# Patient Record
Sex: MALE | Race: BLACK OR AFRICAN AMERICAN | Employment: OTHER | URBAN - METROPOLITAN AREA
[De-identification: names, ages, dates, MRNs, and addresses within clinical notes are randomized per-mention and may not be internally consistent; named-entity substitution may affect disease eponyms.]

---

## 2017-05-13 ENCOUNTER — HOSPITAL ENCOUNTER (EMERGENCY)
Age: 27
Discharge: HOME | End: 2017-05-14
Attending: EMERGENCY MEDICINE
Payer: MEDICAID

## 2017-05-13 ENCOUNTER — APPOINTMENT (OUTPATIENT)
Dept: CT IMAGING | Age: 27
End: 2017-05-13
Payer: MEDICAID

## 2017-05-13 ENCOUNTER — APPOINTMENT (OUTPATIENT)
Dept: GENERAL RADIOLOGY | Age: 27
End: 2017-05-13
Payer: MEDICAID
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
## 2017-05-13 DIAGNOSIS — R07.81 RIB PAIN ON RIGHT SIDE: ICD-10-CM

## 2017-05-13 DIAGNOSIS — F10.920 ACUTE ALCOHOLIC INTOXICATION, UNCOMPLICATED (HCC): ICD-10-CM

## 2017-05-13 LAB
ETHANOL PERCENT: 0.35 %
ETHANOL: 348 MG/DL

## 2017-05-13 PROCEDURE — 72125 CT NECK SPINE W/O DYE: CPT

## 2017-05-13 PROCEDURE — 74177 CT ABD & PELVIS W/CONTRAST: CPT

## 2017-05-13 PROCEDURE — 99284 EMERGENCY DEPT VISIT MOD MDM: CPT

## 2017-05-13 PROCEDURE — 70486 CT MAXILLOFACIAL W/O DYE: CPT

## 2017-05-13 PROCEDURE — G0480 DRUG TEST DEF 1-7 CLASSES: HCPCS

## 2017-05-13 PROCEDURE — 6360000004 HC RX CONTRAST MEDICATION: Performed by: EMERGENCY MEDICINE

## 2017-05-13 PROCEDURE — 71100 X-RAY EXAM RIBS UNI 2 VIEWS: CPT

## 2017-05-13 PROCEDURE — 71020 XR CHEST STANDARD TWO VW: CPT

## 2017-05-13 PROCEDURE — 70450 CT HEAD/BRAIN W/O DYE: CPT

## 2017-05-13 RX ADMIN — IOVERSOL 130 ML: 741 INJECTION INTRA-ARTERIAL; INTRAVENOUS at 23:39

## 2017-05-13 ASSESSMENT — ENCOUNTER SYMPTOMS
CHEST TIGHTNESS: 0
COUGH: 0
NAUSEA: 0
ABDOMINAL PAIN: 0
CONSTIPATION: 0
PHOTOPHOBIA: 0
DIARRHEA: 0
SHORTNESS OF BREATH: 0
VOMITING: 0

## 2017-05-13 ASSESSMENT — PAIN DESCRIPTION - PAIN TYPE: TYPE: ACUTE PAIN

## 2017-05-13 ASSESSMENT — PAIN SCALES - GENERAL: PAINLEVEL_OUTOF10: 8

## 2017-05-13 ASSESSMENT — PAIN DESCRIPTION - LOCATION: LOCATION: BACK;NECK;FACE

## 2017-05-14 VITALS
WEIGHT: 180 LBS | HEIGHT: 70 IN | DIASTOLIC BLOOD PRESSURE: 86 MMHG | RESPIRATION RATE: 16 BRPM | OXYGEN SATURATION: 97 % | SYSTOLIC BLOOD PRESSURE: 130 MMHG | TEMPERATURE: 98.2 F | HEART RATE: 73 BPM | BODY MASS INDEX: 25.77 KG/M2

## 2017-06-04 ENCOUNTER — HOSPITAL ENCOUNTER (INPATIENT)
Age: 27
LOS: 1 days | Discharge: HOME | DRG: 182 | End: 2017-06-05
Attending: EMERGENCY MEDICINE | Admitting: SURGERY
Payer: MEDICAID

## 2017-06-04 ENCOUNTER — ANESTHESIA (OUTPATIENT)
Dept: OPERATING ROOM | Age: 27
DRG: 182 | End: 2017-06-04
Payer: MEDICAID

## 2017-06-04 ENCOUNTER — ANESTHESIA EVENT (OUTPATIENT)
Dept: OPERATING ROOM | Age: 27
DRG: 182 | End: 2017-06-04
Payer: MEDICAID

## 2017-06-04 ENCOUNTER — APPOINTMENT (OUTPATIENT)
Dept: GENERAL RADIOLOGY | Age: 27
DRG: 182 | End: 2017-06-04
Payer: MEDICAID

## 2017-06-04 VITALS — OXYGEN SATURATION: 100 % | SYSTOLIC BLOOD PRESSURE: 161 MMHG | DIASTOLIC BLOOD PRESSURE: 75 MMHG | TEMPERATURE: 97.6 F

## 2017-06-04 DIAGNOSIS — S51.812A FOREARM LACERATION, LEFT, INITIAL ENCOUNTER: Primary | ICD-10-CM

## 2017-06-04 PROBLEM — S54.11XA LACERATION OF RIGHT MEDIAN NERVE: Status: ACTIVE | Noted: 2017-06-04

## 2017-06-04 PROBLEM — S41.101A: Status: ACTIVE | Noted: 2017-06-04

## 2017-06-04 PROBLEM — S55.011A: Status: ACTIVE | Noted: 2017-06-04

## 2017-06-04 PROBLEM — S55.111A LACERATION OF RIGHT RADIAL ARTERY: Status: ACTIVE | Noted: 2017-06-04

## 2017-06-04 PROBLEM — S46.901A: Status: ACTIVE | Noted: 2017-06-04

## 2017-06-04 PROBLEM — S54.01XA: Status: ACTIVE | Noted: 2017-06-04

## 2017-06-04 LAB
ABO/RH: NORMAL
ABSOLUTE EOS #: 0.1 K/UL (ref 0–0.4)
ABSOLUTE LYMPH #: 3.6 K/UL (ref 1–4.8)
ABSOLUTE MONO #: 0.4 K/UL (ref 0.1–1.2)
ANION GAP SERPL CALCULATED.3IONS-SCNC: 18 MMOL/L (ref 9–17)
ANTIBODY SCREEN: NEGATIVE
ARM BAND NUMBER: NORMAL
BASOPHILS # BLD: 1 %
BASOPHILS ABSOLUTE: 0 K/UL (ref 0–0.2)
BUN BLDV-MCNC: 11 MG/DL (ref 6–20)
BUN/CREAT BLD: ABNORMAL (ref 9–20)
CALCIUM SERPL-MCNC: 9.8 MG/DL (ref 8.6–10.4)
CHLORIDE BLD-SCNC: 97 MMOL/L (ref 98–107)
CO2: 21 MMOL/L (ref 20–31)
CREAT SERPL-MCNC: 1.03 MG/DL (ref 0.7–1.2)
DIFFERENTIAL TYPE: NORMAL
EOSINOPHILS RELATIVE PERCENT: 1 %
EXPIRATION DATE: NORMAL
GFR AFRICAN AMERICAN: >60 ML/MIN
GFR NON-AFRICAN AMERICAN: >60 ML/MIN
GFR SERPL CREATININE-BSD FRML MDRD: ABNORMAL ML/MIN/{1.73_M2}
GFR SERPL CREATININE-BSD FRML MDRD: ABNORMAL ML/MIN/{1.73_M2}
GLUCOSE BLD-MCNC: 130 MG/DL (ref 70–99)
HCT VFR BLD CALC: 43.2 % (ref 41–53)
HEMOGLOBIN: 14.6 G/DL (ref 13.5–17.5)
LYMPHOCYTES # BLD: 53 %
MCH RBC QN AUTO: 31.1 PG (ref 26–34)
MCHC RBC AUTO-ENTMCNC: 33.8 G/DL (ref 31–37)
MCV RBC AUTO: 92.2 FL (ref 80–100)
MONOCYTES # BLD: 5 %
PDW BLD-RTO: 14.6 % (ref 12.5–15.4)
PLATELET # BLD: 296 K/UL (ref 140–450)
PLATELET ESTIMATE: NORMAL
PMV BLD AUTO: 6.9 FL (ref 6–12)
POTASSIUM SERPL-SCNC: 3.6 MMOL/L (ref 3.7–5.3)
RBC # BLD: 4.69 M/UL (ref 4.5–5.9)
RBC # BLD: NORMAL 10*6/UL
SEG NEUTROPHILS: 40 %
SEGMENTED NEUTROPHILS ABSOLUTE COUNT: 2.7 K/UL (ref 1.8–7.7)
SODIUM BLD-SCNC: 136 MMOL/L (ref 135–144)
WBC # BLD: 6.8 K/UL (ref 3.5–11)
WBC # BLD: NORMAL 10*3/UL

## 2017-06-04 PROCEDURE — 7100000000 HC PACU RECOVERY - FIRST 15 MIN: Performed by: SURGERY

## 2017-06-04 PROCEDURE — 6360000002 HC RX W HCPCS: Performed by: STUDENT IN AN ORGANIZED HEALTH CARE EDUCATION/TRAINING PROGRAM

## 2017-06-04 PROCEDURE — 6370000000 HC RX 637 (ALT 250 FOR IP): Performed by: STUDENT IN AN ORGANIZED HEALTH CARE EDUCATION/TRAINING PROGRAM

## 2017-06-04 PROCEDURE — 96375 TX/PRO/DX INJ NEW DRUG ADDON: CPT

## 2017-06-04 PROCEDURE — 2580000003 HC RX 258: Performed by: NURSE ANESTHETIST, CERTIFIED REGISTERED

## 2017-06-04 PROCEDURE — 3700000001 HC ADD 15 MINUTES (ANESTHESIA): Performed by: SURGERY

## 2017-06-04 PROCEDURE — 86901 BLOOD TYPING SEROLOGIC RH(D): CPT

## 2017-06-04 PROCEDURE — 85025 COMPLETE CBC W/AUTO DIFF WBC: CPT

## 2017-06-04 PROCEDURE — 80048 BASIC METABOLIC PNL TOTAL CA: CPT

## 2017-06-04 PROCEDURE — 6360000002 HC RX W HCPCS: Performed by: NURSE ANESTHETIST, CERTIFIED REGISTERED

## 2017-06-04 PROCEDURE — 6370000000 HC RX 637 (ALT 250 FOR IP): Performed by: SURGERY

## 2017-06-04 PROCEDURE — 0LQ50ZZ REPAIR RIGHT LOWER ARM AND WRIST TENDON, OPEN APPROACH: ICD-10-PCS | Performed by: PLASTIC SURGERY

## 2017-06-04 PROCEDURE — 6360000002 HC RX W HCPCS: Performed by: SURGERY

## 2017-06-04 PROCEDURE — 2580000003 HC RX 258: Performed by: STUDENT IN AN ORGANIZED HEALTH CARE EDUCATION/TRAINING PROGRAM

## 2017-06-04 PROCEDURE — 1200000000 HC SEMI PRIVATE

## 2017-06-04 PROCEDURE — 6360000002 HC RX W HCPCS: Performed by: EMERGENCY MEDICINE

## 2017-06-04 PROCEDURE — C1757 CATH, THROMBECTOMY/EMBOLECT: HCPCS | Performed by: SURGERY

## 2017-06-04 PROCEDURE — 2500000003 HC RX 250 WO HCPCS: Performed by: SURGERY

## 2017-06-04 PROCEDURE — 99285 EMERGENCY DEPT VISIT HI MDM: CPT

## 2017-06-04 PROCEDURE — 03QB0ZZ REPAIR RIGHT RADIAL ARTERY, OPEN APPROACH: ICD-10-PCS | Performed by: PLASTIC SURGERY

## 2017-06-04 PROCEDURE — 0HQDXZZ REPAIR RIGHT LOWER ARM SKIN, EXTERNAL APPROACH: ICD-10-PCS | Performed by: PLASTIC SURGERY

## 2017-06-04 PROCEDURE — 6360000002 HC RX W HCPCS: Performed by: ANESTHESIOLOGY

## 2017-06-04 PROCEDURE — 2580000003 HC RX 258: Performed by: SURGERY

## 2017-06-04 PROCEDURE — 3600000015 HC SURGERY LEVEL 5 ADDTL 15MIN: Performed by: SURGERY

## 2017-06-04 PROCEDURE — 90715 TDAP VACCINE 7 YRS/> IM: CPT | Performed by: EMERGENCY MEDICINE

## 2017-06-04 PROCEDURE — 86850 RBC ANTIBODY SCREEN: CPT

## 2017-06-04 PROCEDURE — 7100000001 HC PACU RECOVERY - ADDTL 15 MIN: Performed by: SURGERY

## 2017-06-04 PROCEDURE — 3600000005 HC SURGERY LEVEL 5 BASE: Performed by: SURGERY

## 2017-06-04 PROCEDURE — 90471 IMMUNIZATION ADMIN: CPT | Performed by: EMERGENCY MEDICINE

## 2017-06-04 PROCEDURE — 73130 X-RAY EXAM OF HAND: CPT

## 2017-06-04 PROCEDURE — 01Q50ZZ REPAIR MEDIAN NERVE, OPEN APPROACH: ICD-10-PCS | Performed by: PLASTIC SURGERY

## 2017-06-04 PROCEDURE — 2500000003 HC RX 250 WO HCPCS: Performed by: NURSE ANESTHETIST, CERTIFIED REGISTERED

## 2017-06-04 PROCEDURE — 3700000000 HC ANESTHESIA ATTENDED CARE: Performed by: SURGERY

## 2017-06-04 PROCEDURE — 2580000003 HC RX 258: Performed by: EMERGENCY MEDICINE

## 2017-06-04 PROCEDURE — 86900 BLOOD TYPING SEROLOGIC ABO: CPT

## 2017-06-04 PROCEDURE — 96365 THER/PROPH/DIAG IV INF INIT: CPT

## 2017-06-04 PROCEDURE — 01Q40ZZ REPAIR ULNAR NERVE, OPEN APPROACH: ICD-10-PCS | Performed by: PLASTIC SURGERY

## 2017-06-04 RX ORDER — MIDAZOLAM HYDROCHLORIDE 1 MG/ML
INJECTION INTRAMUSCULAR; INTRAVENOUS PRN
Status: DISCONTINUED | OUTPATIENT
Start: 2017-06-04 | End: 2017-06-04 | Stop reason: SDUPTHER

## 2017-06-04 RX ORDER — HYDRALAZINE HYDROCHLORIDE 20 MG/ML
5 INJECTION INTRAMUSCULAR; INTRAVENOUS EVERY 10 MIN PRN
Status: DISCONTINUED | OUTPATIENT
Start: 2017-06-04 | End: 2017-06-04

## 2017-06-04 RX ORDER — SODIUM CHLORIDE 9 MG/ML
INJECTION, SOLUTION INTRAVENOUS CONTINUOUS PRN
Status: DISCONTINUED | OUTPATIENT
Start: 2017-06-04 | End: 2017-06-04 | Stop reason: SDUPTHER

## 2017-06-04 RX ORDER — SODIUM CHLORIDE 9 MG/ML
INJECTION, SOLUTION INTRAVENOUS CONTINUOUS
Status: DISCONTINUED | OUTPATIENT
Start: 2017-06-04 | End: 2017-06-05

## 2017-06-04 RX ORDER — OXYCODONE HYDROCHLORIDE 5 MG/1
10 TABLET ORAL EVERY 4 HOURS PRN
Status: DISCONTINUED | OUTPATIENT
Start: 2017-06-04 | End: 2017-06-05 | Stop reason: HOSPADM

## 2017-06-04 RX ORDER — MORPHINE SULFATE 4 MG/ML
4 INJECTION, SOLUTION INTRAMUSCULAR; INTRAVENOUS
Status: DISCONTINUED | OUTPATIENT
Start: 2017-06-04 | End: 2017-06-05

## 2017-06-04 RX ORDER — SODIUM CHLORIDE, SODIUM LACTATE, POTASSIUM CHLORIDE, CALCIUM CHLORIDE 600; 310; 30; 20 MG/100ML; MG/100ML; MG/100ML; MG/100ML
INJECTION, SOLUTION INTRAVENOUS CONTINUOUS PRN
Status: DISCONTINUED | OUTPATIENT
Start: 2017-06-04 | End: 2017-06-04 | Stop reason: SDUPTHER

## 2017-06-04 RX ORDER — FENTANYL CITRATE 50 UG/ML
25 INJECTION, SOLUTION INTRAMUSCULAR; INTRAVENOUS EVERY 5 MIN PRN
Status: DISCONTINUED | OUTPATIENT
Start: 2017-06-04 | End: 2017-06-04

## 2017-06-04 RX ORDER — SODIUM CHLORIDE 450 MG/100ML
INJECTION, SOLUTION INTRAVENOUS CONTINUOUS PRN
Status: DISCONTINUED | OUTPATIENT
Start: 2017-06-04 | End: 2017-06-04 | Stop reason: SDUPTHER

## 2017-06-04 RX ORDER — ONDANSETRON 2 MG/ML
4 INJECTION INTRAMUSCULAR; INTRAVENOUS
Status: DISCONTINUED | OUTPATIENT
Start: 2017-06-04 | End: 2017-06-04

## 2017-06-04 RX ORDER — LABETALOL HYDROCHLORIDE 5 MG/ML
5 INJECTION, SOLUTION INTRAVENOUS EVERY 10 MIN PRN
Status: DISCONTINUED | OUTPATIENT
Start: 2017-06-04 | End: 2017-06-04

## 2017-06-04 RX ORDER — 0.9 % SODIUM CHLORIDE 0.9 %
1000 INTRAVENOUS SOLUTION INTRAVENOUS ONCE
Status: COMPLETED | OUTPATIENT
Start: 2017-06-04 | End: 2017-06-04

## 2017-06-04 RX ORDER — SODIUM CHLORIDE 0.9 % (FLUSH) 0.9 %
10 SYRINGE (ML) INJECTION PRN
Status: DISCONTINUED | OUTPATIENT
Start: 2017-06-04 | End: 2017-06-05 | Stop reason: HOSPADM

## 2017-06-04 RX ORDER — MEPERIDINE HYDROCHLORIDE 50 MG/ML
12.5 INJECTION INTRAMUSCULAR; INTRAVENOUS; SUBCUTANEOUS EVERY 5 MIN PRN
Status: DISCONTINUED | OUTPATIENT
Start: 2017-06-04 | End: 2017-06-04

## 2017-06-04 RX ORDER — OXYCODONE HYDROCHLORIDE 5 MG/1
5 TABLET ORAL EVERY 4 HOURS PRN
Status: DISCONTINUED | OUTPATIENT
Start: 2017-06-04 | End: 2017-06-05 | Stop reason: HOSPADM

## 2017-06-04 RX ORDER — ROCURONIUM BROMIDE 10 MG/ML
INJECTION, SOLUTION INTRAVENOUS PRN
Status: DISCONTINUED | OUTPATIENT
Start: 2017-06-04 | End: 2017-06-04 | Stop reason: SDUPTHER

## 2017-06-04 RX ORDER — ASPIRIN 81 MG/1
81 TABLET, CHEWABLE ORAL DAILY
Status: DISCONTINUED | OUTPATIENT
Start: 2017-06-04 | End: 2017-06-05 | Stop reason: HOSPADM

## 2017-06-04 RX ORDER — SODIUM CHLORIDE 0.9 % (FLUSH) 0.9 %
10 SYRINGE (ML) INJECTION EVERY 12 HOURS SCHEDULED
Status: DISCONTINUED | OUTPATIENT
Start: 2017-06-04 | End: 2017-06-05 | Stop reason: HOSPADM

## 2017-06-04 RX ORDER — ACETAMINOPHEN 325 MG/1
650 TABLET ORAL EVERY 4 HOURS PRN
Status: DISCONTINUED | OUTPATIENT
Start: 2017-06-04 | End: 2017-06-05 | Stop reason: HOSPADM

## 2017-06-04 RX ORDER — MORPHINE SULFATE 2 MG/ML
2 INJECTION, SOLUTION INTRAMUSCULAR; INTRAVENOUS EVERY 5 MIN PRN
Status: DISCONTINUED | OUTPATIENT
Start: 2017-06-04 | End: 2017-06-04

## 2017-06-04 RX ORDER — OXYCODONE HYDROCHLORIDE AND ACETAMINOPHEN 5; 325 MG/1; MG/1
1 TABLET ORAL PRN
Status: DISCONTINUED | OUTPATIENT
Start: 2017-06-04 | End: 2017-06-04

## 2017-06-04 RX ORDER — PROPOFOL 10 MG/ML
INJECTION, EMULSION INTRAVENOUS PRN
Status: DISCONTINUED | OUTPATIENT
Start: 2017-06-04 | End: 2017-06-04 | Stop reason: SDUPTHER

## 2017-06-04 RX ORDER — MORPHINE SULFATE 2 MG/ML
2 INJECTION, SOLUTION INTRAMUSCULAR; INTRAVENOUS
Status: DISCONTINUED | OUTPATIENT
Start: 2017-06-04 | End: 2017-06-05

## 2017-06-04 RX ORDER — ONDANSETRON 2 MG/ML
4 INJECTION INTRAMUSCULAR; INTRAVENOUS EVERY 6 HOURS PRN
Status: DISCONTINUED | OUTPATIENT
Start: 2017-06-04 | End: 2017-06-05 | Stop reason: HOSPADM

## 2017-06-04 RX ORDER — DIPHENHYDRAMINE HYDROCHLORIDE 50 MG/ML
12.5 INJECTION INTRAMUSCULAR; INTRAVENOUS
Status: DISCONTINUED | OUTPATIENT
Start: 2017-06-04 | End: 2017-06-04

## 2017-06-04 RX ORDER — HEPARIN SODIUM 1000 [USP'U]/ML
INJECTION, SOLUTION INTRAVENOUS; SUBCUTANEOUS PRN
Status: DISCONTINUED | OUTPATIENT
Start: 2017-06-04 | End: 2017-06-04 | Stop reason: SDUPTHER

## 2017-06-04 RX ORDER — OXYCODONE HYDROCHLORIDE AND ACETAMINOPHEN 5; 325 MG/1; MG/1
2 TABLET ORAL PRN
Status: DISCONTINUED | OUTPATIENT
Start: 2017-06-04 | End: 2017-06-04

## 2017-06-04 RX ORDER — SUCCINYLCHOLINE CHLORIDE 20 MG/ML
INJECTION INTRAMUSCULAR; INTRAVENOUS PRN
Status: DISCONTINUED | OUTPATIENT
Start: 2017-06-04 | End: 2017-06-04 | Stop reason: SDUPTHER

## 2017-06-04 RX ORDER — FENTANYL CITRATE 50 UG/ML
50 INJECTION, SOLUTION INTRAMUSCULAR; INTRAVENOUS EVERY 5 MIN PRN
Status: DISCONTINUED | OUTPATIENT
Start: 2017-06-04 | End: 2017-06-04

## 2017-06-04 RX ORDER — TETRAHYDROZOLINE HCL 0.05 %
1 DROPS OPHTHALMIC (EYE) 2 TIMES DAILY
Status: DISCONTINUED | OUTPATIENT
Start: 2017-06-04 | End: 2017-06-05 | Stop reason: HOSPADM

## 2017-06-04 RX ORDER — MAGNESIUM HYDROXIDE 1200 MG/15ML
LIQUID ORAL CONTINUOUS PRN
Status: DISCONTINUED | OUTPATIENT
Start: 2017-06-04 | End: 2017-06-05 | Stop reason: HOSPADM

## 2017-06-04 RX ADMIN — Medication 1 DROP: at 16:49

## 2017-06-04 RX ADMIN — Medication 2 G: at 07:30

## 2017-06-04 RX ADMIN — FENTANYL CITRATE 100 MCG: 50 INJECTION INTRAMUSCULAR; INTRAVENOUS at 10:41

## 2017-06-04 RX ADMIN — TETANUS TOXOID, REDUCED DIPHTHERIA TOXOID AND ACELLULAR PERTUSSIS VACCINE, ADSORBED 0.5 ML: 5; 2.5; 8; 8; 2.5 SUSPENSION INTRAMUSCULAR at 05:32

## 2017-06-04 RX ADMIN — FENTANYL CITRATE 150 MCG: 50 INJECTION INTRAMUSCULAR; INTRAVENOUS at 08:37

## 2017-06-04 RX ADMIN — HYDROMORPHONE HYDROCHLORIDE 1 MG: 1 INJECTION, SOLUTION INTRAMUSCULAR; INTRAVENOUS; SUBCUTANEOUS at 05:36

## 2017-06-04 RX ADMIN — SODIUM CHLORIDE: 9 INJECTION, SOLUTION INTRAVENOUS at 08:51

## 2017-06-04 RX ADMIN — OXYCODONE HYDROCHLORIDE 10 MG: 5 TABLET ORAL at 17:14

## 2017-06-04 RX ADMIN — FENTANYL CITRATE 100 MCG: 50 INJECTION INTRAMUSCULAR; INTRAVENOUS at 06:55

## 2017-06-04 RX ADMIN — Medication 1 DROP: at 20:16

## 2017-06-04 RX ADMIN — Medication 10 ML: at 20:16

## 2017-06-04 RX ADMIN — SODIUM CHLORIDE 1000 ML: 9 INJECTION, SOLUTION INTRAVENOUS at 05:31

## 2017-06-04 RX ADMIN — DEXTROSE MONOHYDRATE 2 G: 50 INJECTION, SOLUTION INTRAVENOUS at 21:41

## 2017-06-04 RX ADMIN — MORPHINE SULFATE 4 MG: 4 INJECTION, SOLUTION INTRAMUSCULAR; INTRAVENOUS at 15:39

## 2017-06-04 RX ADMIN — SODIUM CHLORIDE, POTASSIUM CHLORIDE, SODIUM LACTATE AND CALCIUM CHLORIDE: 600; 310; 30; 20 INJECTION, SOLUTION INTRAVENOUS at 06:52

## 2017-06-04 RX ADMIN — MORPHINE SULFATE 4 MG: 4 INJECTION, SOLUTION INTRAMUSCULAR; INTRAVENOUS at 20:16

## 2017-06-04 RX ADMIN — ROCURONIUM BROMIDE 20 MG: 10 INJECTION INTRAVENOUS at 07:06

## 2017-06-04 RX ADMIN — CEFAZOLIN SODIUM 1 G: 1 INJECTION, SOLUTION INTRAVENOUS at 05:32

## 2017-06-04 RX ADMIN — DEXTROSE MONOHYDRATE 2 G: 50 INJECTION, SOLUTION INTRAVENOUS at 16:49

## 2017-06-04 RX ADMIN — HEPARIN SODIUM 5000 UNITS: 1000 INJECTION, SOLUTION INTRAVENOUS; SUBCUTANEOUS at 07:33

## 2017-06-04 RX ADMIN — SUCCINYLCHOLINE CHLORIDE 60 MG: 20 INJECTION, SOLUTION INTRAMUSCULAR; INTRAVENOUS at 06:59

## 2017-06-04 RX ADMIN — OXYCODONE HYDROCHLORIDE 10 MG: 5 TABLET ORAL at 21:41

## 2017-06-04 RX ADMIN — MORPHINE SULFATE 2 MG: 2 INJECTION, SOLUTION INTRAMUSCULAR; INTRAVENOUS at 13:25

## 2017-06-04 RX ADMIN — PROPOFOL 200 MG: 10 INJECTION, EMULSION INTRAVENOUS at 06:59

## 2017-06-04 RX ADMIN — Medication 2 G: at 06:59

## 2017-06-04 RX ADMIN — MIDAZOLAM HYDROCHLORIDE 2 MG: 1 INJECTION, SOLUTION INTRAMUSCULAR; INTRAVENOUS at 06:55

## 2017-06-04 RX ADMIN — ASPIRIN 81 MG: 81 TABLET, CHEWABLE ORAL at 15:40

## 2017-06-04 RX ADMIN — SODIUM CHLORIDE 1000 ML: 9 INJECTION, SOLUTION INTRAVENOUS at 05:32

## 2017-06-04 RX ADMIN — SODIUM CHLORIDE: 4.5 INJECTION, SOLUTION INTRAVENOUS at 06:52

## 2017-06-04 RX ADMIN — ONDANSETRON 4 MG: 2 INJECTION, SOLUTION INTRAMUSCULAR; INTRAVENOUS at 11:35

## 2017-06-04 ASSESSMENT — PAIN DESCRIPTION - PAIN TYPE
TYPE: SURGICAL PAIN
TYPE: ACUTE PAIN

## 2017-06-04 ASSESSMENT — PAIN SCALES - GENERAL
PAINLEVEL_OUTOF10: 9
PAINLEVEL_OUTOF10: 10
PAINLEVEL_OUTOF10: 9
PAINLEVEL_OUTOF10: 10
PAINLEVEL_OUTOF10: 6

## 2017-06-04 ASSESSMENT — PAIN DESCRIPTION - PROGRESSION: CLINICAL_PROGRESSION: NOT CHANGED

## 2017-06-04 ASSESSMENT — PAIN DESCRIPTION - ORIENTATION: ORIENTATION: RIGHT

## 2017-06-04 ASSESSMENT — PAIN DESCRIPTION - LOCATION: LOCATION: HAND

## 2017-06-04 ASSESSMENT — PAIN DESCRIPTION - FREQUENCY
FREQUENCY: CONTINUOUS
FREQUENCY: CONTINUOUS

## 2017-06-04 ASSESSMENT — ENCOUNTER SYMPTOMS: SHORTNESS OF BREATH: 0

## 2017-06-04 ASSESSMENT — PAIN DESCRIPTION - ONSET: ONSET: ON-GOING

## 2017-06-04 ASSESSMENT — PAIN DESCRIPTION - DESCRIPTORS: DESCRIPTORS: ACHING

## 2017-06-05 VITALS
DIASTOLIC BLOOD PRESSURE: 92 MMHG | OXYGEN SATURATION: 98 % | RESPIRATION RATE: 17 BRPM | SYSTOLIC BLOOD PRESSURE: 164 MMHG | HEART RATE: 77 BPM | BODY MASS INDEX: 25.77 KG/M2 | WEIGHT: 180 LBS | TEMPERATURE: 98.8 F | HEIGHT: 70 IN

## 2017-06-05 LAB
ABSOLUTE EOS #: 0.1 K/UL (ref 0–0.4)
ABSOLUTE LYMPH #: 2.3 K/UL (ref 1–4.8)
ABSOLUTE MONO #: 0.8 K/UL (ref 0.1–1.2)
ANION GAP SERPL CALCULATED.3IONS-SCNC: 17 MMOL/L (ref 9–17)
BASOPHILS # BLD: 0 %
BASOPHILS ABSOLUTE: 0 K/UL (ref 0–0.2)
BUN BLDV-MCNC: 6 MG/DL (ref 6–20)
BUN/CREAT BLD: ABNORMAL (ref 9–20)
CALCIUM SERPL-MCNC: 9 MG/DL (ref 8.6–10.4)
CHLORIDE BLD-SCNC: 97 MMOL/L (ref 98–107)
CO2: 22 MMOL/L (ref 20–31)
CREAT SERPL-MCNC: 0.93 MG/DL (ref 0.7–1.2)
DIFFERENTIAL TYPE: ABNORMAL
EOSINOPHILS RELATIVE PERCENT: 1 %
GFR AFRICAN AMERICAN: >60 ML/MIN
GFR NON-AFRICAN AMERICAN: >60 ML/MIN
GFR SERPL CREATININE-BSD FRML MDRD: ABNORMAL ML/MIN/{1.73_M2}
GFR SERPL CREATININE-BSD FRML MDRD: ABNORMAL ML/MIN/{1.73_M2}
GLUCOSE BLD-MCNC: 121 MG/DL (ref 70–99)
HCT VFR BLD CALC: 37 % (ref 41–53)
HEMOGLOBIN: 12.6 G/DL (ref 13.5–17.5)
LYMPHOCYTES # BLD: 29 %
MCH RBC QN AUTO: 31.6 PG (ref 26–34)
MCHC RBC AUTO-ENTMCNC: 34 G/DL (ref 31–37)
MCV RBC AUTO: 92.9 FL (ref 80–100)
MONOCYTES # BLD: 10 %
PDW BLD-RTO: 14.4 % (ref 12.5–15.4)
PLATELET # BLD: 244 K/UL (ref 140–450)
PLATELET ESTIMATE: ABNORMAL
PMV BLD AUTO: 7.6 FL (ref 6–12)
POTASSIUM SERPL-SCNC: 4.1 MMOL/L (ref 3.7–5.3)
RBC # BLD: 3.98 M/UL (ref 4.5–5.9)
RBC # BLD: ABNORMAL 10*6/UL
SEG NEUTROPHILS: 60 %
SEGMENTED NEUTROPHILS ABSOLUTE COUNT: 4.7 K/UL (ref 1.8–7.7)
SODIUM BLD-SCNC: 136 MMOL/L (ref 135–144)
WBC # BLD: 7.9 K/UL (ref 3.5–11)
WBC # BLD: ABNORMAL 10*3/UL

## 2017-06-05 PROCEDURE — 2580000003 HC RX 258: Performed by: SURGERY

## 2017-06-05 PROCEDURE — 94762 N-INVAS EAR/PLS OXIMTRY CONT: CPT

## 2017-06-05 PROCEDURE — 6370000000 HC RX 637 (ALT 250 FOR IP): Performed by: SURGERY

## 2017-06-05 PROCEDURE — 6360000002 HC RX W HCPCS: Performed by: SURGERY

## 2017-06-05 PROCEDURE — 6360000002 HC RX W HCPCS: Performed by: STUDENT IN AN ORGANIZED HEALTH CARE EDUCATION/TRAINING PROGRAM

## 2017-06-05 PROCEDURE — 80048 BASIC METABOLIC PNL TOTAL CA: CPT

## 2017-06-05 PROCEDURE — 85025 COMPLETE CBC W/AUTO DIFF WBC: CPT

## 2017-06-05 PROCEDURE — 36415 COLL VENOUS BLD VENIPUNCTURE: CPT

## 2017-06-05 RX ORDER — ASPIRIN 81 MG/1
81 TABLET, CHEWABLE ORAL DAILY
Qty: 30 TABLET | Refills: 1 | Status: SHIPPED | OUTPATIENT
Start: 2017-06-05 | End: 2017-07-18 | Stop reason: ALTCHOICE

## 2017-06-05 RX ORDER — DOCUSATE SODIUM 100 MG/1
100 CAPSULE, LIQUID FILLED ORAL 2 TIMES DAILY
Qty: 30 CAPSULE | Refills: 0 | Status: ON HOLD | OUTPATIENT
Start: 2017-06-05 | End: 2019-10-27

## 2017-06-05 RX ORDER — OXYCODONE HYDROCHLORIDE 5 MG/1
5 TABLET ORAL EVERY 4 HOURS PRN
Qty: 60 TABLET | Refills: 0 | Status: SHIPPED | OUTPATIENT
Start: 2017-06-05 | End: 2017-07-05

## 2017-06-05 RX ADMIN — MORPHINE SULFATE 4 MG: 4 INJECTION, SOLUTION INTRAMUSCULAR; INTRAVENOUS at 00:03

## 2017-06-05 RX ADMIN — OXYCODONE HYDROCHLORIDE 10 MG: 5 TABLET ORAL at 08:44

## 2017-06-05 RX ADMIN — Medication 10 ML: at 08:52

## 2017-06-05 RX ADMIN — OXYCODONE HYDROCHLORIDE 10 MG: 5 TABLET ORAL at 04:31

## 2017-06-05 RX ADMIN — CEFAZOLIN SODIUM 1 G: 1 INJECTION, SOLUTION INTRAVENOUS at 09:00

## 2017-06-05 RX ADMIN — MORPHINE SULFATE 4 MG: 4 INJECTION, SOLUTION INTRAMUSCULAR; INTRAVENOUS at 06:41

## 2017-06-05 RX ADMIN — ASPIRIN 81 MG: 81 TABLET, CHEWABLE ORAL at 08:51

## 2017-06-05 RX ADMIN — OXYCODONE HYDROCHLORIDE 10 MG: 5 TABLET ORAL at 12:25

## 2017-06-05 ASSESSMENT — PAIN SCALES - GENERAL
PAINLEVEL_OUTOF10: 9
PAINLEVEL_OUTOF10: 9
PAINLEVEL_OUTOF10: 8
PAINLEVEL_OUTOF10: 9
PAINLEVEL_OUTOF10: 9

## 2017-06-11 ENCOUNTER — HOSPITAL ENCOUNTER (EMERGENCY)
Age: 27
Discharge: HOME | End: 2017-06-11
Attending: EMERGENCY MEDICINE
Payer: MEDICAID

## 2017-06-11 ENCOUNTER — HOSPITAL ENCOUNTER (EMERGENCY)
Age: 27
Discharge: HOME | End: 2017-06-11
Payer: MEDICAID

## 2017-06-11 VITALS
BODY MASS INDEX: 25.77 KG/M2 | SYSTOLIC BLOOD PRESSURE: 156 MMHG | TEMPERATURE: 97.3 F | WEIGHT: 180 LBS | DIASTOLIC BLOOD PRESSURE: 94 MMHG | HEART RATE: 69 BPM | RESPIRATION RATE: 18 BRPM | HEIGHT: 70 IN | OXYGEN SATURATION: 99 %

## 2017-06-11 DIAGNOSIS — Z48.00 ENCOUNTER FOR CAST CARE: Primary | ICD-10-CM

## 2017-06-11 PROCEDURE — 29125 APPL SHORT ARM SPLINT STATIC: CPT

## 2017-06-11 PROCEDURE — G0381 LEV 2 HOSP TYPE B ED VISIT: HCPCS

## 2017-06-11 ASSESSMENT — PAIN DESCRIPTION - DESCRIPTORS: DESCRIPTORS: CONSTANT;SHARP;THROBBING

## 2017-06-11 ASSESSMENT — PAIN DESCRIPTION - LOCATION: LOCATION: ARM;HAND

## 2017-06-11 ASSESSMENT — PAIN SCALES - GENERAL: PAINLEVEL_OUTOF10: 10

## 2017-06-11 ASSESSMENT — PAIN DESCRIPTION - FREQUENCY: FREQUENCY: CONTINUOUS

## 2017-06-11 ASSESSMENT — ENCOUNTER SYMPTOMS
NAUSEA: 0
RHINORRHEA: 0
BACK PAIN: 0
SHORTNESS OF BREATH: 0
TROUBLE SWALLOWING: 0
ABDOMINAL PAIN: 0
DIARRHEA: 0
CONSTIPATION: 0
VOMITING: 0

## 2017-06-11 ASSESSMENT — PAIN DESCRIPTION - PAIN TYPE: TYPE: ACUTE PAIN

## 2017-06-11 ASSESSMENT — PAIN DESCRIPTION - ORIENTATION: ORIENTATION: LEFT

## 2017-06-19 ENCOUNTER — APPOINTMENT (OUTPATIENT)
Dept: GENERAL RADIOLOGY | Age: 27
End: 2017-06-19
Payer: MEDICAID

## 2017-06-19 ENCOUNTER — HOSPITAL ENCOUNTER (EMERGENCY)
Age: 27
Discharge: HOME | End: 2017-06-19
Attending: EMERGENCY MEDICINE
Payer: MEDICAID

## 2017-06-19 VITALS
DIASTOLIC BLOOD PRESSURE: 85 MMHG | RESPIRATION RATE: 16 BRPM | OXYGEN SATURATION: 97 % | WEIGHT: 180 LBS | TEMPERATURE: 98.8 F | SYSTOLIC BLOOD PRESSURE: 150 MMHG | HEART RATE: 58 BPM | BODY MASS INDEX: 25.77 KG/M2 | HEIGHT: 70 IN

## 2017-06-19 DIAGNOSIS — Z47.89 CAST DISCOMFORT: Primary | ICD-10-CM

## 2017-06-19 PROCEDURE — G0382 LEV 3 HOSP TYPE B ED VISIT: HCPCS

## 2017-06-19 PROCEDURE — 73130 X-RAY EXAM OF HAND: CPT

## 2017-06-19 PROCEDURE — 6370000000 HC RX 637 (ALT 250 FOR IP): Performed by: PHYSICIAN ASSISTANT

## 2017-06-19 RX ORDER — OXYCODONE HYDROCHLORIDE AND ACETAMINOPHEN 5; 325 MG/1; MG/1
2 TABLET ORAL ONCE
Status: COMPLETED | OUTPATIENT
Start: 2017-06-19 | End: 2017-06-19

## 2017-06-19 RX ORDER — OXYCODONE HYDROCHLORIDE AND ACETAMINOPHEN 5; 325 MG/1; MG/1
1 TABLET ORAL EVERY 8 HOURS PRN
Qty: 21 TABLET | Refills: 0 | Status: SHIPPED | OUTPATIENT
Start: 2017-06-19 | End: 2019-06-23

## 2017-06-19 RX ADMIN — OXYCODONE HYDROCHLORIDE AND ACETAMINOPHEN 2 TABLET: 5; 325 TABLET ORAL at 14:53

## 2017-06-19 ASSESSMENT — PAIN SCALES - GENERAL
PAINLEVEL_OUTOF10: 10
PAINLEVEL_OUTOF10: 10

## 2017-06-19 ASSESSMENT — ENCOUNTER SYMPTOMS
WHEEZING: 0
COUGH: 0
TROUBLE SWALLOWING: 0
VOMITING: 0
NAUSEA: 0
SHORTNESS OF BREATH: 0

## 2017-06-19 ASSESSMENT — PAIN DESCRIPTION - DESCRIPTORS: DESCRIPTORS: ACHING;THROBBING

## 2017-06-20 ENCOUNTER — TELEPHONE (OUTPATIENT)
Dept: BURN CARE | Age: 27
End: 2017-06-20

## 2017-07-17 ENCOUNTER — TELEPHONE (OUTPATIENT)
Dept: BURN CARE | Age: 27
End: 2017-07-17

## 2017-07-18 ENCOUNTER — OFFICE VISIT (OUTPATIENT)
Dept: BURN CARE | Age: 27
End: 2017-07-18
Payer: MEDICAID

## 2017-07-18 VITALS
BODY MASS INDEX: 24.65 KG/M2 | WEIGHT: 172.2 LBS | HEART RATE: 58 BPM | SYSTOLIC BLOOD PRESSURE: 142 MMHG | HEIGHT: 70 IN | DIASTOLIC BLOOD PRESSURE: 88 MMHG | TEMPERATURE: 98.5 F

## 2017-07-18 DIAGNOSIS — S46.901D OPEN WOUND OF RIGHT UPPER EXTREMITY WITH TENDON INJURY, SUBSEQUENT ENCOUNTER: Primary | ICD-10-CM

## 2017-07-18 DIAGNOSIS — S41.101D OPEN WOUND OF RIGHT UPPER EXTREMITY WITH TENDON INJURY, SUBSEQUENT ENCOUNTER: Primary | ICD-10-CM

## 2017-07-18 PROCEDURE — 99202 OFFICE O/P NEW SF 15 MIN: CPT | Performed by: PLASTIC SURGERY

## 2017-07-18 RX ORDER — IBUPROFEN 800 MG/1
800 TABLET ORAL EVERY 8 HOURS PRN
Qty: 50 TABLET | Refills: 0 | Status: SHIPPED | OUTPATIENT
Start: 2017-07-18 | End: 2019-06-23 | Stop reason: SDUPTHER

## 2017-08-01 ENCOUNTER — TELEPHONE (OUTPATIENT)
Dept: BURN CARE | Age: 27
End: 2017-08-01

## 2017-09-23 ENCOUNTER — APPOINTMENT (OUTPATIENT)
Dept: GENERAL RADIOLOGY | Age: 27
End: 2017-09-23
Payer: MEDICAID

## 2017-09-23 ENCOUNTER — HOSPITAL ENCOUNTER (INPATIENT)
Age: 27
LOS: 2 days | Discharge: HOME | End: 2017-09-25
Attending: EMERGENCY MEDICINE | Admitting: SURGERY
Payer: MEDICAID

## 2017-09-23 ENCOUNTER — APPOINTMENT (OUTPATIENT)
Dept: CT IMAGING | Age: 27
End: 2017-09-23
Payer: MEDICAID

## 2017-09-23 DIAGNOSIS — R41.89 UNRESPONSIVE: Primary | ICD-10-CM

## 2017-09-23 PROBLEM — F10.20 ALCOHOLISM (HCC): Chronic | Status: ACTIVE | Noted: 2017-09-23

## 2017-09-23 PROBLEM — F41.9 ANXIETY: Chronic | Status: ACTIVE | Noted: 2017-09-23

## 2017-09-23 PROBLEM — F14.10 COCAINE ABUSE (HCC): Chronic | Status: ACTIVE | Noted: 2017-09-23
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
## 2017-09-23 PROBLEM — F12.10 MILD TETRAHYDROCANNABINOL (THC) ABUSE: Chronic | Status: ACTIVE | Noted: 2017-09-23

## 2017-09-23 PROBLEM — S06.9X9A CLOSED HEAD INJURY WITH LOSS OF CONSCIOUSNESS OF UNKNOWN DURATION (HCC): Status: ACTIVE | Noted: 2017-09-23

## 2017-09-23 PROBLEM — S46.901A: Status: RESOLVED | Noted: 2017-06-04 | Resolved: 2017-09-23

## 2017-09-23 PROBLEM — S41.101A: Status: RESOLVED | Noted: 2017-06-04 | Resolved: 2017-09-23

## 2017-09-23 PROBLEM — I10 HYPERTENSION: Chronic | Status: ACTIVE | Noted: 2017-09-23

## 2017-09-23 PROBLEM — I49.9 IRREGULAR HEARTBEAT: Chronic | Status: ACTIVE | Noted: 2017-09-23

## 2017-09-23 LAB
ABO/RH: NORMAL
ABSOLUTE EOS #: 0.22 K/UL (ref 0–0.4)
ABSOLUTE LYMPH #: 2.25 K/UL (ref 1–4.8)
ABSOLUTE MONO #: 0.35 K/UL (ref 0.1–1.3)
ACETAMINOPHEN LEVEL: <10 UG/ML (ref 10–30)
ALBUMIN SERPL-MCNC: 4.7 G/DL (ref 3.5–5.2)
ALBUMIN/GLOBULIN RATIO: ABNORMAL (ref 1–2.5)
ALLEN TEST: ABNORMAL
ALP BLD-CCNC: 57 U/L (ref 40–129)
ALT SERPL-CCNC: 25 U/L (ref 5–41)
AMMONIA: 54 UMOL/L (ref 16–60)
AMPHETAMINE SCREEN URINE: NEGATIVE
ANION GAP SERPL CALCULATED.3IONS-SCNC: 17 MMOL/L (ref 9–17)
ANTIBODY SCREEN: NEGATIVE
ARM BAND NUMBER: NORMAL
AST SERPL-CCNC: 34 U/L
ATYPICAL LYMPHOCYTE ABSOLUTE COUNT: 0.26 K/UL
ATYPICAL LYMPHOCYTES: 6 %
BARBITURATE SCREEN URINE: NEGATIVE
BASOPHILS # BLD: 0 %
BASOPHILS ABSOLUTE: 0 K/UL (ref 0–0.2)
BENZODIAZEPINE SCREEN, URINE: NEGATIVE
BILIRUB SERPL-MCNC: 0.17 MG/DL (ref 0.3–1.2)
BILIRUBIN URINE: NEGATIVE
BLOOD BANK COMMENT: NORMAL
BUN BLDV-MCNC: 9 MG/DL (ref 6–20)
BUN/CREAT BLD: ABNORMAL (ref 9–20)
BUPRENORPHINE URINE: ABNORMAL
CALCIUM SERPL-MCNC: 9 MG/DL (ref 8.6–10.4)
CANNABINOID SCREEN URINE: POSITIVE
CARBOXYHEMOGLOBIN: 0.6 % (ref 0–5)
CHLORIDE BLD-SCNC: 104 MMOL/L (ref 98–107)
CO2: 24 MMOL/L (ref 20–31)
COCAINE METABOLITE, URINE: POSITIVE
COLOR: YELLOW
COMMENT UA: NORMAL
CREAT SERPL-MCNC: 1.12 MG/DL (ref 0.7–1.2)
DIFFERENTIAL TYPE: NORMAL
EOSINOPHILS RELATIVE PERCENT: 5 %
ETHANOL PERCENT: 0.34 %
ETHANOL: 343 MG/DL
EXPIRATION DATE: NORMAL
FIO2: 100
GFR AFRICAN AMERICAN: >60 ML/MIN
GFR NON-AFRICAN AMERICAN: >60 ML/MIN
GFR SERPL CREATININE-BSD FRML MDRD: ABNORMAL ML/MIN/{1.73_M2}
GFR SERPL CREATININE-BSD FRML MDRD: ABNORMAL ML/MIN/{1.73_M2}
GLUCOSE BLD-MCNC: 105 MG/DL (ref 70–99)
GLUCOSE URINE: NEGATIVE
HCO3 ARTERIAL: 23.1 MMOL/L (ref 22–26)
HCT VFR BLD CALC: 41.8 % (ref 41–53)
HEMOGLOBIN: 14 G/DL (ref 13.5–17.5)
INR BLD: 1
KETONES, URINE: NEGATIVE
LEUKOCYTE ESTERASE, URINE: NEGATIVE
LIPASE: 23 U/L (ref 13–60)
LYMPHOCYTES # BLD: 51 %
MCH RBC QN AUTO: 30.2 PG (ref 26–34)
MCHC RBC AUTO-ENTMCNC: 33.6 G/DL (ref 31–37)
MCV RBC AUTO: 90 FL (ref 80–100)
MDMA URINE: ABNORMAL
METHADONE SCREEN, URINE: NEGATIVE
METHAMPHETAMINE, URINE: ABNORMAL
METHEMOGLOBIN: 1 % (ref 0–1.9)
MODE: ABNORMAL
MONOCYTES # BLD: 8 %
MORPHOLOGY: NORMAL
NEGATIVE BASE EXCESS, ART: 2.8 MMOL/L (ref 0–2)
NITRITE, URINE: NEGATIVE
NOTIFICATION TIME: ABNORMAL
NOTIFICATION: ABNORMAL
O2 DEVICE/FLOW/%: ABNORMAL
O2 SAT, ARTERIAL: 98.6 % (ref 95–98)
OPIATES, URINE: NEGATIVE
OXYCODONE SCREEN URINE: NEGATIVE
OXYHEMOGLOBIN: ABNORMAL % (ref 95–98)
PARTIAL THROMBOPLASTIN TIME: 24.8 SEC (ref 23–31)
PATIENT TEMP: 37
PCO2 ARTERIAL: 43.5 MMHG (ref 35–45)
PCO2, ART, TEMP ADJ: ABNORMAL (ref 35–45)
PDW BLD-RTO: 12.6 % (ref 11.5–14.9)
PEEP/CPAP: 5
PH ARTERIAL: 7.33 (ref 7.35–7.45)
PH UA: 6 (ref 5–8)
PH, ART, TEMP ADJ: ABNORMAL (ref 7.35–7.45)
PHENCYCLIDINE, URINE: NEGATIVE
PLATELET # BLD: 273 K/UL (ref 150–450)
PLATELET ESTIMATE: NORMAL
PMV BLD AUTO: 7.3 FL (ref 6–12)
PO2 ARTERIAL: 423 MMHG (ref 80–100)
PO2, ART, TEMP ADJ: ABNORMAL MMHG (ref 80–100)
POSITIVE BASE EXCESS, ART: ABNORMAL MMOL/L (ref 0–2)
POTASSIUM SERPL-SCNC: 3.6 MMOL/L (ref 3.7–5.3)
PROPOXYPHENE, URINE: ABNORMAL
PROTEIN UA: NEGATIVE
PROTHROMBIN TIME: 11 SEC (ref 9.7–12)
PSV: ABNORMAL
PT. POSITION: ABNORMAL
RBC # BLD: 4.64 M/UL (ref 4.5–5.9)
RBC # BLD: NORMAL 10*6/UL
RESPIRATORY RATE: ABNORMAL
SALICYLATE LEVEL: <1 MG/DL (ref 3–10)
SAMPLE SITE: ABNORMAL
SEG NEUTROPHILS: 30 %
SEGMENTED NEUTROPHILS ABSOLUTE COUNT: 1.32 K/UL (ref 1.3–9.1)
SET RATE: 14
SODIUM BLD-SCNC: 145 MMOL/L (ref 135–144)
SPECIFIC GRAVITY UA: 1.01 (ref 1–1.03)
TEST INFORMATION: ABNORMAL
TEXT FOR RESPIRATORY: ABNORMAL
TOTAL HB: ABNORMAL G/DL (ref 12–16)
TOTAL PROTEIN: 7.9 G/DL (ref 6.4–8.3)
TOTAL RATE: 14
TRICYCLIC ANTIDEP,URINE: NEGATIVE
TRICYCLIC ANTIDEPRESSANTS, UR: ABNORMAL
TURBIDITY: CLEAR
URINE HGB: NEGATIVE
UROBILINOGEN, URINE: NORMAL
VT: 600
WBC # BLD: 4.4 K/UL (ref 3.5–11)
WBC # BLD: NORMAL 10*3/UL

## 2017-09-23 PROCEDURE — 96365 THER/PROPH/DIAG IV INF INIT: CPT

## 2017-09-23 PROCEDURE — 82947 ASSAY GLUCOSE BLOOD QUANT: CPT

## 2017-09-23 PROCEDURE — 2580000003 HC RX 258: Performed by: EMERGENCY MEDICINE

## 2017-09-23 PROCEDURE — 82140 ASSAY OF AMMONIA: CPT

## 2017-09-23 PROCEDURE — 6360000002 HC RX W HCPCS: Performed by: EMERGENCY MEDICINE

## 2017-09-23 PROCEDURE — 94770 HC ETCO2 MONITOR DAILY: CPT

## 2017-09-23 PROCEDURE — 85730 THROMBOPLASTIN TIME PARTIAL: CPT

## 2017-09-23 PROCEDURE — 85610 PROTHROMBIN TIME: CPT

## 2017-09-23 PROCEDURE — 94002 VENT MGMT INPAT INIT DAY: CPT

## 2017-09-23 PROCEDURE — 82805 BLOOD GASES W/O2 SATURATION: CPT

## 2017-09-23 PROCEDURE — 86901 BLOOD TYPING SEROLOGIC RH(D): CPT

## 2017-09-23 PROCEDURE — G0378 HOSPITAL OBSERVATION PER HR: HCPCS

## 2017-09-23 PROCEDURE — 2500000003 HC RX 250 WO HCPCS: Performed by: SURGERY

## 2017-09-23 PROCEDURE — 5A1945Z RESPIRATORY VENTILATION, 24-96 CONSECUTIVE HOURS: ICD-10-PCS | Performed by: INTERNAL MEDICINE

## 2017-09-23 PROCEDURE — 6360000002 HC RX W HCPCS: Performed by: INTERNAL MEDICINE

## 2017-09-23 PROCEDURE — 2000000000 HC ICU R&B

## 2017-09-23 PROCEDURE — 6370000000 HC RX 637 (ALT 250 FOR IP): Performed by: INTERNAL MEDICINE

## 2017-09-23 PROCEDURE — 72125 CT NECK SPINE W/O DYE: CPT

## 2017-09-23 PROCEDURE — 2580000003 HC RX 258: Performed by: SURGERY

## 2017-09-23 PROCEDURE — 2500000003 HC RX 250 WO HCPCS

## 2017-09-23 PROCEDURE — 0BH18EZ INSERTION OF ENDOTRACHEAL AIRWAY INTO TRACHEA, VIA NATURAL OR ARTIFICIAL OPENING ENDOSCOPIC: ICD-10-PCS | Performed by: INTERNAL MEDICINE

## 2017-09-23 PROCEDURE — S0028 INJECTION, FAMOTIDINE, 20 MG: HCPCS | Performed by: SURGERY

## 2017-09-23 PROCEDURE — 12013 RPR F/E/E/N/L/M 2.6-5.0 CM: CPT

## 2017-09-23 PROCEDURE — 99285 EMERGENCY DEPT VISIT HI MDM: CPT

## 2017-09-23 PROCEDURE — 2500000003 HC RX 250 WO HCPCS: Performed by: EMERGENCY MEDICINE

## 2017-09-23 PROCEDURE — 36600 WITHDRAWAL OF ARTERIAL BLOOD: CPT

## 2017-09-23 PROCEDURE — 6360000002 HC RX W HCPCS

## 2017-09-23 PROCEDURE — 71010 XR CHEST PORTABLE: CPT

## 2017-09-23 PROCEDURE — 86850 RBC ANTIBODY SCREEN: CPT

## 2017-09-23 PROCEDURE — 6360000002 HC RX W HCPCS: Performed by: SURGERY

## 2017-09-23 PROCEDURE — 80307 DRUG TEST PRSMV CHEM ANLYZR: CPT

## 2017-09-23 PROCEDURE — 36415 COLL VENOUS BLD VENIPUNCTURE: CPT

## 2017-09-23 PROCEDURE — 72170 X-RAY EXAM OF PELVIS: CPT

## 2017-09-23 PROCEDURE — 70450 CT HEAD/BRAIN W/O DYE: CPT

## 2017-09-23 PROCEDURE — 74177 CT ABD & PELVIS W/CONTRAST: CPT

## 2017-09-23 PROCEDURE — 81003 URINALYSIS AUTO W/O SCOPE: CPT

## 2017-09-23 PROCEDURE — 51702 INSERT TEMP BLADDER CATH: CPT

## 2017-09-23 PROCEDURE — 90471 IMMUNIZATION ADMIN: CPT | Performed by: EMERGENCY MEDICINE

## 2017-09-23 PROCEDURE — 85025 COMPLETE CBC W/AUTO DIFF WBC: CPT

## 2017-09-23 PROCEDURE — 86900 BLOOD TYPING SEROLOGIC ABO: CPT

## 2017-09-23 PROCEDURE — 83690 ASSAY OF LIPASE: CPT

## 2017-09-23 PROCEDURE — 6360000004 HC RX CONTRAST MEDICATION: Performed by: EMERGENCY MEDICINE

## 2017-09-23 PROCEDURE — 90715 TDAP VACCINE 7 YRS/> IM: CPT | Performed by: EMERGENCY MEDICINE

## 2017-09-23 PROCEDURE — G0480 DRUG TEST DEF 1-7 CLASSES: HCPCS

## 2017-09-23 PROCEDURE — 94762 N-INVAS EAR/PLS OXIMTRY CONT: CPT

## 2017-09-23 PROCEDURE — 80053 COMPREHEN METABOLIC PANEL: CPT

## 2017-09-23 PROCEDURE — 96375 TX/PRO/DX INJ NEW DRUG ADDON: CPT

## 2017-09-23 PROCEDURE — 93005 ELECTROCARDIOGRAM TRACING: CPT

## 2017-09-23 PROCEDURE — 6370000000 HC RX 637 (ALT 250 FOR IP): Performed by: SURGERY

## 2017-09-23 RX ORDER — POTASSIUM CHLORIDE 20MEQ/15ML
40 LIQUID (ML) ORAL PRN
Status: DISCONTINUED | OUTPATIENT
Start: 2017-09-23 | End: 2017-09-25 | Stop reason: HOSPADM

## 2017-09-23 RX ORDER — FENTANYL CITRATE 50 UG/ML
100 INJECTION, SOLUTION INTRAMUSCULAR; INTRAVENOUS ONCE
Status: COMPLETED | OUTPATIENT
Start: 2017-09-23 | End: 2017-09-23

## 2017-09-23 RX ORDER — PROPOFOL 10 MG/ML
10 INJECTION, EMULSION INTRAVENOUS ONCE
Status: COMPLETED | OUTPATIENT
Start: 2017-09-23 | End: 2017-09-23

## 2017-09-23 RX ORDER — METOPROLOL TARTRATE 5 MG/5ML
5 INJECTION INTRAVENOUS EVERY 6 HOURS
Status: DISCONTINUED | OUTPATIENT
Start: 2017-09-23 | End: 2017-09-25 | Stop reason: HOSPADM

## 2017-09-23 RX ORDER — LORAZEPAM 2 MG/ML
1 INJECTION INTRAMUSCULAR
Status: DISCONTINUED | OUTPATIENT
Start: 2017-09-23 | End: 2017-09-25 | Stop reason: HOSPADM

## 2017-09-23 RX ORDER — DOCUSATE SODIUM 100 MG/1
100 CAPSULE, LIQUID FILLED ORAL 2 TIMES DAILY
Status: DISCONTINUED | OUTPATIENT
Start: 2017-09-23 | End: 2017-09-25 | Stop reason: HOSPADM

## 2017-09-23 RX ORDER — MORPHINE SULFATE 2 MG/ML
2 INJECTION, SOLUTION INTRAMUSCULAR; INTRAVENOUS
Status: DISCONTINUED | OUTPATIENT
Start: 2017-09-23 | End: 2017-09-25 | Stop reason: HOSPADM

## 2017-09-23 RX ORDER — VECURONIUM BROMIDE 1 MG/ML
10 INJECTION, POWDER, LYOPHILIZED, FOR SOLUTION INTRAVENOUS ONCE
Status: COMPLETED | OUTPATIENT
Start: 2017-09-23 | End: 2017-09-23

## 2017-09-23 RX ORDER — ONDANSETRON 2 MG/ML
4 INJECTION INTRAMUSCULAR; INTRAVENOUS EVERY 4 HOURS PRN
Status: DISCONTINUED | OUTPATIENT
Start: 2017-09-23 | End: 2017-09-25 | Stop reason: HOSPADM

## 2017-09-23 RX ORDER — CHLORHEXIDINE GLUCONATE 0.12 MG/ML
15 RINSE ORAL 2 TIMES DAILY
Status: DISCONTINUED | OUTPATIENT
Start: 2017-09-23 | End: 2017-09-25 | Stop reason: HOSPADM

## 2017-09-23 RX ORDER — SODIUM CHLORIDE 0.9 % (FLUSH) 0.9 %
10 SYRINGE (ML) INJECTION EVERY 12 HOURS SCHEDULED
Status: DISCONTINUED | OUTPATIENT
Start: 2017-09-23 | End: 2017-09-25 | Stop reason: HOSPADM

## 2017-09-23 RX ORDER — SUCCINYLCHOLINE CHLORIDE 20 MG/ML
INJECTION INTRAMUSCULAR; INTRAVENOUS DAILY PRN
Status: COMPLETED | OUTPATIENT
Start: 2017-09-23 | End: 2017-09-23

## 2017-09-23 RX ORDER — MORPHINE SULFATE 2 MG/ML
4 INJECTION, SOLUTION INTRAMUSCULAR; INTRAVENOUS
Status: DISCONTINUED | OUTPATIENT
Start: 2017-09-23 | End: 2017-09-25 | Stop reason: HOSPADM

## 2017-09-23 RX ORDER — 0.9 % SODIUM CHLORIDE 0.9 %
100 INTRAVENOUS SOLUTION INTRAVENOUS ONCE
Status: COMPLETED | OUTPATIENT
Start: 2017-09-23 | End: 2017-09-23

## 2017-09-23 RX ORDER — OXYCODONE HYDROCHLORIDE AND ACETAMINOPHEN 5; 325 MG/1; MG/1
2 TABLET ORAL EVERY 4 HOURS PRN
Status: DISCONTINUED | OUTPATIENT
Start: 2017-09-23 | End: 2017-09-25 | Stop reason: HOSPADM

## 2017-09-23 RX ORDER — MIDAZOLAM HYDROCHLORIDE 1 MG/ML
INJECTION INTRAMUSCULAR; INTRAVENOUS DAILY PRN
Status: COMPLETED | OUTPATIENT
Start: 2017-09-23 | End: 2017-09-23

## 2017-09-23 RX ORDER — SODIUM CHLORIDE 0.9 % (FLUSH) 0.9 %
10 SYRINGE (ML) INJECTION PRN
Status: DISCONTINUED | OUTPATIENT
Start: 2017-09-23 | End: 2017-09-25 | Stop reason: HOSPADM

## 2017-09-23 RX ORDER — 0.9 % SODIUM CHLORIDE 0.9 %
1000 INTRAVENOUS SOLUTION INTRAVENOUS ONCE
Status: COMPLETED | OUTPATIENT
Start: 2017-09-23 | End: 2017-09-23

## 2017-09-23 RX ORDER — LORAZEPAM 1 MG/1
4 TABLET ORAL
Status: DISCONTINUED | OUTPATIENT
Start: 2017-09-23 | End: 2017-09-25 | Stop reason: HOSPADM

## 2017-09-23 RX ORDER — SODIUM CHLORIDE 9 MG/ML
INJECTION, SOLUTION INTRAVENOUS CONTINUOUS PRN
Status: COMPLETED | OUTPATIENT
Start: 2017-09-23 | End: 2017-09-23

## 2017-09-23 RX ORDER — SODIUM CHLORIDE 0.9 % (FLUSH) 0.9 %
10 SYRINGE (ML) INJECTION PRN
Status: DISCONTINUED | OUTPATIENT
Start: 2017-09-23 | End: 2017-09-23

## 2017-09-23 RX ORDER — NICOTINE 21 MG/24HR
1 PATCH, TRANSDERMAL 24 HOURS TRANSDERMAL DAILY
Status: DISCONTINUED | OUTPATIENT
Start: 2017-09-23 | End: 2017-09-25 | Stop reason: HOSPADM

## 2017-09-23 RX ORDER — LORAZEPAM 1 MG/1
2 TABLET ORAL
Status: DISCONTINUED | OUTPATIENT
Start: 2017-09-23 | End: 2017-09-25 | Stop reason: HOSPADM

## 2017-09-23 RX ORDER — LORAZEPAM 2 MG/ML
4 INJECTION INTRAMUSCULAR
Status: DISCONTINUED | OUTPATIENT
Start: 2017-09-23 | End: 2017-09-25 | Stop reason: HOSPADM

## 2017-09-23 RX ORDER — OXYCODONE HYDROCHLORIDE AND ACETAMINOPHEN 5; 325 MG/1; MG/1
1 TABLET ORAL EVERY 4 HOURS PRN
Status: DISCONTINUED | OUTPATIENT
Start: 2017-09-23 | End: 2017-09-25 | Stop reason: HOSPADM

## 2017-09-23 RX ORDER — POTASSIUM CHLORIDE 7.45 MG/ML
10 INJECTION INTRAVENOUS PRN
Status: DISCONTINUED | OUTPATIENT
Start: 2017-09-23 | End: 2017-09-25 | Stop reason: HOSPADM

## 2017-09-23 RX ORDER — LORAZEPAM 2 MG/ML
3 INJECTION INTRAMUSCULAR
Status: DISCONTINUED | OUTPATIENT
Start: 2017-09-23 | End: 2017-09-25 | Stop reason: HOSPADM

## 2017-09-23 RX ORDER — DEXTROSE, SODIUM CHLORIDE, AND POTASSIUM CHLORIDE 5; .45; .15 G/100ML; G/100ML; G/100ML
INJECTION INTRAVENOUS CONTINUOUS
Status: DISCONTINUED | OUTPATIENT
Start: 2017-09-23 | End: 2017-09-25 | Stop reason: HOSPADM

## 2017-09-23 RX ORDER — ETOMIDATE 2 MG/ML
INJECTION INTRAVENOUS DAILY PRN
Status: COMPLETED | OUTPATIENT
Start: 2017-09-23 | End: 2017-09-23

## 2017-09-23 RX ORDER — POTASSIUM CHLORIDE 20 MEQ/1
40 TABLET, EXTENDED RELEASE ORAL PRN
Status: DISCONTINUED | OUTPATIENT
Start: 2017-09-23 | End: 2017-09-25 | Stop reason: HOSPADM

## 2017-09-23 RX ORDER — MIDAZOLAM HYDROCHLORIDE 1 MG/ML
4 INJECTION INTRAMUSCULAR; INTRAVENOUS ONCE
Status: DISCONTINUED | OUTPATIENT
Start: 2017-09-23 | End: 2017-09-23

## 2017-09-23 RX ORDER — METOPROLOL TARTRATE 5 MG/5ML
5 INJECTION INTRAVENOUS ONCE
Status: DISCONTINUED | OUTPATIENT
Start: 2017-09-23 | End: 2017-09-23

## 2017-09-23 RX ORDER — FENTANYL CITRATE 50 UG/ML
50 INJECTION, SOLUTION INTRAMUSCULAR; INTRAVENOUS EVERY 30 MIN PRN
Status: DISCONTINUED | OUTPATIENT
Start: 2017-09-23 | End: 2017-09-25 | Stop reason: HOSPADM

## 2017-09-23 RX ORDER — LIDOCAINE HYDROCHLORIDE AND EPINEPHRINE BITARTRATE 20; .01 MG/ML; MG/ML
20 INJECTION, SOLUTION SUBCUTANEOUS ONCE
Status: COMPLETED | OUTPATIENT
Start: 2017-09-23 | End: 2017-09-23

## 2017-09-23 RX ORDER — MAGNESIUM SULFATE 1 G/100ML
1 INJECTION INTRAVENOUS PRN
Status: DISCONTINUED | OUTPATIENT
Start: 2017-09-23 | End: 2017-09-25 | Stop reason: HOSPADM

## 2017-09-23 RX ORDER — LORAZEPAM 1 MG/1
1 TABLET ORAL
Status: DISCONTINUED | OUTPATIENT
Start: 2017-09-23 | End: 2017-09-25 | Stop reason: HOSPADM

## 2017-09-23 RX ORDER — LORAZEPAM 1 MG/1
3 TABLET ORAL
Status: DISCONTINUED | OUTPATIENT
Start: 2017-09-23 | End: 2017-09-25 | Stop reason: HOSPADM

## 2017-09-23 RX ORDER — TRAZODONE HYDROCHLORIDE 100 MG/1
100 TABLET ORAL NIGHTLY
Status: ON HOLD | COMMUNITY
End: 2019-10-27

## 2017-09-23 RX ORDER — PROPOFOL 10 MG/ML
10 INJECTION, EMULSION INTRAVENOUS CONTINUOUS
Status: DISCONTINUED | OUTPATIENT
Start: 2017-09-23 | End: 2017-09-25 | Stop reason: HOSPADM

## 2017-09-23 RX ORDER — ALBUTEROL SULFATE 90 UG/1
4 AEROSOL, METERED RESPIRATORY (INHALATION) EVERY 6 HOURS PRN
Status: DISCONTINUED | OUTPATIENT
Start: 2017-09-23 | End: 2017-09-25 | Stop reason: HOSPADM

## 2017-09-23 RX ORDER — LORAZEPAM 2 MG/ML
2 INJECTION INTRAMUSCULAR
Status: DISCONTINUED | OUTPATIENT
Start: 2017-09-23 | End: 2017-09-25 | Stop reason: HOSPADM

## 2017-09-23 RX ADMIN — VECURONIUM BROMIDE 10 MG: 1 INJECTION, POWDER, LYOPHILIZED, FOR SOLUTION INTRAVENOUS at 17:46

## 2017-09-23 RX ADMIN — LIDOCAINE HYDROCHLORIDE,EPINEPHRINE BITARTRATE 20 ML: 20; .01 INJECTION, SOLUTION INFILTRATION; PERINEURAL at 17:22

## 2017-09-23 RX ADMIN — ETOMIDATE 10 MG: 2 INJECTION, SOLUTION INTRAVENOUS at 15:37

## 2017-09-23 RX ADMIN — HYDROMORPHONE HYDROCHLORIDE 1 MG: 1 INJECTION, SOLUTION INTRAMUSCULAR; INTRAVENOUS; SUBCUTANEOUS at 17:29

## 2017-09-23 RX ADMIN — FAMOTIDINE 20 MG: 10 INJECTION, SOLUTION INTRAVENOUS at 22:51

## 2017-09-23 RX ADMIN — IOPAMIDOL 100 ML: 755 INJECTION, SOLUTION INTRAVENOUS at 16:54

## 2017-09-23 RX ADMIN — CEFAZOLIN SODIUM 1 G: 1 INJECTION, POWDER, FOR SOLUTION INTRAMUSCULAR; INTRAVENOUS at 17:51

## 2017-09-23 RX ADMIN — CHLORHEXIDINE GLUCONATE 15 ML: 1.2 RINSE ORAL at 23:01

## 2017-09-23 RX ADMIN — SUCCINYLCHOLINE CHLORIDE 150 MG: 20 INJECTION, SOLUTION INTRAMUSCULAR; INTRAVENOUS at 15:20

## 2017-09-23 RX ADMIN — ENOXAPARIN SODIUM 40 MG: 40 INJECTION SUBCUTANEOUS at 19:59

## 2017-09-23 RX ADMIN — SODIUM CHLORIDE 150 ML/HR: 9 INJECTION, SOLUTION INTRAVENOUS at 16:30

## 2017-09-23 RX ADMIN — TETANUS TOXOID, REDUCED DIPHTHERIA TOXOID AND ACELLULAR PERTUSSIS VACCINE, ADSORBED 0.5 ML: 5; 2.5; 8; 8; 2.5 SUSPENSION INTRAMUSCULAR at 17:53

## 2017-09-23 RX ADMIN — LORAZEPAM 1 MG: 2 INJECTION INTRAMUSCULAR; INTRAVENOUS at 19:35

## 2017-09-23 RX ADMIN — METOPROLOL TARTRATE 5 MG: 5 INJECTION INTRAVENOUS at 19:59

## 2017-09-23 RX ADMIN — ETOMIDATE 30 MG: 2 INJECTION, SOLUTION INTRAVENOUS at 15:19

## 2017-09-23 RX ADMIN — SODIUM CHLORIDE 100 ML: 9 INJECTION, SOLUTION INTRAVENOUS at 16:54

## 2017-09-23 RX ADMIN — FOLIC ACID: 5 INJECTION, SOLUTION INTRAMUSCULAR; INTRAVENOUS; SUBCUTANEOUS at 22:52

## 2017-09-23 RX ADMIN — PROPOFOL 30 MCG/KG/MIN: 10 INJECTION, EMULSION INTRAVENOUS at 22:55

## 2017-09-23 RX ADMIN — SODIUM CHLORIDE 1000 ML: 9 INJECTION, SOLUTION INTRAVENOUS at 15:15

## 2017-09-23 RX ADMIN — POTASSIUM CHLORIDE, DEXTROSE MONOHYDRATE AND SODIUM CHLORIDE: 150; 5; 450 INJECTION, SOLUTION INTRAVENOUS at 19:36

## 2017-09-23 RX ADMIN — MIDAZOLAM HYDROCHLORIDE 4 MG: 1 INJECTION, SOLUTION INTRAMUSCULAR; INTRAVENOUS at 15:57

## 2017-09-23 RX ADMIN — PROPOFOL 10 MCG/KG/MIN: 10 INJECTION, EMULSION INTRAVENOUS at 15:41

## 2017-09-23 RX ADMIN — Medication 10 ML: at 16:54

## 2017-09-23 RX ADMIN — FENTANYL CITRATE 100 MCG: 50 INJECTION, SOLUTION INTRAMUSCULAR; INTRAVENOUS at 16:14

## 2017-09-23 ASSESSMENT — PAIN SCALES - GENERAL
PAINLEVEL_OUTOF10: 0
PAINLEVEL_OUTOF10: 0
PAINLEVEL_OUTOF10: 6
PAINLEVEL_OUTOF10: 0
PAINLEVEL_OUTOF10: 0

## 2017-09-23 ASSESSMENT — PAIN SCALES - PAIN ASSESSMENT IN ADVANCED DEMENTIA (PAINAD)
BODYLANGUAGE: 2
CONSOLABILITY: 2
FACIALEXPRESSION: 0
BREATHING: 2
NEGVOCALIZATION: 0
TOTALSCORE: 6

## 2017-09-23 ASSESSMENT — PULMONARY FUNCTION TESTS
PIF_VALUE: 24
PIF_VALUE: 8
PIF_VALUE: 23
PIF_VALUE: 23

## 2017-09-24 ENCOUNTER — APPOINTMENT (OUTPATIENT)
Dept: GENERAL RADIOLOGY | Age: 27
End: 2017-09-24
Payer: MEDICAID

## 2017-09-24 PROBLEM — S55.111A LACERATION OF RIGHT RADIAL ARTERY: Status: RESOLVED | Noted: 2017-06-04 | Resolved: 2017-09-24

## 2017-09-24 PROBLEM — S55.011A: Status: RESOLVED | Noted: 2017-06-04 | Resolved: 2017-09-24

## 2017-09-24 PROBLEM — S54.01XA: Status: RESOLVED | Noted: 2017-06-04 | Resolved: 2017-09-24

## 2017-09-24 PROBLEM — S54.11XA LACERATION OF RIGHT MEDIAN NERVE: Status: RESOLVED | Noted: 2017-06-04 | Resolved: 2017-09-24

## 2017-09-24 LAB
ALLEN TEST: ABNORMAL
ANION GAP SERPL CALCULATED.3IONS-SCNC: 14 MMOL/L (ref 9–17)
BUN BLDV-MCNC: 8 MG/DL (ref 6–20)
BUN/CREAT BLD: ABNORMAL (ref 9–20)
CALCIUM SERPL-MCNC: 8.1 MG/DL (ref 8.6–10.4)
CARBOXYHEMOGLOBIN: 0.3 %
CHLORIDE BLD-SCNC: 109 MMOL/L (ref 98–107)
CO2: 21 MMOL/L (ref 20–31)
CREAT SERPL-MCNC: 1.01 MG/DL (ref 0.7–1.2)
ETHANOL PERCENT: 0.13 %
ETHANOL: 133 MG/DL
FIO2: ABNORMAL
GFR AFRICAN AMERICAN: >60 ML/MIN
GFR NON-AFRICAN AMERICAN: >60 ML/MIN
GFR SERPL CREATININE-BSD FRML MDRD: ABNORMAL ML/MIN/{1.73_M2}
GFR SERPL CREATININE-BSD FRML MDRD: ABNORMAL ML/MIN/{1.73_M2}
GLUCOSE BLD-MCNC: 115 MG/DL (ref 70–99)
GLUCOSE BLD-MCNC: 92 MG/DL (ref 75–110)
HCO3 ARTERIAL: 22 MMOL/L
HCT VFR BLD CALC: 40.4 % (ref 41–53)
HEMOGLOBIN: 13.9 G/DL (ref 13.5–17.5)
MCH RBC QN AUTO: 31.5 PG (ref 26–34)
MCHC RBC AUTO-ENTMCNC: 34.3 G/DL (ref 31–37)
MCV RBC AUTO: 91.8 FL (ref 80–100)
METHEMOGLOBIN: 0.7 %
MODE: ABNORMAL
NEGATIVE BASE EXCESS, ART: 3.3 MMOL/L (ref 0–2)
NOTIFICATION TIME: ABNORMAL
NOTIFICATION: ABNORMAL
O2 DEVICE/FLOW/%: ABNORMAL
O2 SAT, ARTERIAL: 97.6 %
OXYHEMOGLOBIN: ABNORMAL % (ref 95–98)
PATIENT TEMP: 37
PCO2 ARTERIAL: 38.5 MMHG
PCO2, ART, TEMP ADJ: ABNORMAL
PDW BLD-RTO: 13.1 % (ref 11.5–14.9)
PEEP/CPAP: 5
PH ARTERIAL: 7.37
PH, ART, TEMP ADJ: ABNORMAL
PLATELET # BLD: 250 K/UL (ref 150–450)
PMV BLD AUTO: 6.9 FL (ref 6–12)
PO2 ARTERIAL: 124 MMHG
PO2, ART, TEMP ADJ: ABNORMAL MMHG
POSITIVE BASE EXCESS, ART: ABNORMAL MMOL/L (ref 0–2)
POTASSIUM SERPL-SCNC: 3.9 MMOL/L (ref 3.7–5.3)
PSV: ABNORMAL
PT. POSITION: ABNORMAL
RBC # BLD: 4.41 M/UL (ref 4.5–5.9)
RESPIRATORY RATE: 16
SAMPLE SITE: ABNORMAL
SET RATE: 16
SODIUM BLD-SCNC: 144 MMOL/L (ref 135–144)
TEXT FOR RESPIRATORY: ABNORMAL
TOTAL HB: ABNORMAL G/DL (ref 12–16)
TOTAL RATE: 16
VT: 600
WBC # BLD: 6.6 K/UL (ref 3.5–11)

## 2017-09-24 PROCEDURE — 82805 BLOOD GASES W/O2 SATURATION: CPT

## 2017-09-24 PROCEDURE — 80048 BASIC METABOLIC PNL TOTAL CA: CPT

## 2017-09-24 PROCEDURE — 6360000002 HC RX W HCPCS: Performed by: SURGERY

## 2017-09-24 PROCEDURE — 6370000000 HC RX 637 (ALT 250 FOR IP): Performed by: INTERNAL MEDICINE

## 2017-09-24 PROCEDURE — 85027 COMPLETE CBC AUTOMATED: CPT

## 2017-09-24 PROCEDURE — 6370000000 HC RX 637 (ALT 250 FOR IP): Performed by: SURGERY

## 2017-09-24 PROCEDURE — 94770 HC ETCO2 MONITOR DAILY: CPT

## 2017-09-24 PROCEDURE — 6360000002 HC RX W HCPCS: Performed by: INTERNAL MEDICINE

## 2017-09-24 PROCEDURE — 94003 VENT MGMT INPAT SUBQ DAY: CPT

## 2017-09-24 PROCEDURE — 36600 WITHDRAWAL OF ARTERIAL BLOOD: CPT

## 2017-09-24 PROCEDURE — 36415 COLL VENOUS BLD VENIPUNCTURE: CPT

## 2017-09-24 PROCEDURE — 2580000003 HC RX 258: Performed by: SURGERY

## 2017-09-24 PROCEDURE — G0480 DRUG TEST DEF 1-7 CLASSES: HCPCS

## 2017-09-24 PROCEDURE — 2000000000 HC ICU R&B

## 2017-09-24 PROCEDURE — 2500000003 HC RX 250 WO HCPCS: Performed by: SURGERY

## 2017-09-24 PROCEDURE — S0028 INJECTION, FAMOTIDINE, 20 MG: HCPCS | Performed by: SURGERY

## 2017-09-24 PROCEDURE — 71010 XR CHEST PORTABLE: CPT

## 2017-09-24 PROCEDURE — 94762 N-INVAS EAR/PLS OXIMTRY CONT: CPT

## 2017-09-24 RX ORDER — ACETAMINOPHEN 650 MG/1
650 SUPPOSITORY RECTAL EVERY 4 HOURS PRN
Status: DISCONTINUED | OUTPATIENT
Start: 2017-09-24 | End: 2017-09-25 | Stop reason: HOSPADM

## 2017-09-24 RX ORDER — ACETAMINOPHEN 650 MG/1
650 SUPPOSITORY RECTAL EVERY 4 HOURS PRN
Status: DISCONTINUED | OUTPATIENT
Start: 2017-09-24 | End: 2017-09-24 | Stop reason: SDUPTHER

## 2017-09-24 RX ADMIN — PROPOFOL 50 MCG/KG/MIN: 10 INJECTION, EMULSION INTRAVENOUS at 16:45

## 2017-09-24 RX ADMIN — FENTANYL CITRATE 50 MCG: 50 INJECTION INTRAMUSCULAR; INTRAVENOUS at 09:51

## 2017-09-24 RX ADMIN — POTASSIUM CHLORIDE, DEXTROSE MONOHYDRATE AND SODIUM CHLORIDE: 150; 5; 450 INJECTION, SOLUTION INTRAVENOUS at 21:41

## 2017-09-24 RX ADMIN — PROPOFOL 50 MCG/KG/MIN: 10 INJECTION, EMULSION INTRAVENOUS at 07:33

## 2017-09-24 RX ADMIN — FAMOTIDINE 20 MG: 10 INJECTION, SOLUTION INTRAVENOUS at 09:24

## 2017-09-24 RX ADMIN — LORAZEPAM 2 MG: 2 INJECTION INTRAMUSCULAR; INTRAVENOUS at 21:41

## 2017-09-24 RX ADMIN — LORAZEPAM 3 MG: 2 INJECTION INTRAMUSCULAR; INTRAVENOUS at 07:30

## 2017-09-24 RX ADMIN — METOPROLOL TARTRATE 5 MG: 5 INJECTION INTRAVENOUS at 07:46

## 2017-09-24 RX ADMIN — PROPOFOL 40 MCG/KG/MIN: 10 INJECTION, EMULSION INTRAVENOUS at 04:30

## 2017-09-24 RX ADMIN — METOPROLOL TARTRATE 5 MG: 5 INJECTION INTRAVENOUS at 02:24

## 2017-09-24 RX ADMIN — PROPOFOL 50 MCG/KG/MIN: 10 INJECTION, EMULSION INTRAVENOUS at 11:10

## 2017-09-24 RX ADMIN — CHLORHEXIDINE GLUCONATE 15 ML: 1.2 RINSE ORAL at 21:41

## 2017-09-24 RX ADMIN — LORAZEPAM 2 MG: 2 INJECTION INTRAMUSCULAR; INTRAVENOUS at 23:58

## 2017-09-24 RX ADMIN — LORAZEPAM 2 MG: 2 INJECTION INTRAMUSCULAR; INTRAVENOUS at 16:55

## 2017-09-24 RX ADMIN — PROPOFOL 50 MCG/KG/MIN: 10 INJECTION, EMULSION INTRAVENOUS at 19:30

## 2017-09-24 RX ADMIN — PROPOFOL 30 MCG/KG/MIN: 10 INJECTION, EMULSION INTRAVENOUS at 00:05

## 2017-09-24 RX ADMIN — ACETAMINOPHEN 650 MG: 650 SUPPOSITORY RECTAL at 17:04

## 2017-09-24 RX ADMIN — POTASSIUM CHLORIDE, DEXTROSE MONOHYDRATE AND SODIUM CHLORIDE: 150; 5; 450 INJECTION, SOLUTION INTRAVENOUS at 16:26

## 2017-09-24 RX ADMIN — PROPOFOL 60 MCG/KG/MIN: 10 INJECTION, EMULSION INTRAVENOUS at 23:50

## 2017-09-24 RX ADMIN — POTASSIUM CHLORIDE, DEXTROSE MONOHYDRATE AND SODIUM CHLORIDE: 150; 5; 450 INJECTION, SOLUTION INTRAVENOUS at 09:22

## 2017-09-24 RX ADMIN — FAMOTIDINE 20 MG: 10 INJECTION, SOLUTION INTRAVENOUS at 21:38

## 2017-09-24 RX ADMIN — Medication 10 ML: at 09:24

## 2017-09-24 RX ADMIN — METOPROLOL TARTRATE 5 MG: 5 INJECTION INTRAVENOUS at 19:49

## 2017-09-24 RX ADMIN — LORAZEPAM 2 MG: 2 INJECTION INTRAMUSCULAR; INTRAVENOUS at 19:58

## 2017-09-24 RX ADMIN — LORAZEPAM 2 MG: 2 INJECTION INTRAMUSCULAR; INTRAVENOUS at 15:31

## 2017-09-24 RX ADMIN — METOPROLOL TARTRATE 5 MG: 5 INJECTION INTRAVENOUS at 14:14

## 2017-09-24 RX ADMIN — POTASSIUM CHLORIDE, DEXTROSE MONOHYDRATE AND SODIUM CHLORIDE: 150; 5; 450 INJECTION, SOLUTION INTRAVENOUS at 02:14

## 2017-09-24 RX ADMIN — ENOXAPARIN SODIUM 40 MG: 40 INJECTION SUBCUTANEOUS at 09:23

## 2017-09-24 RX ADMIN — FENTANYL CITRATE 50 MCG: 50 INJECTION INTRAMUSCULAR; INTRAVENOUS at 15:31

## 2017-09-24 RX ADMIN — FENTANYL CITRATE 50 MCG: 50 INJECTION INTRAMUSCULAR; INTRAVENOUS at 12:02

## 2017-09-24 ASSESSMENT — PAIN SCALES - GENERAL
PAINLEVEL_OUTOF10: 6
PAINLEVEL_OUTOF10: 2
PAINLEVEL_OUTOF10: 6
PAINLEVEL_OUTOF10: 0
PAINLEVEL_OUTOF10: 6
PAINLEVEL_OUTOF10: 8
PAINLEVEL_OUTOF10: 0
PAINLEVEL_OUTOF10: 7
PAINLEVEL_OUTOF10: 0

## 2017-09-24 ASSESSMENT — PULMONARY FUNCTION TESTS
PIF_VALUE: 12
PIF_VALUE: 20
PIF_VALUE: 12
PIF_VALUE: 22
PIF_VALUE: 12
PIF_VALUE: 21
PIF_VALUE: 24
PIF_VALUE: 12
PIF_VALUE: 13
PIF_VALUE: 20
PIF_VALUE: 14
PIF_VALUE: 13
PIF_VALUE: 12
PIF_VALUE: 23
PIF_VALUE: 12
PIF_VALUE: 13
PIF_VALUE: 12
PIF_VALUE: 17
PIF_VALUE: 12
PIF_VALUE: 21
PIF_VALUE: 12
PIF_VALUE: 17
PIF_VALUE: 13

## 2017-09-24 ASSESSMENT — PAIN SCALES - PAIN ASSESSMENT IN ADVANCED DEMENTIA (PAINAD)
BREATHING: 1
TOTALSCORE: 2
NEGVOCALIZATION: 0
FACIALEXPRESSION: 0
CONSOLABILITY: 0
BODYLANGUAGE: 1

## 2017-09-25 ENCOUNTER — APPOINTMENT (OUTPATIENT)
Dept: GENERAL RADIOLOGY | Age: 27
End: 2017-09-25
Payer: MEDICAID

## 2017-09-25 VITALS
WEIGHT: 188.93 LBS | DIASTOLIC BLOOD PRESSURE: 95 MMHG | SYSTOLIC BLOOD PRESSURE: 151 MMHG | BODY MASS INDEX: 27.98 KG/M2 | OXYGEN SATURATION: 97 % | HEART RATE: 65 BPM | TEMPERATURE: 99.3 F | HEIGHT: 69 IN | RESPIRATION RATE: 19 BRPM

## 2017-09-25 LAB
ALLEN TEST: NORMAL
ANION GAP SERPL CALCULATED.3IONS-SCNC: 13 MMOL/L (ref 9–17)
BUN BLDV-MCNC: 5 MG/DL (ref 6–20)
BUN/CREAT BLD: ABNORMAL (ref 9–20)
CALCIUM SERPL-MCNC: 9.2 MG/DL (ref 8.6–10.4)
CARBOXYHEMOGLOBIN: 0.6 %
CHLORIDE BLD-SCNC: 104 MMOL/L (ref 98–107)
CO2: 24 MMOL/L (ref 20–31)
CREAT SERPL-MCNC: 0.95 MG/DL (ref 0.7–1.2)
FIO2: NORMAL
GFR AFRICAN AMERICAN: >60 ML/MIN
GFR NON-AFRICAN AMERICAN: >60 ML/MIN
GFR SERPL CREATININE-BSD FRML MDRD: ABNORMAL ML/MIN/{1.73_M2}
GFR SERPL CREATININE-BSD FRML MDRD: ABNORMAL ML/MIN/{1.73_M2}
GLUCOSE BLD-MCNC: 113 MG/DL (ref 70–99)
HCO3 ARTERIAL: 24.7 MMOL/L
HCT VFR BLD CALC: 40.8 % (ref 41–53)
HEMOGLOBIN: 13.9 G/DL (ref 13.5–17.5)
MCH RBC QN AUTO: 31.1 PG (ref 26–34)
MCHC RBC AUTO-ENTMCNC: 34.1 G/DL (ref 31–37)
MCV RBC AUTO: 91.1 FL (ref 80–100)
METHEMOGLOBIN: 0.6 %
MODE: NORMAL
NEGATIVE BASE EXCESS, ART: NORMAL MMOL/L (ref 0–2)
NOTIFICATION TIME: NORMAL
NOTIFICATION: NORMAL
O2 DEVICE/FLOW/%: NORMAL
O2 SAT, ARTERIAL: 96.7 %
OXYHEMOGLOBIN: NORMAL % (ref 95–98)
PATIENT TEMP: 37
PCO2 ARTERIAL: 36.9 MMHG
PCO2, ART, TEMP ADJ: NORMAL
PDW BLD-RTO: 12.9 % (ref 11.5–14.9)
PEEP/CPAP: 5
PH ARTERIAL: 7.43
PH, ART, TEMP ADJ: NORMAL
PLATELET # BLD: 220 K/UL (ref 150–450)
PMV BLD AUTO: 7.1 FL (ref 6–12)
PO2 ARTERIAL: 92.1 MMHG
PO2, ART, TEMP ADJ: NORMAL MMHG
POSITIVE BASE EXCESS, ART: 0.5 MMOL/L (ref 0–2)
POTASSIUM SERPL-SCNC: 3.9 MMOL/L (ref 3.7–5.3)
PSV: NORMAL
PT. POSITION: NORMAL
RBC # BLD: 4.47 M/UL (ref 4.5–5.9)
RESPIRATORY RATE: 16
SAMPLE SITE: NORMAL
SET RATE: 16
SODIUM BLD-SCNC: 141 MMOL/L (ref 135–144)
TEXT FOR RESPIRATORY: NORMAL
TOTAL HB: NORMAL G/DL (ref 12–16)
TOTAL RATE: 16
VT: 600
WBC # BLD: 8.8 K/UL (ref 3.5–11)

## 2017-09-25 PROCEDURE — S0028 INJECTION, FAMOTIDINE, 20 MG: HCPCS | Performed by: SURGERY

## 2017-09-25 PROCEDURE — 71010 XR CHEST PORTABLE: CPT

## 2017-09-25 PROCEDURE — 6370000000 HC RX 637 (ALT 250 FOR IP): Performed by: SURGERY

## 2017-09-25 PROCEDURE — 2500000003 HC RX 250 WO HCPCS: Performed by: SURGERY

## 2017-09-25 PROCEDURE — 94003 VENT MGMT INPAT SUBQ DAY: CPT

## 2017-09-25 PROCEDURE — 82805 BLOOD GASES W/O2 SATURATION: CPT

## 2017-09-25 PROCEDURE — 6370000000 HC RX 637 (ALT 250 FOR IP): Performed by: NURSE PRACTITIONER

## 2017-09-25 PROCEDURE — 80048 BASIC METABOLIC PNL TOTAL CA: CPT

## 2017-09-25 PROCEDURE — 36600 WITHDRAWAL OF ARTERIAL BLOOD: CPT

## 2017-09-25 PROCEDURE — 94762 N-INVAS EAR/PLS OXIMTRY CONT: CPT

## 2017-09-25 PROCEDURE — 6370000000 HC RX 637 (ALT 250 FOR IP): Performed by: INTERNAL MEDICINE

## 2017-09-25 PROCEDURE — 6360000002 HC RX W HCPCS: Performed by: SURGERY

## 2017-09-25 PROCEDURE — 85027 COMPLETE CBC AUTOMATED: CPT

## 2017-09-25 PROCEDURE — 36415 COLL VENOUS BLD VENIPUNCTURE: CPT

## 2017-09-25 PROCEDURE — 2580000003 HC RX 258: Performed by: SURGERY

## 2017-09-25 PROCEDURE — 6360000002 HC RX W HCPCS: Performed by: INTERNAL MEDICINE

## 2017-09-25 RX ORDER — FOLIC ACID 1 MG/1
1 TABLET ORAL DAILY
Status: DISCONTINUED | OUTPATIENT
Start: 2017-09-25 | End: 2017-09-25 | Stop reason: HOSPADM

## 2017-09-25 RX ORDER — THIAMINE MONONITRATE (VIT B1) 100 MG
100 TABLET ORAL DAILY
Status: DISCONTINUED | OUTPATIENT
Start: 2017-09-25 | End: 2017-09-25 | Stop reason: HOSPADM

## 2017-09-25 RX ADMIN — POTASSIUM CHLORIDE, DEXTROSE MONOHYDRATE AND SODIUM CHLORIDE: 150; 5; 450 INJECTION, SOLUTION INTRAVENOUS at 12:45

## 2017-09-25 RX ADMIN — FAMOTIDINE 20 MG: 10 INJECTION, SOLUTION INTRAVENOUS at 08:52

## 2017-09-25 RX ADMIN — CHLORHEXIDINE GLUCONATE 15 ML: 1.2 RINSE ORAL at 08:52

## 2017-09-25 RX ADMIN — PROPOFOL 60 MCG/KG/MIN: 10 INJECTION, EMULSION INTRAVENOUS at 06:55

## 2017-09-25 RX ADMIN — Medication 100 MG: at 11:32

## 2017-09-25 RX ADMIN — METOPROLOL TARTRATE 5 MG: 5 INJECTION INTRAVENOUS at 07:29

## 2017-09-25 RX ADMIN — ENOXAPARIN SODIUM 40 MG: 40 INJECTION SUBCUTANEOUS at 09:06

## 2017-09-25 RX ADMIN — LORAZEPAM 2 MG: 2 INJECTION INTRAMUSCULAR; INTRAVENOUS at 01:52

## 2017-09-25 RX ADMIN — POTASSIUM CHLORIDE, DEXTROSE MONOHYDRATE AND SODIUM CHLORIDE: 150; 5; 450 INJECTION, SOLUTION INTRAVENOUS at 05:26

## 2017-09-25 RX ADMIN — METOPROLOL TARTRATE 5 MG: 5 INJECTION INTRAVENOUS at 01:52

## 2017-09-25 RX ADMIN — FOLIC ACID 1 MG: 1 TABLET ORAL at 11:32

## 2017-09-25 RX ADMIN — METOPROLOL TARTRATE 5 MG: 5 INJECTION INTRAVENOUS at 12:46

## 2017-09-25 RX ADMIN — PROPOFOL 60 MCG/KG/MIN: 10 INJECTION, EMULSION INTRAVENOUS at 03:30

## 2017-09-25 ASSESSMENT — PULMONARY FUNCTION TESTS
PIF_VALUE: 19
PIF_VALUE: 22
PIF_VALUE: 17

## 2017-09-26 LAB
EKG ATRIAL RATE: 75 BPM
EKG P AXIS: 76 DEGREES
EKG P-R INTERVAL: 146 MS
EKG Q-T INTERVAL: 382 MS
EKG QRS DURATION: 90 MS
EKG QTC CALCULATION (BAZETT): 426 MS
EKG R AXIS: 33 DEGREES
EKG T AXIS: 20 DEGREES
EKG VENTRICULAR RATE: 75 BPM

## 2017-09-27 ENCOUNTER — APPOINTMENT (OUTPATIENT)
Dept: GENERAL RADIOLOGY | Age: 27
End: 2017-09-27
Payer: MEDICAID

## 2017-09-27 ENCOUNTER — HOSPITAL ENCOUNTER (EMERGENCY)
Age: 27
Discharge: HOME | End: 2017-09-27
Attending: EMERGENCY MEDICINE
Payer: MEDICAID

## 2017-09-27 VITALS
WEIGHT: 180 LBS | RESPIRATION RATE: 27 BRPM | HEART RATE: 74 BPM | OXYGEN SATURATION: 98 % | TEMPERATURE: 99.3 F | SYSTOLIC BLOOD PRESSURE: 131 MMHG | HEIGHT: 70 IN | DIASTOLIC BLOOD PRESSURE: 81 MMHG | BODY MASS INDEX: 25.77 KG/M2

## 2017-09-27 DIAGNOSIS — F10.920 ACUTE ALCOHOLIC INTOXICATION, UNCOMPLICATED (HCC): Primary | ICD-10-CM

## 2017-09-27 DIAGNOSIS — R06.02 SHORTNESS OF BREATH: ICD-10-CM

## 2017-09-27 LAB
ABSOLUTE EOS #: 0.3 K/UL (ref 0–0.4)
ABSOLUTE LYMPH #: 2.9 K/UL (ref 1–4.8)
ABSOLUTE MONO #: 0.6 K/UL (ref 0.1–1.2)
AMPHETAMINE SCREEN URINE: NEGATIVE
ANION GAP SERPL CALCULATED.3IONS-SCNC: 18 MMOL/L (ref 9–17)
BARBITURATE SCREEN URINE: NEGATIVE
BASOPHILS # BLD: 1 %
BASOPHILS ABSOLUTE: 0.1 K/UL (ref 0–0.2)
BENZODIAZEPINE SCREEN, URINE: NEGATIVE
BUN BLDV-MCNC: 11 MG/DL (ref 6–20)
BUN/CREAT BLD: ABNORMAL (ref 9–20)
BUPRENORPHINE URINE: ABNORMAL
CALCIUM SERPL-MCNC: 9.1 MG/DL (ref 8.6–10.4)
CANNABINOID SCREEN URINE: POSITIVE
CHLORIDE BLD-SCNC: 101 MMOL/L (ref 98–107)
CO2: 20 MMOL/L (ref 20–31)
COCAINE METABOLITE, URINE: POSITIVE
CREAT SERPL-MCNC: 0.91 MG/DL (ref 0.7–1.2)
DIFFERENTIAL TYPE: ABNORMAL
EOSINOPHILS RELATIVE PERCENT: 4 %
ETHANOL PERCENT: 0.19 %
ETHANOL: 189 MG/DL
GFR AFRICAN AMERICAN: >60 ML/MIN
GFR NON-AFRICAN AMERICAN: >60 ML/MIN
GFR SERPL CREATININE-BSD FRML MDRD: ABNORMAL ML/MIN/{1.73_M2}
GFR SERPL CREATININE-BSD FRML MDRD: ABNORMAL ML/MIN/{1.73_M2}
GLUCOSE BLD-MCNC: 97 MG/DL (ref 70–99)
HCT VFR BLD CALC: 38.5 % (ref 41–53)
HEMOGLOBIN: 13.1 G/DL (ref 13.5–17.5)
LYMPHOCYTES # BLD: 40 %
MCH RBC QN AUTO: 29.9 PG (ref 26–34)
MCHC RBC AUTO-ENTMCNC: 34.2 G/DL (ref 31–37)
MCV RBC AUTO: 87.7 FL (ref 80–100)
MDMA URINE: ABNORMAL
METHADONE SCREEN, URINE: NEGATIVE
METHAMPHETAMINE, URINE: ABNORMAL
MONOCYTES # BLD: 8 %
OPIATES, URINE: NEGATIVE
OXYCODONE SCREEN URINE: NEGATIVE
PDW BLD-RTO: 13.5 % (ref 12.5–15.4)
PHENCYCLIDINE, URINE: NEGATIVE
PLATELET # BLD: 254 K/UL (ref 140–450)
PLATELET ESTIMATE: ABNORMAL
PMV BLD AUTO: 7.1 FL (ref 6–12)
POC TROPONIN I: 0 NG/ML (ref 0–0.1)
POC TROPONIN I: 0 NG/ML (ref 0–0.1)
POC TROPONIN INTERP: NORMAL
POC TROPONIN INTERP: NORMAL
POTASSIUM SERPL-SCNC: 3.5 MMOL/L (ref 3.7–5.3)
PROPOXYPHENE, URINE: ABNORMAL
RBC # BLD: 4.39 M/UL (ref 4.5–5.9)
RBC # BLD: ABNORMAL 10*6/UL
SEG NEUTROPHILS: 47 %
SEGMENTED NEUTROPHILS ABSOLUTE COUNT: 3.3 K/UL (ref 1.8–7.7)
SODIUM BLD-SCNC: 139 MMOL/L (ref 135–144)
TEST INFORMATION: ABNORMAL
TRICYCLIC ANTIDEPRESSANTS, UR: ABNORMAL
WBC # BLD: 7.1 K/UL (ref 3.5–11)
WBC # BLD: ABNORMAL 10*3/UL

## 2017-09-27 PROCEDURE — 85025 COMPLETE CBC W/AUTO DIFF WBC: CPT

## 2017-09-27 PROCEDURE — 84484 ASSAY OF TROPONIN QUANT: CPT

## 2017-09-27 PROCEDURE — 99285 EMERGENCY DEPT VISIT HI MDM: CPT

## 2017-09-27 PROCEDURE — 80048 BASIC METABOLIC PNL TOTAL CA: CPT

## 2017-09-27 PROCEDURE — 80307 DRUG TEST PRSMV CHEM ANLYZR: CPT

## 2017-09-27 PROCEDURE — 71020 XR CHEST STANDARD TWO VW: CPT

## 2017-09-27 PROCEDURE — 93005 ELECTROCARDIOGRAM TRACING: CPT

## 2017-09-27 PROCEDURE — 2580000003 HC RX 258: Performed by: NURSE PRACTITIONER

## 2017-09-27 PROCEDURE — G0480 DRUG TEST DEF 1-7 CLASSES: HCPCS

## 2017-09-27 RX ORDER — 0.9 % SODIUM CHLORIDE 0.9 %
500 INTRAVENOUS SOLUTION INTRAVENOUS ONCE
Status: COMPLETED | OUTPATIENT
Start: 2017-09-27 | End: 2017-09-27

## 2017-09-27 RX ADMIN — SODIUM CHLORIDE 1000 ML: 9 INJECTION, SOLUTION INTRAVENOUS at 12:23

## 2017-09-27 ASSESSMENT — ENCOUNTER SYMPTOMS
DIARRHEA: 0
ABDOMINAL PAIN: 0
SHORTNESS OF BREATH: 1
VOMITING: 0
COUGH: 1
CHEST TIGHTNESS: 1
NAUSEA: 0

## 2017-09-27 ASSESSMENT — PAIN DESCRIPTION - LOCATION: LOCATION: CHEST

## 2017-09-27 ASSESSMENT — PAIN SCALES - GENERAL: PAINLEVEL_OUTOF10: 9

## 2017-09-28 LAB
EKG ATRIAL RATE: 84 BPM
EKG P AXIS: 65 DEGREES
EKG P-R INTERVAL: 138 MS
EKG Q-T INTERVAL: 386 MS
EKG QRS DURATION: 94 MS
EKG QTC CALCULATION (BAZETT): 456 MS
EKG R AXIS: 33 DEGREES
EKG T AXIS: 9 DEGREES
EKG VENTRICULAR RATE: 84 BPM

## 2017-10-18 ENCOUNTER — HOSPITAL ENCOUNTER (EMERGENCY)
Age: 27
Discharge: HOME | End: 2017-10-18
Attending: EMERGENCY MEDICINE
Payer: MEDICAID

## 2017-10-18 VITALS
RESPIRATION RATE: 16 BRPM | HEART RATE: 87 BPM | TEMPERATURE: 98.4 F | DIASTOLIC BLOOD PRESSURE: 93 MMHG | OXYGEN SATURATION: 98 % | SYSTOLIC BLOOD PRESSURE: 145 MMHG

## 2017-10-18 DIAGNOSIS — S01.81XA CHIN LACERATION, INITIAL ENCOUNTER: Primary | ICD-10-CM

## 2017-10-18 PROCEDURE — 99282 EMERGENCY DEPT VISIT SF MDM: CPT

## 2017-10-18 PROCEDURE — 2500000003 HC RX 250 WO HCPCS: Performed by: EMERGENCY MEDICINE

## 2017-10-18 PROCEDURE — 12011 RPR F/E/E/N/L/M 2.5 CM/<: CPT

## 2017-10-18 RX ORDER — LIDOCAINE HYDROCHLORIDE 10 MG/ML
20 INJECTION, SOLUTION INFILTRATION; PERINEURAL ONCE
Status: COMPLETED | OUTPATIENT
Start: 2017-10-18 | End: 2017-10-18

## 2017-10-18 RX ADMIN — LIDOCAINE HYDROCHLORIDE 20 ML: 10 INJECTION, SOLUTION INFILTRATION; PERINEURAL at 01:23

## 2017-10-18 ASSESSMENT — PAIN SCALES - GENERAL: PAINLEVEL_OUTOF10: 10

## 2017-10-18 ASSESSMENT — PAIN DESCRIPTION - PAIN TYPE: TYPE: ACUTE PAIN

## 2017-10-18 ASSESSMENT — PAIN DESCRIPTION - LOCATION: LOCATION: JAW
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
## 2018-04-14 ENCOUNTER — APPOINTMENT (OUTPATIENT)
Dept: CT IMAGING | Age: 28
DRG: 057 | End: 2018-04-14
Payer: MEDICAID

## 2018-04-14 ENCOUNTER — HOSPITAL ENCOUNTER (INPATIENT)
Age: 28
LOS: 1 days | Discharge: PSYCHIATRIC OR SUBSTANCE USE FACILITY | DRG: 057 | End: 2018-04-15
Attending: EMERGENCY MEDICINE | Admitting: SURGERY
Payer: MEDICAID

## 2018-04-14 ENCOUNTER — APPOINTMENT (OUTPATIENT)
Dept: GENERAL RADIOLOGY | Age: 28
DRG: 057 | End: 2018-04-14
Payer: MEDICAID

## 2018-04-14 DIAGNOSIS — S06.0X1A CONCUSSION WITH BRIEF LOC: Primary | ICD-10-CM
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
## 2018-04-14 LAB
ABO/RH: NORMAL
ALLEN TEST: ABNORMAL
ANION GAP SERPL CALCULATED.3IONS-SCNC: 13 MMOL/L (ref 9–17)
ANTIBODY SCREEN: NEGATIVE
ARM BAND NUMBER: NORMAL
BLOOD BANK SPECIMEN: ABNORMAL
BUN BLDV-MCNC: 14 MG/DL (ref 6–20)
CARBOXYHEMOGLOBIN: 6.7 % (ref 0–5)
CHLORIDE BLD-SCNC: 100 MMOL/L (ref 98–107)
CO2: 25 MMOL/L (ref 20–31)
COLLAGEN ADENOSINE-5'-DIPHOSPHATE (ADP) TIME: 119 SEC (ref 67–112)
COLLAGEN EPINEPHRINE TIME: 160 SEC (ref 85–172)
CREAT SERPL-MCNC: 1.29 MG/DL (ref 0.7–1.2)
ETHANOL PERCENT: 0.14 %
ETHANOL: 143 MG/DL
EXPIRATION DATE: NORMAL
FIO2: ABNORMAL
GFR AFRICAN AMERICAN: ABNORMAL ML/MIN
GFR NON-AFRICAN AMERICAN: ABNORMAL ML/MIN
GFR SERPL CREATININE-BSD FRML MDRD: ABNORMAL ML/MIN/{1.73_M2}
GFR SERPL CREATININE-BSD FRML MDRD: ABNORMAL ML/MIN/{1.73_M2}
GLUCOSE BLD-MCNC: 107 MG/DL (ref 70–99)
HCG QUALITATIVE: NEGATIVE
HCO3 VENOUS: 25.3 MMOL/L (ref 24–30)
HCT VFR BLD CALC: 41 % (ref 40.7–50.3)
HEMOGLOBIN: 13.5 G/DL (ref 13–17)
INR BLD: 1
MCH RBC QN AUTO: 31 PG (ref 25.2–33.5)
MCHC RBC AUTO-ENTMCNC: 32.9 G/DL (ref 28.4–34.8)
MCV RBC AUTO: 94 FL (ref 82.6–102.9)
METHEMOGLOBIN: ABNORMAL % (ref 0–1.5)
MODE: ABNORMAL
MYOGLOBIN: 72 NG/ML (ref 28–72)
NEGATIVE BASE EXCESS, VEN: 0.3 MMOL/L (ref 0–2)
NOTIFICATION TIME: ABNORMAL
NOTIFICATION: ABNORMAL
NRBC AUTOMATED: 0 PER 100 WBC
O2 DEVICE/FLOW/%: ABNORMAL
O2 SAT, VEN: 70.3 % (ref 60–85)
OXYHEMOGLOBIN: ABNORMAL % (ref 95–98)
PARTIAL THROMBOPLASTIN TIME: 19.3 SEC (ref 20.5–30.5)
PATIENT TEMP: 37
PCO2, VEN, TEMP ADJ: ABNORMAL MMHG (ref 39–55)
PCO2, VEN: 47.6 (ref 39–55)
PDW BLD-RTO: 12.8 % (ref 11.8–14.4)
PEEP/CPAP: ABNORMAL
PH VENOUS: 7.34 (ref 7.32–7.42)
PH, VEN, TEMP ADJ: ABNORMAL (ref 7.32–7.42)
PLATELET # BLD: 221 K/UL (ref 138–453)
PLATELET FUNCTION INTERP: ABNORMAL
PMV BLD AUTO: 9.1 FL (ref 8.1–13.5)
PO2, VEN, TEMP ADJ: ABNORMAL MMHG (ref 30–50)
PO2, VEN: 40.9 (ref 30–50)
POSITIVE BASE EXCESS, VEN: ABNORMAL MMOL/L (ref 0–2)
POTASSIUM SERPL-SCNC: 3.8 MMOL/L (ref 3.7–5.3)
PROTHROMBIN TIME: 10.5 SEC (ref 9–12)
PSV: ABNORMAL
PT. POSITION: ABNORMAL
RBC # BLD: 4.36 M/UL (ref 4.21–5.77)
RESPIRATORY RATE: ABNORMAL
SAMPLE SITE: ABNORMAL
SET RATE: ABNORMAL
SODIUM BLD-SCNC: 138 MMOL/L (ref 135–144)
TEXT FOR RESPIRATORY: ABNORMAL
TOTAL CK: 545 U/L (ref 39–308)
TOTAL HB: ABNORMAL G/DL (ref 12–16)
TOTAL RATE: ABNORMAL
VT: ABNORMAL
WBC # BLD: 6.3 K/UL (ref 3.5–11.3)

## 2018-04-14 PROCEDURE — 83874 ASSAY OF MYOGLOBIN: CPT

## 2018-04-14 PROCEDURE — 74177 CT ABD & PELVIS W/CONTRAST: CPT

## 2018-04-14 PROCEDURE — 73100 X-RAY EXAM OF WRIST: CPT

## 2018-04-14 PROCEDURE — 82947 ASSAY GLUCOSE BLOOD QUANT: CPT

## 2018-04-14 PROCEDURE — S0028 INJECTION, FAMOTIDINE, 20 MG: HCPCS | Performed by: SURGERY

## 2018-04-14 PROCEDURE — 72125 CT NECK SPINE W/O DYE: CPT

## 2018-04-14 PROCEDURE — 73130 X-RAY EXAM OF HAND: CPT

## 2018-04-14 PROCEDURE — 82565 ASSAY OF CREATININE: CPT

## 2018-04-14 PROCEDURE — 72131 CT LUMBAR SPINE W/O DYE: CPT

## 2018-04-14 PROCEDURE — G0480 DRUG TEST DEF 1-7 CLASSES: HCPCS

## 2018-04-14 PROCEDURE — 2580000003 HC RX 258: Performed by: SURGERY

## 2018-04-14 PROCEDURE — G0390 TRAUMA RESPONS W/HOSP CRITI: HCPCS

## 2018-04-14 PROCEDURE — 99285 EMERGENCY DEPT VISIT HI MDM: CPT

## 2018-04-14 PROCEDURE — 84520 ASSAY OF UREA NITROGEN: CPT

## 2018-04-14 PROCEDURE — 72128 CT CHEST SPINE W/O DYE: CPT

## 2018-04-14 PROCEDURE — 86850 RBC ANTIBODY SCREEN: CPT

## 2018-04-14 PROCEDURE — 80051 ELECTROLYTE PANEL: CPT

## 2018-04-14 PROCEDURE — 94762 N-INVAS EAR/PLS OXIMTRY CONT: CPT

## 2018-04-14 PROCEDURE — 86901 BLOOD TYPING SEROLOGIC RH(D): CPT

## 2018-04-14 PROCEDURE — 85027 COMPLETE CBC AUTOMATED: CPT

## 2018-04-14 PROCEDURE — 6360000002 HC RX W HCPCS: Performed by: SURGERY

## 2018-04-14 PROCEDURE — 70450 CT HEAD/BRAIN W/O DYE: CPT

## 2018-04-14 PROCEDURE — 72170 X-RAY EXAM OF PELVIS: CPT

## 2018-04-14 PROCEDURE — 1200000000 HC SEMI PRIVATE

## 2018-04-14 PROCEDURE — 84703 CHORIONIC GONADOTROPIN ASSAY: CPT

## 2018-04-14 PROCEDURE — 2500000003 HC RX 250 WO HCPCS: Performed by: SURGERY

## 2018-04-14 PROCEDURE — 80307 DRUG TEST PRSMV CHEM ANLYZR: CPT

## 2018-04-14 PROCEDURE — 85610 PROTHROMBIN TIME: CPT

## 2018-04-14 PROCEDURE — 71045 X-RAY EXAM CHEST 1 VIEW: CPT

## 2018-04-14 PROCEDURE — 85730 THROMBOPLASTIN TIME PARTIAL: CPT

## 2018-04-14 PROCEDURE — 71260 CT THORAX DX C+: CPT

## 2018-04-14 PROCEDURE — 86900 BLOOD TYPING SEROLOGIC ABO: CPT

## 2018-04-14 PROCEDURE — 82550 ASSAY OF CK (CPK): CPT

## 2018-04-14 PROCEDURE — 6360000004 HC RX CONTRAST MEDICATION: Performed by: SURGERY

## 2018-04-14 PROCEDURE — 82805 BLOOD GASES W/O2 SATURATION: CPT

## 2018-04-14 PROCEDURE — 85576 BLOOD PLATELET AGGREGATION: CPT

## 2018-04-14 RX ORDER — OXYCODONE HYDROCHLORIDE 5 MG/1
10 TABLET ORAL EVERY 4 HOURS PRN
Status: DISCONTINUED | OUTPATIENT
Start: 2018-04-14 | End: 2018-04-15 | Stop reason: HOSPADM

## 2018-04-14 RX ORDER — ONDANSETRON 2 MG/ML
4 INJECTION INTRAMUSCULAR; INTRAVENOUS EVERY 6 HOURS PRN
Status: DISCONTINUED | OUTPATIENT
Start: 2018-04-14 | End: 2018-04-15 | Stop reason: HOSPADM

## 2018-04-14 RX ORDER — SODIUM CHLORIDE 0.9 % (FLUSH) 0.9 %
10 SYRINGE (ML) INJECTION PRN
Status: DISCONTINUED | OUTPATIENT
Start: 2018-04-14 | End: 2018-04-15 | Stop reason: HOSPADM

## 2018-04-14 RX ORDER — MAGNESIUM SULFATE 1 G/100ML
1 INJECTION INTRAVENOUS PRN
Status: DISCONTINUED | OUTPATIENT
Start: 2018-04-14 | End: 2018-04-15 | Stop reason: HOSPADM

## 2018-04-14 RX ORDER — POTASSIUM CHLORIDE 7.45 MG/ML
10 INJECTION INTRAVENOUS PRN
Status: DISCONTINUED | OUTPATIENT
Start: 2018-04-14 | End: 2018-04-15 | Stop reason: HOSPADM

## 2018-04-14 RX ORDER — OXYCODONE HYDROCHLORIDE 5 MG/1
5 TABLET ORAL EVERY 4 HOURS PRN
Status: DISCONTINUED | OUTPATIENT
Start: 2018-04-14 | End: 2018-04-15 | Stop reason: HOSPADM

## 2018-04-14 RX ORDER — SODIUM CHLORIDE 9 MG/ML
INJECTION, SOLUTION INTRAVENOUS CONTINUOUS
Status: DISCONTINUED | OUTPATIENT
Start: 2018-04-14 | End: 2018-04-15

## 2018-04-14 RX ORDER — SODIUM CHLORIDE 0.9 % (FLUSH) 0.9 %
10 SYRINGE (ML) INJECTION EVERY 12 HOURS SCHEDULED
Status: DISCONTINUED | OUTPATIENT
Start: 2018-04-14 | End: 2018-04-15 | Stop reason: HOSPADM

## 2018-04-14 RX ORDER — BISACODYL 10 MG
10 SUPPOSITORY, RECTAL RECTAL DAILY PRN
Status: DISCONTINUED | OUTPATIENT
Start: 2018-04-14 | End: 2018-04-15 | Stop reason: HOSPADM

## 2018-04-14 RX ORDER — MORPHINE SULFATE 2 MG/ML
2 INJECTION, SOLUTION INTRAMUSCULAR; INTRAVENOUS
Status: DISCONTINUED | OUTPATIENT
Start: 2018-04-14 | End: 2018-04-15 | Stop reason: HOSPADM

## 2018-04-14 RX ORDER — GINSENG 100 MG
CAPSULE ORAL 3 TIMES DAILY
Status: DISCONTINUED | OUTPATIENT
Start: 2018-04-14 | End: 2018-04-15 | Stop reason: HOSPADM

## 2018-04-14 RX ORDER — DOCUSATE SODIUM 100 MG/1
100 CAPSULE, LIQUID FILLED ORAL 2 TIMES DAILY
Status: DISCONTINUED | OUTPATIENT
Start: 2018-04-14 | End: 2018-04-15 | Stop reason: HOSPADM

## 2018-04-14 RX ADMIN — MORPHINE SULFATE 2 MG: 2 INJECTION, SOLUTION INTRAMUSCULAR; INTRAVENOUS at 21:00

## 2018-04-14 RX ADMIN — IOPAMIDOL 130 ML: 755 INJECTION, SOLUTION INTRAVENOUS at 14:46

## 2018-04-14 RX ADMIN — MORPHINE SULFATE 2 MG: 2 INJECTION, SOLUTION INTRAMUSCULAR; INTRAVENOUS at 22:59

## 2018-04-14 RX ADMIN — FAMOTIDINE 20 MG: 10 INJECTION INTRAVENOUS at 20:52

## 2018-04-14 RX ADMIN — SODIUM CHLORIDE: 9 INJECTION, SOLUTION INTRAVENOUS at 16:45

## 2018-04-14 ASSESSMENT — PAIN SCALES - GENERAL
PAINLEVEL_OUTOF10: 4
PAINLEVEL_OUTOF10: 8
PAINLEVEL_OUTOF10: 4
PAINLEVEL_OUTOF10: 8

## 2018-04-14 ASSESSMENT — ENCOUNTER SYMPTOMS: ABDOMINAL PAIN: 1

## 2018-04-15 ENCOUNTER — APPOINTMENT (OUTPATIENT)
Dept: GENERAL RADIOLOGY | Age: 28
DRG: 057 | End: 2018-04-15
Payer: MEDICAID

## 2018-04-15 ENCOUNTER — HOSPITAL ENCOUNTER (INPATIENT)
Age: 28
LOS: 2 days | Discharge: HOME | DRG: 751 | End: 2018-04-17
Attending: PSYCHIATRY & NEUROLOGY | Admitting: PSYCHIATRY & NEUROLOGY
Payer: MEDICAID

## 2018-04-15 VITALS
SYSTOLIC BLOOD PRESSURE: 144 MMHG | OXYGEN SATURATION: 98 % | WEIGHT: 145 LBS | HEIGHT: 70 IN | DIASTOLIC BLOOD PRESSURE: 84 MMHG | BODY MASS INDEX: 20.76 KG/M2 | TEMPERATURE: 98 F | HEART RATE: 56 BPM | RESPIRATION RATE: 17 BRPM

## 2018-04-15 PROBLEM — F10.20 UNCOMPLICATED ALCOHOL DEPENDENCE (HCC): Status: ACTIVE | Noted: 2018-04-15

## 2018-04-15 PROBLEM — F33.2 SEVERE EPISODE OF RECURRENT MAJOR DEPRESSIVE DISORDER, WITHOUT PSYCHOTIC FEATURES (HCC): Status: ACTIVE | Noted: 2018-04-15

## 2018-04-15 LAB
AMPHETAMINE SCREEN URINE: NEGATIVE
ANION GAP SERPL CALCULATED.3IONS-SCNC: 10 MMOL/L (ref 9–17)
BARBITURATE SCREEN URINE: NEGATIVE
BENZODIAZEPINE SCREEN, URINE: NEGATIVE
BILIRUBIN URINE: NEGATIVE
BUN BLDV-MCNC: 14 MG/DL (ref 6–20)
BUN/CREAT BLD: NORMAL (ref 9–20)
BUPRENORPHINE URINE: ABNORMAL
CALCIUM IONIZED: 1.22 MMOL/L (ref 1.13–1.33)
CALCIUM SERPL-MCNC: 8.9 MG/DL (ref 8.6–10.4)
CANNABINOID SCREEN URINE: NEGATIVE
CHLORIDE BLD-SCNC: 101 MMOL/L (ref 98–107)
CO2: 26 MMOL/L (ref 20–31)
COCAINE METABOLITE, URINE: POSITIVE
COLOR: YELLOW
COMMENT UA: ABNORMAL
CREAT SERPL-MCNC: 0.92 MG/DL (ref 0.7–1.2)
GFR AFRICAN AMERICAN: >60 ML/MIN
GFR NON-AFRICAN AMERICAN: >60 ML/MIN
GFR SERPL CREATININE-BSD FRML MDRD: NORMAL ML/MIN/{1.73_M2}
GFR SERPL CREATININE-BSD FRML MDRD: NORMAL ML/MIN/{1.73_M2}
GLUCOSE BLD-MCNC: 99 MG/DL (ref 70–99)
GLUCOSE URINE: NEGATIVE
HCT VFR BLD CALC: 39.9 % (ref 40.7–50.3)
HEMOGLOBIN: 13.2 G/DL (ref 13–17)
KETONES, URINE: ABNORMAL
LEUKOCYTE ESTERASE, URINE: NEGATIVE
MAGNESIUM: 2 MG/DL (ref 1.6–2.6)
MCH RBC QN AUTO: 30 PG (ref 25.2–33.5)
MCHC RBC AUTO-ENTMCNC: 33.1 G/DL (ref 28.4–34.8)
MCV RBC AUTO: 90.7 FL (ref 82.6–102.9)
MDMA URINE: ABNORMAL
METHADONE SCREEN, URINE: NEGATIVE
METHAMPHETAMINE, URINE: ABNORMAL
NITRITE, URINE: NEGATIVE
NRBC AUTOMATED: 0 PER 100 WBC
OPIATES, URINE: POSITIVE
OXYCODONE SCREEN URINE: NEGATIVE
PDW BLD-RTO: 13 % (ref 11.8–14.4)
PH UA: 7 (ref 5–8)
PHENCYCLIDINE, URINE: NEGATIVE
PLATELET # BLD: 216 K/UL (ref 138–453)
PMV BLD AUTO: 8.8 FL (ref 8.1–13.5)
POTASSIUM SERPL-SCNC: 3.8 MMOL/L (ref 3.7–5.3)
PROPOXYPHENE, URINE: ABNORMAL
PROTEIN UA: NEGATIVE
RBC # BLD: 4.4 M/UL (ref 4.21–5.77)
SODIUM BLD-SCNC: 137 MMOL/L (ref 135–144)
SPECIFIC GRAVITY UA: 1.02 (ref 1–1.03)
TEST INFORMATION: ABNORMAL
TRICYCLIC ANTIDEPRESSANTS, UR: ABNORMAL
TURBIDITY: CLEAR
URINE HGB: NEGATIVE
UROBILINOGEN, URINE: NORMAL
WBC # BLD: 4.1 K/UL (ref 3.5–11.3)

## 2018-04-15 PROCEDURE — G0379 DIRECT REFER HOSPITAL OBSERV: HCPCS

## 2018-04-15 PROCEDURE — G9169 MEMORY GOAL STATUS: HCPCS

## 2018-04-15 PROCEDURE — G9168 MEMORY CURRENT STATUS: HCPCS

## 2018-04-15 PROCEDURE — 90792 PSYCH DIAG EVAL W/MED SRVCS: CPT | Performed by: PSYCHIATRY & NEUROLOGY

## 2018-04-15 PROCEDURE — S0028 INJECTION, FAMOTIDINE, 20 MG: HCPCS | Performed by: SURGERY

## 2018-04-15 PROCEDURE — 6370000000 HC RX 637 (ALT 250 FOR IP): Performed by: PSYCHIATRY & NEUROLOGY

## 2018-04-15 PROCEDURE — G9170 MEMORY D/C STATUS: HCPCS

## 2018-04-15 PROCEDURE — G0378 HOSPITAL OBSERVATION PER HR: HCPCS

## 2018-04-15 PROCEDURE — 85027 COMPLETE CBC AUTOMATED: CPT

## 2018-04-15 PROCEDURE — 81003 URINALYSIS AUTO W/O SCOPE: CPT

## 2018-04-15 PROCEDURE — 36415 COLL VENOUS BLD VENIPUNCTURE: CPT

## 2018-04-15 PROCEDURE — 83735 ASSAY OF MAGNESIUM: CPT

## 2018-04-15 PROCEDURE — 1240000000 HC EMOTIONAL WELLNESS R&B

## 2018-04-15 PROCEDURE — 92523 SPEECH SOUND LANG COMPREHEN: CPT

## 2018-04-15 PROCEDURE — 80048 BASIC METABOLIC PNL TOTAL CA: CPT

## 2018-04-15 PROCEDURE — 82330 ASSAY OF CALCIUM: CPT

## 2018-04-15 PROCEDURE — 80307 DRUG TEST PRSMV CHEM ANLYZR: CPT

## 2018-04-15 PROCEDURE — 94762 N-INVAS EAR/PLS OXIMTRY CONT: CPT

## 2018-04-15 PROCEDURE — 2500000003 HC RX 250 WO HCPCS: Performed by: SURGERY

## 2018-04-15 PROCEDURE — 72040 X-RAY EXAM NECK SPINE 2-3 VW: CPT

## 2018-04-15 PROCEDURE — 6370000000 HC RX 637 (ALT 250 FOR IP): Performed by: SURGERY

## 2018-04-15 RX ORDER — ACETAMINOPHEN 325 MG/1
650 TABLET ORAL EVERY 4 HOURS PRN
Status: DISCONTINUED | OUTPATIENT
Start: 2018-04-15 | End: 2018-04-17 | Stop reason: HOSPADM

## 2018-04-15 RX ORDER — TRAZODONE HYDROCHLORIDE 50 MG/1
50 TABLET ORAL NIGHTLY PRN
Status: DISCONTINUED | OUTPATIENT
Start: 2018-04-15 | End: 2018-04-16

## 2018-04-15 RX ORDER — CHLORDIAZEPOXIDE HYDROCHLORIDE 25 MG/1
25 CAPSULE, GELATIN COATED ORAL 4 TIMES DAILY PRN
Status: DISCONTINUED | OUTPATIENT
Start: 2018-04-15 | End: 2018-04-17 | Stop reason: HOSPADM

## 2018-04-15 RX ORDER — GINSENG 100 MG
CAPSULE ORAL
Qty: 1 TUBE | Refills: 0 | Status: SHIPPED | OUTPATIENT
Start: 2018-04-15 | End: 2018-04-25

## 2018-04-15 RX ORDER — THIAMINE MONONITRATE (VIT B1) 100 MG
100 TABLET ORAL DAILY
Status: DISCONTINUED | OUTPATIENT
Start: 2018-04-15 | End: 2018-04-17 | Stop reason: HOSPADM

## 2018-04-15 RX ORDER — BENZTROPINE MESYLATE 1 MG/ML
2 INJECTION INTRAMUSCULAR; INTRAVENOUS 2 TIMES DAILY PRN
Status: DISCONTINUED | OUTPATIENT
Start: 2018-04-15 | End: 2018-04-17 | Stop reason: HOSPADM

## 2018-04-15 RX ORDER — FOLIC ACID 1 MG/1
1 TABLET ORAL DAILY
Status: DISCONTINUED | OUTPATIENT
Start: 2018-04-15 | End: 2018-04-17 | Stop reason: HOSPADM

## 2018-04-15 RX ORDER — MAGNESIUM HYDROXIDE/ALUMINUM HYDROXICE/SIMETHICONE 120; 1200; 1200 MG/30ML; MG/30ML; MG/30ML
30 SUSPENSION ORAL EVERY 6 HOURS PRN
Status: DISCONTINUED | OUTPATIENT
Start: 2018-04-15 | End: 2018-04-17 | Stop reason: HOSPADM

## 2018-04-15 RX ORDER — OXYCODONE HYDROCHLORIDE 5 MG/1
5 TABLET ORAL EVERY 6 HOURS PRN
Qty: 10 TABLET | Refills: 0 | Status: CANCELLED | OUTPATIENT
Start: 2018-04-15 | End: 2018-04-22

## 2018-04-15 RX ORDER — NICOTINE 21 MG/24HR
1 PATCH, TRANSDERMAL 24 HOURS TRANSDERMAL DAILY
Status: DISCONTINUED | OUTPATIENT
Start: 2018-04-15 | End: 2018-04-17 | Stop reason: HOSPADM

## 2018-04-15 RX ORDER — HYDROXYZINE HYDROCHLORIDE 25 MG/1
50 TABLET, FILM COATED ORAL 3 TIMES DAILY PRN
Status: DISCONTINUED | OUTPATIENT
Start: 2018-04-15 | End: 2018-04-17 | Stop reason: HOSPADM

## 2018-04-15 RX ORDER — GINSENG 100 MG
CAPSULE ORAL
Qty: 1 TUBE | Refills: 0 | Status: CANCELLED | OUTPATIENT
Start: 2018-04-15 | End: 2018-04-25

## 2018-04-15 RX ADMIN — DOCUSATE SODIUM 100 MG: 100 CAPSULE ORAL at 09:00

## 2018-04-15 RX ADMIN — CHLORDIAZEPOXIDE HYDROCHLORIDE 25 MG: 25 CAPSULE ORAL at 20:50

## 2018-04-15 RX ADMIN — Medication 100 MG: at 20:52

## 2018-04-15 RX ADMIN — BACITRACIN: 500 OINTMENT TOPICAL at 14:00

## 2018-04-15 RX ADMIN — BACITRACIN: 500 OINTMENT TOPICAL at 09:00

## 2018-04-15 RX ADMIN — ACETAMINOPHEN 650 MG: 325 TABLET, FILM COATED ORAL at 20:50

## 2018-04-15 RX ADMIN — FOLIC ACID 1 MG: 1 TABLET ORAL at 20:52

## 2018-04-15 RX ADMIN — HYDROXYZINE HYDROCHLORIDE 50 MG: 25 TABLET, FILM COATED ORAL at 20:50

## 2018-04-15 RX ADMIN — FAMOTIDINE 20 MG: 10 INJECTION INTRAVENOUS at 09:00

## 2018-04-15 RX ADMIN — TRAZODONE HYDROCHLORIDE 50 MG: 50 TABLET ORAL at 20:50

## 2018-04-15 ASSESSMENT — PAIN SCALES - GENERAL
PAINLEVEL_OUTOF10: 3
PAINLEVEL_OUTOF10: 0
PAINLEVEL_OUTOF10: 2

## 2018-04-15 ASSESSMENT — SLEEP AND FATIGUE QUESTIONNAIRES
DO YOU USE A SLEEP AID: NO
DO YOU HAVE DIFFICULTY SLEEPING: NO
AVERAGE NUMBER OF SLEEP HOURS: 6

## 2018-04-15 ASSESSMENT — PAIN DESCRIPTION - PAIN TYPE
TYPE: OTHER (COMMENT)
TYPE: ACUTE PAIN

## 2018-04-15 ASSESSMENT — LIFESTYLE VARIABLES: HISTORY_ALCOHOL_USE: YES

## 2018-04-15 ASSESSMENT — PAIN DESCRIPTION - LOCATION: LOCATION: GENERALIZED

## 2018-04-15 ASSESSMENT — PATIENT HEALTH QUESTIONNAIRE - PHQ9: SUM OF ALL RESPONSES TO PHQ QUESTIONS 1-9: 9

## 2018-04-16 LAB
ABSOLUTE EOS #: 0.1 K/UL (ref 0–0.4)
ABSOLUTE IMMATURE GRANULOCYTE: ABNORMAL K/UL (ref 0–0.3)
ABSOLUTE LYMPH #: 1.6 K/UL (ref 1–4.8)
ABSOLUTE MONO #: 0.7 K/UL (ref 0.1–1.3)
ALBUMIN SERPL-MCNC: 4.2 G/DL (ref 3.5–5.2)
ALBUMIN/GLOBULIN RATIO: NORMAL (ref 1–2.5)
ALP BLD-CCNC: 61 U/L (ref 40–129)
ALT SERPL-CCNC: 28 U/L (ref 5–41)
ANION GAP SERPL CALCULATED.3IONS-SCNC: 11 MMOL/L (ref 9–17)
AST SERPL-CCNC: 26 U/L
BASOPHILS # BLD: 1 % (ref 0–2)
BASOPHILS ABSOLUTE: 0 K/UL (ref 0–0.2)
BILIRUB SERPL-MCNC: 0.36 MG/DL (ref 0.3–1.2)
BUN BLDV-MCNC: 13 MG/DL (ref 6–20)
BUN/CREAT BLD: NORMAL (ref 9–20)
CALCIUM SERPL-MCNC: 9.2 MG/DL (ref 8.6–10.4)
CHLORIDE BLD-SCNC: 101 MMOL/L (ref 98–107)
CHOLESTEROL/HDL RATIO: 4.4
CHOLESTEROL: 194 MG/DL
CO2: 27 MMOL/L (ref 20–31)
CREAT SERPL-MCNC: 1.1 MG/DL (ref 0.7–1.2)
DIFFERENTIAL TYPE: ABNORMAL
EOSINOPHILS RELATIVE PERCENT: 2 % (ref 0–4)
GFR AFRICAN AMERICAN: >60 ML/MIN
GFR NON-AFRICAN AMERICAN: >60 ML/MIN
GFR SERPL CREATININE-BSD FRML MDRD: NORMAL ML/MIN/{1.73_M2}
GFR SERPL CREATININE-BSD FRML MDRD: NORMAL ML/MIN/{1.73_M2}
GLUCOSE BLD-MCNC: 99 MG/DL (ref 70–99)
HCT VFR BLD CALC: 43.3 % (ref 41–53)
HDLC SERPL-MCNC: 44 MG/DL
HEMOGLOBIN: 14.8 G/DL (ref 13.5–17.5)
IMMATURE GRANULOCYTES: ABNORMAL %
LDL CHOLESTEROL: 126 MG/DL (ref 0–130)
LYMPHOCYTES # BLD: 37 % (ref 24–44)
MCH RBC QN AUTO: 31.3 PG (ref 26–34)
MCHC RBC AUTO-ENTMCNC: 34.1 G/DL (ref 31–37)
MCV RBC AUTO: 91.7 FL (ref 80–100)
MONOCYTES # BLD: 15 % (ref 1–7)
NRBC AUTOMATED: ABNORMAL PER 100 WBC
PDW BLD-RTO: 13.8 % (ref 11.5–14.9)
PLATELET # BLD: 239 K/UL (ref 150–450)
PLATELET ESTIMATE: ABNORMAL
PMV BLD AUTO: 7.3 FL (ref 6–12)
POTASSIUM SERPL-SCNC: 3.9 MMOL/L (ref 3.7–5.3)
RBC # BLD: 4.72 M/UL (ref 4.5–5.9)
RBC # BLD: ABNORMAL 10*6/UL
SEG NEUTROPHILS: 45 % (ref 36–66)
SEGMENTED NEUTROPHILS ABSOLUTE COUNT: 2 K/UL (ref 1.3–9.1)
SODIUM BLD-SCNC: 139 MMOL/L (ref 135–144)
THYROXINE, FREE: 1.15 NG/DL (ref 0.93–1.7)
TOTAL PROTEIN: 7.4 G/DL (ref 6.4–8.3)
TRIGL SERPL-MCNC: 120 MG/DL
TSH SERPL DL<=0.05 MIU/L-ACNC: 0.7 MIU/L (ref 0.3–5)
VLDLC SERPL CALC-MCNC: NORMAL MG/DL (ref 1–30)
WBC # BLD: 4.3 K/UL (ref 3.5–11)
WBC # BLD: ABNORMAL 10*3/UL

## 2018-04-16 PROCEDURE — 1240000000 HC EMOTIONAL WELLNESS R&B

## 2018-04-16 PROCEDURE — 36415 COLL VENOUS BLD VENIPUNCTURE: CPT

## 2018-04-16 PROCEDURE — 6370000000 HC RX 637 (ALT 250 FOR IP): Performed by: PSYCHIATRY & NEUROLOGY

## 2018-04-16 PROCEDURE — G0378 HOSPITAL OBSERVATION PER HR: HCPCS

## 2018-04-16 PROCEDURE — 90792 PSYCH DIAG EVAL W/MED SRVCS: CPT | Performed by: PSYCHIATRY & NEUROLOGY

## 2018-04-16 PROCEDURE — 80053 COMPREHEN METABOLIC PANEL: CPT

## 2018-04-16 PROCEDURE — 80061 LIPID PANEL: CPT

## 2018-04-16 PROCEDURE — 84443 ASSAY THYROID STIM HORMONE: CPT

## 2018-04-16 PROCEDURE — 84439 ASSAY OF FREE THYROXINE: CPT

## 2018-04-16 PROCEDURE — 85025 COMPLETE CBC W/AUTO DIFF WBC: CPT

## 2018-04-16 RX ORDER — TRAZODONE HYDROCHLORIDE 100 MG/1
100 TABLET ORAL NIGHTLY PRN
Status: DISCONTINUED | OUTPATIENT
Start: 2018-04-16 | End: 2018-04-17 | Stop reason: HOSPADM

## 2018-04-16 RX ORDER — FLUOXETINE HYDROCHLORIDE 20 MG/1
20 CAPSULE ORAL DAILY
Status: DISCONTINUED | OUTPATIENT
Start: 2018-04-16 | End: 2018-04-17 | Stop reason: HOSPADM

## 2018-04-16 RX ADMIN — FLUOXETINE 20 MG: 20 CAPSULE ORAL at 11:37

## 2018-04-16 RX ADMIN — CHLORDIAZEPOXIDE HYDROCHLORIDE 25 MG: 25 CAPSULE ORAL at 20:38

## 2018-04-16 RX ADMIN — HYDROXYZINE HYDROCHLORIDE 50 MG: 25 TABLET, FILM COATED ORAL at 09:07

## 2018-04-16 RX ADMIN — Medication 100 MG: at 09:06

## 2018-04-16 RX ADMIN — HYDROXYZINE HYDROCHLORIDE 50 MG: 25 TABLET, FILM COATED ORAL at 20:38

## 2018-04-16 RX ADMIN — FOLIC ACID 1 MG: 1 TABLET ORAL at 09:07

## 2018-04-16 RX ADMIN — CHLORDIAZEPOXIDE HYDROCHLORIDE 25 MG: 25 CAPSULE ORAL at 09:07

## 2018-04-16 ASSESSMENT — LIFESTYLE VARIABLES: HISTORY_ALCOHOL_USE: YES

## 2018-04-17 VITALS
RESPIRATION RATE: 14 BRPM | TEMPERATURE: 98.7 F | OXYGEN SATURATION: 98 % | SYSTOLIC BLOOD PRESSURE: 154 MMHG | HEIGHT: 70 IN | HEART RATE: 60 BPM | WEIGHT: 145 LBS | DIASTOLIC BLOOD PRESSURE: 88 MMHG | BODY MASS INDEX: 20.76 KG/M2
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
## 2018-04-17 PROBLEM — I10 ESSENTIAL HYPERTENSION: Status: ACTIVE | Noted: 2018-04-17

## 2018-04-17 PROCEDURE — 99239 HOSP IP/OBS DSCHRG MGMT >30: CPT | Performed by: PSYCHIATRY & NEUROLOGY

## 2018-04-17 PROCEDURE — G0378 HOSPITAL OBSERVATION PER HR: HCPCS

## 2018-04-17 PROCEDURE — 99222 1ST HOSP IP/OBS MODERATE 55: CPT | Performed by: INTERNAL MEDICINE

## 2018-04-17 PROCEDURE — 6370000000 HC RX 637 (ALT 250 FOR IP): Performed by: PSYCHIATRY & NEUROLOGY

## 2018-04-17 PROCEDURE — 5130000000 HC BRIDGE APPOINTMENT

## 2018-04-17 PROCEDURE — 6370000000 HC RX 637 (ALT 250 FOR IP): Performed by: INTERNAL MEDICINE

## 2018-04-17 RX ORDER — AMLODIPINE BESYLATE 5 MG/1
5 TABLET ORAL DAILY
Qty: 30 TABLET | Refills: 1 | Status: ON HOLD | OUTPATIENT
Start: 2018-04-18 | End: 2019-10-27

## 2018-04-17 RX ORDER — AMLODIPINE BESYLATE 5 MG/1
5 TABLET ORAL DAILY
Status: DISCONTINUED | OUTPATIENT
Start: 2018-04-17 | End: 2018-04-17 | Stop reason: HOSPADM

## 2018-04-17 RX ORDER — FLUOXETINE HYDROCHLORIDE 20 MG/1
20 CAPSULE ORAL DAILY
Qty: 30 CAPSULE | Refills: 1 | Status: ON HOLD | OUTPATIENT
Start: 2018-04-18 | End: 2019-10-27

## 2018-04-17 RX ORDER — GABAPENTIN 100 MG/1
200 CAPSULE ORAL 3 TIMES DAILY PRN
Qty: 42 CAPSULE | Refills: 0 | Status: ON HOLD | OUTPATIENT
Start: 2018-04-17 | End: 2019-10-27

## 2018-04-17 RX ORDER — GABAPENTIN 100 MG/1
200 CAPSULE ORAL 3 TIMES DAILY PRN
Status: DISCONTINUED | OUTPATIENT
Start: 2018-04-17 | End: 2018-04-17 | Stop reason: HOSPADM

## 2018-04-17 RX ADMIN — FLUOXETINE 20 MG: 20 CAPSULE ORAL at 08:45

## 2018-04-17 RX ADMIN — Medication 100 MG: at 08:45

## 2018-04-17 RX ADMIN — FOLIC ACID 1 MG: 1 TABLET ORAL at 08:45

## 2018-04-17 RX ADMIN — AMLODIPINE BESYLATE 5 MG: 5 TABLET ORAL at 15:49

## 2018-04-17 ASSESSMENT — PAIN SCALES - GENERAL: PAINLEVEL_OUTOF10: 0

## 2018-08-28 ENCOUNTER — HOSPITAL ENCOUNTER (EMERGENCY)
Age: 28
Discharge: HOME | End: 2018-08-28
Attending: EMERGENCY MEDICINE

## 2018-08-28 ENCOUNTER — APPOINTMENT (OUTPATIENT)
Dept: CT IMAGING | Age: 28
End: 2018-08-28

## 2018-08-28 VITALS
DIASTOLIC BLOOD PRESSURE: 96 MMHG | TEMPERATURE: 98.2 F | HEART RATE: 90 BPM | SYSTOLIC BLOOD PRESSURE: 159 MMHG | OXYGEN SATURATION: 98 % | RESPIRATION RATE: 16 BRPM

## 2018-08-28 DIAGNOSIS — S00.03XA CONTUSION OF SCALP, INITIAL ENCOUNTER: ICD-10-CM

## 2018-08-28 DIAGNOSIS — S16.1XXA STRAIN OF NECK MUSCLE, INITIAL ENCOUNTER: Primary | ICD-10-CM

## 2018-08-28 PROCEDURE — 72125 CT NECK SPINE W/O DYE: CPT

## 2018-08-28 PROCEDURE — 70450 CT HEAD/BRAIN W/O DYE: CPT

## 2018-08-28 PROCEDURE — 99284 EMERGENCY DEPT VISIT MOD MDM: CPT

## 2018-08-28 RX ORDER — IBUPROFEN 400 MG/1
400 TABLET ORAL EVERY 6 HOURS PRN
Qty: 30 TABLET | Refills: 0 | Status: ON HOLD | OUTPATIENT
Start: 2018-08-28 | End: 2019-10-27

## 2018-08-28 RX ORDER — CYCLOBENZAPRINE HCL 10 MG
10 TABLET ORAL 3 TIMES DAILY PRN
Qty: 21 TABLET | Refills: 0 | Status: SHIPPED | OUTPATIENT
Start: 2018-08-28 | End: 2018-09-07

## 2018-08-28 RX ORDER — ACETAMINOPHEN 500 MG
1000 TABLET ORAL EVERY 6 HOURS PRN
Qty: 30 TABLET | Refills: 0 | Status: ON HOLD | OUTPATIENT
Start: 2018-08-28 | End: 2019-10-27

## 2018-08-28 ASSESSMENT — ENCOUNTER SYMPTOMS
ABDOMINAL PAIN: 0
NAUSEA: 0
VOMITING: 0
SHORTNESS OF BREATH: 0

## 2018-08-28 ASSESSMENT — PAIN DESCRIPTION - LOCATION: LOCATION: HEAD

## 2018-08-28 ASSESSMENT — PAIN SCALES - GENERAL: PAINLEVEL_OUTOF10: 9

## 2018-08-28 ASSESSMENT — PAIN DESCRIPTION - PAIN TYPE: TYPE: ACUTE PAIN

## 2019-06-01 ENCOUNTER — HOSPITAL ENCOUNTER (EMERGENCY)
Age: 29
Discharge: LEFT BEFORE DISCHARGE | End: 2019-06-01
Attending: EMERGENCY MEDICINE

## 2019-06-01 VITALS
OXYGEN SATURATION: 100 % | TEMPERATURE: 98.2 F | SYSTOLIC BLOOD PRESSURE: 147 MMHG | RESPIRATION RATE: 14 BRPM | DIASTOLIC BLOOD PRESSURE: 92 MMHG | BODY MASS INDEX: 30.31 KG/M2 | HEIGHT: 68 IN | HEART RATE: 63 BPM | WEIGHT: 200 LBS

## 2019-06-01 DIAGNOSIS — R07.9 CHEST PAIN, UNSPECIFIED TYPE: Primary | ICD-10-CM

## 2019-06-01 LAB
ANION GAP SERPL CALCULATED.3IONS-SCNC: 14 MMOL/L (ref 9–17)
BUN BLDV-MCNC: 8 MG/DL (ref 6–20)
BUN/CREAT BLD: ABNORMAL (ref 9–20)
CALCIUM SERPL-MCNC: 9 MG/DL (ref 8.6–10.4)
CHLORIDE BLD-SCNC: 100 MMOL/L (ref 98–107)
CO2: 21 MMOL/L (ref 20–31)
CREAT SERPL-MCNC: 1.01 MG/DL (ref 0.7–1.2)
GFR AFRICAN AMERICAN: >60 ML/MIN
GFR NON-AFRICAN AMERICAN: >60 ML/MIN
GFR SERPL CREATININE-BSD FRML MDRD: ABNORMAL ML/MIN/{1.73_M2}
GFR SERPL CREATININE-BSD FRML MDRD: ABNORMAL ML/MIN/{1.73_M2}
GLUCOSE BLD-MCNC: 109 MG/DL (ref 70–99)
HCT VFR BLD CALC: 42 % (ref 40.7–50.3)
HEMOGLOBIN: 13.8 G/DL (ref 13–17)
MCH RBC QN AUTO: 30.4 PG (ref 25.2–33.5)
MCHC RBC AUTO-ENTMCNC: 32.9 G/DL (ref 28.4–34.8)
MCV RBC AUTO: 92.5 FL (ref 82.6–102.9)
NRBC AUTOMATED: 0 PER 100 WBC
PDW BLD-RTO: 12 % (ref 11.8–14.4)
PLATELET # BLD: 268 K/UL (ref 138–453)
PMV BLD AUTO: 8.7 FL (ref 8.1–13.5)
POTASSIUM SERPL-SCNC: 3.3 MMOL/L (ref 3.7–5.3)
RBC # BLD: 4.54 M/UL (ref 4.21–5.77)
SODIUM BLD-SCNC: 135 MMOL/L (ref 135–144)
TROPONIN INTERP: NORMAL
TROPONIN T: NORMAL NG/ML
TROPONIN, HIGH SENSITIVITY: <6 NG/L (ref 0–22)
WBC # BLD: 6 K/UL (ref 3.5–11.3)

## 2019-06-01 PROCEDURE — 80048 BASIC METABOLIC PNL TOTAL CA: CPT

## 2019-06-01 PROCEDURE — 93005 ELECTROCARDIOGRAM TRACING: CPT

## 2019-06-01 PROCEDURE — 84484 ASSAY OF TROPONIN QUANT: CPT

## 2019-06-01 PROCEDURE — 6370000000 HC RX 637 (ALT 250 FOR IP): Performed by: EMERGENCY MEDICINE

## 2019-06-01 PROCEDURE — 93005 ELECTROCARDIOGRAM TRACING: CPT | Performed by: EMERGENCY MEDICINE

## 2019-06-01 PROCEDURE — 99285 EMERGENCY DEPT VISIT HI MDM: CPT

## 2019-06-01 PROCEDURE — 85027 COMPLETE CBC AUTOMATED: CPT

## 2019-06-01 RX ORDER — ASPIRIN 81 MG/1
324 TABLET, CHEWABLE ORAL ONCE
Status: COMPLETED | OUTPATIENT
Start: 2019-06-01 | End: 2019-06-01

## 2019-06-01 RX ORDER — ONDANSETRON 2 MG/ML
4 INJECTION INTRAMUSCULAR; INTRAVENOUS ONCE
Status: DISCONTINUED | OUTPATIENT
Start: 2019-06-01 | End: 2019-06-01 | Stop reason: HOSPADM

## 2019-06-01 RX ADMIN — ASPIRIN 81 MG 324 MG: 81 TABLET ORAL at 01:06

## 2019-06-01 ASSESSMENT — ENCOUNTER SYMPTOMS
VOMITING: 0
BACK PAIN: 0
ABDOMINAL PAIN: 0
NAUSEA: 1
SHORTNESS OF BREATH: 1

## 2019-06-01 NOTE — ED PROVIDER NOTES
[facility]     Emergency Department     Faculty Note/ Attestation      Pt Name: Martita Avila                                       MRN: [id number]  [place] 1990  Date of evaluation: 6/1/2019    Patients PCP:    [address] [place] [address]  I performed a history and physical examination of the patient and discussed management with the resident. I reviewed the residents note and agree with the documented findings and plan of care. Any areas of disagreement are noted on the chart. I was personally present for the key portions of any procedures. I have documented in the chart those procedures where I was not present during the key portions. I have reviewed the emergency nurses triage note. I agree with the chief complaint, past medical history, past surgical history, allergies, medications, social and family history as documented unless otherwise noted below. For Physician Assistant/ Nurse Practitioner cases/documentation I have personally evaluated this patient and have completed at least one if not all key elements of the E/M (history, physical exam, and MDM). Additional findings are as noted. Initial Screens:        [place] Coma Scale  Eye Opening: Spontaneous  Best Verbal Response: Oriented  Best Motor Response: Obeys commands  [place] Coma Scale Score: 15    Vitals:    Vitals:    06/01/19 0026   BP: (!) 147/92   Pulse: 82   Resp: 14   Temp: 98.2 °F (36.8 °C)   TempSrc: Oral   SpO2: 98%   Weight: 200 lb (90.7 kg)   Height: 5' 8\" (1.727 m)       CHIEF COMPLAINT       Chief Complaint   Patient presents with    Chest Pain     chest pain after smpoking synthetic weed       Chest pain post smoking K2 the pt now denying pain and stating he wants to leave he will not stay for even a troponin. The pt was advised to stay as he could die or suffer severe heart attach that could make it so he is no longer independent.   He understands he can come back at any time if he changes his mind.    DIAGNOSTIC RESULTS     RADIOLOGY:   No orders to display     EKG Interpretation   Interpreted by Man Montoya DO    Rhythm: normal sinus   Rate: normal  Axis: normal  Ectopy: none  Conduction: normal  ST Segments: normal  T Waves: normal  Q Waves: none    Clinical Impression: no acute changes normal EKG      LABS:  Labs Reviewed   CBC   BASIC METABOLIC PANEL   TROPONIN       EMERGENCY DEPARTMENT COURSE:     -------------------------  BP: (!) 147/92, Temp: 98.2 °F (36.8 °C), Pulse: 82, Resp: 14  Physical Exam __  Constitutional:  oriented to person, place, and time. appears well-developed and well-nourished. HENT: no facial swelling or edema  Head: Normocephalic and atraumatic. Eyes: Right eye exhibits no discharge. Left eye exhibits no discharge. No scleral icterus. Neck: No JVD present. No tracheal deviation present. Cardiovascular: pulses present in extremities  Pulmonary/Chest: Effort normal. No respiratory distress. Musculoskeletal: Normal range of motion. exhibits no edema. Neurological: they are alert and oriented to person, place, and time. Skin: Skin is warm and dry. Psychiatric: has a normal mood and affect. behavior is normal.      Comments    The pt leaving AMA    Luis DO, RDMS.   Attending Emergency Physician          Man Montoya DO  06/01/19 6682

## 2019-06-01 NOTE — ED PROVIDER NOTES
[address]  Emergency Department Encounter  Emergency Medicine Resident     Pt Name: Xin Abbasi  MRN: [id number]  [place] 1990  Date of evaluation: 6/1/19  PCP:  Shauna Shane       Chief Complaint   Patient presents with    Chest Pain     chest pain after smpoking synthetic weed       HISTORY OF PRESENT ILLNESS  (Location/Symptom, Timing/Onset, Context/Setting, Quality, Duration, Modifying Factors, Severity.)    Xin Abbasi is a 33 y.o. male who presents with midsternal \"punching\" chest pain that started immediately after he was smoking some K2. Patient states he became short of breath and nauseated when the pain started. He has not had pain similar to this in the past.  He denies any other illicit drug use earlier this evening and denies any alcohol. Patient states that he currently wants to leave against medical advice as he wants to go see his girlfriend and she is coming to pick him up. Denies any cough, vomiting, abdominal pain, headache, dizziness or lightheadedness, weakness, syncope, diaphoresis. PAST MEDICAL / SURGICAL / SOCIAL / FAMILY HISTORY    has a past medical history of Alcoholism ([facility] [address] Anxiety, Bipolar 1 disorder ([facility] [address] Cocaine abuse ([facility] [address] Depression, Hypertension, Irregular heartbeat, Mild tetrahydrocannabinol (THC) abuse, and Suicidal ideation. has a past surgical history that includes other surgical history (Right, 06/04/2017); Dialysis fistula creation (Right, 6/4/2017); and EXPLORATION OF WOUND OF EXTREMITY (Right, 6/4/2017).     Social History     Socioeconomic History    Marital status: Single     Spouse name: magalys    Number of children: 1    Years of education: Not on file    Highest education level: Not on file   Occupational History    Occupation: unemployed     Comment: used to work at [employer] 6 months ago   Social Needs    Financial resource strain: Not on file    Food insecurity:     Worry: Not on file Inability: Not on file    Transportation needs:     Medical: Not on file     Non-medical: Not on file   Tobacco Use    Smoking status: Current Every Day Smoker     Packs/day: 0.50     Years: 14.00     Pack years: 7.00     Types: Cigarettes    Smokeless tobacco: Never Used    Tobacco comment: 3 cigs per day per patient (17)   Substance and Sexual Activity    Alcohol use: Yes     Comment: daily drinks beer until he passes out has at least 144oz beer daily    Drug use: Yes     Types: Cocaine, Marijuana    Sexual activity: Yes     Partners: Female   Lifestyle    Physical activity:     Days per week: Not on file     Minutes per session: Not on file    Stress: Not on file   Relationships    Social connections:     Talks on phone: Not on file     Gets together: Not on file     Attends [employer] service: Not on file     Active member of club or organization: Not on file     Attends meetings of clubs or organizations: Not on file     Relationship status: Not on file    Intimate partner violence:     Fear of current or ex partner: Not on file     Emotionally abused: Not on file     Physically abused: Not on file     Forced sexual activity: Not on file   Other Topics Concern    Not on file   Social History Narrative    ** Merged History Encounter **         Lives with fiance and 3year old daughter       Family History   Problem Relation Age of Onset    Heart Disease Father 52    Asthma Brother     Hypertension Maternal Grandmother     Lung Cancer Maternal Grandmother         Kidney cancer, liver cancer    Stroke Maternal Grandmother     Heart Disease Mother 46         of presumed heart disease in her sleep       Allergies:    Vicodin [hydrocodone-acetaminophen]    Home Medications:  Prior to Admission medications    Medication Sig Start Date End Date Taking?  Authorizing Provider   ibuprofen (IBU) 400 MG tablet Take 1 tablet by mouth every 6 hours as needed for Pain 18   Aiden Anderson MD 100%   Weight: 200 lb (90.7 kg)    Height: 5' 8\" (1.727 m)        Physical Exam   Constitutional: He is oriented to person, place, and time. He appears well-developed and well-nourished. No distress. HENT:   Head: Normocephalic and atraumatic. Right Ear: External ear normal.   Left Ear: External ear normal.   Nose: Nose normal.   Eyes: Conjunctivae are normal.   Neck: Normal range of motion. Neck supple. Cardiovascular: Normal rate, regular rhythm, normal heart sounds and intact distal pulses. No murmur heard. Pulmonary/Chest: Effort normal and breath sounds normal. No respiratory distress. He has no wheezes. He has no rales. He exhibits no tenderness. Abdominal: Soft. Bowel sounds are normal. He exhibits no distension. There is no tenderness. Musculoskeletal: Normal range of motion. He exhibits no edema. Neurological: He is alert and oriented to person, place, and time. Skin: Skin is warm and dry. He is not diaphoretic. No pallor. Psychiatric: He has a normal mood and affect. His behavior is normal.   Nursing note and vitals reviewed. DIFFERENTIAL  DIAGNOSIS   PLAN (LABS / IMAGING / EKG):  Orders Placed This Encounter   Procedures    CBC    BASIC METABOLIC PANEL    Troponin    EKG 12 Lead       MEDICATIONS ORDERED:  Orders Placed This Encounter   Medications    ondansetron (ZOFRAN) injection 4 mg    aspirin chewable tablet 324 mg       DDX:   ACS, PE, PNA, dissection, drug reaction     DIAGNOSTIC RESULTS / EMERGENCYDEPARTMENT COURSE / MDM   LABS:  Labs Reviewed   BASIC METABOLIC PANEL - Abnormal; Notable for the following components:       Result Value    Glucose 109 (*)     Potassium 3.3 (*)     All other components within normal limits   CBC   TROPONIN       RADIOLOGY:  No results found.     EKG    EKG Interpretation    Interpreted by emergency department physician at 0032    Rhythm: sinus arrhythmia  Rate: normal  Axis: normal  Ectopy: none  Conduction: normal  ST Segments: no acute

## 2019-06-01 NOTE — ED NOTES
Bed: 33  Expected date: 6/1/19  Expected time: 12:13 AM  Means of arrival: [facility] SURGICAL AND CARDIOVASCULAR [facility]  Comments:  Medic 3      [place]is [place]  06/01/19 2005

## 2019-06-03 LAB
EKG ATRIAL RATE: 68 BPM
EKG P AXIS: 75 DEGREES
EKG P-R INTERVAL: 154 MS
EKG Q-T INTERVAL: 386 MS
EKG QRS DURATION: 90 MS
EKG QTC CALCULATION (BAZETT): 410 MS
EKG R AXIS: 49 DEGREES
EKG T AXIS: 40 DEGREES
EKG VENTRICULAR RATE: 68 BPM

## 2019-06-23 ENCOUNTER — APPOINTMENT (OUTPATIENT)
Dept: GENERAL RADIOLOGY | Age: 29
End: 2019-06-23

## 2019-06-23 ENCOUNTER — HOSPITAL ENCOUNTER (EMERGENCY)
Age: 29
Discharge: HOME | End: 2019-06-23
Attending: EMERGENCY MEDICINE

## 2019-06-23 VITALS
SYSTOLIC BLOOD PRESSURE: 143 MMHG | OXYGEN SATURATION: 98 % | RESPIRATION RATE: 16 BRPM | DIASTOLIC BLOOD PRESSURE: 89 MMHG | HEART RATE: 75 BPM | TEMPERATURE: 97.5 F

## 2019-06-23 DIAGNOSIS — S93.401A SPRAIN OF RIGHT ANKLE, UNSPECIFIED LIGAMENT, INITIAL ENCOUNTER: Primary | ICD-10-CM

## 2019-06-23 PROCEDURE — 99283 EMERGENCY DEPT VISIT LOW MDM: CPT

## 2019-06-23 PROCEDURE — 6370000000 HC RX 637 (ALT 250 FOR IP): Performed by: STUDENT IN AN ORGANIZED HEALTH CARE EDUCATION/TRAINING PROGRAM

## 2019-06-23 PROCEDURE — 73610 X-RAY EXAM OF ANKLE: CPT

## 2019-06-23 RX ORDER — IBUPROFEN 800 MG/1
800 TABLET ORAL ONCE
Status: COMPLETED | OUTPATIENT
Start: 2019-06-23 | End: 2019-06-23

## 2019-06-23 RX ORDER — IBUPROFEN 800 MG/1
800 TABLET ORAL EVERY 8 HOURS PRN
Qty: 30 TABLET | Refills: 0 | Status: ON HOLD | OUTPATIENT
Start: 2019-06-23 | End: 2019-10-27

## 2019-06-23 RX ADMIN — IBUPROFEN 800 MG: 800 TABLET, FILM COATED ORAL at 10:50

## 2019-06-23 ASSESSMENT — ENCOUNTER SYMPTOMS
VOMITING: 0
SORE THROAT: 0
SHORTNESS OF BREATH: 0
PHOTOPHOBIA: 0
BACK PAIN: 0
WHEEZING: 0
ABDOMINAL PAIN: 0
COUGH: 0
TROUBLE SWALLOWING: 0
NAUSEA: 0
CHEST TIGHTNESS: 0

## 2019-06-23 ASSESSMENT — PAIN DESCRIPTION - LOCATION: LOCATION: ANKLE

## 2019-06-23 ASSESSMENT — PAIN SCALES - GENERAL
PAINLEVEL_OUTOF10: 7
PAINLEVEL_OUTOF10: 10

## 2019-06-23 NOTE — ED NOTES
[employer] work advised that patient has fresh cut marks to left forearm, pt denied suicidal or homicidal ideations, pt has a psych history, scabs noted to cuts     [facility] Josr, RN  06/23/19 3006

## 2019-06-23 NOTE — ED PROVIDER NOTES
[facility]     Emergency Department     Faculty Attestation    I performed a history and physical examination of the patient and discussed management with the resident. I reviewed the residents note and agree with the documented findings including all diagnostic interpretations and plan of care. Any areas of disagreement are noted on the chart. I was personally present for the key portions of any procedures. I have documented in the chart those procedures where I was not present during the key portions. I have reviewed the emergency nurses triage note. I agree with the chief complaint, past medical history, past surgical history, allergies, medications, social and family history as documented unless otherwise noted below. Documentation of the HPI, Physical Exam and Medical Decision Making performed by natashaibozzy is based on my personal performance of the HPI, PE and MDM. For Physician Assistant/ Nurse Practitioner cases/documentation I have personally evaluated this patient and have completed at least one if not all key elements of the E/M (history, physical exam, and MDM). Additional findings are as noted. Primary Care Physician: [facility]    History: This is a 33 y.o. male who presents to the Emergency Department with complaint of ankle injury. Right ankle. Was doing back flips with kids and landed on it wrong. Has been able to bear weight on it but has been increasingly painful through the day. No numbness no weakness. Physical:     oral temperature is 97.5 °F (36.4 °C). His blood pressure is 143/89 (abnormal) and his pulse is 75. His respiration is 16 and oxygen saturation is 98%.    33 y.o. male no acute distress, right ankle shows mild edema over the lateral malleolus as well as tenderness. Passive range of motion is intact, normal sensation through all 5 toes. DP pulse 2+.     Impression: Ankle injury    Plan: X-ray, Aircast, analgesia, dana Cook MD  Attending Emergency Physician         Marla Paul MD  06/23/19 4726

## 2019-06-23 NOTE — ED PROVIDER NOTES
[facility] ED  Emergency Department Encounter  EmergencyMedicine Resident     Pt Mariela Michaels  MRN: [id number]  [place] 1990  Date of evaluation: 6/23/19  PCP:  Shauna Shane       Chief Complaint   Patient presents with    Ankle Pain       HISTORY OF PRESENT ILLNESS  (Location/Symptom, Timing/Onset, Context/Setting, Quality, Duration, Modifying Factors, Severity.)      Don Graham is a 33 y.o. male who presents with ankle injury yesterday evening. Patient states that he was doing a back flip and landed on his toes yesterday. Patient denies any eversion/inversion injury. Patient was able to walk without difficulty with minimal pain yesterday and noticed pain increasing this morning. Patient with no significant swelling to the area. Patient states he has been using ice for pain control. Patient denies any numbness, tingling to the toes. Patient denies any color change. No proximal or distal injuries. Patient localizing pain to medial malleoli area. PAST MEDICAL / SURGICAL / SOCIAL / FAMILY HISTORY      has a past medical history of Alcoholism ([facility] [address] Anxiety, Bipolar 1 disorder ([facility] [address] Cocaine abuse ([facility] [address] Depression, Hypertension, Irregular heartbeat, Mild tetrahydrocannabinol (THC) abuse, and Suicidal ideation. has a past surgical history that includes other surgical history (Right, 06/04/2017); Dialysis fistula creation (Right, 6/4/2017); and EXPLORATION OF WOUND OF EXTREMITY (Right, 6/4/2017).     Social History     Socioeconomic History    Marital status: Single     Spouse name: magalys    Number of children: 1    Years of education: Not on file    Highest education level: Not on file   Occupational History    Occupation: unemployed     Comment: used to work at [employer] 6 months ago   Social Needs    Financial resource strain: Not on file    Food insecurity:     Worry: Not on file     Inability: Not on file   Sqoot needs: Medical: Not on file     Non-medical: Not on file   Tobacco Use    Smoking status: Current Every Day Smoker     Packs/day: 0.50     Years: 14.00     Pack years: 7.00     Types: Cigarettes    Smokeless tobacco: Never Used    Tobacco comment: 3 cigs per day per patient (17)   Substance and Sexual Activity    Alcohol use: Yes     Comment: daily drinks beer until he passes out has at least 144oz beer daily    Drug use: Yes     Types: Cocaine, Marijuana    Sexual activity: Yes     Partners: Female   Lifestyle    Physical activity:     Days per week: Not on file     Minutes per session: Not on file    Stress: Not on file   Relationships    Social connections:     Talks on phone: Not on file     Gets together: Not on file     Attends Confucianism service: Not on file     Active member of club or organization: Not on file     Attends meetings of clubs or organizations: Not on file     Relationship status: Not on file    Intimate partner violence:     Fear of current or ex partner: Not on file     Emotionally abused: Not on file     Physically abused: Not on file     Forced sexual activity: Not on file   Other Topics Concern    Not on file   Social History Narrative    ** Merged History Encounter **         Lives with fiance and 3year old daughter       Family History   Problem Relation Age of Onset    Heart Disease Father 52    Asthma Brother     Hypertension Maternal Grandmother     Lung Cancer Maternal Grandmother         Kidney cancer, liver cancer    Stroke Maternal Grandmother     Heart Disease Mother 46         of presumed heart disease in her sleep       Allergies:  Vicodin [hydrocodone-acetaminophen]    Home Medications:  Prior to Admission medications    Medication Sig Start Date End Date Taking?  Authorizing Provider   ibuprofen (ADVIL;MOTRIN) 800 MG tablet Take 1 tablet by mouth every 8 hours as needed for Pain or Fever 19  Yes Justin Giles MD   ibuprofen (IBU) 400 MG tablet Take 1 tablet by mouth every 6 hours as needed for Pain 8/28/18   Anushka Fountain MD   acetaminophen (TYLENOL) 500 MG tablet Take 2 tablets by mouth every 6 hours as needed for Pain 8/28/18   Anushka Fountain MD   gabapentin (NEURONTIN) 100 MG capsule Take 2 capsules by mouth 3 times daily as needed (withdrawal symptoms (shaking, sweating, anxiety. Jerryl Boyers )) for up to 7 days. . 4/17/18 4/24/18  Tasha Owen MD   FLUoxetine (PROZAC) 20 MG capsule Take 1 capsule by mouth daily 4/18/18   Tasha Owen MD   amLODIPine (NORVASC) 5 MG tablet Take 1 tablet by mouth daily 4/18/18   Tasha Owen MD   sertraline (ZOLOFT) 50 MG tablet Take 50 mg by mouth daily    Historical Provider, MD   traZODone (DESYREL) 100 MG tablet Take 100 mg by mouth nightly    Historical Provider, MD   docusate sodium (COLACE) 100 MG capsule Take 1 capsule by mouth 2 times daily 6/5/17   Parish Prudeagapito Gordon, DO   nicotine (NICODERM CQ) 21 MG/24HR Place 1 patch onto the skin daily 11/14/16   Dorna Lesches, MD       REVIEW OF SYSTEMS    (2-9 systems for level 4, 10 or more for level 5)      Review of Systems   Constitutional: Negative for chills and fever. HENT: Negative for sore throat and trouble swallowing. Eyes: Negative for photophobia. Respiratory: Negative for cough, chest tightness, shortness of breath and wheezing. Cardiovascular: Negative for chest pain and palpitations. Gastrointestinal: Negative for abdominal pain, nausea and vomiting. Endocrine: Negative for polyuria. Genitourinary: Negative for dysuria and flank pain. Musculoskeletal: Negative for back pain and neck pain. Ankle pain. Skin: Negative for rash and wound. Neurological: Negative for syncope, weakness, light-headedness and headaches. Psychiatric/Behavioral: Negative for agitation and confusion.        PHYSICAL EXAM   (up to 7 for level 4, 8 or more for level 5)      INITIAL VITALS:   BP (!) 143/89   Pulse 75   Temp 97.5 °F (36.4 °C) (Oral)   Resp DEPARTMENT COURSE / [facility]     LABS:  No results found for this visit on 06/23/19. RADIOLOGY:  None    EKG  None    All EKG's are interpreted by the Emergency Department Physician who either signs or Co-signs this chart in the absence of a cardiologist.    EMERGENCY DEPARTMENT COURSE:  Patient with minor injury to the right ankle after doing backslid yesterday. Patient with delayed onset of pain. Lateral malleolar tenderness on exam.  No significant swelling/effusion noted on exam.  Patient able to bear weight with some difficulty due to the pain. X-ray without any fractures. Patient likely with ankle sprain that is minor. Patient provided with pain medications, crutches, Aircast.  Patient instructed to follow-up as needed with PCP or return to the ED if symptoms worsen or persist.    PROCEDURES:  None    CONSULTS:  None    CRITICAL CARE:  None    FINAL IMPRESSION      1.  Sprain of right ankle, unspecified ligament, initial encounter          DISPOSITION / PLAN     DISPOSITION Decision To Discharge 06/23/2019 11:25:26 AM      PATIENT REFERRED TO:  [facility]  NO FAMILY DOC ONLY    Schedule an appointment as soon as possible for a visit in 1 day  For Follow-up      DISCHARGE MEDICATIONS:  Discharge Medication List as of 6/23/2019 11:27 AM          Ming Valdez MD  Emergency Medicine Resident    (Please note that portions of thisnote were completed with a voice recognition program.  Efforts were made to edit the dictations but occasionally words are mis-transcribed.)       Ming Valdez MD  06/23/19 [phone]

## 2019-10-27 ENCOUNTER — HOSPITAL ENCOUNTER (EMERGENCY)
Age: 29
Discharge: PSYCHIATRIC OR SUBSTANCE USE FACILITY | End: 2019-10-27
Attending: EMERGENCY MEDICINE
Payer: MEDICARE

## 2019-10-27 ENCOUNTER — HOSPITAL ENCOUNTER (INPATIENT)
Age: 29
LOS: 1 days | Discharge: HOME | DRG: 751 | End: 2019-10-28
Attending: PSYCHIATRY & NEUROLOGY | Admitting: PSYCHIATRY & NEUROLOGY
Payer: MEDICARE

## 2019-10-27 VITALS
RESPIRATION RATE: 18 BRPM | DIASTOLIC BLOOD PRESSURE: 77 MMHG | SYSTOLIC BLOOD PRESSURE: 128 MMHG | OXYGEN SATURATION: 100 % | HEART RATE: 80 BPM | TEMPERATURE: 97.2 F

## 2019-10-27 DIAGNOSIS — F19.20 DRUG ADDICTION (HCC): ICD-10-CM

## 2019-10-27 DIAGNOSIS — R45.851 SUICIDAL IDEATION: Primary | ICD-10-CM

## 2019-10-27 PROBLEM — F33.9 MAJOR DEPRESSION, RECURRENT (HCC): Status: ACTIVE | Noted: 2019-10-27

## 2019-10-27 PROBLEM — F32.2 SEVERE MAJOR DEPRESSION (HCC): Status: ACTIVE | Noted: 2019-10-27

## 2019-10-27 PROCEDURE — 6370000000 HC RX 637 (ALT 250 FOR IP): Performed by: INTERNAL MEDICINE

## 2019-10-27 PROCEDURE — 99253 IP/OBS CNSLTJ NEW/EST LOW 45: CPT | Performed by: INTERNAL MEDICINE

## 2019-10-27 PROCEDURE — 1240000000 HC EMOTIONAL WELLNESS R&B

## 2019-10-27 PROCEDURE — 99285 EMERGENCY DEPT VISIT HI MDM: CPT

## 2019-10-27 RX ORDER — ACETAMINOPHEN 325 MG/1
650 TABLET ORAL EVERY 4 HOURS PRN
Status: DISCONTINUED | OUTPATIENT
Start: 2019-10-27 | End: 2019-10-28 | Stop reason: HOSPADM

## 2019-10-27 RX ORDER — BENZTROPINE MESYLATE 1 MG/ML
2 INJECTION INTRAMUSCULAR; INTRAVENOUS 2 TIMES DAILY PRN
Status: DISCONTINUED | OUTPATIENT
Start: 2019-10-27 | End: 2019-10-28 | Stop reason: HOSPADM

## 2019-10-27 RX ORDER — NICOTINE 21 MG/24HR
1 PATCH, TRANSDERMAL 24 HOURS TRANSDERMAL DAILY
Status: DISCONTINUED | OUTPATIENT
Start: 2019-10-27 | End: 2019-10-28

## 2019-10-27 RX ORDER — AMLODIPINE BESYLATE 5 MG/1
5 TABLET ORAL DAILY
Status: DISCONTINUED | OUTPATIENT
Start: 2019-10-27 | End: 2019-10-28 | Stop reason: HOSPADM

## 2019-10-27 RX ORDER — TRAZODONE HYDROCHLORIDE 50 MG/1
50 TABLET ORAL NIGHTLY PRN
Status: DISCONTINUED | OUTPATIENT
Start: 2019-10-27 | End: 2019-10-28 | Stop reason: HOSPADM

## 2019-10-27 RX ORDER — MAGNESIUM HYDROXIDE/ALUMINUM HYDROXICE/SIMETHICONE 120; 1200; 1200 MG/30ML; MG/30ML; MG/30ML
15 SUSPENSION ORAL EVERY 6 HOURS PRN
Status: DISCONTINUED | OUTPATIENT
Start: 2019-10-27 | End: 2019-10-28 | Stop reason: HOSPADM

## 2019-10-27 RX ADMIN — AMLODIPINE BESYLATE 5 MG: 5 TABLET ORAL at 14:39

## 2019-10-27 ASSESSMENT — ENCOUNTER SYMPTOMS
SORE THROAT: 0
EYE PAIN: 0
DIARRHEA: 0
NAUSEA: 0
SHORTNESS OF BREATH: 0
ABDOMINAL PAIN: 0
COUGH: 0

## 2019-10-27 ASSESSMENT — SLEEP AND FATIGUE QUESTIONNAIRES
DO YOU HAVE DIFFICULTY SLEEPING: YES
RESTFUL SLEEP: NO
DIFFICULTY STAYING ASLEEP: YES
DO YOU USE A SLEEP AID: NO
SLEEP PATTERN: EARLY AWAKENING

## 2019-10-27 ASSESSMENT — PAIN SCALES - GENERAL: PAINLEVEL_OUTOF10: 8

## 2019-10-27 ASSESSMENT — LIFESTYLE VARIABLES: HISTORY_ALCOHOL_USE: YES

## 2019-10-27 ASSESSMENT — PAIN DESCRIPTION - LOCATION: LOCATION: HAND

## 2019-10-28 VITALS
RESPIRATION RATE: 14 BRPM | BODY MASS INDEX: 27.35 KG/M2 | TEMPERATURE: 98 F | HEIGHT: 70 IN | WEIGHT: 191.06 LBS | DIASTOLIC BLOOD PRESSURE: 89 MMHG | SYSTOLIC BLOOD PRESSURE: 139 MMHG | HEART RATE: 56 BPM | OXYGEN SATURATION: 98 %

## 2019-10-28 PROCEDURE — 5130000000 HC BRIDGE APPOINTMENT

## 2019-10-28 PROCEDURE — 6370000000 HC RX 637 (ALT 250 FOR IP): Performed by: PSYCHIATRY & NEUROLOGY

## 2019-10-28 PROCEDURE — 6370000000 HC RX 637 (ALT 250 FOR IP): Performed by: INTERNAL MEDICINE

## 2019-10-28 PROCEDURE — 90792 PSYCH DIAG EVAL W/MED SRVCS: CPT | Performed by: PSYCHIATRY & NEUROLOGY

## 2019-10-28 RX ORDER — TRAZODONE HYDROCHLORIDE 50 MG/1
50 TABLET ORAL NIGHTLY PRN
Qty: 14 TABLET | Refills: 0 | Status: ON HOLD | OUTPATIENT
Start: 2019-10-28 | End: 2019-11-11 | Stop reason: HOSPADM

## 2019-10-28 RX ORDER — AMLODIPINE BESYLATE 5 MG/1
5 TABLET ORAL DAILY
Qty: 30 TABLET | Refills: 0 | Status: ON HOLD | OUTPATIENT
Start: 2019-10-28 | End: 2019-11-11 | Stop reason: SDUPTHER

## 2019-10-28 RX ADMIN — AMLODIPINE BESYLATE 5 MG: 5 TABLET ORAL at 08:51

## 2019-10-28 RX ADMIN — TRAZODONE HYDROCHLORIDE 50 MG: 50 TABLET ORAL at 00:00

## 2019-10-28 ASSESSMENT — ENCOUNTER SYMPTOMS
TROUBLE SWALLOWING: 0
DIARRHEA: 0
CONSTIPATION: 0
VOMITING: 0
SHORTNESS OF BREATH: 0
RHINORRHEA: 0
COUGH: 0
WHEEZING: 0
SINUS PRESSURE: 0
NAUSEA: 0
ABDOMINAL PAIN: 0

## 2019-10-28 ASSESSMENT — PAIN DESCRIPTION - LOCATION: LOCATION: GENERALIZED

## 2019-10-28 ASSESSMENT — PAIN DESCRIPTION - PAIN TYPE: TYPE: ACUTE PAIN

## 2019-10-28 ASSESSMENT — PAIN SCALES - GENERAL: PAINLEVEL_OUTOF10: 5

## 2019-10-28 ASSESSMENT — LIFESTYLE VARIABLES: HISTORY_ALCOHOL_USE: NO

## 2019-11-07 ENCOUNTER — HOSPITAL ENCOUNTER (EMERGENCY)
Age: 29
Discharge: HOME | End: 2019-11-07
Attending: EMERGENCY MEDICINE
Payer: MEDICARE

## 2019-11-07 ENCOUNTER — HOSPITAL ENCOUNTER (INPATIENT)
Age: 29
LOS: 4 days | Discharge: HOME | DRG: 751 | End: 2019-11-11
Attending: PSYCHIATRY & NEUROLOGY | Admitting: PSYCHIATRY & NEUROLOGY
Payer: MEDICARE

## 2019-11-07 VITALS
RESPIRATION RATE: 14 BRPM | HEART RATE: 73 BPM | OXYGEN SATURATION: 97 % | TEMPERATURE: 97.9 F | SYSTOLIC BLOOD PRESSURE: 123 MMHG | DIASTOLIC BLOOD PRESSURE: 71 MMHG

## 2019-11-07 DIAGNOSIS — T43.211A OVERDOSE OF TRAZODONE: ICD-10-CM

## 2019-11-07 DIAGNOSIS — T14.91XA SUICIDE ATTEMPT (HCC): Primary | ICD-10-CM

## 2019-11-07 PROBLEM — F33.2 SEVERE RECURRENT MAJOR DEPRESSION WITHOUT PSYCHOTIC FEATURES (HCC): Status: ACTIVE | Noted: 2019-11-07

## 2019-11-07 LAB
ABSOLUTE EOS #: 0.15 K/UL (ref 0–0.44)
ABSOLUTE IMMATURE GRANULOCYTE: <0.03 K/UL (ref 0–0.3)
ABSOLUTE LYMPH #: 2.55 K/UL (ref 1.1–3.7)
ABSOLUTE MONO #: 0.59 K/UL (ref 0.1–1.2)
ACETAMINOPHEN LEVEL: <5 UG/ML (ref 10–30)
ALBUMIN SERPL-MCNC: 4 G/DL (ref 3.5–5.2)
ALBUMIN/GLOBULIN RATIO: 1.5 (ref 1–2.5)
ALP BLD-CCNC: 63 U/L (ref 40–129)
ALT SERPL-CCNC: 16 U/L (ref 5–41)
ANION GAP SERPL CALCULATED.3IONS-SCNC: 14 MMOL/L (ref 9–17)
AST SERPL-CCNC: 20 U/L
BASOPHILS # BLD: 1 % (ref 0–2)
BASOPHILS ABSOLUTE: 0.04 K/UL (ref 0–0.2)
BILIRUB SERPL-MCNC: <0.1 MG/DL (ref 0.3–1.2)
BUN BLDV-MCNC: 6 MG/DL (ref 6–20)
BUN/CREAT BLD: ABNORMAL (ref 9–20)
CALCIUM SERPL-MCNC: 8.8 MG/DL (ref 8.6–10.4)
CHLORIDE BLD-SCNC: 104 MMOL/L (ref 98–107)
CO2: 22 MMOL/L (ref 20–31)
CREAT SERPL-MCNC: 0.91 MG/DL (ref 0.7–1.2)
DIFFERENTIAL TYPE: ABNORMAL
EOSINOPHILS RELATIVE PERCENT: 2 % (ref 1–4)
ETHANOL PERCENT: 0.17 %
ETHANOL: 170 MG/DL
GFR AFRICAN AMERICAN: >60 ML/MIN
GFR NON-AFRICAN AMERICAN: >60 ML/MIN
GFR SERPL CREATININE-BSD FRML MDRD: ABNORMAL ML/MIN/{1.73_M2}
GFR SERPL CREATININE-BSD FRML MDRD: ABNORMAL ML/MIN/{1.73_M2}
GLUCOSE BLD-MCNC: 132 MG/DL (ref 70–99)
HCT VFR BLD CALC: 43 % (ref 40.7–50.3)
HEMOGLOBIN: 14.5 G/DL (ref 13–17)
IMMATURE GRANULOCYTES: 0 %
LYMPHOCYTES # BLD: 32 % (ref 24–43)
MAGNESIUM: 2.2 MG/DL (ref 1.6–2.6)
MCH RBC QN AUTO: 30.9 PG (ref 25.2–33.5)
MCHC RBC AUTO-ENTMCNC: 33.7 G/DL (ref 28.4–34.8)
MCV RBC AUTO: 91.5 FL (ref 82.6–102.9)
MONOCYTES # BLD: 7 % (ref 3–12)
NRBC AUTOMATED: 0 PER 100 WBC
PDW BLD-RTO: 11.4 % (ref 11.8–14.4)
PLATELET # BLD: ABNORMAL K/UL (ref 138–453)
PLATELET ESTIMATE: ABNORMAL
PLATELET, FLUORESCENCE: NORMAL K/UL (ref 138–453)
PMV BLD AUTO: ABNORMAL FL (ref 8.1–13.5)
POTASSIUM SERPL-SCNC: 3.3 MMOL/L (ref 3.7–5.3)
RBC # BLD: 4.7 M/UL (ref 4.21–5.77)
RBC # BLD: ABNORMAL 10*6/UL
SALICYLATE LEVEL: <1 MG/DL (ref 3–10)
SEG NEUTROPHILS: 59 % (ref 36–65)
SEGMENTED NEUTROPHILS ABSOLUTE COUNT: 4.73 K/UL (ref 1.5–8.1)
SODIUM BLD-SCNC: 140 MMOL/L (ref 135–144)
TOTAL PROTEIN: 6.6 G/DL (ref 6.4–8.3)
TOXIC TRICYCLIC SC,BLOOD: NEGATIVE
WBC # BLD: 8.1 K/UL (ref 3.5–11.3)
WBC # BLD: ABNORMAL 10*3/UL

## 2019-11-07 PROCEDURE — 85055 RETICULATED PLATELET ASSAY: CPT

## 2019-11-07 PROCEDURE — 1240000000 HC EMOTIONAL WELLNESS R&B

## 2019-11-07 PROCEDURE — 96365 THER/PROPH/DIAG IV INF INIT: CPT

## 2019-11-07 PROCEDURE — 99285 EMERGENCY DEPT VISIT HI MDM: CPT

## 2019-11-07 PROCEDURE — G0480 DRUG TEST DEF 1-7 CLASSES: HCPCS

## 2019-11-07 PROCEDURE — 2580000003 HC RX 258: Performed by: STUDENT IN AN ORGANIZED HEALTH CARE EDUCATION/TRAINING PROGRAM

## 2019-11-07 PROCEDURE — 6370000000 HC RX 637 (ALT 250 FOR IP): Performed by: NURSE PRACTITIONER

## 2019-11-07 PROCEDURE — 83735 ASSAY OF MAGNESIUM: CPT

## 2019-11-07 PROCEDURE — 6370000000 HC RX 637 (ALT 250 FOR IP): Performed by: STUDENT IN AN ORGANIZED HEALTH CARE EDUCATION/TRAINING PROGRAM

## 2019-11-07 PROCEDURE — 80053 COMPREHEN METABOLIC PANEL: CPT

## 2019-11-07 PROCEDURE — 80307 DRUG TEST PRSMV CHEM ANLYZR: CPT

## 2019-11-07 PROCEDURE — 93005 ELECTROCARDIOGRAM TRACING: CPT | Performed by: STUDENT IN AN ORGANIZED HEALTH CARE EDUCATION/TRAINING PROGRAM

## 2019-11-07 PROCEDURE — 6360000002 HC RX W HCPCS: Performed by: STUDENT IN AN ORGANIZED HEALTH CARE EDUCATION/TRAINING PROGRAM

## 2019-11-07 PROCEDURE — 85025 COMPLETE CBC W/AUTO DIFF WBC: CPT

## 2019-11-07 RX ORDER — AMLODIPINE BESYLATE 5 MG/1
5 TABLET ORAL DAILY
Status: DISCONTINUED | OUTPATIENT
Start: 2019-11-07 | End: 2019-11-11 | Stop reason: HOSPADM

## 2019-11-07 RX ORDER — HALOPERIDOL 5 MG
5 TABLET ORAL EVERY 4 HOURS PRN
Status: CANCELLED | OUTPATIENT
Start: 2019-11-07

## 2019-11-07 RX ORDER — ACETAMINOPHEN 325 MG/1
650 TABLET ORAL EVERY 4 HOURS PRN
Status: DISCONTINUED | OUTPATIENT
Start: 2019-11-07 | End: 2019-11-11 | Stop reason: HOSPADM

## 2019-11-07 RX ORDER — BENZTROPINE MESYLATE 1 MG/ML
2 INJECTION INTRAMUSCULAR; INTRAVENOUS 2 TIMES DAILY PRN
Status: DISCONTINUED | OUTPATIENT
Start: 2019-11-07 | End: 2019-11-11 | Stop reason: HOSPADM

## 2019-11-07 RX ORDER — MAGNESIUM HYDROXIDE/ALUMINUM HYDROXICE/SIMETHICONE 120; 1200; 1200 MG/30ML; MG/30ML; MG/30ML
30 SUSPENSION ORAL EVERY 6 HOURS PRN
Status: CANCELLED | OUTPATIENT
Start: 2019-11-07

## 2019-11-07 RX ORDER — SODIUM CHLORIDE, SODIUM LACTATE, POTASSIUM CHLORIDE, AND CALCIUM CHLORIDE .6; .31; .03; .02 G/100ML; G/100ML; G/100ML; G/100ML
1000 INJECTION, SOLUTION INTRAVENOUS ONCE
Status: COMPLETED | OUTPATIENT
Start: 2019-11-07 | End: 2019-11-07

## 2019-11-07 RX ORDER — HALOPERIDOL 5 MG/ML
5 INJECTION INTRAMUSCULAR EVERY 4 HOURS PRN
Status: CANCELLED | OUTPATIENT
Start: 2019-11-07

## 2019-11-07 RX ORDER — TRAZODONE HYDROCHLORIDE 50 MG/1
50 TABLET ORAL NIGHTLY PRN
Status: CANCELLED | OUTPATIENT
Start: 2019-11-07

## 2019-11-07 RX ORDER — MAGNESIUM HYDROXIDE/ALUMINUM HYDROXICE/SIMETHICONE 120; 1200; 1200 MG/30ML; MG/30ML; MG/30ML
30 SUSPENSION ORAL EVERY 6 HOURS PRN
Status: DISCONTINUED | OUTPATIENT
Start: 2019-11-07 | End: 2019-11-11 | Stop reason: HOSPADM

## 2019-11-07 RX ORDER — HALOPERIDOL 5 MG/ML
5 INJECTION INTRAMUSCULAR EVERY 4 HOURS PRN
Status: DISCONTINUED | OUTPATIENT
Start: 2019-11-07 | End: 2019-11-11 | Stop reason: HOSPADM

## 2019-11-07 RX ORDER — BENZTROPINE MESYLATE 1 MG/ML
2 INJECTION INTRAMUSCULAR; INTRAVENOUS 2 TIMES DAILY PRN
Status: CANCELLED | OUTPATIENT
Start: 2019-11-07

## 2019-11-07 RX ORDER — HALOPERIDOL 10 MG/1
5 TABLET ORAL EVERY 4 HOURS PRN
Status: DISCONTINUED | OUTPATIENT
Start: 2019-11-07 | End: 2019-11-11 | Stop reason: HOSPADM

## 2019-11-07 RX ORDER — TRAZODONE HYDROCHLORIDE 50 MG/1
50 TABLET ORAL NIGHTLY PRN
Status: DISCONTINUED | OUTPATIENT
Start: 2019-11-07 | End: 2019-11-07

## 2019-11-07 RX ORDER — NICOTINE 21 MG/24HR
1 PATCH, TRANSDERMAL 24 HOURS TRANSDERMAL DAILY
Status: DISCONTINUED | OUTPATIENT
Start: 2019-11-08 | End: 2019-11-11 | Stop reason: HOSPADM

## 2019-11-07 RX ORDER — POTASSIUM CHLORIDE 20 MEQ/1
40 TABLET, EXTENDED RELEASE ORAL ONCE
Status: COMPLETED | OUTPATIENT
Start: 2019-11-07 | End: 2019-11-07

## 2019-11-07 RX ORDER — TRAZODONE HYDROCHLORIDE 50 MG/1
50 TABLET ORAL NIGHTLY PRN
Status: DISCONTINUED | OUTPATIENT
Start: 2019-11-07 | End: 2019-11-09

## 2019-11-07 RX ORDER — ACETAMINOPHEN 325 MG/1
650 TABLET ORAL EVERY 4 HOURS PRN
Status: CANCELLED | OUTPATIENT
Start: 2019-11-07

## 2019-11-07 RX ORDER — MAGNESIUM SULFATE 1 G/100ML
2 INJECTION INTRAVENOUS
Status: DISPENSED | OUTPATIENT
Start: 2019-11-07 | End: 2019-11-07

## 2019-11-07 RX ADMIN — POTASSIUM CHLORIDE 40 MEQ: 1500 TABLET, EXTENDED RELEASE ORAL at 16:47

## 2019-11-07 RX ADMIN — SERTRALINE HYDROCHLORIDE 50 MG: 50 TABLET ORAL at 21:58

## 2019-11-07 RX ADMIN — MAGNESIUM SULFATE HEPTAHYDRATE 2 G: 1 INJECTION, SOLUTION INTRAVENOUS at 11:22

## 2019-11-07 RX ADMIN — AMLODIPINE BESYLATE 5 MG: 5 TABLET ORAL at 21:58

## 2019-11-07 RX ADMIN — SODIUM CHLORIDE, POTASSIUM CHLORIDE, SODIUM LACTATE AND CALCIUM CHLORIDE 1000 ML: 600; 310; 30; 20 INJECTION, SOLUTION INTRAVENOUS at 12:41

## 2019-11-07 RX ADMIN — TRAZODONE HYDROCHLORIDE 50 MG: 50 TABLET ORAL at 21:58

## 2019-11-07 ASSESSMENT — SLEEP AND FATIGUE QUESTIONNAIRES
DO YOU HAVE DIFFICULTY SLEEPING: YES
AVERAGE NUMBER OF SLEEP HOURS: 4
DIFFICULTY ARISING: NO
SLEEP PATTERN: DIFFICULTY FALLING ASLEEP;RESTLESSNESS
DIFFICULTY FALLING ASLEEP: YES
RESTFUL SLEEP: NO
DIFFICULTY STAYING ASLEEP: NO
DO YOU USE A SLEEP AID: YES

## 2019-11-07 ASSESSMENT — PATIENT HEALTH QUESTIONNAIRE - PHQ9: SUM OF ALL RESPONSES TO PHQ QUESTIONS 1-9: 15

## 2019-11-07 ASSESSMENT — LIFESTYLE VARIABLES: HISTORY_ALCOHOL_USE: YES

## 2019-11-07 ASSESSMENT — PAIN SCALES - GENERAL: PAINLEVEL_OUTOF10: 0

## 2019-11-08 LAB
EKG ATRIAL RATE: 67 BPM
EKG ATRIAL RATE: 95 BPM
EKG P AXIS: 60 DEGREES
EKG P AXIS: 78 DEGREES
EKG P-R INTERVAL: 128 MS
EKG P-R INTERVAL: 136 MS
EKG Q-T INTERVAL: 440 MS
EKG Q-T INTERVAL: 446 MS
EKG QRS DURATION: 90 MS
EKG QRS DURATION: 92 MS
EKG QTC CALCULATION (BAZETT): 464 MS
EKG QTC CALCULATION (BAZETT): 560 MS
EKG R AXIS: 38 DEGREES
EKG R AXIS: 50 DEGREES
EKG T AXIS: 38 DEGREES
EKG T AXIS: 41 DEGREES
EKG VENTRICULAR RATE: 67 BPM
EKG VENTRICULAR RATE: 95 BPM

## 2019-11-08 PROCEDURE — 90792 PSYCH DIAG EVAL W/MED SRVCS: CPT | Performed by: NURSE PRACTITIONER

## 2019-11-08 PROCEDURE — 1240000000 HC EMOTIONAL WELLNESS R&B

## 2019-11-08 PROCEDURE — 6370000000 HC RX 637 (ALT 250 FOR IP): Performed by: NURSE PRACTITIONER

## 2019-11-08 RX ADMIN — SERTRALINE HYDROCHLORIDE 50 MG: 50 TABLET ORAL at 08:37

## 2019-11-08 RX ADMIN — AMLODIPINE BESYLATE 5 MG: 5 TABLET ORAL at 08:37

## 2019-11-08 RX ADMIN — TRAZODONE HYDROCHLORIDE 50 MG: 50 TABLET ORAL at 21:31

## 2019-11-08 ASSESSMENT — LIFESTYLE VARIABLES: HISTORY_ALCOHOL_USE: YES

## 2019-11-09 PROCEDURE — 99232 SBSQ HOSP IP/OBS MODERATE 35: CPT | Performed by: NURSE PRACTITIONER

## 2019-11-09 PROCEDURE — 1240000000 HC EMOTIONAL WELLNESS R&B

## 2019-11-09 PROCEDURE — 6370000000 HC RX 637 (ALT 250 FOR IP): Performed by: NURSE PRACTITIONER

## 2019-11-09 RX ORDER — TRAZODONE HYDROCHLORIDE 100 MG/1
100 TABLET ORAL NIGHTLY PRN
Status: DISCONTINUED | OUTPATIENT
Start: 2019-11-09 | End: 2019-11-11 | Stop reason: HOSPADM

## 2019-11-09 RX ADMIN — SERTRALINE HYDROCHLORIDE 50 MG: 50 TABLET ORAL at 08:54

## 2019-11-09 RX ADMIN — AMLODIPINE BESYLATE 5 MG: 5 TABLET ORAL at 08:54

## 2019-11-09 RX ADMIN — TRAZODONE HYDROCHLORIDE 100 MG: 100 TABLET ORAL at 22:18

## 2019-11-10 PROCEDURE — 6370000000 HC RX 637 (ALT 250 FOR IP): Performed by: NURSE PRACTITIONER

## 2019-11-10 PROCEDURE — 1240000000 HC EMOTIONAL WELLNESS R&B

## 2019-11-10 PROCEDURE — 99232 SBSQ HOSP IP/OBS MODERATE 35: CPT | Performed by: NURSE PRACTITIONER

## 2019-11-10 RX ADMIN — TRAZODONE HYDROCHLORIDE 100 MG: 100 TABLET ORAL at 20:45

## 2019-11-10 RX ADMIN — SERTRALINE HYDROCHLORIDE 50 MG: 50 TABLET ORAL at 09:44

## 2019-11-10 RX ADMIN — AMLODIPINE BESYLATE 5 MG: 5 TABLET ORAL at 09:44

## 2019-11-11 VITALS
HEART RATE: 64 BPM | HEIGHT: 70 IN | SYSTOLIC BLOOD PRESSURE: 151 MMHG | BODY MASS INDEX: 27.2 KG/M2 | DIASTOLIC BLOOD PRESSURE: 80 MMHG | WEIGHT: 190 LBS | TEMPERATURE: 98.6 F | RESPIRATION RATE: 14 BRPM

## 2019-11-11 PROBLEM — F33.2 SEVERE RECURRENT MAJOR DEPRESSION WITHOUT PSYCHOTIC FEATURES (HCC): Status: RESOLVED | Noted: 2019-11-07 | Resolved: 2019-11-11

## 2019-11-11 PROCEDURE — 99238 HOSP IP/OBS DSCHRG MGMT 30/<: CPT | Performed by: NURSE PRACTITIONER

## 2019-11-11 PROCEDURE — 5130000000 HC BRIDGE APPOINTMENT

## 2019-11-11 PROCEDURE — 6370000000 HC RX 637 (ALT 250 FOR IP): Performed by: NURSE PRACTITIONER

## 2019-11-11 RX ORDER — AMLODIPINE BESYLATE 5 MG/1
5 TABLET ORAL DAILY
Qty: 30 TABLET | Refills: 0 | Status: ON HOLD | OUTPATIENT
Start: 2019-11-11 | End: 2020-06-21 | Stop reason: SDUPTHER

## 2019-11-11 RX ORDER — TRAZODONE HYDROCHLORIDE 100 MG/1
100 TABLET ORAL NIGHTLY PRN
Qty: 30 TABLET | Refills: 0 | Status: ON HOLD | OUTPATIENT
Start: 2019-11-11 | End: 2020-06-21 | Stop reason: SDUPTHER

## 2019-11-11 RX ADMIN — AMLODIPINE BESYLATE 5 MG: 5 TABLET ORAL at 08:54

## 2019-11-11 RX ADMIN — SERTRALINE HYDROCHLORIDE 50 MG: 50 TABLET ORAL at 08:54

## 2019-12-22 ENCOUNTER — HOSPITAL ENCOUNTER (EMERGENCY)
Age: 29
Discharge: HOME | End: 2019-12-22
Attending: EMERGENCY MEDICINE
Payer: MEDICARE

## 2019-12-22 VITALS
OXYGEN SATURATION: 93 % | WEIGHT: 195 LBS | DIASTOLIC BLOOD PRESSURE: 91 MMHG | RESPIRATION RATE: 16 BRPM | TEMPERATURE: 98.6 F | HEART RATE: 94 BPM | HEIGHT: 70 IN | SYSTOLIC BLOOD PRESSURE: 123 MMHG | BODY MASS INDEX: 27.92 KG/M2

## 2019-12-22 DIAGNOSIS — S41.112A LACERATION OF LEFT UPPER EXTREMITY, INITIAL ENCOUNTER: Primary | ICD-10-CM

## 2019-12-22 PROCEDURE — 6370000000 HC RX 637 (ALT 250 FOR IP): Performed by: STUDENT IN AN ORGANIZED HEALTH CARE EDUCATION/TRAINING PROGRAM

## 2019-12-22 PROCEDURE — 12002 RPR S/N/AX/GEN/TRNK2.6-7.5CM: CPT

## 2019-12-22 PROCEDURE — 99283 EMERGENCY DEPT VISIT LOW MDM: CPT

## 2019-12-22 RX ORDER — CEPHALEXIN 500 MG/1
500 CAPSULE ORAL 4 TIMES DAILY
Qty: 40 CAPSULE | Refills: 0 | Status: SHIPPED | OUTPATIENT
Start: 2019-12-22 | End: 2020-01-01

## 2019-12-22 RX ORDER — CEPHALEXIN 500 MG/1
500 CAPSULE ORAL ONCE
Status: COMPLETED | OUTPATIENT
Start: 2019-12-22 | End: 2019-12-22

## 2019-12-22 RX ADMIN — CEPHALEXIN 500 MG: 500 CAPSULE ORAL at 05:49

## 2019-12-22 ASSESSMENT — ENCOUNTER SYMPTOMS
COLOR CHANGE: 0
NAUSEA: 0
CONSTIPATION: 0
ABDOMINAL PAIN: 0
PHOTOPHOBIA: 0
DIARRHEA: 0
WHEEZING: 0
VOMITING: 0
CHEST TIGHTNESS: 0
SHORTNESS OF BREATH: 0
BACK PAIN: 0
COUGH: 0
RHINORRHEA: 0

## 2019-12-22 ASSESSMENT — PAIN DESCRIPTION - FREQUENCY: FREQUENCY: CONTINUOUS

## 2019-12-22 ASSESSMENT — PAIN SCALES - GENERAL: PAINLEVEL_OUTOF10: 10

## 2019-12-22 ASSESSMENT — PAIN DESCRIPTION - PAIN TYPE: TYPE: ACUTE PAIN

## 2019-12-22 ASSESSMENT — PAIN DESCRIPTION - ORIENTATION: ORIENTATION: LEFT

## 2019-12-22 ASSESSMENT — PAIN DESCRIPTION - DESCRIPTORS: DESCRIPTORS: SHOOTING;THROBBING

## 2019-12-22 ASSESSMENT — PAIN DESCRIPTION - LOCATION: LOCATION: WRIST

## 2020-05-24 ENCOUNTER — APPOINTMENT (OUTPATIENT)
Dept: GENERAL RADIOLOGY | Age: 30
End: 2020-05-24
Payer: MEDICARE

## 2020-05-24 ENCOUNTER — HOSPITAL ENCOUNTER (EMERGENCY)
Age: 30
Discharge: HOME | End: 2020-05-24
Attending: EMERGENCY MEDICINE
Payer: MEDICARE

## 2020-05-24 VITALS
HEART RATE: 86 BPM | WEIGHT: 190 LBS | BODY MASS INDEX: 27.2 KG/M2 | RESPIRATION RATE: 18 BRPM | DIASTOLIC BLOOD PRESSURE: 96 MMHG | OXYGEN SATURATION: 99 % | HEIGHT: 70 IN | SYSTOLIC BLOOD PRESSURE: 144 MMHG | TEMPERATURE: 98.1 F

## 2020-05-24 PROCEDURE — 73090 X-RAY EXAM OF FOREARM: CPT

## 2020-05-24 PROCEDURE — 99284 EMERGENCY DEPT VISIT MOD MDM: CPT

## 2020-05-24 PROCEDURE — 90715 TDAP VACCINE 7 YRS/> IM: CPT | Performed by: EMERGENCY MEDICINE

## 2020-05-24 PROCEDURE — 73080 X-RAY EXAM OF ELBOW: CPT

## 2020-05-24 PROCEDURE — 73110 X-RAY EXAM OF WRIST: CPT

## 2020-05-24 PROCEDURE — 71045 X-RAY EXAM CHEST 1 VIEW: CPT

## 2020-05-24 PROCEDURE — 73564 X-RAY EXAM KNEE 4 OR MORE: CPT

## 2020-05-24 PROCEDURE — 73130 X-RAY EXAM OF HAND: CPT

## 2020-05-24 PROCEDURE — 96375 TX/PRO/DX INJ NEW DRUG ADDON: CPT

## 2020-05-24 PROCEDURE — 73060 X-RAY EXAM OF HUMERUS: CPT

## 2020-05-24 PROCEDURE — 73030 X-RAY EXAM OF SHOULDER: CPT

## 2020-05-24 PROCEDURE — 90471 IMMUNIZATION ADMIN: CPT | Performed by: EMERGENCY MEDICINE

## 2020-05-24 PROCEDURE — 96374 THER/PROPH/DIAG INJ IV PUSH: CPT

## 2020-05-24 PROCEDURE — 72100 X-RAY EXAM L-S SPINE 2/3 VWS: CPT

## 2020-05-24 PROCEDURE — 6360000002 HC RX W HCPCS: Performed by: EMERGENCY MEDICINE

## 2020-05-24 RX ORDER — ONDANSETRON 2 MG/ML
4 INJECTION INTRAMUSCULAR; INTRAVENOUS ONCE
Status: COMPLETED | OUTPATIENT
Start: 2020-05-24 | End: 2020-05-24

## 2020-05-24 RX ORDER — MORPHINE SULFATE 4 MG/ML
4 INJECTION, SOLUTION INTRAMUSCULAR; INTRAVENOUS ONCE
Status: COMPLETED | OUTPATIENT
Start: 2020-05-24 | End: 2020-05-24

## 2020-05-24 RX ADMIN — ONDANSETRON 4 MG: 2 INJECTION INTRAMUSCULAR; INTRAVENOUS at 08:25

## 2020-05-24 RX ADMIN — MORPHINE SULFATE 4 MG: 4 INJECTION INTRAVENOUS at 08:25

## 2020-05-24 RX ADMIN — TETANUS TOXOID, REDUCED DIPHTHERIA TOXOID AND ACELLULAR PERTUSSIS VACCINE, ADSORBED 0.5 ML: 5; 2.5; 8; 8; 2.5 SUSPENSION INTRAMUSCULAR at 08:25

## 2020-05-24 ASSESSMENT — ENCOUNTER SYMPTOMS
VOMITING: 0
SHORTNESS OF BREATH: 0
ABDOMINAL PAIN: 0
TROUBLE SWALLOWING: 0
BACK PAIN: 1

## 2020-05-24 ASSESSMENT — PAIN DESCRIPTION - PROGRESSION: CLINICAL_PROGRESSION: NOT CHANGED

## 2020-05-24 ASSESSMENT — PAIN DESCRIPTION - DESCRIPTORS: DESCRIPTORS: ACHING

## 2020-05-24 ASSESSMENT — PAIN DESCRIPTION - LOCATION: LOCATION: RIB CAGE

## 2020-05-24 ASSESSMENT — PAIN DESCRIPTION - ONSET: ONSET: SUDDEN

## 2020-05-24 ASSESSMENT — PAIN SCALES - GENERAL
PAINLEVEL_OUTOF10: 10
PAINLEVEL_OUTOF10: 10

## 2020-05-24 ASSESSMENT — PAIN DESCRIPTION - FREQUENCY: FREQUENCY: CONTINUOUS

## 2020-05-24 ASSESSMENT — PAIN DESCRIPTION - ORIENTATION: ORIENTATION: RIGHT

## 2020-05-24 ASSESSMENT — PAIN DESCRIPTION - PAIN TYPE: TYPE: ACUTE PAIN

## 2020-05-24 NOTE — ED PROVIDER NOTES
Socioeconomic History    Marital status: Single     Spouse name: magalys    Number of children: 1    Years of education: Not on file    Highest education level: Not on file   Occupational History    Occupation: unemployed     Comment: used to work at [employer] 6 months ago   Social Needs    Financial resource strain: Not on file    Food insecurity     Worry: Not on file     Inability: Not on file   [employer] needs     Medical: Not on file     Non-medical: Not on file   Tobacco Use    Smoking status: Current Every Day Smoker     Packs/day: 0.50     Years: 14.00     Pack years: 7.00     Types: Cigarettes    Smokeless tobacco: Never Used    Tobacco comment: 3 cigs per day per patient   Substance and Sexual Activity    Alcohol use:  Yes     Alcohol/week: 2.0 standard drinks     Types: 2 Cans of beer per week    Drug use: Yes     Types: Marijuana    Sexual activity: Yes     Partners: Female   Lifestyle    Physical activity     Days per week: Not on file     Minutes per session: Not on file    Stress: Not on file   Relationships    Social connections     Talks on phone: Not on file     Gets together: Not on file     Attends Sikhism service: Not on file     Active member of club or organization: Not on file     Attends meetings of clubs or organizations: Not on file     Relationship status: Not on file    Intimate partner violence     Fear of current or ex partner: Not on file     Emotionally abused: Not on file     Physically abused: Not on file     Forced sexual activity: Not on file   Other Topics Concern    Not on file   Social History Narrative    ** Merged History Encounter **         Lives with fiance and 3year old daughter       Family History   Problem Relation Age of Onset    Heart Disease Father 52    Asthma Brother     Hypertension Maternal Grandmother     Lung Cancer Maternal Grandmother         Kidney cancer, liver cancer    Stroke Maternal Grandmother     Heart Disease Mother 46         of presumed heart disease in her sleep       Allergies:  Vicodin [hydrocodone-acetaminophen]    Home Medications:  Prior to Admission medications    Medication Sig Start Date End Date Taking? Authorizing Provider   sertraline (ZOLOFT) 50 MG tablet Take 1 tablet by mouth daily 19   Curtis Meth, APRN - CNP   traZODone (DESYREL) 100 MG tablet Take 1 tablet by mouth nightly as needed for Sleep 19   Curtis Meth, APRN - CNP   amLODIPine (NORVASC) 5 MG tablet Take 1 tablet by mouth daily 19   Curtis Meth, APRN - CNP       REVIEW OF SYSTEMS    (2-9 systems for level 4, 10 or more for level 5)      Review of Systems   Constitutional: Negative for fever. HENT: Negative for trouble swallowing. Eyes: Negative for visual disturbance. Respiratory: Negative for shortness of breath. Cardiovascular: Positive for chest pain. Gastrointestinal: Negative for abdominal pain and vomiting. Genitourinary: Negative for difficulty urinating. Musculoskeletal: Positive for arthralgias, back pain, myalgias and neck pain. Skin: Positive for wound. Neurological: Positive for light-headedness. Negative for headaches. Psychiatric/Behavioral: Negative for confusion. PHYSICAL EXAM   (up to 7 for level 4, 8 or more for level 5)      INITIAL VITALS:   BP (!) 144/96   Pulse 86   Temp 98.1 °F (36.7 °C) (Oral)   Resp 18   Ht 5' 10\" (1.778 m)   Wt 190 lb (86.2 kg)   SpO2 99%   BMI 27.26 kg/m²     Physical Exam  Vitals signs and nursing note reviewed. Constitutional:       Appearance: He is diaphoretic. He is not toxic-appearing. HENT:      Head: Normocephalic and atraumatic. Mouth/Throat:      Mouth: Mucous membranes are moist.      Pharynx: Oropharynx is clear. Eyes:      Extraocular Movements: Extraocular movements intact. Pupils: Pupils are equal, round, and reactive to light.    Neck:      Comments: C-collar immobilized position, trachea midline  Cardiovascular:      Rate and Rhythm: Normal rate and regular rhythm. Pulses:           Radial pulses are 2+ on the right side and 2+ on the left side. Dorsalis pedis pulses are 2+ on the right side and 2+ on the left side. Pulmonary:      Effort: Pulmonary effort is normal. No respiratory distress. Comments: Patient has decreased movement of air seems to be splinting a bit but equal breath sounds bilateral  Abdominal:      General: There is no distension. Palpations: Abdomen is soft. Tenderness: There is no abdominal tenderness. There is no guarding or rebound. Musculoskeletal:      Comments: Patient has paraspinal cervical tenderness on the right does not seem to have any bony midline tenderness, does have bony tenderness to the lumbar spine, no tenderness to the thoracic spine there is no step-offs deformities or signs of trauma to the back or neck. Patient has tenderness to the right shoulder, does have range of motion to the right shoulder intact, tenderness to the right elbow diffusely with swelling and is fixed in flexion at 90 degrees, decreased range of motion likely due to pain. Patient has deformity at the right wrist over the carpal bones with dorsal displacement. Radial pulses intact with normal capillary refill to all fingers and sensation. Patient has skin tears over the dorsal distal aspects of the third and fourth fingers. No active bleeding. Patient has an abrasion over the right knee With tenderness over the patella no deformity with full range of motion of the right knee no laxity to the knee. He is neurovascular intact distally to all extremities. He has tenderness over the right anterior ribs chest wall no crepitus no deformity   Skin:     General: Skin is warm. Capillary Refill: Capillary refill takes less than 2 seconds. Neurological:      General: No focal deficit present.       Mental Status: He is alert and oriented to person, place, fell to the ground. Alcohol use last night. He is in c-collar his vitals are stable primary survey is intact GCS 15 he does have a deformity to the right wrist possible carpal dislocation with neurovascular status intact to the right extremity. He has tenderness diffusely to the right upper extremity will image. Also with tenderness to the right ribs right knee and low back. Will get images, treat pain, update tetanus, reassess C-spine given that it appears to be more paraspinal but he also was drinking alcohol last night and possibly has a distracting injury. 10:01 AM EDT  X-rays of lumbar spine knee right upper extremity and chest unremarkable for acute bony pathology. Given that he has no distracting injury and is clinically sober c-collar was cleared has he has paraspinal tenderness and no midline tenderness. Will p.o. challenge and ambulate and if okay can be discharged. Patient did tolerate water. I did explain his work-up and disposition for discharge. He is requesting a cab ride which I did call social work for. Prior to getting the discharge papers the patient was not seen in his room. He had been explained that he will likely have worse pain tomorrow but if it severe or he is having any other concerns to return for reevaluation. Instructed to take Tylenol or Motrin. PROCEDURES:  None    CONSULTS:  None    CRITICAL CARE:  None    FINAL IMPRESSION      1. Rib pain on right side    2. Acute low back pain, unspecified back pain laterality, unspecified whether sciatica present    3. Right wrist pain          DISPOSITION / PLAN     DISPOSITION Decision To Discharge 05/24/2020 09:57:22 AM      PATIENT REFERRED TO:  No follow-up provider specified.     DISCHARGE MEDICATIONS:  New Prescriptions    No medications on file       Sam Calderon MD  Emergency Medicine Resident    (Please note that portions of thisnote were completed with a voice recognition program.  Efforts were made to edit the

## 2020-05-24 NOTE — ED PROVIDER NOTES
[facility]     Emergency Department     Faculty Attestation    I performed a history and physical examination of the patient and discussed management with the resident. I have reviewed and agree with the residents findings including all diagnostic interpretations, and treatment plans as written at the time of my review. Any areas of disagreement are noted on the chart. I was personally present for the key portions of any procedures. I have documented in the chart those procedures where I was not present during the key portions. For Physician Assistant/ Nurse Practitioner cases/documentation I have personally evaluated this patient and have completed at least one if not all key elements of the E/M (history, physical exam, and MDM). Additional findings are as noted. This patient was evaluated in the Emergency Department for symptoms described in the history of present illness. The patient was evaluated in the context of the global COVID-19 pandemic, which necessitated consideration that the patient might be at risk for infection with the SARS-CoV-2 virus that causes COVID-19. Institutional protocols and algorithms that pertain to the evaluation of patients at risk for COVID-19 are in a state of rapid change based on information released by regulatory bodies including the CDC and federal and state organizations. These policies and algorithms were followed during the patient's care in the ED. The patient around the [place] is complaining of right-sided arm rib pain. He was no reported loss consciousness. The patient has no midline cervical spinal tenderness on examination. Has tenderness in the right rib as well as the wrist.  Has no tenderness in the hips of the pelvis. Patient will have x-rays and analgesia will be given.     (Please note that portions of this note were completed with a voice recognition program.  Efforts were made to edit the

## 2020-06-17 ENCOUNTER — HOSPITAL ENCOUNTER (INPATIENT)
Age: 30
LOS: 5 days | Discharge: HOME | DRG: 751 | End: 2020-06-22
Attending: PSYCHIATRY & NEUROLOGY | Admitting: PSYCHIATRY & NEUROLOGY
Payer: MEDICARE

## 2020-06-17 ENCOUNTER — HOSPITAL ENCOUNTER (EMERGENCY)
Age: 30
Discharge: PSYCHIATRIC OR SUBSTANCE USE FACILITY | End: 2020-06-17
Attending: EMERGENCY MEDICINE
Payer: MEDICARE

## 2020-06-17 VITALS
SYSTOLIC BLOOD PRESSURE: 128 MMHG | DIASTOLIC BLOOD PRESSURE: 83 MMHG | RESPIRATION RATE: 16 BRPM | TEMPERATURE: 97.4 F | HEIGHT: 70 IN | BODY MASS INDEX: 27.2 KG/M2 | HEART RATE: 61 BPM | OXYGEN SATURATION: 99 % | WEIGHT: 190 LBS

## 2020-06-17 PROBLEM — Z86.59 HX OF MAJOR DEPRESSION: Status: ACTIVE | Noted: 2020-06-17

## 2020-06-17 LAB
ABSOLUTE EOS #: 0.24 K/UL (ref 0–0.44)
ABSOLUTE IMMATURE GRANULOCYTE: <0.03 K/UL (ref 0–0.3)
ABSOLUTE LYMPH #: 2.84 K/UL (ref 1.1–3.7)
ABSOLUTE MONO #: 0.51 K/UL (ref 0.1–1.2)
ACETAMINOPHEN LEVEL: <5 UG/ML (ref 10–30)
ALBUMIN SERPL-MCNC: 4.6 G/DL (ref 3.5–5.2)
ALBUMIN/GLOBULIN RATIO: 1.7 (ref 1–2.5)
ALP BLD-CCNC: 68 U/L (ref 40–129)
ALT SERPL-CCNC: 34 U/L (ref 5–41)
AMPHETAMINE SCREEN URINE: POSITIVE
ANION GAP SERPL CALCULATED.3IONS-SCNC: 17 MMOL/L (ref 9–17)
AST SERPL-CCNC: 40 U/L
BARBITURATE SCREEN URINE: NEGATIVE
BASOPHILS # BLD: 1 % (ref 0–2)
BASOPHILS ABSOLUTE: 0.03 K/UL (ref 0–0.2)
BENZODIAZEPINE SCREEN, URINE: NEGATIVE
BILIRUB SERPL-MCNC: 0.22 MG/DL (ref 0.3–1.2)
BUN BLDV-MCNC: 13 MG/DL (ref 6–20)
BUN/CREAT BLD: ABNORMAL (ref 9–20)
BUPRENORPHINE URINE: ABNORMAL
CALCIUM SERPL-MCNC: 9.4 MG/DL (ref 8.6–10.4)
CANNABINOID SCREEN URINE: POSITIVE
CHLORIDE BLD-SCNC: 103 MMOL/L (ref 98–107)
CO2: 19 MMOL/L (ref 20–31)
COCAINE METABOLITE, URINE: POSITIVE
CREAT SERPL-MCNC: 1.07 MG/DL (ref 0.7–1.2)
DIFFERENTIAL TYPE: NORMAL
EOSINOPHILS RELATIVE PERCENT: 4 % (ref 1–4)
ETHANOL PERCENT: <0.01 %
ETHANOL: <10 MG/DL
GFR AFRICAN AMERICAN: >60 ML/MIN
GFR NON-AFRICAN AMERICAN: >60 ML/MIN
GFR SERPL CREATININE-BSD FRML MDRD: ABNORMAL ML/MIN/{1.73_M2}
GFR SERPL CREATININE-BSD FRML MDRD: ABNORMAL ML/MIN/{1.73_M2}
GLUCOSE BLD-MCNC: 87 MG/DL (ref 70–99)
HCT VFR BLD CALC: 40 % (ref 40.7–50.3)
HEMOGLOBIN: 13.1 G/DL (ref 13–17)
IMMATURE GRANULOCYTES: 0 %
LYMPHOCYTES # BLD: 43 % (ref 24–43)
MCH RBC QN AUTO: 30.7 PG (ref 25.2–33.5)
MCHC RBC AUTO-ENTMCNC: 32.8 G/DL (ref 28.4–34.8)
MCV RBC AUTO: 93.7 FL (ref 82.6–102.9)
MDMA URINE: ABNORMAL
METHADONE SCREEN, URINE: NEGATIVE
METHAMPHETAMINE, URINE: ABNORMAL
MONOCYTES # BLD: 8 % (ref 3–12)
NRBC AUTOMATED: 0 PER 100 WBC
OPIATES, URINE: NEGATIVE
OXYCODONE SCREEN URINE: NEGATIVE
PDW BLD-RTO: 13.2 % (ref 11.8–14.4)
PHENCYCLIDINE, URINE: NEGATIVE
PLATELET # BLD: 276 K/UL (ref 138–453)
PLATELET ESTIMATE: NORMAL
PMV BLD AUTO: 9.5 FL (ref 8.1–13.5)
POTASSIUM SERPL-SCNC: 4.1 MMOL/L (ref 3.7–5.3)
PROPOXYPHENE, URINE: ABNORMAL
RBC # BLD: 4.27 M/UL (ref 4.21–5.77)
RBC # BLD: NORMAL 10*6/UL
SALICYLATE LEVEL: <1 MG/DL (ref 3–10)
SARS-COV-2, PCR: NORMAL
SARS-COV-2, RAPID: NORMAL
SARS-COV-2: NOT DETECTED
SEG NEUTROPHILS: 44 % (ref 36–65)
SEGMENTED NEUTROPHILS ABSOLUTE COUNT: 2.92 K/UL (ref 1.5–8.1)
SODIUM BLD-SCNC: 139 MMOL/L (ref 135–144)
SOURCE: NORMAL
TEST INFORMATION: ABNORMAL
TOTAL PROTEIN: 7.3 G/DL (ref 6.4–8.3)
TOXIC TRICYCLIC SC,BLOOD: NEGATIVE
TRICYCLIC ANTIDEPRESSANTS, UR: ABNORMAL
WBC # BLD: 6.6 K/UL (ref 3.5–11.3)
WBC # BLD: NORMAL 10*3/UL

## 2020-06-17 PROCEDURE — 1240000000 HC EMOTIONAL WELLNESS R&B

## 2020-06-17 PROCEDURE — 6370000000 HC RX 637 (ALT 250 FOR IP): Performed by: NURSE PRACTITIONER

## 2020-06-17 PROCEDURE — G0480 DRUG TEST DEF 1-7 CLASSES: HCPCS

## 2020-06-17 PROCEDURE — 80307 DRUG TEST PRSMV CHEM ANLYZR: CPT

## 2020-06-17 PROCEDURE — 80053 COMPREHEN METABOLIC PANEL: CPT

## 2020-06-17 PROCEDURE — 99285 EMERGENCY DEPT VISIT HI MDM: CPT

## 2020-06-17 PROCEDURE — 85027 COMPLETE CBC AUTOMATED: CPT

## 2020-06-17 PROCEDURE — U0003 INFECTIOUS AGENT DETECTION BY NUCLEIC ACID (DNA OR RNA); SEVERE ACUTE RESPIRATORY SYNDROME CORONAVIRUS 2 (SARS-COV-2) (CORONAVIRUS DISEASE [COVID-19]), AMPLIFIED PROBE TECHNIQUE, MAKING USE OF HIGH THROUGHPUT TECHNOLOGIES AS DESCRIBED BY CMS-2020-01-R: HCPCS

## 2020-06-17 RX ORDER — HYDROXYZINE HYDROCHLORIDE 25 MG/1
25 TABLET, FILM COATED ORAL 3 TIMES DAILY PRN
Status: DISCONTINUED | OUTPATIENT
Start: 2020-06-17 | End: 2020-06-22 | Stop reason: HOSPADM

## 2020-06-17 RX ORDER — MAGNESIUM HYDROXIDE/ALUMINUM HYDROXICE/SIMETHICONE 120; 1200; 1200 MG/30ML; MG/30ML; MG/30ML
30 SUSPENSION ORAL EVERY 6 HOURS PRN
Status: DISCONTINUED | OUTPATIENT
Start: 2020-06-17 | End: 2020-06-22 | Stop reason: HOSPADM

## 2020-06-17 RX ORDER — NICOTINE 21 MG/24HR
1 PATCH, TRANSDERMAL 24 HOURS TRANSDERMAL DAILY
Status: DISCONTINUED | OUTPATIENT
Start: 2020-06-17 | End: 2020-06-22 | Stop reason: HOSPADM

## 2020-06-17 RX ORDER — ACETAMINOPHEN 325 MG/1
650 TABLET ORAL EVERY 4 HOURS PRN
Status: DISCONTINUED | OUTPATIENT
Start: 2020-06-17 | End: 2020-06-22 | Stop reason: HOSPADM

## 2020-06-17 RX ORDER — BENZTROPINE MESYLATE 1 MG/ML
2 INJECTION INTRAMUSCULAR; INTRAVENOUS 2 TIMES DAILY PRN
Status: DISCONTINUED | OUTPATIENT
Start: 2020-06-17 | End: 2020-06-22 | Stop reason: HOSPADM

## 2020-06-17 RX ORDER — AMLODIPINE BESYLATE 5 MG/1
5 TABLET ORAL DAILY
Status: DISCONTINUED | OUTPATIENT
Start: 2020-06-17 | End: 2020-06-22 | Stop reason: HOSPADM

## 2020-06-17 RX ORDER — TRAZODONE HYDROCHLORIDE 50 MG/1
50 TABLET ORAL NIGHTLY PRN
Status: DISCONTINUED | OUTPATIENT
Start: 2020-06-17 | End: 2020-06-18

## 2020-06-17 RX ADMIN — TRAZODONE HYDROCHLORIDE 50 MG: 50 TABLET ORAL at 21:31

## 2020-06-17 ASSESSMENT — ENCOUNTER SYMPTOMS
TROUBLE SWALLOWING: 0
BACK PAIN: 0
SHORTNESS OF BREATH: 0
ABDOMINAL PAIN: 0
VOMITING: 0

## 2020-06-17 ASSESSMENT — PATIENT HEALTH QUESTIONNAIRE - PHQ9: SUM OF ALL RESPONSES TO PHQ QUESTIONS 1-9: 21

## 2020-06-17 ASSESSMENT — PAIN SCALES - GENERAL
PAINLEVEL_OUTOF10: 0
PAINLEVEL_OUTOF10: 0

## 2020-06-17 ASSESSMENT — SLEEP AND FATIGUE QUESTIONNAIRES
DIFFICULTY STAYING ASLEEP: YES
SLEEP PATTERN: INSOMNIA
DIFFICULTY FALLING ASLEEP: YES
RESTFUL SLEEP: NO
DO YOU HAVE DIFFICULTY SLEEPING: YES
DO YOU USE A SLEEP AID: NO
DIFFICULTY ARISING: YES
AVERAGE NUMBER OF SLEEP HOURS: 0

## 2020-06-17 ASSESSMENT — LIFESTYLE VARIABLES: HISTORY_ALCOHOL_USE: YES
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
## 2020-06-17 NOTE — ED PROVIDER NOTES
note were completed with a voice recognition program.  Efforts were made to edit the dictations but occasionally words are mis-transcribed. )    Yuan MD, F.A.C.E.P.   Attending Emergency Physician        Michell Lynch MD  06/17/20 [id number]

## 2020-06-17 NOTE — ED TRIAGE NOTES
Pt comes to the ED w/ c/o SI. Pt reports that he is having bad thoughts but current;y has no plan. Pt reports that he was taking trazodone and zoloft but they were not helping so he stopped taking them. Pt has had previous episodes of self harm and SI as well as previous psych admissions. Pt is A&Ox4, RR even and non-labored. Will continue to monitor.

## 2020-06-17 NOTE — ED NOTES
Pt provided with breakfast tray and warm blanket, denies further needs at this time. Safeguard remains in place for suicide precautions. Will continue to monitor.       Robert Fields RN  06/17/20 1072
Pt sleeping on stretcher, NAD noted. Meal tray ordered for pt.      Deepti Ac RN  06/17/20 5810
Social work; spoke to pt who is requesting in patient care at [facility] unit. Pt states he has been feeling suicidal for several days. His parents are . Some family support from siblings. Not working at this time. Has seen Sarah Price in the past but is not active with any mental health agency. Pt states he has had one attempt in his lifetime. At that time he took pills. Pt states he was contemplating a method but did not have all his plan thought through. Pt states, \"I was going to come up with a way\". Tearful at times. Asking for help. Signed voluntary admission for in pt psych. LUCIANA Stewart, [place]  20 1739    Social work: spoke to Chasidy Benavides he is planning to admit to [facility] but is insistant on a COVID TEST. Would like other cmp, drug screen.  LUCIANA Stewart, [place]  20 0327
[x] Flowers and plants   [x] Double check for lighters, matches, razors, any glass items etc that can be used as weapons. Person completing Checklist: [employer]       GENERAL INFORMATION     Y  N     [x] [] Has the patient been informed that they are on a watch and what that means? [x] [] Can the patient get out of Bed without nursing assistance? [x] [] Can the patient use the restroom without nursing assistance? [x] [] Can the patient walk the halls to [place] their legs? \"   [] [x] Does the patient have metal utensils? [x] [] Have the patient's belongings been placed out of control of the patient? [x] [] Have the patient and his/her belongings been checked for contraband? [] [x] Is the patient under any visitor restrictions? If Yes, explain:   [] [x] Is the patient under an alias? [facility] Name:   Authorized visitors (no more than two are to be on the list)   Name/Relationship:   Name/Relationship:    Name of Staff member that you  Received this information from?: [facility]     General Description:    [address] male 26 y.o. Admission weight: 190 lb (86.2 kg) Height: 5' 10\" (177.8 cm)  Race: []  [x] Black  []   []   [] [place] [] Other  Facial Hair:  [x] Yes  [] No  If yes, please describe: Identifying Marks (i.e. Visible tattoos, scars, etc... ):      [employer], RN  06/17/20 1491

## 2020-06-17 NOTE — ED PROVIDER NOTES
Handoff taken on the following patient from prior Attending Physician:    Pt Name: Dariusz Mcclain    PCP:  Jackie Briceno [id number]    I was available and discussed any additional care issues that arose and coordinated the management plans with the resident(s) caring for the patient during my duty period. Any areas of disagreement with residents documentation of care or procedures are noted on the chart. I was personally present for the key portions of any/all procedures during my duty period. I have documented in the chart those procedures where I was not present during the lantigua portions.         Cece Orozco,   06/17/20 1521

## 2020-06-18 LAB
CHOLESTEROL/HDL RATIO: 3.7
CHOLESTEROL: 187 MG/DL
ESTIMATED AVERAGE GLUCOSE: 114 MG/DL
HBA1C MFR BLD: 5.6 % (ref 4–6)
HDLC SERPL-MCNC: 50 MG/DL
LDL CHOLESTEROL: 117 MG/DL (ref 0–130)
THYROXINE, FREE: 1.16 NG/DL (ref 0.93–1.7)
TRIGL SERPL-MCNC: 98 MG/DL
TSH SERPL DL<=0.05 MIU/L-ACNC: 0.5 MIU/L (ref 0.3–5)
VLDLC SERPL CALC-MCNC: NORMAL MG/DL (ref 1–30)

## 2020-06-18 PROCEDURE — 80061 LIPID PANEL: CPT

## 2020-06-18 PROCEDURE — 90792 PSYCH DIAG EVAL W/MED SRVCS: CPT | Performed by: NURSE PRACTITIONER

## 2020-06-18 PROCEDURE — 83036 HEMOGLOBIN GLYCOSYLATED A1C: CPT

## 2020-06-18 PROCEDURE — 36415 COLL VENOUS BLD VENIPUNCTURE: CPT

## 2020-06-18 PROCEDURE — 6370000000 HC RX 637 (ALT 250 FOR IP): Performed by: NURSE PRACTITIONER

## 2020-06-18 PROCEDURE — 84443 ASSAY THYROID STIM HORMONE: CPT

## 2020-06-18 PROCEDURE — 1240000000 HC EMOTIONAL WELLNESS R&B

## 2020-06-18 PROCEDURE — 84439 ASSAY OF FREE THYROXINE: CPT

## 2020-06-18 RX ORDER — TRAZODONE HYDROCHLORIDE 100 MG/1
100 TABLET ORAL NIGHTLY PRN
Status: DISCONTINUED | OUTPATIENT
Start: 2020-06-18 | End: 2020-06-22 | Stop reason: HOSPADM

## 2020-06-18 RX ADMIN — SERTRALINE HYDROCHLORIDE 50 MG: 50 TABLET ORAL at 09:59

## 2020-06-18 RX ADMIN — AMLODIPINE BESYLATE 5 MG: 5 TABLET ORAL at 09:59

## 2020-06-18 ASSESSMENT — SLEEP AND FATIGUE QUESTIONNAIRES
SLEEP PATTERN: INSOMNIA;DIFFICULTY FALLING ASLEEP
RESTFUL SLEEP: NO
DIFFICULTY ARISING: YES
DO YOU HAVE DIFFICULTY SLEEPING: YES
DIFFICULTY STAYING ASLEEP: YES
AVERAGE NUMBER OF SLEEP HOURS: 6
DO YOU USE A SLEEP AID: NO
DIFFICULTY FALLING ASLEEP: YES

## 2020-06-18 ASSESSMENT — LIFESTYLE VARIABLES: HISTORY_ALCOHOL_USE: NO

## 2020-06-18 NOTE — VIRTUAL HEALTH
Psychiatric Admission Note    Max Looney is a 26 y.o. male who was admitted from [facility] on a voluntary basis with thoughts to harm himself. The patient is seen via [employer] monitoring system. The patient states that he was having \"messed up thoughts\" and was \"not feeling safe\" at time of admission. The patient reports continued suicidal thoughts with multiple plans, though he does not identify his plans to harm himself. The patient does contract for safety while hospitalized. The patient stated he has been off his medications since the last time he was in the hospital.  He cannot provide a reason as to why he has not been compliant with recommended course of treatment. The patient presents with feelings of being sad, depressed, hopelessness and helpless, loss of interest in usual activities, feelings of worthlessness and loss of self confidence. Reports low energy and poor sleep patterns, stating he has struggled with sleep onset and maintenance. Past Psychiatric History   Patient Reports noncompliance with outpatient psychiatric care. Reported history of psychiatric inpatient hospitalizations. Reported history of suicide attempts. History of Substance Abuse     Reports daily ETOH use estimating he drinks 2-3 beers daily. Reports smoking 5 cigarettes daily. The patient also reports THC and cocaine use. The patient indicates he uses THC daily to help him relax and \"deal with life. \"  The patient reports cocaine use is only social and his last use was two days ago. Family History of psychiatric disorders    Family history: unknown . Past psychiatric medications:  Patient is unaware of past medications he has taken.      Medical History   Allergies:  Vicodin [hydrocodone-acetaminophen]   Past Medical History:   Diagnosis Date    Alcoholism ([address]     Anxiety     Bipolar 1 disorder (HCC)     Cocaine abuse ([address]     Depression     activity     Days per week: Not on file     Minutes per session: Not on file    Stress: Not on file   Relationships    Social connections     Talks on phone: Not on file     Gets together: Not on file     Attends Jehovah's witness service: Not on file     Active member of club or organization: Not on file     Attends meetings of clubs or organizations: Not on file     Relationship status: Not on file    Intimate partner violence     Fear of current or ex partner: Not on file     Emotionally abused: Not on file     Physically abused: Not on file     Forced sexual activity: Not on file   Other Topics Concern    Not on file   Social History Narrative    ** Merged History Encounter **         Lives with fiance and 3year old daughter         Mental Status  Pt. was alert, fully oriented, and cooperative. Appearance and hygiene wereappropriate, well-groomed . Mood was depressed. Affect was \"dysthymic and poorly reactive Thought process was linear and well-organized. Patient denied any hallucinations or paranoia. Patient endorsed suicidal ideations. Patient denied homicidal ideations . Patient's gross cognitive functions were intact. Insight and judgement were poor. Both recent and remote memory were intact.     Psychomotor status was slowed     Labs  Recent Results (from the past 72 hour(s))   TOX SCR, BLD, ED    Collection Time: 06/17/20  6:59 AM   Result Value Ref Range    Acetaminophen Level <5 (L) 10 - 30 ug/mL    Ethanol <10 <10 mg/dL    Ethanol percent <1.713 <7.950 %    Salicylate Lvl <1 (L) 3 - 10 mg/dL    Toxic Tricyclic Sc,Blood NEGATIVE NEGATIVE   CBC    Collection Time: 06/17/20  6:59 AM   Result Value Ref Range    WBC 6.6 3.5 - 11.3 k/uL    RBC 4.27 4.21 - 5.77 m/uL    Hemoglobin 13.1 13.0 - 17.0 g/dL    Hematocrit 40.0 (L) 40.7 - 50.3 %    MCV 93.7 82.6 - 102.9 fL    MCH 30.7 25.2 - 33.5 pg    MCHC 32.8 28.4 - 34.8 g/dL    RDW 13.2 11.8 - 14.4 %    Platelets 780 113 - 517 k/uL    MPV 9.5 8.1 - 13.5 fL    NRBC Automated 0.0 0.0 per 100 WBC   Comprehensive Metabolic Panel    Collection Time: 06/17/20  6:59 AM   Result Value Ref Range    Glucose 87 70 - 99 mg/dL    BUN 13 6 - 20 mg/dL    CREATININE 1.07 0.70 - 1.20 mg/dL    Bun/Cre Ratio NOT REPORTED 9 - 20    Calcium 9.4 8.6 - 10.4 mg/dL    Sodium 139 135 - 144 mmol/L    Potassium 4.1 3.7 - 5.3 mmol/L    Chloride 103 98 - 107 mmol/L    CO2 19 (L) 20 - 31 mmol/L    Anion Gap 17 9 - 17 mmol/L    Alkaline Phosphatase 68 40 - 129 U/L    ALT 34 5 - 41 U/L    AST 40 (H) <40 U/L    Total Bilirubin 0.22 (L) 0.3 - 1.2 mg/dL    Total Protein 7.3 6.4 - 8.3 g/dL    Alb 4.6 3.5 - 5.2 g/dL    Albumin/Globulin Ratio 1.7 1.0 - 2.5    GFR Non-African American >60 >60 mL/min    GFR African American >60 >60 mL/min    GFR Comment          GFR Staging NOT REPORTED    Differential    Collection Time: 06/17/20  6:59 AM   Result Value Ref Range    Differential Type NOT REPORTED     Seg Neutrophils 44 36 - 65 %    Lymphocytes 43 24 - 43 %    Monocytes 8 3 - 12 %    Eosinophils % 4 1 - 4 %    Basophils 1 0 - 2 %    Immature Granulocytes 0 0 %    Segs Absolute 2.92 1.50 - 8.10 k/uL    Absolute Lymph # 2.84 1.10 - 3.70 k/uL    Absolute Mono # 0.51 0.10 - 1.20 k/uL    Absolute Eos # 0.24 0.00 - 0.44 k/uL    Basophils Absolute 0.03 0.00 - 0.20 k/uL    Absolute Immature Granulocyte <0.03 0.00 - 0.30 k/uL    WBC Morphology NOT REPORTED     RBC Morphology NOT REPORTED     Platelet Estimate NOT REPORTED    COVID-19    Collection Time: 06/17/20  8:32 AM   Result Value Ref Range    SARS-CoV-2 Not Detected Not Detected    SARS-CoV-2, Rapid          Source . NASOPHARYNGEAL SWAB     SARS-CoV-2, PCR         Urine Drug Screen    Collection Time: 06/17/20  9:31 AM   Result Value Ref Range    Amphetamine Screen, Ur POSITIVE (A) NEGATIVE    Barbiturate Screen, Ur NEGATIVE NEGATIVE    Benzodiazepine Screen, Urine NEGATIVE NEGATIVE    Cocaine Metabolite, Urine POSITIVE (A) NEGATIVE    Methadone Screen, Urine NEGATIVE NEGATIVE    Opiates, Urine NEGATIVE NEGATIVE    Phencyclidine, Urine NEGATIVE NEGATIVE    Propoxyphene, Urine NOT REPORTED NEGATIVE    Cannabinoid Scrn, Ur POSITIVE (A) NEGATIVE    Oxycodone Screen, Ur NEGATIVE NEGATIVE    Methamphetamine, Urine NOT REPORTED NEGATIVE    Tricyclic Antidepressants, Urine NOT REPORTED NEGATIVE    MDMA, Urine NOT REPORTED NEGATIVE    Buprenorphine Urine NOT REPORTED NEGATIVE    Test Information       Assay provides medical screening only. The absence of expected drug(s) and/or metabolite(s) may indicate diluted or adulterated urine, limitations of testing or timing of collection. Hemoglobin A1c    Collection Time: 06/18/20  6:33 AM   Result Value Ref Range    Hemoglobin A1C 5.6 4.0 - 6.0 %    Estimated Avg Glucose 114 mg/dL   TSH without Reflex    Collection Time: 06/18/20  6:33 AM   Result Value Ref Range    TSH 0.50 0.30 - 5.00 mIU/L   T4, free    Collection Time: 06/18/20  6:33 AM   Result Value Ref Range    Thyroxine, Free 1.16 0.93 - 1.70 ng/dL   Lipid panel - fasting    Collection Time: 06/18/20  6:33 AM   Result Value Ref Range    Cholesterol 187 <200 mg/dL    HDL 50 >40 mg/dL    LDL Cholesterol 117 0 - 130 mg/dL    Chol/HDL Ratio 3.7 <5    Triglycerides 98 <150 mg/dL    VLDL NOT REPORTED 1 - 30 mg/dL         Diagnostic Impression  Principal Problem:    Severe episode of recurrent major depressive disorder, without psychotic features ([address]  Resolved Problems:    * No resolved hospital problems. *          Medications   sertraline  50 mg Oral Daily    nicotine  1 patch Transdermal Daily    amLODIPine  5 mg Oral Daily     traZODone, acetaminophen, benztropine mesylate, magnesium hydroxide, aluminum & magnesium hydroxide-simethicone, hydrOXYzine    Treatment Plan:     Admit to inpatient psychiatric treatment   Supportive therapy with medication management. Reviewed risks and benefits as well as potential side effects with patient.    Restart past medications as he felt they

## 2020-06-18 NOTE — PLAN OF CARE
Problem: Altered Mood, Depressive Behavior:  Goal: Able to verbalize and/or display a decrease in depressive symptoms  Description: Able to verbalize and/or display a decrease in depressive symptoms  Outcome: Ongoing  Note: Patient reports fleeting suicidal thoughts with no plan. He contracts for safety on the unit and agrees to seek out staff should thoughts worsen. He denies hallucinations but reports increased depression and anxiety. He has been mostly isolative to room but calm and cooperative during talk time. Q15min safety checks continue     Problem: Altered Mood, Depressive Behavior:  Goal: Ability to disclose and discuss suicidal ideas will improve  Description: Ability to disclose and discuss suicidal ideas will improve  Outcome: Ongoing  Note: Patient reports fleeting suicidal thoughts with no plan. He contracts for safety on the unit and agrees to seek out staff should thoughts worsen. He denies hallucinations but reports increased depression and anxiety. He has been mostly isolative to room but calm and cooperative during talk time.  Q15min safety checks continue

## 2020-06-18 NOTE — PLAN OF CARE
Problem: Altered Mood, Depressive Behavior:  Goal: Able to verbalize and/or display a decrease in depressive symptoms  Description: Able to verbalize and/or display a decrease in depressive symptoms  6/18/2020 1817 by Alecia Landrum RN  Outcome: Ongoing  Pt states that he continues to be depressed. He was seclusive to room for long intervals. He was medication compliant. Problem: Altered Mood, Depressive Behavior:  Goal: Ability to disclose and discuss suicidal ideas will improve  Description: Ability to disclose and discuss suicidal ideas will improve  6/18/2020 1817 by Alecia Landrum RN  Outcome: Ongoing  Pt states that he continues to have fleeting thoughts of suicide without a plan. Problem: Tobacco Use:  Goal: Inpatient tobacco use cessation counseling participation  Description: Inpatient tobacco use cessation counseling participation  6/18/2020 1817 by Alecia Tineo RN  Outcome: Completed  6/18/2020 0830 by Brandi Jama  Outcome: Ongoing   Pt does not want to stop smoking at this time.

## 2020-06-18 NOTE — PLAN OF CARE
[facility]  Initial Interdisciplinary Treatment Plan NO      Original treatment plan Date & Time: 6/18/2020 0830    Admission Type:  Admission Type: Voluntary    Reason for admission:   Reason for Admission: thoughts of harming himself    Estimated Length of Stay:  5-7days  Estimated Discharge Date: to be determined by physician    PATIENT STRENGTHS:  Patient Strengths:Strengths: Communication  Patient Strengths and Limitations:Limitations: Difficulty problem solving/relies on others to help solve problems  Addictive Behavior: Addictive Behavior  In the past 3 months, have you felt or has someone told you that you have a problem with:  : None  Do you have a history of Chemical Use?: No  Do you have a history of Alcohol Use?: Yes  Do you have a history of Street Drug Abuse?: Yes  Histroy of Prescripton Drug Abuse?: No  Medical Problems:  Past Medical History:   Diagnosis Date    Alcoholism ([facility]     Anxiety     Bipolar 1 disorder ([facility]     Cocaine abuse ([facility]     Depression     Hypertension     Irregular heartbeat     Mild tetrahydrocannabinol (THC) abuse     Suicidal ideation      Status EXAM:Status and Exam  Normal: No  Facial Expression: Avoids Gaze, Flat, Worried  Affect: Appropriate  Level of Consciousness: Alert  Mood:Normal: No  Mood: Depressed, Anxious  Motor Activity:Normal: Yes  Interview Behavior: Cooperative  Preception: [place] to Person, [place] to Time, [place] to Place, [place] to Situation  Attention:Normal: Yes  Thought Processes: Circumstantial  Thought Content:Normal: Yes  Hallucinations: None  Delusions: No  Memory:Normal: Yes  Insight and Judgment: No  Insight and Judgment: Poor Judgment, Poor Insight  Present Suicidal Ideation: Yes(fleeting SI no plan)  Present Homicidal Ideation: No    EDUCATION:   Learner Progress Toward Treatment Goals: reviewed group plans and strategies for care    Method:group therapy, medication compliance, individualized assessments and care planning    Outcome: needs reinforcement    PATIENT GOALS: to be discussed with patient within 72 hours    PLAN/TREATMENT RECOMMENDATIONS:     continue group therapy , medications compliance, goal setting, individualized assessments and care, continue to monitor pt on unit      SHORT-TERM GOALS:   Time frame for Short-Term Goals: 5-7 days    LONG-TERM GOALS:  Time frame for Long-Term Goals: 6 months  Members Present in Team Meeting: See Signature [address]

## 2020-06-18 NOTE — H&P
Seizures,Thyroid disease, Kidney Disease, Hepatitis, TB.    SURGICAL HISTORY       Past Surgical History:   Procedure Laterality Date    DIALYSIS FISTULA CREATION Right 2017    RIGHT WRIST WOUND EXPLORATION WITH ARTERIAL REPAIR ULNAR AND RADIAL ARTERIES RIGHT WRIST performed by Bud Pete MD at [address] [id number] Right 2017    WOUND EXPLORATION AND/OR REVISION performed by Bud Pete MD at [address] [place] [address] Rd Left     OTHER SURGICAL HISTORY Right 2017    exp and repair of unlar and radial artery with exp and repair of 12 tendons and 2 nerves       FAMILY HISTORY       Family History   Problem Relation Age of Onset    Heart Disease Father 52    Asthma Brother     Hypertension Maternal Grandmother     Lung Cancer Maternal Grandmother         Kidney cancer, liver cancer    Stroke Maternal Grandmother     Heart Disease Mother 46         of presumed heart disease in her sleep       SOCIAL HISTORY       Social History     Socioeconomic History    Marital status: Single     Spouse name: magalys    Number of children: 1    Years of education: None    Highest education level: None   Occupational History    Occupation: unemployed     Comment: used to work at [employer]. 6 months ago   Social Needs    Financial resource strain: None    Food insecurity     Worry: None     Inability: None    Transportation needs     Medical: None     Non-medical: None   Tobacco Use    Smoking status: Current Every Day Smoker     Packs/day: 0.50     Years: 14.00     Pack years: 7.00     Types: Cigarettes    Smokeless tobacco: Never Used    Tobacco comment: 3 cigs per day per patient   Substance and Sexual Activity    Alcohol use:  Yes     Alcohol/week: 2.0 standard drinks     Types: 2 Cans of beer per week    Drug use: Not Currently     Types: Marijuana, Cocaine    Sexual activity: Yes     Partners: Female   Lifestyle    Physical activity     Days per week: None

## 2020-06-19 PROCEDURE — 99232 SBSQ HOSP IP/OBS MODERATE 35: CPT | Performed by: NURSE PRACTITIONER

## 2020-06-19 PROCEDURE — 6370000000 HC RX 637 (ALT 250 FOR IP): Performed by: NURSE PRACTITIONER

## 2020-06-19 PROCEDURE — 1240000000 HC EMOTIONAL WELLNESS R&B

## 2020-06-19 RX ADMIN — TRAZODONE HYDROCHLORIDE 100 MG: 100 TABLET ORAL at 21:31

## 2020-06-19 RX ADMIN — AMLODIPINE BESYLATE 5 MG: 5 TABLET ORAL at 17:13

## 2020-06-19 RX ADMIN — SERTRALINE HYDROCHLORIDE 50 MG: 50 TABLET ORAL at 17:13

## 2020-06-19 RX ADMIN — HYDROXYZINE HYDROCHLORIDE 25 MG: 25 TABLET, FILM COATED ORAL at 21:31

## 2020-06-19 NOTE — VIRTUAL HEALTH
Department of Psychiatry  Attending Progress Note  Chief Complaint: Severe episode of recurrent major depressive disorder, without psychotic features ([address]     SUBJECTIVE:  This is a 26year old male being seen for follow up at the [facility] via virtual monitoring system today. The patient refuses to cooperate with assessment process and stays in bed when assessment is attempted. Nursing documentation indicates the patient continues to report depression, rating it 9/10 (on numeric rating scale of 0 to 10 with 0 being none and 10 the worst). He did deny suicidal plan but continued fleeting ideations. The patient was out of his room and social throughout the day but not attending groups. Medication compliant. Behaviorally controlled. OBJECTIVE    Physical  BP (!) 141/89   Pulse 59   Temp 97.7 °F (36.5 °C) (Oral)   Resp 12   Ht 5' 10\" (1.778 m)   Wt 190 lb (86.2 kg)   BMI 27.26 kg/m²      Mental Status Evaluation:  Orientation: alertness: alert   Mood:.  LENNIE      Affect:  LENNIE      Appearance:  age appropriate and casually dressed   Activity:  Psychomotor Retardation   Gait/Posture: Normal   Speech:  normal pitch and soft   Thought Process:  LENNIE   Thought Content:  LENNIE   Sensorium:  person and place   Cognition:  grossly intact   Memory: intact   Insight:  limited   Judgment: limited   Suicidal Ideations: LENNIE   Homicidal Ideations: LENNIE   Medication Side Effects: absent       Attention Span LENNIE       Labs  Recent Results (from the past 72 hour(s))   TOX SCR, BLD, ED    Collection Time: 06/17/20  6:59 AM   Result Value Ref Range    Acetaminophen Level <5 (L) 10 - 30 ug/mL    Ethanol <10 <10 mg/dL    Ethanol percent <9.182 <1.321 %    Salicylate Lvl <1 (L) 3 - 10 mg/dL    Toxic Tricyclic Sc,Blood NEGATIVE NEGATIVE   CBC    Collection Time: 06/17/20  6:59 AM   Result Value Ref Range    WBC 6.6 3.5 - 11.3 k/uL    RBC 4.27 4.21 - 5.77 m/uL    Hemoglobin 13.1 13.0 - 17.0 g/dL    Hematocrit 40.0 (L) 40.7 - 50.3 % Barbiturate Screen, Ur NEGATIVE NEGATIVE    Benzodiazepine Screen, Urine NEGATIVE NEGATIVE    Cocaine Metabolite, Urine POSITIVE (A) NEGATIVE    Methadone Screen, Urine NEGATIVE NEGATIVE    Opiates, Urine NEGATIVE NEGATIVE    Phencyclidine, Urine NEGATIVE NEGATIVE    Propoxyphene, Urine NOT REPORTED NEGATIVE    Cannabinoid Scrn, Ur POSITIVE (A) NEGATIVE    Oxycodone Screen, Ur NEGATIVE NEGATIVE    Methamphetamine, Urine NOT REPORTED NEGATIVE    Tricyclic Antidepressants, Urine NOT REPORTED NEGATIVE    MDMA, Urine NOT REPORTED NEGATIVE    Buprenorphine Urine NOT REPORTED NEGATIVE    Test Information       Assay provides medical screening only. The absence of expected drug(s) and/or metabolite(s) may indicate diluted or adulterated urine, limitations of testing or timing of collection.    Hemoglobin A1c    Collection Time: 06/18/20  6:33 AM   Result Value Ref Range    Hemoglobin A1C 5.6 4.0 - 6.0 %    Estimated Avg Glucose 114 mg/dL   TSH without Reflex    Collection Time: 06/18/20  6:33 AM   Result Value Ref Range    TSH 0.50 0.30 - 5.00 mIU/L   T4, free    Collection Time: 06/18/20  6:33 AM   Result Value Ref Range    Thyroxine, Free 1.16 0.93 - 1.70 ng/dL   Lipid panel - fasting    Collection Time: 06/18/20  6:33 AM   Result Value Ref Range    Cholesterol 187 <200 mg/dL    HDL 50 >40 mg/dL    LDL Cholesterol 117 0 - 130 mg/dL    Chol/HDL Ratio 3.7 <5    Triglycerides 98 <150 mg/dL    VLDL NOT REPORTED 1 - 30 mg/dL       Medications  Current Facility-Administered Medications   Medication Dose Route Frequency Provider Last Rate Last Dose    sertraline (ZOLOFT) tablet 50 mg  50 mg Oral Daily [facility], APRN - CNP   50 mg at 06/19/20 1713    traZODone (DESYREL) tablet 100 mg  100 mg Oral Nightly PRN Ame Wooten, APRN - CNP        acetaminophen (TYLENOL) tablet 650 mg  650 mg Oral Q4H PRN Eloise Gross APRN - CNP        benztropine mesylate (COGENTIN) injection 2 mg  2 mg Intramuscular BID PRN Jackie Bell

## 2020-06-19 NOTE — PLAN OF CARE
Problem: Altered Mood, Depressive Behavior:  Goal: Able to verbalize and/or display a decrease in depressive symptoms  Description: Able to verbalize and/or display a decrease in depressive symptoms  6/19/2020 1020 by Fidencio Vera  Outcome: Ongoing  Pt admits to feelings of depression and rates the severity 9/10. Writer offered support and pt accepted. Will continue to monitor and provide support as needed. Q15min checks for safety. Problem: Altered Mood, Depressive Behavior:  Goal: Ability to disclose and discuss suicidal ideas will improve  Description: Ability to disclose and discuss suicidal ideas will improve  6/19/2020 1020 by Fidencio Vera  Outcome: Ongoing  Patient denies suicidal ideations. Agreeable to talking with staff if thoughts to harm self arise. Q15min checks maintained for safety.

## 2020-06-19 NOTE — GROUP NOTE
Group Therapy Note    Date: 6/19/2020    Group Start Time: 0900  Group End Time: 0920  Group Topic: Community Meeting    JEREMIAH Deal    Patient refused to attend community meeting/ goals group at 0900 after encouragement from staff. 1:1 talk time provided by staff.       Signature:  Nahomi Deal

## 2020-06-19 NOTE — GROUP NOTE
Group Therapy Note    Date: 6/19/2020    Group Start Time: 1000  Group End Time: 7742  Group Topic: Psychotherapy    [address], LSW    patient refused to attend psychotherapy group at [address] [facility] after encouragement from staff.   1:1 talk time provided as alternative to group session

## 2020-06-19 NOTE — PLAN OF CARE
Problem: Altered Mood, Depressive Behavior:  Goal: Ability to disclose and discuss suicidal ideas will improve  Description: Ability to disclose and discuss suicidal ideas will improve  6/19/2020 1938 by Rosemary Pickering RN  Outcome: Ongoing     Problem: Altered Mood, Depressive Behavior:  Goal: Able to verbalize and/or display a decrease in depressive symptoms  Description: Able to verbalize and/or display a decrease in depressive symptoms  6/19/2020 1938 by Rosemary Pickering RN  Outcome: Ongoing  Note: Patient bright and social with peers and using the telephone. Patient still states fleeting suicidal ideations. Offered 1-1 talk time, but he refused.  Will continue to monitor and provide q15 min safety checks

## 2020-06-19 NOTE — PLAN OF CARE
Long-Term Goals:  6 months    Members Present in Team Meeting:   See signature sheet  [place]ack[place] Card, CTRS

## 2020-06-19 NOTE — GROUP NOTE
Group Therapy Note    Date: 6/19/2020    Group Start Time: 1430  Group End Time: 0094  Group Topic: Psychoeducation    [address] [facility]    Patient refused to attend cognitive distortions group at 1430 after encouragement from staff. 1:1 talk time offered by staff as alternative to group session.

## 2020-06-20 PROCEDURE — 6370000000 HC RX 637 (ALT 250 FOR IP): Performed by: NURSE PRACTITIONER

## 2020-06-20 PROCEDURE — 99232 SBSQ HOSP IP/OBS MODERATE 35: CPT | Performed by: NURSE PRACTITIONER

## 2020-06-20 PROCEDURE — 1240000000 HC EMOTIONAL WELLNESS R&B

## 2020-06-20 RX ADMIN — TRAZODONE HYDROCHLORIDE 100 MG: 100 TABLET ORAL at 22:10

## 2020-06-20 RX ADMIN — HYDROXYZINE HYDROCHLORIDE 25 MG: 25 TABLET, FILM COATED ORAL at 22:10

## 2020-06-20 NOTE — GROUP NOTE
Group Therapy Note    Date: 6/20/2020    Group Start Time: 0900  Group End Time: 0920  Group Topic: Community Meeting    [address] [facility]    Patient refused to attend community meeting and goal setting group at 0900 after encouragement from staff. 1:1 talk time offered by staff as alternative to group session.

## 2020-06-20 NOTE — GROUP NOTE
Group Therapy Note    Date: 6/20/2020    Group Start Time: 1330  Group End Time: 9188  Group Topic: Psychoeducation    JEREMIAH Mcdermott, CTRS    Patient refused to attend trivia skills group at [phone] after encouragement from staff. 1:1 talk time offered by staff as alternative to group session.

## 2020-06-21 PROCEDURE — 99232 SBSQ HOSP IP/OBS MODERATE 35: CPT | Performed by: NURSE PRACTITIONER

## 2020-06-21 PROCEDURE — 6370000000 HC RX 637 (ALT 250 FOR IP): Performed by: NURSE PRACTITIONER

## 2020-06-21 PROCEDURE — 1240000000 HC EMOTIONAL WELLNESS R&B

## 2020-06-21 RX ORDER — TRAZODONE HYDROCHLORIDE 100 MG/1
100 TABLET ORAL NIGHTLY PRN
Qty: 14 TABLET | Refills: 0 | Status: ON HOLD | OUTPATIENT
Start: 2020-06-21 | End: 2021-08-04 | Stop reason: HOSPADM

## 2020-06-21 RX ORDER — AMLODIPINE BESYLATE 5 MG/1
5 TABLET ORAL DAILY
Qty: 14 TABLET | Refills: 0 | Status: ON HOLD | OUTPATIENT
Start: 2020-06-21 | End: 2021-08-04 | Stop reason: HOSPADM

## 2020-06-21 RX ADMIN — AMLODIPINE BESYLATE 5 MG: 5 TABLET ORAL at 08:59

## 2020-06-21 RX ADMIN — SERTRALINE HYDROCHLORIDE 50 MG: 50 TABLET ORAL at 08:59

## 2020-06-21 RX ADMIN — HYDROXYZINE HYDROCHLORIDE 25 MG: 25 TABLET, FILM COATED ORAL at 21:38

## 2020-06-21 RX ADMIN — TRAZODONE HYDROCHLORIDE 100 MG: 100 TABLET ORAL at 21:38

## 2020-06-21 ASSESSMENT — PAIN SCALES - GENERAL: PAINLEVEL_OUTOF10: 0

## 2020-06-21 NOTE — PLAN OF CARE
Problem: Altered Mood, Depressive Behavior:  Goal: Able to verbalize and/or display a decrease in depressive symptoms  Description: Able to verbalize and/or display a decrease in depressive symptoms  6/21/2020 1923 by Joaquin Lucero RN  Outcome: Ongoing  Note: Patient has been calm, controlled and med complaint. Accepting of 1:1 talk time with staff. 1:1 with pt x ten minutes. Pt encouraged to attend unit programming and interact with peers and staff. Pt also encouraged to tend to hygiene and ADLs. Pt encouraged to discuss feelings with staff and feedback and reassurance provided. Patient has been out in the dayroom and social with peers. Denies anxiety and depression at this time       Problem: Altered Mood, Depressive Behavior:  Goal: Ability to disclose and discuss suicidal ideas will improve  Description: Ability to disclose and discuss suicidal ideas will improve  6/21/2020 1923 by Joaquin Lucero RN  Outcome: Ongoing  Note: Patient denies suicidal and homicidal thoughts. Patient denies auditory and visual hallucinations. Patient is taking meds and eating well. No self harm noted this shift. Patient agrees to seek staff out if negative thoughts arise. Will continue to monitor Q15 minute and intermittently.       6/21/2020 1159 by Rufina Garland  Outcome: Ongoing

## 2020-06-21 NOTE — GROUP NOTE
Group Therapy Note    Date: 6/21/2020    Group Start Time: 1000  Group End Time: 1030  Group Topic: Psychoeducation    [facility]    Erin Cheung        Group Therapy Note           Patient refused to attend psychotherapy group after encouragement from staff. 1:1 talk time offered but refused. Signature:   Erin Cheung

## 2020-06-21 NOTE — GROUP NOTE
Group Therapy Note    Date: 6/21/2020    Group Start Time: 1997  Group End Time: 5743  Group Topic: Psychoeducation    [address] [facility]    Patient refused to attend music and coping skills group at 1335 after encouragement from staff. 1:1 talk time offered by staff as alternative to group session.

## 2020-06-22 VITALS
DIASTOLIC BLOOD PRESSURE: 75 MMHG | BODY MASS INDEX: 27.2 KG/M2 | WEIGHT: 190 LBS | RESPIRATION RATE: 12 BRPM | HEIGHT: 70 IN | HEART RATE: 80 BPM | TEMPERATURE: 97.6 F | SYSTOLIC BLOOD PRESSURE: 133 MMHG

## 2020-06-22 PROCEDURE — 6370000000 HC RX 637 (ALT 250 FOR IP): Performed by: NURSE PRACTITIONER

## 2020-06-22 PROCEDURE — 99238 HOSP IP/OBS DSCHRG MGMT 30/<: CPT | Performed by: PSYCHIATRY & NEUROLOGY

## 2020-06-22 RX ADMIN — SERTRALINE HYDROCHLORIDE 50 MG: 50 TABLET ORAL at 08:37

## 2020-06-22 RX ADMIN — AMLODIPINE BESYLATE 5 MG: 5 TABLET ORAL at 08:37

## 2020-06-22 NOTE — BH NOTE
Patient given tobacco quitline number 83149683353 at this time, refusing to call at this time, states \" I just dont want to quit now\"- patient given information as to the dangers of long term tobacco use. Continue to reinforce the importance of tobacco cessation.
Patient is not a smoker and does not use any tobacco products.
Patient was seen by MD via telehealth
`[facility]  Admission Note     Admission Type:   Admission Type: Voluntary    Reason for admission:  Reason for Admission: thoughts of harming himself    PATIENT STRENGTHS:  Strengths: Communication    Patient Strengths and Limitations:  Limitations: Difficulty problem solving/relies on others to help solve problems    Addictive Behavior:   Addictive Behavior  In the past 3 months, have you felt or has someone told you that you have a problem with:  : None  Do you have a history of Chemical Use?: No  Do you have a history of Alcohol Use?: Yes  Do you have a history of Street Drug Abuse?: Yes  Histroy of Prescripton Drug Abuse?: No    Medical Problems:   Past Medical History:   Diagnosis Date    [facility]ism ([facility] [address] [facility]     Anxiety     [facility]     [facility]     Depression     Hypertension     Irregular heartbeat     Mild tetrahydrocannabinol (THC) abuse     Suicidal ideation        Status EXAM:  Status and Exam  Normal: No  Facial Expression: Avoids Gaze, Flat, Worried  Affect: Appropriate  Level of Consciousness: Alert  Mood:Normal: No  Mood: Depressed, Anxious  Motor Activity:Normal: Yes  Interview Behavior: Cooperative  Preception: [place] to Person, [place] to Time, [place] to Place, [place] to Situation  Attention:Normal: Yes  Thought Processes: Circumstantial  Thought Content:Normal: Yes  Hallucinations: None  Delusions: No  Memory:Normal: Yes  Insight and Judgment: No  Insight and Judgment: Poor Judgment, Poor Insight  Present Suicidal Ideation: Yes(fleeting SI no plan)  Present Homicidal Ideation: No    Tobacco Screening:  Practical Counseling, on admission, rachna X, if applicable and completed (first 3 are required if patient doesn't refuse):            ( )  Recognizing danger situations (included triggers and roadblocks)                    ( )  Coping skills (new ways to manage stress, exercise, relaxation techniques, changing routine, distraction)
patient refused to attend wellness group at 1600 after encouragement from staff.   1:1 talk time offered as alternative to group session
Reji MacNicole for dual diagnosis for community outpatient treatment.      Clinical Summary:  Client is met on this date and was alert, fully oriented, and cooperative. Client is A&Ox4. Client does present with flat affect, fair eye contact, low volume of speech, states loss of interest in doing pleasurable activities, no appetite, not sleeping well, low self-esteem and self-worth, tearful, and an increase in depression and suicidal thoughts over the past several days. PT currently appears absent of self-harm. Reji Randall is a 34-year old male who came to [facility] emergency department voluntarily with a report of suicidal ideation and an increase in depression for the past few days. Client reports off medications for an undetermined amount of time. Client reports recently got into a fight with his girlfriend and she kicked him out. Client is currently not working and confirms has been using drugs. Client reports he used to see Dr. Johanna Sigala at [facility] but has not seen him in a long time and states \"for some reason they linked me with [facility]. \"  Client reports some family support from siblings but both parents are  and does not have anywhere he can go at discharge. Writer provides client with list of inpatient and recovery housing options to review and to advise SW if interested in assistance with placement.   Client was very tired and asked if writer could come back at a later time to talk.            
insurance-provided taxi.     Phillip Navarrete, RN

## 2020-06-22 NOTE — PLAN OF CARE
Problem: Altered Mood, Depressive Behavior:  Goal: Able to verbalize and/or display a decrease in depressive symptoms  Description: Able to verbalize and/or display a decrease in depressive symptoms  Outcome: Ongoing  Patient denies depression and does not present with depressive symptoms or behaviors. Problem: Altered Mood, Depressive Behavior:  Goal: Ability to disclose and discuss suicidal ideas will improve  Description: Ability to disclose and discuss suicidal ideas will improve  Outcome: Ongoing  Patient denies suicidal ideations. Agreeable to talking with staff if thoughts to harm self arise. Q15min checks maintained for safety.

## 2020-08-14 ENCOUNTER — HOSPITAL ENCOUNTER (EMERGENCY)
Age: 30
Discharge: HOME | End: 2020-08-14
Attending: EMERGENCY MEDICINE
Payer: MEDICARE

## 2020-08-14 VITALS
HEART RATE: 86 BPM | TEMPERATURE: 98.1 F | RESPIRATION RATE: 14 BRPM | OXYGEN SATURATION: 100 % | SYSTOLIC BLOOD PRESSURE: 151 MMHG | DIASTOLIC BLOOD PRESSURE: 87 MMHG

## 2020-08-14 PROCEDURE — 12013 RPR F/E/E/N/L/M 2.6-5.0 CM: CPT

## 2020-08-14 PROCEDURE — 99282 EMERGENCY DEPT VISIT SF MDM: CPT

## 2020-08-14 ASSESSMENT — ENCOUNTER SYMPTOMS
COUGH: 0
SHORTNESS OF BREATH: 0
NAUSEA: 0
DIARRHEA: 0
CONSTIPATION: 0
RHINORRHEA: 0
ABDOMINAL PAIN: 0
VOMITING: 0
EYE DISCHARGE: 0
EYE PAIN: 0

## 2020-08-14 ASSESSMENT — PAIN SCALES - GENERAL: PAINLEVEL_OUTOF10: 10

## 2020-08-14 ASSESSMENT — PAIN DESCRIPTION - PAIN TYPE: TYPE: ACUTE PAIN

## 2020-08-14 ASSESSMENT — PAIN DESCRIPTION - LOCATION: LOCATION: HEAD

## 2020-08-14 NOTE — ED PROVIDER NOTES
[address] [facility]     Emergency Department     Faculty Attestation    I performed a history and physical examination of the patient and discussed management with the resident. I have reviewed and agree with the residents findings including all diagnostic interpretations, and treatment plans as written at the time of my review. Any areas of disagreement are noted on the chart. I was personally present for the key portions of any procedures. I have documented in the chart those procedures where I was not present during the key portions. For Physician Assistant/ Nurse Practitioner cases/documentation I have personally evaluated this patient and have completed at least one if not all key elements of the E/M (history, physical exam, and MDM). Additional findings are as noted. This patient was evaluated in the Emergency Department for symptoms described in the history of present illness. The patient was evaluated in the context of the global COVID-19 pandemic, which necessitated consideration that the patient might be at risk for infection with the SARS-CoV-2 virus that causes COVID-19. Institutional protocols and algorithms that pertain to the evaluation of patients at risk for COVID-19 are in a state of rapid change based on information released by regulatory bodies including the CDC and federal and state organizations. These policies and algorithms were followed during the patient's care in the ED. Primary Care Physician: GENERIC [facility]    History: This is a 26 y.o. male who presents to the Emergency Department with complaint of laceration. The patient tripped and fell last night hitting his head. There is no reported loss conscious. He had no problems with lightheadedness dizziness or chest pains or palpitations prior to the fall. His tetanus immunization is up-to-date. Patient denies any headache. Physical:   oral temperature is 98.1 °F (36.7 °C).  His

## 2020-08-14 NOTE — ED PROVIDER NOTES
[facility] ED  Emergency Department Encounter  Emergency Medicine Resident     Pt Name: Arturo Verdugo  MRN: [id number]  [place] 1990  Date of evaluation: 8/14/20  PCP:  Shauna Shane       Chief Complaint   Patient presents with    Head Laceration     lac to forehead s/p running into a wall last night, not currently bleeding       HISTORY OFPRESENT ILLNESS  (Location/Symptom, Timing/Onset, Context/Setting, Quality, Duration, Modifying Renetta Caceres.)      Arturo Verdugo is a 26 y.o male who presents with a head wound that happened last night. Patient states that he was walking into his room when he tripped and fell. He hit his head on the door way which split it open. At the time he put a washcloth on it to stop the bleeding and did not think that the wound was that bad. He did not have any loss of consciousness, denies any other trauma, dizziness, nausea, vomiting, confusion, altered mental status. Denies other pain. Does state that the pain localized around the wound is a 7/10 in severity, dull, achy, and pressure like. Denies headache currently. Has not tried any medications for pain relief prior to arrival. The wound hurts worse when touched. Had Tdap vaccination in May 2020    [facility] / SURGICAL / SOCIAL / FAMILY HISTORY      has a past medical history of Alcoholism ([facility] [address] Anxiety, Bipolar 1 disorder ([facility] [address] [facility] Cocaine abuse ([facility] [address] Depression, Hypertension, Irregular heartbeat, Mild tetrahydrocannabinol (THC) abuse, and Suicidal ideation. has a past surgical history that includes other surgical history (Right, 06/04/2017); Dialysis fistula creation (Right, 6/4/2017); EXPLORATION OF WOUND OF EXTREMITY (Right, 6/4/2017); and Femur Surgery (Left). Social:  reports that he has been smoking cigarettes. He has a 7.00 pack-year smoking history.  He has never used smokeless tobacco. He reports current alcohol use of about 2.0 standard drinks of alcohol per not ill-appearing. HENT:      Head: Normocephalic and atraumatic. Nose: Nose normal.      Mouth/Throat:      Mouth: Mucous membranes are moist.      Pharynx: Oropharynx is clear. Eyes:      Pupils: Pupils are equal, round, and reactive to light. Neck:      Musculoskeletal: Normal range of motion. Cardiovascular:      Rate and Rhythm: Normal rate and regular rhythm. Heart sounds: No murmur. No friction rub. No gallop. Pulmonary:      Effort: Pulmonary effort is normal. No respiratory distress. Breath sounds: Normal breath sounds. No wheezing. Abdominal:      General: Abdomen is flat. There is no distension. Palpations: Abdomen is soft. Tenderness: There is no abdominal tenderness. Skin:     General: Skin is warm and dry. Comments: Aspiration of right lateral forehead about 5 cm, clean edges, achieve hemostasis prior to arrival.   Neurological:      Mental Status: He is alert and oriented to person, place, and time. Psychiatric:         Mood and Affect: Mood normal.         Behavior: Behavior normal.           DIFFERENTIAL  DIAGNOSIS       Initial MDM/Plan: 26 y.o. male who presents with laceration of right lateral forehead. Will plan for closure with sutures. Denying pain right now states he does not need any pain medication. DIAGNOSTIC RESULTS / EMERGENCYDEPARTMENT COURSE / MDM     LABS:  Labs Reviewed - No data to display      RADIOLOGY:  No results found. Not indicated at this time    EKG  Not indicated at this time    All EKG's are interpreted by the Emergency Department Physicianwho either signs or Co-signs this chart in the absence of a cardiologist.    EMERGENCY DEPARTMENT COURSE:    24-year-old male presents with laceration of right lateral forehead. He is hemostasis prior to arrival.  Denying any other trauma, denying any other pain. We will plan for suturing for wound closure.     Suturing completed and successful with education to the wound clean and your own personal wellness. Please expect an automated phone call from [phone] so we can ask a few questions about your health and progress. Based on your answers, a clinician may call you back to offer help and instructions. If you notice any concerning symptoms please return to the ER immediately. These can include but are not limited to: fevers, chills, shortness of breath, vomiting, weakness of the extremities, changes in your mental status, numbness, pale extremities, or chest pain. SUMMARY OF YOUR VISIT      Follow up: Please follow-up with primary care provider, urgent care, or this emergency department to have your sutures removed in 5 to 7 days. Return to the emergency department as needed or if symptoms worsen, including but not limited to, increasing pain, increasing swelling, redness around the wound, purulent drainage from the wound, or any other concerns. PATIENT REFERRED TO:  [facility]  NO FAMILY DOC ONLY      As needed, If symptoms worsen    [facility] ED  [address] [facility] [id number] [phone]    As needed, If symptoms worsen    Generic High  NO FAMILY DOC ONLY      5 days to have sutures removed and wound check.       DISCHARGE MEDICATIONS:  Discharge Medication List as of 8/14/2020 12:29 PM          Candelaria Canela DO  Emergency Medicine Resident    (Please note that portions of this note were completed with a voice recognition program.Efforts were made to edit the dictations but occasionally words are mis-transcribed.)       Candelaria Canela DO  Resident  08/14/20 6828

## 2020-11-03 PROBLEM — I10 HYPERTENSION: Status: RESOLVED | Noted: 2017-09-23 | Resolved: 2020-11-03

## 2020-12-29 ENCOUNTER — HOSPITAL ENCOUNTER (EMERGENCY)
Age: 30
Discharge: HOME | End: 2020-12-29
Attending: EMERGENCY MEDICINE
Payer: MEDICARE

## 2020-12-29 ENCOUNTER — APPOINTMENT (OUTPATIENT)
Dept: GENERAL RADIOLOGY | Age: 30
End: 2020-12-29
Payer: MEDICARE

## 2020-12-29 VITALS
BODY MASS INDEX: 27.26 KG/M2 | HEART RATE: 80 BPM | SYSTOLIC BLOOD PRESSURE: 148 MMHG | DIASTOLIC BLOOD PRESSURE: 100 MMHG | RESPIRATION RATE: 14 BRPM | OXYGEN SATURATION: 93 % | WEIGHT: 190 LBS | TEMPERATURE: 97.6 F

## 2020-12-29 DIAGNOSIS — T40.601A OPIATE OVERDOSE, ACCIDENTAL OR UNINTENTIONAL, INITIAL ENCOUNTER (HCC): Primary | ICD-10-CM

## 2020-12-29 LAB
ABSOLUTE EOS #: 0.04 K/UL (ref 0–0.44)
ABSOLUTE IMMATURE GRANULOCYTE: 0.05 K/UL (ref 0–0.3)
ABSOLUTE LYMPH #: 1.76 K/UL (ref 1.1–3.7)
ABSOLUTE MONO #: 0.61 K/UL (ref 0.1–1.2)
ACETAMINOPHEN LEVEL: <5 UG/ML (ref 10–30)
ANION GAP SERPL CALCULATED.3IONS-SCNC: 12 MMOL/L (ref 9–17)
BASOPHILS # BLD: 1 % (ref 0–2)
BASOPHILS ABSOLUTE: 0.05 K/UL (ref 0–0.2)
BUN BLDV-MCNC: 10 MG/DL (ref 6–20)
BUN/CREAT BLD: ABNORMAL (ref 9–20)
CALCIUM SERPL-MCNC: 9.2 MG/DL (ref 8.6–10.4)
CHLORIDE BLD-SCNC: 105 MMOL/L (ref 98–107)
CHP ED QC CHECK: NORMAL
CO2: 22 MMOL/L (ref 20–31)
CREAT SERPL-MCNC: 1.02 MG/DL (ref 0.7–1.2)
DIFFERENTIAL TYPE: ABNORMAL
EOSINOPHILS RELATIVE PERCENT: 1 % (ref 1–4)
ETHANOL PERCENT: 0.11 %
ETHANOL: 112 MG/DL
GFR AFRICAN AMERICAN: >60 ML/MIN
GFR NON-AFRICAN AMERICAN: >60 ML/MIN
GFR SERPL CREATININE-BSD FRML MDRD: ABNORMAL ML/MIN/{1.73_M2}
GFR SERPL CREATININE-BSD FRML MDRD: ABNORMAL ML/MIN/{1.73_M2}
GLUCOSE BLD-MCNC: 129 MG/DL (ref 70–99)
GLUCOSE BLD-MCNC: 159 MG/DL
GLUCOSE BLD-MCNC: 159 MG/DL (ref 75–110)
HCT VFR BLD CALC: 49.7 % (ref 40.7–50.3)
HEMOGLOBIN: 15.3 G/DL (ref 13–17)
IMMATURE GRANULOCYTES: 1 %
LYMPHOCYTES # BLD: 22 % (ref 24–43)
MCH RBC QN AUTO: 30.1 PG (ref 25.2–33.5)
MCHC RBC AUTO-ENTMCNC: 30.8 G/DL (ref 28.4–34.8)
MCV RBC AUTO: 97.6 FL (ref 82.6–102.9)
MONOCYTES # BLD: 8 % (ref 3–12)
NRBC AUTOMATED: 0 PER 100 WBC
PDW BLD-RTO: 12.3 % (ref 11.8–14.4)
PLATELET # BLD: 281 K/UL (ref 138–453)
PLATELET ESTIMATE: ABNORMAL
PMV BLD AUTO: 8.7 FL (ref 8.1–13.5)
POTASSIUM SERPL-SCNC: 4 MMOL/L (ref 3.7–5.3)
RBC # BLD: 5.09 M/UL (ref 4.21–5.77)
RBC # BLD: ABNORMAL 10*6/UL
SALICYLATE LEVEL: <1 MG/DL (ref 3–10)
SEG NEUTROPHILS: 67 % (ref 36–65)
SEGMENTED NEUTROPHILS ABSOLUTE COUNT: 5.66 K/UL (ref 1.5–8.1)
SODIUM BLD-SCNC: 139 MMOL/L (ref 135–144)
TOTAL CK: 285 U/L (ref 39–308)
TOXIC TRICYCLIC SC,BLOOD: NEGATIVE
WBC # BLD: 8.2 K/UL (ref 3.5–11.3)
WBC # BLD: ABNORMAL 10*3/UL

## 2020-12-29 PROCEDURE — 82947 ASSAY GLUCOSE BLOOD QUANT: CPT

## 2020-12-29 PROCEDURE — 80307 DRUG TEST PRSMV CHEM ANLYZR: CPT

## 2020-12-29 PROCEDURE — 6360000002 HC RX W HCPCS

## 2020-12-29 PROCEDURE — 80048 BASIC METABOLIC PNL TOTAL CA: CPT

## 2020-12-29 PROCEDURE — 2580000003 HC RX 258: Performed by: EMERGENCY MEDICINE

## 2020-12-29 PROCEDURE — 71045 X-RAY EXAM CHEST 1 VIEW: CPT

## 2020-12-29 PROCEDURE — 96376 TX/PRO/DX INJ SAME DRUG ADON: CPT

## 2020-12-29 PROCEDURE — 96375 TX/PRO/DX INJ NEW DRUG ADDON: CPT

## 2020-12-29 PROCEDURE — 96374 THER/PROPH/DIAG INJ IV PUSH: CPT

## 2020-12-29 PROCEDURE — 82550 ASSAY OF CK (CPK): CPT

## 2020-12-29 PROCEDURE — 6360000002 HC RX W HCPCS: Performed by: STUDENT IN AN ORGANIZED HEALTH CARE EDUCATION/TRAINING PROGRAM

## 2020-12-29 PROCEDURE — G0480 DRUG TEST DEF 1-7 CLASSES: HCPCS

## 2020-12-29 PROCEDURE — 85025 COMPLETE CBC W/AUTO DIFF WBC: CPT

## 2020-12-29 PROCEDURE — 99283 EMERGENCY DEPT VISIT LOW MDM: CPT

## 2020-12-29 RX ORDER — ONDANSETRON 2 MG/ML
INJECTION INTRAMUSCULAR; INTRAVENOUS
Status: COMPLETED
Start: 2020-12-29 | End: 2020-12-29

## 2020-12-29 RX ORDER — NALOXONE HYDROCHLORIDE 1 MG/ML
1 INJECTION INTRAMUSCULAR; INTRAVENOUS; SUBCUTANEOUS ONCE
Status: COMPLETED | OUTPATIENT
Start: 2020-12-29 | End: 2020-12-29

## 2020-12-29 RX ORDER — SODIUM CHLORIDE, SODIUM LACTATE, POTASSIUM CHLORIDE, CALCIUM CHLORIDE 600; 310; 30; 20 MG/100ML; MG/100ML; MG/100ML; MG/100ML
1000 INJECTION, SOLUTION INTRAVENOUS ONCE
Status: COMPLETED | OUTPATIENT
Start: 2020-12-29 | End: 2020-12-29

## 2020-12-29 RX ORDER — ONDANSETRON 2 MG/ML
4 INJECTION INTRAMUSCULAR; INTRAVENOUS ONCE
Status: COMPLETED | OUTPATIENT
Start: 2020-12-29 | End: 2020-12-29

## 2020-12-29 RX ORDER — NALOXONE HYDROCHLORIDE 0.4 MG/ML
INJECTION, SOLUTION INTRAMUSCULAR; INTRAVENOUS; SUBCUTANEOUS
Status: COMPLETED
Start: 2020-12-29 | End: 2020-12-29

## 2020-12-29 RX ORDER — NALOXONE HYDROCHLORIDE 1 MG/ML
1 INJECTION INTRAMUSCULAR; INTRAVENOUS; SUBCUTANEOUS ONCE
Status: DISCONTINUED | OUTPATIENT
Start: 2020-12-29 | End: 2020-12-29 | Stop reason: HOSPADM

## 2020-12-29 RX ADMIN — NALOXONE HYDROCHLORIDE 1 MG: 1 INJECTION PARENTERAL at 07:14

## 2020-12-29 RX ADMIN — SODIUM CHLORIDE, POTASSIUM CHLORIDE, SODIUM LACTATE AND CALCIUM CHLORIDE 1000 ML: 600; 310; 30; 20 INJECTION, SOLUTION INTRAVENOUS at 06:21

## 2020-12-29 RX ADMIN — ONDANSETRON 4 MG: 2 INJECTION INTRAMUSCULAR; INTRAVENOUS at 05:58

## 2020-12-29 RX ADMIN — NALOXONE HYDROCHLORIDE 0.4 MG: 0.4 INJECTION, SOLUTION INTRAMUSCULAR; INTRAVENOUS; SUBCUTANEOUS at 05:58

## 2020-12-29 ASSESSMENT — ENCOUNTER SYMPTOMS
VOMITING: 0
SINUS PAIN: 0
SHORTNESS OF BREATH: 0
EYE PAIN: 0
SORE THROAT: 0
BACK PAIN: 0
DIARRHEA: 0
ABDOMINAL PAIN: 0
COUGH: 0
NAUSEA: 0

## 2020-12-29 NOTE — ED NOTES
Dr. Viktor Romero at bedside  Pt given narcan, responds to verbal stimuli however falls right back asleep     Tiago Tam RN  12/29/20 Nehemias Gusman RN  12/29/20 7330

## 2020-12-29 NOTE — ED PROVIDER NOTES
[address] [facility]     Emergency Department     Faculty Attestation    I performed a history and physical examination of the patient and discussed management with the resident. I have reviewed and agree with the residents findings including all diagnostic interpretations, and treatment plans as written. Any areas of disagreement are noted on the chart. I was personally present for the key portions of any procedures. I have documented in the chart those procedures where I was not present during the key portions. I have reviewed the emergency nurses triage note. I agree with the chief complaint, past medical history, past surgical history, allergies, medications, social and family history as documented unless otherwise noted below. Documentation of the HPI, Physical Exam and Medical Decision Making performed by scribozzy is based on my personal performance of the HPI, PE and MDM. For Physician Assistant/ Nurse Practitioner cases/documentation I have personally evaluated this patient and have completed at least one if not all key elements of the E/M (history, physical exam, and MDM). Additional findings are as noted. 32 yo M found down by ems, given narcan 4 mg iv, awakened for ems, no reported injury or fever,   Pt admits to smoking k2, no injury,   pe vss pt arouses to voice, herb, 3mm pupils bilaterally, normocephalic atraumatic, no cervical tenderness, crepitus or deformity,   Chest non tender, abdomen non tender, no distension, no rigidity,   No peripheral edema,     Glucose stable, pt somnolent, cxr stable, care turned over to day shift,   Probable dc home,     EKG Interpretation    Interpreted by me  Normal sinus, hr 98, no ischemia, normal axis, qtc 472    CRITICAL CARE: There was a high probability of clinically significant/life threatening deterioration in this patient's condition which required my urgent intervention.   Total critical care time was 5 minutes. This excludes any time for separately reportable procedures.        HAMIDA-Bettina [facility], DO  12/29/20 [facility], DO  12/29/20 6761

## 2020-12-29 NOTE — ED NOTES
Assisted pt with calling for a ride home. Pt denies suicidal or homicidal ideation, reports safe and stable housing to return to and does not feel that he would benefit from drug counseling resources at this time.       Ruben Ferrer [place]  12/29/20 0293

## 2020-12-29 NOTE — ED PROVIDER NOTES
[address] ED  Emergency Department  Emergency Medicine Resident Sign-out     Care of David Crowley was assumed from Dr. Madison Nina and is being seen for Drug Overdose (4mg narcan pta)  . The patient's initial evaluation and plan have been discussed with the prior provider who initially evaluated the patient. EMERGENCY DEPARTMENT COURSE / MEDICAL DECISION MAKING:       MEDICATIONS GIVEN:  Orders Placed This Encounter   Medications    naloxone (NARCAN) injection 1 mg    ondansetron (ZOFRAN) injection 4 mg    naloxone (NARCAN) 0.4 MG/ML injection     Gena Clark: cabinet override    ondansetron (ZOFRAN) 4 MG/2ML injection     Karol Johnson: cabinet override    lactated ringers infusion 1,000 mL    naloxone (NARCAN) injection 1 mg       LABS / RADIOLOGY:     Labs Reviewed   BASIC METABOLIC PANEL W/ REFLEX TO MG FOR LOW K - Abnormal; Notable for the following components:       Result Value    Glucose 129 (*)     All other components within normal limits   CBC WITH AUTO DIFFERENTIAL - Abnormal; Notable for the following components:    Seg Neutrophils 67 (*)     Lymphocytes 22 (*)     Immature Granulocytes 1 (*)     All other components within normal limits   TOX SCR, BLD, ED - Abnormal; Notable for the following components:    Acetaminophen Level <5 (*)     Ethanol 112 (*)     Ethanol percent 2.382 (*)     Salicylate Lvl <1 (*)     All other components within normal limits   POC GLUCOSE FINGERSTICK - Abnormal; Notable for the following components:    POC Glucose 159 (*)     All other components within normal limits   POCT GLUCOSE - Normal   CK       Xr Chest Portable    Result Date: 12/29/2020  EXAMINATION: ONE XRAY VIEW OF THE CHEST 12/29/2020 6:37 am COMPARISON: Chest portable May 24, 2020 HISTORY: ORDERING SYSTEM PROVIDED HISTORY: low sat TECHNOLOGIST PROVIDED HISTORY: low sat Reason for Exam: upright portable FINDINGS: The heart is normal in size and configuration.   The mediastinal contours are within normal limits. The lungs are well aerated. The pleural surfaces are normal and no evidence of a pleural effusion is seen. Bones and soft tissues are unremarkable. Unremarkable single upright portable AP view of the chest.       RECENT VITALS:     Temp: 96.3 °F (35.7 °C),  Pulse: 92, Resp: 14, BP: (!) 146/105, SpO2: 94 %    This patient is a 31 y.o. Male with past medical history of polysubstance abuse presenting status post opiate overdose. Patient was administered 4 mg of Narcan via EMS in route after he was found down in a hallway exposed to the elements displaying agonal breathing. Patient's level of consciousness improved he was answering questions appropriately and then became drowsy and unarousable ED. Patient subsequently required 2 rounds of Narcan at 1 mg in the emergency department after he became hypoxic in the mid 80s on room air. Patient was placed on 2 L nasal cannula and is saturating at 99% as of 0731. Pending stabilized mentation patient can hopefully be discharged home this morning. No electrolyte abnormalities, normal CK, elevated ethanol level 112. Unremarkable CBC. Additionally treated with IV fluids. Chest x-ray unremarkable. External rewarming with blankets. Narcan last administered at 0700, will require monitoring for at least 2 hours post last dose of Narcan given multiple rounds. On reassessment patient alert and oriented, tolerating p.o. intake, with complete resolution of symptoms. Patient is medically stable for discharge home. OUTSTANDING TASKS / RECOMMENDATIONS:    1. Reassess 0900  2. Discharge if requiring no additional Narcan, consider admission if additional doses of Narcan needed     FINAL IMPRESSION:     1.  Opiate overdose, accidental or unintentional, initial encounter ([facility] [address]        DISPOSITION:         DISPOSITION:  [x]  Discharge   []  Transfer -    []  Admission -     []  Against Medical Advice   []  Eloped FOLLOW-UP: No follow-up provider specified.    DISCHARGE MEDICATIONS: New Prescriptions    No medications on file           Faraz Petty MD  Emergency Medicine Resident  [address] [facility]        Faraz Petty MD  Resident  12/29/20 6688

## 2020-12-29 NOTE — ED PROVIDER NOTES
Zoya Rich  ED  Emergency Department Encounter  EmergencyMedicineResident     This patient was seen during the COVID-19 crisis. There were limited resources and those resources we did have had to be conserved for the sickest of patients. Pt Name: Bassem Rowley  MRN: [id number]  Peggytrongfpati 1990  Date of evaluation: 12/29/20  PCP: Shauna Shane       Chief Complaint   Patient presents with    Drug Overdose     4mg narcan pta       HISTORY OF PRESENT ILLNESS  (Location/Symptom, Timing/Onset, Context/Setting, Quality, Duration, Modifying Factors, Severity.)      Bassem Rowley is a 31 y.o. male who presents for evaluation after being found down in a hallway exposed to the elements after suspected opiate overdose. Patient had reportedly thought that he was ingesting cocaine. EMS arrived on scene patient had agonal breathing. He was administered form milligrams of Narcan and began spontaneously moving, breathing and eventually talking in route on the way to the hospital.  At this time the patient is complaining of a headache. He denies any medical history. Denies taking any medications on a regular basis. PAST MEDICAL / SURGICAL /SOCIAL / FAMILY HISTORY      has a past medical history of Alcoholism ([facility] [address] Anxiety, Bipolar 1 disorder ([facility] [address] Cocaine abuse ([facility] [address] Depression, Hypertension, Irregular heartbeat, Mild tetrahydrocannabinol (THC) abuse, and Suicidal ideation. has a past surgical history that includes other surgical history (Right, 06/04/2017); Dialysis fistula creation (Right, 6/4/2017); EXPLORATION OF WOUND OF EXTREMITY (Right, 6/4/2017); and Femur Surgery (Left).       Social History     Socioeconomic History    Marital status: Single     Spouse name: magalys    Number of children: 1    Years of education: Not on file    Highest education level: Not on file   Occupational History    Occupation: unemployed     Comment: used to work at [email or web address] months ago   Social Needs    Financial resource strain: Not on file    Food insecurity     Worry: Not on file     Inability: Not on file   [employer] needs     Medical: Not on file     Non-medical: Not on file   Tobacco Use    Smoking status: Current Every Day Smoker     Packs/day: 0.50     Years: 14.00     Pack years: 7.00     Types: Cigarettes    Smokeless tobacco: Never Used    Tobacco comment: 3 cigs per day per patient   Substance and Sexual Activity    Alcohol use: Yes     Alcohol/week: 2.0 standard drinks     Types: 2 Cans of beer per week    Drug use: Not Currently     Types: Marijuana, Cocaine    Sexual activity: Yes     Partners: Female   Lifestyle    Physical activity     Days per week: Not on file     Minutes per session: Not on file    Stress: Not on file   Relationships    Social connections     Talks on phone: Not on file     Gets together: Not on file     Attends Scientologist service: Not on file     Active member of club or organization: Not on file     Attends meetings of clubs or organizations: Not on file     Relationship status: Not on file    Intimate partner violence     Fear of current or ex partner: Not on file     Emotionally abused: Not on file     Physically abused: Not on file     Forced sexual activity: Not on file   Other Topics Concern    Not on file   Social History Narrative    ** Merged History Encounter **         Lives with fiance and 3year old daughter       Family History   Problem Relation Age of Onset    Heart Disease Father 52    Asthma Brother     Hypertension Maternal Grandmother     Lung Cancer Maternal Grandmother         Kidney cancer, liver cancer    Stroke Maternal Grandmother     Heart Disease Mother 46         of presumed heart disease in her sleep       Allergies:  Vicodin [hydrocodone-acetaminophen]    Home Medications:  Prior to Admission medications    Medication Sig Start Date End Date Taking?  Authorizing Provider   sertraline rhythm. Pulses: Normal pulses. Heart sounds: Normal heart sounds. Pulmonary:      Effort: Pulmonary effort is normal.      Breath sounds: Normal breath sounds. Abdominal:      General: Abdomen is flat. Palpations: Abdomen is soft. Musculoskeletal: Normal range of motion. Skin:     General: Skin is warm and dry. Capillary Refill: Capillary refill takes less than 2 seconds. Neurological:      General: No focal deficit present. Mental Status: He is oriented to person, place, and time.    Psychiatric:         Mood and Affect: Mood normal.         Behavior: Behavior normal.           DIFFERENTIAL  DIAGNOSIS     PLAN (LABS / IMAGING / EKG):  Orders Placed This Encounter   Procedures    XR CHEST PORTABLE    Basic Metabolic Panel w/ Reflex to MG    CK    CBC Auto Differential    TOX SCR, BLD, ED    POCT Glucose    POC Glucose Fingerstick    EKG 12 Lead       MEDICATIONS ORDERED:  ED Medication Orders (From admission, onward)    Start Ordered     Status Ordering Provider    12/29/20 0715 12/29/20 0712  naloxone (NARCAN) injection 1 mg  ONCE      Acknowledged SOBEIDA KIRSTEN L    12/29/20 0630 12/29/20 0617  lactated ringers infusion 1,000 mL  ONCE      Last MAR action: New Bag - [facility] Maryse Fat    12/29/20 0600 12/29/20 0555  naloxone (NARCAN) injection 1 mg  ONCE      Acknowledged VIDALESRICARDOER L    12/29/20 0600 12/29/20 0555  ondansetron (ZOFRAN) injection 4 mg  ONCE      Last MAR action: Given - by Alyssa Abraham on 12/29/20 at 0558 Merwyn Daily    12/29/20 0557 12/29/20 0557  naloxone (NARCAN) 0.4 MG/ML injection     Note to Pharmacy: Karen Zayas: cabinet override    Last MAR action: Given - by Alyssa Abraham on 12/29/20 at 0558           DDX: Opiate overdose    DIAGNOSTIC RESULTS / [facility]     IMPRESSION & INITIAL PLAN:  This is a 48-year-old male presents emergency department today status post opiate overdose and revival with 4 mg of Narcan via EMS. Patient reportedly thought he was ingesting cocaine. Patient was found out exposed to the elements agonal breathing per EMS. After being administered 4 mg of Narcan the patient spontaneously started breathing moving and speaking. .    Plan to order twelve-lead EKG and point-of-care glucose. Lead EKG and point-of-care glucose within normal limits. BMP and CBC within normal months. EtOH present on ED tox screen. Patient became hypoxic after becoming sonorous and drowsy in the emergency department. Patient was placed on 2 L of oxygen and given 1 mg of Narcan. Patient responded became more alert and began oxygenating better. Approximately 1 hour later the patient went into another cycle of sonorous breathing drowsiness and hypoxia. Patient again given 1 mg of Narcan. Patient became more alert after every administration of Narcan. Suspicion for a long-acting opiate such as carfentanil. LABS:  Results for orders placed or performed during the hospital encounter of 05/38/89   Basic Metabolic Panel w/ Reflex to MG   Result Value Ref Range    Glucose 129 (H) 70 - 99 mg/dL    BUN 10 6 - 20 mg/dL    CREATININE 1.02 0.70 - 1.20 mg/dL    Bun/Cre Ratio NOT REPORTED 9 - 20    Calcium 9.2 8.6 - 10.4 mg/dL    Sodium 139 135 - 144 mmol/L    Potassium 4.0 3.7 - 5.3 mmol/L    Chloride 105 98 - 107 mmol/L    CO2 22 20 - 31 mmol/L    Anion Gap 12 9 - 17 mmol/L    GFR Non-African American >60 >60 mL/min    GFR African American >60 >60 mL/min    GFR Comment          GFR Staging NOT REPORTED    CK   Result Value Ref Range    Total  39 - 308 U/L   CBC Auto Differential   Result Value Ref Range    WBC 8.2 3.5 - 11.3 k/uL    RBC 5.09 4. 21 - 5.77 m/uL    Hemoglobin 15.3 13.0 - 17.0 g/dL    Hematocrit 49.7 40.7 - 50.3 %    MCV 97.6 82.6 - 102.9 fL    MCH 30.1 25.2 - 33.5 pg    MCHC 30.8 28.4 - 34.8 g/dL    RDW 12.3 11.8 - 14.4 %    Platelets 652 674 - 734 k/uL    MPV 8.7 8.1 -

## 2020-12-29 NOTE — ED PROVIDER NOTES
[address] HANDOFF       Handoff taken on the following patient from prior Attending Physician:  Pt Name: Vianney Yates  PCP:  [address]  I was available and discussed any additional care issues that arose and coordinated the management plans with the resident(s) caring for the patient during my duty period. Any areas of disagreement with resident's documentation of care or procedures are noted on the chart. I was personally present for the key portions of any/all procedures during my duty period. I have documented in the chart those procedures where I was not present during the key portions. [facility]       Chief Complaint   Patient presents with    Drug Overdose     4mg narcan pta         CURRENT MEDICATIONS     Previous Medications  Previous Medications    AMLODIPINE (NORVASC) 5 MG TABLET    Take 1 tablet by mouth daily    SERTRALINE (ZOLOFT) 50 MG TABLET    Take 1 tablet by mouth daily    TRAZODONE (DESYREL) 100 MG TABLET    Take 1 tablet by mouth nightly as needed for Sleep       Encounter Medications  Orders Placed This Encounter   Medications    naloxone (NARCAN) injection 1 mg    ondansetron (ZOFRAN) injection 4 mg    naloxone (NARCAN) 0.4 MG/ML injection     Gena Clark: cabinet override    ondansetron (ZOFRAN) 4 MG/2ML injection     Gena Clark: cabinet override    lactated ringers infusion 1,000 mL    naloxone (NARCAN) injection 1 mg    Naloxone HCl (NALOXONE OPIATE OVERDOSE KIT)     Si each by Nasal route once for 1 dose     Dispense:  1 kit     Refill:  0       ALLERGIES     is allergic to vicodin [hydrocodone-acetaminophen].       RECENT VITALS:   Temp: 97.6 °F (36.4 °C),  Pulse: 80, Resp: 14, BP: (!) 148/100    RADIOLOGY:   XR CHEST PORTABLE   Final Result   Unremarkable single upright portable AP view of the chest.             LABS:  Labs Reviewed   BASIC METABOLIC PANEL W/ REFLEX TO MG FOR LOW K - Abnormal; Notable for the following components:       Result Value    Glucose 129 (*)     All other components within normal limits   CBC WITH AUTO DIFFERENTIAL - Abnormal; Notable for the following components:    Seg Neutrophils 67 (*)     Lymphocytes 22 (*)     Immature Granulocytes 1 (*)     All other components within normal limits   TOX SCR, BLD, ED - Abnormal; Notable for the following components:    Acetaminophen Level <5 (*)     Ethanol 112 (*)     Ethanol percent 7.759 (*)     Salicylate Lvl <1 (*)     All other components within normal limits   POC GLUCOSE FINGERSTICK - Abnormal; Notable for the following components:    POC Glucose 159 (*)     All other components within normal limits   POCT GLUCOSE - Normal   CK           PLAN/ TASKS OUTSTANDING     This patient is a 31 y.o. Male. K2 and narcotic overdose, received 2 doses Narcan prior to signout, awaiting for him to wake up and be stable for discharge.     10:14 AM EST  Patient sitting up the side of the bed awake and conversant, will discharge him with resources    (Please note that portions of this note were completed with a voice recognition program.  Efforts were made to edit the dictations but occasionally words are mis-transcribed.)    Loving MD  Attending Emergency Physician       Tamica Jameson MD  12/29/20 8882

## 2020-12-29 NOTE — ED NOTES
Bed: 14  Expected date: 12/29/20  Expected time: 5:25 AM  Means of arrival:   Comments:  [facility] [address], RN  12/29/20 5595

## 2021-01-03 LAB
EKG ATRIAL RATE: 98 BPM
EKG P AXIS: 62 DEGREES
EKG P-R INTERVAL: 152 MS
EKG Q-T INTERVAL: 370 MS
EKG QRS DURATION: 94 MS
EKG QTC CALCULATION (BAZETT): 472 MS
EKG R AXIS: 31 DEGREES
EKG T AXIS: 31 DEGREES
EKG VENTRICULAR RATE: 98 BPM

## 2021-03-25 ENCOUNTER — HOSPITAL ENCOUNTER (EMERGENCY)
Age: 31
Discharge: HOME | End: 2021-03-25
Attending: EMERGENCY MEDICINE
Payer: MEDICARE

## 2021-03-25 VITALS
OXYGEN SATURATION: 99 % | TEMPERATURE: 98.1 F | DIASTOLIC BLOOD PRESSURE: 88 MMHG | HEART RATE: 85 BPM | RESPIRATION RATE: 20 BRPM | SYSTOLIC BLOOD PRESSURE: 136 MMHG

## 2021-03-25 DIAGNOSIS — F19.10 DRUG ABUSE (HCC): Primary | ICD-10-CM

## 2021-03-25 PROCEDURE — 99283 EMERGENCY DEPT VISIT LOW MDM: CPT

## 2021-03-25 NOTE — ED NOTES
Pt to ED after his girlfriend had concerns for his mental health. Pt states he is a heroin drug user and last snorted heroin 2 hours ago. He denies any suicidal or homicidal ideation. He states that he eventually wants help for resources to get clean, but wasn't quite ready to make the first steps.       Christel Elliott RN  03/25/21 [id number]

## 2021-03-25 NOTE — ED NOTES
Met with pt at bedside who reports that he and his girlfriend were in an argument this morning and she dropped him off here because she wants him to get clean, pt states he is not ready to go to inpatient rehab. Pt is willing to take information on outpatient resources and states that he has gone to [facility] in the past, but ended up going to assisted and was terminated from their facility. Pt is interested in MAT services and will be given information on local programs. Pt denies suicidal or homicidal ideation and historically has voluntarily asked for assistance with mental health treatment when needed. Very low concern for harm to self or others. No further needs at this time, social work will remain available should further need for intervention arise.        Chelsey August  03/25/21 5694

## 2021-03-25 NOTE — ED PROVIDER NOTES
[address]  ED  Emergency Department Encounter  EmergencyMedicine Resident     Pt Kings Hunter  MRN: [id number]  Cesargfpati 1990  Date of evaluation: 3/25/21  PCP:  Shauna Shane       Chief Complaint   Patient presents with    Drug Problem     pt brought in by girlfriend. Concern for heroin detox. HISTORY OF PRESENT ILLNESS  (Location/Symptom, Timing/Onset, Context/Setting, Quality, Duration, Modifying Factors, Severity.)      Edmund Wallace is a 31 y.o. male who presents with his girlfriend dropping off in the emergency department for treatment. Patient denies suicidal homicidal ideation. States that he uses intranasal heroin last used 2 hours ago. His girlfriend wants him to get treatment states that he thinks he is not ready for treatment. Admits to drinking. Liquor yesterday. His speech and behavior is appropriate clear and articulate. He seems to have excellent insight into his situation. He is denying any chest pain shortness of breath dizziness headache signs of trauma. States that he feels at baseline. Not taking any medication, denies allergies. Denies smoking. Drinks alcohol socially uses heroin in binges. Denies use of IV drugs. PAST MEDICAL / SURGICAL / SOCIAL / FAMILY HISTORY      has a past medical history of Alcoholism ([address] Anxiety, Bipolar 1 disorder ([address] Cocaine abuse ([facility] [address] Depression, Hypertension, Irregular heartbeat, Mild tetrahydrocannabinol (THC) abuse, and Suicidal ideation. has a past surgical history that includes other surgical history (Right, 06/04/2017); Dialysis fistula creation (Right, 6/4/2017); EXPLORATION OF WOUND OF EXTREMITY (Right, 6/4/2017); and Femur Surgery (Left).     Social History     Socioeconomic History    Marital status: Single     Spouse name: magalys    Number of children: 1    Years of education: Not on file    Highest education level: Not on file   Occupational History    Occupation: unemployed     Comment: used to work at [employer] 6 months ago   Social Needs    Financial resource strain: Not on file    Food insecurity     Worry: Not on file     Inability: Not on file   [employer] needs     Medical: Not on file     Non-medical: Not on file   Tobacco Use    Smoking status: Current Every Day Smoker     Packs/day: 0.50     Years: 14.00     Pack years: 7.00     Types: Cigarettes    Smokeless tobacco: Never Used    Tobacco comment: 3 cigs per day per patient   Substance and Sexual Activity    Alcohol use:  Yes     Alcohol/week: 2.0 standard drinks     Types: 2 Cans of beer per week    Drug use: Not Currently     Types: Marijuana, Cocaine    Sexual activity: Yes     Partners: Female   Lifestyle    Physical activity     Days per week: Not on file     Minutes per session: Not on file    Stress: Not on file   Relationships    Social connections     Talks on phone: Not on file     Gets together: Not on file     Attends Nondenominational service: Not on file     Active member of club or organization: Not on file     Attends meetings of clubs or organizations: Not on file     Relationship status: Not on file    Intimate partner violence     Fear of current or ex partner: Not on file     Emotionally abused: Not on file     Physically abused: Not on file     Forced sexual activity: Not on file   Other Topics Concern    Not on file   Social History Narrative    ** Merged History Encounter **         Lives with fiance and 3year old daughter       Family History   Problem Relation Age of Onset    Heart Disease Father 52    Asthma Brother     Hypertension Maternal Grandmother     Lung Cancer Maternal Grandmother         Kidney cancer, liver cancer    Stroke Maternal Grandmother     Heart Disease Mother 46         of presumed heart disease in her sleep       Allergies:  Vicodin [hydrocodone-acetaminophen]    Home Medications:  Prior to Admission medications    Medication Sig Start Date End Date Taking? Authorizing Provider   sertraline (ZOLOFT) 50 MG tablet Take 1 tablet by mouth daily 6/21/20   [facility] Coffee, APRN - CNP   traZODone (DESYREL) 100 MG tablet Take 1 tablet by mouth nightly as needed for Sleep 6/21/20   [facility] Coffee, APRN - CNP   amLODIPine (NORVASC) 5 MG tablet Take 1 tablet by mouth daily 6/21/20   [facility] Je, APRN - CNP       REVIEW OF SYSTEMS    (2-9 systems for level 4, 10 or more for level 5)      Review of Systems   Constitutional: Negative for fever. HENT: Negative for congestion. Eyes: Negative for photophobia. Respiratory: Negative for shortness of breath. Cardiovascular: Negative for chest pain. Gastrointestinal: Negative for abdominal pain and vomiting. Endocrine: Negative for polyuria. Genitourinary: Negative for dysuria. Musculoskeletal: Negative for arthralgias. Skin: Negative for color change. Allergic/Immunologic: Negative for immunocompromised state. Neurological: Negative for dizziness. Hematological: Does not bruise/bleed easily. Psychiatric/Behavioral: Negative for agitation. PHYSICAL EXAM   (up to 7 for level 4, 8 or more for level 5)      INITIAL VITALS:   /88   Pulse 85   Temp 98.1 °F (36.7 °C) (Oral)   Resp 20   SpO2 99%     Physical Exam  Constitutional:       General: Not in acute distress. Appearance: Normal appearance. Normal weight. Not toxic-appearing. HENT:      Head: Normocephalic and atraumatic. Nose: Nose normal.      Mouth/Throat: Mucous membranes are moist.  Uvula midline no oropharyngeal edema. Pharynx: Oropharynx is clear. Eyes:      Extraocular Movements: Extraocular movements intact. Conjunctiva/sclera: Conjunctivae normal.      Pupils: Pupils are equal, round, and reactive to light. Neck:      Musculoskeletal: Normal range of motion and neck supple. No neck rigidity. Cardiovascular:      Rate and Rhythm: Normal rate and regular rhythm. Pulses: Normal pulses. Heart sounds: Normal heart sounds. No murmur. Pulmonary:      Effort: Pulmonary effort is normal.      Breath sounds: Normal breath sounds. No wheezing. Abdominal:      General: Abdomen is flat. Bowel sounds are normal.      Tenderness: There is no abdominal tenderness. Musculoskeletal:     Normal range of motion. General: No swelling or tenderness. No LE edema    Skin:     General: Skin is warm. Capillary Refill: Capillary refill takes less than 2 seconds. Coloration: Skin is not jaundiced. Neurological:      General: No focal deficit present. Mental Status: Alert and oriented to person, place, and time. Mental status is at baseline. Motor: No weakness. DIFFERENTIAL  DIAGNOSIS     PLAN (LABS / IMAGING / EKG):  No orders of the defined types were placed in this encounter. MEDICATIONS ORDERED:  No orders of the defined types were placed in this encounter. DIAGNOSTIC RESULTS / EMERGENCY DEPARTMENT COURSE / MDM     LABS:  No results found for this visit on 03/25/21. RADIOLOGY:  None    EKG  None    All EKG's are interpreted by the Emergency Department Physician who either signs or Co-signs this chart in the absence of a cardiologist.    EMERGENCY DEPARTMENT COURSE:  Patient breathing quietly and unlabored on room air. Speech is normal and speaking in full sentences without requiring to pause to take a breath. Patient speech is appropriate and articulate. He has good insight into his condition. He is denying homicidal ideation suicidal ideation. Denying trauma. States that he would like to talk to  about resources for drug abuse. Says that he is not interested in treatment right now but he will consider treatment. He is in the contemplative stage. Social work provided him with resources.   Instructed the patient to return emergency department if he changes his mind and would like more aggressive assistance with heroin detox.    PROCEDURES:  None    CONSULTS:  None    CRITICAL CARE:  None    FINAL IMPRESSION      1.  Drug abuse ([facility] [address]          DISPOSITION / PLAN     DISPOSITION Decision To Discharge 03/25/2021 07:33:22 AM      PATIENT REFERRED TO:  [facility]  NO FAMILY DOC ONLY    Schedule an appointment as soon as possible for a visit in 2 days        DISCHARGE MEDICATIONS:  New Prescriptions    No medications on file       Alexandr Armas MD  Emergency Medicine Resident    (Please note that portions of thisnote were completed with a voice recognition program.  Efforts were made to edit the dictations but occasionally words are mis-transcribed.)       Alexandr Armas MD  Resident  03/25/21 5969

## 2021-03-25 NOTE — ED PROVIDER NOTES
[address] [facility]     Emergency Department     Faculty Attestation    I performed a history and physical examination of the patient and discussed management with the resident. I have reviewed and agree with the residents findings including all diagnostic interpretations, and treatment plans as written at the time of my review. Any areas of disagreement are noted on the chart. I was personally present for the key portions of any procedures. I have documented in the chart those procedures where I was not present during the key portions. For Physician Assistant/ Nurse Practitioner cases/documentation I have personally evaluated this patient and have completed at least one if not all key elements of the E/M (history, physical exam, and MDM). Additional findings are as noted. This patient was evaluated in the Emergency Department for symptoms described in the history of present illness. The patient was evaluated in the context of the global COVID-19 pandemic, which necessitated consideration that the patient might be at risk for infection with the SARS-CoV-2 virus that causes COVID-19. Institutional protocols and algorithms that pertain to the evaluation of patients at risk for COVID-19 are in a state of rapid change based on information released by regulatory bodies including the CDC and federal and state organizations. These policies and algorithms were followed during the patient's care in the ED. The patient presents to the emergency department brought in by his girlfriend concern for heroin abuse. Initially the girlfriend stated that he was suicidal.  However discussing with myself, resident and  he denies any suicidal ideation. When brought up with his heroin abuse he says he is not ready to quit using at this time.  did give the patient information for outpatient detox. He was encouraged to quit using.   Patient is awake alert oriented x3 and appears to have decisional capacity. (Please note that portions of this note were completed with a voice recognition program.  Efforts were made to edit the dictations but occasionally words are mis-transcribed.)    Michael Marrow.  Aaliyah Topete MD, [facility]  Attending Emergency Medicine Physician        Mejia Matamoros MD  03/25/21 2174

## 2021-07-31 ENCOUNTER — HOSPITAL ENCOUNTER (INPATIENT)
Age: 31
LOS: 4 days | Discharge: HOME | DRG: 753 | End: 2021-08-04
Attending: PSYCHIATRY & NEUROLOGY | Admitting: PSYCHIATRY & NEUROLOGY
Payer: MEDICARE

## 2021-07-31 ENCOUNTER — HOSPITAL ENCOUNTER (EMERGENCY)
Age: 31
Discharge: PSYCHIATRIC OR SUBSTANCE USE FACILITY | End: 2021-07-31
Attending: EMERGENCY MEDICINE
Payer: MEDICARE

## 2021-07-31 VITALS
WEIGHT: 190 LBS | SYSTOLIC BLOOD PRESSURE: 146 MMHG | DIASTOLIC BLOOD PRESSURE: 81 MMHG | TEMPERATURE: 97.2 F | HEART RATE: 79 BPM | RESPIRATION RATE: 16 BRPM | BODY MASS INDEX: 27.26 KG/M2 | OXYGEN SATURATION: 96 %

## 2021-07-31 DIAGNOSIS — R45.851 SUICIDAL IDEATION: Primary | ICD-10-CM
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
## 2021-07-31 PROBLEM — F32.A DEPRESSION WITH SUICIDAL IDEATION: Status: ACTIVE | Noted: 2021-07-31

## 2021-07-31 LAB
ABSOLUTE EOS #: 0.23 K/UL (ref 0–0.44)
ABSOLUTE IMMATURE GRANULOCYTE: <0.03 K/UL (ref 0–0.3)
ABSOLUTE LYMPH #: 2.07 K/UL (ref 1.1–3.7)
ABSOLUTE MONO #: 0.71 K/UL (ref 0.1–1.2)
ALBUMIN SERPL-MCNC: 3.9 G/DL (ref 3.5–5.2)
ALBUMIN/GLOBULIN RATIO: 1.6 (ref 1–2.5)
ALP BLD-CCNC: 57 U/L (ref 40–129)
ALT SERPL-CCNC: 15 U/L (ref 5–41)
AMPHETAMINE SCREEN URINE: NEGATIVE
ANION GAP SERPL CALCULATED.3IONS-SCNC: 7 MMOL/L (ref 9–17)
AST SERPL-CCNC: 24 U/L
BARBITURATE SCREEN URINE: NEGATIVE
BASOPHILS # BLD: 1 % (ref 0–2)
BASOPHILS ABSOLUTE: 0.03 K/UL (ref 0–0.2)
BENZODIAZEPINE SCREEN, URINE: NEGATIVE
BILIRUB SERPL-MCNC: 0.17 MG/DL (ref 0.3–1.2)
BUN BLDV-MCNC: 10 MG/DL (ref 6–20)
BUN/CREAT BLD: ABNORMAL (ref 9–20)
BUPRENORPHINE URINE: ABNORMAL
CALCIUM SERPL-MCNC: 9 MG/DL (ref 8.6–10.4)
CANNABINOID SCREEN URINE: POSITIVE
CHLORIDE BLD-SCNC: 105 MMOL/L (ref 98–107)
CO2: 27 MMOL/L (ref 20–31)
COCAINE METABOLITE, URINE: POSITIVE
CREAT SERPL-MCNC: 0.91 MG/DL (ref 0.7–1.2)
DIFFERENTIAL TYPE: ABNORMAL
EOSINOPHILS RELATIVE PERCENT: 4 % (ref 1–4)
ETHANOL PERCENT: <0.01 %
ETHANOL: <10 MG/DL
GFR AFRICAN AMERICAN: >60 ML/MIN
GFR NON-AFRICAN AMERICAN: >60 ML/MIN
GFR SERPL CREATININE-BSD FRML MDRD: ABNORMAL ML/MIN/{1.73_M2}
GFR SERPL CREATININE-BSD FRML MDRD: ABNORMAL ML/MIN/{1.73_M2}
GLUCOSE BLD-MCNC: 90 MG/DL (ref 70–99)
HCT VFR BLD CALC: 37.2 % (ref 40.7–50.3)
HEMOGLOBIN: 11.8 G/DL (ref 13–17)
IMMATURE GRANULOCYTES: 0 %
LYMPHOCYTES # BLD: 34 % (ref 24–43)
MCH RBC QN AUTO: 29.9 PG (ref 25.2–33.5)
MCHC RBC AUTO-ENTMCNC: 31.7 G/DL (ref 28.4–34.8)
MCV RBC AUTO: 94.4 FL (ref 82.6–102.9)
MDMA URINE: ABNORMAL
METHADONE SCREEN, URINE: NEGATIVE
METHAMPHETAMINE, URINE: ABNORMAL
MONOCYTES # BLD: 12 % (ref 3–12)
NRBC AUTOMATED: 0 PER 100 WBC
OPIATES, URINE: NEGATIVE
OXYCODONE SCREEN URINE: NEGATIVE
PDW BLD-RTO: 12 % (ref 11.8–14.4)
PHENCYCLIDINE, URINE: NEGATIVE
PLATELET # BLD: 304 K/UL (ref 138–453)
PLATELET ESTIMATE: ABNORMAL
PMV BLD AUTO: 8.5 FL (ref 8.1–13.5)
POTASSIUM SERPL-SCNC: 4.1 MMOL/L (ref 3.7–5.3)
PROPOXYPHENE, URINE: ABNORMAL
RBC # BLD: 3.94 M/UL (ref 4.21–5.77)
RBC # BLD: ABNORMAL 10*6/UL
SARS-COV-2, RAPID: NOT DETECTED
SEG NEUTROPHILS: 49 % (ref 36–65)
SEGMENTED NEUTROPHILS ABSOLUTE COUNT: 2.99 K/UL (ref 1.5–8.1)
SODIUM BLD-SCNC: 139 MMOL/L (ref 135–144)
SPECIMEN DESCRIPTION: NORMAL
TEST INFORMATION: ABNORMAL
TOTAL PROTEIN: 6.3 G/DL (ref 6.4–8.3)
TRICYCLIC ANTIDEPRESSANTS, UR: ABNORMAL
WBC # BLD: 6 K/UL (ref 3.5–11.3)
WBC # BLD: ABNORMAL 10*3/UL

## 2021-07-31 PROCEDURE — 6370000000 HC RX 637 (ALT 250 FOR IP): Performed by: PSYCHIATRY & NEUROLOGY

## 2021-07-31 PROCEDURE — 6370000000 HC RX 637 (ALT 250 FOR IP): Performed by: STUDENT IN AN ORGANIZED HEALTH CARE EDUCATION/TRAINING PROGRAM

## 2021-07-31 PROCEDURE — 80053 COMPREHEN METABOLIC PANEL: CPT

## 2021-07-31 PROCEDURE — 1240000000 HC EMOTIONAL WELLNESS R&B

## 2021-07-31 PROCEDURE — 80307 DRUG TEST PRSMV CHEM ANLYZR: CPT

## 2021-07-31 PROCEDURE — G0480 DRUG TEST DEF 1-7 CLASSES: HCPCS

## 2021-07-31 PROCEDURE — 99285 EMERGENCY DEPT VISIT HI MDM: CPT

## 2021-07-31 PROCEDURE — 90375 RABIES IG IM/SC: CPT | Performed by: STUDENT IN AN ORGANIZED HEALTH CARE EDUCATION/TRAINING PROGRAM

## 2021-07-31 PROCEDURE — 90471 IMMUNIZATION ADMIN: CPT | Performed by: STUDENT IN AN ORGANIZED HEALTH CARE EDUCATION/TRAINING PROGRAM

## 2021-07-31 PROCEDURE — 90675 RABIES VACCINE IM: CPT | Performed by: STUDENT IN AN ORGANIZED HEALTH CARE EDUCATION/TRAINING PROGRAM

## 2021-07-31 PROCEDURE — 6360000002 HC RX W HCPCS: Performed by: STUDENT IN AN ORGANIZED HEALTH CARE EDUCATION/TRAINING PROGRAM

## 2021-07-31 PROCEDURE — 85025 COMPLETE CBC W/AUTO DIFF WBC: CPT

## 2021-07-31 PROCEDURE — APPSS60 APP SPLIT SHARED TIME 46-60 MINUTES

## 2021-07-31 PROCEDURE — 87635 SARS-COV-2 COVID-19 AMP PRB: CPT

## 2021-07-31 RX ORDER — ACETAMINOPHEN 325 MG/1
650 TABLET ORAL EVERY 4 HOURS PRN
Status: DISCONTINUED | OUTPATIENT
Start: 2021-07-31 | End: 2021-08-04 | Stop reason: HOSPADM

## 2021-07-31 RX ORDER — HYDROXYZINE 50 MG/1
50 TABLET, FILM COATED ORAL 3 TIMES DAILY PRN
Status: DISCONTINUED | OUTPATIENT
Start: 2021-07-31 | End: 2021-08-04 | Stop reason: HOSPADM

## 2021-07-31 RX ORDER — SERTRALINE HYDROCHLORIDE 25 MG/1
25 TABLET, FILM COATED ORAL DAILY
Status: DISCONTINUED | OUTPATIENT
Start: 2021-08-01 | End: 2021-08-01

## 2021-07-31 RX ORDER — MAGNESIUM HYDROXIDE/ALUMINUM HYDROXICE/SIMETHICONE 120; 1200; 1200 MG/30ML; MG/30ML; MG/30ML
30 SUSPENSION ORAL EVERY 6 HOURS PRN
Status: DISCONTINUED | OUTPATIENT
Start: 2021-07-31 | End: 2021-08-04 | Stop reason: HOSPADM

## 2021-07-31 RX ORDER — NICOTINE 21 MG/24HR
1 PATCH, TRANSDERMAL 24 HOURS TRANSDERMAL DAILY
Status: DISCONTINUED | OUTPATIENT
Start: 2021-07-31 | End: 2021-08-04

## 2021-07-31 RX ORDER — IBUPROFEN 400 MG/1
400 TABLET ORAL EVERY 6 HOURS PRN
Status: DISCONTINUED | OUTPATIENT
Start: 2021-07-31 | End: 2021-08-04 | Stop reason: HOSPADM

## 2021-07-31 RX ORDER — TRAZODONE HYDROCHLORIDE 50 MG/1
50 TABLET ORAL NIGHTLY PRN
Status: DISCONTINUED | OUTPATIENT
Start: 2021-07-31 | End: 2021-08-02

## 2021-07-31 RX ORDER — POLYETHYLENE GLYCOL 3350 17 G/17G
17 POWDER, FOR SOLUTION ORAL DAILY PRN
Status: DISCONTINUED | OUTPATIENT
Start: 2021-07-31 | End: 2021-08-04 | Stop reason: HOSPADM

## 2021-07-31 RX ORDER — AMOXICILLIN AND CLAVULANATE POTASSIUM 875; 125 MG/1; MG/1
1 TABLET, FILM COATED ORAL EVERY 12 HOURS SCHEDULED
Status: DISCONTINUED | OUTPATIENT
Start: 2021-07-31 | End: 2021-07-31 | Stop reason: HOSPADM

## 2021-07-31 RX ORDER — AMOXICILLIN AND CLAVULANATE POTASSIUM 875; 125 MG/1; MG/1
1 TABLET, FILM COATED ORAL 2 TIMES DAILY
Qty: 14 TABLET | Refills: 0 | Status: ON HOLD | OUTPATIENT
Start: 2021-07-31 | End: 2021-08-04 | Stop reason: HOSPADM

## 2021-07-31 RX ADMIN — TRAZODONE HYDROCHLORIDE 50 MG: 50 TABLET ORAL at 21:06

## 2021-07-31 RX ADMIN — RABIES IMMUNE GLOBULIN (HUMAN) 1650 UNITS: 300 INJECTION, SOLUTION INFILTRATION; INTRAMUSCULAR at 15:15

## 2021-07-31 RX ADMIN — AMOXICILLIN AND CLAVULANATE POTASSIUM 1 TABLET: 875; 125 TABLET, FILM COATED ORAL at 10:18

## 2021-07-31 RX ADMIN — HYDROXYZINE HYDROCHLORIDE 50 MG: 50 TABLET, FILM COATED ORAL at 21:06

## 2021-07-31 RX ADMIN — IBUPROFEN 400 MG: 400 TABLET ORAL at 21:18

## 2021-07-31 RX ADMIN — RABIES VACCINE 1 ML: KIT at 15:12

## 2021-07-31 ASSESSMENT — ENCOUNTER SYMPTOMS
SHORTNESS OF BREATH: 0
COUGH: 0
DIARRHEA: 0
ABDOMINAL PAIN: 0
NAUSEA: 0
RHINORRHEA: 0
BACK PAIN: 0
CONSTIPATION: 0
VOMITING: 0

## 2021-07-31 ASSESSMENT — SLEEP AND FATIGUE QUESTIONNAIRES
AVERAGE NUMBER OF SLEEP HOURS: 2
DIFFICULTY FALLING ASLEEP: YES
DIFFICULTY ARISING: NO
DO YOU HAVE DIFFICULTY SLEEPING: YES
DO YOU USE A SLEEP AID: NO
SLEEP PATTERN: DIFFICULTY FALLING ASLEEP;DISTURBED/INTERRUPTED SLEEP;INSOMNIA
RESTFUL SLEEP: NO
DIFFICULTY STAYING ASLEEP: YES

## 2021-07-31 ASSESSMENT — PAIN SCALES - GENERAL
PAINLEVEL_OUTOF10: 4
PAINLEVEL_OUTOF10: 1
PAINLEVEL_OUTOF10: 5

## 2021-07-31 ASSESSMENT — LIFESTYLE VARIABLES: HISTORY_ALCOHOL_USE: NO

## 2021-07-31 ASSESSMENT — PAIN - FUNCTIONAL ASSESSMENT: PAIN_FUNCTIONAL_ASSESSMENT: ACTIVITIES ARE NOT PREVENTED

## 2021-07-31 ASSESSMENT — PAIN DESCRIPTION - DESCRIPTORS: DESCRIPTORS: SORE

## 2021-07-31 ASSESSMENT — PAIN DESCRIPTION - FREQUENCY: FREQUENCY: INTERMITTENT

## 2021-07-31 ASSESSMENT — PAIN DESCRIPTION - LOCATION: LOCATION: LEG

## 2021-07-31 ASSESSMENT — PAIN DESCRIPTION - ONSET: ONSET: GRADUAL

## 2021-07-31 ASSESSMENT — PAIN DESCRIPTION - PROGRESSION: CLINICAL_PROGRESSION: GRADUALLY IMPROVING

## 2021-07-31 ASSESSMENT — PATIENT HEALTH QUESTIONNAIRE - PHQ9: SUM OF ALL RESPONSES TO PHQ QUESTIONS 1-9: 18

## 2021-07-31 ASSESSMENT — PAIN DESCRIPTION - PAIN TYPE: TYPE: ACUTE PAIN

## 2021-07-31 NOTE — ED NOTES
Pt to ed, reports he is homeless, has not eaten in two days, reports the past month he has had suicidal ideation with no plan. 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 \" Pt declined any recent cutting. Pt denies HI/auditory or visual hallucinations. Patient is aox4, patient last used crack two days ago, reports he typically uses daily. Pt does not drink alcohol.       Jenn Kelsey RN  07/31/21 5701

## 2021-07-31 NOTE — ED NOTES
Provisional Diagnosis:    Depression with suicidal ideations    Psychosocial and Contextual Factors:     Homeless, no support system    C-SSRS Summary:    Patient: X  Family:  Agency:    Present Suicidal Behavior:    Verbal: ideations with plan to cut self    Attempt: denied    Past Suicidal Behavior:    Verbal: ideations    Attempt: denied    Self-Injurious/Self-Mutilation: reports that he is a cutter      Protective Factors:    Patient has health insurance, resilient     Risk Factors:    Reports increase in depression for past month, history of self harm, substance use, homelessness, no support system. Clinical Summary:    Patient is a 31year old  male that presents to the ED with suicidal ideations with plan to cut self. Patient reports that he has a history of cutting behaviors and would do this in an attempt to harm self. Patient states that his depression has been increasing for the past month and he no longer feels that he wants to live. Patient states that he is homeless with no support system. He states that he has a history of working with [employer] but is not current with treatment or currently taking medications. He reports that he uses crack and last use was two days ago. He denies hallucinations and does not appear to be exhibiting signs of psychosis at this time. Patient denied legal issues. Level of Care Disposition:    Writer will consult with on call provider once patient is medically cleared.       [address]  07/31/21 9068

## 2021-07-31 NOTE — ED PROVIDER NOTES
[facility] ED  Emergency Department Encounter  Emergency Medicine Resident     Pt Name: Shavon Perry  MRN: [id number]  [place] 1990  Date of evaluation: 7/31/21  PCP:  Shauna Shane       Chief Complaint   Patient presents with    Suicidal       HISTORY OFPRESENT ILLNESS  (Location/Symptom, Timing/Onset, Context/Setting, Quality, Duration, Modifying Factors,Severity.)      Shavon Perry is a 31 y.o. male who presents with suicidal thoughts. Patient states he has been depressed for over a month and it has been increasing recently. Depression worsened to the point where he thought of harming himself this morning. Patient plan to cut himself but did not induce any self injury. He denies any homicidal ideations. No auditory or visual hallucinations. [facility], he was supposed to be taking Norvasc, trazodone for sleep, and Zoloft for depression. He did not like the way Zoloft made him feel and has not taken any meds in a few months now. Patient is homeless, denies alcohol use, does use crack with last use 2 days ago. Tobacco use is approximately 10 cigarettes/day. PAST MEDICAL / SURGICAL / SOCIAL / FAMILY HISTORY      has a past medical history of Alcoholism ([facility] Anxiety, Bipolar 1 disorder ([facility] Cocaine abuse ([facility] Depression, Hypertension, Irregular heartbeat, Mild tetrahydrocannabinol (THC) abuse, and Suicidal ideation. has a past surgical history that includes other surgical history (Right, 06/04/2017); Dialysis fistula creation (Right, 6/4/2017); EXPLORATION OF WOUND OF EXTREMITY (Right, 6/4/2017); and Femur Surgery (Left).      Social History     Socioeconomic History    Marital status: Single     Spouse name: magalys    Number of children: 1    Years of education: Not on file    Highest education level: Not on file   Occupational History    Occupation: unemployed     Comment: used to work at [employer] 6 months medications    Medication Sig Start Date End Date Taking? Authorizing Provider   amoxicillin-clavulanate (AUGMENTIN) 875-125 MG per tablet Take 1 tablet by mouth 2 times daily for 7 days 7/31/21 8/7/21 Yes Yancy Reagan,    sertraline (ZOLOFT) 50 MG tablet Take 1 tablet by mouth daily 6/21/20   [facility] Je, APRN - CNP   traZODone (DESYREL) 100 MG tablet Take 1 tablet by mouth nightly as needed for Sleep 6/21/20   [facility] Coffee, APRN - CNP   amLODIPine (NORVASC) 5 MG tablet Take 1 tablet by mouth daily 6/21/20   [facility] Coffee, APRN - CNP       REVIEW OFSYSTEMS    (2-9 systems for level 4, 10 or more for level 5)      Review of Systems   Constitutional: Negative for chills and fever. HENT: Negative for congestion and rhinorrhea. Eyes: Negative for visual disturbance. Respiratory: Negative for cough and shortness of breath. Cardiovascular: Negative for chest pain. Gastrointestinal: Negative for abdominal pain, constipation, diarrhea, nausea and vomiting. Genitourinary: Negative for dysuria and frequency. Musculoskeletal: Negative for back pain and neck pain. Skin: Positive for wound (dog bite b/l lower extrem). Negative for rash. Neurological: Negative for weakness, numbness and headaches. Psychiatric/Behavioral: Positive for suicidal ideas. Negative for agitation, hallucinations and self-injury. Increased depression       PHYSICAL EXAM   (up to 7 for level 4, 8 or more forlevel 5)      INITIAL VITALS:   ED Triage Vitals [07/31/21 0922]   BP Temp Temp Source Pulse Resp SpO2 Height Weight   (!) 146/81 97.2 °F (36.2 °C) Oral 79 16 96 % -- --       Physical Exam  Constitutional:       General: He is not in acute distress. Appearance: Normal appearance. He is not ill-appearing, toxic-appearing or diaphoretic. HENT:      Head: Normocephalic and atraumatic. Mouth/Throat:      Mouth: Mucous membranes are moist.      Pharynx: Oropharynx is clear.    Eyes:      Extraocular Movements: Extraocular movements intact. Cardiovascular:      Rate and Rhythm: Normal rate and regular rhythm. Heart sounds: Normal heart sounds. No murmur heard. Pulmonary:      Effort: Pulmonary effort is normal. No respiratory distress. Breath sounds: Normal breath sounds. No wheezing or rhonchi. Abdominal:      Palpations: Abdomen is soft. Tenderness: There is no abdominal tenderness. Musculoskeletal:         General: Normal range of motion. Cervical back: Normal range of motion and neck supple. Skin:     General: Skin is warm and dry. Comments: Small puncture wounds bilateral lower extremities from dog bites. Minimal swelling surrounding these areas, no obvious erythema. These areas are tender to palpation. Neurological:      General: No focal deficit present. Mental Status: He is alert and oriented to person, place, and time. DIFFERENTIAL  DIAGNOSIS     PLAN (LABS / IMAGING / EKG):  Orders Placed This Encounter   Procedures    COVID-19, Rapid    CBC Auto Differential    Comprehensive Metabolic Panel w/ Reflex to MG    ETHANOL    Urine Drug Screen       MEDICATIONS ORDERED:  Orders Placed This Encounter   Medications    amoxicillin-clavulanate (AUGMENTIN) 875-125 MG per tablet 1 tablet     Order Specific Question:   Antimicrobial Indications     Answer:   Skin and Soft Tissue Infection    rabies vaccine, PCEC (RABAVERT) injection 1 mL    DISCONTD: rabies immune globulin (HYPERRAB) injection 1,725 Units    rabies immune globulin (HYPERRAB) 300 UNIT/ML injection 1,650 Units    amoxicillin-clavulanate (AUGMENTIN) 875-125 MG per tablet     Sig: Take 1 tablet by mouth 2 times daily for 7 days     Dispense:  14 tablet     Refill:  0     Initial MDM/Plan/ED COURSE:    31 y.o. male who presents with depression and suicidal ideas.   Patient had thoughts of cutting himself this morning and has done this in the past.  On exam, patient appears depressed but is otherwise resting comfortably in bed. Heart is regular rate and rhythm and lungs clear to auscultation bilaterally. Vitals are stable. He has no abdominal tenderness on exam.  He was bit by a pit bull on bilateral lower extremities yesterday. He did not seek care after this bite, but did wash the wounds out and applied bandages. No significant erythema or swelling around these bite marks. Given patient's suicidal ideations and plans to harm himself, will talk with social work and pursue possible BHI placement. Labs ordered for this placement. Augmentin also ordered for dog bites as patient has not been evaluated for this. ED Course as of Jul 31 1424   Sat Jul 31, 2021   1218 Work-up notable for positive cocaine and cannabinoids in the urine. Otherwise no leukocytosis or other significant findings on labs. [JS]   [id number] with health department regarding dog bite as the dog was unknown to be vaccinated or who the owner is, and unable to be detained. They will get a hold of the specialist for dog bites and call us back regarding possible need for rabies vaccine. Patient otherwise medically cleared for BHI. [JS]   104 Tyrese Gonzalez followed up and recommended rabies PEP. Rabies immunoglobulin and vaccine were ordered. Nurse to administer first doses here. Further vaccine doses should be given on day 3, day 7, and day 14. Patient will need to follow-up for this. Patient otherwise medically cleared for BHI.  spoke with staff at [facility] and they confirmed that they would be able to get this vaccine if patient is still present there. [JS]      ED Course User Index  [JS] Shi Gold, DO     Patient medically cleared for BHI. Prescription written for augmentin for dog bite and should be continued.       DIAGNOSTIC RESULTS / EMERGENCYDEPARTMENT COURSE / MDM     LABS:  Labs Reviewed   CBC WITH AUTO DIFFERENTIAL - Abnormal; Notable for the following components:       Result Value    RBC 3.94 (*)     Hemoglobin 11.8 (*)     Hematocrit 37.2 (*)     All other components within normal limits   COMPREHENSIVE METABOLIC PANEL W/ REFLEX TO MG FOR LOW K - Abnormal; Notable for the following components:    Anion Gap 7 (*)     Total Bilirubin 0.17 (*)     Total Protein 6.3 (*)     All other components within normal limits   URINE DRUG SCREEN - Abnormal; Notable for the following components:    Cocaine Metabolite, Urine POSITIVE (*)     Cannabinoid Scrn, Ur POSITIVE (*)     All other components within normal limits   COVID-19, RAPID   ETHANOL           No results found. EKG      All EKG's are interpreted by the Emergency Department Physicianwho either signs or Co-signs this chart in the absence of a cardiologist.      PROCEDURES:  None    CONSULTS:  None    CRITICAL CARE:  Please see attending note    FINAL IMPRESSION      1. Suicidal ideation    2. Dog bite, initial encounter          DISPOSITION / PLAN     DISPOSITION Decision To Transfer 07/31/2021 09:56:59 AM      PATIENT REFERRED TO:  No follow-up provider specified.     DISCHARGE MEDICATIONS:  New Prescriptions    AMOXICILLIN-CLAVULANATE (AUGMENTIN) 875-125 MG PER TABLET    Take 1 tablet by mouth 2 times daily for 7 days       Maryann Crabtree DO  Emergency Medicine Resident    (Please note that portions of this note were completed with a voice recognition program.Efforts were made to edit the dictations but occasionally words are mis-transcribed.)       Maryann Crabtree DO  Resident  07/31/21 1186

## 2021-07-31 NOTE — ED PROVIDER NOTES
[facility]     Emergency Department     Faculty Attestation    I performed a history and physical examination of the patient and discussed management with the resident. I have reviewed and agree with the residents findings including all diagnostic interpretations, and treatment plans as written at the time of my review. Any areas of disagreement are noted on the chart. I was personally present for the key portions of any procedures. I have documented in the chart those procedures where I was not present during the key portions. For Physician Assistant/ Nurse Practitioner cases/documentation I have personally evaluated this patient and have completed at least one if not all key elements of the E/M (history, physical exam, and MDM). Additional findings are as noted. This patient was evaluated in the Emergency Department for symptoms described in the history of present illness. The patient was evaluated in the context of the global COVID-19 pandemic, which necessitated consideration that the patient might be at risk for infection with the SARS-CoV-2 virus that causes COVID-19. Institutional protocols and algorithms that pertain to the evaluation of patients at risk for COVID-19 are in a state of rapid change based on information released by regulatory bodies including the CDC and federal and state organizations. These policies and algorithms were followed during the patient's care in the ED. Primary Care Physician: GENERIC HIGH    History: This is a 31 y.o. male who presents to the Emergency Department with complaint of suicidal thoughts. The patient presents emergency room with complaints of suicidal thoughts. Has been noncompliant on his medications for least the last month. He denies any alcohol or drugs today. Physical:   oral temperature is 97.2 °F (36.2 °C). His blood pressure is 146/81 (abnormal) and his pulse is 79.  His respiration is 16 and oxygen saturation is 96%. Awake alert moves all extremities abdomen is soft nontender    Impression: Suicidal ideation    Plan: Patient medically stable at this time. Psychiatric consultation will be placed. (Please note that portions of this note were completed with a voice recognition program.  Efforts were made to edit the dictations but occasionally words are mis-transcribed.)    Darlene Araujo.  Aditya Menjivar MD, [facility]  Attending Emergency Medicine Physician        Santhosh Kilpatrick MD  07/31/21 5829

## 2021-07-31 NOTE — ED NOTES
Reviewed pt case with on call psychiatrist who finds that pt meets criteria for inpatient psychiatric admission. Pt to be admitted to the [facility] under the care of Dr. Gregory Cuevas with a diagnosis of depression with suicidal ideations.       DANTE Sanchez  07/31/21 8543

## 2021-07-31 NOTE — BH NOTE
[facility]  Admission Note     Admission Type:   Admission Type: Voluntary    Reason for admission:  Reason for Admission: SI with plan to cut himself. Been off medications for a few months. PATIENT STRENGTHS:  Strengths: Communication, No significant Physical Illness, Social Skills    Patient Strengths and Limitations:  Limitations: Inappropriate/potentially harmful leisure interests    Addictive Behavior:   Addictive Behavior  In the past 3 months, have you felt or has someone told you that you have a problem with:  : None  Do you have a history of Chemical Use?: No  Do you have a history of Alcohol Use?: No  Do you have a history of Street Drug Abuse?: Yes  Histroy of Prescripton Drug Abuse?: No    Medical Problems:   Past Medical History:   Diagnosis Date    [facility] ([facility]     Anxiety     [facility]     Cocaine abuse ([facility]     Depression     Hypertension     Irregular heartbeat     Mild tetrahydrocannabinol (THC) abuse     Suicidal ideation        Status EXAM:  Status and Exam  Normal: No  Facial Expression: Flat  Affect: Appropriate  Level of Consciousness: Alert  Mood:Normal: No  Mood: Depressed, Anxious  Motor Activity:Normal: No  Motor Activity: Decreased  Interview Behavior: Cooperative  Preception: [place] to Person, Pearla Ohms to Time, [place] to Place, [place] to Situation  Attention:Normal: No  Attention: Distractible  Thought Processes: Circumstantial  Thought Content:Normal: No  Thought Content: Preoccupations  Hallucinations: None  Delusions: No  Memory:Normal: Yes  Insight and Judgment: No  Insight and Judgment: Poor Judgment, Poor Insight, Unmotivated  Present Suicidal Ideation: Yes  Present Homicidal Ideation: No    Tobacco Screening:  Practical Counseling, on admission, rachna X, if applicable and completed (first 3 are required if patient doesn't refuse):             (x )  Recognizing danger situations (included triggers and roadblocks)                    (x )

## 2021-07-31 NOTE — ED NOTES
[] Pasquale    [] [address]    [x]  [address] PRECAUTION CHECKLIST    Orders    [x]  Suicide/Security Watch Precautions initiated as checked below:   7/31/21 9:26 AM EDT BH31/BH31A    [x] Notified physician:  Hellen Leblanc MD  7/31/21 9:26 AM EDT    [x] Orders obtained as appropriate:     [x] 1:1 Observer     [] Psych Consult     [] Psych Consult    Name:  Date:  Time:    [x] 1:1 Observer, Notifed by:  Feliciana Dubin, RN    Contact Nurse Supervisor    [x] Remove all personal clothes from room and place in snap/paper gown/pants. Slipper only    [x] Remove all personal belongings from room and secured away from patient. Documentation    [x] Initiate Suicide/Security Watch Precaution Flow Sheet    [x] Initiate individualized Care Plan/Problem    [x] Document why precautions initiated on flow sheet (Initiate Nursing Care Plan/Problem)    [x] 1:1 Observer in place; instructions provided. Suicide precautions require observer be within arms length. [x] Nurse-Observer Communication Hand-off initiated by RN, reviewed with Observer. Subsequently used as Hand Off between Observers. [x] Initiate every 15 minute observations per observer as delegated by the RN.     [x] Initiate RN assessment and documentation    Environmental Scan  Search Criteria and Process: OPTIONAL, see Search Policy    [] Reason for search:    [] Nursing in presence of second person to search patient    [] Patient notified of reason for body assessment and belongings search:     Persons present during search:   Results of search and disposition:       Searchers Name: security    These items or items similar should be removed from the room:   [] Chairs   [x] Telephone   [x] Trash cans and liners   [x] Plastic utensils (order Patient Safety tray)   [x] Empty or remove Sharps containers   [x] All personal clothing/belongings removed   [x] All unnecessary lead wires, electrical cords, draw cords, etc.   [x] Flowers and plants   [x] Double check for lighters, matches, razors, any glass items etc that can be used as weapons. Person completing Checklist: Yovany Hollingsworth RN       GENERAL INFORMATION     Y  N     [x] [] Has the patient been informed that they are on a watch and what that means? [x] [] Can the patient get out of Bed without nursing assistance? [x] [] Can the patient use the restroom without nursing assistance? [x] [] Can the patient walk the halls to [place] their legs? \"   [] [x] Does the patient have metal utensils? [x] [] Have the patient's belongings been placed out of control of the patient? [x] [] Have the patient and his/her belongings been checked for contraband? [] [x] Is the patient under any visitor restrictions? If Yes, explain:   [] [x] Is the patient under an alias? Alias Name:   Authorized visitors (no more than two are to be on the list)   Name/Relationship:   Name/Relationship:    Name of Staff member that you  Received this information from?: security    General Description:    HOLLY Mao [address] male 31 y.o. Admission weight:      Race: []  [x] Black  []   []   [] [place] [] Other  Facial Hair:  [x] Yes  [] No  If yes, please describe:   Identifying Marks (i.e. Visible tattoos, scars, etc...):      Yovany Hollingsworth RN  07/31/21 3527

## 2021-07-31 NOTE — BH NOTE
1:1 discussion/interaction with the patient which addressed the following 1) Recognizing danger situations (alcohol use during first month, being around smoke/other smokers or times/situations when the patient routinely smoked or other triggers). 2) Developing coping skills (managing stress, exercising, relaxation breathing, changing routines & distraction techniques to prevent tobacco use).  3) Basic information provided on quitting (benefits of quitting tobacco, how to quit techniques, & available resources to support quitting  including discussion regarding Quitline Referral [phone])

## 2021-07-31 NOTE — ED NOTES
Pt reports being bit by friends dog, unsure of vaccination status of dog. Unsure of all the info for the friend such as address. Pt provided dog bite form to fill out. Patient has puncture wounds, no bleeding, no drainage to bilateral shins.       Radha Weir RN  07/31/21 4917

## 2021-08-01 PROCEDURE — 99223 1ST HOSP IP/OBS HIGH 75: CPT | Performed by: INTERNAL MEDICINE

## 2021-08-01 PROCEDURE — 1240000000 HC EMOTIONAL WELLNESS R&B

## 2021-08-01 PROCEDURE — 6370000000 HC RX 637 (ALT 250 FOR IP): Performed by: INTERNAL MEDICINE

## 2021-08-01 PROCEDURE — 6370000000 HC RX 637 (ALT 250 FOR IP): Performed by: PSYCHIATRY & NEUROLOGY

## 2021-08-01 PROCEDURE — 6370000000 HC RX 637 (ALT 250 FOR IP)

## 2021-08-01 PROCEDURE — 99222 1ST HOSP IP/OBS MODERATE 55: CPT | Performed by: PSYCHIATRY & NEUROLOGY

## 2021-08-01 RX ORDER — VENLAFAXINE HYDROCHLORIDE 37.5 MG/1
37.5 CAPSULE, EXTENDED RELEASE ORAL
Status: DISCONTINUED | OUTPATIENT
Start: 2021-08-02 | End: 2021-08-04 | Stop reason: HOSPADM

## 2021-08-01 RX ORDER — CLINDAMYCIN HYDROCHLORIDE 150 MG/1
300 CAPSULE ORAL EVERY 8 HOURS SCHEDULED
Status: DISCONTINUED | OUTPATIENT
Start: 2021-08-01 | End: 2021-08-04 | Stop reason: HOSPADM

## 2021-08-01 RX ADMIN — CLINDAMYCIN HYDROCHLORIDE 300 MG: 150 CAPSULE ORAL at 14:30

## 2021-08-01 RX ADMIN — IBUPROFEN 400 MG: 400 TABLET ORAL at 20:34

## 2021-08-01 RX ADMIN — SERTRALINE HYDROCHLORIDE 25 MG: 25 TABLET ORAL at 08:30

## 2021-08-01 RX ADMIN — TRAZODONE HYDROCHLORIDE 50 MG: 50 TABLET ORAL at 20:34

## 2021-08-01 RX ADMIN — HYDROXYZINE HYDROCHLORIDE 50 MG: 50 TABLET, FILM COATED ORAL at 20:34

## 2021-08-01 RX ADMIN — CLINDAMYCIN HYDROCHLORIDE 300 MG: 150 CAPSULE ORAL at 21:57

## 2021-08-01 ASSESSMENT — PAIN DESCRIPTION - LOCATION: LOCATION: LEG

## 2021-08-01 ASSESSMENT — PAIN SCALES - GENERAL
PAINLEVEL_OUTOF10: 1
PAINLEVEL_OUTOF10: 4

## 2021-08-01 ASSESSMENT — PAIN DESCRIPTION - PAIN TYPE: TYPE: ACUTE PAIN

## 2021-08-01 NOTE — PLAN OF CARE
[facility]  Initial Interdisciplinary Treatment Plan NO      Original treatment plan Date & Time: 8/1/2021  0753    Admission Type:  Admission Type: Voluntary    Reason for admission:   Reason for Admission: SI with plan to cut himself. Been off medications for a few months.     Estimated Length of Stay:  5-7days  Estimated Discharge Date: to be determined by physician    PATIENT STRENGTHS:  Patient Strengths:Strengths: Communication, No significant Physical Illness, Social Skills  Patient Strengths and Limitations:Limitations: Inappropriate/potentially harmful leisure interests  Addictive Behavior: Addictive Behavior  In the past 3 months, have you felt or has someone told you that you have a problem with:  : None  Do you have a history of Chemical Use?: No  Do you have a history of Alcohol Use?: No  Do you have a history of Street Drug Abuse?: Yes  Histroy of Prescripton Drug Abuse?: No  Medical Problems:  Past Medical History:   Diagnosis Date    [facility]     Anxiety     Bipolar 1 disorder ([facility]     Cocaine abuse ([facility]     Depression     Hypertension     Irregular heartbeat     Mild tetrahydrocannabinol (THC) abuse     Suicidal ideation      Status EXAM:Status and Exam  Normal: No  Facial Expression: Avoids Gaze, Flat  Affect: Appropriate, Blunt  Level of Consciousness: Alert  Mood:Normal: No  Mood: Anxious  Motor Activity:Normal: No  Motor Activity: Decreased  Interview Behavior: Cooperative, Evasive  Preception: [place] to Person, Cynda Carrier to Time, [place] to Place, [place] to Situation  Attention:Normal: No  Attention: Distractible  Thought Processes: Circumstantial  Thought Content:Normal: No  Thought Content: [place] of Content, Preoccupations  Hallucinations: None  Delusions: No  Memory:Normal: No  Memory: Poor Recent  Insight and Judgment: No  Insight and Judgment: Poor Judgment, Poor Insight  Present Suicidal Ideation: Yes (fleeting; no plan; contracts)  Present Homicidal Ideation: No    EDUCATION:   Learner Progress Toward Treatment Goals: reviewed group plans and strategies for care    Method:group therapy, medication compliance, individualized assessments and care planning    Outcome: needs reinforcement    PATIENT GOALS: to be discussed with patient within 72 hours    PLAN/TREATMENT RECOMMENDATIONS:     continue group therapy , medications compliance, goal setting, individualized assessments and care, continue to monitor pt on unit      SHORT-TERM GOALS:   Time frame for Short-Term Goals: 5-7 days    LONG-TERM GOALS:  Time frame for Long-Term Goals: 6 months  Members Present in Team Meeting: See Signature Sheet    ALBARO Bonner

## 2021-08-01 NOTE — GROUP NOTE
Group Therapy Note    Date: 8/1/2021    Group Start Time: 1000  Group End Time: 1100  Group Topic: Psychoeducation    JEREMIAH FIERRO    Carl Johns    Individual Psycho-education Note:     [facility] CD    Rooms [phone] (15 patients total)    SW meets with client 1:1 to discuss discharge planning as well as other community supportive services social work can assist them with prior to their discharge. Client was pleasant and cooperative during 1:1 clinical intervention. Patient's Goal:  Make an attempt to engage in 1:1 and discuss discharge planning as well as the importance of community mental health follow-up appointments.   SW remind clients to review their safety plan at time of discharge to make sure they have all of the community resources

## 2021-08-01 NOTE — CONSULTS
[facility]    CONSULTATION / HISTORY AND PHYSICAL EXAMINATION            Date:   8/1/2021  Patient name:  Bob Aburto  Date of admission:  7/31/2021  4:53 PM  MRN:   [id number]  Account:  [de-identified]  YOB: 1990  PCP:    Issa POWELL  Room:   0227/0227-01  Code Status:    Full Code    Physician Requesting Consult: Lora Phillips MD    Reason for Consult:  medical management    Chief Complaint:     No chief complaint on file. Dog bite    History Obtained From:     Patient medical record nursing staff    History of Present Illness:     34-year-old gentleman with a history of hypertension history of street drug abuse and depression with bit by a neighbor's dog 2 days ago patient was seen in the emergency room per patient at [address] was discharged on amoxicillin there was some concern of rabies but no diagnosis confirmed with the  unclear  Pt got immuneglobin and rabavert vaccine  July 31    Past Medical History:     Past Medical History:   Diagnosis Date    [facility] [address]     Anxiety     Bipolar 1 disorder ([facility] [address]     Cocaine abuse ([facility] [address]     Depression     Hypertension     Irregular heartbeat     Mild tetrahydrocannabinol (THC) abuse     Suicidal ideation         Past Surgical History:     Past Surgical History:   Procedure Laterality Date    DIALYSIS FISTULA CREATION Right 6/4/2017    RIGHT WRIST WOUND EXPLORATION WITH ARTERIAL REPAIR ULNAR AND RADIAL ARTERIES RIGHT WRIST performed by Valdez Rodriguez MD at [place] [address] Right 6/4/2017    WOUND EXPLORATION AND/OR REVISION performed by Valdez Rodriguez MD at [address] [place] [address] Rd Left     OTHER SURGICAL HISTORY Right 06/04/2017    exp and repair of unlar and radial artery with exp and repair of 12 tendons and 2 nerves        Medications Prior to Admission:     Prior to Admission medications    Medication Sig Start Date End Date Taking? Authorizing Provider   amoxicillin-clavulanate (AUGMENTIN) 875-125 MG per tablet Take 1 tablet by mouth 2 times daily for 7 days 21 Yes Liyah Rojas DO   sertraline (ZOLOFT) 50 MG tablet Take 1 tablet by mouth daily 20   [facility] Coffee, APRN - CNP   traZODone (DESYREL) 100 MG tablet Take 1 tablet by mouth nightly as needed for Sleep 20   [facility] Coffee, APRN - CNP   amLODIPine (NORVASC) 5 MG tablet Take 1 tablet by mouth daily 20   [facility] Coffee, APRN - CNP        Allergies:     Vicodin [hydrocodone-acetaminophen]    Social History:     Tobacco:    reports that he has been smoking cigarettes. He has a 7.00 pack-year smoking history. He has never used smokeless tobacco.  Alcohol:      reports previous alcohol use. Drug Use:  reports previous drug use. Drugs: Marijuana and Cocaine. Family History:     Family History   Problem Relation Age of Onset    Heart Disease Father 52    Asthma Brother     Hypertension Maternal Grandmother     Lung Cancer Maternal Grandmother         Kidney cancer, liver cancer    Stroke Maternal Grandmother     Heart Disease Mother 46         of presumed heart disease in her sleep       Review of Systems:     Positive and Negative as described in HPI. CONSTITUTIONAL:  negative for fevers, chills, sweats, fatigue, weight loss  HEENT:  negative for vision, hearing changes, runny nose, throat pain  RESPIRATORY:  negative for shortness of breath, cough, congestion, wheezing. CARDIOVASCULAR:  negative for chest pain, palpitations.   GASTROINTESTINAL:  negative for nausea, vomiting, diarrhea, constipation, change in bowel habits, abdominal pain   GENITOURINARY:  negative for difficulty of urination, burning with urination, frequency   INTEGUMENT:  negative for rash, skin lesions, easy bruising   HEMATOLOGIC/LYMPHATIC:  negative for swelling/edema   ALLERGIC/IMMUNOLOGIC:  negative for urticaria , itching  ENDOCRINE:  negative increase in drinking, increase in urination, hot or cold intolerance  MUSCULOSKELETAL:  negative joint pains, muscle aches, swelling of joints  Bilateral lower extremity dog bite  NEUROLOGICAL:  negative for headaches, dizziness, lightheadedness, numbness, pain, tingling extremities      Physical Exam:     BP (!) 155/99   Pulse 80   Temp 100 °F (37.8 °C)   Resp 14   Ht 5' 10\" (1.778 m)   Wt 185 lb (83.9 kg)   SpO2 98%   BMI 26.54 kg/m²   Temp (24hrs), Av.8 °F (37.1 °C), Min:97.5 °F (36.4 °C), Max:100 °F (37.8 °C)    No results for input(s): POCGLU in the last 72 hours. No intake or output data in the 24 hours ending 21 1341    General Appearance:  alert, well appearing, and in no acute distress  Mental status: oriented to person, place, and time with normal affect  Head:  normocephalic, atraumatic. Eye: no icterus, redness, pupils equal and reactive, extraocular eye movements intact, conjunctiva clear  Ear: normal external ear, no discharge, hearing intact  Nose:  no drainage noted  Mouth: mucous membranes moist  Neck: supple, no carotid bruits, thyroid not palpable  Lungs: Bilateral equal air entry, clear to ausculation, no wheezing, rales or rhonchi, normal effort  Cardiovascular: normal rate, regular rhythm, no murmur, gallop, rub. Abdomen: Soft, nontender, nondistended, normal bowel sounds, no hepatomegaly or splenomegaly  Neurologic: There are no new focal motor or sensory deficits, normal muscle tone and bulk, no abnormal sensation, normal speech, cranial nerves II through XII grossly intact  Skin: No gross lesions, rashes, bruising or bleeding on exposed skin area  Extremities:  peripheral pulses palpable, no pedal edema or calf pain with palpation  Bilateral lower extremity dog bite noted no laceration mild tenderness no significant cellulitis    Investigations:      Laboratory Testing:  No results found for this or any previous visit (from the past 24 hour(s)).         Consultations:   IP CONSULT TO INTERNAL MEDICINE  Assessment :      Primary Problem  Depression with suicidal ideation    Active Hospital Problems    Diagnosis Date Noted    Depression with suicidal ideation [F32.9, R45.851] 07/31/2021       Plan:     Bilateral lower extremity dog bite mild tenderness no obvious cellulitis  Oral clindamycin 300 -3 times a day for 7  No evidence of rabies in the dog that bit the patient is owned by a neighbor  Pt got rabbies immunglobin   Will need 1 mL of rabies vaccine on day 0 ,3 ,7 and 14     Day 0 received on July 31  Vaccine ordered next dose on 3 aug and then day 7 and 14   Pt will need to followup with pcp for rest of the doses if discharged before completion        Estrellita Benitez MD  8/1/2021  1:41 PM    Copy sent to Dr. Shayna Mathur    Please note that this chart was generated using voice recognition Dragon dictation software. Although every effort was made to ensure the accuracy of this automated transcription, some errors in transcription may have occurred.

## 2021-08-01 NOTE — BH NOTE
Mathieu Beltran notified of first dose rabbies vaccine given at [facility].  Per discharge instructions from [facility] day 0 is July 1st with following doses due day 3, August 3rd, day 7 August 7th and day 14 August 14th

## 2021-08-01 NOTE — GROUP NOTE
Group Therapy Note    Date: 8/1/2021    Group Start Time: 1100  Group End Time: 1064  Group Topic: Cognitive Skills    STCZ TRACI Moran, CTRS    Pt did not attend 1100 cogntiive skills group d/t resting in room despite staff invitation to attend. 1:1 talk time offered as alternative to group session, pt declined.             Signature:  Chadd Mccray

## 2021-08-01 NOTE — BH NOTE
BHI Biopsychosocial Assessment     Current Level of Psychosocial Functioning      Independent   Dependent  X  Minimal Assist      Comments:  Client has a provisional diagnosis of Major Depressive Disorder, severe; and, prior diagnosis of Stimulant-use Disorder, cocaine type; Alcohol-use Disorder, chronic; Generalized Anxiety Disorder, chronic     Psychosocial High Risk Factors (check all that apply)     Unable to obtain meds   Chronic illness/pain    Substance abuse X  Lack of Family Support X  Financial stress X  Isolation   Inadequate Community Resources X  Suicide attempt(s) X  Not taking medications X  Victim of crime    Developmental Delay  Unable to manage personal needs    Age 72 or older   Homeless    No transportation   Readmission within 30 days    Unemployment X  Traumatic Event X     Psychiatric Advanced Directives: Nothing reported      Family to Involve in Treatment: Lack of family support; lists domestic partner Marcelo Leonard as emergency contact      Sexual Orientation:  Client reports in a relationship     Patient Strengths:  [employer]; supportive partner      Patient Barriers:  Unemployed and no income; homeless; substance abuse; not compliant with community mental health treatment      Opiate/AOD Referral and/or Education Provided:  Client has a long history of drug and alcohol abuse. Client tox screen positive for alcohol, marijuana and cocaine     CMHC/mental health history:  [facility] Care: Medication management, group/individual therapies, family meetings, psycho -education, treatment team meetings to assist with stabilization, referral to community resources. Safety Plan:  Writer provides and explains safety plan to client and advises client to complete it and return to staff.      Initial Discharge Plan:  Stabilize mood and medications; return to apartment on face-sheet; [employer]; follow-up appointment with [facility]      Clinical

## 2021-08-01 NOTE — H&P
Department of Psychiatry  Attending Physician Psychiatric Assessment     Reason for Admission to Psychiatric Unit:    · Threat to self requiring 24 hour professional observation  · A mental disorder causing major disability in social, interpersonal, occupational, and/or educational functioning that is leading to dangerous or life-threatening functioning, and that can only be addressed in an acute inpatient setting   · Concerns about patient's safety in the community    CHIEF COMPLAINT:  Depression with suicidal ideation    History obtained from:  patient, electronic medical record and family members    HISTORY OF PRESENT ILLNESS:    Cristian Whitmore is a 31 y.o. male with significant past medical history of depression who presented to the ED with suicidal ideation. Per ED report, \"Patient is a 31year old [facility] American male that presents to the ED with suicidal ideations with plan to cut self. Patient reports that he has a history of cutting behaviors and would do this in an attempt to harm self. Patient states that his depression has been increasing for the past month and he no longer feels that he wants to live. Patient states that he is homeless with no support system. He states that he has a history of working with [employer] but is not current with treatment or currently taking medications. He reports that he uses crack and last use was two days ago. He denies hallucinations and does not appear to be exhibiting signs of psychosis at this time. Patient denied legal issues. \"    Per nursing staff, patient has been behavior controlled, isolative to room, and sleeping most of the shift. Today, NP had a brief conversation with patient where he endorsed increasing suicidal ideations over the last month. Patient was declining to speak with NP in his room and questions to patient are received with muteness. Patient did endorse worsening depression to writer, however declined to answer questions. History     Substance and Sexual Activity   Drug Use Not Currently    Types: Marijuana, Cocaine     Crack, THC    LEGAL HISTORY:   HISTORY OF INCARCERATION: patient declined to answer     Family History:       Problem Relation Age of Onset    Heart Disease Father 52    Asthma Brother     Hypertension Maternal Grandmother     Lung Cancer Maternal Grandmother         Kidney cancer, liver cancer    Stroke Maternal Grandmother     Heart Disease Mother 46         of presumed heart disease in her sleep       Psychiatric Family History  Patient declined to answer    PHYSICAL EXAM:  Vitals:  BP (!) 136/109   Pulse 116   Temp 100 °F (37.8 °C)   Resp 14   Ht 5' 10\" (1.778 m)   Wt 185 lb (83.9 kg)   BMI 26.54 kg/m²      Review of Systems   Constitutional: Negative for chills and weight loss. HENT: Negative for ear pain and nosebleeds. Eyes: Negative for blurred vision and photophobia. Respiratory: Negative for cough, shortness of breath and wheezing. Cardiovascular: Negative for chest pain and palpitations. Gastrointestinal: Negative for abdominal pain, diarrhea and vomiting. Genitourinary: Negative for dysuria and urgency. Musculoskeletal: Negative for falls and joint pain. Skin: Negative for itching and rash. Neurological: Negative for tremors, seizures and weakness. Endo/Heme/Allergies: Does not bruise/bleed easily. Physical Exam:      Constitutional:  Appears well-developed and well-nourished, no acute distress  HENT:   Head: Normocephalic and atraumatic. Eyes: Conjunctivae are normal. Right eye exhibits no discharge. Left eye exhibits no discharge. No scleral icterus. Neck: Normal range of motion. Neck supple. Pulmonary/Chest:  No respiratory distress or accessory muscle use, no wheezing. Abdominal: Soft. Exhibits no distension. Musculoskeletal: Normal range of motion. Exhibits no edema.    Neurological: cranial nerves II-XII grossly in tact, normal gait and station  Skin: Skin is warm and dry. Patient is not diaphoretic. No erythema. Mental Status Examination:    Level of consciousness:  within normal limits   Appearance:  Hospital attire, seated on the side of bed, fair grooming   Behavior/Motor: no abnormalities noted  Attitude toward examiner:  Cooperative  Speech: normal rate and volume  Mood:  Depressed  Affect:  blunted  Thought processes:  Goal directed, linear  Thought content: active suicidal ideations without current plan or intent               denies homicidal ideations               Denies hallucinations              denies delusions  Cognition:  Oriented to self, location, time, situation  Concentration clinically adequate  Memory: intact  Insight &Judgment: poor    DSM-5 Diagnosis  Depression with suicidal ideation    Psychosocial and Contextual factors:  Financial  Occupational  Relationship  Legal   Living situation  Educational     Past Medical History:   Diagnosis Date    Alcoholism ([facility] [address]     Anxiety     Bipolar 1 disorder ([facility] [address]     Cocaine abuse ([facility] [address]     Depression     Hypertension     Irregular heartbeat     Mild tetrahydrocannabinol (THC) abuse     Suicidal ideation         TREATMENT PLAN  Start zoloft 25 mg  Attempt to develop insight  Psycho-education conducted  Supportive Therapy conducted  Follow up daily while on inpatient unit  Encourage patient to participate in milieu activities      Risk Management:  close watch per standard protocol      Psychotherapy:  participation in milieu and group and individual sessions with Attending Physician,  and Physician Assistant/CNP      Estimated length of stay:  2-14 days      GENERAL PATIENT/FAMILY EDUCATION  Patient will understand basic signs and symptoms, Patient will understand benefits/risks and potential side effects from proposed meds and Patient will understand their role in recovery. Family is  active in patient's care.    Patient assets that may be helpful during treatment include: Intent to participate and engage in treatment, sufficient fund of knowledge and intellect to understand and utilize treatments. Goals:    1) Remission of suicidal ideation. 2) Stabilization of symptoms prior to discharge. 3) Establish efficacy and tolerability of medications. Behavioral Services  Medicare Certification     Admission Day 1  I certify that this patient's inpatient psychiatric hospital admission is medically necessary for:    x (1) treatment which could reasonably be expected to improve this patient's condition, or    x (2) diagnostic study or its equivalent. Time Spent: 60 minutes     Physicians Signature:  Electronically signed by ROXANNE Cummings CNP on 7/31/21 at 9:52 PM EDT    I independently saw and evaluated the patient. I reviewed the nurse practitioners documentation above. Any additional comments or changes to the nurse practitioners documentation are stated below otherwise agree with assessment. Plan will be as follows:  Patient reports noncompliance with medication of Zoloft secondary to concerns for side effects. Reports worsening mood and depression with suicidal ideation and unable to contract for safety outside of the unit. Discussed risk versus benefits and alternatives and mutually agreed to trial Effexor. Reports able to contract for safety here. Medicine on board for dog bite wounds and management of rabies vaccinations.   Electronically signed by Zeke Richards MD on 8/1/2021 at 1:57 PM

## 2021-08-01 NOTE — PLAN OF CARE
Problem: Altered Mood, Depressive Behavior:  Goal: Able to verbalize acceptance of life and situations over which he or she has no control  Description: Able to verbalize acceptance of life and situations over which he or she has no control  8/1/2021 1021 by Eulalio Elaine LPN  Outcome: Ongoing  Note: Patient is able to verbalize decrease in depression symptoms. Patient has been free of self harm. Q15 minute safety checks continue. Problem: Altered Mood, Depressive Behavior:  Goal: Ability to disclose and discuss suicidal ideas will improve  Description: Ability to disclose and discuss suicidal ideas will improve  8/1/2021 1021 by Eulalio Elaine LPN  Outcome: Ongoing  Note: Patient has been free of self harm and denies thoughts of harming self at this time. Patient has agreed to seek staff if he begins having thoughts of harming self or needs to talk. Q15 minute safety checks continue.

## 2021-08-02 PROCEDURE — 6370000000 HC RX 637 (ALT 250 FOR IP): Performed by: INTERNAL MEDICINE

## 2021-08-02 PROCEDURE — 99231 SBSQ HOSP IP/OBS SF/LOW 25: CPT | Performed by: INTERNAL MEDICINE

## 2021-08-02 PROCEDURE — 6370000000 HC RX 637 (ALT 250 FOR IP): Performed by: PSYCHIATRY & NEUROLOGY

## 2021-08-02 PROCEDURE — APPSS30 APP SPLIT SHARED TIME 16-30 MINUTES: Performed by: NURSE PRACTITIONER

## 2021-08-02 PROCEDURE — 99232 SBSQ HOSP IP/OBS MODERATE 35: CPT | Performed by: PSYCHIATRY & NEUROLOGY

## 2021-08-02 PROCEDURE — 1240000000 HC EMOTIONAL WELLNESS R&B

## 2021-08-02 RX ORDER — TRAZODONE HYDROCHLORIDE 150 MG/1
150 TABLET ORAL NIGHTLY PRN
Status: DISCONTINUED | OUTPATIENT
Start: 2021-08-02 | End: 2021-08-04 | Stop reason: HOSPADM

## 2021-08-02 RX ADMIN — CLINDAMYCIN HYDROCHLORIDE 300 MG: 150 CAPSULE ORAL at 21:53

## 2021-08-02 RX ADMIN — HYDROXYZINE HYDROCHLORIDE 50 MG: 50 TABLET, FILM COATED ORAL at 21:53

## 2021-08-02 RX ADMIN — VENLAFAXINE HYDROCHLORIDE 37.5 MG: 37.5 CAPSULE, EXTENDED RELEASE ORAL at 12:15

## 2021-08-02 RX ADMIN — CLINDAMYCIN HYDROCHLORIDE 300 MG: 150 CAPSULE ORAL at 06:22

## 2021-08-02 RX ADMIN — IBUPROFEN 400 MG: 400 TABLET ORAL at 21:53

## 2021-08-02 RX ADMIN — CLINDAMYCIN HYDROCHLORIDE 300 MG: 150 CAPSULE ORAL at 14:47

## 2021-08-02 RX ADMIN — TRAZODONE HYDROCHLORIDE 150 MG: 150 TABLET ORAL at 21:53

## 2021-08-02 ASSESSMENT — PAIN SCALES - GENERAL: PAINLEVEL_OUTOF10: 4

## 2021-08-02 NOTE — GROUP NOTE
Group Therapy Note    Date: 8/2/2021    Group Start Time: 1440  Group End Time: 1520  Group Topic: Cognitive Skills    JEREMIAH Roy, CTRS        Group Therapy Note    Attendees: 4/12       Pt did not participate in Cognitive Skills Group at 1440 when encouraged by RT due to resting in room and declined to attend . Pt was offered talk time as an alternative to group but declined.          Discipline Responsible: Psychoeducational Specialist        Signature:  Chris Alvarez

## 2021-08-02 NOTE — PROGRESS NOTES
(1.778 m) and weight is 185 lb (83.9 kg). His oral temperature is 98.4 °F (36.9 °C). His blood pressure is 144/95 (abnormal) and his pulse is 72. His respiration is 14 and oxygen saturation is 98%. Labs:   No visits with results within 2 Day(s) from this visit.    Latest known visit with results is:   Admission on 07/31/2021, Discharged on 07/31/2021   Component Date Value Ref Range Status    WBC 07/31/2021 6.0  3.5 - 11.3 k/uL Final    RBC 07/31/2021 3.94* 4.21 - 5.77 m/uL Final    Hemoglobin 07/31/2021 11.8* 13.0 - 17.0 g/dL Final    Hematocrit 07/31/2021 37.2* 40.7 - 50.3 % Final    MCV 07/31/2021 94.4  82.6 - 102.9 fL Final    MCH 07/31/2021 29.9  25.2 - 33.5 pg Final    MCHC 07/31/2021 31.7  28.4 - 34.8 g/dL Final    RDW 07/31/2021 12.0  11.8 - 14.4 % Final    Platelets 48/13/8674 304  138 - 453 k/uL Final    MPV 07/31/2021 8.5  8.1 - 13.5 fL Final    NRBC Automated 07/31/2021 0.0  0.0 per 100 WBC Final    Differential Type 07/31/2021 NOT REPORTED   Final    Seg Neutrophils 07/31/2021 49  36 - 65 % Final    Lymphocytes 07/31/2021 34  24 - 43 % Final    Monocytes 07/31/2021 12  3 - 12 % Final    Eosinophils % 07/31/2021 4  1 - 4 % Final    Basophils 07/31/2021 1  0 - 2 % Final    Immature Granulocytes 07/31/2021 0  0 % Final    Segs Absolute 07/31/2021 2.99  1.50 - 8.10 k/uL Final    Absolute Lymph # 07/31/2021 2.07  1.10 - 3.70 k/uL Final    Absolute Mono # 07/31/2021 0.71  0.10 - 1.20 k/uL Final    Absolute Eos # 07/31/2021 0.23  0.00 - 0.44 k/uL Final    Basophils Absolute 07/31/2021 0.03  0.00 - 0.20 k/uL Final    Absolute Immature Granulocyte 07/31/2021 <0.03  0.00 - 0.30 k/uL Final    WBC Morphology 07/31/2021 NOT REPORTED   Final    RBC Morphology 07/31/2021 NOT REPORTED   Final    Platelet Estimate 63/12/3282 NOT REPORTED   Final    Glucose 07/31/2021 90  70 - 99 mg/dL Final    BUN 07/31/2021 10  6 - 20 mg/dL Final    CREATININE 07/31/2021 0.91  0.70 - 1.20 mg/dL Final    Bun/Cre Ratio 07/31/2021 NOT REPORTED  9 - 20 Final    Calcium 07/31/2021 9.0  8.6 - 10.4 mg/dL Final    Sodium 07/31/2021 139  135 - 144 mmol/L Final    Potassium 07/31/2021 4.1  3.7 - 5.3 mmol/L Final    Chloride 07/31/2021 105  98 - 107 mmol/L Final    CO2 07/31/2021 27  20 - 31 mmol/L Final    Anion Gap 07/31/2021 7* 9 - 17 mmol/L Final    Alkaline Phosphatase 07/31/2021 57  40 - 129 U/L Final    ALT 07/31/2021 15  5 - 41 U/L Final    AST 07/31/2021 24  <40 U/L Final    Total Bilirubin 07/31/2021 0.17* 0.3 - 1.2 mg/dL Final    Total Protein 07/31/2021 6.3* 6.4 - 8.3 g/dL Final    Albumin 07/31/2021 3.9  3.5 - 5.2 g/dL Final    Albumin/Globulin Ratio 07/31/2021 1.6  1.0 - 2.5 Final    GFR Non- 07/31/2021 >60  >60 mL/min Final    GFR  07/31/2021 >60  >60 mL/min Final    GFR Comment 07/31/2021        Final    Comment: Average GFR for 30-36 years old:   80 mL/min/1.73sq m  Chronic Kidney Disease:   <60 mL/min/1.73sq m  Kidney failure:   <15 mL/min/1.73sq m              eGFR calculated using average adult body mass.  Additional eGFR calculator available at:        [email or web address]            GFR Staging 07/31/2021 NOT REPORTED   Final    Ethanol 07/31/2021 <10  <10 mg/dL Final    Ethanol percent 07/31/2021 <0.010  <0.010 % Final    Amphetamine Screen, Ur 07/31/2021 NEGATIVE  NEGATIVE Final    Comment:       (Positive cutoff 1000 ng/mL)                  Barbiturate Screen, Ur 07/31/2021 NEGATIVE  NEGATIVE Final    Comment:       (Positive cutoff 200 ng/mL)                  Benzodiazepine Screen, Urine 07/31/2021 NEGATIVE  NEGATIVE Final    Comment:       (Positive cutoff 200 ng/mL)                  Cocaine Metabolite, Urine 07/31/2021 POSITIVE* NEGATIVE Final    Comment:       (Positive cutoff 300 ng/mL)                  Methadone Screen, Urine 07/31/2021 NEGATIVE  NEGATIVE Final    Comment:       (Positive cutoff 300 ng/mL)  Opiates, Urine 07/31/2021 NEGATIVE  NEGATIVE Final    Comment:       (Positive cutoff 300 ng/mL)                  Phencyclidine, Urine 07/31/2021 NEGATIVE  NEGATIVE Final    Comment:       (Positive cutoff 25 ng/mL)                  Propoxyphene, Urine 07/31/2021 NOT REPORTED  NEGATIVE Final    Cannabinoid Scrn, Ur 07/31/2021 POSITIVE* NEGATIVE Final    Comment:       (Positive cutoff 50 ng/mL)                  Oxycodone Screen, Ur 07/31/2021 NEGATIVE  NEGATIVE Final    Comment:       (Positive cutoff 100 ng/mL)                  Methamphetamine, Urine 07/31/2021 NOT REPORTED  NEGATIVE Final    Tricyclic Antidepressants, Urine 07/31/2021 NOT REPORTED  NEGATIVE Final    MDMA, Urine 07/31/2021 NOT REPORTED  NEGATIVE Final    Buprenorphine Urine 07/31/2021 NOT REPORTED  NEGATIVE Final    Test Information 07/31/2021 Assay provides medical screening only. The absence of expected drug(s) and/or metabolite(s) may indicate diluted or adulterated urine, limitations of testing or timing of collection. Final    Comment: Testing for legal purposes should be confirmed by another method. To request confirmation   of test result, please call the lab within 7 days of sample submission.  Specimen Description 07/31/2021 . NASOPHARYNGEAL SWAB   Final    SARS-CoV-2, Rapid 07/31/2021 Not Detected  Not Detected Final    Comment:       Rapid NAAT:  The specimen is NEGATIVE for SARS-CoV-2, the novel coronavirus associated with   COVID-19. The ID NOW COVID-19 assay is designed to detect the virus that causes COVID-19 in patients   with signs and symptoms of infection who are suspected of COVID-19. An individual without symptoms of COVID-19 and who is not shedding SARS-CoV-2 virus would   expect to have a negative (not detected) result in this assay.   Negative results should be treated as presumptive and, if inconsistent with clinical signs   and symptoms or necessary for patient management,  should be tested with an alternative molecular assay. Negative results do not preclude   SARS-CoV-2 infection and   should not be used as the sole basis for patient management decisions. Fact sheet for Healthcare Providers: Leonard  Fact sheet for Patients: Leonard          Methodology: Isothermal Nucleic Acid Amplification           Reviewed patient's current plan of care and vital signs with nursing staff. Labs reviewed: [x] Yes  Last EKG in EMR reviewed: [x] Yes    Medications  Current Facility-Administered Medications: clindamycin (CLEOCIN) capsule 300 mg, 300 mg, Oral, 3 times per day  venlafaxine (EFFEXOR XR) extended release capsule 37.5 mg, 37.5 mg, Oral, Daily with breakfast  [START ON 8/3/2021] rabies vaccine, PCEC (RABAVERT) injection 1 mL, 1 mL, Intramuscular, Once  acetaminophen (TYLENOL) tablet 650 mg, 650 mg, Oral, Q4H PRN  ibuprofen (ADVIL;MOTRIN) tablet 400 mg, 400 mg, Oral, Q6H PRN  aluminum & magnesium hydroxide-simethicone (MAALOX) 200-200-20 MG/5ML suspension 30 mL, 30 mL, Oral, Q6H PRN  polyethylene glycol (GLYCOLAX) packet 17 g, 17 g, Oral, Daily PRN  traZODone (DESYREL) tablet 50 mg, 50 mg, Oral, Nightly PRN  hydrOXYzine (ATARAX) tablet 50 mg, 50 mg, Oral, TID PRN  nicotine (NICODERM CQ) 14 MG/24HR 1 patch, 1 patch, Transdermal, Daily    ASSESSMENT  Depression with suicidal ideation         PLAN  Patient symptoms are: Remains Unstable  Continue current medication regimen. Monitor need and frequency of PRN medications. Encourage participation in groups and milieu. Attempt to develop insight. Psycho-education conducted. Supportive Therapy conducted. Probable discharge is to be determined by MD.   Follow-up daily while inpatient. Patient continues to be monitored in the inpatient psychiatric facility at [facility] for safety and stabilization.  Patient continues to need, on a daily basis, active treatment furnished directly

## 2021-08-02 NOTE — PLAN OF CARE
[facility]  Day 3 Interdisciplinary Treatment Plan NOTE    Review Date & Time: 8/2/2021                         1300    Admission Type:   Admission Type: Involuntary    Reason for admission:  Reason for Admission: SI no plan  Estimated Length of Stay: 5-7 days  Estimated Discharge Date Update: to be determined by physician    PATIENT STRENGTHS:  Patient Strengths Strengths: Positive Support, No significant Physical Illness  Patient Strengths and Limitations:Limitations: Tendency to isolate self, Lacks leisure interests, Difficulty problem solving/relies on others to help solve problems, Hopeless about future, Multiple barriers to leisure interests, Inappropriate/potentially harmful leisure interests (depression substance abuse anxiety poor coping skills)  Addictive Behavior:Addictive Behavior  In the past 3 months, have you felt or has someone told you that you have a problem with:  : None  Do you have a history of Chemical Use?: No  Do you have a history of Alcohol Use?: No  Do you have a history of Street Drug Abuse?: Yes  Histroy of Prescripton Drug Abuse?: No  Medical Problems:No past medical history on file.     Risk:  Fall RiskTotal: 53  Joao Scale Joao Scale Score: 22  BVC Total: 0  Change in scores no Changes to plan of Care no    Status EXAM:   Status and Exam  Normal: No  Facial Expression: Elevated  Affect: Inappropriate  Level of Consciousness: Alert  Mood:Normal: No  Mood: Depressed, Anxious  Motor Activity:Normal: No  Motor Activity: Decreased  Interview Behavior: Cooperative  Preception: [place] to Person, [place] Merle to Time, [place] to Place, [place] to Situation  Attention:Normal: Yes  Attention: Distractible  Thought Processes: Circumstantial  Thought Content:Normal: Yes  Thought Content: Preoccupations  Hallucinations: None  Delusions: No  Memory:Normal: Yes  Memory: Poor Recent, Confabulation  Insight and Judgment: No  Insight and Judgment: Unmotivated  Present Suicidal Ideation: No  Present Homicidal Ideation: No    Daily Assessment Last Entry:   Daily Sleep (WDL): Within Defined Limits         Patient Currently in Pain: No  Daily Nutrition (WDL): Within Defined Limits    Patient Monitoring:  Frequency of Checks: 4 times per hour, close    Psychiatric Symptoms:   Depression Symptoms  Depression Symptoms: Isolative, Loss of interest  Anxiety Symptoms  Anxiety Symptoms: Generalized  [place] Symptoms  [place] Symptoms: No problems reported or observed. Psychosis Symptoms  Delusion Type: No problems reported or observed.     Suicide Risk CSSR-S:  Have you wished you were dead or wished you could go to sleep and not wake up? : NO  Have you actually had any thoughts of killing yourself? : NO  Have you ever done anything, started to do anything, or prepared to do anything to end your life?: NO  Change in Result              no                  Change in Plan of care           no      EDUCATION:   EDUCATION:   Learner Progress Toward Treatment Goals: Reviewed results and recommendations of this team, Reviewed group plan and strategies, Reviewed signs, symptoms and risk of self harm and violent behavior, Reviewed goals and plan of care    Method:small group, individual verbal education    Outcome:verbalized by patient, but needs reinforcement to obtain goals    PATIENT GOALS:  Short term: Pt did not answer when approached to attend team meeting x2, pt did not develop a short term goal.  Long term:Pt did not develop a long term goal.    PLAN/TREATMENT RECOMMENDATIONS UPDATE: continue with group therapies, increased socialization, continue planning for after discharge goals, continue with medication compliance    SHORT-TERM GOALS UPDATE:   Time frame for Short-Term Goals: 5-7 days    LONG-TERM GOALS UPDATE:   Time frame for Long-Term Goals: 6 months  Members Present in Team Meeting: See Signature Sheet    Christiano Marcelo

## 2021-08-02 NOTE — GROUP NOTE
Group Therapy Note    Date: 8/2/2021    Group Start Time: 1000  Group End Time: 3457  Group Topic: Psychotherapy    [facility]    Brittny Payment        Group Therapy Note       Patient refused to attend psychotherapy group after encouragement from staff. 1:1 talk time offered but refused. Signature:   Brittny Norris

## 2021-08-02 NOTE — GROUP NOTE
Group Therapy Note    Date: 8/2/2021    Group Start Time: 1330  Group End Time: 4025  Group Topic: Cognitive Skills    JEREMIAH James, CTRS        Group Therapy Note    Attendees: 5/14         Pt did not participate in Cognitive Skills Group at 1330 when encouraged by RT due to resting in room and declined to attend . Pt was offered talk time as an alternative to group but declined.          Discipline Responsible: Psychoeducational Specialist        Signature:  Mary Murillo

## 2021-08-02 NOTE — PLAN OF CARE
Problem: Altered Mood, Depressive Behavior:  Goal: Able to verbalize acceptance of life and situations over which he or she has no control  Description: Able to verbalize acceptance of life and situations over which he or she has no control  8/1/2021 2227 by Jenelle Blanco RN  Outcome: Ongoing  Patient expressed his feelings about the recent incident with the dog bite and he understands he had no control over the situation, but he was still very upset and wants to contact police once he gets discharged. Goal: Able to verbalize and/or display a decrease in depressive symptoms  Description: Able to verbalize and/or display a decrease in depressive symptoms  Outcome: Ongoing  Patient still admitting to feeling depressed. The patient is looking forward to getting discharged and getting his situation under control. Patient was irritable at his current situation but was compliant and cooperative with staff. Patient got out of bed and participated in nightly snack. Goal: Ability to disclose and discuss suicidal ideas will improve  Description: Ability to disclose and discuss suicidal ideas will improve  8/1/2021 2227 by Jenelle Blanco RN  Outcome: Ongoing  Patient stated he was having thoughts of wanting to self-harm. Patient stated that he felt safe on the unit and felt safe coming to writer and talking about situation. Patient also stated that he had no specific plan on how he would harm himself but would \"probably cut wrist\". The patient denied having any instrument in room that could cause self-harm and writer looked through all his stuff. Problem: Tobacco Use:  Goal: Inpatient tobacco use cessation counseling participation  Description: Inpatient tobacco use cessation counseling participation  8/1/2021 2227 by Jenelle Blanco RN  Outcome: Ongoing  Patient refused tobacco use cessation counseling.       Problem: Pain:  Goal: Pain level will decrease  Description: Pain level will decrease  Outcome: Ongoing  Patient complained of pain in lower bilateral legs. PO PRN pain medication given and patient was satisfied. Goal: Control of acute pain  Description: Control of acute pain  Outcome: Ongoing  Patient complained of pain in lower bilateral legs. PO PRN pain medication given and patient was satisfied. Goal: Control of chronic pain  Description: Control of chronic pain  Outcome: Ongoing  Patient complained of pain in lower bilateral legs. PO PRN pain medication given and patient was satisfied.

## 2021-08-02 NOTE — CONSULTS
[facility]    CONSULTATION / HISTORY AND PHYSICAL EXAMINATION            Date:   8/2/2021  Patient name:  Daryn Stevens  Date of admission:  7/31/2021  4:53 PM  MRN:   [id number]  Account:  [de-identified]  YOB: 1990  PCP:    Kristyn POWELL  Room:   0227/0227-01  Code Status:    Full Code    Physician Requesting Consult: Roberto Carlos Keys MD    Reason for Consult:  medical management    Chief Complaint:     No chief complaint on file. Dog bite    History Obtained From:     Patient medical record nursing staff    History of Present Illness:     79-year-old gentleman with a history of hypertension history of street drug abuse and depression with bit by a neighbor's dog 2 days ago patient was seen in the emergency room per patient at [facility] was discharged on amoxicillin there was some concern of rabies but no diagnosis confirmed with the  unclear  Pt got immuneglobin and rabavert vaccine  July 31    Past Medical History:     Past Medical History:   Diagnosis Date    [facility] [address]     Anxiety     Bipolar 1 disorder ([facility] [address]     Cocaine abuse ([facility] [address] [facility]     Depression     Hypertension     Irregular heartbeat     Mild tetrahydrocannabinol (THC) abuse     Suicidal ideation         Past Surgical History:     Past Surgical History:   Procedure Laterality Date    DIALYSIS FISTULA CREATION Right 6/4/2017    RIGHT WRIST WOUND EXPLORATION WITH ARTERIAL REPAIR ULNAR AND RADIAL ARTERIES RIGHT WRIST performed by Marval Heimlich, MD at [place] [address] Right 6/4/2017    WOUND EXPLORATION AND/OR REVISION performed by Marval Heimlich, MD at [address] [place] [address] Rd Left     OTHER SURGICAL HISTORY Right 06/04/2017    exp and repair of unlar and radial artery with exp and repair of 12 tendons and 2 nerves        Medications Prior to Admission:     Prior to Admission medications    Medication Sig Start Date End Date Taking? Authorizing Provider   amoxicillin-clavulanate (AUGMENTIN) 875-125 MG per tablet Take 1 tablet by mouth 2 times daily for 7 days 21 Yes Amanda Coleman DO   sertraline (ZOLOFT) 50 MG tablet Take 1 tablet by mouth daily 20   [facility] Coffee, APRN - CNP   traZODone (DESYREL) 100 MG tablet Take 1 tablet by mouth nightly as needed for Sleep 20   [facility] Coffee, APRN - CNP   amLODIPine (NORVASC) 5 MG tablet Take 1 tablet by mouth daily 20   [facility] Coffee, APRN - CNP        Allergies:     Vicodin [hydrocodone-acetaminophen]    Social History:     Tobacco:    reports that he has been smoking cigarettes. He has a 7.00 pack-year smoking history. He has never used smokeless tobacco.  Alcohol:      reports previous alcohol use. Drug Use:  reports previous drug use. Drugs: Marijuana and Cocaine. Family History:     Family History   Problem Relation Age of Onset    Heart Disease Father 52    Asthma Brother     Hypertension Maternal Grandmother     Lung Cancer Maternal Grandmother         Kidney cancer, liver cancer    Stroke Maternal Grandmother     Heart Disease Mother 46         of presumed heart disease in her sleep       Review of Systems:     Positive and Negative as described in HPI. CONSTITUTIONAL:  negative for fevers, chills, sweats, fatigue, weight loss  HEENT:  negative for vision, hearing changes, runny nose, throat pain  RESPIRATORY:  negative for shortness of breath, cough, congestion, wheezing. CARDIOVASCULAR:  negative for chest pain, palpitations.   GASTROINTESTINAL:  negative for nausea, vomiting, diarrhea, constipation, change in bowel habits, abdominal pain   GENITOURINARY:  negative for difficulty of urination, burning with urination, frequency   INTEGUMENT:  negative for rash, skin lesions, easy bruising   HEMATOLOGIC/LYMPHATIC:  negative for swelling/edema   ALLERGIC/IMMUNOLOGIC:  negative for urticaria , itching  ENDOCRINE:  negative increase in drinking, increase in urination, hot or cold intolerance  MUSCULOSKELETAL:  negative joint pains, muscle aches, swelling of joints  Bilateral lower extremity dog bite  NEUROLOGICAL:  negative for headaches, dizziness, lightheadedness, numbness, pain, tingling extremities      Physical Exam:     BP (!) 144/95   Pulse 72   Temp 98.4 °F (36.9 °C) (Oral)   Resp 14   Ht 5' 10\" (1.778 m)   Wt 185 lb (83.9 kg)   SpO2 98%   BMI 26.54 kg/m²   Temp (24hrs), Av.7 °F (37.1 °C), Min:98.4 °F (36.9 °C), Max:99 °F (37.2 °C)    No results for input(s): POCGLU in the last 72 hours. No intake or output data in the 24 hours ending 21 1521    General Appearance:  alert, well appearing, and in no acute distress  Mental status: oriented to person, place, and time with normal affect  Head:  normocephalic, atraumatic. Eye: no icterus, redness, pupils equal and reactive, extraocular eye movements intact, conjunctiva clear  Ear: normal external ear, no discharge, hearing intact  Nose:  no drainage noted  Mouth: mucous membranes moist  Neck: supple, no carotid bruits, thyroid not palpable  Lungs: Bilateral equal air entry, clear to ausculation, no wheezing, rales or rhonchi, normal effort  Cardiovascular: normal rate, regular rhythm, no murmur, gallop, rub. Abdomen: Soft, nontender, nondistended, normal bowel sounds, no hepatomegaly or splenomegaly  Neurologic: There are no new focal motor or sensory deficits, normal muscle tone and bulk, no abnormal sensation, normal speech, cranial nerves II through XII grossly intact  Skin: No gross lesions, rashes, bruising or bleeding on exposed skin area  Extremities:  peripheral pulses palpable, no pedal edema or calf pain with palpation  Bilateral lower extremity dog bite noted no laceration mild tenderness no significant cellulitis    Investigations:      Laboratory Testing:  No results found for this or any previous visit (from the past 24 hour(s)).         Consultations:   IP CONSULT TO INTERNAL MEDICINE  Assessment :      Primary Problem  Depression with suicidal ideation    Active Hospital Problems    Diagnosis Date Noted    Depression with suicidal ideation [F32.9, R45.851] 07/31/2021       Plan:     Bilateral lower extremity dog bite mild tenderness no obvious cellulitis  Oral clindamycin 300 -3 times a day for 7  No evidence of rabies in the dog that bit the patient is owned by a neighbor  Pt got rabbies immunglobin   Will need 1 mL of rabies vaccine on day 0 ,3 ,7 and 14     Day 0 received on July 31  Vaccine ordered next dose on 3 aug and then day 7 and 14   Pt will need to followup with pcp for rest of the doses if discharged before completion  ivon sign off       Margaux Phoenix MD  8/2/2021  3:21 PM    Copy sent to Dr. Sarah Call    Please note that this chart was generated using voice recognition Dragon dictation software. Although every effort was made to ensure the accuracy of this automated transcription, some errors in transcription may have occurred.

## 2021-08-02 NOTE — GROUP NOTE
Group Therapy Note    Date: 8/2/2021    Group Start Time: 1100  Group End Time: 1150  Group Topic: Cognitive Skills    JEREMIAH BHANGELA Xavier, [address]        Group Therapy Note    7/15         Pt did not participate in Cognitive Skills Group at 1100am when encouraged by RT due to resting in room and declined to attend . Pt was offered talk time as an alternative to group but declined.          Discipline Responsible: Psychoeducational Specialist        Signature:  Sobia Orellana

## 2021-08-02 NOTE — PLAN OF CARE
Problem: Altered Mood, Depressive Behavior:  Goal: Able to verbalize acceptance of life and situations over which he or she has no control  Description: Able to verbalize acceptance of life and situations over which he or she has no control  Outcome: Ongoing   Patient states he is nervous about the rabies shot he needs to get but is accepting of it and states he \"know it is necessary its just going to suck\". Problem: Altered Mood, Depressive Behavior:  Goal: Able to verbalize and/or display a decrease in depressive symptoms  Description: Able to verbalize and/or display a decrease in depressive symptoms  Outcome: Ongoing   Patient is isolative to room for most of the day. He is pleasant and cooperative upon approach. Problem: Altered Mood, Depressive Behavior:  Goal: Ability to disclose and discuss suicidal ideas will improve  Description: Ability to disclose and discuss suicidal ideas will improve  Outcome: Ongoing   Patient endorses fleeting SI, and is able to contract for safety on the unit. He states he has no plan on acting, the thoughts just \"come and go\". Patient is fifteen minute safety check.

## 2021-08-03 PROCEDURE — 6370000000 HC RX 637 (ALT 250 FOR IP): Performed by: PSYCHIATRY & NEUROLOGY

## 2021-08-03 PROCEDURE — APPSS30 APP SPLIT SHARED TIME 16-30 MINUTES: Performed by: NURSE PRACTITIONER

## 2021-08-03 PROCEDURE — 6360000002 HC RX W HCPCS: Performed by: INTERNAL MEDICINE

## 2021-08-03 PROCEDURE — 6370000000 HC RX 637 (ALT 250 FOR IP): Performed by: INTERNAL MEDICINE

## 2021-08-03 PROCEDURE — 90471 IMMUNIZATION ADMIN: CPT | Performed by: INTERNAL MEDICINE

## 2021-08-03 PROCEDURE — 1240000000 HC EMOTIONAL WELLNESS R&B

## 2021-08-03 PROCEDURE — 99232 SBSQ HOSP IP/OBS MODERATE 35: CPT | Performed by: PSYCHIATRY & NEUROLOGY

## 2021-08-03 PROCEDURE — 90675 RABIES VACCINE IM: CPT | Performed by: INTERNAL MEDICINE

## 2021-08-03 RX ADMIN — RABIES VACCINE 1 ML: KIT at 10:00

## 2021-08-03 RX ADMIN — CLINDAMYCIN HYDROCHLORIDE 300 MG: 150 CAPSULE ORAL at 06:30

## 2021-08-03 RX ADMIN — VENLAFAXINE HYDROCHLORIDE 37.5 MG: 37.5 CAPSULE, EXTENDED RELEASE ORAL at 09:36

## 2021-08-03 RX ADMIN — CLINDAMYCIN HYDROCHLORIDE 300 MG: 150 CAPSULE ORAL at 20:41

## 2021-08-03 RX ADMIN — CLINDAMYCIN HYDROCHLORIDE 300 MG: 150 CAPSULE ORAL at 14:30

## 2021-08-03 RX ADMIN — HYDROXYZINE HYDROCHLORIDE 50 MG: 50 TABLET, FILM COATED ORAL at 20:41

## 2021-08-03 RX ADMIN — TRAZODONE HYDROCHLORIDE 150 MG: 150 TABLET ORAL at 20:41

## 2021-08-03 ASSESSMENT — PAIN DESCRIPTION - ORIENTATION: ORIENTATION: LEFT

## 2021-08-03 ASSESSMENT — PAIN SCALES - GENERAL: PAINLEVEL_OUTOF10: 7

## 2021-08-03 ASSESSMENT — PAIN DESCRIPTION - LOCATION: LOCATION: LEG

## 2021-08-03 NOTE — GROUP NOTE
Group Therapy Note    Date: 8/3/2021    Group Start Time: 1330  Group End Time: 4553  Group Topic: Cognitive Skills    JEREMIAH Moreland, CTRS        Group Therapy Note    Attendees: 6/18       Pt did not participate in Cognitive Skills Group at 1330 when encouraged by RT due to resting in room, and declined to attend . Pt was offered talk time as an alternative to group but declined.          Discipline Responsible: Psychoeducational Specialist        Signature:  Stefan Fermin

## 2021-08-03 NOTE — PLAN OF CARE
Problem: Altered Mood, Depressive Behavior:  Goal: Able to verbalize acceptance of life and situations over which he or she has no control  Description: Able to verbalize acceptance of life and situations over which he or she has no control  8/2/2021 2113 by Krystyna Harris RN  Outcome: Ongoing  Patient did not talk much about past situations. Will continue to monitor. Goal: Able to verbalize and/or display a decrease in depressive symptoms  Description: Able to verbalize and/or display a decrease in depressive symptoms  8/2/2021 2113 by Krystyna Harris RN  Outcome: Ongoing  Patient stated his depression was a 0/10. Patient was brightened and got out of bed and watched TV in the dayroom. Patient was compliant and cooperative with staff and nightly medications. Goal: Ability to disclose and discuss suicidal ideas will improve  Description: Ability to disclose and discuss suicidal ideas will improve  8/2/2021 2113 by Krystyna Harris RN  Outcome: Ongoing  Patient denied suicidal ideations. Patient monitored every 15 minutes and as needed for safety and environmental checks. Problem: Tobacco Use:  Goal: Inpatient tobacco use cessation counseling participation  Description: Inpatient tobacco use cessation counseling participation  8/2/2021 2113 by Krystyna Harris RN  Outcome: Ongoing  Patient refused tobacco use cessation counseling. Problem: Pain:  Goal: Pain level will decrease  Description: Pain level will decrease  8/2/2021 2113 by Krystyna Harris RN  Outcome: Ongoing  Patient denied pain. Goal: Control of acute pain  Description: Control of acute pain  8/2/2021 2113 by Krystyna Harris RN  Outcome: Ongoing  Patient denied pain. Goal: Control of chronic pain  Description: Control of chronic pain  8/2/2021 2113 by Krystyna Harris RN  Outcome: Ongoing  Patient denied pain.

## 2021-08-03 NOTE — BH NOTE
Pt. Did not attend [address] group. Pt offered 1:1 talk time as an alternative to group. Pt. Declined.

## 2021-08-03 NOTE — PROGRESS NOTES
Daily Progress Note  8/3/2021    Patient Name: Akbar Fabian    CHIEF COMPLAINT:  Depression with suicidal ideation         SUBJECTIVE:      Patient is seen today for a follow up assessment. Patient has been medication compliant. Patient is seen in his room for follow-up assessment today. He is more pleasant today than yesterday. Suicidal ideations have improved. His depression and anxiety have also improved. He is denying homicidal ideations today. Auditory and visual hallucinations continue to be absent. He is reporting adequate sleep. He also reports that his appetite is appropriate. He will be receiving his rabies vaccine today due to his dog bites. He predominantly isolates to his room and does not attend group activities. Appetite:  [x] Normal/Adequate/Unchanged  [] Increased  [] Decreased      Sleep:       [] Normal/Adequate/Unchanged  [x] Fair  [] Poor      Group Attendance on Unit:   [] Yes  [] Selectively    [x] No    Medication Side Effects: denies         Mental Status Exam  Level of consciousness:  within normal limits   Appearance:  Hospital attire, resting in bed, fair grooming   Behavior/Motor: no abnormalities noted  Attitude toward examiner:  Cooperative  Speech: normal rate and volume  Mood:  Depressed, anxious  Affect:  blunted  Thought processes:  linear, coherent  Thought content: improved suicidal ideations without current plan or intent               denies homicidal ideations              Denies hallucinations              denies delusions  Cognition:  Oriented to self, location, time, situation  Concentration clinically adequate  Memory: intact  Insight &Judgment: poor    Data   height is 5' 10\" (1.778 m) and weight is 185 lb (83.9 kg). His oral temperature is 97.4 °F (36.3 °C). His blood pressure is 168/97 (abnormal) and his pulse is 53. His respiration is 14 and oxygen saturation is 98%. Labs:   No visits with results within 2 Day(s) from this visit.    Latest known visit with results is:   Admission on 07/31/2021, Discharged on 07/31/2021   Component Date Value Ref Range Status    WBC 07/31/2021 6.0  3.5 - 11.3 k/uL Final    RBC 07/31/2021 3.94* 4.21 - 5.77 m/uL Final    Hemoglobin 07/31/2021 11.8* 13.0 - 17.0 g/dL Final    Hematocrit 07/31/2021 37.2* 40.7 - 50.3 % Final    MCV 07/31/2021 94.4  82.6 - 102.9 fL Final    MCH 07/31/2021 29.9  25.2 - 33.5 pg Final    MCHC 07/31/2021 31.7  28.4 - 34.8 g/dL Final    RDW 07/31/2021 12.0  11.8 - 14.4 % Final    Platelets 06/99/5761 304  138 - 453 k/uL Final    MPV 07/31/2021 8.5  8.1 - 13.5 fL Final    NRBC Automated 07/31/2021 0.0  0.0 per 100 WBC Final    Differential Type 07/31/2021 NOT REPORTED   Final    Seg Neutrophils 07/31/2021 49  36 - 65 % Final    Lymphocytes 07/31/2021 34  24 - 43 % Final    Monocytes 07/31/2021 12  3 - 12 % Final    Eosinophils % 07/31/2021 4  1 - 4 % Final    Basophils 07/31/2021 1  0 - 2 % Final    Immature Granulocytes 07/31/2021 0  0 % Final    Segs Absolute 07/31/2021 2.99  1.50 - 8.10 k/uL Final    Absolute Lymph # 07/31/2021 2.07  1.10 - 3.70 k/uL Final    Absolute Mono # 07/31/2021 0.71  0.10 - 1.20 k/uL Final    Absolute Eos # 07/31/2021 0.23  0.00 - 0.44 k/uL Final    Basophils Absolute 07/31/2021 0.03  0.00 - 0.20 k/uL Final    Absolute Immature Granulocyte 07/31/2021 <0.03  0.00 - 0.30 k/uL Final    WBC Morphology 07/31/2021 NOT REPORTED   Final    RBC Morphology 07/31/2021 NOT REPORTED   Final    Platelet Estimate 27/48/2719 NOT REPORTED   Final    Glucose 07/31/2021 90  70 - 99 mg/dL Final    BUN 07/31/2021 10  6 - 20 mg/dL Final    CREATININE 07/31/2021 0.91  0.70 - 1.20 mg/dL Final    Bun/Cre Ratio 07/31/2021 NOT REPORTED  9 - 20 Final    Calcium 07/31/2021 9.0  8.6 - 10.4 mg/dL Final    Sodium 07/31/2021 139  135 - 144 mmol/L Final    Potassium 07/31/2021 4.1  3.7 - 5.3 mmol/L Final    Chloride 07/31/2021 105  98 - 107 mmol/L Final    CO2 07/31/2021 27 REPORTED  NEGATIVE Final    Cannabinoid Scrn, Ur 07/31/2021 POSITIVE* NEGATIVE Final    Comment:       (Positive cutoff 50 ng/mL)                  Oxycodone Screen, Ur 07/31/2021 NEGATIVE  NEGATIVE Final    Comment:       (Positive cutoff 100 ng/mL)                  Methamphetamine, Urine 07/31/2021 NOT REPORTED  NEGATIVE Final    Tricyclic Antidepressants, Urine 07/31/2021 NOT REPORTED  NEGATIVE Final    MDMA, Urine 07/31/2021 NOT REPORTED  NEGATIVE Final    Buprenorphine Urine 07/31/2021 NOT REPORTED  NEGATIVE Final    Test Information 07/31/2021 Assay provides medical screening only. The absence of expected drug(s) and/or metabolite(s) may indicate diluted or adulterated urine, limitations of testing or timing of collection. Final    Comment: Testing for legal purposes should be confirmed by another method. To request confirmation   of test result, please call the lab within 7 days of sample submission.  Specimen Description 07/31/2021 . NASOPHARYNGEAL SWAB   Final    SARS-CoV-2, Rapid 07/31/2021 Not Detected  Not Detected Final    Comment:       Rapid NAAT:  The specimen is NEGATIVE for SARS-CoV-2, the novel coronavirus associated with   COVID-19. The ID NOW COVID-19 assay is designed to detect the virus that causes COVID-19 in patients   with signs and symptoms of infection who are suspected of COVID-19. An individual without symptoms of COVID-19 and who is not shedding SARS-CoV-2 virus would   expect to have a negative (not detected) result in this assay. Negative results should be treated as presumptive and, if inconsistent with clinical signs   and symptoms or necessary for patient management,  should be tested with an alternative molecular assay. Negative results do not preclude   SARS-CoV-2 infection and   should not be used as the sole basis for patient management decisions.          Fact sheet for Healthcare Providers: BuildHer.es  Fact sheet for Patients: Ken.es          Methodology: Isothermal Nucleic Acid Amplification           Reviewed patient's current plan of care and vital signs with nursing staff. Labs reviewed: [x] Yes  Last EKG in EMR reviewed: [x] Yes    Medications  Current Facility-Administered Medications: traZODone (DESYREL) tablet 150 mg, 150 mg, Oral, Nightly PRN  clindamycin (CLEOCIN) capsule 300 mg, 300 mg, Oral, 3 times per day  venlafaxine (EFFEXOR XR) extended release capsule 37.5 mg, 37.5 mg, Oral, Daily with breakfast  rabies vaccine, PCEC (RABAVERT) injection 1 mL, 1 mL, Intramuscular, Once  acetaminophen (TYLENOL) tablet 650 mg, 650 mg, Oral, Q4H PRN  ibuprofen (ADVIL;MOTRIN) tablet 400 mg, 400 mg, Oral, Q6H PRN  aluminum & magnesium hydroxide-simethicone (MAALOX) 200-200-20 MG/5ML suspension 30 mL, 30 mL, Oral, Q6H PRN  polyethylene glycol (GLYCOLAX) packet 17 g, 17 g, Oral, Daily PRN  hydrOXYzine (ATARAX) tablet 50 mg, 50 mg, Oral, TID PRN  nicotine (NICODERM CQ) 14 MG/24HR 1 patch, 1 patch, Transdermal, Daily    ASSESSMENT  Depression with suicidal ideation         PLAN  Patient symptoms are: Improved  Continue current medication regimen. Monitor need and frequency of PRN medications. Encourage participation in groups and milieu. Attempt to develop insight. Psycho-education conducted. Supportive Therapy conducted. Probable discharge is to be determined by MD.   Follow-up daily while inpatient. Patient continues to be monitored in the inpatient psychiatric facility at [facility] for safety and stabilization. Patient continues to need, on a daily basis, active treatment furnished directly by or requiring the supervision of inpatient psychiatric personnel. Electronically signed by ROXANNE Love CNP on 8/3/2021 at 1:08 PM    **This report has been created using voice recognition software. It may contain minor errors which are inherent in voice recognition technology. **  I independently saw and evaluated the patient. I reviewed the nurse practitioner's documentation above. Any additional comments or changes to the    documentation are stated below otherwise agree with assessment.      -The patient reports an improvement in his mood. He states he has been sleeping better at night. He denies any side effect from his medications        PLAN  Medications as noted above. No changes  Attempt to develop insight  Psycho-education conducted. Supportive Therapy conducted.   Probable discharge is in 1 to 2 days  Follow-up daily while on inpatient unit    Electronically signed by Divya Woodruff MD on 8/3/21 at 3:17 PM EDT

## 2021-08-03 NOTE — BH NOTE
Pt. Declined to attend goal setting group. Pt. Offered 1:1 talk time as an alternative to group. Pt. Declines.

## 2021-08-03 NOTE — GROUP NOTE
Group Therapy Note    Date: 8/3/2021    Group Start Time: 1100  Group End Time: 0622  Group Topic: Cognitive Skills    SHANNAN Isaacs        Group Therapy Note    Attendees: 6/22         Pt did not participate in Cognitive Skills Group at 1100am when encouraged by RT due to resting in room, and declined to attend . Pt was offered talk time as an alternative to group but declined.          Discipline Responsible: Psychoeducational Specialist        Signature:  Tomasz Rosa

## 2021-08-03 NOTE — PLAN OF CARE
Problem: Altered Mood, Depressive Behavior:  Goal: Able to verbalize acceptance of life and situations over which he or she has no control  Description: Able to verbalize acceptance of life and situations over which he or she has no control  Outcome: Ongoing  Pt. Relates feeling better and wants to go home. Denies hallucinations. Denies suicidal or homicidal thoughts. Med compliant. Out for meals. Problem: Altered Mood, Depressive Behavior:  Goal: Able to verbalize and/or display a decrease in depressive symptoms  Description: Able to verbalize and/or display a decrease in depressive symptoms  Outcome: Ongoing  Pt. States he is feeling better and feels ready for discharge. States he will discuss with the doctor tomorrow. Problem: Altered Mood, Depressive Behavior:  Goal: Ability to disclose and discuss suicidal ideas will improve  Description: Ability to disclose and discuss suicidal ideas will improve  Outcome: Ongoing  Pt. Denies any suicidal thoughts. Pt. Remains safe on the unit. Q 15 minute checks for safety maintained. Problem: Pain:  Goal: Pain level will decrease  Description: Pain level will decrease  Outcome: Ongoing  Pt. Does relate to pain in legs from dog bites. Will continue to monitor.

## 2021-08-04 VITALS
HEART RATE: 74 BPM | SYSTOLIC BLOOD PRESSURE: 153 MMHG | RESPIRATION RATE: 14 BRPM | OXYGEN SATURATION: 98 % | HEIGHT: 70 IN | DIASTOLIC BLOOD PRESSURE: 108 MMHG | BODY MASS INDEX: 26.48 KG/M2 | TEMPERATURE: 99 F | WEIGHT: 185 LBS

## 2021-08-04 PROCEDURE — 99239 HOSP IP/OBS DSCHRG MGMT >30: CPT | Performed by: PSYCHIATRY & NEUROLOGY

## 2021-08-04 PROCEDURE — 6370000000 HC RX 637 (ALT 250 FOR IP): Performed by: PSYCHIATRY & NEUROLOGY

## 2021-08-04 PROCEDURE — 6370000000 HC RX 637 (ALT 250 FOR IP): Performed by: INTERNAL MEDICINE

## 2021-08-04 RX ORDER — NICOTINE 21 MG/24HR
1 PATCH, TRANSDERMAL 24 HOURS TRANSDERMAL DAILY
Status: DISCONTINUED | OUTPATIENT
Start: 2021-08-04 | End: 2021-08-04 | Stop reason: HOSPADM

## 2021-08-04 RX ORDER — TRAZODONE HYDROCHLORIDE 150 MG/1
150 TABLET ORAL NIGHTLY PRN
Qty: 30 TABLET | Refills: 0 | Status: ON HOLD | OUTPATIENT
Start: 2021-08-04 | End: 2021-11-23 | Stop reason: HOSPADM

## 2021-08-04 RX ORDER — CLINDAMYCIN HYDROCHLORIDE 300 MG/1
300 CAPSULE ORAL EVERY 8 HOURS SCHEDULED
Qty: 12 CAPSULE | Refills: 0 | Status: SHIPPED | OUTPATIENT
Start: 2021-08-04 | End: 2021-08-08

## 2021-08-04 RX ORDER — VENLAFAXINE HYDROCHLORIDE 37.5 MG/1
37.5 CAPSULE, EXTENDED RELEASE ORAL
Qty: 30 CAPSULE | Refills: 3 | Status: ON HOLD | OUTPATIENT
Start: 2021-08-05 | End: 2021-11-23 | Stop reason: HOSPADM

## 2021-08-04 RX ADMIN — VENLAFAXINE HYDROCHLORIDE 37.5 MG: 37.5 CAPSULE, EXTENDED RELEASE ORAL at 08:12

## 2021-08-04 RX ADMIN — CLINDAMYCIN HYDROCHLORIDE 300 MG: 150 CAPSULE ORAL at 13:35

## 2021-08-04 RX ADMIN — CLINDAMYCIN HYDROCHLORIDE 300 MG: 150 CAPSULE ORAL at 06:31

## 2021-08-04 NOTE — PROGRESS NOTES
Patient given tobacco quitline number 81185101616 at this time, refusing to call at this time, states \" I just dont want to quit now\"- patient given information as to the dangers of long term tobacco use. Continue to reinforce the importance of tobacco cessation.

## 2021-08-04 NOTE — PROGRESS NOTES
[id number] [facility]  Discharge Note    Pt discharged with followings belongings:   Dentures: None  Vision - Corrective Lenses: None  Hearing Aid: None  Jewelry: None  Body Piercings Removed: N/A  Clothing: Footwear, Shirt, Pants  Were All Patient Medications Collected?: Not Applicable  Other Valuables: None   Valuables sent home with pt or returned to patient. Patient education on aftercare instructions: yes  Information faxed to [facility] by Adriane Brown  at 3:28 PM .Patient verbalize understanding of AVS:  yes. Status EXAM upon discharge:  Status and Exam  Normal: No  Facial Expression: Flat  Affect: Appropriate  Level of Consciousness: Alert  Mood:Normal: Yes  Mood: Depressed  Motor Activity:Normal: Yes  Motor Activity: Increased  Interview Behavior: Cooperative  Preception: [place] to Person, [place] to Time, [place] to Place, [place] to Situation  Attention:Normal: Yes  Attention: Distractible  Thought Processes: Circumstantial  Thought Content:Normal: Yes  Thought Content: Preoccupations  Hallucinations: None  Delusions: No  Memory:Normal: Yes  Memory: Poor Recent, Poor Remote  Insight and Judgment: No  Insight and Judgment: Poor Insight  Present Suicidal Ideation: No  Present Homicidal Ideation: No      Metabolic Screening:    Lab Results   Component Value Date    LABA1C 5.6 06/18/2020       Lab Results   Component Value Date    CHOL 187 06/18/2020    CHOL 194 04/16/2018     Lab Results   Component Value Date    TRIG 98 06/18/2020    TRIG 120 04/16/2018     Lab Results   Component Value Date    HDL 50 06/18/2020    HDL 44 04/16/2018     No components found for: LDLCAL  No results found for: LABVLDL     Pt discharged to home by cab. All belongings et valuables sent with pt. Pt verbalizes all discharge instructions. Pt instructed to follow up with health department regarding rabies vaccines.      Opal Velazco RN

## 2021-08-04 NOTE — GROUP NOTE
Group Therapy Note    Date: 8/4/2021    Group Start Time: 1005  Group End Time: 6788  Group Topic: Psychotherapy    [facility]    LUCIANA Gallardo, CYNTHIA        Group Therapy Note    Attendees: 6/24         Patient was offered group therapy today but declined to participate despite encouragement from staff. 1:1 was offered.     Signature:  LUCIANA Gallardo, CYNTHIA

## 2021-08-04 NOTE — PLAN OF CARE
Problem: Altered Mood, Depressive Behavior:  Goal: Able to verbalize acceptance of life and situations over which he or she has no control  Description: Able to verbalize acceptance of life and situations over which he or she has no control  8/4/2021 0844 by Dima Dunlap RN  Outcome: Ongoing   1:1 with pt x ten minutes. Pt encouraged to attend unit programming and interact with peers and staff. Pt also encouraged to tend to hygiene and ADLs. Pt encouraged to discuss feelings with staff and feedback and reassurance provided. Pt denies thoughts of self harm and is agreeable to seeking out should thoughts of self harm arise. Safe environment maintained. Q15 minute checks for safety cont per unit policy. Will cont to monitor for safety and provides support and reassurance as needed. Pt is controlled in behaviors. Compliant with medications. Out briefly for breakfast. Pt admits to some feelings of depression. Social with select peers.

## 2021-08-04 NOTE — DISCHARGE SUMMARY
DISCHARGE SUMMARY      Patient ID:  Gio Loaiza  [id number]  31 y.o.  1990    Admit date: 7/31/2021    Discharge date and time: 8/4/2021    Disposition: Home    Admitting Physician: Darshana Ignacio MD     Discharge Physician: Dr Sae Maher MD    Admission Diagnoses: Depression with suicidal ideation [F32.9, R45.851]    Admission Condition: poor    Discharged Condition: stable    Admission Circumstance: Gio Loaiza is a 31 y.o. male with significant past medical history of depression who presented to the ED with suicidal ideation. Per ED report, \"Patient is a 31year old  male that presents to the ED with suicidal ideations with plan to cut self.  Patient reports that he has a history of cutting behaviors and would do this in an attempt to harm self. Patient states that his depression has been increasing for the past month and he no longer feels that he wants to live. Heidy Arellano states that he is homeless with no support system. [facility] states that he has a history of working with Ximena Figueredo but is not current with treatment or currently taking medications. [facility] reports that he uses crack and last use was two days ago. [facility] denies hallucinations and does not appear to be exhibiting signs of psychosis at this time. Patient denied legal issues. \"     Per nursing staff, patient has been behavior controlled, isolative to room, and sleeping most of the shift.      Today, NP had a brief conversation with patient where he endorsed increasing suicidal ideations over the last month. Patient was declining to speak with NP in his room and questions to patient are received with muteness.      Patient did endorse worsening depression to writer, however declined to answer questions.        PAST MEDICAL/PSYCHIATRIC HISTORY:   Past Medical History:   Diagnosis Date    Alcoholism ([facility]     Anxiety     Bipolar 1 disorder (HCC)     Cocaine abuse ([facility]     Depression     Hypertension     Irregular heartbeat     Mild tetrahydrocannabinol (THC) abuse     Suicidal ideation        FAMILY/SOCIAL HISTORY:  Family History   Problem Relation Age of Onset    Heart Disease Father 52    Asthma Brother     Hypertension Maternal Grandmother     Lung Cancer Maternal Grandmother         Kidney cancer, liver cancer    Stroke Maternal Grandmother     Heart Disease Mother 46         of presumed heart disease in her sleep     Social History     Socioeconomic History    Marital status: Single     Spouse name: magalys    Number of children: 1    Years of education: Not on file    Highest education level: Not on file   Occupational History    Occupation: unemployed     Comment: used to work at [employer] 6 months ago   Tobacco Use    Smoking status: Current Every Day Smoker     Packs/day: 0.50     Years: 14.00     Pack years: 7.00     Types: Cigarettes    Smokeless tobacco: Never Used    Tobacco comment: 3 cigs per day per patient   Vaping Use    Vaping Use: Every day   Substance and Sexual Activity    Alcohol use: Not Currently    Drug use: Not Currently     Types: Marijuana, Cocaine    Sexual activity: Yes     Partners: Female   Other Topics Concern    Not on file   Social History Narrative    ** Merged History Encounter **         Lives with fiance and 3year old daughter     Social Determinants of Health     Financial Resource Strain:     Difficulty of Paying Living Expenses:    Food Insecurity:     Worried About [address] in the Last Year:     [address] in the Last Year:    Transportation Needs:     Lack of Transportation (Medical):      Lack of Transportation (Non-Medical):    Physical Activity:     Days of Exercise per Week:     Minutes of Exercise per Session:    Stress:     Feeling of Stress :    Social Connections:     Frequency of Communication with Friends and Family:     Frequency of Social Gatherings with Friends and Family:     Attends [employer] Services:     Active Member of Clubs or Organizations:     Attends Club or Organization Meetings:     Marital Status:    Intimate Partner Violence:     Fear of Current or Ex-Partner:     Emotionally Abused:     Physically Abused:     Sexually Abused:        MEDICATIONS:    Current Facility-Administered Medications:     nicotine (NICODERM CQ) 21 MG/24HR 1 patch, 1 patch, Transdermal, Daily, Ana Barba MD, 1 patch at 08/04/21 0821    traZODone (DESYREL) tablet 150 mg, 150 mg, Oral, Nightly PRN, Ana Barba MD, 150 mg at 08/03/21 2041    clindamycin (CLEOCIN) capsule 300 mg, 300 mg, Oral, 3 times per day, Osmar Buckley MD, 300 mg at 08/04/21 0631    venlafaxine (EFFEXOR XR) extended release capsule 37.5 mg, 37.5 mg, Oral, Daily with breakfast, Ramses Navas MD, 37.5 mg at 08/04/21 1211    acetaminophen (TYLENOL) tablet 650 mg, 650 mg, Oral, Q4H PRN, Ramses Navas MD    ibuprofen (ADVIL;MOTRIN) tablet 400 mg, 400 mg, Oral, Q6H PRN, Ramses Navas MD, 400 mg at 08/02/21 2153    aluminum & magnesium hydroxide-simethicone (MAALOX) 200-200-20 MG/5ML suspension 30 mL, 30 mL, Oral, Q6H PRN, Ramses Navas MD    polyethylene glycol (GLYCOLAX) packet 17 g, 17 g, Oral, Daily PRN, Ramses Navas MD    hydrOXYzine (ATARAX) tablet 50 mg, 50 mg, Oral, TID PRN, Ramses Navas MD, 50 mg at 08/03/21 2041    Examination:  BP (!) 153/108   Pulse 74   Temp 99 °F (37.2 °C) (Oral)   Resp 14   Ht 5' 10\" (1.778 m)   Wt 185 lb (83.9 kg)   SpO2 98%   BMI 26.54 kg/m²   Gait - steady    HOSPITAL COURSE[de-identified]  Following admission to the hospital, patient had a complete physical exam and blood work up, which was unremarkable. Patient was monitored closely with suicide precaution  Patient was started on trazodone and Effexor as noted above  Was encouraged to participate in group and other milieu activity  Patient started to feel better with this combination of treatment. Significant progress in the symptoms since admission.     Mood is improved  The patient denies AVH or paranoid thoughts  The patient denies any hopelessness or worthlessness  No active SI/HI  Appetite:  [x] Normal  [] Increased  [] Decreased    Sleep:       [x] Normal  [] Fair       [] Poor            Energy:    [x] Normal  [] Increased  [] Decreased     SI [] Present  [x] Absent  HI  []Present  [x] Absent   Aggression:  [] yes  [] no  Patient is [x] able  [] unable to CONTRACT FOR SAFETY   Medication side effects(SE):  [x] None(Psych. Meds.) [] Other      Mental Status Examination on discharge:    Level of consciousness:  within normal limits   Appearance:  well-appearing  Behavior/Motor:  no abnormalities noted  Attitude toward examiner:  attentive and good eye contact  Speech:  spontaneous, normal rate and normal volume   Mood: euthymic  Affect:  mood congruent  Thought processes:  linear, goal directed and coherent   Thought content:  Suicidal Ideation:  denies suicidal ideation  Delusions:  no evidence of delusions  Perceptual Disturbance:  denies any perceptual disturbance  Cognition:  oriented to person, place, and time   Concentration intact  Memory intact  Insight good   Judgement fair   Fund of Knowledge adequate      ASSESSMENT:  Patient symptoms are:  [x] Well controlled  [x] Improving  [] Worsening  [] No change      Diagnosis:  Principal Problem:    Depression with suicidal ideation  Resolved Problems:    * No resolved hospital problems. *      LABS:    No results for input(s): WBC, HGB, PLT in the last 72 hours. No results for input(s): NA, K, CL, CO2, BUN, CREATININE, GLUCOSE in the last 72 hours. No results for input(s): BILITOT, ALKPHOS, AST, ALT in the last 72 hours.   Lab Results   Component Value Date    BARBSCNU NEGATIVE 07/31/2021    LABBENZ NEGATIVE 07/31/2021    LABMETH NEGATIVE 07/31/2021    PPXUR NOT REPORTED 07/31/2021     Lab Results   Component Value Date    TSH 0.50 06/18/2020     No results found for: LITHIUM  No results found for: VALPROATE, CBMZ    RISK ASSESSMENT AT DISCHARGE: Low risk for suicide and homicide. Treatment Plan:  Reviewed current Medications with the patient. Education provided on the complaince with treatment. Risks, benefits, side effects, drug-to-drug interactions and alternatives to treatment were discussed. Encourage patient to attend outpatient follow up appointment and therapy. Patient was advised to call the outpatient provider, visit the nearest ED or call 911 if symptoms are not manageable.            Medication List      START taking these medications    clindamycin 300 MG capsule  Commonly known as: CLEOCIN  Take 1 capsule by mouth every 8 hours for 4 days     venlafaxine 37.5 MG extended release capsule  Commonly known as: EFFEXOR XR  Take 1 capsule by mouth daily (with breakfast)  Start taking on: August 5, 2021        CHANGE how you take these medications    traZODone 150 MG tablet  Commonly known as: DESYREL  Take 1 tablet by mouth nightly as needed for Sleep  What changed:   · medication strength  · how much to take        STOP taking these medications    amLODIPine 5 MG tablet  Commonly known as: NORVASC     amoxicillin-clavulanate 875-125 MG per tablet  Commonly known as: AUGMENTIN     sertraline 50 MG tablet  Commonly known as: ZOLOFT           Where to Get Your Medications      These medications were sent to [place] [address], [facility] [address]    Phone: [phone]   · clindamycin 300 MG capsule  · traZODone 150 MG tablet  · venlafaxine 37.5 MG extended release capsule           Core Measures statement:   Not applicable      TIME SPENT - 28 MINUTES TO COMPLETE THE EVALUATION, DISCHARGE SUMMARY, MEDICATION RECONCILIATION AND FOLLOW UP CARE                                         Luis Alberto Gallegos is a 31 y.o. male being evaluated Danelle Bruno MD on 8/4/2021 at 10:24 AM    An electronic signature was used to authenticate this note. **This report has been created using voice recognition software. It may contain minor errors which are inherent in voice recognition technology. **

## 2021-08-04 NOTE — GROUP NOTE
Group Therapy Note    Date: 8/4/2021    Group Start Time: 1100  Group End Time: 4912  Group Topic: Cognitive Skills    SHANNAN Connor        Group Therapy Note    Attendees: 12         Patient's Goal: To increase interpersonal interactions. To increase cognitive thinking    Notes:  Patient attended and participated in group. Patient was pleasant and appropriate. Status After Intervention:  Improved    Participation Level:  Active Listener and Interactive    Participation Quality: Appropriate, Attentive and Sharing      Speech:  normal      Thought Process/Content: Logical      Affective Functioning: Congruent      Mood: euthymic      Level of consciousness:  Alert, Oriented x4 and Attentive      Response to Learning: Able to verbalize current knowledge/experience, Able to verbalize/acknowledge new learning and Progressing to goal      Endings: None Reported    Modes of Intervention: Socialization, Exploration, Problem-solving, Activity and Reality-testing      Discipline Responsible: Psychoeducational Specialist      Signature:  Sobia Orellana

## 2021-08-04 NOTE — SUICIDE SAFETY PLAN
SAFETY PLAN    A suicide Safety Plan is a document that supports someone when they are having thoughts of suicide. Warning Signs that indicate a suicidal crisis may be developing: What (situations, thoughts, feelings, body sensations, behaviors, etc.) do you experience that lets you know you are beginning to think about suicide? 1. Go off medications  2. Mood is depressed and start to feel sad, hopeless, helpless, guilty, decline in self-esteem, excess worry, no interest in doing any pleasurable activities, unable to concentrate  3. Begin to cry over the smallest of things  4. Not eating or sleeping as normal  5. Relationship issues start happening  6. I become angry and start a fight  7. When I dont listen or respond to people in a good, positive way  Internal Coping Strategies:  What things can I do (relaxation techniques, hobbies, physical activities, etc.) to take my mind off my problems without contacting another person? 1. Go to hospital discharge appointments and follow-up with community mental health counseling  2. Talk with other people  3. Learn to identify and control your emotions by new ways  4. Think before you speak or act; walk away from the situation  5. Join a support group in person or on Social Media  6. Take a time-out  7. Take deep breaths; use relaxation techniques  8. Get some exercise; go for a walk  9. Read; listen to music; watch a funny movie     People whom I can ask for help: Who can I call when I need help - for example, friends, family, clergy, someone else? [address] [place] [address], [address] [place] [address]  [phone]  Professionals or [address] [place] [address] I can contact during a crisis: Who can I call for help - for example, my doctor, my psychiatrist, my psychologist, a mental health provider, a suicide hotline?   Suicide Prevention Lifeline: 1-454-671-TALK (3836)  [facility] 2-1-0 (897) 991-6196 or (294) 544-1334  CARMIN[employer] (National Association of Mental Illness) groups and support, [facility] [address]  65., (254) 382-6015  4. [address] [place] [address] Emergency Services -  for example, Community Mental      [facility], [facility], Crisis line: [address] [facility] 18-FBXW Crisis Response Team (Crisis Intervention Team - [place]), [phone] or 9-1-1  [address] [facility], [address] Association of Mental Illness, 7-480-006-139.732.2077  [facility], 7-173-636-HELP (1195)   Crisis Text Line, Text 4HOPE to 536876 to connect with a crisis counselor  [facility] [employer] [facility], 1-282-546-882.284.2403  Inga Davidson ([place], [address]), 5-499.506.7677  [facility], [facility] 124  [address] [facility], [address] [facility] [place]., [facility], [address] [phone]  [facility], [address] [place] [address]., [facility], [address] [place] [address], [address] [place] [address]., [facility], [facility], [phone]  Making the environment safe: How can I make my environment (house/apartment/living space) safer? For example, can I remove guns, medications, and other items? 1. Throw away all medications not being taken  2.  Plan daily goals to help remember to stay on specific medications

## 2021-08-04 NOTE — PROGRESS NOTES
CLINICAL PHARMACY NOTE: MEDS TO BEDS    Total # of Prescriptions Filled: 3   The following medications were delivered to the patient:  · Trazodone HCL 150mg  · Venlafaxine HCL ER 5mg  · Clindamycin HCL 300mg    Additional Documentation:  delivered to nurses station

## 2021-08-04 NOTE — PLAN OF CARE
Problem: Altered Mood, Depressive Behavior:  Goal: Able to verbalize acceptance of life and situations over which he or she has no control  Description: Able to verbalize acceptance of life and situations over which he or she has no control  8/3/2021 2032 by Elif Bro RN  Outcome: Ongoing     Problem: Altered Mood, Depressive Behavior:  Goal: Able to verbalize and/or display a decrease in depressive symptoms  Description: Able to verbalize and/or display a decrease in depressive symptoms  8/3/2021 2032 by Elif Bro RN  Outcome: Ongoing     Problem: Altered Mood, Depressive Behavior:  Goal: Ability to disclose and discuss suicidal ideas will improve  Description: Ability to disclose and discuss suicidal ideas will improve  8/3/2021 2032 by Elif Bro RN  Outcome: Ongoing     Patient denies any depression, anxiety, hallucinations, or suicidal or homicidal ideation. He took his medications as prescribed. Patient was isolative to his room. Pleasant and cooperative. Safety precautions in place and Q 15 minute checks completed.

## 2021-08-05 NOTE — CARE COORDINATION
Name: Valdo Dempsey    : 1990    Discharge Date: 21    Primary Auth/Cert #: [id number]    Destination: Pt discharged to home     Discharge Medications:      Medication List      START taking these medications    clindamycin 300 MG capsule  Commonly known as: CLEOCIN  Take 1 capsule by mouth every 8 hours for 4 days     venlafaxine 37.5 MG extended release capsule  Commonly known as: EFFEXOR XR  Take 1 capsule by mouth daily (with breakfast)        CHANGE how you take these medications    traZODone 150 MG tablet  Commonly known as: DESYREL  Take 1 tablet by mouth nightly as needed for Sleep  What changed:   · medication strength  · how much to take        STOP taking these medications    amLODIPine 5 MG tablet  Commonly known as: NORVASC     amoxicillin-clavulanate 875-125 MG per tablet  Commonly known as: AUGMENTIN     sertraline 50 MG tablet  Commonly known as: ZOLOFT           Where to Get Your Medications      These medications were sent to [place] [address]    Phone: [phone]   · clindamycin 300 MG capsule  · traZODone 150 MG tablet  · venlafaxine 37.5 MG extended release capsule         Follow Up Appointment: [address]  [phone]  fax [phone]  On 2021  You have a scheduled diagnostic assessment on  at 9:30 am at the [facility] office     Please discharge   [address], [address] [facility] [address]    If the PT doesn't have a ride home, please send by [facility] cab           follow up with rabies vaccines on Aug. 7th and Aug. 14th. at [facility].

## 2021-11-19 ENCOUNTER — HOSPITAL ENCOUNTER (EMERGENCY)
Age: 31
Discharge: PSYCHIATRIC OR SUBSTANCE USE FACILITY | End: 2021-11-19
Attending: EMERGENCY MEDICINE
Payer: MEDICARE

## 2021-11-19 ENCOUNTER — APPOINTMENT (OUTPATIENT)
Dept: GENERAL RADIOLOGY | Age: 31
End: 2021-11-19
Payer: MEDICARE

## 2021-11-19 ENCOUNTER — HOSPITAL ENCOUNTER (INPATIENT)
Age: 31
LOS: 5 days | Discharge: HOME | DRG: 751 | End: 2021-11-24
Attending: PSYCHIATRY & NEUROLOGY | Admitting: PSYCHIATRY & NEUROLOGY
Payer: MEDICARE

## 2021-11-19 VITALS
RESPIRATION RATE: 16 BRPM | DIASTOLIC BLOOD PRESSURE: 80 MMHG | HEART RATE: 68 BPM | SYSTOLIC BLOOD PRESSURE: 139 MMHG | TEMPERATURE: 97.3 F | WEIGHT: 185 LBS | OXYGEN SATURATION: 98 % | BODY MASS INDEX: 26.54 KG/M2

## 2021-11-19 DIAGNOSIS — R45.851 SUICIDAL IDEATION: Primary | ICD-10-CM

## 2021-11-19 LAB
ABSOLUTE EOS #: 0.19 K/UL (ref 0–0.44)
ABSOLUTE IMMATURE GRANULOCYTE: <0.03 K/UL (ref 0–0.3)
ABSOLUTE LYMPH #: 2.78 K/UL (ref 1.1–3.7)
ABSOLUTE MONO #: 0.69 K/UL (ref 0.1–1.2)
ALBUMIN SERPL-MCNC: 4 G/DL (ref 3.5–5.2)
ALBUMIN/GLOBULIN RATIO: 1.4 (ref 1–2.5)
ALP BLD-CCNC: 61 U/L (ref 40–129)
ALT SERPL-CCNC: 15 U/L (ref 5–41)
AMPHETAMINE SCREEN URINE: NEGATIVE
ANION GAP SERPL CALCULATED.3IONS-SCNC: 11 MMOL/L (ref 9–17)
AST SERPL-CCNC: 17 U/L
BARBITURATE SCREEN URINE: NEGATIVE
BASOPHILS # BLD: 0 % (ref 0–2)
BASOPHILS ABSOLUTE: 0.04 K/UL (ref 0–0.2)
BENZODIAZEPINE SCREEN, URINE: NEGATIVE
BILIRUB SERPL-MCNC: 0.18 MG/DL (ref 0.3–1.2)
BUN BLDV-MCNC: 13 MG/DL (ref 6–20)
BUN/CREAT BLD: ABNORMAL (ref 9–20)
BUPRENORPHINE URINE: ABNORMAL
CALCIUM SERPL-MCNC: 9.3 MG/DL (ref 8.6–10.4)
CANNABINOID SCREEN URINE: POSITIVE
CHLORIDE BLD-SCNC: 102 MMOL/L (ref 98–107)
CO2: 23 MMOL/L (ref 20–31)
COCAINE METABOLITE, URINE: POSITIVE
CREAT SERPL-MCNC: 0.81 MG/DL (ref 0.7–1.2)
DIFFERENTIAL TYPE: ABNORMAL
EOSINOPHILS RELATIVE PERCENT: 2 % (ref 1–4)
ETHANOL PERCENT: <0.01 %
ETHANOL: <10 MG/DL
GFR AFRICAN AMERICAN: >60 ML/MIN
GFR NON-AFRICAN AMERICAN: >60 ML/MIN
GFR SERPL CREATININE-BSD FRML MDRD: ABNORMAL ML/MIN/{1.73_M2}
GFR SERPL CREATININE-BSD FRML MDRD: ABNORMAL ML/MIN/{1.73_M2}
GLUCOSE BLD-MCNC: 118 MG/DL (ref 70–99)
HCT VFR BLD CALC: 40.6 % (ref 40.7–50.3)
HEMOGLOBIN: 13.4 G/DL (ref 13–17)
IMMATURE GRANULOCYTES: 0 %
LYMPHOCYTES # BLD: 31 % (ref 24–43)
MCH RBC QN AUTO: 30.5 PG (ref 25.2–33.5)
MCHC RBC AUTO-ENTMCNC: 33 G/DL (ref 28.4–34.8)
MCV RBC AUTO: 92.3 FL (ref 82.6–102.9)
MDMA URINE: ABNORMAL
METHADONE SCREEN, URINE: NEGATIVE
METHAMPHETAMINE, URINE: ABNORMAL
MONOCYTES # BLD: 8 % (ref 3–12)
NRBC AUTOMATED: 0 PER 100 WBC
OPIATES, URINE: NEGATIVE
OXYCODONE SCREEN URINE: NEGATIVE
PDW BLD-RTO: 12.2 % (ref 11.8–14.4)
PHENCYCLIDINE, URINE: NEGATIVE
PLATELET # BLD: 278 K/UL (ref 138–453)
PLATELET ESTIMATE: ABNORMAL
PMV BLD AUTO: 8.9 FL (ref 8.1–13.5)
POTASSIUM SERPL-SCNC: 3.2 MMOL/L (ref 3.7–5.3)
PROPOXYPHENE, URINE: ABNORMAL
RBC # BLD: 4.4 M/UL (ref 4.21–5.77)
RBC # BLD: ABNORMAL 10*6/UL
SARS-COV-2, RAPID: NOT DETECTED
SEG NEUTROPHILS: 59 % (ref 36–65)
SEGMENTED NEUTROPHILS ABSOLUTE COUNT: 5.33 K/UL (ref 1.5–8.1)
SODIUM BLD-SCNC: 136 MMOL/L (ref 135–144)
SPECIMEN DESCRIPTION: NORMAL
TEST INFORMATION: ABNORMAL
TOTAL PROTEIN: 6.9 G/DL (ref 6.4–8.3)
TRICYCLIC ANTIDEPRESSANTS, UR: ABNORMAL
WBC # BLD: 9.1 K/UL (ref 3.5–11.3)
WBC # BLD: ABNORMAL 10*3/UL

## 2021-11-19 PROCEDURE — 99223 1ST HOSP IP/OBS HIGH 75: CPT | Performed by: PSYCHIATRY & NEUROLOGY

## 2021-11-19 PROCEDURE — G0480 DRUG TEST DEF 1-7 CLASSES: HCPCS

## 2021-11-19 PROCEDURE — 85025 COMPLETE CBC W/AUTO DIFF WBC: CPT

## 2021-11-19 PROCEDURE — 80053 COMPREHEN METABOLIC PANEL: CPT

## 2021-11-19 PROCEDURE — APPSS45 APP SPLIT SHARED TIME 31-45 MINUTES: Performed by: PSYCHIATRY & NEUROLOGY

## 2021-11-19 PROCEDURE — 73130 X-RAY EXAM OF HAND: CPT

## 2021-11-19 PROCEDURE — 87635 SARS-COV-2 COVID-19 AMP PRB: CPT

## 2021-11-19 PROCEDURE — 99285 EMERGENCY DEPT VISIT HI MDM: CPT

## 2021-11-19 PROCEDURE — 6370000000 HC RX 637 (ALT 250 FOR IP): Performed by: PSYCHIATRY & NEUROLOGY

## 2021-11-19 PROCEDURE — 80307 DRUG TEST PRSMV CHEM ANLYZR: CPT

## 2021-11-19 PROCEDURE — 6370000000 HC RX 637 (ALT 250 FOR IP): Performed by: STUDENT IN AN ORGANIZED HEALTH CARE EDUCATION/TRAINING PROGRAM

## 2021-11-19 PROCEDURE — 1240000000 HC EMOTIONAL WELLNESS R&B

## 2021-11-19 RX ORDER — NICOTINE 21 MG/24HR
1 PATCH, TRANSDERMAL 24 HOURS TRANSDERMAL DAILY
Status: DISCONTINUED | OUTPATIENT
Start: 2021-11-19 | End: 2021-11-21

## 2021-11-19 RX ORDER — HYDROXYZINE 50 MG/1
50 TABLET, FILM COATED ORAL 3 TIMES DAILY PRN
Status: DISCONTINUED | OUTPATIENT
Start: 2021-11-19 | End: 2021-11-24 | Stop reason: HOSPADM

## 2021-11-19 RX ORDER — POTASSIUM CHLORIDE 20 MEQ/1
20 TABLET, EXTENDED RELEASE ORAL ONCE
Status: COMPLETED | OUTPATIENT
Start: 2021-11-19 | End: 2021-11-19

## 2021-11-19 RX ORDER — DIPHENHYDRAMINE HYDROCHLORIDE 50 MG/ML
50 INJECTION INTRAMUSCULAR; INTRAVENOUS EVERY 4 HOURS PRN
Status: DISCONTINUED | OUTPATIENT
Start: 2021-11-19 | End: 2021-11-24 | Stop reason: HOSPADM

## 2021-11-19 RX ORDER — LORAZEPAM 1 MG/1
2 TABLET ORAL EVERY 4 HOURS PRN
Status: DISCONTINUED | OUTPATIENT
Start: 2021-11-19 | End: 2021-11-24 | Stop reason: HOSPADM

## 2021-11-19 RX ORDER — VENLAFAXINE HYDROCHLORIDE 37.5 MG/1
37.5 CAPSULE, EXTENDED RELEASE ORAL
Status: DISCONTINUED | OUTPATIENT
Start: 2021-11-20 | End: 2021-11-21

## 2021-11-19 RX ORDER — POLYETHYLENE GLYCOL 3350 17 G/17G
17 POWDER, FOR SOLUTION ORAL DAILY PRN
Status: DISCONTINUED | OUTPATIENT
Start: 2021-11-19 | End: 2021-11-24 | Stop reason: HOSPADM

## 2021-11-19 RX ORDER — HALOPERIDOL 5 MG/ML
5 INJECTION INTRAMUSCULAR EVERY 4 HOURS PRN
Status: DISCONTINUED | OUTPATIENT
Start: 2021-11-19 | End: 2021-11-24 | Stop reason: HOSPADM

## 2021-11-19 RX ORDER — MAGNESIUM HYDROXIDE/ALUMINUM HYDROXICE/SIMETHICONE 120; 1200; 1200 MG/30ML; MG/30ML; MG/30ML
30 SUSPENSION ORAL EVERY 6 HOURS PRN
Status: DISCONTINUED | OUTPATIENT
Start: 2021-11-19 | End: 2021-11-24 | Stop reason: HOSPADM

## 2021-11-19 RX ORDER — HALOPERIDOL 5 MG
5 TABLET ORAL EVERY 4 HOURS PRN
Status: DISCONTINUED | OUTPATIENT
Start: 2021-11-19 | End: 2021-11-24 | Stop reason: HOSPADM

## 2021-11-19 RX ORDER — IBUPROFEN 400 MG/1
400 TABLET ORAL EVERY 6 HOURS PRN
Status: DISCONTINUED | OUTPATIENT
Start: 2021-11-19 | End: 2021-11-24 | Stop reason: HOSPADM

## 2021-11-19 RX ORDER — LORAZEPAM 2 MG/ML
2 INJECTION INTRAMUSCULAR EVERY 4 HOURS PRN
Status: DISCONTINUED | OUTPATIENT
Start: 2021-11-19 | End: 2021-11-24 | Stop reason: HOSPADM

## 2021-11-19 RX ORDER — ACETAMINOPHEN 325 MG/1
650 TABLET ORAL EVERY 4 HOURS PRN
Status: DISCONTINUED | OUTPATIENT
Start: 2021-11-19 | End: 2021-11-24 | Stop reason: HOSPADM

## 2021-11-19 RX ORDER — TRAZODONE HYDROCHLORIDE 50 MG/1
50 TABLET ORAL NIGHTLY PRN
Status: DISCONTINUED | OUTPATIENT
Start: 2021-11-19 | End: 2021-11-24 | Stop reason: HOSPADM

## 2021-11-19 RX ADMIN — POTASSIUM CHLORIDE 20 MEQ: 1500 TABLET, EXTENDED RELEASE ORAL at 15:45

## 2021-11-19 RX ADMIN — POTASSIUM BICARBONATE 40 MEQ: 782 TABLET, EFFERVESCENT ORAL at 04:40

## 2021-11-19 ASSESSMENT — PATIENT HEALTH QUESTIONNAIRE - PHQ9: SUM OF ALL RESPONSES TO PHQ QUESTIONS 1-9: 20

## 2021-11-19 ASSESSMENT — PAIN SCALES - GENERAL
PAINLEVEL_OUTOF10: 4
PAINLEVEL_OUTOF10: 5

## 2021-11-19 ASSESSMENT — ENCOUNTER SYMPTOMS
ABDOMINAL PAIN: 0
VOMITING: 0
SORE THROAT: 0
COUGH: 0
SHORTNESS OF BREATH: 0
RHINORRHEA: 0
DIARRHEA: 0
NAUSEA: 0

## 2021-11-19 ASSESSMENT — PAIN DESCRIPTION - ORIENTATION
ORIENTATION: RIGHT
ORIENTATION: RIGHT

## 2021-11-19 ASSESSMENT — SLEEP AND FATIGUE QUESTIONNAIRES
DO YOU USE A SLEEP AID: NO
DO YOU HAVE DIFFICULTY SLEEPING: NO

## 2021-11-19 ASSESSMENT — PAIN DESCRIPTION - PAIN TYPE
TYPE: ACUTE PAIN
TYPE: ACUTE PAIN

## 2021-11-19 ASSESSMENT — LIFESTYLE VARIABLES: HISTORY_ALCOHOL_USE: NO

## 2021-11-19 ASSESSMENT — PAIN DESCRIPTION - LOCATION
LOCATION: HAND
LOCATION: HAND

## 2021-11-19 NOTE — H&P
Department of Psychiatry  Attending Physician Psychiatric Assessment     Reason for Admission to Psychiatric Unit:  Concerns about patient's safety in the community    CHIEF COMPLAINT: Suicidal ideation with plan to overdose on fentanyl    History obtained from: Patient, electronic medical record          HISTORY OF PRESENT ILLNESS:    Carl Cardenas is a 27 y.o. male who has a past medical history of depression, polysubstance use disorder including crack cocaine, THC and alcohol use who presented to the emergency department wanting to end his life. Per ED records, \"  Carl Cardenas is a 27 y.o. male who presents with suicidal ideation with plan to overdose on fentanyl. Patient said that he had access to a needle and knew where the fentanyl dealers are. Has had previous admission [facility] for suicidal ideation in August. Patient says that he was discharged on antidepressants but he is homeless and has been unable to fill the prescriptions. At diagnostic assessment patient endorses symptoms of depression including low mood, difficulty with sleep, significant anhedonia and feeling up guilt and worthlessness related to his ongoing struggles with crack cocaine. He reports that he has been smoking for the last year is unable to control his drug habit. He reports poor motivation, difficulty with concentration and feelings of helplessness and hopelessness. He reports that he has not seen his children in a long time his drug and he feels that his life is no longer worth living. He continues to endorse suicidal ideation and appreciates the safety of the unit. He is able to contract for safety on this unit but reports that he would have killed himself if he were in the community. He reports the symptoms have been present more days than not, almost all day during the last 2 weeks. Mr. Julio Cesar Adames denies symptoms of ashish including grandiosity, decreased need for sleep, elevated mood or rapid speech.   He denies auditory or visual hallucinations or concerns that people are watching or talking about him. He denies that he receives messages from the media or believes that he has magical snell. He denies experiencing panic attacks. He does endorse symptoms of anxiety including excessive worry that leads to edginess, decreased concentration muscle tension and fatigue. Currently he rates his anxiety 8/10 on a 0-to-10 scale with 10 being the worst.  Patient does endorse that he likes things to be neat and organized but denies intrusive or persistent thoughts that require repetitive behaviors for relief. He denies symptoms related to PTSD including nightmares or flashbacks related to trauma that he may have experienced in his life. He denies that he had a difficult childhood. He reports his family was close and supportive untill the death of his mother in 2013 and everyone lost contact as she was \"the glue \". He denies a history of physical, sexual or emotional abuse. He does endorse a time when he utilizes cutting to relieve pain and he does reports feelings of chronic emptiness with unstable relationships and poor self-esteem. He reports labile mood and impulsivity as well as intense angry outbursts with a history of punching walls. Patient denies that he has ever participated in counseling and has no interest currently in exploring dialectical behavioral therapy. He is unable to verbalized where he was to follow-up at the last discharge but would like to be connected  possibly @ [facility] stating \"I need to get this sh**under control\". Patient does endorse a forensic history and reports that he was incarcerated for 10 months related to burglary. He does endorse aggression and violence history based on surviving on the streets and being exposed to drug dealer but he denies lack of empathy or remorse for his behaviors.   He denies that he was ever expelled from school due to fighting and he was never diagnosed with childhood conduct disorder. Discussed the milieu groups and protocols and patient agrees that if he becomes irritable or angry he will reach out to the support team for one-to-one time. He contracts for safety on this unit not to harm himself or others. Additionally discussed previously trialed medications and he is agreeable to restart venlafaxine as sertraline had caused GI discomfort. History of head trauma: [x] Yes [] No    History of seizures: [] Yes [x] No    History of violence or aggression: [x] Yes [] No         PSYCHIATRIC HISTORY:  [x] Yes [] No    Currently follows with no community provider. Previously linked with [employer]   He is requesting AOD residential.    Several lifetime suicide attempts by cutting and overdose    Several psychiatric hospital admissions including most recently [facility] 7/31/2021    Home Medication Compliance: [] Yes [x] No patient reports lack of resources    Past psychiatric medications includes: trazodone, venlafaxine, Zoloft    Adverse reactions from psychotropic medications: [x] Yes [] No GI discomfort with Zoloft         Lifetime Psychiatric Review of Systems         Depression: Endorses     Anxiety: Endorses     Panic Attacks: Denies     Lizett or Hypomania: Denies     Phobias: Reports fear of spiders and other people's blood, does not meet criteria     Obsessions and Compulsions: Reports inability to leave a discarded paper on the floor, does not meet criteria     Visual Hallucinations: Denies     Auditory Hallucinations: Denies     Delusions: Denies     Paranoia: Denies     PTSD: Denies    Past Medical History:        Diagnosis Date    [facility]     Anxiety     Bipolar 1 disorder ([facility]     Cocaine abuse ([facility]     Depression     Hypertension     Irregular heartbeat     Mild tetrahydrocannabinol (THC) abuse     Suicidal ideation        Past Surgical History:        Procedure Laterality Date    DIALYSIS FISTULA CREATION Right 2017    RIGHT WRIST WOUND EXPLORATION WITH ARTERIAL REPAIR ULNAR AND RADIAL ARTERIES RIGHT WRIST performed by Chanell Sheppard MD at [facility] Right 2017    WOUND EXPLORATION AND/OR REVISION performed by Chanell Sheppard MD at [address] [place] [address] Rd Left     OTHER SURGICAL HISTORY Right 2017    exp and repair of unlar and radial artery with exp and repair of 12 tendons and 2 nerves       Allergies:  Vicodin [hydrocodone-acetaminophen]         Social History:     Born in: [place]  Family: Reports \"they all dead \". Shares that he currently has 1 brother and 1 on TV in the [address] [place] [address] area that are supportive. Father  when he was 14, mother's death , grandmother's death 2 years ago, sister's death last year. Highest Level of Education: GED left [employer] 10th grade  Occupation: Previously worked at for [employer] of [address] [place] [address]. Reports that he likes his job but he has been smoking crack cocaine for the last year. Marital Status: Single   Children: 4-3 live locally and one in [place]. Denies that he has seen them recently due to his ongoing drug use  Residence: Reports he has been living in various shelters  Stressors: drug, use, financial concerns, homeless  Patient Assets/Supportive Factors:  Willingness to ask for help         DRUG USE HISTORY  Social History     Tobacco Use   Smoking Status Current Every Day Smoker    Packs/day: 0.50    Years: 14.00    Pack years: 7.00    Types: Cigarettes   Smokeless Tobacco Never Used   Tobacco Comment    3 cigs per day per patient     Social History     Substance and Sexual Activity   Alcohol Use Not Currently     Social History     Substance and Sexual Activity   Drug Use Not Currently    Types: Marijuana ([employer]os Seller), Cocaine       Crack cocaine and THC reports using more than anticipated and prioritizing drug use over nutrition  Denies recent alcohol use or concerns regarding alcohol withdrawal. LEGAL HISTORY:   HISTORY OF INCARCERATION: [x] Yes [] No as noted 10-month related to burglary  Family History:       Problem Relation Age of Onset    Heart Disease Father 52    Asthma Brother     Hypertension Maternal Grandmother     Lung Cancer Maternal Grandmother         Kidney cancer, liver cancer    Stroke Maternal Grandmother     Heart Disease Mother 46         of presumed heart disease in her sleep       Psychiatric Family History    Patient denies psychiatric family history. Suicides in family: [] Yes [x] No    Substance use in family: [x] Yes [] No reports mother and father use crack cocaine         PHYSICAL EXAM:  Vitals:  /71   Pulse 61   Temp 97.6 °F (36.4 °C) (Oral)   Resp 14   Ht 5' 10\" (1.778 m)   Wt 162 lb (73.5 kg)   SpO2 98%   BMI 23.24 kg/m²     Pain Level: Denies current pain or discomfort    LABS:  Labs reviewed: [x] Yes  K 3.2 oral supplement to be provided once  BAL unremarkable  UDS + for cocaine and cannabinoids   Last EKG in EMR reviewed: [x] Yes  QTc: 472 on 2020          Review of Systems   Constitutional: Negative for chills and weight loss. HENT: Negative for ear pain and nosebleeds. Eyes: Negative for blurred vision and photophobia. Respiratory: Negative for cough, shortness of breath and wheezing. Cardiovascular: Negative for chest pain and palpitations. Gastrointestinal: Negative for abdominal pain, diarrhea and vomiting. Genitourinary: Negative for dysuria and urgency. Musculoskeletal: Negative for falls and joint pain. Skin: Negative for itching and rash. Neurological: Negative for tremors, seizures and weakness. Endo/Heme/Allergies: Does not bruise/bleed easily. Physical Exam:   Constitutional:  Appears well-developed and well-nourished, no acute distress. HENT:   Head: Normocephalic and atraumatic. Eyes: Conjunctivae are normal. Right eye exhibits no discharge. Left eye exhibits no discharge. No scleral icterus. TREATMENT PLAN    Continue inpatient psychiatric treatment. Home medications reviewed. Start venlafaxine 37.5 mg daily and titrate as tolerable  Potassium chloride 20 mEq once, will then recheck potassium  Problem list updated. Monitor need and frequency of PRN medications. Attempt to develop insight. Follow-up daily while inpatient. Reviewed risks and benefits as well as potential side effects with patient. CONSULTS [] Yes [x] No    Risk Management: close watch per standard protocol      Psychotherapy: participation in milieu and group and individual sessions with Attending Physician,  and Physician Assistant/CNP      Estimated length of stay:  2-14 days      GENERAL PATIENT/FAMILY EDUCATION  Patient will understand basic signs and symptoms, patient will understand benefits/risks and potential side effects from proposed medications, and patient will understand their role in recovery. Family is not currently active in patient's care however patient reports he will call his monthly once he is feeling better. Patient assets that may be helpful during treatment include: Intent to participate and engage in treatment, sufficient fund of knowledge and intellect to understand and utilize treatments. Goals:    1) Remission of suicidal ideation  2) Stabilization of symptoms prior to discharge. 3) Establish efficacy and tolerability of medications. Behavioral Services  Medicare Certification     Admission Day 1  I certify that this patient's inpatient psychiatric hospital admission is medically necessary for:    x (1) treatment which could reasonably be expected to improve this patient's condition, or    x (2) diagnostic study or its equivalent. Time Spent: 45 minutes    Latonya Marr is a 27 y.o. male being evaluated face to face    --ROXANNE Payan CNP on 11/19/2021 at 1:31 PM    An electronic signature was used to authenticate this note.      I independently saw and evaluated the patient. I reviewed the nurse practitioners documentation above. Any additional comments or changes to the nurse practitioners documentation are stated below otherwise agree with assessment. Plan will be as follows:  Time spent: 60 minutes in face-to-face, review of records, and discussion of treatment plan. Patient has multiple illness, mood disorder with depressive symptoms and suicidal ideation, unable to contract for safety outside of the hospital.  Substance use issues, states he is determined to work on getting clean, wants to explore inpatient rehab. Does not engage in appropriate therapy in the past.  Prominent personality traits. Will monitor patient in hospital.  Agree with medication adjustments above, discussed with patient.     Electronically signed by Es Pérez MD on 11/19/2021 at 3:11 PM

## 2021-11-19 NOTE — ED NOTES
The following labs labeled with pt sticker and tubed to lab:     [] Blue     [] Lavender   [] on ice  [] Green/yellow  [] Green/black [] on ice  [] Yellow  [] Red  [] Pink      [] COVID-19 swab    [] Rapid  [] PCR  [] Peds Viral Panel     [x] Urine Sample  [] Pelvic Cultures  [] Blood Cultures     .      Roxanne Hurtado RN  11/19/21 3050

## 2021-11-19 NOTE — GROUP NOTE
Group Therapy Note    Date: 11/19/2021    Group Start Time: 1005  Group End Time: 1706  Group Topic: Psychotherapy    [facility]    Page Headings, MSW, LSW        Group Therapy Note    Attendees: 5/15       Patient was offered group therapy today but declined to participate despite encouragement from staff. 1:1 was offered.       Signature:  Page Headings, MSW, LSW

## 2021-11-19 NOTE — ED NOTES
The following labs labeled with pt sticker and tubed to lab:     [] Blue     [x] Lavender   [] on ice  [x] Green/yellow  [] Green/black [] on ice  [] Yellow  [] Red  [] Pink      [] COVID-19 swab    [] Rapid  [] PCR  [] Peds Viral Panel     [] Urine Sample  [] Pelvic Cultures  [] Blood Cultures            Delma Mao RN  11/19/21 3816

## 2021-11-19 NOTE — ED NOTES
KISHAN contacted [facility]. KISHAN discussed the patient with Dr. Dlemar Lazo. Patient will be accepted to the [facility] under the care of Dr Delmar Lazo.

## 2021-11-19 NOTE — PROGRESS NOTES
[facility]  Admission Note     Admission Type:   Admission Type: Voluntary    Reason for admission:  Reason for Admission: suicidal with a plan to overdose on fentynal    PATIENT STRENGTHS:  Strengths: Communication, Motivated, Social Skills, No significant Physical Illness    Patient Strengths and Limitations:  Limitations: Hopeless about future, Difficulty problem solving/relies on others to help solve problems, Inappropriate/potentially harmful leisure interests, Tendency to isolate self    Addictive Behavior:   Addictive Behavior  In the past 3 months, have you felt or has someone told you that you have a problem with:  : None  Do you have a history of Chemical Use?: No  Do you have a history of Alcohol Use?: No  Do you have a history of Street Drug Abuse?: Yes  Histroy of Prescripton Drug Abuse?: No    Medical Problems:   Past Medical History:   Diagnosis Date    [facility]     Anxiety     Bipolar [facility] disorder [facility]     [facility] abuse ([facility]     Depression     Hypertension     Irregular heartbeat     Mild tetrahydrocannabinol (THC) abuse     Suicidal ideation        Status EXAM:  Status and Exam  Normal: No  Facial Expression: Worried  Affect: Blunt  Level of Consciousness: Alert  Mood:Normal: No  Mood: Depressed  Motor Activity:Normal: Yes  Interview Behavior: Cooperative  Attention:Normal: Yes  Thought Content:Normal: Yes  Hallucinations: None  Delusions: No  Memory:Normal: Yes  Insight and Judgment: No  Insight and Judgment: Poor Insight, Poor Judgment  Present Suicidal Ideation: No  Present Homicidal Ideation: No    Tobacco Screening:  Practical Counseling, on admission, rachna X, if applicable and completed (first 3 are required if patient doesn't refuse):            ( )  Recognizing danger situations (included triggers and roadblocks)                    ( )  Coping skills (new ways to manage stress, exercise, relaxation techniques, changing routine, distraction) ( )  Basic information about quitting (benefits of quitting, techniques in how to quit, available resources  ( ) Referral for counseling faxed to [facility]                                           (x ) Patient refused counseling  ( ) Patient has not smoked in the last 30 days    Metabolic Screening:    Lab Results   Component Value Date    LABA1C 5.6 06/18/2020       Lab Results   Component Value Date    CHOL 187 06/18/2020    CHOL 194 04/16/2018     Lab Results   Component Value Date    TRIG 98 06/18/2020    TRIG 120 04/16/2018     Lab Results   Component Value Date    HDL 50 06/18/2020    HDL 44 04/16/2018     No components found for: LDLCAL  No results found for: LABVLDL      Body mass index is 23.24 kg/m². BP Readings from Last 2 Encounters:   11/19/21 (!) 154/88   11/19/21 139/80           Pt admitted with followings belongings:  Dentures: None  Vision - Corrective Lenses: None  Hearing Aid: None  Jewelry: None  Body Piercings Removed: N/A  Clothing: Footwear, Jacket / coat, Pants, Shirt, Sweater, Undergarments (Comment)  Were All Patient Medications Collected?: Not Applicable  Other Valuables: Other (Comment) (Lighter, 1 ear bud, landyard, credit card cut in 2 pieces)     Patient's home medications to be reviewed by provider. Patient oriented to surroundings and program expectations and copy of patient rights given. Received admission packet. Consents reviewed. Refused  (wanted to sleep). Patient verbalize understanding. Patient education on precautions. Patient [facility] due to suicidal ideation to OD. He is hopeless and helpless about his drug use. He has lost everything to drugs and can't stop. He is pleasant and cooperative with admission. Patient reports very tired so wanted to sign paperwork in morning. He is homeless and has limited supports.                  Kelly Braga RN

## 2021-11-19 NOTE — ED NOTES
Pt arrived to the ED with Suicidal ideations. Pt states that he is feeling worthless/hopeless/depressed. Pt states that he does have a plan to harm himself, and that the plan is to overdose on fentanyl. Pt states that he is homeless. Pt also states that he is having right hand pain. Pt states that he previously had surgery on his hand and the pain is a 7 on a 0-10 scale. Pt states that his right hand feels numb and that when he goes to hold something he drops it without knowing. RR even and unlabored. No distress noted. Pt resting on stretcher.       Zenia Harrington RN  11/19/21 3049

## 2021-11-19 NOTE — ED PROVIDER NOTES
[facility] ED  Emergency Department Encounter  Emergency Medicine Resident     Pt Name: Mike Penaloza  MRN: [id number]  [place] 1990  Date of evaluation: 21  PCP:  Shauna Shane       Chief Complaint   Patient presents with    Suicidal       HISTORY OFPRESENT ILLNESS  (Location/Symptom, Timing/Onset, Context/Setting, Quality, Duration, Modifying Factors,Severity.)      Mike Penaloza is a 27 y.o. male who presents with suicidal ideation with plan to overdose on fentanyl. Patient said that he had access to a needle and knew where the fentanyl dealers are. Has had previous admission to [facility] for suicidal ideation in August. Patient says that he was discharged on antidepressants but he is homeless and has been unable to fill the prescriptions. PAST MEDICAL / SURGICAL / SOCIAL / FAMILY HISTORY      has a past medical history of Alcoholism ([address] Anxiety, Bipolar 1 disorder ([address] Cocaine abuse ([address] Depression, Hypertension, Irregular heartbeat, Mild tetrahydrocannabinol (THC) abuse, and Suicidal ideation. has a past surgical history that includes other surgical history (Right, 2017); Dialysis fistula creation (Right, 2017); EXPLORATION OF WOUND OF EXTREMITY (Right, 2017); and Femur Surgery (Left). Social:  reports that he has been smoking cigarettes. He has a 7.00 pack-year smoking history. He has never used smokeless tobacco. He reports previous alcohol use. He reports previous drug use. Drugs: Marijuana (Weed) and Cocaine.     Family Hx:   Family History   Problem Relation Age of Onset    Heart Disease Father 52    Asthma Brother     Hypertension Maternal Grandmother     Lung Cancer Maternal Grandmother         Kidney cancer, liver cancer    Stroke Maternal Grandmother     Heart Disease Mother 46         of presumed heart disease in her sleep        Allergies:  Vicodin [hydrocodone-acetaminophen]    Home Medications:  Prior to Admission medications    Medication Sig Start Date End Date Taking? Authorizing Provider   traZODone (DESYREL) 150 MG tablet Take 1 tablet by mouth nightly as needed for Sleep 8/4/21   Shar Krishnamurthy MD   venlafaxine (EFFEXOR XR) 37.5 MG extended release capsule Take 1 capsule by mouth daily (with breakfast) 8/5/21   Shar Krishnamurthy MD       REVIEW OFSYSTEMS    (2-9 systems for level 4, 10 or more for level 5)      Review of Systems   Constitutional: Negative for appetite change, chills, fatigue and fever. HENT: Negative for congestion, rhinorrhea, sneezing and sore throat. Eyes: Negative for visual disturbance. Respiratory: Negative for cough and shortness of breath. Cardiovascular: Negative for chest pain and leg swelling. Gastrointestinal: Negative for abdominal pain, diarrhea, nausea and vomiting. Genitourinary: Negative for dysuria. Musculoskeletal: Negative for myalgias, neck pain and neck stiffness. Skin: Negative for rash and wound. Neurological: Negative for dizziness, syncope, light-headedness and headaches. Psychiatric/Behavioral: Positive for dysphoric mood, sleep disturbance and suicidal ideas. Negative for agitation, hallucinations and self-injury. PHYSICAL EXAM   (up to 7 for level 4, 8 or more forlevel 5)      INITIAL VITALS:   Vitals:    11/19/21 0157   BP: 139/80   Pulse: 68   Resp: 16   Temp: 97.3 °F (36.3 °C)   SpO2: 98%        Physical Exam  Vitals and nursing note reviewed. Constitutional:       General: He is not in acute distress. Appearance: Normal appearance. He is normal weight. He is not ill-appearing, toxic-appearing or diaphoretic. HENT:      Nose: Nose normal.      Mouth/Throat:      Mouth: Mucous membranes are moist.      Pharynx: Oropharynx is clear. Eyes:      Extraocular Movements: Extraocular movements intact. Conjunctiva/sclera: Conjunctivae normal.      Pupils: Pupils are equal, round, and reactive to light. Cardiovascular:      Rate and Rhythm: Normal rate and regular rhythm. Pulses: Normal pulses. Heart sounds: Normal heart sounds. Pulmonary:      Effort: Pulmonary effort is normal.      Breath sounds: Normal breath sounds. Abdominal:      General: There is no distension. Palpations: Abdomen is soft. Tenderness: There is no abdominal tenderness. There is no right CVA tenderness, left CVA tenderness or guarding. Musculoskeletal:         General: Normal range of motion. Cervical back: Normal range of motion and neck supple. Skin:     General: Skin is warm. Capillary Refill: Capillary refill takes less than 2 seconds. Neurological:      General: No focal deficit present. Mental Status: He is alert and oriented to person, place, and time. Psychiatric:         Attention and Perception: Attention and perception normal.         Mood and Affect: Mood is depressed. Affect is flat. Speech: Speech normal.         Behavior: Behavior normal.         Thought Content: Thought content includes suicidal ideation. Thought content does not include homicidal ideation. Thought content includes suicidal plan. Thought content does not include homicidal plan. Cognition and Memory: Cognition and memory normal.         Judgment: Judgment normal.         DIFFERENTIAL  DIAGNOSIS     Initial MDM/Plan: 27 y.o. male who presents with suicidal ideation, wants to inject himself with fentanyl and has access to needles and fentanyl dealer. Is homeless, unable to access his antidepressants. Vital signs are within normal limits. Physical examination showing chronic appearing deformity to the right hand. Mental status examination showing depressed mood, flat affect, suicidal ideation and plan of suicide. He is not actively hallucinating and denies any homicidal ideation. Plan to obtain basic labs and admit to [facility] for suicidal ideation.     DIAGNOSTIC RESULTS / EMERGENCYDEPARTMENT COURSE / MDM     LABS:  Labs Reviewed   CBC WITH AUTO DIFFERENTIAL - Abnormal; Notable for the following components:       Result Value    Hematocrit 40.6 (*)     All other components within normal limits   COMPREHENSIVE METABOLIC PANEL - Abnormal; Notable for the following components:    Glucose 118 (*)     Potassium 3.2 (*)     Total Bilirubin 0.18 (*)     All other components within normal limits   COVID-19, RAPID   ETHANOL   URINE DRUG SCREEN       RADIOLOGY:  XR HAND RIGHT (MIN 3 VIEWS)    Result Date: 11/19/2021  EXAMINATION: THREE XRAY VIEWS OF THE RIGHT HAND 11/19/2021 3:24 am COMPARISON: 05/24/2020 HISTORY: ORDERING SYSTEM PROVIDED HISTORY: Right hand pain with h/o prior dislocation TECHNOLOGIST PROVIDED HISTORY: Right hand pain with h/o prior dislocation FINDINGS: No dislocation or acute fracture. Old healed fractures at the bases of the 3rd, 4th and 5th metacarpals. Chronic subluxation at the 1st MCP joint. Bone density and soft tissues are normal.     No acute abnormality evident. Multiple old posttraumatic abnormalities. EMERGENCY DEPARTMENT COURSE:  ED Course as of 11/19/21 0407   Fri Nov 19, 2021   6379 Medically cleared for transfer to [facility] [JT]      ED Course User Index  [JT] Linden Canela MD         PROCEDURES:  None    CONSULTS:  None      FINAL IMPRESSION      1. Suicidal ideation      DISPOSITION / PLAN     DISPOSITION Decision To Transfer 11/19/2021 02:21:03 AM - [facility]    PATIENT REFERRED TO:  No follow-up provider specified.     DISCHARGE MEDICATIONS:  New Prescriptions    No medications on file       Linden Canela MD  Emergency Medicine Resident    (Please note that portions of this note were completed with a voice recognition program.Efforts were made to edit the dictations but occasionally words are mis-transcribed.)        Linden Canela MD  Resident  11/19/21 0446

## 2021-11-19 NOTE — PLAN OF CARE
Problem: Altered Mood, Depressive Behavior:  Goal: Able to verbalize and/or display a decrease in depressive symptoms  Description: Able to verbalize and/or display a decrease in depressive symptoms  Outcome: Ongoing     Problem: Altered Mood, Depressive Behavior:  Goal: Absence of self-harm  Description: Absence of self-harm  Outcome: Ongoing     Problem: Suicide risk  Goal: Provide patient with safe environment  Description: Provide patient with safe environment  Outcome: Ongoing   Patient is isolative to room during this shift. Patient denies all at this time and is only out for needs. Patient is cooperative with shift assessment. Q15 minute and random safety checks maintained.

## 2021-11-19 NOTE — CARE COORDINATION
BHI Biopsychosocial Assessment    Current Level of Psychosocial Functioning     Independent X   Dependent    Minimal Assist     Comments:    Psychosocial High Risk Factors (check all that apply)    Unable to obtain meds   Chronic illness/pain    Substance abuse X   Lack of Family Support   Financial stress X   Isolation   Inadequate Community Resources  Suicide attempt(s)  Not taking medications X    Victim of crime    Developmental Delay  Unable to manage personal needs    Age 72 or older   Homeless X   No transportation   Readmission within 30 days  Unemployment X   Traumatic Event    Comments:   Psychiatric Advanced Directives:  N/A   Family to Involve in Treatment:   Pt declined to involve his family in his tx. Sexual Orientation:    Heterosexual   Patient Strengths:  Pt is independent in self-care activities. Patient Barriers:   Pt is not motivated at this time. Opiate Education Provided:  Unable to provide as pt is sleeping. CMHC/mental health history:  [facility]  Plan of Care   medication management, group/individual therapies, family meetings, psycho -education, treatment team meetings to assist with stabilization    Initial Discharge Plan:    Unable to plan at this time, as pt is sleeping. Clinical Summary:    Pt is a 27 y.o. black male who presented to the ED with SI to OD on Fetanyl. Pt was uncooperative during assessment, due to sleeping, therefore the vast majority of assessment information was taken from previous notes. Pt is linked with [facility]. Pt has been off his medication. Pt is homeless. Pt has no \"legal income\" per assessment in March 2021. Pt has support from his friend and brother. Pt denied trauma hx. Pt has no known legal issues.

## 2021-11-19 NOTE — GROUP NOTE
Group Therapy Note    Date: 11/19/2021    Group Start Time: 1100  Group End Time: 8491  Group Topic: Recreational    [facility] TRACI Buckner        Group Therapy Note    Pt did not attend recreational group d/t resting in room despite staff invitation to attend. 1:1 talk time offered as alternative to group session, pt declined.

## 2021-11-19 NOTE — PLAN OF CARE
[facility]  Initial Interdisciplinary Treatment Plan NO      Original treatment plan Date & Time: 11/19/2021   0831    Admission Type:  Admission Type: Voluntary    Reason for admission:   Reason for Admission: suicidal with a plan to overdose on fentynal    Estimated Length of Stay:  5-7days  Estimated Discharge Date: to be determined by physician    PATIENT STRENGTHS:  Patient Strengths:Strengths: Communication, Motivated, Social Skills, No significant Physical Illness  Patient Strengths and Limitations:Limitations: Hopeless about future, Difficulty problem solving/relies on others to help solve problems, Inappropriate/potentially harmful leisure interests, Tendency to isolate self  Addictive Behavior: Addictive Behavior  In the past 3 months, have you felt or has someone told you that you have a problem with:  : None  Do you have a history of Chemical Use?: No  Do you have a history of Alcohol Use?: No  Do you have a history of Street Drug Abuse?: Yes  Histroy of Prescripton Drug Abuse?: No  Medical Problems:  Past Medical History:   Diagnosis Date    Alcoholism ([facility]     Anxiety     Bipolar 1 disorder ([facility]     Cocaine abuse ([facility]     Depression     Hypertension     Irregular heartbeat     Mild tetrahydrocannabinol (THC) abuse     Suicidal ideation      Status EXAM:Status and Exam  Normal: No  Facial Expression: Worried  Affect: Blunt  Level of Consciousness: Alert  Mood:Normal: No  Mood: Depressed  Motor Activity:Normal: Yes  Interview Behavior: Cooperative  Attention:Normal: Yes  Thought Content:Normal: Yes  Hallucinations: None  Delusions: No  Memory:Normal: Yes  Insight and Judgment: No  Insight and Judgment: Poor Insight, Poor Judgment  Present Suicidal Ideation: No  Present Homicidal Ideation: No    EDUCATION:   Learner Progress Toward Treatment Goals: reviewed group plans and strategies for care    Method:group therapy, medication compliance, individualized assessments and care planning    Outcome: needs reinforcement    PATIENT GOALS: to be discussed with patient within 72 hours    PLAN/TREATMENT RECOMMENDATIONS:     continue group therapy , medications compliance, goal setting, individualized assessments and care, continue to monitor pt on unit      SHORT-TERM GOALS:   Time frame for Short-Term Goals: 5-7 days    LONG-TERM GOALS:  Time frame for Long-Term Goals: 6 months  Members Present in Team Meeting: See Signature Sheet    Eura Puls

## 2021-11-19 NOTE — PROGRESS NOTES
Patient given tobacco quitline number 75753747272 at this time, refusing to call at this time, states \" I just dont want to quit now\"- patient given information as to the dangers of long term tobacco use. Continue to reinforce the importance of tobacco cessation.

## 2021-11-19 NOTE — ED NOTES
[] Pasquale    [] [address]    [x]  [facility]      Y  N     [x] [] In the past two weeks have you had thoughts of hurting yourself in any way? [x] [] In the past two weeks have you had thoughts that you would be better off dead? [] [x] Have you made a suicide attempt in the past two months? [x] [] Do you have a plan for hurting yourself or suicide? [] [x] Presence of hallucinations/voices related to hurting himself or herself or someone else. SUICIDE/SECURITY WATCH PRECAUTION CHECKLIST     Orders    [x]  Suicide/Security Watch Precautions initiated as checked below:   11/19/21 2:06 AM EST BH31/BH31D    [x] Notified physician:  Elizabeth Keating MD  11/19/21 2:06 AM EST    [x] Orders obtained as appropriate:     [] 1:1 Observer     [] Psych Consult     [] Psych Consult    Name:  Date:  Time:    [x] 1:1 Observer, Notified by:  Lawrence Myers RN    Contact Nurse Supervisor    [x] Remove all personal clothes from room and place in snap/paper gown/pants. Slipper only    [x] Remove all personal belongings from room and secured away from patient. Documentation    [x] Initiate Suicide/Security Watch Precaution Flow Sheet    [x] Initiate individualized Care Plan/Problem    [x] Document why precautions initiated on flow sheet (Initiate Nursing Care Plan/Problem)    [x] 1:1 Observer in place; instructions provided. Suicide precautions require observer be within arms length. [x] Nurse-Observer Communication Hand-off initiated by RN, reviewed with Observer. Subsequently used as Hand Off between Observers. [x] Initiate every 15 minute observations per observer as delegated by the RN.     [x] Initiate RN assessment and documentation    Environmental Scan  Search Criteria and Process: OPTIONAL, see Search Policy    [] Reason for search:    [] Nursing in presence of second person to search patient    [] Patient notified of reason for body assessment and belongings search:     Persons present during search:   Results of search and disposition:       Searchers Name: [employer]     These items or items similar should be removed from the room:   [x] Chairs   [x] Telephone   [x] Trash cans and liners   [x] Plastic utensils (order Patient Safety tray)   [x] Empty or remove Sharps containers   [x] All personal clothing/belongings removed   [x] All unnecessary lead wires, electrical cords, draw cords, etc.   [x] Flowers and plants   [x] Double check for lighters, matches, razors, any glass items etc that can be used as weapons. Person completing Checklist: Maddison Puri RN       GENERAL INFORMATION     Y  N     [x] [] Has the patient been informed that they are on a watch and what that means? [x] [] Can the patient get out of Bed without nursing assistance? [x] [] Can the patient use the restroom without nursing assistance? [x] [] Can the patient walk the halls to [place] their legs? \"   [] [x] Does the patient have metal utensils? [x] [] Have the patient's belongings been placed out of control of the patient? [x] [] Have the patient and his/her belongings been checked for contraband? [x] [] Is the patient under any visitor restrictions? If Yes, explain:SI   [] [x] Is the patient under an alias? [facility] Name:   Authorized visitors (no more than two are to be on the list)   Name/Relationship:   Name/Relationship:    Name of Staff member that you  Received this information from?: Writer    General Description:    [address] male 27 y.o. Admission weight: 185 lb (83.9 kg)    Race: []  [x] Black  []   []   [] [place] [] Other  Facial Hair:  [x] Yes  [] No  If yes, please describe: Identifying Marks (i.e. Visible tattoos, scars, etc... ):     NURSING CARE PLAN    Nursing Diagnosis: Risk of Self Directed Harm  [] Actual  [] Potential  Date Started: 11/19/2021  Etiological Factors: (related to)  [x] Expressed or implied suicidal ideation/behavior  [x] Depression  [] Suicide attempt      [] Low self-esteem  [] Hallucinations      [x] Feeling of Hopelessness  [] Substance abuse or withdrawal    [] Dysfunctional family  [] Major traumatic event, eg., divorce, etc   [] Excessive stress/anxiety    11/19/21    Expected Outcomes    Patient will:   [] Patient will remain safe for the duration of their stay   [] Patient's environment will be safe, eg. Free of potential suicide weapons   [] Verbalize Recovery from suicidal episode and improvement in self-worth   [] Discuss feeling that precipitated suicide attempt/thoughts/behavior   [] Will describe available resources for crisis prevention and management   [] Will verbalize positive coping skills     Nursing Intervention   [x] Assessment and Observations hourly   [x] Suicide Precautions implemented with patient, should be 1:1 observation   [x] Document observation [id number] and RN assessment hourly   [] Consult physician for:    [] Psychiatric consult    [] Pharmacological therapy    [] Other:    [x] Patient search completed by security   [x] Initiated appropriate safety protocols by removing from the patient's environment anything that could be used to inflict self injury, eg. Order safe tray, snap gown, etc   [x] Maintain open, warm, caring, non-judgmental attitude/manner towards patient   [] Discuss advantages and disadvantages of existing coping methods/skills   [x] Assist and educate patient with identifying present strengths and coping skills   [x] Keep patient informed regarding plan of care and provide clear concise explanations. Provide the patient/family education information as well as telephone numbers and other information about crisis centers, hot lines, and counselors.     Discharge Planning:   [] Referral  [] Groups [] Health agencies  [] Other:            Taz Valadez RN  11/19/21 8724

## 2021-11-19 NOTE — GROUP NOTE
Group Therapy Note    Date: 11/19/2021    Group Start Time: 1330  Group End Time: 0034  Group Topic: Music Therapy    [facility] TRACI Maynard        Group Therapy Note    Pt did not attend music therapy group d/t resting in room despite staff invitation to attend. 1:1 talk time offered as alternative to group session, pt declined.

## 2021-11-19 NOTE — PROGRESS NOTES
Pharmacy Medication History Note      List of current medications patient is taking is complete. Source of information: [facility]    Changes made to medication list:  Medications removed (include reason, ex. therapy complete or physician discontinued, noncompliance):  none    Medications added/doses adjusted:  none    Other notes (ex. Recent course of antibiotics, Coumadin dosing):  Patient has not filled prescriptions since he participated in beds to meds upon discharge from the [facility] in August.      Please let me know if you have any questions about this encounter. Thank you!     Electronically signed by Radha Edmonds [facility] on 11/19/2021 at 9:08 AM

## 2021-11-19 NOTE — PROGRESS NOTES
Behavioral Services  Medicare Certification Upon Admission    I certify that this patient's inpatient psychiatric hospital admission is medically necessary for:    [x] (1) Treatment which could reasonably be expected to improve this patient's condition,       [x] (2) Or for diagnostic study;     AND     [x](2) The inpatient psychiatric services are provided while the individual is under the care of a physician and are included in the individualized plan of care.     Estimated length of stay/service 4 to 7 days    Plan for post-hospital care Home versus inpatient substance use rehab with outpatient community mental health follow-up    Electronically signed by Vibha Galan MD on 11/19/2021 at 3:11 PM

## 2021-11-19 NOTE — ED NOTES
..Provisional Diagnosis:   Hx of Depression     Psychosocial and Contextual Factors:   Homeless Drug Use     C-SSRS Summary:      Patient: X  Family:   Agency: [facility]     Substance Abuse: Cocaine and Fentanyl Use     Present Suicidal Behavior:      Verbal:  Yes    Attempt: Denied     Past Suicidal Behavior:     Verbal: Yes    Attempt: Denied       Self-Injurious/Self-Mutilation: Denied       Violence Current or Past  Denied       Trauma Identified: Denied     Protective Factors: Willing to sign into treatment       Risk Factors:    Suicidal with a plan       Clinical Summary:    The patient is a 27year old male with a history of Depression. The patient came to the ED today due to feeling suicidal. The patient does endorse a suicidal plan or injecting himself with enough fentanyl to kill himself. Patient states that he has been depressed, hopeless and helpless. Patient states, \"I once had everything but since I started using drugs I lost everything. \" Patient denied any real intentions to wanting to stop using drugs despite his acknowledgment of how using drugs have hurt him. The patient reports that because of his drug use he is homeless and has no money to feed himself. Patient reports that he has been connected to [facility] but not compliant with medications or treatment recommendations. The patient was last admitted to the [facility] on on 07.31.2021. Patient reports that he uses cocaine and Fentanyl. Patient denied any every day alcohol use. Patient states that he is not able to contract for safety. Level of Care Disposition:    ~Await medical clearance and then contact [facility] for psyc disposition.

## 2021-11-20 PROCEDURE — 6370000000 HC RX 637 (ALT 250 FOR IP): Performed by: PSYCHIATRY & NEUROLOGY

## 2021-11-20 PROCEDURE — 6360000002 HC RX W HCPCS: Performed by: PSYCHIATRY & NEUROLOGY

## 2021-11-20 PROCEDURE — 1240000000 HC EMOTIONAL WELLNESS R&B

## 2021-11-20 PROCEDURE — 99232 SBSQ HOSP IP/OBS MODERATE 35: CPT | Performed by: PSYCHIATRY & NEUROLOGY

## 2021-11-20 RX ORDER — LOPERAMIDE HYDROCHLORIDE 2 MG/1
2 CAPSULE ORAL 4 TIMES DAILY PRN
Status: DISCONTINUED | OUTPATIENT
Start: 2021-11-20 | End: 2021-11-24 | Stop reason: HOSPADM

## 2021-11-20 RX ORDER — PROMETHAZINE HYDROCHLORIDE 25 MG/ML
6.25 INJECTION, SOLUTION INTRAMUSCULAR; INTRAVENOUS EVERY 6 HOURS PRN
Status: DISCONTINUED | OUTPATIENT
Start: 2021-11-20 | End: 2021-11-21

## 2021-11-20 RX ORDER — ONDANSETRON 4 MG/1
4 TABLET, ORALLY DISINTEGRATING ORAL ONCE
Status: COMPLETED | OUTPATIENT
Start: 2021-11-20 | End: 2021-11-20

## 2021-11-20 RX ORDER — CYCLOBENZAPRINE HCL 10 MG
10 TABLET ORAL 3 TIMES DAILY PRN
Status: DISCONTINUED | OUTPATIENT
Start: 2021-11-20 | End: 2021-11-23

## 2021-11-20 RX ORDER — CLONIDINE HYDROCHLORIDE 0.1 MG/1
0.1 TABLET ORAL EVERY 4 HOURS PRN
Status: DISCONTINUED | OUTPATIENT
Start: 2021-11-20 | End: 2021-11-23

## 2021-11-20 RX ADMIN — CLONIDINE HYDROCHLORIDE 0.1 MG: 0.1 TABLET ORAL at 21:51

## 2021-11-20 RX ADMIN — PROMETHAZINE HYDROCHLORIDE 6.25 MG: 25 INJECTION INTRAMUSCULAR; INTRAVENOUS at 20:14

## 2021-11-20 RX ADMIN — CYCLOBENZAPRINE 10 MG: 10 TABLET, FILM COATED ORAL at 11:06

## 2021-11-20 RX ADMIN — CYCLOBENZAPRINE 10 MG: 10 TABLET, FILM COATED ORAL at 17:44

## 2021-11-20 RX ADMIN — ONDANSETRON 4 MG: 4 TABLET, ORALLY DISINTEGRATING ORAL at 11:06

## 2021-11-20 RX ADMIN — HYDROXYZINE HYDROCHLORIDE 50 MG: 50 TABLET, FILM COATED ORAL at 21:51

## 2021-11-20 RX ADMIN — CYCLOBENZAPRINE 10 MG: 10 TABLET, FILM COATED ORAL at 21:51

## 2021-11-20 RX ADMIN — CLONIDINE HYDROCHLORIDE 0.1 MG: 0.1 TABLET ORAL at 11:06

## 2021-11-20 RX ADMIN — ACETAMINOPHEN 650 MG: 325 TABLET ORAL at 21:51

## 2021-11-20 RX ADMIN — TRAZODONE HYDROCHLORIDE 50 MG: 50 TABLET ORAL at 21:51

## 2021-11-20 RX ADMIN — ACETAMINOPHEN 650 MG: 325 TABLET ORAL at 05:46

## 2021-11-20 RX ADMIN — IBUPROFEN 400 MG: 400 TABLET ORAL at 21:51

## 2021-11-20 RX ADMIN — IBUPROFEN 400 MG: 400 TABLET ORAL at 06:57

## 2021-11-20 RX ADMIN — CLONIDINE HYDROCHLORIDE 0.1 MG: 0.1 TABLET ORAL at 17:44

## 2021-11-20 RX ADMIN — VENLAFAXINE HYDROCHLORIDE 37.5 MG: 37.5 CAPSULE, EXTENDED RELEASE ORAL at 08:05

## 2021-11-20 ASSESSMENT — PAIN SCALES - GENERAL
PAINLEVEL_OUTOF10: 3
PAINLEVEL_OUTOF10: 1
PAINLEVEL_OUTOF10: 1
PAINLEVEL_OUTOF10: 6
PAINLEVEL_OUTOF10: 9
PAINLEVEL_OUTOF10: 3
PAINLEVEL_OUTOF10: 6

## 2021-11-20 ASSESSMENT — PAIN DESCRIPTION - LOCATION: LOCATION: BACK;HEAD

## 2021-11-20 NOTE — PLAN OF CARE
[address] [place] [address]  Day 3 Interdisciplinary Treatment Plan NOTE    Review Date & Time: 11/20/2021 0925    Admission Type:   Admission Type: Voluntary    Reason for admission:  Reason for Admission: suicidal with a plan to overdose on fentynal  Estimated Length of Stay: 5-7 days  Estimated Discharge Date Update: to be determined by physician    PATIENT STRENGTHS:  Patient Strengths Strengths: Communication, Motivated, Social Skills, No significant Physical Illness  Patient Strengths and Limitations:Limitations: Difficult relationships / poor social skills, Inappropriate/potentially harmful leisure interests, Difficulty problem solving/relies on others to help solve problems  Addictive Behavior:Addictive Behavior  In the past 3 months, have you felt or has someone told you that you have a problem with:  : None  Do you have a history of Chemical Use?: No  Do you have a history of Alcohol Use?: No  Do you have a history of Street Drug Abuse?: Yes  Histroy of Prescripton Drug Abuse?: No  Medical Problems:  Past Medical History:   Diagnosis Date    Alcoholism ([facility]     Anxiety     Bipolar 1 disorder ([facility]     Cocaine abuse ([facility]     Depression     Hypertension     Irregular heartbeat     Mild tetrahydrocannabinol (THC) abuse     Suicidal ideation        Risk:  Fall RiskTotal: 63  Joao Scale Joao Scale Score: 22  BVC    Change in scores no Changes to plan of Care no    Status EXAM:   Status and Exam  Normal: No  Facial Expression: Flat  Affect: Appropriate  Level of Consciousness: Alert  Mood:Normal: No  Mood: Depressed, Anxious  Motor Activity:Normal: Yes  Motor Activity: Decreased  Interview Behavior: Cooperative  Preception: [place] to Person, [place] to Time, [place] to Situation, [place] to Place  Attention:Normal: No  Attention: Distractible  Thought Processes: Blocking  Thought Content:Normal: No  Thought Content: Poverty of Content  Hallucinations: None  Delusions: No  Memory:Normal: Yes  Insight and Judgment: No  Insight and Judgment: Poor Judgment, Poor Insight  Present Suicidal Ideation: No  Present Homicidal Ideation: No    Daily Assessment Last Entry:   Daily Sleep (WDL): Within Defined Limits         Patient Currently in Pain: Yes  Daily Nutrition (WDL): Within Defined Limits    Patient Monitoring:  Frequency of Checks: 4 times per hour, close    Psychiatric Symptoms:   Depression Symptoms  Depression Symptoms: Isolative, Loss of interest  Anxiety Symptoms  Anxiety Symptoms: Generalized  [place] Symptoms  [place] Symptoms: No problems reported or observed. Psychosis Symptoms  Delusion Type: No problems reported or observed. Suicide Risk CSSR-S:  1) Within the past month, have you wished you were dead or wished you could go to sleep and not wake up? : Yes  2) Have you actually had any thoughts of killing yourself? : Yes  3) Have you been thinking about how you might kill yourself? : Yes  5) Have you started to work out or worked out the details of how to kill yourself?  Do you intend to carry out this plan? : Yes  6) Have you ever done anything, started to do anything, or prepared to do anything to end your life?: Yes  Change in Result  NA  Change in Plan of care  NA       EDUCATION:   EDUCATION:   Learner Progress Toward Treatment Goals: Reviewed results and recommendations of this team, Reviewed group plan and strategies, Reviewed signs, symptoms and risk of self harm and violent behavior, Reviewed goals and plan of care    Method:small group, individual verbal education    Outcome:verbalized by patient, but needs reinforcement to obtain goals    PATIENT GOALS:  Short term: Patient declined to attend    Long term:     PLAN/TREATMENT RECOMMENDATIONS UPDATE: continue with group therapies, increased socialization, continue planning for after discharge goals, continue with medication compliance    SHORT-TERM GOALS UPDATE:   Time frame for Short-Term Goals: 5-7 days    LONG-TERM GOALS UPDATE:   Time frame for Long-Term Goals: 6 months  Members Present in Team Meeting: See Signature [id number]

## 2021-11-20 NOTE — PROGRESS NOTES
Daily Progress Note  11/20/2021    Patient Name: Nisreen Fishman    CHIEF COMPLAINT: Suicidal ideation with plan to overdose on fentanyl         SUBJECTIVE:    Nursing staff report the patient has maintained medication adherence and has not required emergency medications since admission. He is reporting significant withdrawal symptoms related to fentanyl use which was not reported at admission. Patient is diaphoretic and vomiting. At assessment he confirms he was able to tolerate some of his meal and plans to rest.  He reports previously utilizing Subutex at [facility] however there is no PDMP activity and patient denies follow-up recently. Patient is requesting medication to manage withdrawal symptoms and discussed that without community involvement are established AOD treatment we will not start Suboxone. Patient is aware that an additional as needed medications will be added and we will not titrate venlafaxine currently due to GI discomfort. Patient continues to endorse suicidal ideation and contracts for safety on this unit. He agrees to reach out to support team as needed for both emotional and physical support. He denies having questions or concerns at this time. Writer encouraged patient to attend groups on the unit. At this time, the patient is not appropriate for a lower level of care. There is risk of decompensation and patient warrants further hospitalization for safety and stabilization. Appetite:  [] Normal/Adequate/Unchanged  [] Increased  [x] Decreased      Sleep:       [] Normal/Adequate/Unchanged  [] Fair  [] Poor      Group Attendance on Unit:   [] Yes  [] Selectively    [x] No    Medication Side Effects: Patient denies any medication side effects at the time of assessment. Mental Status Exam  Level of consciousness: Alert and awake. Appearance: Appropriate attire for setting, standing, with fair  grooming and hygiene.    Behavior/Motor: Approachable, no psychomotor Fact sheet for Healthcare Providers: BuildHer.es  Fact sheet for Patients: BuildHer.es          Methodology: Isothermal Nucleic Acid Amplification      Ethanol 11/19/2021 <10  <10 mg/dL Final    Ethanol percent 11/19/2021 <0.010  <0.010 % Final    WBC 11/19/2021 9.1  3.5 - 11.3 k/uL Final    RBC 11/19/2021 4.40  4.21 - 5.77 m/uL Final    Hemoglobin 11/19/2021 13.4  13.0 - 17.0 g/dL Final    Hematocrit 11/19/2021 40.6* 40.7 - 50.3 % Final    MCV 11/19/2021 92.3  82.6 - 102.9 fL Final    MCH 11/19/2021 30.5  25.2 - 33.5 pg Final    MCHC 11/19/2021 33.0  28.4 - 34.8 g/dL Final    RDW 11/19/2021 12.2  11.8 - 14.4 % Final    Platelets 70/21/2057 278  138 - 453 k/uL Final    MPV 11/19/2021 8.9  8.1 - 13.5 fL Final    NRBC Automated 11/19/2021 0.0  0.0 per 100 WBC Final    Differential Type 11/19/2021 NOT REPORTED   Final    Seg Neutrophils 11/19/2021 59  36 - 65 % Final    Lymphocytes 11/19/2021 31  24 - 43 % Final    Monocytes 11/19/2021 8  3 - 12 % Final    Eosinophils % 11/19/2021 2  1 - 4 % Final    Basophils 11/19/2021 0  0 - 2 % Final    Immature Granulocytes 11/19/2021 0  0 % Final    Segs Absolute 11/19/2021 5.33  1.50 - 8.10 k/uL Final    Absolute Lymph # 11/19/2021 2.78  1.10 - 3.70 k/uL Final    Absolute Mono # 11/19/2021 0.69  0.10 - 1.20 k/uL Final    Absolute Eos # 11/19/2021 0.19  0.00 - 0.44 k/uL Final    Basophils Absolute 11/19/2021 0.04  0.00 - 0.20 k/uL Final    Absolute Immature Granulocyte 11/19/2021 <0.03  0.00 - 0.30 k/uL Final    WBC Morphology 11/19/2021 NOT REPORTED   Final    RBC Morphology 11/19/2021 NOT REPORTED   Final    Platelet Estimate 88/37/5201 NOT REPORTED   Final    Glucose 11/19/2021 118* 70 - 99 mg/dL Final    BUN 11/19/2021 13  6 - 20 mg/dL Final    CREATININE 11/19/2021 0.81  0.70 - 1.20 mg/dL Final    Bun/Cre Ratio 11/19/2021 NOT REPORTED  9 - 20 Final    Calcium 11/19/2021 9.3  8.6 - 10.4 mg/dL Final    Sodium 11/19/2021 136  135 - 144 mmol/L Final    Potassium 11/19/2021 3.2* 3.7 - 5.3 mmol/L Final    Chloride 11/19/2021 102  98 - 107 mmol/L Final    CO2 11/19/2021 23  20 - 31 mmol/L Final    Anion Gap 11/19/2021 11  9 - 17 mmol/L Final    Alkaline Phosphatase 11/19/2021 61  40 - 129 U/L Final    ALT 11/19/2021 15  5 - 41 U/L Final    AST 11/19/2021 17  <40 U/L Final    Total Bilirubin 11/19/2021 0.18* 0.3 - 1.2 mg/dL Final    Total Protein 11/19/2021 6.9  6.4 - 8.3 g/dL Final    Albumin 11/19/2021 4.0  3.5 - 5.2 g/dL Final    Albumin/Globulin Ratio 11/19/2021 1.4  1.0 - 2.5 Final    GFR Non- 11/19/2021 >60  >60 mL/min Final    GFR  11/19/2021 >60  >60 mL/min Final    GFR Comment 11/19/2021        Final    Comment: Average GFR for 30-36 years old:   80 mL/min/1.73sq m  Chronic Kidney Disease:   <60 mL/min/1.73sq m  Kidney failure:   <15 mL/min/1.73sq m              eGFR calculated using average adult body mass.  Additional eGFR calculator available at:        BetterLesson.[employer]            GFR Staging 11/19/2021 NOT REPORTED   Final    Amphetamine Screen, Ur 11/19/2021 NEGATIVE  NEGATIVE Final    Comment:       (Positive cutoff 1000 ng/mL)                  Barbiturate Screen, Ur 11/19/2021 NEGATIVE  NEGATIVE Final    Comment:       (Positive cutoff 200 ng/mL)                  Benzodiazepine Screen, Urine 11/19/2021 NEGATIVE  NEGATIVE Final    Comment:       (Positive cutoff 200 ng/mL)                  Cocaine Metabolite, Urine 11/19/2021 POSITIVE* NEGATIVE Final    Comment:       (Positive cutoff 300 ng/mL)                  Methadone Screen, Urine 11/19/2021 NEGATIVE  NEGATIVE Final    Comment:       (Positive cutoff 300 ng/mL)                  Opiates, Urine 11/19/2021 NEGATIVE  NEGATIVE Final    Comment:       (Positive cutoff 300 ng/mL)                  Phencyclidine, Urine 11/19/2021 NEGATIVE  NEGATIVE Final Comment:       (Positive cutoff 25 ng/mL)                  Propoxyphene, Urine 11/19/2021 NOT REPORTED  NEGATIVE Final    Cannabinoid Scrn, Ur 11/19/2021 POSITIVE* NEGATIVE Final    Comment:       (Positive cutoff 50 ng/mL)                  Oxycodone Screen, Ur 11/19/2021 NEGATIVE  NEGATIVE Final    Comment:       (Positive cutoff 100 ng/mL)                  Methamphetamine, Urine 11/19/2021 NOT REPORTED  NEGATIVE Final    Tricyclic Antidepressants, Urine 11/19/2021 NOT REPORTED  NEGATIVE Final    MDMA, Urine 11/19/2021 NOT REPORTED  NEGATIVE Final    Buprenorphine Urine 11/19/2021 NOT REPORTED  NEGATIVE Final    Test Information 11/19/2021 Assay provides medical screening only. The absence of expected drug(s) and/or metabolite(s) may indicate diluted or adulterated urine, limitations of testing or timing of collection. Final    Comment: Testing for legal purposes should be confirmed by another method. To request confirmation   of test result, please call the lab within 7 days of sample submission. Reviewed patient's current plan of care and vital signs with nursing staff.     Labs reviewed: [x] Yes  Last EKG in EMR reviewed: [x] Yes  QTc: 472    Medications  Current Facility-Administered Medications: loperamide (IMODIUM) capsule 2 mg, 2 mg, Oral, 4x Daily PRN  cloNIDine (CATAPRES) tablet 0.1 mg, 0.1 mg, Oral, Q4H PRN  cyclobenzaprine (FLEXERIL) tablet 10 mg, 10 mg, Oral, TID PRN  promethazine (PHENERGAN) injection 6.25 mg, 6.25 mg, IntraMUSCular, Q6H PRN  acetaminophen (TYLENOL) tablet 650 mg, 650 mg, Oral, Q4H PRN  aluminum & magnesium hydroxide-simethicone (MAALOX) 200-200-20 MG/5ML suspension 30 mL, 30 mL, Oral, Q6H PRN  hydrOXYzine (ATARAX) tablet 50 mg, 50 mg, Oral, TID PRN  ibuprofen (ADVIL;MOTRIN) tablet 400 mg, 400 mg, Oral, Q6H PRN  nicotine (NICODERM CQ) 14 MG/24HR 1 patch, 1 patch, TransDERmal, Daily  polyethylene glycol (GLYCOLAX) packet 17 g, 17 g, Oral, Daily PRN  traZODone (DESYREL) tablet 50 mg, 50 mg, Oral, Nightly PRN  haloperidol (HALDOL) tablet 5 mg, 5 mg, Oral, Q4H PRN **AND** LORazepam (ATIVAN) tablet 2 mg, 2 mg, Oral, Q4H PRN  haloperidol lactate (HALDOL) injection 5 mg, 5 mg, IntraMUSCular, Q4H PRN **AND** LORazepam (ATIVAN) injection 2 mg, 2 mg, IntraMUSCular, Q4H PRN **AND** diphenhydrAMINE (BENADRYL) injection 50 mg, 50 mg, IntraMUSCular, Q4H PRN  influenza quadrivalent split vaccine (FLUZONE;FLUARIX;FLULAVAL;AFLURIA) injection 0.5 mL, 0.5 mL, IntraMUSCular, Prior to discharge  COVID-19 Ad26 Vaccine ([employer]) injection 0.5 mL, 1 Dose, IntraMUSCular, Prior to discharge  venlafaxine (EFFEXOR XR) extended release capsule 37.5 mg, 37.5 mg, Oral, Daily with breakfast    ASSESSMENT  Severe episode of recurrent major depressive disorder, without psychotic features ([facility]         PLAN  Patient symptoms are: Remains Unstable. We will monitor opiate withdrawal with no plans to provide Suboxone  Continue with venlafaxine extended release 37.5 as patient vomited this morning after medication administration. We will plan to titrate as patient tolerates based on withdrawal symptoms and GI discomfort  Order Zofran ODT 4 mg tablets once. Clonidine 0.1 mg every 4 hours as needed related to high blood pressure from reported fentanyl use  Flexeril 10 mg 3 times daily as needed related to muscle spasms  Imodium 2 mg 4 times daily as needed diarrhea  Promethazine 6.25 mg IM every 6 hours today as needed for nausea and vomiting. Monitor need and frequency of PRN medications. Encourage participation in groups and milieu. Attempt to develop insight. Psycho-education conducted. Supportive Therapy conducted. Probable discharge per attending physician and currently undetermined. Follow-up daily while inpatient. Patient continues to be monitored in the inpatient psychiatric facility at [facility] for safety and stabilization.  Patient continues to need, on a daily basis, active treatment furnished directly by or requiring the supervision of inpatient psychiatric personnel. Electronically signed by ROXANNE Pérez CNP on 11/20/2021 at 1:37 PM    **This report has been created using voice recognition software. It may contain minor errors which are inherent in voice recognition technology. **

## 2021-11-20 NOTE — GROUP NOTE
Group Therapy Note    Date: 11/20/2021    Group Start Time: 1030  Group End Time: 6439  Group Topic: Psychoeducation    [facility] LUCIANA Abraham LSW        Group Therapy Note    Attendees: 9/17         Patient was offered group therapy today but declined to participate despite encouragement from staff. 1:1 was offered.       Signature:  LUCIANA Leonardo LSW

## 2021-11-20 NOTE — PLAN OF CARE
Problem: Altered Mood, Depressive Behavior:  Goal: Able to verbalize and/or display a decrease in depressive symptoms  Description: Able to verbalize and/or display a decrease in depressive symptoms  11/20/2021 1526 by Erla Harm  Outcome: Ongoing  Pt. Remains isolative in bed. Pt complains of all over body aches and stomach cramps. Pt. Is in active withdrawal.   Pt denies hallucinations. Medication compliant. Problem: Altered Mood, Depressive Behavior:  Goal: Absence of self-harm  Description: Absence of self-harm  11/20/2021 1526 by Erla Harm  Outcome: Ongoing  Pt denies current suicidal thoughts but relates to feeling very depressed over his drug use and his past inability to quit. Problem: Suicide risk  Goal: Provide patient with safe environment  Description: Provide patient with safe environment  11/20/2021 1526 by Erla Harm  Outcome: Ongoing  Pt denies current suicidal thoughts. Pt. Remains safe on the unit. Q 15 minute checks for safety maintained. Problem: Pain:  Goal: Pain level will decrease  Description: Pain level will decrease  11/20/2021 1526 by Erla Harm  Outcome: Ongoing  Pt relates to overall body aches and cramping. Pt is accepting of medication for withdrawal. Will continue to monitor.

## 2021-11-20 NOTE — PLAN OF CARE
Problem: Altered Mood, Depressive Behavior:  Goal: Able to verbalize and/or display a decrease in depressive symptoms  Description: Able to verbalize and/or display a decrease in depressive symptoms  11/19/2021 2256 by Geovany Sales RN  Outcome: Ongoing     Problem: Altered Mood, Depressive Behavior:  Goal: Absence of self-harm  Description: Absence of self-harm  11/19/2021 2256 by Geovany Sales RN  Outcome: Ongoing     Problem: Suicide risk  Goal: Provide patient with safe environment  Description: Provide patient with safe environment  11/19/2021 2256 by Geovany Sales RN  Outcome: Ongoing     Problem: Pain:  Goal: Pain level will decrease  Description: Pain level will decrease  Outcome: Ongoing     Patient denies suicidal and homicidal ideation at this time. Patient denies depression at this time. Patient was seclusive to his room most of the night and out for needs only. Patient had no complaints of pain this evening, Patient is free of self harm at this time. Patient agrees to seek out staff if thoughts to harm self arise. Staff will provide support and reassurance as needed. Safety checks maintained every 15 minutes.

## 2021-11-21 PROCEDURE — 6370000000 HC RX 637 (ALT 250 FOR IP): Performed by: PSYCHIATRY & NEUROLOGY

## 2021-11-21 PROCEDURE — 99232 SBSQ HOSP IP/OBS MODERATE 35: CPT | Performed by: PSYCHIATRY & NEUROLOGY

## 2021-11-21 PROCEDURE — 1240000000 HC EMOTIONAL WELLNESS R&B

## 2021-11-21 RX ORDER — VENLAFAXINE HYDROCHLORIDE 75 MG/1
75 CAPSULE, EXTENDED RELEASE ORAL
Status: DISCONTINUED | OUTPATIENT
Start: 2021-11-22 | End: 2021-11-24 | Stop reason: HOSPADM

## 2021-11-21 RX ADMIN — CYCLOBENZAPRINE 10 MG: 10 TABLET, FILM COATED ORAL at 21:07

## 2021-11-21 RX ADMIN — VENLAFAXINE HYDROCHLORIDE 37.5 MG: 37.5 CAPSULE, EXTENDED RELEASE ORAL at 08:34

## 2021-11-21 RX ADMIN — HYDROXYZINE HYDROCHLORIDE 50 MG: 50 TABLET, FILM COATED ORAL at 08:35

## 2021-11-21 RX ADMIN — HYDROXYZINE HYDROCHLORIDE 50 MG: 50 TABLET, FILM COATED ORAL at 13:58

## 2021-11-21 RX ADMIN — CYCLOBENZAPRINE 10 MG: 10 TABLET, FILM COATED ORAL at 13:58

## 2021-11-21 RX ADMIN — IBUPROFEN 400 MG: 400 TABLET ORAL at 13:58

## 2021-11-21 RX ADMIN — CLONIDINE HYDROCHLORIDE 0.1 MG: 0.1 TABLET ORAL at 13:58

## 2021-11-21 RX ADMIN — CLONIDINE HYDROCHLORIDE 0.1 MG: 0.1 TABLET ORAL at 08:34

## 2021-11-21 RX ADMIN — HYDROXYZINE HYDROCHLORIDE 50 MG: 50 TABLET, FILM COATED ORAL at 21:07

## 2021-11-21 RX ADMIN — CYCLOBENZAPRINE 10 MG: 10 TABLET, FILM COATED ORAL at 08:34

## 2021-11-21 RX ADMIN — TRAZODONE HYDROCHLORIDE 50 MG: 50 TABLET ORAL at 21:07

## 2021-11-21 RX ADMIN — ACETAMINOPHEN 650 MG: 325 TABLET ORAL at 21:07

## 2021-11-21 RX ADMIN — IBUPROFEN 400 MG: 400 TABLET ORAL at 21:07

## 2021-11-21 RX ADMIN — CLONIDINE HYDROCHLORIDE 0.1 MG: 0.1 TABLET ORAL at 21:07

## 2021-11-21 ASSESSMENT — PAIN DESCRIPTION - PAIN TYPE: TYPE: ACUTE PAIN

## 2021-11-21 ASSESSMENT — PAIN DESCRIPTION - LOCATION
LOCATION: ABDOMEN;GENERALIZED
LOCATION: GENERALIZED
LOCATION: GENERALIZED

## 2021-11-21 ASSESSMENT — PAIN SCALES - GENERAL
PAINLEVEL_OUTOF10: 6
PAINLEVEL_OUTOF10: 8
PAINLEVEL_OUTOF10: 3
PAINLEVEL_OUTOF10: 5
PAINLEVEL_OUTOF10: 0

## 2021-11-21 NOTE — PLAN OF CARE
Problem: Altered Mood, Depressive Behavior:  Goal: Absence of self-harm  Description: Absence of self-harm  11/21/2021 0951 by Bassam Matthews LPN  Outcome: Ongoing     Problem: Suicide risk  Goal: Provide patient with safe environment  Description: Provide patient with safe environment  11/21/2021 0951 by Bassam Matthews LPN  Outcome: Ongoing   Patient denies thoughts of self harm, remains free from self harm. Safe environment maintained, support and encouragement provided.

## 2021-11-21 NOTE — PLAN OF CARE
Problem: Altered Mood, Depressive Behavior:  Goal: Able to verbalize and/or display a decrease in depressive symptoms  Description: Able to verbalize and/or display a decrease in depressive symptoms  11/20/2021 2028 by Eugenia Galloway RN  Outcome: Ongoing   Pt continues to endorse depressive symptoms. Problem: Altered Mood, Depressive Behavior:  Goal: Absence of self-harm  Description: Absence of self-harm  11/20/2021 2028 by Eugenia Galloway RN  Outcome: Ongoing   Pt denies thoughts of self harm at current time, relates if he were outside of the hospital he would have thoughts to harm self. Problem: Tobacco Use:  Goal: Inpatient tobacco use cessation counseling participation  Description: Inpatient tobacco use cessation counseling participation  11/20/2021 2028 by Eugenia Galloway RN  Outcome: Ongoing   Pt declines  Problem: Suicide risk  Goal: Provide patient with safe environment  Description: Provide patient with safe environment  11/20/2021 2028 by Eugenia Galloway RN  Outcome: Ongoing   Pt safe on unit.   Problem: Pain:  Goal: Pain level will decrease  Description: Pain level will decrease  11/20/2021 2028 by Eugenia Galloway RN  Outcome: Ongoing   Pt endorses pain related to withdrawal.

## 2021-11-21 NOTE — PROGRESS NOTES
Daily Progress Note  11/21/2021    Patient Name: Km Meehan    CHIEF COMPLAINT: Suicidal ideation with plan to overdose on fentanyl         SUBJECTIVE:    Nursing staff report the patient has maintained medication adherence and has not required emergency medications in the last 24 hours. He continues to exhibit symptoms of opiate withdrawal after reporting to the team that he had been using fentanyl prior to admission. Team continues to help monitor the symptoms for comfort with clonidine, Flexeril, acetaminophen, ibuprofen, hydroxyzine and trazodone with good effect per Mr. Shirin Colin. He shares that he was able to tolerate lunch and his GI symptoms have been improved and is aware that we will now titrate to his previously utilized venlafaxine dosage and he is agreeable. Currently he shows no interest in AOD treatment or reconnecting with [facility]. Patient  endorses suicidal ideation and contracts for safety on this unit. He agrees to reach out to support team as needed for both emotional and physical support. He denies having questions or concerns at this time. Writer encouraged patient to attend groups on the unit. At this time, the patient is not appropriate for a lower level of care. There is risk of decompensation and patient warrants further hospitalization for safety and stabilization. Appetite:  [] Normal/Adequate/Unchanged  [] Increased  [x] Decreased      Sleep:       [] Normal/Adequate/Unchanged  [x] Fair  [] Poor      Group Attendance on Unit:   [] Yes  [] Selectively    [x] No    Medication Side Effects: Patient denies any medication side effects at the time of assessment. Mental Status Exam  Level of consciousness: Resting, responds to verbal stimuli  Appearance: Appropriate attire for setting, laying in bed with fair  grooming and hygiene. Behavior/Motor: Approachable, no psychomotor abnormalities.    Attitude toward examiner: Cooperative, attentive, fair eye contact. Speech: Normal rate, normal volume, normal tone. Mood:  Patient reports \" a little better\". Affect: Congruent, blunted  Thought processes: Linear and coherent. Thought content: Denies homicidal ideation. Suicidal Ideation: Active suicidal ideations, without current plan or intent, contracts for safety on the unit. Delusions: No evidence of delusions. Denies paranoia. Perceptual Disturbance: Patient does not appear to be responding to internal stimuli. Denies auditory hallucinations. Denies visual hallucinations. Cognition: Oriented to self, location, time, and situation. Memory: Intact. Insight & Judgement: Poor. Data   height is 5' 10\" (1.778 m) and weight is 162 lb (73.5 kg). His oral temperature is 98.1 °F (36.7 °C). His blood pressure is 158/94 (abnormal) and his pulse is 62. His respiration is 14 and oxygen saturation is 98%. Labs:   No visits with results within 2 Day(s) from this visit. Latest known visit with results is:   Admission on 11/19/2021, Discharged on 11/19/2021   Component Date Value Ref Range Status    Specimen Description 11/19/2021 . NASOPHARYNGEAL SWAB   Final    SARS-CoV-2, Rapid 11/19/2021 Not Detected  Not Detected Final    Comment:       Rapid NAAT:  The specimen is NEGATIVE for SARS-CoV-2, the novel coronavirus associated with   COVID-19. The ID NOW COVID-19 assay is designed to detect the virus that causes COVID-19 in patients   with signs and symptoms of infection who are suspected of COVID-19. An individual without symptoms of COVID-19 and who is not shedding SARS-CoV-2 virus would   expect to have a negative (not detected) result in this assay. Negative results should be treated as presumptive and, if inconsistent with clinical signs   and symptoms or necessary for patient management,  should be tested with an alternative molecular assay.  Negative results do not preclude   SARS-CoV-2 infection and   should not be used as the sole basis for patient management decisions.          Fact sheet for Healthcare Providers: BuildHer.es  Fact sheet for Patients: BuildHer.es          Methodology: Isothermal Nucleic Acid Amplification      Ethanol 11/19/2021 <10  <10 mg/dL Final    Ethanol percent 11/19/2021 <0.010  <0.010 % Final    WBC 11/19/2021 9.1  3.5 - 11.3 k/uL Final    RBC 11/19/2021 4.40  4.21 - 5.77 m/uL Final    Hemoglobin 11/19/2021 13.4  13.0 - 17.0 g/dL Final    Hematocrit 11/19/2021 40.6* 40.7 - 50.3 % Final    MCV 11/19/2021 92.3  82.6 - 102.9 fL Final    MCH 11/19/2021 30.5  25.2 - 33.5 pg Final    MCHC 11/19/2021 33.0  28.4 - 34.8 g/dL Final    RDW 11/19/2021 12.2  11.8 - 14.4 % Final    Platelets 67/76/5996 278  138 - 453 k/uL Final    MPV 11/19/2021 8.9  8.1 - 13.5 fL Final    NRBC Automated 11/19/2021 0.0  0.0 per 100 WBC Final    Differential Type 11/19/2021 NOT REPORTED   Final    Seg Neutrophils 11/19/2021 59  36 - 65 % Final    Lymphocytes 11/19/2021 31  24 - 43 % Final    Monocytes 11/19/2021 8  3 - 12 % Final    Eosinophils % 11/19/2021 2  1 - 4 % Final    Basophils 11/19/2021 0  0 - 2 % Final    Immature Granulocytes 11/19/2021 0  0 % Final    Segs Absolute 11/19/2021 5.33  1.50 - 8.10 k/uL Final    Absolute Lymph # 11/19/2021 2.78  1.10 - 3.70 k/uL Final    Absolute Mono # 11/19/2021 0.69  0.10 - 1.20 k/uL Final    Absolute Eos # 11/19/2021 0.19  0.00 - 0.44 k/uL Final    Basophils Absolute 11/19/2021 0.04  0.00 - 0.20 k/uL Final    Absolute Immature Granulocyte 11/19/2021 <0.03  0.00 - 0.30 k/uL Final    WBC Morphology 11/19/2021 NOT REPORTED   Final    RBC Morphology 11/19/2021 NOT REPORTED   Final    Platelet Estimate 28/87/8363 NOT REPORTED   Final    Glucose 11/19/2021 118* 70 - 99 mg/dL Final    BUN 11/19/2021 13  6 - 20 mg/dL Final    CREATININE 11/19/2021 0.81  0.70 - 1.20 mg/dL Final    Bun/Cre Ratio 11/19/2021 NOT REPORTED  9 - 20 Urine 11/19/2021 NEGATIVE  NEGATIVE Final    Comment:       (Positive cutoff 25 ng/mL)                  Propoxyphene, Urine 11/19/2021 NOT REPORTED  NEGATIVE Final    Cannabinoid Scrn, Ur 11/19/2021 POSITIVE* NEGATIVE Final    Comment:       (Positive cutoff 50 ng/mL)                  Oxycodone Screen, Ur 11/19/2021 NEGATIVE  NEGATIVE Final    Comment:       (Positive cutoff 100 ng/mL)                  Methamphetamine, Urine 11/19/2021 NOT REPORTED  NEGATIVE Final    Tricyclic Antidepressants, Urine 11/19/2021 NOT REPORTED  NEGATIVE Final    MDMA, Urine 11/19/2021 NOT REPORTED  NEGATIVE Final    Buprenorphine Urine 11/19/2021 NOT REPORTED  NEGATIVE Final    Test Information 11/19/2021 Assay provides medical screening only. The absence of expected drug(s) and/or metabolite(s) may indicate diluted or adulterated urine, limitations of testing or timing of collection. Final    Comment: Testing for legal purposes should be confirmed by another method. To request confirmation   of test result, please call the lab within 7 days of sample submission. Reviewed patient's current plan of care and vital signs with nursing staff.     Labs reviewed: [x] Yes  Last EKG in EMR reviewed: [x] Yes  QTc: 472    Medications  Current Facility-Administered Medications: [START ON 11/22/2021] venlafaxine (EFFEXOR XR) extended release capsule 75 mg, 75 mg, Oral, Daily with breakfast  loperamide (IMODIUM) capsule 2 mg, 2 mg, Oral, 4x Daily PRN  cloNIDine (CATAPRES) tablet 0.1 mg, 0.1 mg, Oral, Q4H PRN  cyclobenzaprine (FLEXERIL) tablet 10 mg, 10 mg, Oral, TID PRN  promethazine (PHENERGAN) injection 6.25 mg, 6.25 mg, IntraMUSCular, Q6H PRN  acetaminophen (TYLENOL) tablet 650 mg, 650 mg, Oral, Q4H PRN  aluminum & magnesium hydroxide-simethicone (MAALOX) 200-200-20 MG/5ML suspension 30 mL, 30 mL, Oral, Q6H PRN  hydrOXYzine (ATARAX) tablet 50 mg, 50 mg, Oral, TID PRN  ibuprofen (ADVIL;MOTRIN) tablet 400 mg, 400 mg, Oral, Q6H

## 2021-11-22 PROCEDURE — 6370000000 HC RX 637 (ALT 250 FOR IP): Performed by: PSYCHIATRY & NEUROLOGY

## 2021-11-22 PROCEDURE — 1240000000 HC EMOTIONAL WELLNESS R&B

## 2021-11-22 PROCEDURE — 99232 SBSQ HOSP IP/OBS MODERATE 35: CPT | Performed by: PSYCHIATRY & NEUROLOGY

## 2021-11-22 RX ADMIN — CYCLOBENZAPRINE 10 MG: 10 TABLET, FILM COATED ORAL at 10:04

## 2021-11-22 RX ADMIN — VENLAFAXINE HYDROCHLORIDE 75 MG: 75 CAPSULE, EXTENDED RELEASE ORAL at 08:06

## 2021-11-22 RX ADMIN — IBUPROFEN 400 MG: 400 TABLET ORAL at 10:07

## 2021-11-22 RX ADMIN — CLONIDINE HYDROCHLORIDE 0.1 MG: 0.1 TABLET ORAL at 10:04

## 2021-11-22 RX ADMIN — CYCLOBENZAPRINE 10 MG: 10 TABLET, FILM COATED ORAL at 17:30

## 2021-11-22 RX ADMIN — CLONIDINE HYDROCHLORIDE 0.1 MG: 0.1 TABLET ORAL at 17:30

## 2021-11-22 ASSESSMENT — PAIN SCALES - GENERAL: PAINLEVEL_OUTOF10: 8

## 2021-11-22 NOTE — BH NOTE
Patient blood pressure increased;he admits to an aching body. Catapres and flexeril PO given, PT encouraged to seek staff for his needs while on the unit.

## 2021-11-22 NOTE — PLAN OF CARE
Problem: Altered Mood, Depressive Behavior:  Goal: Able to verbalize and/or display a decrease in depressive symptoms  Description: Able to verbalize and/or display a decrease in depressive symptoms  Outcome: Ongoing   Pt continues to endorse depressive symptoms related to withdrawal symptoms. Problem: Altered Mood, Depressive Behavior:  Goal: Absence of self-harm  Description: Absence of self-harm  11/21/2021 2157 by Katherine Torres RN  Outcome: Ongoing   No self harm  Problem: Tobacco Use:  Goal: Inpatient tobacco use cessation counseling participation  Description: Inpatient tobacco use cessation counseling participation  Outcome: Ongoing   Pt declines  Problem: Suicide risk  Goal: Provide patient with safe environment  Description: Provide patient with safe environment  11/21/2021 2157 by Katherine Torres RN  Outcome: Ongoing   Pt safe on unit  Problem: Pain:  Goal: Pain level will decrease  Description: Pain level will decrease  Outcome: Ongoing   Pt continues to endorse pain related to withdrawal symptoms.

## 2021-11-22 NOTE — BH NOTE
Catapres, flexeril and Motrin as needed Med's administered, patient voiced feeling bad and aching all over. Will follow up.

## 2021-11-22 NOTE — PLAN OF CARE
Problem: Altered Mood, Depressive Behavior:  Goal: Able to verbalize and/or display a decrease in depressive symptoms  Description: Able to verbalize and/or display a decrease in depressive symptoms  11/22/2021 1049 by Nicki Gagnon LPN  Outcome: Ongoing, Patient voiced feeling depressed and aching body,but hopeful of feeling better soon. Patient encouraged to continue his  medication regime. Problem: Suicide risk  Goal: Provide patient with safe environment  Description: Provide patient with safe environment  11/22/2021 1049 by Nicki Gagnon LPN  Outcome: Ongoing, Patient resting in bed; remain safe while on the unit. Patient denied suicidal and homicidal ideations. Problem: Pain:  Goal: Pain level will decrease  Description: Pain level will decrease  11/22/2021 1049 by Nicki Gagnon LPN  Outcome: Ongoing, Patient has complaint of general body aching, as needed medication given. 15 MIN safety checks.

## 2021-11-22 NOTE — CARE COORDINATION
Writer spoke to pt regarding his desire for potentially going to AOD inpatient tx. Pt reported that he was considering going, but he wished to go home first. Pt provided with inpatient AOD folder.

## 2021-11-22 NOTE — GROUP NOTE
Group Therapy Note    Date: 11/22/2021    Group Start Time: 1100  Group End Time: 3992  Group Topic: Music Therapy    [facility] TRACI Walker        Group Therapy Note    Pt did not attend recreational group d/t resting in room despite staff invitation to attend. 1:1 talk time offered as alternative to group session, pt declined.

## 2021-11-23 PROCEDURE — 6370000000 HC RX 637 (ALT 250 FOR IP): Performed by: PSYCHIATRY & NEUROLOGY

## 2021-11-23 PROCEDURE — 90833 PSYTX W PT W E/M 30 MIN: CPT | Performed by: PSYCHIATRY & NEUROLOGY

## 2021-11-23 PROCEDURE — 99232 SBSQ HOSP IP/OBS MODERATE 35: CPT | Performed by: PSYCHIATRY & NEUROLOGY

## 2021-11-23 PROCEDURE — 1240000000 HC EMOTIONAL WELLNESS R&B

## 2021-11-23 RX ORDER — TRAZODONE HYDROCHLORIDE 50 MG/1
50 TABLET ORAL NIGHTLY PRN
Qty: 30 TABLET | Refills: 0 | Status: ON HOLD | OUTPATIENT
Start: 2021-11-23 | End: 2022-02-13

## 2021-11-23 RX ORDER — VENLAFAXINE HYDROCHLORIDE 75 MG/1
75 CAPSULE, EXTENDED RELEASE ORAL
Qty: 30 CAPSULE | Refills: 0 | Status: ON HOLD | OUTPATIENT
Start: 2021-11-24 | End: 2022-02-17 | Stop reason: HOSPADM

## 2021-11-23 RX ADMIN — ACETAMINOPHEN 650 MG: 325 TABLET ORAL at 08:32

## 2021-11-23 RX ADMIN — TRAZODONE HYDROCHLORIDE 50 MG: 50 TABLET ORAL at 20:39

## 2021-11-23 RX ADMIN — CLONIDINE HYDROCHLORIDE 0.1 MG: 0.1 TABLET ORAL at 08:33

## 2021-11-23 RX ADMIN — VENLAFAXINE HYDROCHLORIDE 75 MG: 75 CAPSULE, EXTENDED RELEASE ORAL at 08:27

## 2021-11-23 RX ADMIN — HYDROXYZINE HYDROCHLORIDE 50 MG: 50 TABLET, FILM COATED ORAL at 20:39

## 2021-11-23 RX ADMIN — CYCLOBENZAPRINE 10 MG: 10 TABLET, FILM COATED ORAL at 08:33

## 2021-11-23 ASSESSMENT — PAIN SCALES - GENERAL
PAINLEVEL_OUTOF10: 8
PAINLEVEL_OUTOF10: 0

## 2021-11-23 ASSESSMENT — PAIN DESCRIPTION - LOCATION: LOCATION: GENERALIZED

## 2021-11-23 ASSESSMENT — PAIN DESCRIPTION - PAIN TYPE: TYPE: ACUTE PAIN

## 2021-11-23 NOTE — CARE COORDINATION
KISHAN scheduled appointment with [facility] on [address] for 12/1/2021 at 9:30 am

## 2021-11-23 NOTE — PROGRESS NOTES
Component Date Value Ref Range Status    Specimen Description 11/19/2021 . NASOPHARYNGEAL SWAB   Final    SARS-CoV-2, Rapid 11/19/2021 Not Detected  Not Detected Final    Comment:       Rapid NAAT:  The specimen is NEGATIVE for SARS-CoV-2, the novel coronavirus associated with   COVID-19. The ID NOW COVID-19 assay is designed to detect the virus that causes COVID-19 in patients   with signs and symptoms of infection who are suspected of COVID-19. An individual without symptoms of COVID-19 and who is not shedding SARS-CoV-2 virus would   expect to have a negative (not detected) result in this assay. Negative results should be treated as presumptive and, if inconsistent with clinical signs   and symptoms or necessary for patient management,  should be tested with an alternative molecular assay. Negative results do not preclude   SARS-CoV-2 infection and   should not be used as the sole basis for patient management decisions.          Fact sheet for Healthcare Providers: [email or web address]  Fact sheet for Patients: [email or web address]          Methodology: Isothermal Nucleic Acid Amplification      Ethanol 11/19/2021 <10  <10 mg/dL Final    Ethanol percent 11/19/2021 <0.010  <0.010 % Final    WBC 11/19/2021 9.1  3.5 - 11.3 k/uL Final    RBC 11/19/2021 4.40  4.21 - 5.77 m/uL Final    Hemoglobin 11/19/2021 13.4  13.0 - 17.0 g/dL Final    Hematocrit 11/19/2021 40.6* 40.7 - 50.3 % Final    MCV 11/19/2021 92.3  82.6 - 102.9 fL Final    MCH 11/19/2021 30.5  25.2 - 33.5 pg Final    MCHC 11/19/2021 33.0  28.4 - 34.8 g/dL Final    RDW 11/19/2021 12.2  11.8 - 14.4 % Final    Platelets 63/23/5721 278  138 - 453 k/uL Final    MPV 11/19/2021 8.9  8.1 - 13.5 fL Final    NRBC Automated 11/19/2021 0.0  0.0 per 100 WBC Final    Differential Type 11/19/2021 NOT REPORTED   Final    Seg Neutrophils 11/19/2021 59  36 - 65 % Final    Lymphocytes 11/19/2021 31  24 - 43 % Final    Monocytes 11/19/2021 8  3 - 12 % Final    Eosinophils % 11/19/2021 2  1 - 4 % Final    Basophils 11/19/2021 0  0 - 2 % Final    Immature Granulocytes 11/19/2021 0  0 % Final    Segs Absolute 11/19/2021 5.33  1.50 - 8.10 k/uL Final    Absolute Lymph # 11/19/2021 2.78  1.10 - 3.70 k/uL Final    Absolute Mono # 11/19/2021 0.69  0.10 - 1.20 k/uL Final    Absolute Eos # 11/19/2021 0.19  0.00 - 0.44 k/uL Final    Basophils Absolute 11/19/2021 0.04  0.00 - 0.20 k/uL Final    Absolute Immature Granulocyte 11/19/2021 <0.03  0.00 - 0.30 k/uL Final    WBC Morphology 11/19/2021 NOT REPORTED   Final    RBC Morphology 11/19/2021 NOT REPORTED   Final    Platelet Estimate 27/60/6792 NOT REPORTED   Final    Glucose 11/19/2021 118* 70 - 99 mg/dL Final    BUN 11/19/2021 13  6 - 20 mg/dL Final    CREATININE 11/19/2021 0.81  0.70 - 1.20 mg/dL Final    Bun/Cre Ratio 11/19/2021 NOT REPORTED  9 - 20 Final    Calcium 11/19/2021 9.3  8.6 - 10.4 mg/dL Final    Sodium 11/19/2021 136  135 - 144 mmol/L Final    Potassium 11/19/2021 3.2* 3.7 - 5.3 mmol/L Final    Chloride 11/19/2021 102  98 - 107 mmol/L Final    CO2 11/19/2021 23  20 - 31 mmol/L Final    Anion Gap 11/19/2021 11  9 - 17 mmol/L Final    Alkaline Phosphatase 11/19/2021 61  40 - 129 U/L Final    ALT 11/19/2021 15  5 - 41 U/L Final    AST 11/19/2021 17  <40 U/L Final    Total Bilirubin 11/19/2021 0.18* 0.3 - 1.2 mg/dL Final    Total Protein 11/19/2021 6.9  6.4 - 8.3 g/dL Final    Albumin 11/19/2021 4.0  3.5 - 5.2 g/dL Final    Albumin/Globulin Ratio 11/19/2021 1.4  1.0 - 2.5 Final    GFR Non- 11/19/2021 >60  >60 mL/min Final    GFR  11/19/2021 >60  >60 mL/min Final    GFR Comment 11/19/2021        Final    Comment: Average GFR for 30-36 years old:   80 mL/min/1.73sq m  Chronic Kidney Disease:   <60 mL/min/1.73sq m  Kidney failure:   <15 mL/min/1.73sq m              eGFR calculated using average adult body mass. Additional eGFR calculator available at:        Cels[employer].[email or web address].br            GFR Staging 11/19/2021 NOT REPORTED   Final    Amphetamine Screen, Ur 11/19/2021 NEGATIVE  NEGATIVE Final    Comment:       (Positive cutoff 1000 ng/mL)                  Barbiturate Screen, Ur 11/19/2021 NEGATIVE  NEGATIVE Final    Comment:       (Positive cutoff 200 ng/mL)                  Benzodiazepine Screen, Urine 11/19/2021 NEGATIVE  NEGATIVE Final    Comment:       (Positive cutoff 200 ng/mL)                  Cocaine Metabolite, Urine 11/19/2021 POSITIVE* NEGATIVE Final    Comment:       (Positive cutoff 300 ng/mL)                  Methadone Screen, Urine 11/19/2021 NEGATIVE  NEGATIVE Final    Comment:       (Positive cutoff 300 ng/mL)                  Opiates, Urine 11/19/2021 NEGATIVE  NEGATIVE Final    Comment:       (Positive cutoff 300 ng/mL)                  Phencyclidine, Urine 11/19/2021 NEGATIVE  NEGATIVE Final    Comment:       (Positive cutoff 25 ng/mL)                  Propoxyphene, Urine 11/19/2021 NOT REPORTED  NEGATIVE Final    Cannabinoid Scrn, Ur 11/19/2021 POSITIVE* NEGATIVE Final    Comment:       (Positive cutoff 50 ng/mL)                  Oxycodone Screen, Ur 11/19/2021 NEGATIVE  NEGATIVE Final    Comment:       (Positive cutoff 100 ng/mL)                  Methamphetamine, Urine 11/19/2021 NOT REPORTED  NEGATIVE Final    Tricyclic Antidepressants, Urine 11/19/2021 NOT REPORTED  NEGATIVE Final    MDMA, Urine 11/19/2021 NOT REPORTED  NEGATIVE Final    Buprenorphine Urine 11/19/2021 NOT REPORTED  NEGATIVE Final    Test Information 11/19/2021 Assay provides medical screening only. The absence of expected drug(s) and/or metabolite(s) may indicate diluted or adulterated urine, limitations of testing or timing of collection. Final    Comment: Testing for legal purposes should be confirmed by another method.   To request confirmation   of test result, please call the lab within 7 days of sample submission. Medications  Current Facility-Administered Medications: venlafaxine (EFFEXOR XR) extended release capsule 75 mg, 75 mg, Oral, Daily with breakfast  loperamide (IMODIUM) capsule 2 mg, 2 mg, Oral, 4x Daily PRN  cloNIDine (CATAPRES) tablet 0.1 mg, 0.1 mg, Oral, Q4H PRN  cyclobenzaprine (FLEXERIL) tablet 10 mg, 10 mg, Oral, TID PRN  acetaminophen (TYLENOL) tablet 650 mg, 650 mg, Oral, Q4H PRN  aluminum & magnesium hydroxide-simethicone (MAALOX) 200-200-20 MG/5ML suspension 30 mL, 30 mL, Oral, Q6H PRN  hydrOXYzine (ATARAX) tablet 50 mg, 50 mg, Oral, TID PRN  ibuprofen (ADVIL;MOTRIN) tablet 400 mg, 400 mg, Oral, Q6H PRN  polyethylene glycol (GLYCOLAX) packet 17 g, 17 g, Oral, Daily PRN  traZODone (DESYREL) tablet 50 mg, 50 mg, Oral, Nightly PRN  haloperidol (HALDOL) tablet 5 mg, 5 mg, Oral, Q4H PRN **AND** LORazepam (ATIVAN) tablet 2 mg, 2 mg, Oral, Q4H PRN  haloperidol lactate (HALDOL) injection 5 mg, 5 mg, IntraMUSCular, Q4H PRN **AND** LORazepam (ATIVAN) injection 2 mg, 2 mg, IntraMUSCular, Q4H PRN **AND** diphenhydrAMINE (BENADRYL) injection 50 mg, 50 mg, IntraMUSCular, Q4H PRN  influenza quadrivalent split vaccine (FLUZONE;FLUARIX;FLULAVAL;AFLURIA) injection 0.5 mL, 0.5 mL, IntraMUSCular, Prior to discharge  COVID-19 Ad26 Vaccine (J&J, CUAUHTEMOC) injection 0.5 mL, 1 Dose, IntraMUSCular, Prior to discharge    ASSESSMENT  Severe episode of recurrent major depressive disorder, without psychotic features [facility])     PLAN  Patient s symptoms   are improving  Continue with current medication for now  Attempt to develop insight  Psycho-education conducted. Supportive Therapy conducted. Probable discharge is Wednesday  Follow-up daily while in the inpatient unit      Electronically signed by Jaya Conde MD on 11/22/21 at 9:25 PM EST    **This report has been created using voice recognition software.  It may contain minor errors which are inherent in voice recognition technology. **

## 2021-11-23 NOTE — PLAN OF CARE
Problem: Altered Mood, Depressive Behavior:  Goal: Able to verbalize and/or display a decrease in depressive symptoms  Description: Able to verbalize and/or display a decrease in depressive symptoms  11/22/2021 2248 by Brent Brown LPN  Outcome: Ongoing   Patient reports depression at this time. Patient encouraged to attend groups to work on coping skills for life stressors. Problem: Altered Mood, Depressive Behavior:  Goal: Absence of self-harm  Description: Absence of self-harm  11/22/2021 2248 by Brent Brown LPN  Outcome: Ongoing   Patient denies self harm at this time. No sign of self harm noted. Problem: Tobacco Use:  Goal: Inpatient tobacco use cessation counseling participation  Description: Inpatient tobacco use cessation counseling participation  11/22/2021 2248 by Brent Brown LPN  Outcome: Ongoing   Patient denies tobacco counseling at this time. Problem: Suicide risk  Goal: Provide patient with safe environment  Description: Provide patient with safe environment  11/22/2021 2248 by Brent Brown LPN  Outcome: Ongoing   Patient provided with an safe environment while on the unit. Will continue to monitor for safety every 15 minutes and provide support as needed.

## 2021-11-23 NOTE — PLAN OF CARE
Problem: Altered Mood, Depressive Behavior:  Goal: Able to verbalize and/or display a decrease in depressive symptoms  Description: Able to verbalize and/or display a decrease in depressive symptoms  11/23/2021 0916 by Juan Lemus  Outcome: Ongoing  Patient is alert. Observed in room. Patient is flat on approach. Minimizes gaze. Patient is admitting to having suicidal thoughts; reports having a plan to overdose. Patient contracts for safety; reports feeling safe on unit. Patient denies hallucinations and delusions. Patient is currently denying thoughts to harm others. Patients is reporting depression and anxiety; 8 out of 10 with 10 being the worse. Patient reports anxiety and depression is due to \"not having parents, losing everything. \" Patient reports \"no support and avoids family. \" Reassurance provided, coping skills explained. Patient has been isolative to room, only coming out for meals and needs. Patient reports sleeping better at night and adequate appetite. Patient is disheveled with poor hygiene, was encouraged to shower. Patient is medication compliant and denies any side effects. Patient encouraged to attend group and interact with peers. 15 minutes checks maintained. Problem: Pain:  Goal: Pain level will decrease  Description: Pain level will decrease  Outcome: Ongoing   Patient reporting pain 8 out 10 with 10 being the worse. Patient given PRN Tylenol.

## 2021-11-23 NOTE — GROUP NOTE
Group Therapy Note    Date: 11/23/2021    Group Start Time: 1000  Group End Time: 9872  Group Topic: Psychotherapy    JEREMIAH FLORENTINO    LUCIANA Parrish LSW        Group Therapy Note    Attendees: 11/22         Patient was offered group therapy today but declined to participate despite encouragement from staff. 1:1 was offered.       Signature:  LUCIANA Parrish LSW

## 2021-11-23 NOTE — ED PROVIDER NOTES
[facility] [address] [facility]  Emergency Department  Faculty Attestation       I performed a history and physical examination of the patient and discussed management with the resident. I reviewed the residents note and agree with the documented findings including all diagnostic interpretations and plan of care. Any areas of disagreement are noted on the chart. I was personally present for the key portions of any procedures. I have documented in the chart those procedures where I was not present during the key portions. I have reviewed the emergency nurses triage note. I agree with the chief complaint, past medical history, past surgical history, allergies, medications, social and family history as documented unless otherwise noted below. Documentation of the HPI, Physical Exam and Medical Decision Making performed by scribozzy is based on my personal performance of the HPI, PE and MDM. For Physician Assistant/ Nurse Practitioner cases/documentation I have personally evaluated this patient and have completed at least one if not all key elements of the E/M (history, physical exam, and MDM). Additional findings are as noted. Pertinent Comments     Primary Care Physician: GENERIC HIGH    History: This is a 27 y.o. male who presents to the Emergency Department with suicidal ideations with specific plan to overdose on fentanyl and does report he does have easy access to drugs in his house. Patient denies any homicidal ideations. Patient denies hallucinations. Denies alcohol use today. .  Denies any illicit drug use . Does report right hand pain. States he has had prior surgery on this before for dislocation, and does not believe that he had any injury to it but it was starting to remove the last 24 to 48 hours. Denies any other complaints at this time including no cough, shortness of breath or fever. Patient is  voluntary for psychiatric admission at this time.      Physical:    ED Triage Vitals [11/19/21 0157] BP Temp Temp Source Pulse Resp SpO2 Height Weight   139/80 97.3 °F (36.3 °C) Oral 68 16 98 % -- 185 lb (83.9 kg)       Constitutional:  Normal appearance in no acute distress, not ill-appearing  HENT: Normocephalic, atraumatic  Eyes: EOM intact with no conjunctival injection. Neck: Supple with full ROM  Cardiovascular: Regular rate and rhythm with no rubs, murmurs, or gallops  Respiratory: Talking in full sentences, clear to auscultation bilaterally with no rales, rhonchi, or wheezes   Abdomen: soft, nontender with no guarding   Musculoskeletal: Patient is a chronic deformity of the right hand. This tenderness of the dorsum of the hand with no erythema or new injuries. Normal range of motion of the digits. Skin: No significant rash or jaundice in exposed areas. Neuro: Alert, no focal deficits   Psych: Behavior: shy, Speech: faint, quiet, Thought content: suicidal, Affect: flat. MDM/Plan:   Suicidal ideations with specific plan to overdose. Right hand pain with a history of a prior dislocation and surgery in the area. X-rays negative.   And normal peripheral vascular and sensation intact  Medically cleared at this time  Labs per protocol for [facility]   SW consult to facilitate possible admission     Critical Care Time: None     Inder Boland MD  Attending Emergency Physician         Inder Boland MD  11/22/21 2300

## 2021-11-23 NOTE — GROUP NOTE
Group Therapy Note    Date: 11/23/2021    Group Start Time: 1330  Group End Time: 4518  Group Topic: Music Therapy    [facility]        Group Therapy Note    Pt did not attend music therapy group d/t resting in room despite staff invitation to attend. 1:1 talk time offered as alternative to group session, pt declined.

## 2021-11-23 NOTE — GROUP NOTE
Group Therapy Note    Date: 11/23/2021    Group Start Time: 1100  Group End Time: 3093  Group Topic: Recreational    [facility]        Group Therapy Note    Pt did not attend recreational conversation group d/t resting in room despite staff invitation to attend. 1:1 talk time offered as alternative to group session, pt declined.

## 2021-11-24 VITALS
SYSTOLIC BLOOD PRESSURE: 137 MMHG | HEART RATE: 90 BPM | TEMPERATURE: 97.8 F | DIASTOLIC BLOOD PRESSURE: 93 MMHG | HEIGHT: 70 IN | WEIGHT: 162 LBS | OXYGEN SATURATION: 98 % | RESPIRATION RATE: 14 BRPM | BODY MASS INDEX: 23.19 KG/M2

## 2021-11-24 PROCEDURE — G0008 ADMIN INFLUENZA VIRUS VAC: HCPCS | Performed by: PSYCHIATRY & NEUROLOGY

## 2021-11-24 PROCEDURE — 6370000000 HC RX 637 (ALT 250 FOR IP): Performed by: PSYCHIATRY & NEUROLOGY

## 2021-11-24 PROCEDURE — 6360000002 HC RX W HCPCS: Performed by: PSYCHIATRY & NEUROLOGY

## 2021-11-24 PROCEDURE — 99239 HOSP IP/OBS DSCHRG MGMT >30: CPT | Performed by: PSYCHIATRY & NEUROLOGY

## 2021-11-24 PROCEDURE — 90686 IIV4 VACC NO PRSV 0.5 ML IM: CPT | Performed by: PSYCHIATRY & NEUROLOGY

## 2021-11-24 RX ADMIN — VENLAFAXINE HYDROCHLORIDE 75 MG: 75 CAPSULE, EXTENDED RELEASE ORAL at 08:26

## 2021-11-24 RX ADMIN — INFLUENZA A VIRUS A/VICTORIA/2570/2019 IVR-215 (H1N1) ANTIGEN (PROPIOLACTONE INACTIVATED), INFLUENZA A VIRUS A/CAMBODIA/E0826360/2020 IVR-224 (H3N2) ANTIGEN (PROPIOLACTONE INACTIVATED), INFLUENZA B VIRUS B/VICTORIA/705/2018 BVR-11 ANTIGEN (PROPIOLACTONE INACTIVATED), INFLUENZA B VIRUS B/PHUKET/3073/2013 BVR-1B ANTIGEN (PROPIOLACTONE INACTIVATED) 0.5 ML: 15; 15; 15; 15 INJECTION, SUSPENSION INTRAMUSCULAR at 11:19

## 2021-11-24 ASSESSMENT — PAIN SCALES - GENERAL: PAINLEVEL_OUTOF10: 5

## 2021-11-24 ASSESSMENT — PAIN DESCRIPTION - PAIN TYPE: TYPE: ACUTE PAIN

## 2021-11-24 ASSESSMENT — PAIN DESCRIPTION - LOCATION: LOCATION: HEAD

## 2021-11-24 NOTE — DISCHARGE INSTR - DIET

## 2021-11-24 NOTE — GROUP NOTE
HS Goal Group     Date: November 23, 2021   Group Start Time: 2000   Group End Time: 2030   [facility]    Attendees: 8/19     Group Topic: HS Goal Group   Notes: Patient participated in HS goal group. Status After Intervention: Improved     Participation Level:  Active Listener and Interactive     Participation Quality: Appropriate, attentive and supportive     Speech: Normal     Thought Process/Content: Logical and linear     Affective Functioning: Congruent     Mood: WDL     Level of consciousness: Oriented x4     Response to Learning: Progressing to goal; able to verbalize current knowledge/experience, acknowledge new learning, retain information and change behavior Endings: None reported     Modes of Intervention: Education and exploration     Discipline Responsible: Behavioral Health Tech   Signature: VIVIENNE Phipps

## 2021-11-24 NOTE — BH NOTE
[facility]  Discharge Note    Pt discharged with followings belongings:   Dentures: None  Vision - Corrective Lenses: None  Hearing Aid: None  Jewelry: None  Body Piercings Removed: N/A  Clothing: Footwear, Jacket / coat, Pants, Shirt, Sweater, Undergarments (Comment)  Were All Patient Medications Collected?: Not Applicable  Other Valuables: Other (Comment) (Lighter, 1 ear bud, landyard, credit card cut in 2 pieces)   Valuables sent home with patient or returned to patient. Patient education on aftercare instructions: yes  Information faxed to [facility] by nurse  at 12:32 PM .Patient verbalize understanding of AVS:  yes.  Pt discharged to private residence via cab without incident  Status EXAM upon discharge:  Status and Exam  Normal: No  Facial Expression: Sad  Affect: Appropriate  Level of Consciousness: Alert  Mood:Normal: No  Mood: Depressed, Anxious, Sad, Helpless  Motor Activity:Normal: No  Motor Activity: Decreased  Interview Behavior: Cooperative  Preception: [place] to Person, Cresenciano Monas to Time, [place] to Place, [place] to Situation  Attention:Normal: No  Attention: Distractible  Thought Processes: Circumstantial  Thought Content:Normal: No  Thought Content: Preoccupations  Hallucinations: None  Delusions: No  Memory:Normal: Yes  Insight and Judgment: No  Insight and Judgment: Poor Insight  Present Suicidal Ideation: Yes (contracts for safety)  Present Homicidal Ideation: No      Metabolic Screening:    Lab Results   Component Value Date    LABA1C 5.6 06/18/2020       Lab Results   Component Value Date    CHOL 187 06/18/2020    CHOL 194 04/16/2018     Lab Results   Component Value Date    TRIG 98 06/18/2020    TRIG 120 04/16/2018     Lab Results   Component Value Date    HDL 50 06/18/2020    HDL 44 04/16/2018     No components found for: LDLCAL  No results found for: Yoselyn Horton LPN

## 2021-11-24 NOTE — PROGRESS NOTES
Daily Progress Note  Calvin Rodriguez MD  11/23/2021  CHIEF COMPLAINT: Depression with suicidal ideation    Reviewed patient's current plan of care and vital signs with nursing staff. Sleep:  8 hours last night  Attending groups: Intermittent    SUBJECTIVE:    Patient states that he wants to discharge home will follow up with walk-in substance use rehab. Denying side effects to medication. Reports improvement in suicidal and homicidal ideation intent or plan. Denying any of either. Denying auditory or visual hallucinations. Spent 30 minutes with the patient, of that greater than 16 minutes spent in supportive psychotherapy. 10 of our discussion patient stating that he may just choose to go to [facility] and follow-up with Cindy Pickering as they have an office inside [facility] now. .    Mental Status Exam  Level of consciousness:  Within normal limits  Appearance: Hospital attire, seated in chair, with good grooming and hygiene   Behavior/Motor: No abnormalities noted  Attitude toward examiner:  Cooperative, attentive, good eye contact  Speech:  spontaneous, normal rate, normal volume and well articulated  Mood: \"Better\"  Affect: Improved  Thought processes:  linear, goal directed and coherent  Thought content:  denies homicidal ideation  Suicidal Ideation: Denies suicidal ideation  Delusions:  no evidence of delusions  Perceptual Disturbance:  denies any perceptual disturbance  Cognition:  Oriented to self, location, time, and situation  Memory: age appropriate  Insight & Judgement: improving  Medication side effects:  denies       Data   height is 5' 10\" (1.778 m) and weight is 162 lb (73.5 kg). His oral temperature is 97.8 °F (36.6 °C). His blood pressure is 133/72 and his pulse is 74. His respiration is 12 and oxygen saturation is 98%. Labs:   No visits with results within 2 Day(s) from this visit.    Latest known visit with results is:   Admission on 11/19/2021, Discharged on 11/19/2021   Component Date Value Ref Range Status    Specimen Description 11/19/2021 . NASOPHARYNGEAL SWAB   Final    SARS-CoV-2, Rapid 11/19/2021 Not Detected  Not Detected Final    Comment:       Rapid NAAT:  The specimen is NEGATIVE for SARS-CoV-2, the novel coronavirus associated with   COVID-19. The ID NOW COVID-19 assay is designed to detect the virus that causes COVID-19 in patients   with signs and symptoms of infection who are suspected of COVID-19. An individual without symptoms of COVID-19 and who is not shedding SARS-CoV-2 virus would   expect to have a negative (not detected) result in this assay. Negative results should be treated as presumptive and, if inconsistent with clinical signs   and symptoms or necessary for patient management,  should be tested with an alternative molecular assay. Negative results do not preclude   SARS-CoV-2 infection and   should not be used as the sole basis for patient management decisions.          Fact sheet for Healthcare Providers: [email or web address]  Fact sheet for Patients: [email or web address]          Methodology: Isothermal Nucleic Acid Amplification      Ethanol 11/19/2021 <10  <10 mg/dL Final    Ethanol percent 11/19/2021 <0.010  <0.010 % Final    WBC 11/19/2021 9.1  3.5 - 11.3 k/uL Final    RBC 11/19/2021 4.40  4.21 - 5.77 m/uL Final    Hemoglobin 11/19/2021 13.4  13.0 - 17.0 g/dL Final    Hematocrit 11/19/2021 40.6* 40.7 - 50.3 % Final    MCV 11/19/2021 92.3  82.6 - 102.9 fL Final    MCH 11/19/2021 30.5  25.2 - 33.5 pg Final    MCHC 11/19/2021 33.0  28.4 - 34.8 g/dL Final    RDW 11/19/2021 12.2  11.8 - 14.4 % Final    Platelets 11/19/0719 278  138 - 453 k/uL Final    MPV 11/19/2021 8.9  8.1 - 13.5 fL Final    NRBC Automated 11/19/2021 0.0  0.0 per 100 WBC Final    Differential Type 11/19/2021 NOT REPORTED   Final    Seg Neutrophils 11/19/2021 59  36 - 65 % Final    Lymphocytes 11/19/2021 31  24 - 43 % Final    Monocytes 11/19/2021 8  3 - 12 % Final    Eosinophils % 11/19/2021 2  1 - 4 % Final    Basophils 11/19/2021 0  0 - 2 % Final    Immature Granulocytes 11/19/2021 0  0 % Final    Segs Absolute 11/19/2021 5.33  1.50 - 8.10 k/uL Final    Absolute Lymph # 11/19/2021 2.78  1.10 - 3.70 k/uL Final    Absolute Mono # 11/19/2021 0.69  0.10 - 1.20 k/uL Final    Absolute Eos # 11/19/2021 0.19  0.00 - 0.44 k/uL Final    Basophils Absolute 11/19/2021 0.04  0.00 - 0.20 k/uL Final    Absolute Immature Granulocyte 11/19/2021 <0.03  0.00 - 0.30 k/uL Final    WBC Morphology 11/19/2021 NOT REPORTED   Final    RBC Morphology 11/19/2021 NOT REPORTED   Final    Platelet Estimate 03/31/2599 NOT REPORTED   Final    Glucose 11/19/2021 118* 70 - 99 mg/dL Final    BUN 11/19/2021 13  6 - 20 mg/dL Final    CREATININE 11/19/2021 0.81  0.70 - 1.20 mg/dL Final    Bun/Cre Ratio 11/19/2021 NOT REPORTED  9 - 20 Final    Calcium 11/19/2021 9.3  8.6 - 10.4 mg/dL Final    Sodium 11/19/2021 136  135 - 144 mmol/L Final    Potassium 11/19/2021 3.2* 3.7 - 5.3 mmol/L Final    Chloride 11/19/2021 102  98 - 107 mmol/L Final    CO2 11/19/2021 23  20 - 31 mmol/L Final    Anion Gap 11/19/2021 11  9 - 17 mmol/L Final    Alkaline Phosphatase 11/19/2021 61  40 - 129 U/L Final    ALT 11/19/2021 15  5 - 41 U/L Final    AST 11/19/2021 17  <40 U/L Final    Total Bilirubin 11/19/2021 0.18* 0.3 - 1.2 mg/dL Final    Total Protein 11/19/2021 6.9  6.4 - 8.3 g/dL Final    Albumin 11/19/2021 4.0  3.5 - 5.2 g/dL Final    Albumin/Globulin Ratio 11/19/2021 1.4  1.0 - 2.5 Final    GFR Non- 11/19/2021 >60  >60 mL/min Final    GFR  11/19/2021 >60  >60 mL/min Final    GFR Comment 11/19/2021        Final    Comment: Average GFR for 30-36 years old:   80 mL/min/1.73sq m  Chronic Kidney Disease:   <60 mL/min/1.73sq m  Kidney failure:   <15 mL/min/1.73sq m              eGFR calculated using average adult body mass.  Additional eGFR calculator available at:        [employer]            GFR Staging 11/19/2021 NOT REPORTED   Final    Amphetamine Screen, Ur 11/19/2021 NEGATIVE  NEGATIVE Final    Comment:       (Positive cutoff 1000 ng/mL)                  Barbiturate Screen, Ur 11/19/2021 NEGATIVE  NEGATIVE Final    Comment:       (Positive cutoff 200 ng/mL)                  Benzodiazepine Screen, Urine 11/19/2021 NEGATIVE  NEGATIVE Final    Comment:       (Positive cutoff 200 ng/mL)                  Cocaine Metabolite, Urine 11/19/2021 POSITIVE* NEGATIVE Final    Comment:       (Positive cutoff 300 ng/mL)                  Methadone Screen, Urine 11/19/2021 NEGATIVE  NEGATIVE Final    Comment:       (Positive cutoff 300 ng/mL)                  Opiates, Urine 11/19/2021 NEGATIVE  NEGATIVE Final    Comment:       (Positive cutoff 300 ng/mL)                  Phencyclidine, Urine 11/19/2021 NEGATIVE  NEGATIVE Final    Comment:       (Positive cutoff 25 ng/mL)                  Propoxyphene, Urine 11/19/2021 NOT REPORTED  NEGATIVE Final    Cannabinoid Scrn, Ur 11/19/2021 POSITIVE* NEGATIVE Final    Comment:       (Positive cutoff 50 ng/mL)                  Oxycodone Screen, Ur 11/19/2021 NEGATIVE  NEGATIVE Final    Comment:       (Positive cutoff 100 ng/mL)                  Methamphetamine, Urine 11/19/2021 NOT REPORTED  NEGATIVE Final    Tricyclic Antidepressants, Urine 11/19/2021 NOT REPORTED  NEGATIVE Final    MDMA, Urine 11/19/2021 NOT REPORTED  NEGATIVE Final    Buprenorphine Urine 11/19/2021 NOT REPORTED  NEGATIVE Final    Test Information 11/19/2021 Assay provides medical screening only. The absence of expected drug(s) and/or metabolite(s) may indicate diluted or adulterated urine, limitations of testing or timing of collection. Final    Comment: Testing for legal purposes should be confirmed by another method.   To request confirmation   of test result, please call the lab within 7 days of sample submission. Medications  Current Facility-Administered Medications: venlafaxine (EFFEXOR XR) extended release capsule 75 mg, 75 mg, Oral, Daily with breakfast  loperamide (IMODIUM) capsule 2 mg, 2 mg, Oral, 4x Daily PRN  acetaminophen (TYLENOL) tablet 650 mg, 650 mg, Oral, Q4H PRN  aluminum & magnesium hydroxide-simethicone (MAALOX) 200-200-20 MG/5ML suspension 30 mL, 30 mL, Oral, Q6H PRN  hydrOXYzine (ATARAX) tablet 50 mg, 50 mg, Oral, TID PRN  ibuprofen (ADVIL;MOTRIN) tablet 400 mg, 400 mg, Oral, Q6H PRN  polyethylene glycol (GLYCOLAX) packet 17 g, 17 g, Oral, Daily PRN  traZODone (DESYREL) tablet 50 mg, 50 mg, Oral, Nightly PRN  haloperidol (HALDOL) tablet 5 mg, 5 mg, Oral, Q4H PRN **AND** LORazepam (ATIVAN) tablet 2 mg, 2 mg, Oral, Q4H PRN  haloperidol lactate (HALDOL) injection 5 mg, 5 mg, IntraMUSCular, Q4H PRN **AND** LORazepam (ATIVAN) injection 2 mg, 2 mg, IntraMUSCular, Q4H PRN **AND** diphenhydrAMINE (BENADRYL) injection 50 mg, 50 mg, IntraMUSCular, Q4H PRN  influenza quadrivalent split vaccine (FLUZONE;FLUARIX;FLULAVAL;AFLURIA) injection 0.5 mL, 0.5 mL, IntraMUSCular, Prior to discharge  COVID-19 Ad26 Vaccine (J&J, CUAUHTEMOC) injection 0.5 mL, 1 Dose, IntraMUSCular, Prior to discharge    ASSESSMENT  Severe episode of recurrent major depressive disorder, without psychotic features [facility])     PLAN  Patient s symptoms   are improving  Continue with current medication for now  Attempt to develop insight  Psycho-education conducted. Supportive Therapy conducted. Probable discharge is tomorrow  Follow-up daily while in the inpatient unit      Electronically signed by Vibha Galan MD on 11/23/2021 at 9:15 PM    **This report has been created using voice recognition software. It may contain minor errors which are inherent in voice recognition technology. **

## 2021-11-24 NOTE — BH NOTE
Patient given tobacco quitline number 13007261705 at this time, refusing to call at this time, states \" I just dont want to quit now\"- patient given information as to the dangers of long term tobacco use. Continue to reinforce the importance of tobacco cessation.

## 2021-11-24 NOTE — GROUP NOTE
HS Relaxation Group   Date: November 23, 2021     Patient did not participate in HS relaxation group. 1:1 talk time was offered as an alternative. Will continue to encourage patient to participate in unit programming.      Signature: VIVIENNE Andrews

## 2021-11-24 NOTE — PLAN OF CARE
Problem: Altered Mood, Depressive Behavior:  Goal: Able to verbalize and/or display a decrease in depressive symptoms  Description: Able to verbalize and/or display a decrease in depressive symptoms  Outcome: Ongoing   Patient reports depression at this time. Patient encouraged to attend groups to work on coping skills. Problem: Altered Mood, Depressive Behavior:  Goal: Absence of self-harm  Description: Absence of self-harm  Outcome: Ongoing   Patient denies self harm at this time. No sign of self harm noted. Problem: Tobacco Use:  Goal: Inpatient tobacco use cessation counseling participation  Description: Inpatient tobacco use cessation counseling participation  Outcome: Ongoing   Patient denies tobacco counseling at this time. Problem: Suicide risk  Goal: Provide patient with safe environment  Description: Provide patient with safe environment  Outcome: Ongoing   Patient provided with safe environment at this time. No sign of self harm noted.
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
## 2021-11-24 NOTE — GROUP NOTE
Group Therapy Note    Date: 11/24/2021    Group Start Time: 8936  Group End Time: 1130  Group Topic: Psychotherapy    LUCIANA Salomon LSW        Group Therapy Note    Attendees: 8/20         Patient was offered group therapy today but declined to participate despite encouragement from staff. 1:1 was offered.       Signature:  LUCIANA Srivastava LSW

## 2022-02-08 ENCOUNTER — TELEPHONE (OUTPATIENT)
Dept: INTERNAL MEDICINE | Age: 32
End: 2022-02-08

## 2022-02-08 NOTE — TELEPHONE ENCOUNTER
----- Message from Rumalda Links sent at 2/8/2022  2:20 PM EST -----  Subject: Refill Request    QUESTIONS  Name of Medication? venlafaxine (EFFEXOR XR) 75 MG extended release   capsule  Patient-reported dosage and instructions? 75mg, 1 tablet daily  How many days do you have left? 2  Preferred Pharmacy? [facility] phone number (if available)? [phone]  Additional Information for Provider? Pt has an appt to get est at [facility]   on 3/15 but needs a refill of this Rx to get him through until then. Please advise if able to refill.  ---------------------------------------------------------------------------  --------------  CALL BACK INFO  What is the best way for the office to contact you? OK to leave message on   voicemail  Preferred Call Back Phone Number?  [phone]

## 2022-02-12 ENCOUNTER — HOSPITAL ENCOUNTER (EMERGENCY)
Age: 32
Discharge: PSYCHIATRIC OR SUBSTANCE USE FACILITY | DRG: 754 | End: 2022-02-12
Attending: EMERGENCY MEDICINE | Admitting: PSYCHIATRY & NEUROLOGY
Payer: MEDICARE

## 2022-02-12 ENCOUNTER — HOSPITAL ENCOUNTER (INPATIENT)
Age: 32
LOS: 5 days | Discharge: HOME | DRG: 753 | End: 2022-02-17
Attending: PSYCHIATRY & NEUROLOGY | Admitting: PSYCHIATRY & NEUROLOGY
Payer: MEDICARE

## 2022-02-12 VITALS
TEMPERATURE: 97.5 F | HEART RATE: 67 BPM | DIASTOLIC BLOOD PRESSURE: 67 MMHG | SYSTOLIC BLOOD PRESSURE: 140 MMHG | RESPIRATION RATE: 16 BRPM | BODY MASS INDEX: 23.24 KG/M2 | WEIGHT: 162 LBS | OXYGEN SATURATION: 98 %

## 2022-02-12 DIAGNOSIS — F14.90 CRACK COCAINE USE: ICD-10-CM

## 2022-02-12 DIAGNOSIS — F32.A DEPRESSION WITH SUICIDAL IDEATION: Primary | ICD-10-CM

## 2022-02-12 DIAGNOSIS — R45.851 DEPRESSION WITH SUICIDAL IDEATION: Primary | ICD-10-CM

## 2022-02-12 LAB
ABSOLUTE EOS #: 0.16 K/UL (ref 0–0.44)
ABSOLUTE IMMATURE GRANULOCYTE: <0.03 K/UL (ref 0–0.3)
ABSOLUTE LYMPH #: 2.35 K/UL (ref 1.1–3.7)
ABSOLUTE MONO #: 0.63 K/UL (ref 0.1–1.2)
ALBUMIN SERPL-MCNC: 4.7 G/DL (ref 3.5–5.2)
ALBUMIN/GLOBULIN RATIO: 1.7 (ref 1–2.5)
ALP BLD-CCNC: 67 U/L (ref 40–129)
ALT SERPL-CCNC: 17 U/L (ref 5–41)
AMPHETAMINE SCREEN URINE: NEGATIVE
ANION GAP SERPL CALCULATED.3IONS-SCNC: 14 MMOL/L (ref 9–17)
AST SERPL-CCNC: 27 U/L
BARBITURATE SCREEN URINE: NEGATIVE
BASOPHILS # BLD: 1 % (ref 0–2)
BASOPHILS ABSOLUTE: 0.04 K/UL (ref 0–0.2)
BENZODIAZEPINE SCREEN, URINE: NEGATIVE
BILIRUB SERPL-MCNC: 0.63 MG/DL (ref 0.3–1.2)
BUN BLDV-MCNC: 12 MG/DL (ref 6–20)
BUN/CREAT BLD: NORMAL (ref 9–20)
BUPRENORPHINE URINE: ABNORMAL
CALCIUM SERPL-MCNC: 9.4 MG/DL (ref 8.6–10.4)
CANNABINOID SCREEN URINE: POSITIVE
CHLORIDE BLD-SCNC: 99 MMOL/L (ref 98–107)
CO2: 24 MMOL/L (ref 20–31)
COCAINE METABOLITE, URINE: POSITIVE
CREAT SERPL-MCNC: 0.91 MG/DL (ref 0.7–1.2)
DIFFERENTIAL TYPE: NORMAL
EOSINOPHILS RELATIVE PERCENT: 3 % (ref 1–4)
ETHANOL PERCENT: <0.01 %
ETHANOL: <10 MG/DL
GFR AFRICAN AMERICAN: >60 ML/MIN
GFR NON-AFRICAN AMERICAN: >60 ML/MIN
GFR SERPL CREATININE-BSD FRML MDRD: NORMAL ML/MIN/{1.73_M2}
GFR SERPL CREATININE-BSD FRML MDRD: NORMAL ML/MIN/{1.73_M2}
GLUCOSE BLD-MCNC: 85 MG/DL (ref 70–99)
HCT VFR BLD CALC: 43.4 % (ref 40.7–50.3)
HEMOGLOBIN: 14.2 G/DL (ref 13–17)
IMMATURE GRANULOCYTES: 0 %
LYMPHOCYTES # BLD: 41 % (ref 24–43)
MCH RBC QN AUTO: 30.6 PG (ref 25.2–33.5)
MCHC RBC AUTO-ENTMCNC: 32.7 G/DL (ref 28.4–34.8)
MCV RBC AUTO: 93.5 FL (ref 82.6–102.9)
MDMA URINE: ABNORMAL
METHADONE SCREEN, URINE: NEGATIVE
METHAMPHETAMINE, URINE: ABNORMAL
MONOCYTES # BLD: 11 % (ref 3–12)
NRBC AUTOMATED: 0 PER 100 WBC
OPIATES, URINE: NEGATIVE
OXYCODONE SCREEN URINE: NEGATIVE
PDW BLD-RTO: 12.7 % (ref 11.8–14.4)
PHENCYCLIDINE, URINE: NEGATIVE
PLATELET # BLD: 244 K/UL (ref 138–453)
PLATELET ESTIMATE: NORMAL
PMV BLD AUTO: 8.8 FL (ref 8.1–13.5)
POTASSIUM SERPL-SCNC: 3.9 MMOL/L (ref 3.7–5.3)
PROPOXYPHENE, URINE: ABNORMAL
RBC # BLD: 4.64 M/UL (ref 4.21–5.77)
RBC # BLD: NORMAL 10*6/UL
SARS-COV-2, RAPID: NOT DETECTED
SEG NEUTROPHILS: 44 % (ref 36–65)
SEGMENTED NEUTROPHILS ABSOLUTE COUNT: 2.51 K/UL (ref 1.5–8.1)
SODIUM BLD-SCNC: 137 MMOL/L (ref 135–144)
SPECIMEN DESCRIPTION: NORMAL
TEST INFORMATION: ABNORMAL
TOTAL PROTEIN: 7.5 G/DL (ref 6.4–8.3)
TRICYCLIC ANTIDEPRESSANTS, UR: ABNORMAL
WBC # BLD: 5.7 K/UL (ref 3.5–11.3)
WBC # BLD: NORMAL 10*3/UL

## 2022-02-12 PROCEDURE — 99285 EMERGENCY DEPT VISIT HI MDM: CPT

## 2022-02-12 PROCEDURE — 93005 ELECTROCARDIOGRAM TRACING: CPT | Performed by: STUDENT IN AN ORGANIZED HEALTH CARE EDUCATION/TRAINING PROGRAM

## 2022-02-12 PROCEDURE — 80307 DRUG TEST PRSMV CHEM ANLYZR: CPT

## 2022-02-12 PROCEDURE — 87635 SARS-COV-2 COVID-19 AMP PRB: CPT

## 2022-02-12 PROCEDURE — 1200000000 HC SEMI PRIVATE

## 2022-02-12 PROCEDURE — 1240000000 HC EMOTIONAL WELLNESS R&B

## 2022-02-12 PROCEDURE — 80053 COMPREHEN METABOLIC PANEL: CPT

## 2022-02-12 PROCEDURE — 85025 COMPLETE CBC W/AUTO DIFF WBC: CPT

## 2022-02-12 PROCEDURE — G0480 DRUG TEST DEF 1-7 CLASSES: HCPCS

## 2022-02-12 RX ORDER — ACETAMINOPHEN 325 MG/1
650 TABLET ORAL EVERY 6 HOURS PRN
Status: DISCONTINUED | OUTPATIENT
Start: 2022-02-12 | End: 2022-02-17 | Stop reason: HOSPADM

## 2022-02-12 RX ORDER — MAGNESIUM HYDROXIDE/ALUMINUM HYDROXICE/SIMETHICONE 120; 1200; 1200 MG/30ML; MG/30ML; MG/30ML
30 SUSPENSION ORAL EVERY 6 HOURS PRN
Status: DISCONTINUED | OUTPATIENT
Start: 2022-02-12 | End: 2022-02-17 | Stop reason: HOSPADM

## 2022-02-12 RX ORDER — IBUPROFEN 400 MG/1
400 TABLET ORAL EVERY 6 HOURS PRN
Status: CANCELLED | OUTPATIENT
Start: 2022-02-12

## 2022-02-12 RX ORDER — ACETAMINOPHEN 325 MG/1
650 TABLET ORAL EVERY 6 HOURS PRN
Status: CANCELLED | OUTPATIENT
Start: 2022-02-12

## 2022-02-12 RX ORDER — POLYETHYLENE GLYCOL 3350 17 G/17G
17 POWDER, FOR SOLUTION ORAL DAILY PRN
Status: DISCONTINUED | OUTPATIENT
Start: 2022-02-12 | End: 2022-02-17 | Stop reason: HOSPADM

## 2022-02-12 RX ORDER — HYDROXYZINE HYDROCHLORIDE 25 MG/1
50 TABLET, FILM COATED ORAL 3 TIMES DAILY PRN
Status: CANCELLED | OUTPATIENT
Start: 2022-02-12

## 2022-02-12 RX ORDER — POLYETHYLENE GLYCOL 3350 17 G/17G
17 POWDER, FOR SOLUTION ORAL DAILY PRN
Status: CANCELLED | OUTPATIENT
Start: 2022-02-12

## 2022-02-12 RX ORDER — TRAZODONE HYDROCHLORIDE 50 MG/1
50 TABLET ORAL NIGHTLY PRN
Status: DISCONTINUED | OUTPATIENT
Start: 2022-02-13 | End: 2022-02-17 | Stop reason: HOSPADM

## 2022-02-12 RX ORDER — MAGNESIUM HYDROXIDE/ALUMINUM HYDROXICE/SIMETHICONE 120; 1200; 1200 MG/30ML; MG/30ML; MG/30ML
30 SUSPENSION ORAL EVERY 6 HOURS PRN
Status: CANCELLED | OUTPATIENT
Start: 2022-02-12

## 2022-02-12 RX ORDER — HYDROXYZINE 50 MG/1
50 TABLET, FILM COATED ORAL 3 TIMES DAILY PRN
Status: DISCONTINUED | OUTPATIENT
Start: 2022-02-12 | End: 2022-02-17 | Stop reason: HOSPADM

## 2022-02-12 RX ORDER — IBUPROFEN 400 MG/1
400 TABLET ORAL EVERY 6 HOURS PRN
Status: DISCONTINUED | OUTPATIENT
Start: 2022-02-12 | End: 2022-02-17 | Stop reason: HOSPADM

## 2022-02-12 RX ORDER — TRAZODONE HYDROCHLORIDE 50 MG/1
50 TABLET ORAL NIGHTLY PRN
Status: CANCELLED | OUTPATIENT
Start: 2022-02-13

## 2022-02-12 ASSESSMENT — SLEEP AND FATIGUE QUESTIONNAIRES
DO YOU USE A SLEEP AID: NO
DIFFICULTY FALLING ASLEEP: YES
DIFFICULTY ARISING: NO
DIFFICULTY STAYING ASLEEP: NO
AVERAGE NUMBER OF SLEEP HOURS: 5
RESTFUL SLEEP: NO
DO YOU HAVE DIFFICULTY SLEEPING: YES
SLEEP PATTERN: DIFFICULTY FALLING ASLEEP;RESTLESSNESS

## 2022-02-12 ASSESSMENT — PATIENT HEALTH QUESTIONNAIRE - PHQ9: SUM OF ALL RESPONSES TO PHQ QUESTIONS 1-9: 14

## 2022-02-12 ASSESSMENT — LIFESTYLE VARIABLES: HISTORY_ALCOHOL_USE: NO

## 2022-02-12 ASSESSMENT — ENCOUNTER SYMPTOMS
ABDOMINAL PAIN: 0
NAUSEA: 0
RHINORRHEA: 0
VOMITING: 0
SHORTNESS OF BREATH: 0
PHOTOPHOBIA: 0

## 2022-02-12 ASSESSMENT — PAIN SCALES - GENERAL: PAINLEVEL_OUTOF10: 0

## 2022-02-12 NOTE — ED NOTES
Patient is 26 y/o/m presenting to ED with SI. Patient stated he has had a lot of life stress and can't do this anymore. Patient stated he has suicidal plan to overdose on Fentanyl. Patient stated he attempted to overdose on Fentanyl in the past. His last [facility] admission was 11/19/21-11/24/21 for Depression. Patient denied HI. Patient denied hallucinations or delusions. Patient stated his appetite is \"so-so. \" Patient stated he is not sleeping well and has been restless for the last week. Patient stated he did use crack cocaine yesterday and denied alcohol use. Patient did test positive for Cocaine and Marijuana. Patient stated he has not taken his psychiatric medications for one month due to not having a pcp that can call in refills. Patient denied being linked to a mental health agency. Patient is agreeable to [facility].       Aron Oppenheim, LSW  02/12/22 1847

## 2022-02-12 NOTE — ED NOTES
Pt given warm tray. Pt labs drawn and sent to lab. Covid swab completed and sent to lab. Pt changed out and belongings locked up.       Justyna Giles RN  02/12/22 2833

## 2022-02-12 NOTE — ED PROVIDER NOTES
[address] [facility]  ED  Emergency Department  Emergency Medicine Resident Sign-out     Care of Sterling Rodriguez was assumed from Dr. Keisha Sultana and is being seen for Depression and Suicidal   .  The patient's initial evaluation and plan have been discussed with the prior provider who initially evaluated the patient. EMERGENCY DEPARTMENT COURSE / MEDICAL DECISION MAKING:       MEDICATIONS GIVEN:  No orders of the defined types were placed in this encounter. LABS / RADIOLOGY:     Labs Reviewed   COVID-19, RAPID   CBC WITH AUTO DIFFERENTIAL   COMPREHENSIVE METABOLIC PANEL   ETHANOL   URINE DRUG SCREEN       No orders to display       RECENT VITALS:     Temp: 97.5 °F (36.4 °C),  Pulse: 68, Resp: 16, BP: (!) 142/75, SpO2: 99 %    This patient is a 27 y.o. Male with suicidal ideation no specific plan, depression, has been out of psychiatric medication possible days, has been abusing crack cocaine. Psychiatric lab work is pending at this time, social work is involved and working for possible [facility] treatment. ED Course as of 02/12/22 2102   Sat Feb 12, 2022   1838 Care transferred to dr. Jim Oneill [BG]   1902 Patient is medically cleared for treatment at [facility] facility [WG]      ED Course User Index  [BG] Tl Whitmore DO  [WG] Berta Foreman DO       OUTSTANDING TASKS / RECOMMENDATIONS:      1. Await Bed placement     FINAL IMPRESSION:     1. Depression with suicidal ideation    2. Crack cocaine use        DISPOSITION:       DISPOSITION:  []  Discharge   []  Transfer -    [x]  Admission - [facility]    []  Against Medical Advice   []  Eloped   FOLLOW-UP: No follow-up provider specified.    DISCHARGE MEDICATIONS: New Prescriptions    No medications on file          Berta Foreman DO  Emergency Medicine Resident  [address] [facility]     Berta Foreman [place]  Resident  02/12/22 5929

## 2022-02-12 NOTE — ED PROVIDER NOTES
[facility] ED  Emergency Department Encounter  EmergencyMedicine Resident     Pt Nicholas Santos  MRN: [id number]  [place] 1990  Date of evaluation: 2/12/22  PCP:  GENERIC HIGH    This patient was evaluated in the Emergency Department for symptoms described in the history of present illness. The patient was evaluated in the context of the global COVID-19 pandemic, which necessitated consideration that the patient might be at risk for infection with the SARS-CoV-2 virus that causes COVID-19. Institutional protocols and algorithms that pertain to the evaluation of patients at risk for COVID-19 are in a state of rapid change based on information released by regulatory bodies including the CDC and federal and state organizations. These policies and algorithms were followed during the patient's care in the ED. CHIEF COMPLAINT       Chief Complaint   Patient presents with    Depression    Suicidal       HISTORY OF PRESENT ILLNESS  (Location/Symptom, Timing/Onset, Context/Setting, Quality, Duration, Modifying Factors, Severity.)      Brice Reddy is a 27 y.o. male who presents with depression with suicidal ideation no specific plan no homicidal lesion patient has been out of his trazodone and Effexor x1 month. Patient has a recent [facility] admission/tach which was in November. Patient has been feeling increasingly depressed and has been using crack cocaine almost regularly last used yesterday. Patient denies any somatic complaints or trauma currently. PAST MEDICAL / SURGICAL / SOCIAL / FAMILY HISTORY      has a past medical history of Alcoholism ([address] Anxiety, Bipolar 1 disorder ([facility] Cocaine abuse ([facility] Depression, Hypertension, Irregular heartbeat, Mild tetrahydrocannabinol (THC) abuse, and Suicidal ideation.     Problem Noted Date   Severe recurrent major depression without psychotic features 07/07/2017   Alcohol use disorder, severe, dependence 07/07/2017   Cannabis use disorder, severe, dependence 07/07/2017   Cocaine abuse           has a past surgical history that includes other surgical history (Right, 06/04/2017); Dialysis fistula creation (Right, 6/4/2017); EXPLORATION OF WOUND OF EXTREMITY (Right, 6/4/2017); and Femur Surgery (Left). Social History     Socioeconomic History    Marital status: Single     Spouse name: magalys    Number of children: 1    Years of education: Not on file    Highest education level: Not on file   Occupational History    Occupation: unemployed     Comment: used to work at [employer] 6 months ago   Tobacco Use    Smoking status: Current Every Day Smoker     Packs/day: 0.50     Years: 14.00     Pack years: 7.00     Types: Cigarettes    Smokeless tobacco: Never Used    Tobacco comment: 3 cigs per day per patient   Vaping Use    Vaping Use: Every day   Substance and Sexual Activity    Alcohol use: Not Currently    Drug use: Yes     Types: Marijuana ([place]), Cocaine    Sexual activity: Yes     Partners: Female   Other Topics Concern    Not on file   Social History Narrative    ** Merged History Encounter **         Lives with fiance and 3year old daughter     Social Determinants of Health     Financial Resource Strain:     Difficulty of Paying Living Expenses: Not on file   Food Insecurity:     Worried About [address] in the Last Year: Not on file    Oswaldo of Food in the Last Year: Not on file   Transportation Needs:     Lack of Transportation (Medical): Not on file    Lack of Transportation (Non-Medical):  Not on file   Physical Activity:     Days of Exercise per Week: Not on file    Minutes of Exercise per Session: Not on file   Stress:     Feeling of Stress : Not on file   Social Connections:     Frequency of Communication with Friends and Family: Not on file    Frequency of Social Gatherings with Friends and Family: Not on file    Attends Faith Services: Not on file    Active Member of Clubs or Organizations: Not on file    Attends Club or Organization Meetings: Not on file    Marital Status: Not on file   Intimate Partner Violence:     Fear of Current or Ex-Partner: Not on file    Emotionally Abused: Not on file    Physically Abused: Not on file    Sexually Abused: Not on file   Housing Stability:     Unable to Pay for Housing in the Last Year: Not on file    Number of [place] in the Last Year: Not on file    Unstable Housing in the Last Year: Not on file       Family History   Problem Relation Age of Onset    Heart Disease Father 52    Asthma Brother     Hypertension Maternal Grandmother     Lung Cancer Maternal Grandmother         Kidney cancer, liver cancer    Stroke Maternal Grandmother     Heart Disease Mother 46         of presumed heart disease in her sleep       Allergies:  Vicodin [hydrocodone-acetaminophen]    Home Medications:  Prior to Admission medications    Medication Sig Start Date End Date Taking? Authorizing Provider   venlafaxine (EFFEXOR XR) 75 MG extended release capsule Take 1 capsule by mouth daily (with breakfast) 21   Baljit Garber MD       REVIEW OF SYSTEMS    (2-9 systems for level 4, 10 or more for level 5)      Review of Systems   Constitutional: Negative for fever. HENT: Negative for rhinorrhea. Eyes: Negative for photophobia. Respiratory: Negative for shortness of breath. Gastrointestinal: Negative for abdominal pain, nausea and vomiting. Genitourinary: Negative for difficulty urinating and flank pain. Musculoskeletal: Negative for gait problem. Skin: Negative for rash and wound. Allergic/Immunologic: Negative for environmental allergies and food allergies. Neurological: Negative for syncope and headaches. Hematological: Negative for adenopathy. Psychiatric/Behavioral: Positive for suicidal ideas. Negative for agitation.        PHYSICAL EXAM   (up to 7 for level 4, 8 or more for level 5)      INITIAL VITALS:   BP (!) 140/67   Pulse 67 Temp 97.5 °F (36.4 °C)   Resp 16   Wt 162 lb (73.5 kg)   SpO2 98%   BMI 23.24 kg/m²     Physical Exam  Vitals and nursing note reviewed. Constitutional:       General: He is not in acute distress. Appearance: Normal appearance. He is normal weight. He is not ill-appearing, toxic-appearing or diaphoretic. HENT:      Head: Normocephalic. Right Ear: External ear normal.      Left Ear: External ear normal.      Nose: Nose normal.      Mouth/Throat:      Pharynx: Oropharynx is clear. Eyes:      Conjunctiva/sclera: Conjunctivae normal.   Cardiovascular:      Rate and Rhythm: Normal rate. Pulses: Normal pulses. Pulmonary:      Effort: Pulmonary effort is normal. No respiratory distress. Abdominal:      Palpations: Abdomen is soft. Musculoskeletal:         General: Normal range of motion. Cervical back: Normal range of motion. Right lower leg: No edema. Left lower leg: No edema. Skin:     General: Skin is dry. Capillary Refill: Capillary refill takes less than 2 seconds. Neurological:      Mental Status: He is alert and oriented to person, place, and time. Psychiatric:         Attention and Perception: Attention normal.         Mood and Affect: Mood is depressed. Speech: Speech normal.         Behavior: Behavior normal. Behavior is cooperative. Thought Content: Thought content includes suicidal ideation. Thought content does not include homicidal ideation. Thought content does not include suicidal plan. DIFFERENTIAL  DIAGNOSIS     PLAN (LABS / IMAGING / EKG):  Orders Placed This Encounter   Procedures    COVID-19, Rapid    CBC WITH AUTO DIFFERENTIAL    COMPREHENSIVE METABOLIC PANEL    ETHANOL    Urine Drug Screen    Inpatient consult to Social Work    EKG 12 Lead    PATIENT STATUS (DIRECT) Inpatient       MEDICATIONS ORDERED:  No orders of the defined types were placed in this encounter.       DDX: Depression with suicidal ideation    DIAGNOSTIC RESULTS / EMERGENCY DEPARTMENT COURSE / MDM   LAB RESULTS:  Results for orders placed or performed during the hospital encounter of 02/12/22   COVID-19, Rapid    Specimen: Nasopharyngeal Swab   Result Value Ref Range    Specimen Description . NASOPHARYNGEAL SWAB     SARS-CoV-2, Rapid Not Detected Not Detected   CBC WITH AUTO DIFFERENTIAL   Result Value Ref Range    WBC 5.7 3.5 - 11.3 k/uL    RBC 4.64 4.21 - 5.77 m/uL    Hemoglobin 14.2 13.0 - 17.0 g/dL    Hematocrit 43.4 40.7 - 50.3 %    MCV 93.5 82.6 - 102.9 fL    MCH 30.6 25.2 - 33.5 pg    MCHC 32.7 28.4 - 34.8 g/dL    RDW 12.7 11.8 - 14.4 %    Platelets 055 967 - 387 k/uL    MPV 8.8 8.1 - 13.5 fL    NRBC Automated 0.0 0.0 per 100 WBC    Differential Type NOT REPORTED     Seg Neutrophils 44 36 - 65 %    Lymphocytes 41 24 - 43 %    Monocytes 11 3 - 12 %    Eosinophils % 3 1 - 4 %    Basophils 1 0 - 2 %    Immature Granulocytes 0 0 %    Segs Absolute 2.51 1.50 - 8.10 k/uL    Absolute Lymph # 2.35 1.10 - 3.70 k/uL    Absolute Mono # 0.63 0.10 - 1.20 k/uL    Absolute Eos # 0.16 0.00 - 0.44 k/uL    Basophils Absolute 0.04 0.00 - 0.20 k/uL    Absolute Immature Granulocyte <0.03 0.00 - 0.30 k/uL    WBC Morphology NOT REPORTED     RBC Morphology NOT REPORTED     Platelet Estimate NOT REPORTED    COMPREHENSIVE METABOLIC PANEL   Result Value Ref Range    Glucose 85 70 - 99 mg/dL    BUN 12 6 - 20 mg/dL    CREATININE 0.91 0.70 - 1.20 mg/dL    Bun/Cre Ratio NOT REPORTED 9 - 20    Calcium 9.4 8.6 - 10.4 mg/dL    Sodium 137 135 - 144 mmol/L    Potassium 3.9 3.7 - 5.3 mmol/L    Chloride 99 98 - 107 mmol/L    CO2 24 20 - 31 mmol/L    Anion Gap 14 9 - 17 mmol/L    Alkaline Phosphatase 67 40 - 129 U/L    ALT 17 5 - 41 U/L    AST 27 <40 U/L    Total Bilirubin 0.63 0.3 - 1.2 mg/dL    Total Protein 7.5 6.4 - 8.3 g/dL    Albumin 4.7 3.5 - 5.2 g/dL    Albumin/Globulin Ratio 1.7 1.0 - 2.5    GFR Non-African American >60 >60 mL/min    GFR African American >60 >60 mL/min    GFR Comment          GFR Staging NOT REPORTED    ETHANOL   Result Value Ref Range    Ethanol <10 <10 mg/dL    Ethanol percent <0.010 <0.010 %   Urine Drug Screen   Result Value Ref Range    Amphetamine Screen, Ur NEGATIVE NEGATIVE    Barbiturate Screen, Ur NEGATIVE NEGATIVE    Benzodiazepine Screen, Urine NEGATIVE NEGATIVE    Cocaine Metabolite, Urine POSITIVE (A) NEGATIVE    Methadone Screen, Urine NEGATIVE NEGATIVE    Opiates, Urine NEGATIVE NEGATIVE    Phencyclidine, Urine NEGATIVE NEGATIVE    Propoxyphene, Urine NOT REPORTED NEGATIVE    Cannabinoid Scrn, Ur POSITIVE (A) NEGATIVE    Oxycodone Screen, Ur NEGATIVE NEGATIVE    Methamphetamine, Urine NOT REPORTED NEGATIVE    Tricyclic Antidepressants, Urine NOT REPORTED NEGATIVE    MDMA, Urine NOT REPORTED NEGATIVE    Buprenorphine Urine NOT REPORTED NEGATIVE    Test Information       Assay provides medical screening only. The absence of expected drug(s) and/or metabolite(s) may indicate diluted or adulterated urine, limitations of testing or timing of collection. EKG 12 Lead   Result Value Ref Range    Ventricular Rate 54 BPM    Atrial Rate 54 BPM    P-R Interval 134 ms    QRS Duration 88 ms    Q-T Interval 462 ms    QTc Calculation (Bazett) 438 ms    P Axis 47 degrees    R Axis 49 degrees    T Axis 6 degrees       IMPRESSION: Patient is alert oriented nontoxic oriented 54-year-old male with suicidal ideation no plan in the setting of depression worsening in the setting of medication noncompliance and crack cocaine use last use last evening no somatic complaints plan will be labs and work-up in anticipation of presentation for [facility] admission. RADIOLOGY:  No results found. EKG  Sinus bradycardia at a rate of 54 normal axis  RI interval 134 castration 88 ms normal R wave progression nonspecific ECG there is a T wave inversion noted in lead V3 likely normal variant.     All EKG's are interpreted by the Emergency Department Physician who either signs or Co-signs this chart in the absence of a cardiologist.    EMERGENCY DEPARTMENT COURSE:  ED Course as of 02/13/22 1151   Sat Feb 12, 2022   1838 Care transferred to dr. Samira Miller [BG]   1902 Patient is medically cleared for treatment at [facility] facility [WG]      ED Course User Index  [BG] Estela Yee DO  [WG] Stefanie Singh DO         PROCEDURES:      CONSULTS:  IP CONSULT TO SOCIAL WORK    CRITICAL CARE:      FINAL IMPRESSION      1. Depression with suicidal ideation    2. Crack cocaine use          DISPOSITION / PLAN     DISPOSITION  Care transferred to dr. Sanjeev Becerra:  No follow-up provider specified.     DISCHARGE MEDICATIONS:  Discharge Medication List as of 2/12/2022  9:56 PM          Estela Yee DO  Emergency Medicine Resident    (Please note that portions of thisnote were completed with a voice recognition program.  Efforts were made to edit the dictations but occasionally words are mis-transcribed.)        Estela Yee DO  Resident  02/13/22 1159

## 2022-02-12 NOTE — Clinical Note
Patient Class: Inpatient [101]   REQUIRED: Diagnosis: Depression with suicidal ideation [627564]   Estimated Length of Stay: Estimated stay of more than 2 midnights   Admitting Provider: Tiara Quiroz [[id number]]

## 2022-02-13 PROBLEM — F10.10 ALCOHOL ABUSE: Status: ACTIVE | Noted: 2017-09-23

## 2022-02-13 PROCEDURE — APPSS60 APP SPLIT SHARED TIME 46-60 MINUTES

## 2022-02-13 PROCEDURE — 1240000000 HC EMOTIONAL WELLNESS R&B

## 2022-02-13 PROCEDURE — 99222 1ST HOSP IP/OBS MODERATE 55: CPT | Performed by: INTERNAL MEDICINE

## 2022-02-13 PROCEDURE — 99222 1ST HOSP IP/OBS MODERATE 55: CPT | Performed by: PSYCHIATRY & NEUROLOGY

## 2022-02-13 RX ORDER — VENLAFAXINE HYDROCHLORIDE 37.5 MG/1
37.5 CAPSULE, EXTENDED RELEASE ORAL
Status: DISCONTINUED | OUTPATIENT
Start: 2022-02-14 | End: 2022-02-15

## 2022-02-13 ASSESSMENT — PAIN DESCRIPTION - ORIENTATION: ORIENTATION: RIGHT

## 2022-02-13 ASSESSMENT — PAIN SCALES - GENERAL: PAINLEVEL_OUTOF10: 9

## 2022-02-13 ASSESSMENT — PAIN DESCRIPTION - LOCATION: LOCATION: HIP

## 2022-02-13 NOTE — PLAN OF CARE
[facility]  Initial Interdisciplinary Treatment Plan NO      Original treatment plan Date & Time: 2/13/2022  0757    Admission Type:  Admission Type: Voluntary    Reason for admission:   Reason for Admission: depression, suicidal thoughts, drug use    Estimated Length of Stay:  5-7days  Estimated Discharge Date: to be determined by physician    PATIENT STRENGTHS:  Patient Strengths:Strengths: Social Skills,No significant Physical Illness  Patient Strengths and Limitations:Limitations: Difficulty problem solving/relies on others to help solve problems,Lacks leisure interests  Addictive Behavior: Addictive Behavior  In the past 3 months, have you felt or has someone told you that you have a problem with:  : None  Do you have a history of Chemical Use?: No  Do you have a history of Alcohol Use?: No  Do you have a history of Street Drug Abuse?: Yes  Histroy of Prescripton Drug Abuse?: No  Medical Problems:  Past Medical History:   Diagnosis Date    Alcoholism ([facility]     Anxiety     Bipolar 1 disorder ([facility]     Cocaine abuse ([facility]     Depression     Hypertension     Irregular heartbeat     Mild tetrahydrocannabinol (THC) abuse     Suicidal ideation      Status EXAM:Status and Exam  Normal: No  Facial Expression: Flat  Affect: Blunt  Level of Consciousness: Somnolent  Mood:Normal: No  Mood: Depressed  Motor Activity:Normal: Yes  Interview Behavior: Cooperative  Attention:Normal: No  Attention: Distractible  Thought Content:Normal: Yes  Hallucinations: None  Delusions: No  Memory:Normal: Yes  Insight and Judgment: No  Insight and Judgment: Poor Judgment,Poor Insight  Present Suicidal Ideation: No  Present Homicidal Ideation: No    EDUCATION:   Learner Progress Toward Treatment Goals: reviewed group plans and strategies for care    Method:group therapy, medication compliance, individualized assessments and care planning    Outcome: needs reinforcement    PATIENT GOALS: to be discussed with patient within 67 hours    PLAN/TREATMENT RECOMMENDATIONS:     continue group therapy , medications compliance, goal setting, individualized assessments and care, continue to monitor pt on unit      SHORT-TERM GOALS:   Time frame for Short-Term Goals: 5-7 days    LONG-TERM GOALS:  Time frame for Long-Term Goals: 6 months  Members Present in Team Meeting: See Signature Sheet    ALBARO Lopes

## 2022-02-13 NOTE — H&P
Department of Psychiatry  Attending Physician Psychiatric Assessment     Reason for Admission to Psychiatric Unit:  Concerns about patient's safety in the community    CHIEF COMPLAINT: Depression with suicidal ideation and a plan to overdose on fentanyl    History obtained from: Patient, electronic medical record          HISTORY OF PRESENT ILLNESS:    Tedd Kocher is a 27 y.o. male who has a past medical history of essential hypertension and mental illness who presented to the ER with increased depression and suicidal ideation with a plan to overdose on fentanyl. Patient is agreeable to interview in his room today. He reports that he has just been \"tired of life in general.\"  He reports that yesterday he was feeling suicidal and had a plan to overdose on fentanyl. When asked about significant stressors in patient's life patient is unable to list any significant stressors. He reports that he has been working however on Friday he was lacking motivation and could not even get up to go to work. Patient has also been using crack-cocaine lately. Patient also reports that he has been off his medication and has been unable to find a follow-up appointment to get it refilled. Patient reports that for the past 2 weeks he has been down and depressed, all day nearly every day. He reports poor sleep stating that he \"wakes up a lot\" throughout the night. He endorses significant anhedonia, poor energy and motivation and decreased concentration. He reports that his appetite has been poor lately. Patient endorses feeling very hopeless and helpless. Patient endorses suicidal ideation with a plan to overdose on fentanyl. He denies homicidal ideation. He is able to contract for safety on the unit however would feel very unsafe off the unit. He denies ever having a time in his past or he is gone 3 more days without sleep and felt increased energy.   He denies increase in goal-directed activity or grandiose thoughts of himself. He denies symptoms of psychosis including auditory and visual hallucinations. He denies paranoia. He denies delusions of reference or thoughts that people can read his mind. Patient endorses excess worry about anything and everything. He reports that he often feels restless and on edge and has muscle tension from being keyed up often. He reports that his sleep and concentration are affected by his anxiety. He denies a history of panic attacks. He denies obsessive-compulsive thoughts or behaviors. When asked about trauma patient states that his childhood was very good up until his father passed away when he was 14years old. He also reports that his mother passed away 8 years ago and that this is traumatic for him. He reports he often has nightmares and flashbacks this time. He is hyper avoidant and does not like talking about the death of his mother. Patient reports that he has poor self-esteem and endorses a chronic feeling of emptiness that cannot be filled by anything. He reports that he fears rejection from loved ones and has a pattern of unstable relationships. He endorses mood swings throughout the day with intense anger outbursts. Patient reports that he has a history of cutting to relieve tension. Patient endorses crack cocaine use. He states that he uses \"whenever I can\" but denies that this is daily. He denies other illicit drug or alcohol use. UDS upon admission is positive for cocaine and marijuana. History of head trauma: [] Yes [x] No    History of seizures: [] Yes [x] No    History of violence or aggression: [] Yes [x] No         PSYCHIATRIC HISTORY:  [x] Yes [] No    Patient reports that he does not follow with anyone. Per previous records patient has followed with Eloise Murcia in the past    2 previous lifetime suicide attempts by overdose and cutting. Many previous psychiatric hospital admissions.  Last admission was 11/19/2021    Past psychiatric medications includes:   Zoloft, Trazodone, Effexor    Medication Compliance: No    Adverse reactions from psychotropic medications: [] Yes [x] No         Lifetime Psychiatric Review of Systems         Depression: Endorses     Anxiety: Endorses     Panic Attacks: Denies     Lizett or Hypomania: Denies     Phobias: Denies     Obsessions and Compulsions: Denies     Visual Hallucinations: Denies     Auditory Hallucinations: Denies     Delusions: Denies     Paranoia: Denies     PTSD: Endorses    Past Medical History:        Diagnosis Date    Alcoholism ([facility]     Anxiety     Bipolar 1 disorder ([facility]     Cocaine abuse ([facility]     Depression     Hypertension     Irregular heartbeat     Mild tetrahydrocannabinol (THC) abuse     Suicidal ideation        Past Surgical History:        Procedure Laterality Date    DIALYSIS FISTULA CREATION Right 6/4/2017    RIGHT WRIST WOUND EXPLORATION WITH ARTERIAL REPAIR ULNAR AND RADIAL ARTERIES RIGHT WRIST performed by Gustabo Heller MD at [place] [address] Right 6/4/2017    WOUND EXPLORATION AND/OR REVISION performed by Gustabo Heller MD at [address] [place] [address] Rd Left     OTHER SURGICAL HISTORY Right 06/04/2017    exp and repair of unlar and radial artery with exp and repair of 12 tendons and 2 nerves       Allergies:  Vicodin [hydrocodone-acetaminophen]         Social History:     Born in: [facility]  Family: Reports that he was raised by his mother and father until his father passed when he was 14. He reports that his mother passed away about 8 years ago. He states that he has one brother and one sister whom he is close with.   Highest Level of Education: GED   Occupation: Patient reports that he works at [employer]  Marital Status: Never   Children: 4 children whom he has relationships with  Residence: Lives in an apartment with his girlfriend  Stressors: Work, drug use  Patient Assets/Supportive Factors: Patient is seeking additional support         DRUG USE HISTORY  Social History     Tobacco Use   Smoking Status Current Every Day Smoker    Packs/day: 0.50    Years: 14.00    Pack years: 7.00    Types: Cigarettes   Smokeless Tobacco Never Used   Tobacco Comment    3 cigs per day per patient     Social History     Substance and Sexual Activity   Alcohol Use Not Currently     Social History     Substance and Sexual Activity   Drug Use Yes    Types: Marijuana (Ralene Bud), Cocaine         Patient endorses crack cocaine use. He states that he uses \"whenever I can\" but denies that this is daily. He denies other illicit drug or alcohol use. UDS upon admission is positive for cocaine and marijuana. LEGAL HISTORY:   HISTORY OF INCARCERATION: [x] Yes [] No    Family History:       Problem Relation Age of Onset    Heart Disease Father 52    Asthma Brother     Hypertension Maternal Grandmother     Lung Cancer Maternal Grandmother         Kidney cancer, liver cancer    Stroke Maternal Grandmother     Heart Disease Mother 46         of presumed heart disease in her sleep       Psychiatric Family History    Patient denies psychiatric family history. Suicides in family: [] Yes [x] No    Substance use in family: [x] Yes [] No  Patient reports that both of his parents used drugs. PHYSICAL EXAM:  Vitals:  BP (!) 150/104   Pulse 103   Temp 98.1 °F (36.7 °C) (Oral)   Resp 14   Ht 5' 10\" (1.778 m)   Wt 197 lb (89.4 kg)   BMI 28.27 kg/m²     Pain Level: 7 (0-10 scale with 0 being none and 10 being the worst). He is complaining of pain in his hip. LABS:  Labs reviewed: [x] Yes  Last EKG in EMR reviewed: [x] Yes  QTc: 438            Review of Systems   Constitutional: Negative for chills and weight loss. HENT: Negative for ear pain and nosebleeds. Eyes: Negative for blurred vision and photophobia. Respiratory: Negative for cough, shortness of breath and wheezing.     Cardiovascular: Negative for chest pain and palpitations. Gastrointestinal: Negative for abdominal pain, diarrhea and vomiting. Genitourinary: Negative for dysuria and urgency. Musculoskeletal: Negative for falls and joint pain. Skin: Negative for itching and rash. Neurological: Negative for tremors, seizures and weakness. Endo/Heme/Allergies: Does not bruise/bleed easily. Physical Exam:   Constitutional:  Appears well-developed and well-nourished, no acute distress. HENT:   Head: Normocephalic and atraumatic. Eyes: Conjunctivae are normal. Right eye exhibits no discharge. Left eye exhibits no discharge. No scleral icterus. Neck: Normal range of motion. Neck supple. Pulmonary/Chest:  No respiratory distress or accessory muscle use, no wheezing. Cardiac: Regular rate and rhythm. Abdominal: Soft. Non-tender. Exhibits no distension. Musculoskeletal: Normal range of motion. Exhibits no edema. Neurological: cranial nerves II-XII grossly in tact, normal gait and station. Skin: Skin is warm and dry. Patient is not diaphoretic. No erythema. Mental Status Examination:    Level of consciousness: Awake and alert  Appearance:  Appropriate attire, resting in bed, fair grooming   Behavior/Motor: Approachable, slight psychomotor slowing  Attitude toward examiner:  Cooperative, attentive, poor eye contact  Speech: Normal rate, volume, and tone. Mood: Depressed  Affect: Mood-congruent  Thought processes: Linear and coherent  Thought content: Active suicidal ideations, with a  current plan or intent, contracts for safety on the unit. Denies homicidal ideations               Denies visual hallucinations. Denies auditory hallucinations.               Denies delusions              Denies paranoia  Cognition:  Oriented to self, location, time, situation  Concentration: Clinically adequate  Memory: Intact  Insight &Judgment: Poor         DSM-5 Diagnosis    Principal Problem: Severe episode of recurrent major depressive disorder, without psychotic features ([facility]    Stimulant Use Disorder (Cocaine)    Psychosocial and Contextual factors:  Financial: Denies  Occupational: Endorses  Relationship: Denies  Legal: Denies  Living situation: Denies  Educational: Denies    Past Medical History:   Diagnosis Date    Alcoholism ([facility]     Anxiety     Bipolar 1 disorder ([facility]     Cocaine abuse ([facility]     Depression     Hypertension     Irregular heartbeat     Mild tetrahydrocannabinol (THC) abuse     Suicidal ideation         TREATMENT CONSIDERATIONS    Continue inpatient psychiatric treatment. Home medications reviewed. Restart Effexor 37.5 mg daily and titrate to effect  Problem list updated. Monitor need and frequency of PRN medications. Attempt to develop insight. Follow-up daily while inpatient. Reviewed risks and benefits as well as potential side effects with patient. CONSULTS [x] Yes [] No  Internal Medicine for medical H&P    Risk Management: close watch per standard protocol      Psychotherapy: participation in milieu and group and individual sessions with Attending Physician,  and Physician Assistant/CNP      Estimated length of stay:  2-14 days      GENERAL PATIENT/FAMILY EDUCATION  Patient will understand basic signs and symptoms, patient will understand benefits/risks and potential side effects from proposed medications, and patient will understand their role in recovery. Family is not active in patient's care. Patient assets that may be helpful during treatment include: Intent to participate and engage in treatment, sufficient fund of knowledge and intellect to understand and utilize treatments. Goals:    1) Remission of depression with suicidal ideation. 2) Stabilization of symptoms prior to discharge. 3) Establish efficacy and tolerability of medications.          Behavioral Services  Medicare Certification     Admission Day 1  I certify that this patient's inpatient psychiatric hospital admission is medically necessary for:    x (1) treatment which could reasonably be expected to improve this patient's condition, or    x (2) diagnostic study or its equivalent. Time Spent: 60 minutes    Rocio Phoenix is a 27 y.o. male being evaluated face to face    --ROXANNE Kwong CNP on 2/13/2022 at 9:48 AM    An electronic signature was used to authenticate this note. I independently saw and evaluated the patient. I reviewed the nurse practitioners documentation above. Any additional comments or changes to the nurse practitioners documentation are stated below otherwise agree with assessment. Plan will be as follows:  77-Year-old male presented with severe depression, suicidal, multiple plans including overdosing on illicit fentanyl. Not able to contract for safety off the unit. Noncompliant with medications recently. Agreeable to restart Effexor.   Will monitor  Electronically signed by Abran Florian MD on 2/13/2022 at 12:47 PM

## 2022-02-13 NOTE — H&P
History and Physical Service  [facility] Internal Medicine  Medical hx and physical            Date:   2/13/2022  Patient name:  Gaetano Dawkins  MRN:   [id number]  Account:  [de-identified]  YOB: 1990  PCP:    GENERIC HIGH  Code Status:    Full Code    Chief Complaint:   ,MEDICAL COMORBIDITY MANAGEMENT    History Obtained From:     Patient ,medical record ,nursing staff    History of Present Illnes       Admitted with depression , suicidal ideation through ER     Er note;  Gaetano Dawkins is a 27 y.o. male who presents with depression with suicidal ideation no specific plan no homicidal lesion patient has been out of his trazodone and Effexor x1 month. Patient has a recent I admission/tach which was in November. Patient has been feeling increasingly depressed and has been using crack cocaine almost regularly last used yesterday. Patient denies any somatic complaints or trauma currently      Hx depression , htn ,    Abusing cocaine   Ran out of trazodone and effexor       The patient is a 27 y.o.   Non- / non  male who presents       Past Medical History:   Diagnosis Date    [facility]ism ([facility]     Anxiety     Bipolar 1 disorder (H[facility])     Cocaine abuse ([facility]     Depression     Hypertension     Irregular heartbeat     Mild tetrahydrocannabinol (THC) abuse     Suicidal ideation         Past Surgical History:     Past Surgical History:   Procedure Laterality Date    DIALYSIS FISTULA CREATION Right 6/4/2017    RIGHT WRIST WOUND EXPLORATION WITH ARTERIAL REPAIR ULNAR AND RADIAL ARTERIES RIGHT WRIST performed by Hai Zaman MD at [place] [address] Right 6/4/2017    WOUND EXPLORATION AND/OR REVISION performed by Hia Zaman MD at [address] [place] [address] Rd Left     OTHER SURGICAL HISTORY Right 06/04/2017    exp and repair of unlar and radial artery with exp and repair of 12 tendons and 2 nerves        Medications Prior to Admission: Prior to Admission medications    Medication Sig Start Date End Date Taking? Authorizing Provider   venlafaxine (EFFEXOR XR) 75 MG extended release capsule Take 1 capsule by mouth daily (with breakfast) 21   Sixto Arvizu MD        Allergies:     Vicodin [hydrocodone-acetaminophen]    Social History:     Tobacco:    reports that he has been smoking cigarettes. He has a 7.00 pack-year smoking history. He has never used smokeless tobacco.  Alcohol:      reports previous alcohol use. Drug Use:  reports current drug use. Drugs: Marijuana (Weed) and Cocaine. Family History:     Family History   Problem Relation Age of Onset    Heart Disease Father 52    Asthma Brother     Hypertension Maternal Grandmother     Lung Cancer Maternal Grandmother         Kidney cancer, liver cancer    Stroke Maternal Grandmother     Heart Disease Mother 46         of presumed heart disease in her sleep       Review of Systems:     oBb Gastelum ,  ROS     SEE HPI          Respiratory ROS:            Negative for cough,                                              Negative for shortness of breath . Negative for wheezing ,     Cardiovascular ROS:     Negative for chest pain,                                             Negative for palpitations . Negative for worsening or new leg edema . Gastrointestinal ROS:   Negative for abdominal pain . Negative for change in bowel habits . Fanny Quesada   Dermatological ROS:      Negative for rash,                                            .  ==============                                                       Physical Exam:   /64   Pulse 75   Temp 98.1 °F (36.7 °C) (Oral)   Resp 14   Ht 5' 10\" (1.778 m)   Wt 197 lb (89.4 kg)   BMI 28.27 kg/m²       Physical Exam   Vitals: Vitals:    02/12/22 2218 02/13/22 0800 02/13/22 1015   BP: (!) 149/86 (!) 150/104 137/64   Pulse: 92 103 75   Resp: 14 14 14   Temp: 98.4 °F (36.9 °C) 98.1 °F (36.7 °C)    TempSrc: Oral Oral    Weight: 197 lb (89.4 kg)     Height: 5' 10\" (1.778 m)                      Body mass index is 28.27 kg/m². General Appearance:                   Skin:                             No rash or erythema  HEENT ;                           Head                        Symmetrical , within normal limits                                           Eye                           Conjunctiva normal ,, sclera non-icteric . Ear                           External ear ok . Neck:                            No mass , no thyroid enlargement                                           Pulmonary/Chest:        Clear to auscultation bilaterally . No wheezes, rales or rhonchi . No abnormality on percussion                                                        Cardiovascular:            Normal rate, regular rhythm,                                          No murmur or  Gallop . Abdomen:                       Soft, non-tender                                           Normal bowels sounds,                                             Extremities:                    No  Edema .                                            Neurological ;                 No focal motor deficit ,                 No focal sensory deficit ,    Musculo-skeletal ;                  No  gait abnormality                  No significant joint abnormality,                   Psych:   see Psych notect ,                                                                                           Investigations:      Laboratory Testing:  Recent Results (from the past 24 hour(s))   CBC WITH AUTO DIFFERENTIAL    Collection Time: 02/12/22  5:19 PM   Result Value Ref Range    WBC 5.7 3.5 - 11.3 k/uL    RBC 4.64 4.21 - 5.77 m/uL    Hemoglobin 14.2 13.0 - 17.0 g/dL    Hematocrit 43.4 40.7 - 50.3 %    MCV 93.5 82.6 - 102.9 fL    MCH 30.6 25.2 - 33.5 pg    MCHC 32.7 28.4 - 34.8 g/dL    RDW 12.7 11.8 - 14.4 %    Platelets 156 676 - 568 k/uL    MPV 8.8 8.1 - 13.5 fL    NRBC Automated 0.0 0.0 per 100 WBC    Differential Type NOT REPORTED     Seg Neutrophils 44 36 - 65 %    Lymphocytes 41 24 - 43 %    Monocytes 11 3 - 12 %    Eosinophils % 3 1 - 4 %    Basophils 1 0 - 2 %    Immature Granulocytes 0 0 %    Segs Absolute 2.51 1.50 - 8.10 k/uL    Absolute Lymph # 2.35 1.10 - 3.70 k/uL    Absolute Mono # 0.63 0.10 - 1.20 k/uL    Absolute Eos # 0.16 0.00 - 0.44 k/uL    Basophils Absolute 0.04 0.00 - 0.20 k/uL    Absolute Immature Granulocyte <0.03 0.00 - 0.30 k/uL    WBC Morphology NOT REPORTED     RBC Morphology NOT REPORTED     Platelet Estimate NOT REPORTED    COMPREHENSIVE METABOLIC PANEL    Collection Time: 02/12/22  5:19 PM   Result Value Ref Range    Glucose 85 70 - 99 mg/dL    BUN 12 6 - 20 mg/dL    CREATININE 0.91 0.70 - 1.20 mg/dL    Bun/Cre Ratio NOT REPORTED 9 - 20    Calcium 9.4 8.6 - 10.4 mg/dL    Sodium 137 135 - 144 mmol/L    Potassium 3.9 3.7 - 5.3 mmol/L    Chloride 99 98 - 107 mmol/L    CO2 24 20 - 31 mmol/L    Anion Gap 14 9 - 17 mmol/L    Alkaline Phosphatase 67 40 - 129 U/L    ALT 17 5 - 41 U/L    AST 27 <40 U/L    Total Bilirubin 0.63 0.3 - 1.2 mg/dL    Total Protein 7.5 6.4 - 8.3 g/dL    Albumin 4.7 3.5 - 5.2 g/dL    Albumin/Globulin Ratio 1.7 1.0 - 2.5    GFR Non-African American >60 >60 mL/min    GFR African American >60 >60 mL/min    GFR Comment          GFR Staging NOT REPORTED    ETHANOL    Collection Time: 02/12/22  5:19 PM   Result Value Ref Range    Ethanol <10 <10 mg/dL    Ethanol percent <0.010 <0.010 %   COVID-19, Rapid    Collection Time: 02/12/22  5:24 PM    Specimen: Nasopharyngeal Swab   Result Value Ref Range Specimen Description . NASOPHARYNGEAL SWAB     SARS-CoV-2, Rapid Not Detected Not Detected   EKG 12 Lead    Collection Time: 02/12/22  5:45 PM   Result Value Ref Range    Ventricular Rate 54 BPM    Atrial Rate 54 BPM    P-R Interval 134 ms    QRS Duration 88 ms    Q-T Interval 462 ms    QTc Calculation (Bazett) 438 ms    P Axis 47 degrees    R Axis 49 degrees    T Axis 6 degrees   Urine Drug Screen    Collection Time: 02/12/22  6:03 PM   Result Value Ref Range    Amphetamine Screen, Ur NEGATIVE NEGATIVE    Barbiturate Screen, Ur NEGATIVE NEGATIVE    Benzodiazepine Screen, Urine NEGATIVE NEGATIVE    Cocaine Metabolite, Urine POSITIVE (A) NEGATIVE    Methadone Screen, Urine NEGATIVE NEGATIVE    Opiates, Urine NEGATIVE NEGATIVE    Phencyclidine, Urine NEGATIVE NEGATIVE    Propoxyphene, Urine NOT REPORTED NEGATIVE    Cannabinoid Scrn, Ur POSITIVE (A) NEGATIVE    Oxycodone Screen, Ur NEGATIVE NEGATIVE    Methamphetamine, Urine NOT REPORTED NEGATIVE    Tricyclic Antidepressants, Urine NOT REPORTED NEGATIVE    MDMA, Urine NOT REPORTED NEGATIVE    Buprenorphine Urine NOT REPORTED NEGATIVE    Test Information       Assay provides medical screening only. The absence of expected drug(s) and/or metabolite(s) may indicate diluted or adulterated urine, limitations of testing or timing of collection.        Recent Labs     02/12/22  1719   HGB 14.2   HCT 43.4   WBC 5.7   MCV 93.5      K 3.9   CL 99   CO2 24   BUN 12   CREATININE 0.91   GLUCOSE 85   AST 27   ALT 17   LABALBU 4.7       Hematology:  Recent Labs     02/12/22  1719   WBC 5.7   RBC 4.64   HGB 14.2   HCT 43.4   MCV 93.5   MCH 30.6   MCHC 32.7   RDW 12.7      MPV 8.8     Chemistry:  Recent Labs     02/12/22  1719      K 3.9   CL 99   CO2 24   GLUCOSE 85   BUN 12   CREATININE 0.91   ANIONGAP 14   LABGLOM >60   GFRAA >60   CALCIUM 9.4     Recent Labs     02/12/22  1719   PROT 7.5   LABALBU 4.7   AST 27   ALT 17   ALKPHOS 67   BILITOT 0.63 Imaging/Diagnostics:       No results found. Medications: Allergies: Allergies   Allergen Reactions    Vicodin [Hydrocodone-Acetaminophen] Nausea And Vomiting       Current Meds:   Scheduled Meds:    [START ON 2/14/2022] venlafaxine  37.5 mg Oral Daily with breakfast     Continuous Infusions:   PRN Meds: acetaminophen, aluminum & magnesium hydroxide-simethicone, hydrOXYzine, ibuprofen, nicotine polacrilex, polyethylene glycol, traZODone        Impressions :      1. Principal Problem:    Severe episode of recurrent major depressive disorder, without psychotic features ([facility]  Active Problems:    Alcohol abuse    Cocaine abuse ([facility]    Depression with suicidal ideation  Resolved Problems:    * No resolved hospital problems. *        2.  has a past medical history of Alcoholism ([facility] Anxiety, Bipolar 1 disorder ([facility] Cocaine abuse ([facility] Depression, Hypertension, Irregular heartbeat, Mild tetrahydrocannabinol (THC) abuse, and Suicidal ideation. Plans:     1    Patient admitted with depression , suicidal ideation   Positive cannabinoids and cocaine . Hx alcohol abuse . Not on meds for medical issues . Vitals stable . Labs ok      Will sign off . Thanks . Please call again , if neeeded .            Jenny Steele MD  2/13/2022  1:20 PM

## 2022-02-13 NOTE — GROUP NOTE
Group Therapy Note    Date: 2/13/2022    Group Start Time: 1330  Group End Time: 3291  Group Topic: Cognitive Skills    [facility] D    Jr Day, CTRS    Pt did not attend 1330 cognitive skills group d/t resting in room despite staff invitation to attend. 1:1 talk time offered as alternative to group session, pt declined.               Signature:  Stephane Sky

## 2022-02-13 NOTE — PLAN OF CARE
Problem: Altered Mood, Depressive Behavior:  Goal: Able to verbalize and/or display a decrease in depressive symptoms  Description: Able to verbalize and/or display a decrease in depressive symptoms  2/13/2022 1143 by Eliecer Penaloza  Outcome: Ongoing  Pt relates he is depressed. Isolates in bed except for meals. Needs encouragement to come out for meals. Pt. Has no scheduled meds yet today. Pt denies hallucinations. Relates to feeling depressed and anxious. Problem: Altered Mood, Depressive Behavior:  Goal: Absence of self-harm  Description: Absence of self-harm  2/13/2022 1143 by Eliecer Penaloza  Outcome: Ongoing  Pt relates to fleeting suicidal thoughts. Denies any plan or intent to harm himself. Pt. Remains safe on the unit. Q 15 minute checks for safety maintained. Problem: Pain:  Goal: Pain level will decrease  Description: Pain level will decrease  Outcome: Ongoing  Pt relates to pain in Right hip. Will continue to monitor.

## 2022-02-13 NOTE — CARE COORDINATION
Social work attempted to complete biopsychosocial assessment with patient today but he stated he was too tired and asked to talk to social work later.

## 2022-02-13 NOTE — CARE COORDINATION
SW makes second attempt to meet with patient to complete assessment but he does not respond. Will complete with patient tomorrow 2/14.

## 2022-02-13 NOTE — BH NOTE
Safety checks completed. Rooms and all areas of unit checked. Pt. Denies any safety concerns at this time.

## 2022-02-13 NOTE — PROGRESS NOTES
[facility]  Admission Note     Admission Type:   Admission Type: Voluntary    Reason for admission:  Reason for Admission: depression, suicidal thoughts, drug use    PATIENT STRENGTHS:  Strengths: Social Skills,No significant Physical Illness    Patient Strengths and Limitations:  Limitations: Difficulty problem solving/relies on others to help solve problems,Lacks leisure interests    Addictive Behavior:   Addictive Behavior  In the past 3 months, have you felt or has someone told you that you have a problem with:  : None  Do you have a history of Chemical Use?: No  Do you have a history of Alcohol Use?: No  Do you have a history of Street Drug Abuse?: Yes  Histroy of Prescripton Drug Abuse?: No    Medical Problems:   Past Medical History:   Diagnosis Date    Alcoholism ([facility]     Anxiety     [facility]     Cocaine abuse ([facility]     Depression     Hypertension     Irregular heartbeat     Mild tetrahydrocannabinol (THC) abuse     Suicidal ideation        Status EXAM:  Status and Exam  Normal: No  Facial Expression: Flat  Affect: Blunt  Level of Consciousness: Somnolent  Mood:Normal: No  Mood: Depressed  Motor Activity:Normal: Yes  Interview Behavior: Cooperative  Attention:Normal: No  Attention: Distractible  Thought Content:Normal: Yes  Hallucinations: None  Delusions: No  Memory:Normal: Yes  Insight and Judgment: No  Insight and Judgment: Poor Judgment,Poor Insight  Present Suicidal Ideation: No  Present Homicidal Ideation: No    Tobacco Screening:  Practical Counseling, on admission, rachna X, if applicable and completed (first 3 are required if patient doesn't refuse):            ( )  Recognizing danger situations (included triggers and roadblocks)                    ( )  Coping skills (new ways to manage stress, exercise, relaxation techniques, changing routine, distraction)                                                           ( )  Basic information about quitting (benefits of quitting, techniques in how to quit, available resources  ( ) Referral for counseling faxed to [facility]                                           ( ) Patient refused counseling  ( ) Patient has not smoked in the last 30 days    Metabolic Screening:    Lab Results   Component Value Date    LABA1C 5.6 06/18/2020       Lab Results   Component Value Date    CHOL 187 06/18/2020    CHOL 194 04/16/2018     Lab Results   Component Value Date    TRIG 98 06/18/2020    TRIG 120 04/16/2018     Lab Results   Component Value Date    HDL 50 06/18/2020    HDL 44 04/16/2018     No components found for: LDLCAL  No results found for: LABVLDL      Body mass index is 28.27 kg/m². BP Readings from Last 2 Encounters:   02/12/22 (!) 149/86   02/12/22 (!) 140/67           Pt admitted with followings belongings:  Dental Appliances: None  Vision - Corrective Lenses: None  Hearing Aid: None  Jewelry: Earrings  Body Piercings Removed: Yes  Clothing: Gricelda Woodridge / coat,Pants,Shirt  Were All Patient Medications Collected?: Not Applicable  Other Valuables: Money (Comment),Keys,Other (Comment) (VISA [id number], 0634, $0.03, [place] ID)     Patient's home medications were n/a. Patient oriented to surroundings and program expectations and copy of patient rights given. Received admission packet:  yes. Consents reviewed, signed voluntary. Refused /requested to sign remaining paperwork in AM. Patient verbalize understanding:  yes. Patient education on precautions: yes. Voluntary from [employer], depression with suicidal thoughts. Crack use. Cooperative with admit process requesting to sign paperwork in AM d/t somnolence.                    Purnima Willson RN

## 2022-02-13 NOTE — ED PROVIDER NOTES
[address] [facility]     Emergency Department     Faculty Attestation    I performed a history and physical examination of the patient and discussed management with the resident. I reviewed the residents note and agree with the documented findings including all diagnostic interpretations and plan of care. Any areas of disagreement are noted on the chart. I was personally present for the key portions of any procedures. I have documented in the chart those procedures where I was not present during the key portions. I have reviewed the emergency nurses triage note. I agree with the chief complaint, past medical history, past surgical history, allergies, medications, social and family history as documented unless otherwise noted below. Documentation of the HPI, Physical Exam and Medical Decision Making performed by scribozzy is based on my personal performance of the HPI, PE and MDM. For Physician Assistant/ Nurse Practitioner cases/documentation I have personally evaluated this patient and have completed at least one if not all key elements of the E/M (history, physical exam, and MDM). Additional findings are as noted. This patient was evaluated in the Emergency Department for symptoms described in the history of present illness. He/she was evaluated in the context of the global COVID-19 pandemic, which necessitated consideration that the patient might be at risk for infection with the SARS-CoV-2 virus that causes COVID-19. Institutional protocols and algorithms that pertain to the evaluation of patients at risk for COVID-19 are in a state of rapid change based on information released by regulatory bodies including the CDC and federal and state organizations. These policies and algorithms were followed during the patient's care in the ED. Primary Care Physician: GENERIC HIGH    History:  This is a 27 y.o. male who presents to the Emergency Department with complaint of suicidal ideation. Off of meds for 1 month. Did not harm himself prior to arrival.  Does report cocaine use last night denies chest pain or shortness of breath. Physical:     weight is 162 lb (73.5 kg). His temperature is 97.5 °F (36.4 °C). His blood pressure is 142/75 (abnormal) and his pulse is 68. His respiration is 16 and oxygen saturation is 99%.    27 y.o. male no acute distress, cardiac exam regular rate and rhythm no murmurs rubs gallops.   Pulmonary clear bilaterally no obvious appearance of intoxication pupils equal and reactive no ataxia    Impression: Suicidal ideation    Plan: Psychiatric screening labs, EKG, social work    EKG Interpretation  EKG Interpretation    Interpreted by emergency department physician    Rhythm: sinus bradycardia  Rate: 50-60  Axis: normal  Ectopy: none  Conduction: normal  ST Segments: normal  T Waves: normal  Q Waves: none    EKG  Impression: sinus bradycardia    Cori Schwab, MD      Interpreted by me      Shaye Campbell MD, Donna Duncan  Attending Emergency Physician         Cori Schwab, MD  02/12/22 Anny Macdonald

## 2022-02-13 NOTE — ED NOTES
Dr. Favian Guillen @ [facility] accepted patient dx Depression with SI. Voluntary admission form faxed.   Room assignment: 109-[address]  [address]  02/12/22 0502

## 2022-02-13 NOTE — PROGRESS NOTES
Behavioral Services  Medicare Certification Upon Admission    I certify that this patient's inpatient psychiatric hospital admission is medically necessary for:    [x] (1) Treatment which could reasonably be expected to improve this patient's condition,       [x] (2) Or for diagnostic study;     AND     [x](2) The inpatient psychiatric services are provided while the individual is under the care of a physician and are included in the individualized plan of care.     Estimated length of stay/service 3 to 5 days    Plan for post-hospital care Home with outpatient community mental health follow-up    Electronically signed by Regulo Montez MD on 2/13/2022 at 12:46 PM

## 2022-02-13 NOTE — BH NOTE
Patient given tobacco quitline number 39489869293 at this time, refusing to call at this time, states \" I just dont want to quit now\"- patient given information as to the dangers of long term tobacco use. Continue to reinforce the importance of tobacco cessation.

## 2022-02-14 LAB
EKG ATRIAL RATE: 54 BPM
EKG P AXIS: 47 DEGREES
EKG P-R INTERVAL: 134 MS
EKG Q-T INTERVAL: 462 MS
EKG QRS DURATION: 88 MS
EKG QTC CALCULATION (BAZETT): 438 MS
EKG R AXIS: 49 DEGREES
EKG T AXIS: 6 DEGREES
EKG VENTRICULAR RATE: 54 BPM

## 2022-02-14 PROCEDURE — 6370000000 HC RX 637 (ALT 250 FOR IP): Performed by: PSYCHIATRY & NEUROLOGY

## 2022-02-14 PROCEDURE — 1240000000 HC EMOTIONAL WELLNESS R&B

## 2022-02-14 PROCEDURE — APPSS30 APP SPLIT SHARED TIME 16-30 MINUTES

## 2022-02-14 PROCEDURE — 99232 SBSQ HOSP IP/OBS MODERATE 35: CPT | Performed by: PSYCHIATRY & NEUROLOGY

## 2022-02-14 PROCEDURE — 93010 ELECTROCARDIOGRAM REPORT: CPT | Performed by: INTERNAL MEDICINE

## 2022-02-14 PROCEDURE — 6370000000 HC RX 637 (ALT 250 FOR IP)

## 2022-02-14 RX ADMIN — HYDROXYZINE HYDROCHLORIDE 50 MG: 50 TABLET, FILM COATED ORAL at 21:46

## 2022-02-14 RX ADMIN — TRAZODONE HYDROCHLORIDE 50 MG: 50 TABLET ORAL at 21:46

## 2022-02-14 RX ADMIN — VENLAFAXINE HYDROCHLORIDE 37.5 MG: 37.5 CAPSULE, EXTENDED RELEASE ORAL at 08:32

## 2022-02-14 NOTE — PLAN OF CARE
Problem: Altered Mood, Depressive Behavior:  Goal: Able to verbalize and/or display a decrease in depressive symptoms  Description: Able to verbalize and/or display a decrease in depressive symptoms  2/13/2022 2151 by Philomena Mariano RN  Outcome: Ongoing   Pt is seclusive to his room aloof of staff and peers but is pleasant and cooperative when approached. Reports he is tired and that he only wants to rest  Problem: Altered Mood, Depressive Behavior:  Goal: Absence of self-harm  Description: Absence of self-harm  2/13/2022 2151 by Philomena Mariano RN  Outcome: Ongoing   Pt denies thoughts of harming themself and verbally agrees to remain safe while on the unit.  No self harming behaviors are noted this shift    Problem: Pain:  Goal: Pain level will decrease  Description: Pain level will decrease  2/13/2022 2151 by Philomena Mariano RN  Outcome: Ongoing   Pt is satisfied with pain management

## 2022-02-14 NOTE — PROGRESS NOTES
Daily Progress Note  2/14/2022    Patient Name: Mika Yee    CHIEF COMPLAINT:  Depression with suicidal ideation and a plan to overdose on fentanyl          SUBJECTIVE:      Patient is seen today for a follow up assessment. Patient is compliant with scheduled Effexor. Patient has not required emergency medications in the past 24 hours. Patient is agreeable to interview in his room today where he is laying in bed. Nursing staff report that patient continues to remain isolate and Kaia Thornton reports this is due to the pain in his hip. Patient is again reminded that he has tylenol and Ibuprofen both on for pain but it appears as patient is not utilizing these. Patient continues to endorse significant depression and anxiety. He rates both as a 9 (0-10 scale with 0 being none and 10 being the worst). He reports that he slept poorly last night and woke up multiple times throughout the night. He reports that his appetite remains poor. Kaia Thornton endorses active suicidal ideation. He denies homicidal ideation. He is able to contract for safety on the unit however would feel very unsafe off the unit. He reports that he is feeling extremely hopeless and helpless. He denies symptoms of psychosis including auditory and visual hallucinations. He denies paranoia. He denies medication side effects or medical concerns at this time. He continues to be isolative and does not appear to be attending any groups on the unit and is encouraged to do so. Appetite:  [] Normal/Adequate/Unchanged  [] Increased  [x] Decreased      Sleep:       [] Normal/Adequate/Unchanged  [] Fair  [x] Poor      Group Attendance on Unit:   [] Yes  [] Selectively    [x] No    Medication Side Effects:  Patient denies any medication side effects at the time of assessment. Mental Status Exam  Level of consciousness: Alert and awake. Appearance: Appropriate attire for setting, resting in bed, with poor grooming and hygiene.    Behavior/Motor: Approachable, somewhat evasive, psychomotor slowing  Attitude toward examiner: Cooperative,  Fairly attentive, poor eye contact. Speech: Normal rate, quiet volume, depressed tone. Mood:  Patient reports \"not good\". Affect: Depressed  Thought processes: Linear and coherent. Thought content: Denies homicidal ideation. Suicidal Ideation: Active suicidal ideations, without current plan or intent, contracts for safety on the unit. Delusions: No evidence of delusions. Denies paranoia. Perceptual Disturbance: Patient does not appear to be responding to internal stimuli. Denies auditory hallucinations. Denies visual hallucinations. Cognition: Oriented to self, location, time, and situation. Memory: Intact. Insight & Judgement: Poor. Data   height is 5' 10\" (1.778 m) and weight is 197 lb (89.4 kg). His oral temperature is 98 °F (36.7 °C). His blood pressure is 113/70 and his pulse is 82. His respiration is 14. Labs:   No visits with results within 2 Day(s) from this visit.    Latest known visit with results is:   Admission on 02/12/2022, Discharged on 02/12/2022   Component Date Value Ref Range Status    Ventricular Rate 02/12/2022 54  BPM Final    Atrial Rate 02/12/2022 54  BPM Final    P-R Interval 02/12/2022 134  ms Final    QRS Duration 02/12/2022 88  ms Final    Q-T Interval 02/12/2022 462  ms Final    QTc Calculation (Bazett) 02/12/2022 438  ms Final    P Axis 02/12/2022 47  degrees Final    R Axis 02/12/2022 49  degrees Final    T Axis 02/12/2022 6  degrees Final    WBC 02/12/2022 5.7  3.5 - 11.3 k/uL Final    RBC 02/12/2022 4.64  4.21 - 5.77 m/uL Final    Hemoglobin 02/12/2022 14.2  13.0 - 17.0 g/dL Final    Hematocrit 02/12/2022 43.4  40.7 - 50.3 % Final    MCV 02/12/2022 93.5  82.6 - 102.9 fL Final    MCH 02/12/2022 30.6  25.2 - 33.5 pg Final    MCHC 02/12/2022 32.7  28.4 - 34.8 g/dL Final    RDW 02/12/2022 12.7  11.8 - 14.4 % Final    Platelets 74/86/4802 244  138 - 453 k/uL Final    MPV 02/12/2022 8.8  8.1 - 13.5 fL Final    NRBC Automated 02/12/2022 0.0  0.0 per 100 WBC Final    Differential Type 02/12/2022 NOT REPORTED   Final    Seg Neutrophils 02/12/2022 44  36 - 65 % Final    Lymphocytes 02/12/2022 41  24 - 43 % Final    Monocytes 02/12/2022 11  3 - 12 % Final    Eosinophils % 02/12/2022 3  1 - 4 % Final    Basophils 02/12/2022 1  0 - 2 % Final    Immature Granulocytes 02/12/2022 0  0 % Final    Segs Absolute 02/12/2022 2.51  1.50 - 8.10 k/uL Final    Absolute Lymph # 02/12/2022 2.35  1.10 - 3.70 k/uL Final    Absolute Mono # 02/12/2022 0.63  0.10 - 1.20 k/uL Final    Absolute Eos # 02/12/2022 0.16  0.00 - 0.44 k/uL Final    Basophils Absolute 02/12/2022 0.04  0.00 - 0.20 k/uL Final    Absolute Immature Granulocyte 02/12/2022 <0.03  0.00 - 0.30 k/uL Final    WBC Morphology 02/12/2022 NOT REPORTED   Final    RBC Morphology 02/12/2022 NOT REPORTED   Final    Platelet Estimate 57/58/3489 NOT REPORTED   Final    Glucose 02/12/2022 85  70 - 99 mg/dL Final    BUN 02/12/2022 12  6 - 20 mg/dL Final    CREATININE 02/12/2022 0.91  0.70 - 1.20 mg/dL Final    Bun/Cre Ratio 02/12/2022 NOT REPORTED  9 - 20 Final    Calcium 02/12/2022 9.4  8.6 - 10.4 mg/dL Final    Sodium 02/12/2022 137  135 - 144 mmol/L Final    Potassium 02/12/2022 3.9  3.7 - 5.3 mmol/L Final    Chloride 02/12/2022 99  98 - 107 mmol/L Final    CO2 02/12/2022 24  20 - 31 mmol/L Final    Anion Gap 02/12/2022 14  9 - 17 mmol/L Final    Alkaline Phosphatase 02/12/2022 67  40 - 129 U/L Final    ALT 02/12/2022 17  5 - 41 U/L Final    AST 02/12/2022 27  <40 U/L Final    Total Bilirubin 02/12/2022 0.63  0.3 - 1.2 mg/dL Final    Total Protein 02/12/2022 7.5  6.4 - 8.3 g/dL Final    Albumin 02/12/2022 4.7  3.5 - 5.2 g/dL Final    Albumin/Globulin Ratio 02/12/2022 1.7  1.0 - 2.5 Final    GFR Non- 02/12/2022 >60  >60 mL/min Final    GFR  02/12/2022 >60  >60 mL/min Final  GFR Comment 02/12/2022        Final    Comment: Average GFR for 30-36 years old:   107 mL/min/1.73sq m  Chronic Kidney Disease:   <60 mL/min/1.73sq m  Kidney failure:   <15 mL/min/1.73sq m              eGFR calculated using average adult body mass.  Additional eGFR calculator available at:        [employer]            GFR Staging 02/12/2022 NOT REPORTED   Final    Ethanol 02/12/2022 <10  <10 mg/dL Final    Ethanol percent 02/12/2022 <0.010  <0.010 % Final    Amphetamine Screen, Ur 02/12/2022 NEGATIVE  NEGATIVE Final    Comment:       (Positive cutoff 1000 ng/mL)                  Barbiturate Screen, Ur 02/12/2022 NEGATIVE  NEGATIVE Final    Comment:       (Positive cutoff 200 ng/mL)                  Benzodiazepine Screen, Urine 02/12/2022 NEGATIVE  NEGATIVE Final    Comment:       (Positive cutoff 200 ng/mL)                  Cocaine Metabolite, Urine 02/12/2022 POSITIVE* NEGATIVE Final    Comment:       (Positive cutoff 300 ng/mL)                  Methadone Screen, Urine 02/12/2022 NEGATIVE  NEGATIVE Final    Comment:       (Positive cutoff 300 ng/mL)                  Opiates, Urine 02/12/2022 NEGATIVE  NEGATIVE Final    Comment:       (Positive cutoff 300 ng/mL)                  Phencyclidine, Urine 02/12/2022 NEGATIVE  NEGATIVE Final    Comment:       (Positive cutoff 25 ng/mL)                  Propoxyphene, Urine 02/12/2022 NOT REPORTED  NEGATIVE Final    Cannabinoid Scrn, Ur 02/12/2022 POSITIVE* NEGATIVE Final    Comment:       (Positive cutoff 50 ng/mL)                  Oxycodone Screen, Ur 02/12/2022 NEGATIVE  NEGATIVE Final    Comment:       (Positive cutoff 100 ng/mL)                  Methamphetamine, Urine 02/12/2022 NOT REPORTED  NEGATIVE Final    Tricyclic Antidepressants, Urine 02/12/2022 NOT REPORTED  NEGATIVE Final    MDMA, Urine 02/12/2022 NOT REPORTED  NEGATIVE Final    Buprenorphine Urine 02/12/2022 NOT REPORTED  NEGATIVE Final    Test Information 02/12/2022 Assay provides medical screening only. The absence of expected drug(s) and/or metabolite(s) may indicate diluted or adulterated urine, limitations of testing or timing of collection. Final    Comment: Testing for legal purposes should be confirmed by another method. To request confirmation   of test result, please call the lab within 7 days of sample submission.  Specimen Description 02/12/2022 . NASOPHARYNGEAL SWAB   Final    SARS-CoV-2, Rapid 02/12/2022 Not Detected  Not Detected Final    Comment:       Rapid NAAT:  The specimen is NEGATIVE for SARS-CoV-2, the novel coronavirus associated with   COVID-19. The ID NOW COVID-19 assay is designed to detect the virus that causes COVID-19 in patients   with signs and symptoms of infection who are suspected of COVID-19. An individual without symptoms of COVID-19 and who is not shedding SARS-CoV-2 virus would   expect to have a negative (not detected) result in this assay. Negative results should be treated as presumptive and, if inconsistent with clinical signs   and symptoms or necessary for patient management,  should be tested with an alternative molecular assay. Negative results do not preclude   SARS-CoV-2 infection and   should not be used as the sole basis for patient management decisions. Fact sheet for Healthcare Providers: [employer]  Fact sheet for Patients: [employer]es          Methodology: Isothermal Nucleic Acid Amplification           Reviewed patient's current plan of care and vital signs with nursing staff.     Labs reviewed: [x] Yes  Last EKG in EMR reviewed: [x] Yes  QTc: 438    Medications  Current Facility-Administered Medications: venlafaxine (EFFEXOR XR) extended release capsule 37.5 mg, 37.5 mg, Oral, Daily with breakfast  acetaminophen (TYLENOL) tablet 650 mg, 650 mg, Oral, Q6H PRN  aluminum & magnesium hydroxide-simethicone (MAALOX) insight  Psycho-education conducted. Supportive Therapy conducted.   Probable discharge is 3 to 4 days  Follow-up TBD    Electronically signed by Yamilex Butler MD on 2/14/22 at 4:20 PM EST

## 2022-02-14 NOTE — GROUP NOTE
Group Therapy Note    Date: 2/14/2022    Group Start Time: 1000  Group End Time: 1100  Group Topic: Group Therapy    [facility]    DANTE Vega        Group Therapy Note  Pt declined to attend psychotherapy at 1000 am despite encouragement. Pt offered 1:1 and refused.     Attendees: 9/14                         Signature:  DANTE Vega

## 2022-02-14 NOTE — PLAN OF CARE
Problem: Altered Mood, Depressive Behavior:  Goal: Able to verbalize and/or display a decrease in depressive symptoms  Description: Able to verbalize and/or display a decrease in depressive symptoms  2/14/2022 0853 by Ruma Dumont LPN  Outcome: Ongoing     Problem: Altered Mood, Depressive Behavior:  Goal: Absence of self-harm  Description: Absence of self-harm  2/14/2022 0853 by Ruma Dumont LPN  Outcome: Ongoing   Patient endorses thoughts of self harm, contracts for safety on the unit, remains free from self harm.  Support and encouragement provided

## 2022-02-14 NOTE — CARE COORDINATION
[facility] Biopsychosocial Assessment    Current Level of Psychosocial Functioning     Independent: x  Dependent:   Minimal Assist:          Psychosocial High Risk Factors (check all that apply)    Unable to obtain meds: x  Chronic illness/pain:    Substance abuse: x  Lack of Family Support:   Financial stress:   Isolation:   Inadequate Community Resources: x  Suicide attempt(s): x  Not taking medications: x   Victim of crime:   Developmental Delay:   Unable to manage personal needs:   Age 72 or older:   Homeless:   No transportation:   Readmission within 30 days:   Unemployment:   Traumatic Event: Parents are         Psychiatric Advanced Directives: no advanced directives    Family to Involve in Treatment: none reported, patient has PA signed for [employer]      Sexual Orientation: LENNIE    Patient Strengths: employed, housing, linked with [facility] in the past    Patient Barriers: substance use, not currently taking medications    Opiate Education: no opiate use reported     CMHC/mental health history: history with [facility]     Plan of Care   medication management, group/individual therapies, family meetings, psycho -education, treatment team meetings to assist with stabilization    Initial Discharge Plan: to be determined by provider      Clinical Summary:  Patient is a 27year old male who presented to [facility] from [facility] ED with increased depression and suicidal ideation. Patient had thoughts to overdose on fentanyl. Patient reports poor sleep, appetite loss, and poor concentration. Patient reports having nightmares about his mother death. Patient has previous attempts of suicide by overdosing on fentanyl, previous history of cutting. Patient has been off of his medications, and doesn't have a PCP to refill medications. Patient has been linked to [facility] in the past. Social Work will continue to engage patient in treatment and discharge planning.

## 2022-02-14 NOTE — CARE COORDINATION
Social work attempted to meet with patient to gather information for his psychosocial assessment due to patient refusing to participate twice yesterday. Patient pulled the blanket over his head, refusing to speak with social work, stating, \"I just need to sleep. \"

## 2022-02-14 NOTE — PLAN OF CARE
[address] [place] [address]  Day 3 Interdisciplinary Treatment Plan NOTE    Review Date & Time: 2/14/2022   0933    Admission Type:   Admission Type: Voluntary    Reason for admission:  Reason for Admission: depression, suicidal thoughts, drug use  Estimated Length of Stay: 5-7 days  Estimated Discharge Date Update: to be determined by physician    PATIENT STRENGTHS:  Patient Strengths Strengths: Social Skills,No significant Physical Illness  Patient Strengths and Limitations:Limitations: Difficult relationships / poor social skills,Difficulty problem solving/relies on others to help solve problems,Apathetic / unmotivated  Addictive Behavior:Addictive Behavior  In the past 3 months, have you felt or has someone told you that you have a problem with:  : None  Do you have a history of Chemical Use?: No  Do you have a history of Alcohol Use?: No  Do you have a history of Street Drug Abuse?: Yes  Histroy of Prescripton Drug Abuse?: No  Medical Problems:  Past Medical History:   Diagnosis Date    Alcoholism ([facility] [address]     Anxiety     Bipolar 1 disorder ([facility] [address]     Cocaine abuse ([facility]     Depression     Hypertension     Irregular heartbeat     Mild tetrahydrocannabinol (THC) abuse     Suicidal ideation        Risk:  Fall RiskTotal: 55  Joao Scale Joao Scale Score: 22  BVC    Change in scores no Changes to plan of Care no    Status EXAM:   Status and Exam  Normal: No  Facial Expression: Flat,Sad  Affect: Blunt  Level of Consciousness: Alert  Mood:Normal: No  Mood: Depressed  Motor Activity:Normal: Yes  Interview Behavior: Cooperative,Evasive  Preception: Carmel to Person,[place]armel to Time,Carmel to Place,[place]armel to Situation  Attention:Normal: Yes  Attention: Unable to Concentrate  Thought Processes: Circumstantial  Thought Content:Normal: Yes  Hallucinations: None  Delusions: No  Memory:Normal: Yes  Insight and Judgment: Yes  Insight and Judgment: Unmotivated  Present Suicidal Ideation: Yes  Present Homicidal Ideation: No    Daily Assessment Last Entry:   Daily Sleep (WDL): Within Defined Limits         Patient Currently in Pain: Denies  Daily Nutrition (WDL): Within Defined Limits    Patient Monitoring:  Frequency of Checks: 4 times per hour, close    Psychiatric Symptoms:   Depression Symptoms  Depression Symptoms: Isolative,Impaired concentration  Anxiety Symptoms  Anxiety Symptoms: Generalized  Lizett Symptoms  Lizett Symptoms: No problems reported or observed. Psychosis Symptoms  Delusion Type: No problems reported or observed. Suicide Risk CSSR-S:  1) Within the past month, have you wished you were dead or wished you could go to sleep and not wake up? : Yes  2) Have you actually had any thoughts of killing yourself? : No  3) Have you been thinking about how you might kill yourself? : No  5) Have you started to work out or worked out the details of how to kill yourself?  Do you intend to carry out this plan? : No  6) Have you ever done anything, started to do anything, or prepared to do anything to end your life?: No  Change in Result NO Change in Plan of care NO      EDUCATION:   EDUCATION:   Learner Progress Toward Treatment Goals: Reviewed results and recommendations of this team, Reviewed group plan and strategies, Reviewed signs, symptoms and risk of self harm and violent behavior, Reviewed goals and plan of care    Method:small group, individual verbal education    Outcome:verbalized by patient, but needs reinforcement to obtain goals    PATIENT GOALS:  Short term: Patient refused treatment team at this time per   Long term:  Patient refused treatment team at this time per     PLAN/TREATMENT RECOMMENDATIONS UPDATE: continue with group therapies, increased socialization, continue planning for after discharge goals, continue with medication compliance    SHORT-TERM GOALS UPDATE:   Time frame for Short-Term Goals: 5-7 days    LONG-TERM GOALS UPDATE:   Time frame for Long-Term Goals: 6 months  Members Present in Team Meeting: See Signature Sheet    Hina Aburto

## 2022-02-14 NOTE — GROUP NOTE
Group Therapy Note    Date: 2/14/2022    Group Start Time: 1430  Group End Time: 7802  Group Topic: Music Therapy    [facility] Adina Cockayne        Group Therapy Note    Pt did not attend music therapy group d/t resting in room despite staff invitation to attend. 1:1 talk time offered as alternative to group session, pt declined.

## 2022-02-15 PROCEDURE — 1240000000 HC EMOTIONAL WELLNESS R&B

## 2022-02-15 PROCEDURE — 6370000000 HC RX 637 (ALT 250 FOR IP): Performed by: PSYCHIATRY & NEUROLOGY

## 2022-02-15 PROCEDURE — 6370000000 HC RX 637 (ALT 250 FOR IP)

## 2022-02-15 PROCEDURE — 99232 SBSQ HOSP IP/OBS MODERATE 35: CPT | Performed by: PSYCHIATRY & NEUROLOGY

## 2022-02-15 RX ORDER — VENLAFAXINE HYDROCHLORIDE 75 MG/1
75 CAPSULE, EXTENDED RELEASE ORAL
Status: DISCONTINUED | OUTPATIENT
Start: 2022-02-16 | End: 2022-02-17 | Stop reason: HOSPADM

## 2022-02-15 RX ADMIN — TRAZODONE HYDROCHLORIDE 50 MG: 50 TABLET ORAL at 22:42

## 2022-02-15 RX ADMIN — VENLAFAXINE HYDROCHLORIDE 37.5 MG: 37.5 CAPSULE, EXTENDED RELEASE ORAL at 08:15

## 2022-02-15 RX ADMIN — HYDROXYZINE HYDROCHLORIDE 50 MG: 50 TABLET, FILM COATED ORAL at 22:42

## 2022-02-15 NOTE — GROUP NOTE
Group Therapy Note    Date: 2/15/2022    Group Start Time: 1000  Group End Time: 3488  Group Topic: Psychotherapy    [facility]    LUCIANA Dawn, CYNTHIA        Group Therapy Note    Attendees: 8/18         Patient was offered group therapy today but declined to participate despite encouragement from staff. 1:1 was offered.       Signature:  ULCIANA Dawn, CYNTHIA

## 2022-02-15 NOTE — GROUP NOTE
Group Therapy Note    Date: 2/15/2022    Group Start Time: 1330  Group End Time: 3304  Group Topic: Music Therapy    JEREMIAH Umaña        Group Therapy Note    Pt did not attend music therapy group d/t resting in room despite staff invitation to attend. 1:1 talk time offered as alternative to group session, pt declined.

## 2022-02-15 NOTE — PROGRESS NOTES
Daily Progress Note  2/15/2022    Patient Name: Brice Reddy    CHIEF COMPLAINT: Depression with suicidal ideation         SUBJECTIVE:      Patient is seen today for a follow up assessment. He is interviewed today bedside, he continues to endorse depression and reports low motivation or interest in getting out of bed. He did get up today and participate in breakfast though reports little appetite and did not come out of his room for lunch. He continues to tolerate the Effexor at 37.5 mg, we did discuss the risks and benefits of further titration and he is agreeable. He feels that his suicidal ideation intensity has improved though he is still unable to contract for safety in a lower level of care. Appetite:  [] Adequate/Unchanged  [] Increased  [x] Decreased      Sleep:       [] Adequate/Unchanged  [x] Fair  [] Poor      Group Attendance on Unit:   [] Yes   [] Selectively    [x] No    Compliant with scheduled medications: [x] Yes  [] No    Received emergency medications in past 24 hrs: [] Yes   [x] No    Medication Side Effects: Denies          Mental Status Exam  Level of consciousness: Alert and awake   Appearance: Appropriate attire for setting, resting in bed, with fair  grooming and hygiene   Behavior/Motor: Approachable, psychomotor slowing  Attitude toward examiner: Mostly cooperative, somewhat inattentive, fair eye contact  Speech: Mumbled, quiet, low tone  Mood: \"Depressed\"  Affect: Congruent, isolative  Thought processes: Linear and coherent  Thought content: Physical pain also contributing to mood, Denies homicidal ideation  Suicidal Ideation: Reports improvement in suicidal ideations, contracts for safety on the unit. Delusions: No evidence of delusions. Perceptual Disturbance: Denies, patient does not appear to be responding to internal stimuli.    Cognition: Oriented to self, location, time, and situation  Memory: intact  Insight: fair   Judgement: fair       Data   height is 5' 10\" (1.778 m) and weight is 197 lb (89.4 kg). His oral temperature is 97.9 °F (36.6 °C). His blood pressure is 124/80 and his pulse is 81. His respiration is 14. Labs:   No visits with results within 2 Day(s) from this visit.    Latest known visit with results is:   Admission on 02/12/2022, Discharged on 02/12/2022   Component Date Value Ref Range Status    Ventricular Rate 02/12/2022 54  BPM Final    Atrial Rate 02/12/2022 54  BPM Final    P-R Interval 02/12/2022 134  ms Final    QRS Duration 02/12/2022 88  ms Final    Q-T Interval 02/12/2022 462  ms Final    QTc Calculation (Bazett) 02/12/2022 438  ms Final    P Axis 02/12/2022 47  degrees Final    R Axis 02/12/2022 49  degrees Final    T Axis 02/12/2022 6  degrees Final    WBC 02/12/2022 5.7  3.5 - 11.3 k/uL Final    RBC 02/12/2022 4.64  4.21 - 5.77 m/uL Final    Hemoglobin 02/12/2022 14.2  13.0 - 17.0 g/dL Final    Hematocrit 02/12/2022 43.4  40.7 - 50.3 % Final    MCV 02/12/2022 93.5  82.6 - 102.9 fL Final    MCH 02/12/2022 30.6  25.2 - 33.5 pg Final    MCHC 02/12/2022 32.7  28.4 - 34.8 g/dL Final    RDW 02/12/2022 12.7  11.8 - 14.4 % Final    Platelets 79/20/4504 244  138 - 453 k/uL Final    MPV 02/12/2022 8.8  8.1 - 13.5 fL Final    NRBC Automated 02/12/2022 0.0  0.0 per 100 WBC Final    Differential Type 02/12/2022 NOT REPORTED   Final    Seg Neutrophils 02/12/2022 44  36 - 65 % Final    Lymphocytes 02/12/2022 41  24 - 43 % Final    Monocytes 02/12/2022 11  3 - 12 % Final    Eosinophils % 02/12/2022 3  1 - 4 % Final    Basophils 02/12/2022 1  0 - 2 % Final    Immature Granulocytes 02/12/2022 0  0 % Final    Segs Absolute 02/12/2022 2.51  1.50 - 8.10 k/uL Final    Absolute Lymph # 02/12/2022 2.35  1.10 - 3.70 k/uL Final    Absolute Mono # 02/12/2022 0.63  0.10 - 1.20 k/uL Final    Absolute Eos # 02/12/2022 0.16  0.00 - 0.44 k/uL Final    Basophils Absolute 02/12/2022 0.04  0.00 - 0.20 k/uL Final    Absolute Immature Granulocyte 02/12/2022 <0.03  0.00 - 0.30 k/uL Final    WBC Morphology 02/12/2022 NOT REPORTED   Final    RBC Morphology 02/12/2022 NOT REPORTED   Final    Platelet Estimate 33/32/9908 NOT REPORTED   Final    Glucose 02/12/2022 85  70 - 99 mg/dL Final    BUN 02/12/2022 12  6 - 20 mg/dL Final    CREATININE 02/12/2022 0.91  0.70 - 1.20 mg/dL Final    Bun/Cre Ratio 02/12/2022 NOT REPORTED  9 - 20 Final    Calcium 02/12/2022 9.4  8.6 - 10.4 mg/dL Final    Sodium 02/12/2022 137  135 - 144 mmol/L Final    Potassium 02/12/2022 3.9  3.7 - 5.3 mmol/L Final    Chloride 02/12/2022 99  98 - 107 mmol/L Final    CO2 02/12/2022 24  20 - 31 mmol/L Final    Anion Gap 02/12/2022 14  9 - 17 mmol/L Final    Alkaline Phosphatase 02/12/2022 67  40 - 129 U/L Final    ALT 02/12/2022 17  5 - 41 U/L Final    AST 02/12/2022 27  <40 U/L Final    Total Bilirubin 02/12/2022 0.63  0.3 - 1.2 mg/dL Final    Total Protein 02/12/2022 7.5  6.4 - 8.3 g/dL Final    Albumin 02/12/2022 4.7  3.5 - 5.2 g/dL Final    Albumin/Globulin Ratio 02/12/2022 1.7  1.0 - 2.5 Final    GFR Non- 02/12/2022 >60  >60 mL/min Final    GFR  02/12/2022 >60  >60 mL/min Final    GFR Comment 02/12/2022        Final    Comment: Average GFR for 30-36 years old:   80 mL/min/1.73sq m  Chronic Kidney Disease:   <60 mL/min/1.73sq m  Kidney failure:   <15 mL/min/1.73sq m              eGFR calculated using average adult body mass.  Additional eGFR calculator available at:        [email or web address]            GFR Staging 02/12/2022 NOT REPORTED   Final    Ethanol 02/12/2022 <10  <10 mg/dL Final    Ethanol percent 02/12/2022 <0.010  <0.010 % Final    Amphetamine Screen, Ur 02/12/2022 NEGATIVE  NEGATIVE Final    Comment:       (Positive cutoff 1000 ng/mL)                  Barbiturate Screen, Ur 02/12/2022 NEGATIVE  NEGATIVE Final    Comment:       (Positive cutoff 200 ng/mL)                  Benzodiazepine Screen, Urine 02/12/2022 NEGATIVE  NEGATIVE Final    Comment:       (Positive cutoff 200 ng/mL)                  Cocaine Metabolite, Urine 02/12/2022 POSITIVE* NEGATIVE Final    Comment:       (Positive cutoff 300 ng/mL)                  Methadone Screen, Urine 02/12/2022 NEGATIVE  NEGATIVE Final    Comment:       (Positive cutoff 300 ng/mL)                  Opiates, Urine 02/12/2022 NEGATIVE  NEGATIVE Final    Comment:       (Positive cutoff 300 ng/mL)                  Phencyclidine, Urine 02/12/2022 NEGATIVE  NEGATIVE Final    Comment:       (Positive cutoff 25 ng/mL)                  Propoxyphene, Urine 02/12/2022 NOT REPORTED  NEGATIVE Final    Cannabinoid Scrn, Ur 02/12/2022 POSITIVE* NEGATIVE Final    Comment:       (Positive cutoff 50 ng/mL)                  Oxycodone Screen, Ur 02/12/2022 NEGATIVE  NEGATIVE Final    Comment:       (Positive cutoff 100 ng/mL)                  Methamphetamine, Urine 02/12/2022 NOT REPORTED  NEGATIVE Final    Tricyclic Antidepressants, Urine 02/12/2022 NOT REPORTED  NEGATIVE Final    MDMA, Urine 02/12/2022 NOT REPORTED  NEGATIVE Final    Buprenorphine Urine 02/12/2022 NOT REPORTED  NEGATIVE Final    Test Information 02/12/2022 Assay provides medical screening only. The absence of expected drug(s) and/or metabolite(s) may indicate diluted or adulterated urine, limitations of testing or timing of collection. Final    Comment: Testing for legal purposes should be confirmed by another method. To request confirmation   of test result, please call the lab within 7 days of sample submission.  Specimen Description 02/12/2022 . NASOPHARYNGEAL SWAB   Final    SARS-CoV-2, Rapid 02/12/2022 Not Detected  Not Detected Final    Comment:       Rapid NAAT:  The specimen is NEGATIVE for SARS-CoV-2, the novel coronavirus associated with   COVID-19.         The ID NOW COVID-19 assay is designed to detect the virus that causes COVID-19 in patients   with signs and symptoms of infection who are suspected of COVID-19. An individual without symptoms of COVID-19 and who is not shedding SARS-CoV-2 virus would   expect to have a negative (not detected) result in this assay. Negative results should be treated as presumptive and, if inconsistent with clinical signs   and symptoms or necessary for patient management,  should be tested with an alternative molecular assay. Negative results do not preclude   SARS-CoV-2 infection and   should not be used as the sole basis for patient management decisions. Fact sheet for Healthcare Providers: Leonard  Fact sheet for Patients: Leonard          Methodology: Isothermal Nucleic Acid Amplification           Reviewed patient's current plan of care and vital signs with nursing staff. Labs reviewed: [x] Yes    Medications  Current Facility-Administered Medications: venlafaxine (EFFEXOR XR) extended release capsule 37.5 mg, 37.5 mg, Oral, Daily with breakfast  acetaminophen (TYLENOL) tablet 650 mg, 650 mg, Oral, Q6H PRN  aluminum & magnesium hydroxide-simethicone (MAALOX) 200-200-20 MG/5ML suspension 30 mL, 30 mL, Oral, Q6H PRN  hydrOXYzine (ATARAX) tablet 50 mg, 50 mg, Oral, TID PRN  ibuprofen (ADVIL;MOTRIN) tablet 400 mg, 400 mg, Oral, Q6H PRN  nicotine polacrilex (NICORETTE) gum 2 mg, 2 mg, Oral, Q2H PRN  polyethylene glycol (GLYCOLAX) packet 17 g, 17 g, Oral, Daily PRN  traZODone (DESYREL) tablet 50 mg, 50 mg, Oral, Nightly PRN    ASSESSMENT  Severe episode of recurrent major depressive disorder, without psychotic features ([facility] [address]         HANDOFF  Patient symptoms are:  Slowly improving  Medications as determined by attending physician, consider further titration of Effexor  Monitor and encourage oral intake  Encourage participation in groups and milieu.   Probable discharge is to be determined by MD    Electronically signed by ROXANNE Richey CNP on 2/15/2022 at 12:54 PM    **This report has been created using voice recognition software. It may contain minor errors which are inherent in voice recognition technology. **                                         Psychiatry Attending Attestation     I independently saw and evaluated the patient. I reviewed the Advance Practice Provider's documentation above. Any additional comments or changes to the Advance Practice Provider's documentation are stated below otherwise agree with assessment. Patient appears very flat and withdrawn. Largely remains isolated to the room. Mentions that he continues to withdraw from cocaine and has been feeling very restless and tired. Reports that he has been having constant suicidal thoughts and notes that they have not improved since he has been here. Tolerating Effexor well without any side effects. Continues report feeling helpless and hopeless. Discussed with him about continue to titrate Effexor and is agreeable to the plan. PLAN  Patient s symptoms   show no change  Will continue to titrate his antidepressant medication  Attempt to develop insight  Psycho-education conducted. Supportive Therapy conducted.   Probable discharge is 3-4 days  Follow-up TBD    Electronically signed by Casimiro Otto MD on 2/15/22 at 3:42 PM EST

## 2022-02-15 NOTE — GROUP NOTE
HS Goal Group   Date: February 14, 2022     Patient did not participate in HS goal group. 1:1 talk time was offered as an alternative to group. Will continue to encourage patient to participate in unit programming.      Signature: VIVIENNE Orosco

## 2022-02-15 NOTE — PLAN OF CARE
Problem: Altered Mood, Depressive Behavior:  Goal: Able to verbalize and/or display a decrease in depressive symptoms  Description: Able to verbalize and/or display a decrease in depressive symptoms  2/14/2022 2157 by Ryann Rosales, RN  Outcome: Ongoing   Pt is visible in the milieu social with staff and peers. He eats well at snack and accepts all medication. He showers this shift    Problem: Altered Mood, Depressive Behavior:  Goal: Absence of self-harm  Description: Absence of self-harm  2/14/2022 2157 by Ryann Rosales, RN  Outcome: Ongoing   Pt admits to fleeting thoughts of harming themself with no current plan but verbally agrees to remain safe while on the unit.  No self harming behaviors are noted this shift    Problem: Pain:  Goal: Pain level will decrease  Description: Pain level will decrease  Outcome: Ongoing   Pt is satisfied with pain management

## 2022-02-15 NOTE — PLAN OF CARE
Problem: Altered Mood, Depressive Behavior:  Goal: Able to verbalize and/or display a decrease in depressive symptoms  Description: Able to verbalize and/or display a decrease in depressive symptoms  2/15/2022 1038 by Maura Carrel, RN  Outcome: Ongoing     Problem: Altered Mood, Depressive Behavior:  Goal: Absence of self-harm  Description: Absence of self-harm  2/15/2022 1038 by Maura Carrel, RN  Outcome: Ongoing     Problem: Tobacco Use:  Goal: Inpatient tobacco use cessation counseling participation  Description: Inpatient tobacco use cessation counseling participation  Outcome: Ongoing     Problem: Pain:  Goal: Pain level will decrease  Description: Pain level will decrease  2/15/2022 1038 by Maura Carrel, RN  Outcome: Ongoing     Problem: Pain:  Goal: Control of acute pain  Description: Control of acute pain  Outcome: Ongoing     Problem: Pain:  Goal: Control of chronic pain  Description: Control of chronic pain  Outcome: Ongoing     Patient has been cooperative for assessments and physical, denies suicidal/homicidal ideation, anxiety, depression, audio/visual hallucinations; Patient states a decrease in depressive symptoms; Patient is absent of self-harm: staff will monitor; Patient denies pain this shift: Patient offered distraction techniques in addition to pain medication if necessary. Patient given tobacco quitline number 11193769518 at this time, refusing to call at this time, states \" I just dont want to quit now\"- patient given information as to the dangers of long term tobacco use. Continue to reinforce the importance of tobacco cessation.

## 2022-02-15 NOTE — GROUP NOTE
Group Therapy Note    Date: 2/15/2022    Group Start Time: 1105  Group End Time: 1150  Group Topic: Recreational    [facility]        Group Therapy Note    Pt did not attend recreational group d/t resting in room despite staff invitation to attend. 1:1 talk time offered as alternative to group session, pt declined.

## 2022-02-16 PROCEDURE — 6370000000 HC RX 637 (ALT 250 FOR IP): Performed by: PSYCHIATRY & NEUROLOGY

## 2022-02-16 PROCEDURE — 1240000000 HC EMOTIONAL WELLNESS R&B

## 2022-02-16 PROCEDURE — 99232 SBSQ HOSP IP/OBS MODERATE 35: CPT | Performed by: PSYCHIATRY & NEUROLOGY

## 2022-02-16 RX ADMIN — VENLAFAXINE HYDROCHLORIDE 75 MG: 75 CAPSULE, EXTENDED RELEASE ORAL at 08:16

## 2022-02-16 RX ADMIN — ALUMINUM HYDROXIDE, MAGNESIUM HYDROXIDE, AND SIMETHICONE 30 ML: 200; 200; 20 SUSPENSION ORAL at 00:04

## 2022-02-16 RX ADMIN — HYDROXYZINE HYDROCHLORIDE 50 MG: 50 TABLET, FILM COATED ORAL at 21:27

## 2022-02-16 RX ADMIN — TRAZODONE HYDROCHLORIDE 50 MG: 50 TABLET ORAL at 21:27

## 2022-02-16 NOTE — PLAN OF CARE
Problem: Altered Mood, Depressive Behavior:  Goal: Able to verbalize and/or display a decrease in depressive symptoms  Description: Able to verbalize and/or display a decrease in depressive symptoms  2/16/2022 1024 by Nisha Kaiser RN  Outcome: Ongoing   Patient is calm, controlled and medication compliant. Patient denies suicidal ideations but appears flat/isolative, reports some depression and anxiety. Patient is eating and sleeping adequately with safety checks Q15min and at irregular intervals. Patient safety is maintained. Problem: Altered Mood, Depressive Behavior:  Goal: Absence of self-harm  Description: Absence of self-harm  2/16/2022 1024 by Nisha Kaiser RN  Outcome: Ongoing   Patient is calm, controlled and medication compliant. Patient denies suicidal ideations but appears flat/isolative, reports some depression and anxiety. Patient is eating and sleeping adequately with safety checks Q15min and at irregular intervals. Patient safety is maintained.   Problem: Tobacco Use:  Goal: Inpatient tobacco use cessation counseling participation  Description: Inpatient tobacco use cessation counseling participation  Outcome: Ongoing   Smoking education provided

## 2022-02-16 NOTE — PLAN OF CARE
Problem: Altered Mood, Depressive Behavior:  Goal: Able to verbalize and/or display a decrease in depressive symptoms  Description: Able to verbalize and/or display a decrease in depressive symptoms  2/15/2022 2203 by Maryjane Ulloa  Outcome: Ongoing  Patient admits to dep but would not elaborate. Denies suicidal ideations at this time. Isolative to room and aloof of peers. Patient comes out for meals and telephone only. Patient did not attend HS group. Patient is absent of self harm and on Q15 min checks for safety. Patient encouraged to verbalize thoughts and feelings as well as perform ADLs and group attendance. Problem: Altered Mood, Depressive Behavior:  Goal: Absence of self-harm  Description: Absence of self-harm  2/15/2022 2203 by Maryjane Ulloa  Outcome: Ongoing  Safe environment maintained and patient remains free from self-harm. Agreeable to seeking staff should thoughts to harm self or others arise. Q15min checks for safety.

## 2022-02-16 NOTE — GROUP NOTE
Group Therapy Note    Date: 2/16/2022    Group Start Time: 1330  Group End Time: 5266  Group Topic: Cognitive Skills    [facility] SHANNAN Myers    Pt did not attend 1330 relaxation group d/t resting in room despite staff invitation to attend. 1:1 talk time offered as alternative to group session, pt declined.           Signature:  Ernesto Clay

## 2022-02-16 NOTE — GROUP NOTE
Group Therapy Note    Date: 2/16/2022    Group Start Time: 1000  Group End Time: 4846  Group Topic: Psychotherapy    LUCIANA Jonas, CYNTHIA        Group Therapy Note    Attendees: 7/17       Patient was offered group therapy today but declined to participate despite encouragement from staff. 1:1 was offered.       Signature:  LUCIANA Meza, CYNTHIA

## 2022-02-16 NOTE — PROGRESS NOTES
Daily Progress Note  2/16/2022    Patient Name: Vivi Nevarez    CHIEF COMPLAINT: Depression with suicidal ideation         SUBJECTIVE:      Patient is seen today for a follow up assessment. Staff reports that he was out of bed for breakfast however declined to get up for lunch. He was interviewed bedside and reports that he was tired and identifies that is the reason that he did not get up for lunch. He denies any concerns with his appetite. He reports that his depression is improving. He did receive the increased dose and Effexor this morning and denies any negative side effects. He acknowledges that his depressive symptoms have been relatively unchanged though endorses feeling different today. He reports improvement in symptoms of withdrawal.  He is able to contract for safety and agreeable to start engaging with discussion in regards to discharge planning. We discussed the importance of stability of symptoms prior to discharge, he remains agreeable with the plan of care.     Appetite:  [x] Adequate/Unchanged  [] Increased  [] Decreased      Sleep:       [] Adequate/Unchanged  [x] Fair  [] Poor      Group Attendance on Unit:   [] Yes   [] Selectively    [x] No    Compliant with scheduled medications: [x] Yes  [] No    Received emergency medications in past 24 hrs: [] Yes   [x] No    Medication Side Effects: Denies          Mental Status Exam  Level of consciousness: Alert and awake   Appearance: Appropriate attire for setting, resting in bed, with fair  grooming and hygiene   Behavior/Motor: Approachable, does sit upright in bed to engage in interview  Attitude toward examiner: Cooperative, attentive, good eye contact  Speech: Normal rate, volume and tone  Mood: \"Better\"  Affect: Congruent, initially dismissive however he did sit upright to engage in conversation with some encouragement  Thought processes: Linear and coherent  Thought content: Discharge planning focused, Denies homicidal ideation  Suicidal Ideation: Reports improvement in suicidal ideations, contracts for safety on the unit. Delusions: No evidence of delusions. Perceptual Disturbance: Denies, patient does not appear to be responding to internal stimuli. Cognition: Oriented to self, location, time, and situation  Memory: intact  Insight: fair   Judgement: fair       Data   height is 5' 10\" (1.778 m) and weight is 197 lb (89.4 kg). His oral temperature is 97.9 °F (36.6 °C). His blood pressure is 133/93 (abnormal) and his pulse is 74. His respiration is 14. Labs:   No visits with results within 2 Day(s) from this visit.    Latest known visit with results is:   Admission on 02/12/2022, Discharged on 02/12/2022   Component Date Value Ref Range Status    Ventricular Rate 02/12/2022 54  BPM Final    Atrial Rate 02/12/2022 54  BPM Final    P-R Interval 02/12/2022 134  ms Final    QRS Duration 02/12/2022 88  ms Final    Q-T Interval 02/12/2022 462  ms Final    QTc Calculation (Bazett) 02/12/2022 438  ms Final    P Axis 02/12/2022 47  degrees Final    R Axis 02/12/2022 49  degrees Final    T Axis 02/12/2022 6  degrees Final    WBC 02/12/2022 5.7  3.5 - 11.3 k/uL Final    RBC 02/12/2022 4.64  4.21 - 5.77 m/uL Final    Hemoglobin 02/12/2022 14.2  13.0 - 17.0 g/dL Final    Hematocrit 02/12/2022 43.4  40.7 - 50.3 % Final    MCV 02/12/2022 93.5  82.6 - 102.9 fL Final    MCH 02/12/2022 30.6  25.2 - 33.5 pg Final    MCHC 02/12/2022 32.7  28.4 - 34.8 g/dL Final    RDW 02/12/2022 12.7  11.8 - 14.4 % Final    Platelets 92/34/3633 244  138 - 453 k/uL Final    MPV 02/12/2022 8.8  8.1 - 13.5 fL Final    NRBC Automated 02/12/2022 0.0  0.0 per 100 WBC Final    Differential Type 02/12/2022 NOT REPORTED   Final    Seg Neutrophils 02/12/2022 44  36 - 65 % Final    Lymphocytes 02/12/2022 41  24 - 43 % Final    Monocytes 02/12/2022 11  3 - 12 % Final    Eosinophils % 02/12/2022 3  1 - 4 % Final    Basophils 02/12/2022 1  0 - 2 % Final    Immature Granulocytes 02/12/2022 0  0 % Final    Segs Absolute 02/12/2022 2.51  1.50 - 8.10 k/uL Final    Absolute Lymph # 02/12/2022 2.35  1.10 - 3.70 k/uL Final    Absolute Mono # 02/12/2022 0.63  0.10 - 1.20 k/uL Final    Absolute Eos # 02/12/2022 0.16  0.00 - 0.44 k/uL Final    Basophils Absolute 02/12/2022 0.04  0.00 - 0.20 k/uL Final    Absolute Immature Granulocyte 02/12/2022 <0.03  0.00 - 0.30 k/uL Final    WBC Morphology 02/12/2022 NOT REPORTED   Final    RBC Morphology 02/12/2022 NOT REPORTED   Final    Platelet Estimate 28/79/7570 NOT REPORTED   Final    Glucose 02/12/2022 85  70 - 99 mg/dL Final    BUN 02/12/2022 12  6 - 20 mg/dL Final    CREATININE 02/12/2022 0.91  0.70 - 1.20 mg/dL Final    Bun/Cre Ratio 02/12/2022 NOT REPORTED  9 - 20 Final    Calcium 02/12/2022 9.4  8.6 - 10.4 mg/dL Final    Sodium 02/12/2022 137  135 - 144 mmol/L Final    Potassium 02/12/2022 3.9  3.7 - 5.3 mmol/L Final    Chloride 02/12/2022 99  98 - 107 mmol/L Final    CO2 02/12/2022 24  20 - 31 mmol/L Final    Anion Gap 02/12/2022 14  9 - 17 mmol/L Final    Alkaline Phosphatase 02/12/2022 67  40 - 129 U/L Final    ALT 02/12/2022 17  5 - 41 U/L Final    AST 02/12/2022 27  <40 U/L Final    Total Bilirubin 02/12/2022 0.63  0.3 - 1.2 mg/dL Final    Total Protein 02/12/2022 7.5  6.4 - 8.3 g/dL Final    Albumin 02/12/2022 4.7  3.5 - 5.2 g/dL Final    Albumin/Globulin Ratio 02/12/2022 1.7  1.0 - 2.5 Final    GFR Non- 02/12/2022 >60  >60 mL/min Final    GFR  02/12/2022 >60  >60 mL/min Final    GFR Comment 02/12/2022        Final    Comment: Average GFR for 30-36 years old:   80 mL/min/1.73sq m  Chronic Kidney Disease:   <60 mL/min/1.73sq m  Kidney failure:   <15 mL/min/1.73sq m              eGFR calculated using average adult body mass.  Additional eGFR calculator available at:        [employer]            GFR Staging 02/12/2022 NOT REPORTED   Final    Ethanol 02/12/2022 <10  <10 mg/dL Final    Ethanol percent 02/12/2022 <0.010  <0.010 % Final    Amphetamine Screen, Ur 02/12/2022 NEGATIVE  NEGATIVE Final    Comment:       (Positive cutoff 1000 ng/mL)                  Barbiturate Screen, Ur 02/12/2022 NEGATIVE  NEGATIVE Final    Comment:       (Positive cutoff 200 ng/mL)                  Benzodiazepine Screen, Urine 02/12/2022 NEGATIVE  NEGATIVE Final    Comment:       (Positive cutoff 200 ng/mL)                  Cocaine Metabolite, Urine 02/12/2022 POSITIVE* NEGATIVE Final    Comment:       (Positive cutoff 300 ng/mL)                  Methadone Screen, Urine 02/12/2022 NEGATIVE  NEGATIVE Final    Comment:       (Positive cutoff 300 ng/mL)                  Opiates, Urine 02/12/2022 NEGATIVE  NEGATIVE Final    Comment:       (Positive cutoff 300 ng/mL)                  Phencyclidine, Urine 02/12/2022 NEGATIVE  NEGATIVE Final    Comment:       (Positive cutoff 25 ng/mL)                  Propoxyphene, Urine 02/12/2022 NOT REPORTED  NEGATIVE Final    Cannabinoid Scrn, Ur 02/12/2022 POSITIVE* NEGATIVE Final    Comment:       (Positive cutoff 50 ng/mL)                  Oxycodone Screen, Ur 02/12/2022 NEGATIVE  NEGATIVE Final    Comment:       (Positive cutoff 100 ng/mL)                  Methamphetamine, Urine 02/12/2022 NOT REPORTED  NEGATIVE Final    Tricyclic Antidepressants, Urine 02/12/2022 NOT REPORTED  NEGATIVE Final    MDMA, Urine 02/12/2022 NOT REPORTED  NEGATIVE Final    Buprenorphine Urine 02/12/2022 NOT REPORTED  NEGATIVE Final    Test Information 02/12/2022 Assay provides medical screening only. The absence of expected drug(s) and/or metabolite(s) may indicate diluted or adulterated urine, limitations of testing or timing of collection. Final    Comment: Testing for legal purposes should be confirmed by another method.   To request confirmation   of test result, please call the lab within 7 days of sample submission.  Specimen Description 02/12/2022 . NASOPHARYNGEAL SWAB   Final    SARS-CoV-2, Rapid 02/12/2022 Not Detected  Not Detected Final    Comment:       Rapid NAAT:  The specimen is NEGATIVE for SARS-CoV-2, the novel coronavirus associated with   COVID-19. The ID NOW COVID-19 assay is designed to detect the virus that causes COVID-19 in patients   with signs and symptoms of infection who are suspected of COVID-19. An individual without symptoms of COVID-19 and who is not shedding SARS-CoV-2 virus would   expect to have a negative (not detected) result in this assay. Negative results should be treated as presumptive and, if inconsistent with clinical signs   and symptoms or necessary for patient management,  should be tested with an alternative molecular assay. Negative results do not preclude   SARS-CoV-2 infection and   should not be used as the sole basis for patient management decisions. Fact sheet for Healthcare Providers: Leonard  Fact sheet for Patients: Leonard          Methodology: Isothermal Nucleic Acid Amplification           Reviewed patient's current plan of care and vital signs with nursing staff.     Labs reviewed: [x] Yes    Medications  Current Facility-Administered Medications: venlafaxine (EFFEXOR XR) extended release capsule 75 mg, 75 mg, Oral, Daily with breakfast  acetaminophen (TYLENOL) tablet 650 mg, 650 mg, Oral, Q6H PRN  aluminum & magnesium hydroxide-simethicone (MAALOX) 200-200-20 MG/5ML suspension 30 mL, 30 mL, Oral, Q6H PRN  hydrOXYzine (ATARAX) tablet 50 mg, 50 mg, Oral, TID PRN  ibuprofen (ADVIL;MOTRIN) tablet 400 mg, 400 mg, Oral, Q6H PRN  nicotine polacrilex (NICORETTE) gum 2 mg, 2 mg, Oral, Q2H PRN  polyethylene glycol (GLYCOLAX) packet 17 g, 17 g, Oral, Daily PRN  traZODone (DESYREL) tablet 50 mg, 50 mg, Oral, Nightly PRN    ASSESSMENT  Severe episode of recurrent major depressive disorder, without psychotic features [facility])         HANDOFF  Patient symptoms are:  Modest improvement  Medications as determined by attending physician  Encourage participation in groups and milieu. Probable discharge is to be determined by MD    Electronically signed by ROXANNE Richey CNP on 2/16/2022 at 4:02 PM    **This report has been created using voice recognition software. It may contain minor errors which are inherent in voice recognition technology. **                                         Psychiatry Attending Attestation     I independently saw and evaluated the patient. I reviewed the Advance Practice Provider's documentation above. Any additional comments or changes to the Advance Practice Provider's documentation are stated below otherwise agree with assessment. Patient feels better than before. Mood and affect are better. Patient reports fleeting suicidal thoughts with no intent or plan. Patient notes that these thoughts are occurring less frequently. Denies any homicidal thoughts, that was explored with the patient. Oriented to time place and person. Recent and remote memory is intact. Patient feels hopeful. Sleep and appetite is good. No side effect from medication reported. Side-effect of medication were discussed with the patient . Patient is responding to current treatment. Discharge soon, if patient continues to show improvement. Case discussed with the staff. PLAN  Patient s symptoms   are improving  Will continue same medication today and observe  Attempt to develop insight  Psycho-education conducted. Supportive Therapy conducted.   Probable discharge is tomorrow  Follow-up TBD      Electronically signed by Willard Bradley MD on 2/16/22 at 7:36 PM EST

## 2022-02-16 NOTE — GROUP NOTE
HS Goal Group   Date: February 15, 2022     Patient did not participate in HS goal group. 1:1 talk time was offered as an alternative to group. Will continue to encourage patient to participate in unit programming.      Signature: VIVIENNE Peterson

## 2022-02-17 VITALS
DIASTOLIC BLOOD PRESSURE: 93 MMHG | HEART RATE: 82 BPM | RESPIRATION RATE: 14 BRPM | SYSTOLIC BLOOD PRESSURE: 143 MMHG | BODY MASS INDEX: 28.2 KG/M2 | HEIGHT: 70 IN | TEMPERATURE: 98.5 F | WEIGHT: 197 LBS

## 2022-02-17 PROCEDURE — 99239 HOSP IP/OBS DSCHRG MGMT >30: CPT | Performed by: PSYCHIATRY & NEUROLOGY

## 2022-02-17 PROCEDURE — 6370000000 HC RX 637 (ALT 250 FOR IP): Performed by: PSYCHIATRY & NEUROLOGY

## 2022-02-17 RX ORDER — TRAZODONE HYDROCHLORIDE 50 MG/1
50 TABLET ORAL NIGHTLY PRN
Qty: 30 TABLET | Refills: 0 | Status: ON HOLD | OUTPATIENT
Start: 2022-02-17 | End: 2022-03-22 | Stop reason: SDUPTHER

## 2022-02-17 RX ORDER — VENLAFAXINE HYDROCHLORIDE 75 MG/1
75 CAPSULE, EXTENDED RELEASE ORAL
Qty: 30 CAPSULE | Refills: 0 | Status: ON HOLD | OUTPATIENT
Start: 2022-02-18 | End: 2022-03-22 | Stop reason: HOSPADM

## 2022-02-17 RX ADMIN — VENLAFAXINE HYDROCHLORIDE 75 MG: 75 CAPSULE, EXTENDED RELEASE ORAL at 08:11

## 2022-02-17 NOTE — BH NOTE
Patient given tobacco quitline number 79162511549 at this time, refusing to call at this time, states \" I just dont want to quit now\"- patient given information as to the dangers of long term tobacco use. Continue to reinforce the importance of tobacco cessation.
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
## 2022-02-17 NOTE — PLAN OF CARE
Problem: Altered Mood, Depressive Behavior:  Goal: Able to verbalize and/or display a decrease in depressive symptoms  Description: Able to verbalize and/or display a decrease in depressive symptoms  2/16/2022 2209 by Rama Lu LPN  Outcome: Ongoing     Problem: Altered Mood, Depressive Behavior:  Goal: Absence of self-harm  Description: Absence of self-harm  2/16/2022 2209 by Rama Lu LPN  Outcome: Ongoing   Denies suicidal thoughts and remains free from harm at this time. Reports feeling ready for discharge. Remains calm and cooperative.

## 2022-02-17 NOTE — GROUP NOTE
Group Therapy Note    Date: 2/17/2022    Group Start Time: 1100  Group End Time: 1130  Group Topic: cognitive skills     JEREMIAH Canseco, CTRS        Group Therapy Note    Attendees: 5       Patient's Goal:  To improve coping skills/improve interspersonal relationships     Notes:   Pt was pleasant and participated well     Status After Intervention:  Improved    Participation Level:  Active Listener and Interactive    Participation Quality: Appropriate, Sharing and Supportive      Speech:  normal      Thought Process/Content: Logical      Affective Functioning: Congruent      Mood: euthymic      Level of consciousness:  Alert      Response to Learning: Able to verbalize current knowledge/experience and Progressing to goal      Endings: None Reported    Modes of Intervention: Education, Support and Problem-solving      Discipline Responsible: Psychoeducational Specialist      Signature:  Carver Opitz

## 2022-02-17 NOTE — PROGRESS NOTES
CLINICAL PHARMACY NOTE: MEDS TO BEDS    Total # of Prescriptions Filled: 2   The following medications were delivered to the patient:  · Trazodone HCL 50mg  · Venlafaxine HCL ER 75mg    Additional Documentation:  Delivered Medication to [facility]

## 2022-02-17 NOTE — BH NOTE
[facility]  Discharge Note  Patient discharged home via [facility]. Pt discharged with followings belongings:   Dental Appliances: None  Vision - Corrective Lenses: None  Hearing Aid: None  Jewelry: Earrings  Body Piercings Removed: Yes  Clothing: Destiny Barefoot / coat,Pants,Shirt  Were All Patient Medications Collected?: Not Applicable  Other Valuables: Money (Comment),Keys,Other (Comment) (VISA [id number], 0433, $0.03, [address] [place])   Valuables sent home with patient. Patient education on aftercare instructions: given to patient  Information faxed to [facility] by nurse  at 3:18 PM .Patient verbalize understanding of AVS:  yes.     Status EXAM upon discharge:  Status and Exam  Normal: Yes  Facial Expression: Brightened  Affect: Appropriate  Level of Consciousness: Alert  Mood:Normal: Yes  Mood: Anxious  Motor Activity:Normal: Yes  Interview Behavior: Cooperative  Preception: [place] to Person,[place] to Time,[place] to Place,[place] to Situation  Attention:Normal: Yes  Attention: Unable to Concentrate  Thought Processes: Circumstantial  Thought Content:Normal: Yes  Hallucinations: None  Delusions: No  Memory:Normal: Yes  Insight and Judgment: Yes  Insight and Judgment: Poor Judgment,Poor Insight  Present Suicidal Ideation: No  Present Homicidal Ideation: No      Metabolic Screening:    Lab Results   Component Value Date    LABA1C 5.6 06/18/2020       Lab Results   Component Value Date    CHOL 187 06/18/2020    CHOL 194 04/16/2018     Lab Results   Component Value Date    TRIG 98 06/18/2020    TRIG 120 04/16/2018     Lab Results   Component Value Date    HDL 50 06/18/2020    HDL 44 04/16/2018     No components found for: LDLCAL  No results found for: Anais Wylie LPN

## 2022-02-17 NOTE — GROUP NOTE
Group Therapy Note    Date: 2/17/2022    Group Start Time: 1010  Group End Time: 1050  Group Topic: Psychotherapy    STCZ LUCIANA Correa, CYNTHIA        Group Therapy Note    Attendees: 6/15         Patient was offered group therapy today but declined to participate despite encouragement from staff. 1:1 was offered.       Signature:  LUCIANA Fragoso, CYNTHIA

## 2022-02-17 NOTE — CARE COORDINATION
Name: Brice Reddy    : 1990    Discharge Date: 22    Primary Auth/Cert #: [id number]    Destination: home     Discharge Medications:      Medication List      CONTINUE taking these medications    traZODone 50 MG tablet  Commonly known as: DESYREL  Take 1 tablet by mouth nightly as needed for Sleep  Notes to patient: Help with sleep     venlafaxine 75 MG extended release capsule  Commonly known as: EFFEXOR XR  Take 1 capsule by mouth daily (with breakfast)  Start taking on: 2022  Notes to patient: Help improve mood           Where to Get Your Medications      These medications were sent to [facility], [address], [address] [facility] [address]    Phone: [phone]   · traZODone 50 MG tablet  · venlafaxine 75 MG extended release capsule         Follow Up Appointment: Rodney Renteria MD  [address] [facility]  [address]  [phone]    On 3/7/2022  You have a scheduled appointment at 12:15PM for initial primary care appointment to establish care.   Please arrive 15 minutes early with your photo ID, insurance card, any medications you are taking, and a mask    [address]  [address]  [phone]  On 2022  You have a scheduled appointment at 9:30 AM for an assessment

## 2022-02-17 NOTE — CARE COORDINATION
Discharge Arrangements:  Patient plans to return to his girlfriends home today. He prefers to follow up with [facility] again as it is convenient in location to him and he has history with the provider. Discussed with patient barriers to treatment in the community and maintaining prescribed medications. He identified that not having a PCP was a barrier he faced when he was last discharged from the [facility]. Social work will set up an appointment at discharge with PCP patient is agreeable to establish care with.     Guardian notified: n/a    Discharge destination/address: [address] [place] [address] [address] [address]    Transported by:  [employer] transportation

## 2022-02-17 NOTE — DISCHARGE INSTR - DIET

## 2022-02-17 NOTE — GROUP NOTE
Group Therapy Note    Date: 2/17/2022    Group Start Time: 1330  Group End Time: 7628  Group Topic: Recreational    [facility] TRACI Medrano, CTRS    Pt did not attend 1330 recreational group d/t resting in room despite staff invitation to attend. 1:1 talk time offered as alternative to group session, pt declined.               Signature:  Napoleon Dillard

## 2022-03-17 ENCOUNTER — HOSPITAL ENCOUNTER (EMERGENCY)
Age: 32
Discharge: ANOTHER HOSPITAL | End: 2022-03-17
Attending: EMERGENCY MEDICINE
Payer: MEDICARE

## 2022-03-17 ENCOUNTER — HOSPITAL ENCOUNTER (INPATIENT)
Age: 32
LOS: 5 days | Discharge: HOME | DRG: 753 | End: 2022-03-22
Attending: PSYCHIATRY & NEUROLOGY | Admitting: PSYCHIATRY & NEUROLOGY
Payer: MEDICARE

## 2022-03-17 VITALS
SYSTOLIC BLOOD PRESSURE: 138 MMHG | WEIGHT: 175 LBS | BODY MASS INDEX: 25.11 KG/M2 | RESPIRATION RATE: 16 BRPM | HEART RATE: 77 BPM | TEMPERATURE: 98.4 F | DIASTOLIC BLOOD PRESSURE: 80 MMHG | OXYGEN SATURATION: 99 %

## 2022-03-17 DIAGNOSIS — R45.851 SUICIDAL IDEATION: Primary | ICD-10-CM

## 2022-03-17 PROBLEM — F33.2 SEVERE RECURRENT MAJOR DEPRESSION WITHOUT PSYCHOTIC FEATURES (HCC): Status: ACTIVE | Noted: 2022-03-17

## 2022-03-17 LAB
ABSOLUTE EOS #: 0.17 K/UL (ref 0–0.44)
ABSOLUTE IMMATURE GRANULOCYTE: <0.03 K/UL (ref 0–0.3)
ABSOLUTE LYMPH #: 2.74 K/UL (ref 1.1–3.7)
ABSOLUTE MONO #: 0.59 K/UL (ref 0.1–1.2)
ALBUMIN SERPL-MCNC: 4.1 G/DL (ref 3.5–5.2)
ALBUMIN/GLOBULIN RATIO: 1.4 (ref 1–2.5)
ALP BLD-CCNC: 79 U/L (ref 40–129)
ALT SERPL-CCNC: 12 U/L (ref 5–41)
AMPHETAMINE SCREEN URINE: NEGATIVE
ANION GAP SERPL CALCULATED.3IONS-SCNC: 10 MMOL/L (ref 9–17)
AST SERPL-CCNC: 19 U/L
BARBITURATE SCREEN URINE: NEGATIVE
BASOPHILS # BLD: 1 % (ref 0–2)
BASOPHILS ABSOLUTE: 0.03 K/UL (ref 0–0.2)
BENZODIAZEPINE SCREEN, URINE: NEGATIVE
BILIRUB SERPL-MCNC: 0.18 MG/DL (ref 0.3–1.2)
BUN BLDV-MCNC: 11 MG/DL (ref 6–20)
CALCIUM SERPL-MCNC: 9.2 MG/DL (ref 8.6–10.4)
CANNABINOID SCREEN URINE: POSITIVE
CHLORIDE BLD-SCNC: 104 MMOL/L (ref 98–107)
CO2: 27 MMOL/L (ref 20–31)
COCAINE METABOLITE, URINE: POSITIVE
CREAT SERPL-MCNC: 1.13 MG/DL (ref 0.7–1.2)
EOSINOPHILS RELATIVE PERCENT: 3 % (ref 1–4)
ETHANOL PERCENT: <0.01 %
ETHANOL: <10 MG/DL
GFR AFRICAN AMERICAN: >60 ML/MIN
GFR NON-AFRICAN AMERICAN: >60 ML/MIN
GFR SERPL CREATININE-BSD FRML MDRD: ABNORMAL ML/MIN/{1.73_M2}
GLUCOSE BLD-MCNC: 78 MG/DL (ref 70–99)
HCT VFR BLD CALC: 42.1 % (ref 40.7–50.3)
HEMOGLOBIN: 13.5 G/DL (ref 13–17)
IMMATURE GRANULOCYTES: 0 %
LYMPHOCYTES # BLD: 42 % (ref 24–43)
MCH RBC QN AUTO: 30.3 PG (ref 25.2–33.5)
MCHC RBC AUTO-ENTMCNC: 32.1 G/DL (ref 28.4–34.8)
MCV RBC AUTO: 94.4 FL (ref 82.6–102.9)
METHADONE SCREEN, URINE: NEGATIVE
MONOCYTES # BLD: 9 % (ref 3–12)
NRBC AUTOMATED: 0 PER 100 WBC
OPIATES, URINE: NEGATIVE
OXYCODONE SCREEN URINE: NEGATIVE
PDW BLD-RTO: 12.4 % (ref 11.8–14.4)
PHENCYCLIDINE, URINE: NEGATIVE
PLATELET # BLD: 406 K/UL (ref 138–453)
PMV BLD AUTO: 8.2 FL (ref 8.1–13.5)
POTASSIUM SERPL-SCNC: 3.8 MMOL/L (ref 3.7–5.3)
RBC # BLD: 4.46 M/UL (ref 4.21–5.77)
SARS-COV-2, RAPID: NOT DETECTED
SEG NEUTROPHILS: 45 % (ref 36–65)
SEGMENTED NEUTROPHILS ABSOLUTE COUNT: 2.99 K/UL (ref 1.5–8.1)
SODIUM BLD-SCNC: 141 MMOL/L (ref 135–144)
SPECIMEN DESCRIPTION: NORMAL
TEST INFORMATION: ABNORMAL
TOTAL PROTEIN: 7 G/DL (ref 6.4–8.3)
WBC # BLD: 6.5 K/UL (ref 3.5–11.3)

## 2022-03-17 PROCEDURE — 85025 COMPLETE CBC W/AUTO DIFF WBC: CPT

## 2022-03-17 PROCEDURE — 87635 SARS-COV-2 COVID-19 AMP PRB: CPT

## 2022-03-17 PROCEDURE — 80307 DRUG TEST PRSMV CHEM ANLYZR: CPT

## 2022-03-17 PROCEDURE — 99285 EMERGENCY DEPT VISIT HI MDM: CPT

## 2022-03-17 PROCEDURE — 80053 COMPREHEN METABOLIC PANEL: CPT

## 2022-03-17 PROCEDURE — 1240000000 HC EMOTIONAL WELLNESS R&B

## 2022-03-17 PROCEDURE — G0480 DRUG TEST DEF 1-7 CLASSES: HCPCS

## 2022-03-17 RX ORDER — TRAZODONE HYDROCHLORIDE 50 MG/1
50 TABLET ORAL NIGHTLY PRN
Status: DISCONTINUED | OUTPATIENT
Start: 2022-03-17 | End: 2022-03-22 | Stop reason: HOSPADM

## 2022-03-17 RX ORDER — IBUPROFEN 400 MG/1
400 TABLET ORAL EVERY 6 HOURS PRN
Status: DISCONTINUED | OUTPATIENT
Start: 2022-03-17 | End: 2022-03-22 | Stop reason: HOSPADM

## 2022-03-17 RX ORDER — ACETAMINOPHEN 325 MG/1
650 TABLET ORAL EVERY 6 HOURS PRN
Status: DISCONTINUED | OUTPATIENT
Start: 2022-03-17 | End: 2022-03-22 | Stop reason: HOSPADM

## 2022-03-17 RX ORDER — POLYETHYLENE GLYCOL 3350 17 G/17G
17 POWDER, FOR SOLUTION ORAL DAILY PRN
Status: DISCONTINUED | OUTPATIENT
Start: 2022-03-17 | End: 2022-03-22 | Stop reason: HOSPADM

## 2022-03-17 RX ORDER — MAGNESIUM HYDROXIDE/ALUMINUM HYDROXICE/SIMETHICONE 120; 1200; 1200 MG/30ML; MG/30ML; MG/30ML
30 SUSPENSION ORAL EVERY 6 HOURS PRN
Status: DISCONTINUED | OUTPATIENT
Start: 2022-03-17 | End: 2022-03-22 | Stop reason: HOSPADM

## 2022-03-17 RX ORDER — HYDROXYZINE 50 MG/1
50 TABLET, FILM COATED ORAL 3 TIMES DAILY PRN
Status: DISCONTINUED | OUTPATIENT
Start: 2022-03-17 | End: 2022-03-22 | Stop reason: HOSPADM

## 2022-03-17 ASSESSMENT — ENCOUNTER SYMPTOMS
ABDOMINAL PAIN: 0
NAUSEA: 0
DIARRHEA: 0
VOMITING: 0
CONSTIPATION: 0
BACK PAIN: 0
SHORTNESS OF BREATH: 0

## 2022-03-17 ASSESSMENT — SLEEP AND FATIGUE QUESTIONNAIRES
RESTFUL SLEEP: NO
DO YOU HAVE DIFFICULTY SLEEPING: YES
SLEEP PATTERN: INSOMNIA
AVERAGE NUMBER OF SLEEP HOURS: 4
DO YOU USE A SLEEP AID: NO
DIFFICULTY ARISING: YES
DIFFICULTY STAYING ASLEEP: YES
DIFFICULTY FALLING ASLEEP: YES

## 2022-03-17 ASSESSMENT — PAIN SCALES - GENERAL: PAINLEVEL_OUTOF10: 0

## 2022-03-17 ASSESSMENT — PAIN DESCRIPTION - LOCATION: LOCATION: JAW

## 2022-03-17 ASSESSMENT — LIFESTYLE VARIABLES: HISTORY_ALCOHOL_USE: NO

## 2022-03-17 NOTE — ED NOTES
The following labs labeled with pt sticker and tubed to lab:     [] Blue     [x] Lavender   [] on ice  [x] Green/yellow  [] Green/black [] on ice  [] Yellow  [] Red  [] Pink      [x] COVID-19 swab    [x] Rapid  [] PCR  [] Flu swab  [] Peds Viral Panel     [] Urine Sample  [] Pelvic Cultures  [] Blood Cultures          Tin Estrada LPN  33/80/35 8262

## 2022-03-17 NOTE — ED NOTES
Pt resting on cot. Safety sitter at bedside. Pt showing no s/s of distress at this time. Respirations even and nonlabored.       Anita Rockwell LPN  16/81/81 2429

## 2022-03-17 NOTE — GROUP NOTE
Group Therapy Note    Date: 3/17/2022    Group Start Time: 1330  Group End Time: 5496  Group Topic: Music Therapy    [facility] C    Milderd Severe        Group Therapy Note    Pt did not attend music therapy skills group d/t resting in room despite staff invitation to attend. 1:1 talk time offered as alternative to group session, pt declined.

## 2022-03-17 NOTE — ED NOTES
SW met with this patient who states he is having suicidal thoughts of overdosing on Fentanyl. Patient states he has been using Fentanyl for about 5 months and also smokes THC regularly, denies alcohol use. Patient also denies HI. Patient tells SW he is \"tired of life\". Patient diagnosed with bi-polar disorder and is prescribed Effexor and Trazodone which he says he does not take because he does not like how it makes him feel. Patient was to follow up at [facility] or [facility], he cannot remember which, but missed his follow-up appointment. Patient states he has a history of cutting behavior and attempted to overdose on 15 Trazodone last year. Patient currently lives with his girlfriend and denies any legal issues. Patient recently inpatient at [address] [place] [address] from 2/12/22 to 2/17/22. SW to consult psych once patient medically clear and labs resulted. Alex Tavares.  Kennedy Bradshaw     [place], [place]  03/17/22 5591

## 2022-03-17 NOTE — ED NOTES
Per TRACI Burns RN, patient now going to the [facility] Unit, Bed #129. [facility] updated. Mirza Em.  [address], [address]  03/17/22 7830

## 2022-03-17 NOTE — BH NOTE
Patient given tobacco quitline number 21531826771 at this time, refusing to call at this time, states \" I just dont want to quit now\"- patient given information as to the dangers of long term tobacco use. Continue to reinforce the importance of tobacco cessation.

## 2022-03-17 NOTE — BH NOTE
[facility]  Admission Note     Admission Type:   Admission Type: Voluntary    Reason for admission:     Suicidal ideations  PATIENT STRENGTHS:  Strengths: No significant Physical Illness,Social Skills    Patient Strengths and Limitations:  Limitations: Inappropriate/potentially harmful leisure interests    Addictive Behavior:   Addictive Behavior  In the past 3 months, have you felt or has someone told you that you have a problem with:  : None  Do you have a history of Chemical Use?: No  Do you have a history of Alcohol Use?: No  Do you have a history of Street Drug Abuse?: Yes  Histroy of Prescripton Drug Abuse?: No    Medical Problems:   Past Medical History:   Diagnosis Date    Alcoholism ([facility]     Anxiety     Bipolar 1 disorder ([facility]     Cocaine abuse ([facility]     Depression     Hypertension     Irregular heartbeat     Mild tetrahydrocannabinol (THC) abuse     Suicidal ideation        Status EXAM:  Status and Exam  Normal: No  Facial Expression: Flat  Affect: Appropriate  Level of Consciousness: Alert  Mood:Normal: Yes  Mood: Anxious  Motor Activity:Normal: No  Motor Activity: Decreased  Interview Behavior: Cooperative  Preception: Saxon to Person,Saxon to Time,Saxon to Place,Saxon to Situation  Attention:Normal: Yes  Thought Processes: Circumstantial  Thought Content:Normal: Yes  Hallucinations: None  Delusions: No  Memory:Normal: Yes  Insight and Judgment: No  Insight and Judgment: Poor Judgment,Poor Insight  Present Suicidal Ideation: Yes  Present Homicidal Ideation: No    Tobacco Screening:  Practical Counseling, on admission, rachna X, if applicable and completed (first 3 are required if patient doesn't refuse):            ( )  Recognizing danger situations (included triggers and roadblocks)                    ( )  Coping skills (new ways to manage stress, exercise, relaxation techniques, changing routine, distraction)                                                           ( ) Basic information about quitting (benefits of quitting, techniques in how to quit, available resources  ( ) Referral for counseling faxed to [facility]                                           (X ) Patient refused counseling  ( ) Patient has not smoked in the last 30 days    Metabolic Screening:    Lab Results   Component Value Date    LABA1C 5.6 06/18/2020       Lab Results   Component Value Date    CHOL 187 06/18/2020    CHOL 194 04/16/2018     Lab Results   Component Value Date    TRIG 98 06/18/2020    TRIG 120 04/16/2018     Lab Results   Component Value Date    HDL 50 06/18/2020    HDL 44 04/16/2018     No components found for: LDLCAL  No results found for: LABVLDL      Body mass index is 25.11 kg/m². BP Readings from Last 2 Encounters:   03/17/22 (!) 148/70   03/17/22 138/80           Pt admitted with followings belongings:        Patient's home medications were reviewed. Patient oriented to surroundings and program expectations and copy of patient rights given. Received admission packet:  yes. Consents reviewed, signed yes. Refused no. Patient verbalize understanding:  yes. Patient education on precautions: yes    Pt wanded and searched. Suicidal ideations to shoot up with heroin. Has been depressed for past two weeks. Using fentanyl every other day and marijuana frequently. Has not slept much in the past 4 days. Lives with his brother who he says is a good support to him and that he can return there when discharged.                    Alecia Aguilar RN

## 2022-03-17 NOTE — ED NOTES
Pt. Resting on stretcher, RR even and non-labored  Pt. Updated on POC  Will continue to monitor  1:1 guard remains in place.         Vera Prater RN  03/17/22 5445

## 2022-03-17 NOTE — ED NOTES
will admit patient for depression with SI. Voluntary faxed, await bed placement. Marcelo Muta.  [address], [address]  03/17/22 9224

## 2022-03-17 NOTE — ED PROVIDER NOTES
[facility] ED  Emergency Department Encounter  EmergencyMedicine Resident     Pt Sarita Fung  MRN: [id number]  Cesargfpati 1990  Date of evaluation: 3/17/22  PCP:  GENERIC HIGH    This patient was evaluated in the Emergency Department for symptoms described in the history of present illness. The patient was evaluated in the context of the global COVID-19 pandemic, which necessitated consideration that the patient might be at risk for infection with the SARS-CoV-2 virus that causes COVID-19. Institutional protocols and algorithms that pertain to the evaluation of patients at risk for COVID-19 are in a state of rapid change based on information released by regulatory bodies including the CDC and federal and state organizations. These policies and algorithms were followed during the patient's care in the ED. CHIEF COMPLAINT       No chief complaint on file. HISTORY OF PRESENT ILLNESS  (Location/Symptom, Timing/Onset, Context/Setting, Quality, Duration, Modifying Factors, Severity.)      Dalton Reagan is a 27 y.o. male who presents with SI. Patient states that for the past week he has been having suicidal ideations with plan to inject fentanyl and overdose. Patient has obtained fentanyl. States that he snorted fentanyl prior to arrival.  Has not attempted in the past week. Does have history of suicidal ideation and attempts in the past.  In the past patient had attempted overdose on trazodone. Denies fever/chills, cough, shortness of breath, chest pain, nausea/vomiting, change in bowel or bladder function, abdominal pain, numbness/tingling. PAST MEDICAL / SURGICAL / SOCIAL / FAMILY HISTORY      has a past medical history of Alcoholism ([address] Anxiety, Bipolar 1 disorder ([address] Cocaine abuse ([facility] Depression, Hypertension, Irregular heartbeat, Mild tetrahydrocannabinol (THC) abuse, and Suicidal ideation.    has a past surgical history that includes other surgical history (Right, 06/04/2017); Dialysis fistula creation (Right, 6/4/2017); EXPLORATION OF WOUND OF EXTREMITY (Right, 6/4/2017); and Femur Surgery (Left). Social History     Socioeconomic History    Marital status: Single     Spouse name: magalys    Number of children: 1    Years of education: Not on file    Highest education level: Not on file   Occupational History    Occupation: unemployed     Comment: used to work at [employer] 6 months ago   Tobacco Use    Smoking status: Current Every Day Smoker     Packs/day: 0.50     Years: 14.00     Pack years: 7.00     Types: Cigarettes    Smokeless tobacco: Never Used    Tobacco comment: 3 cigs per day per patient   Vaping Use    Vaping Use: Every day   Substance and Sexual Activity    Alcohol use: Not Currently    Drug use: Yes     Types: Marijuana (Delma Pitch), Cocaine     Comment: states uses fentyl    Sexual activity: Yes     Partners: Female   Other Topics Concern    Not on file   Social History Narrative    ** Merged History Encounter **         Lives with fiance and 3year old daughter     Social Determinants of Health     Financial Resource Strain:     Difficulty of Paying Living Expenses: Not on file   Food Insecurity:     Worried About [address] in the Last Year: Not on file    Oswaldo of Food in the Last Year: Not on file   Transportation Needs:     Lack of Transportation (Medical): Not on file    Lack of Transportation (Non-Medical):  Not on file   Physical Activity:     Days of Exercise per Week: Not on file    Minutes of Exercise per Session: Not on file   Stress:     Feeling of Stress : Not on file   Social Connections:     Frequency of Communication with Friends and Family: Not on file    Frequency of Social Gatherings with Friends and Family: Not on file    Attends Rastafarian Services: Not on file    Active Member of Clubs or Organizations: Not on file    Attends Club or Organization Meetings: Not on file    Marital Status: Not on file Intimate Partner Violence:     Fear of Current or Ex-Partner: Not on file    Emotionally Abused: Not on file    Physically Abused: Not on file    Sexually Abused: Not on file   Housing Stability:     Unable to Pay for Housing in the Last Year: Not on file    Number of [place] in the Last Year: Not on file    Unstable Housing in the Last Year: Not on file       Family History   Problem Relation Age of Onset    Heart Disease Father 52    Asthma Brother     Hypertension Maternal Grandmother     Lung Cancer Maternal Grandmother         Kidney cancer, liver cancer    Stroke Maternal Grandmother     Heart Disease Mother 46         of presumed heart disease in her sleep       Allergies:  Vicodin [hydrocodone-acetaminophen]    Home Medications:  Prior to Admission medications    Medication Sig Start Date End Date Taking? Authorizing Provider   traZODone (DESYREL) 50 MG tablet Take 1 tablet by mouth nightly as needed for Sleep 22   Fanny Orlando MD   venlafaxine (EFFEXOR XR) 75 MG extended release capsule Take 1 capsule by mouth daily (with breakfast) 22   Fanny Orlando MD       REVIEW OF SYSTEMS    (2-9 systems for level 4, 10 or more for level 5)    Review of Systems   Constitutional: Negative for chills and fever. HENT: Negative for congestion. Eyes: Negative for visual disturbance. Respiratory: Negative for shortness of breath. Cardiovascular: Negative for chest pain. Gastrointestinal: Negative for abdominal pain, constipation, diarrhea, nausea and vomiting. Genitourinary: Negative for difficulty urinating and dysuria. Musculoskeletal: Negative for back pain. Skin: Negative for wound. Neurological: Negative for weakness, numbness and headaches. Psychiatric/Behavioral: Positive for sleep disturbance and suicidal ideas.      PHYSICAL EXAM   (up to 7 for level 4, 8 or more for level 5)    INITIAL VITALS:   /80   Pulse 77   Temp 98.4 °F (36.9 °C) (Oral)   Resp 16   Wt 175 lb (79.4 kg)   SpO2 99%   BMI 25.11 kg/m²   I have reviewed the triage vital signs. Const: Well nourished, well developed, appears stated age  Eyes: PERRL, no conjunctival injection  HENT: NCAT, Neck supple without meningismus   CV: RRR, Warm, well-perfused extremities  RESP: CTAB, Unlabored respiratory effort  GI: soft, non-tender, non-distended, no masses  MSK: No gross deformities appreciated  Skin: Warm, dry. No rashes  Neuro: Alert, CNs II-XII grossly intact. Sensation and motor function of extremities grossly intact. Appearance: Well kempt  Behavior: Calm, good eye contact, in no acute distress  Mood: Sad  Affect: Melancholy. mood is congruent with affect. Speech: Appropriate rate, quantity and volume. Thought process: Linear  Thought content: Endorses a SI. Denies HI. Cognition: Normal  Insight: Good  Judgment: Good    INITIAL MDM/DIFFERENTIAL  DIAGNOSIS   INITIAL MDM/DDX:    SI    21-year-old male with suicidal ideation. No other complaints at this time. Will obtain I labs. Consult social work. Will reevaluate      PLAN (LABS / IMAGING / EKG):  Orders Placed This Encounter   Procedures    COVID-19, Rapid    Ethanol    CBC with Auto Differential    Comprehensive Metabolic Panel    Urine Drug Screen    Inpatient consult to Social Work       MEDICATIONS ORDERED:  No orders of the defined types were placed in this encounter. DIAGNOSTIC RESULTS / EMERGENCY DEPARTMENT COURSE / MDM   LAB RESULTS:  Results for orders placed or performed during the hospital encounter of 03/17/22   COVID-19, Rapid    Specimen: Nasopharyngeal Swab   Result Value Ref Range    Specimen Description . NASOPHARYNGEAL SWAB     SARS-CoV-2, Rapid Not Detected Not Detected   Ethanol   Result Value Ref Range    Ethanol <10 <10 mg/dL    Ethanol percent <0.010 <0.010 %   CBC with Auto Differential   Result Value Ref Range    WBC 6.5 3.5 - 11.3 k/uL    RBC 4.46 4.21 - 5.77 m/uL    Hemoglobin 13.5 13.0 - 17.0 g/dL    Hematocrit 42.1 40.7 - 50.3 %    MCV 94.4 82.6 - 102.9 fL    MCH 30.3 25.2 - 33.5 pg    MCHC 32.1 28.4 - 34.8 g/dL    RDW 12.4 11.8 - 14.4 %    Platelets 894 469 - 006 k/uL    MPV 8.2 8.1 - 13.5 fL    NRBC Automated 0.0 0.0 per 100 WBC    Seg Neutrophils 45 36 - 65 %    Lymphocytes 42 24 - 43 %    Monocytes 9 3 - 12 %    Eosinophils % 3 1 - 4 %    Basophils 1 0 - 2 %    Immature Granulocytes 0 0 %    Segs Absolute 2.99 1.50 - 8.10 k/uL    Absolute Lymph # 2.74 1.10 - 3.70 k/uL    Absolute Mono # 0.59 0.10 - 1.20 k/uL    Absolute Eos # 0.17 0.00 - 0.44 k/uL    Basophils Absolute 0.03 0.00 - 0.20 k/uL    Absolute Immature Granulocyte <0.03 0.00 - 0.30 k/uL   Comprehensive Metabolic Panel   Result Value Ref Range    Glucose 78 70 - 99 mg/dL    BUN 11 6 - 20 mg/dL    CREATININE 1.13 0.70 - 1.20 mg/dL    Calcium 9.2 8.6 - 10.4 mg/dL    Sodium 141 135 - 144 mmol/L    Potassium 3.8 3.7 - 5.3 mmol/L    Chloride 104 98 - 107 mmol/L    CO2 27 20 - 31 mmol/L    Anion Gap 10 9 - 17 mmol/L    Alkaline Phosphatase 79 40 - 129 U/L    ALT 12 5 - 41 U/L    AST 19 <40 U/L    Total Bilirubin 0.18 (L) 0.3 - 1.2 mg/dL    Total Protein 7.0 6.4 - 8.3 g/dL    Albumin 4.1 3.5 - 5.2 g/dL    Albumin/Globulin Ratio 1.4 1.0 - 2.5    GFR Non-African American >60 >60 mL/min    GFR African American >60 >60 mL/min    GFR Comment         Urine Drug Screen   Result Value Ref Range    Amphetamine Screen, Ur NEGATIVE NEGATIVE    Barbiturate Screen, Ur NEGATIVE NEGATIVE    Benzodiazepine Screen, Urine NEGATIVE NEGATIVE    Cocaine Metabolite, Urine POSITIVE (A) NEGATIVE    Methadone Screen, Urine NEGATIVE NEGATIVE    Opiates, Urine NEGATIVE NEGATIVE    Phencyclidine, Urine NEGATIVE NEGATIVE    Cannabinoid Scrn, Ur POSITIVE (A) NEGATIVE    Oxycodone Screen, Ur NEGATIVE NEGATIVE    Test Information       Assay provides medical screening only.   The absence of expected drug(s) and/or metabolite(s) may indicate diluted or adulterated urine, limitations of testing or timing of collection. UDS positive for cocaine and cannabinoid. Rest of labs are unremarkable. CONSULTS:  IP CONSULT TO SOCIAL WORK     MDM/Emergency Department Course:  ED Course as of 03/18/22 2257   Thu Mar 17, 2022   0736 Medically stable for transfer to mental health facility [AR]   1015 Transport to  [facility], Bed #129 [PS]      ED Course User Index  [AR] Mary Siemens, DO  [PS] Lori Rivera MD     50-year-old male with suicidal ideation. UDS shows cannabinoid and cocaine. Rest of labs are unremarkable. Medically clear for dispo. Awaiting bed placement to mental health facility. Transfer of care to Dr. Mars Talbert. CRITICAL CARE:  Please see Attending Note    FINAL IMPRESSION      1. Suicidal ideation          DISPOSITION / PLAN     DISPOSITION      PATIENT REFERRED TO:  No follow-up provider specified.     DISCHARGE MEDICATIONS:  Discharge Medication List as of 3/17/2022 10:26 AM          Mary Siemens, DO  Emergency Medicine Resident    (Please note that portions of this note were completed with a voice recognition program.  Efforts were made to edit the dictations but occasionally words are mis-transcribed.)       Mary Siemens, DO  Resident  03/18/22 0959

## 2022-03-17 NOTE — ED NOTES
[facility]. All belongings sent.       Romeo Groves RN  03/17/22 1014

## 2022-03-17 NOTE — ED NOTES
[] Pasquale    [] [facility]    [x]  [facility] ASSESSMENT      Y  N     [x] [] In the past two weeks have you had thoughts of hurting yourself in any way? [x] [] In the past two weeks have you had thoughts that you would be better off dead? [] [x] Have you made a suicide attempt in the past two months? [x] [] Do you have a plan for hurting yourself or suicide? [] [x] Presence of hallucinations/voices related to hurting himself or herself or someone else. SUICIDE/SECURITY WATCH PRECAUTION CHECKLIST     Orders    [x]  Suicide/Security Watch Precautions initiated as checked below:   3/17/22 5:51 AM EDT 07/07    [x] Notified physician:  Gilda Booth DO  3/17/22 5:51 AM EDT    [x] Orders obtained as appropriate:     [x] 1:1 Observer     [] Psych Consult     [] Psych Consult    Name:  Date:  Time:    [x] 1:1 Observer, Notified by:  Estephania Echevarria LPN    Contact Nurse Supervisor    [x] Remove all personal clothes from room and place in snap/paper gown/pants. Slipper only    [x] Remove all personal belongings from room and secured away from patient. Documentation    [x] Initiate Suicide/Security Watch Precaution Flow Sheet    [x] Initiate individualized Care Plan/Problem    [x] Document why precautions initiated on flow sheet (Initiate Nursing Care Plan/Problem)    [x] 1:1 Observer in place; instructions provided. Suicide precautions require observer be within arms length. [x] Nurse-Observer Communication Hand-off initiated by RN, reviewed with Observer. Subsequently used as Hand Off between Observers. [x] Initiate every 15 minute observations per observer as delegated by the RN.     [x] Initiate RN assessment and documentation    Environmental Scan  Search Criteria and Process: OPTIONAL, see Search Policy    [x] Reason for search: suicidal thoughts     [x] Nursing in presence of second person to search patient    [x] Patient notified of reason for body assessment and belongings search:     Persons present during search:MPD   Results of search and disposition:       Searchers Name: Álvaro James LPN     These items or items similar should be removed from the room:   [] Chairs   [] Telephone   [] Trash cans and liners   [] Plastic utensils (order Patient Safety tray)   [] Empty or remove Sharps containers   [x] All personal clothing/belongings removed   [x] All unnecessary lead wires, electrical cords, draw cords, etc.   [] Flowers and plants   [x] Double check for lighters, matches, razors, any glass items etc that can be used as weapons. Person completing Checklist: Anita Rockwell LPN       GENERAL INFORMATION     Y  N     [x] [] Has the patient been informed that they are on a watch and what that means? [x] [] Can the patient get out of Bed without nursing assistance? [] [x] Can the patient use the restroom without nursing assistance? [] [x] Can the patient walk the halls to [place] their legs? \"   [] [x] Does the patient have metal utensils? [x] [] Have the patient's belongings been placed out of control of the patient? [x] [] Have the patient and his/her belongings been checked for contraband? [] [x] Is the patient under any visitor restrictions? If Yes, explain:   [] [x] Is the patient under an alias? [place] [id number] Name:   Authorized visitors (no more than two are to be on the list)   Name/Relationship:   Name/Relationship:    Name of Staff member that you  Received this information from?: Álvaro James LPN     General Description:    Brice Reddy 07/07 male 27 y.o. Admission weight: 175 lb (79.4 kg)    Race: []  [x] Black  []   []   [] [place] [] Other  Facial Hair:  [] Yes  [] No  If yes, please describe: Identifying Marks (i.e. Visible tattoos, scars, etc... ):     NURSING CARE PLAN    Nursing Diagnosis: Risk of Self Directed Harm  [] Actual  [x] Potential  Date Started: 3/17/22      Etiological Factors: (related to)  [x] Expressed or implied suicidal ideation/behavior  [x] Depression  [] Suicide attempt      [] Low self-esteem  [] Hallucinations      [] Feeling of Hopelessness  [x] Substance abuse or withdrawal    [] Dysfunctional family  [] Major traumatic event, eg., divorce, etc   [] Excessive stress/anxiety    3/17/22    Expected Outcomes    Patient will:   [x] Patient will remain safe for the duration of their stay   [x] Patient's environment will be safe, eg. Free of potential suicide weapons   [] Verbalize Recovery from suicidal episode and improvement in self-worth   [x] Discuss feeling that precipitated suicide attempt/thoughts/behavior   [] Will describe available resources for crisis prevention and management   [] Will verbalize positive coping skills     Nursing Intervention   [x] Assessment and Observations hourly   [x] Suicide Precautions implemented with patient, should be 1:1 observation   [x] Document observation [id number] and RN assessment hourly   [] Consult physician for:    [x] Psychiatric consult    [] Pharmacological therapy    [] Other:    [x] Patient search completed by security   [x] Initiated appropriate safety protocols by removing from the patient's environment anything that could be used to inflict self injury, eg. Order safe tray, snap gown, etc   [x] Maintain open, warm, caring, non-judgmental attitude/manner towards patient   [] Discuss advantages and disadvantages of existing coping methods/skills   [x] Assist and educate patient with identifying present strengths and coping skills   [x] Keep patient informed regarding plan of care and provide clear concise explanations. Provide the patient/family education information as well as telephone numbers and other information about crisis centers, hot lines, and counselors.     Discharge Planning:   [] Referral  [] Groups [] Health agencies  [] Other:          Terell Young LPN  51/58/67 0096

## 2022-03-17 NOTE — ED PROVIDER NOTES
[address] [facility]     Emergency Department     Faculty Attestation    I performed a history and physical examination of the patient and discussed management with the resident. I have reviewed and agree with the residents findings including all diagnostic interpretations, and treatment plans as written. Any areas of disagreement are noted on the chart. I was personally present for the key portions of any procedures. I have documented in the chart those procedures where I was not present during the key portions. I have reviewed the emergency nurses triage note. I agree with the chief complaint, past medical history, past surgical history, allergies, medications, social and family history as documented unless otherwise noted below. Documentation of the HPI, Physical Exam and Medical Decision Making performed by scribozzy is based on my personal performance of the HPI, PE and MDM. For Physician Assistant/ Nurse Practitioner cases/documentation I have personally evaluated this patient and have completed at least one if not all key elements of the E/M (history, physical exam, and MDM). Additional findings are as noted. 28 yo M c./o suicidal ideation x 1 wk, no injury, no fever or vomiting,   pe vss no finding of acute psychosis, cooperative, no cervical tenderness or crepitus, abdomen non tender, no distension, no rigidity, extremities x 4 atraumatic,     eval started, care turned over to day shift,     EKG Interpretation    Interpreted by me      CRITICAL CARE: There was a high probability of clinically significant/life threatening deterioration in this patient's condition which required my urgent intervention. Total critical care time was 5 minutes. This excludes any time for separately reportable procedures.        Uus-Bettina 24, DO  03/17/22 [address] [place] [address],   03/17/22 5686
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
## 2022-03-17 NOTE — ED NOTES
The following labs labeled with pt sticker and tubed to lab:     [] Blue     [] Lavender   [] on ice  [] Green/yellow  [] Green/black [] on ice  [] Yellow  [] Red  [] Pink      [] COVID-19 swab    [] Rapid  [] PCR  [] Flu swab  [] Peds Viral Panel     [x] Urine Sample  [] Pelvic Cultures  [] Blood Cultures          Fouzia Muhammad LPN  15/63/63 7309

## 2022-03-17 NOTE — ED NOTES
Patient will go to the [facility], Room #223, at 10:00am by Stop Being Watched. RN updated and has packet. Patient also made aware of plan. Enoch Cushing.  [address], [facility], [facility]  03/17/22 3264
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
## 2022-03-17 NOTE — ED PROVIDER NOTES
[facility] ED  Emergency Department  Emergency Medicine Resident Sign-out     Care of Patrick Weston was assumed from Dr. Shashi Mead and is being seen for No chief complaint on file. .  The patient's initial evaluation and plan have been discussed with the prior provider who initially evaluated the patient. EMERGENCY DEPARTMENT COURSE / MEDICAL DECISION MAKING:       MEDICATIONS GIVEN:  No orders of the defined types were placed in this encounter. LABS / RADIOLOGY:     Labs Reviewed   COMPREHENSIVE METABOLIC PANEL - Abnormal; Notable for the following components:       Result Value    Total Bilirubin 0.18 (*)     All other components within normal limits   URINE DRUG SCREEN - Abnormal; Notable for the following components:    Cocaine Metabolite, Urine POSITIVE (*)     Cannabinoid Scrn, Ur POSITIVE (*)     All other components within normal limits   COVID-19, RAPID   ETHANOL   CBC WITH AUTO DIFFERENTIAL       No results found. RECENT VITALS:     Temp: 98.4 °F (36.9 °C),  Pulse: 77, Resp: 16, BP: 138/80, SpO2: 99 %      This patient is a 27 y.o. Male with SI w/ plan to overdose, opioids, accepted to [facility], medically cleared for transport to [facility],       ED Course as of 03/17/22 1016   u Mar 17, 2022   0736 Medically stable for transfer to mental health facility [AR]   1015 Transport to  [address] [place] [address] Unit, Bed [address] [PS]      ED Course User Index  [AR] Pamella Denton DO  [PS] Chris Velarde MD       OUTSTANDING TASKS / RECOMMENDATIONS:    1. Pending transport to [facility]     FINAL IMPRESSION:     1. Suicidal ideation        DISPOSITION:         DISPOSITION:  []  Discharge   []  Transfer -    [x]  Admission -   [facility]   []  Against Medical Advice   []  Eloped   FOLLOW-UP: No follow-up provider specified.    DISCHARGE MEDICATIONS: New Prescriptions    No medications on file          Chris Velarde MD  Emergency Medicine Resident  [facility]       Chris Veladre, MD  Resident  03/17/22 9559       Mackenzie Johnson MD  Resident  03/17/22 1016

## 2022-03-18 PROBLEM — F11.90 OPIATE USE: Status: ACTIVE | Noted: 2022-03-18

## 2022-03-18 PROBLEM — F12.90 MARIJUANA USE: Status: ACTIVE | Noted: 2022-03-18

## 2022-03-18 PROCEDURE — 6370000000 HC RX 637 (ALT 250 FOR IP): Performed by: PSYCHIATRY & NEUROLOGY

## 2022-03-18 PROCEDURE — 1240000000 HC EMOTIONAL WELLNESS R&B

## 2022-03-18 PROCEDURE — APPSS180 APP SPLIT SHARED TIME > 60 MINUTES

## 2022-03-18 RX ORDER — ONDANSETRON 4 MG/1
4 TABLET, FILM COATED ORAL EVERY 8 HOURS PRN
Status: DISCONTINUED | OUTPATIENT
Start: 2022-03-18 | End: 2022-03-21

## 2022-03-18 RX ORDER — CLONIDINE HYDROCHLORIDE 0.1 MG/1
0.1 TABLET ORAL EVERY 4 HOURS PRN
Status: DISCONTINUED | OUTPATIENT
Start: 2022-03-18 | End: 2022-03-21

## 2022-03-18 RX ORDER — DICYCLOMINE HYDROCHLORIDE 10 MG/1
20 CAPSULE ORAL 4 TIMES DAILY PRN
Status: DISCONTINUED | OUTPATIENT
Start: 2022-03-18 | End: 2022-03-21

## 2022-03-18 RX ORDER — CYCLOBENZAPRINE HCL 10 MG
10 TABLET ORAL 3 TIMES DAILY PRN
Status: DISCONTINUED | OUTPATIENT
Start: 2022-03-18 | End: 2022-03-21

## 2022-03-18 RX ADMIN — IBUPROFEN 400 MG: 400 TABLET ORAL at 21:25

## 2022-03-18 RX ADMIN — CYCLOBENZAPRINE 10 MG: 10 TABLET, FILM COATED ORAL at 21:25

## 2022-03-18 RX ADMIN — TRAZODONE HYDROCHLORIDE 50 MG: 50 TABLET ORAL at 21:25

## 2022-03-18 RX ADMIN — DICYCLOMINE HYDROCHLORIDE 20 MG: 10 CAPSULE ORAL at 21:25

## 2022-03-18 RX ADMIN — CLONIDINE HYDROCHLORIDE 0.1 MG: 0.1 TABLET ORAL at 21:25

## 2022-03-18 RX ADMIN — HYDROXYZINE HYDROCHLORIDE 50 MG: 50 TABLET, FILM COATED ORAL at 21:25

## 2022-03-18 ASSESSMENT — PAIN SCALES - GENERAL: PAINLEVEL_OUTOF10: 6

## 2022-03-18 NOTE — PLAN OF CARE
[facility][1]  Initial Interdisciplinary Treatment Plan NO      Original treatment plan Date & Time: 3/18/2022 0834    Admission Type:  Admission Type: Voluntary    Reason for admission:        Estimated Length of Stay:  5-7days  Estimated Discharge Date: to be determined by physician    PATIENT STRENGTHS:  Patient Strengths:Strengths: No significant Physical Illness,Social Skills  Patient Strengths and Limitations:Limitations: Apathetic / unmotivated,Inappropriate/potentially harmful leisure interests,Difficulty problem solving/relies on others to help solve problems,Difficult relationships / poor social skills  Addictive Behavior: Addictive Behavior  In the past 3 months, have you felt or has someone told you that you have a problem with:  : None  Do you have a history of Chemical Use?: No  Do you have a history of Alcohol Use?: No  Do you have a history of Street Drug Abuse?: Yes  Histroy of Prescripton Drug Abuse?: No  Medical Problems:  Past Medical History:   Diagnosis Date    [facility] ([facility]     Anxiety     Bipolar 1 disorder (HCC)     Cocaine abuse ([facility]     Depression     Hypertension     Irregular heartbeat     Mild tetrahydrocannabinol (THC) abuse     Suicidal ideation      Status EXAM:Status and Exam  Normal: No  Facial Expression: Flat  Affect: Appropriate  Level of Consciousness: Alert  Mood:Normal: Yes  Mood: Depressed,Anxious  Motor Activity:Normal: No  Motor Activity: Decreased  Interview Behavior: Cooperative  Preception: [place] to Person,[place] to Time,Vanc[place] to Place,[place] to Situation  Attention:Normal: No  Attention: Distractible  Thought Processes: Blocking  Thought Content:Normal: No  Thought Content: Preoccupations  Hallucinations: None  Delusions: No  Memory:Normal: No  Memory: Poor Recent,Poor Remote  Insight and Judgment: No  Insight and Judgment: Poor Judgment,Poor Insight,Unmotivated  Present Suicidal Ideation: No  Present Homicidal Ideation: No    EDUCATION:   Learner Progress Toward Treatment Goals: reviewed group plans and strategies for care    Method:group therapy, medication compliance, individualized assessments and care planning    Outcome: needs reinforcement    PATIENT GOALS: to be discussed with patient within 72 hours    PLAN/TREATMENT RECOMMENDATIONS:     continue group therapy , medications compliance, goal setting, individualized assessments and care, continue to monitor pt on unit      SHORT-TERM GOALS:   Time frame for Short-Term Goals: 5-7 days    LONG-TERM GOALS:  Time frame for Long-Term Goals: 6 months  Members Present in Team Meeting: See Signature Sheet    Carie Simno

## 2022-03-18 NOTE — GROUP NOTE
Group Therapy Note    Date: 3/18/2022    Group Start Time: 1100  Group End Time: 1150  Group Topic: Psychoeducation    JEREMIAH Waldrop, JAMAALS    Pt did not attend creative expressions group at 1100 d/t resting in room despite staff invitation to attend. 1:1 talk time offered as alternative to group session.      Signature:  Mic Waldrop, [address]

## 2022-03-18 NOTE — GROUP NOTE
Group Therapy Note    Date: 3/18/2022    Group Start Time: 1330  Group End Time: 1430  Group Topic: Cognitive Skills    JEREMIAH Dunaway, CTRS    Pt did not attend cognitive skills group at 1330 d/t resting in room despite staff invitation to attend. 1:1 talk time offered as alternative to group session, pt declined.          Signature:  Andrew Dunaway, [address]

## 2022-03-18 NOTE — PROGRESS NOTES
Behavioral Services  Medicare Certification Upon Admission    I certify that this patient's inpatient psychiatric hospital admission is medically necessary for:    [x] (1) Treatment which could reasonably be expected to improve this patient's condition,       [x] (2) Or for diagnostic study;     AND     [x](2) The inpatient psychiatric services are provided while the individual is under the care of a physician and are included in the individualized plan of care.     Estimated length of stay/service 4 to 7 days    Plan for post-hospital care Home with outpatient community mental health versus inpatient substance use rehab    Electronically signed by Tin Cintron MD on 3/18/2022 at 10:18 AM

## 2022-03-18 NOTE — PLAN OF CARE
Problem: Altered Mood, Depressive Behavior:  Goal: Ability to disclose and discuss suicidal ideas will improve  Description: Ability to disclose and discuss suicidal ideas will improve  Outcome: Ongoing     Patient denies any thoughts or plans to commit suicide at this time. He remains isolative to room.

## 2022-03-18 NOTE — PLAN OF CARE
Problem: Altered Mood, Depressive Behavior:  Goal: Able to verbalize acceptance of life and situations over which he or she has no control  Description: Able to verbalize acceptance of life and situations over which he or she has no control  3/18/2022 0946 by Meredith Day LPN  Outcome: Ongoing  Note: Patient is unsure of details leading up to admission, patient denies hallucinations and does not state delusions at this time. Problem: Altered Mood, Depressive Behavior:  Goal: Ability to disclose and discuss suicidal ideas will improve  Description: Ability to disclose and discuss suicidal ideas will improve  3/18/2022 0946 by Meredith Day LPN  Outcome: Ongoing  Note: Patient denies current suicidal ideations. Patient agrees to talk with staff if the thoughts arise. Problem: Coping - Ineffective, Individual:  Goal: Ability to identify and develop effective coping behavior will improve  Description: Ability to identify and develop effective coping behavior will improve  3/18/2022 0946 by Meredith Day LPN  Outcome: Ongoing  Note: 1:1 with pt x ten minutes. Pt encouraged to attend unit programming and interact with peers and staff. Pt also encouraged to tend to hygiene and ADLs. Pt encouraged to discuss feelings with staff and feedback and reassurance provided.

## 2022-03-19 PROCEDURE — 6370000000 HC RX 637 (ALT 250 FOR IP): Performed by: PSYCHIATRY & NEUROLOGY

## 2022-03-19 PROCEDURE — APPSS30 APP SPLIT SHARED TIME 16-30 MINUTES: Performed by: NURSE PRACTITIONER

## 2022-03-19 PROCEDURE — 99232 SBSQ HOSP IP/OBS MODERATE 35: CPT | Performed by: PSYCHIATRY & NEUROLOGY

## 2022-03-19 PROCEDURE — 1240000000 HC EMOTIONAL WELLNESS R&B

## 2022-03-19 RX ADMIN — TRAZODONE HYDROCHLORIDE 50 MG: 50 TABLET ORAL at 20:38

## 2022-03-19 RX ADMIN — DICYCLOMINE HYDROCHLORIDE 20 MG: 10 CAPSULE ORAL at 20:38

## 2022-03-19 RX ADMIN — CYCLOBENZAPRINE 10 MG: 10 TABLET, FILM COATED ORAL at 20:38

## 2022-03-19 RX ADMIN — IBUPROFEN 400 MG: 400 TABLET ORAL at 20:39

## 2022-03-19 RX ADMIN — CLONIDINE HYDROCHLORIDE 0.1 MG: 0.1 TABLET ORAL at 20:39

## 2022-03-19 ASSESSMENT — PAIN SCALES - GENERAL: PAINLEVEL_OUTOF10: 8

## 2022-03-19 NOTE — GROUP NOTE
Group Therapy Note    Date: 3/19/2022    Group Start Time: 0366  Group End Time: 5464  Group Topic: Psychotherapy    JEREMIAH Bedolla        Group Therapy Note           Patient refused to attend psychotherapy group after encouragement from staff. 1:1 talk time offered but refused. Signature:   Mahogany Bedolla

## 2022-03-19 NOTE — GROUP NOTE
Group Therapy Note    Date: 3/19/2022    Group Start Time: 8910  Group End Time: 1120  Group Topic: Recreational    [facility], [facility]    Patient refused to attend leisure skills group at [phone] after encouragement from staff. 1:1 talk time offered by staff as alternative to group session.

## 2022-03-19 NOTE — PROGRESS NOTES
Daily Progress Note  3/19/2022    Patient Name: Lauren Crandall    CHIEF COMPLAINT:  Suicidal ideation          Emergency Medications: Has required opiate withdrawal medications Catapres 0.1 mg the previous evening at 9:30 PM for a COWS score of morning. Scheduled medications: No scheduled medications at this time    SUBJECTIVE:     Patient seen face-to-face for follow-up assessment. He states that he is feeling awful. Actively going through fentanyl withdrawal.  Endorses mind fogginess, sweating, chills, and feeling that his mind is playing tricks on him. Patient has difficulty keeping his eyes open, but pupils do not appear to be dilated. Vitals within appropriate range. Patient reports feeling hopeless and helpless today. States that he feels depressed. Endorses guilt and shame secondary to his substance use. Reports suicidal ideation with a plan to overdose and is unable to contract for safety off unit. We discussed patient's disposition plan, and he expresses desire for AOD treatment. Expresses desire for stability and making changes in his life. States that he has not felt well enough to leave his room. He has been isolative and withdrawn. Not attending group programming. He appears disheveled with poor grooming and hygiene. Reports reduction in appetite. He denies any perceptual disturbances or psychotic phenomena. Despite withdrawal, patient is pleasant and cooperative with discussion. Denies any concerns regarding his withdrawal treatment protocol. Patient has yet to demonstrate stability and requires inpatient hospitalization for safety.     Appetite:  [] Normal/Adequate/Unchanged  [] Increased  [x] Decreased      Sleep:       [] Normal/Adequate/Unchanged  [] Fair  [x] Poor      Group Attendance on Unit:   [] Yes  [] Selectively    [x] No    Medication Side Effects: denies         Mental Status Exam  Level of consciousness: Alert and awake   Appearance: Appropriate attire for setting, resting in bed, with poor grooming and hygiene   Behavior/Motor: Approachable, appears uncomfortable and restless while resting  Attitude toward examiner: Cooperative, attentive, no eye contact  Speech: spontaneous and low productivity, poor articulation  Mood: Patient reports \"really bad\"  Affect: Congruent  Thought processes: linear and goal directed   Thought content: Denies homicidal ideation  Suicidal Ideation: Endorses active suicidal ideation, unable to contract for safety off unit  Delusions: Endorses feeling that his mind is playing tricks on him, but is evasive in describing  Perceptual Disturbance: Denies. Does not appear to be responding to internal stimuli. Cognition: Oriented to self, location, time, and situation  Memory: intact  Insight & Judgement: poor     Data   height is 5' 10\" (1.778 m) and weight is 175 lb (79.4 kg). His oral temperature is 98.1 °F (36.7 °C). His blood pressure is 118/68 and his pulse is 53. His respiration is 14 and oxygen saturation is 98%. Labs:   No visits with results within 2 Day(s) from this visit. Latest known visit with results is:   Admission on 03/17/2022, Discharged on 03/17/2022   Component Date Value Ref Range Status    Specimen Description 03/17/2022 . NASOPHARYNGEAL SWAB   Final    SARS-CoV-2, Rapid 03/17/2022 Not Detected  Not Detected Final    Comment:       Rapid NAAT:  The specimen is NEGATIVE for SARS-CoV-2, the novel coronavirus associated with   COVID-19. The ID NOW COVID-19 assay is designed to detect the virus that causes COVID-19 in patients   with signs and symptoms of infection who are suspected of COVID-19. An individual without symptoms of COVID-19 and who is not shedding SARS-CoV-2 virus would   expect to have a negative (not detected) result in this assay.   Negative results should be treated as presumptive and, if inconsistent with clinical signs   and symptoms or necessary for patient management,  should be tested with an alternative molecular assay. Negative results do not preclude   SARS-CoV-2 infection and   should not be used as the sole basis for patient management decisions.          Fact sheet for Healthcare Providers: Ken.es  Fact sheet for Patients: Ken.es          Methodology: Isothermal Nucleic Acid Amplification      Ethanol 03/17/2022 <10  <10 mg/dL Final    Ethanol percent 03/17/2022 <0.010  <0.010 % Final    WBC 03/17/2022 6.5  3.5 - 11.3 k/uL Final    RBC 03/17/2022 4.46  4.21 - 5.77 m/uL Final    Hemoglobin 03/17/2022 13.5  13.0 - 17.0 g/dL Final    Hematocrit 03/17/2022 42.1  40.7 - 50.3 % Final    MCV 03/17/2022 94.4  82.6 - 102.9 fL Final    MCH 03/17/2022 30.3  25.2 - 33.5 pg Final    MCHC 03/17/2022 32.1  28.4 - 34.8 g/dL Final    RDW 03/17/2022 12.4  11.8 - 14.4 % Final    Platelets 71/56/5293 406  138 - 453 k/uL Final    MPV 03/17/2022 8.2  8.1 - 13.5 fL Final    NRBC Automated 03/17/2022 0.0  0.0 per 100 WBC Final    Seg Neutrophils 03/17/2022 45  36 - 65 % Final    Lymphocytes 03/17/2022 42  24 - 43 % Final    Monocytes 03/17/2022 9  3 - 12 % Final    Eosinophils % 03/17/2022 3  1 - 4 % Final    Basophils 03/17/2022 1  0 - 2 % Final    Immature Granulocytes 03/17/2022 0  0 % Final    Segs Absolute 03/17/2022 2.99  1.50 - 8.10 k/uL Final    Absolute Lymph # 03/17/2022 2.74  1.10 - 3.70 k/uL Final    Absolute Mono # 03/17/2022 0.59  0.10 - 1.20 k/uL Final    Absolute Eos # 03/17/2022 0.17  0.00 - 0.44 k/uL Final    Basophils Absolute 03/17/2022 0.03  0.00 - 0.20 k/uL Final    Absolute Immature Granulocyte 03/17/2022 <0.03  0.00 - 0.30 k/uL Final    Glucose 03/17/2022 78  70 - 99 mg/dL Final    BUN 03/17/2022 11  6 - 20 mg/dL Final    CREATININE 03/17/2022 1.13  0.70 - 1.20 mg/dL Final    Calcium 03/17/2022 9.2  8.6 - 10.4 mg/dL Final    Sodium 03/17/2022 141  135 - 144 mmol/L Final    Potassium 03/17/2022 3.8 3.7 - 5.3 mmol/L Final    Chloride 03/17/2022 104  98 - 107 mmol/L Final    CO2 03/17/2022 27  20 - 31 mmol/L Final    Anion Gap 03/17/2022 10  9 - 17 mmol/L Final    Alkaline Phosphatase 03/17/2022 79  40 - 129 U/L Final    ALT 03/17/2022 12  5 - 41 U/L Final    AST 03/17/2022 19  <40 U/L Final    Total Bilirubin 03/17/2022 0.18* 0.3 - 1.2 mg/dL Final    Total Protein 03/17/2022 7.0  6.4 - 8.3 g/dL Final    Albumin 03/17/2022 4.1  3.5 - 5.2 g/dL Final    Albumin/Globulin Ratio 03/17/2022 1.4  1.0 - 2.5 Final    GFR Non- 03/17/2022 >60  >60 mL/min Final    GFR  03/17/2022 >60  >60 mL/min Final    GFR Comment 03/17/2022        Final    Comment: Average GFR for 30-36 years old:   80 mL/min/1.73sq m  Chronic Kidney Disease:   <60 mL/min/1.73sq m  Kidney failure:   <15 mL/min/1.73sq m              eGFR calculated using average adult body mass.  Additional eGFR calculator available at:        [employer]            Amphetamine Screen, Ur 03/17/2022 NEGATIVE  NEGATIVE Final    Comment:       (Positive cutoff 1000 ng/mL)                  Barbiturate Screen, Ur 03/17/2022 NEGATIVE  NEGATIVE Final    Comment:       (Positive cutoff 200 ng/mL)                  Benzodiazepine Screen, Urine 03/17/2022 NEGATIVE  NEGATIVE Final    Comment:       (Positive cutoff 200 ng/mL)                  Cocaine Metabolite, Urine 03/17/2022 POSITIVE* NEGATIVE Final    Comment:       (Positive cutoff 300 ng/mL)                  Methadone Screen, Urine 03/17/2022 NEGATIVE  NEGATIVE Final    Comment:       (Positive cutoff 300 ng/mL)                  Opiates, Urine 03/17/2022 NEGATIVE  NEGATIVE Final    Comment:       (Positive cutoff 300 ng/mL)                  Phencyclidine, Urine 03/17/2022 NEGATIVE  NEGATIVE Final    Comment:       (Positive cutoff 25 ng/mL)                  Cannabinoid Scrn, Ur 03/17/2022 POSITIVE* NEGATIVE Final    Comment: (Positive cutoff 50 ng/mL)                  Oxycodone Screen, Ur 03/17/2022 NEGATIVE  NEGATIVE Final    Comment:       (Positive cutoff 100 ng/mL)                  Test Information 03/17/2022 Assay provides medical screening only. The absence of expected drug(s) and/or metabolite(s) may indicate diluted or adulterated urine, limitations of testing or timing of collection. Final    Comment: Testing for legal purposes should be confirmed by another method. To request confirmation   of test result, please call the lab within 7 days of sample submission. Reviewed patient's current plan of care and vital signs with nursing staff. Labs reviewed: [x] Yes  Last EKG in EMR reviewed: [x] Yes    Medications  Current Facility-Administered Medications: ondansetron (ZOFRAN) tablet 4 mg, 4 mg, Oral, Q8H PRN  cloNIDine (CATAPRES) tablet 0.1 mg, 0.1 mg, Oral, Q4H PRN  dicyclomine (BENTYL) capsule 20 mg, 20 mg, Oral, 4x Daily PRN  cyclobenzaprine (FLEXERIL) tablet 10 mg, 10 mg, Oral, TID PRN  acetaminophen (TYLENOL) tablet 650 mg, 650 mg, Oral, Q6H PRN  ibuprofen (ADVIL;MOTRIN) tablet 400 mg, 400 mg, Oral, Q6H PRN  hydrOXYzine (ATARAX) tablet 50 mg, 50 mg, Oral, TID PRN  traZODone (DESYREL) tablet 50 mg, 50 mg, Oral, Nightly PRN  polyethylene glycol (GLYCOLAX) packet 17 g, 17 g, Oral, Daily PRN  aluminum & magnesium hydroxide-simethicone (MAALOX) 200-200-20 MG/5ML suspension 30 mL, 30 mL, Oral, Q6H PRN  nicotine polacrilex (NICORETTE) gum 2 mg, 2 mg, Oral, Q2H PRN    ASSESSMENT  Severe recurrent major depression without psychotic features (HCC)    Opiate use disorder  Cocaine use disorder  Marijuana use disorder         HANDOFF  Patient symptoms are: remain unstable  Medications as determined by attending physician  Consider starting mirtazapine 7.5 mg to help with mood and sleep. Encourage participation in groups and milieu.   Probable discharge is to be determined by MD      Electronically signed by Coty Lion Chong Dimas - CNP on 3/19/2022 at 4:59 PM    **This report has been created using voice recognition software. It may contain minor errors which are inherent in voice recognition technology. **    I independently saw and evaluated the patient. I reviewed the nurse practitioners documentation above. Any additional comments or changes to the nurse practitioners documentation are stated below otherwise agree with assessment. Plan will be as follows:  Patient still experiencing withdrawal.  Antidepressant medications discussed but at this time patient would like a little more time to get through withdrawal before adjusting meds  PLAN  Patient s symptoms   are improving  Continue with current medications at this time   Continue with current medication for now  Attempt to develop insight  Psycho-education conducted. Supportive Therapy conducted.   Probable discharge is undetermined at this time  Follow-up daily while on inpatient unit

## 2022-03-19 NOTE — PLAN OF CARE
Problem: Altered Mood, Depressive Behavior:  Goal: Able to verbalize acceptance of life and situations over which he or she has no control  Description: Able to verbalize acceptance of life and situations over which he or she has no control  Outcome: Ongoing     Problem: Altered Mood, Depressive Behavior:  Goal: Ability to disclose and discuss suicidal ideas will improve  Description: Ability to disclose and discuss suicidal ideas will improve  Outcome: Ongoing     Problem: Coping - Ineffective, Individual:  Goal: Ability to identify and develop effective coping behavior will improve  Description: Ability to identify and develop effective coping behavior will improve  Outcome: Ongoing     Patient denies suicidal and homicidal ideation at this time. Patient is withdrawling and not feeling very well. Patient could not identify a coping skill at this time. Patient encouraged to participate in unit group programming/work with staff to identify any other life situations that are not within ability of control and to learn how to accept and deal with them. Patient is free of self harm at this time. Patient agrees to seek out staff if thoughts to harm self arise. Staff will provide support and reassurance as needed. Safety checks maintained every 15 minutes.

## 2022-03-19 NOTE — PLAN OF CARE
[address] [place] [address]  Day 3 Interdisciplinary Treatment Plan NOTE    Review Date & Time: 3/19/2022 1312    Admission Type:   Admission Type: Voluntary    Reason for admission:     Estimated Length of Stay:  5-7 days  Estimated Discharge Date Update: to be determined by physician    PATIENT STRENGTHS:  Patient Strengths:Strengths: No significant Physical Illness,Social Skills  Patient Strengths and Limitations:Limitations: Apathetic / unmotivated,Inappropriate/potentially harmful leisure interests,Difficulty problem solving/relies on others to help solve problems,Difficult relationships / poor social skills  Addictive Behavior:Addictive Behavior  In the past 3 months, have you felt or has someone told you that you have a problem with:  : None  Do you have a history of Chemical Use?: No  Do you have a history of Alcohol Use?: No  Do you have a history of Street Drug Abuse?: Yes  Histroy of Prescripton Drug Abuse?: No  Medical Problems:  Past Medical History:   Diagnosis Date    [facility]ism ([email or web address]     Anxiety     Bipolar 1 disorder ([facility] [address]     Cocaine abuse ([facility]     Depression     Hypertension     Irregular heartbeat     Mild tetrahydrocannabinol (THC) abuse     Suicidal ideation        Risk:  Fall RiskTotal: 63  Joao Scale Joao Scale Score: 22  BVC    Change in scores:  No Changes to plan of Care:  No    Status EXAM:   Status and Exam  Normal: No  Facial Expression: Flat  Affect: Appropriate  Level of Consciousness: Alert  Mood:Normal: No  Mood: Depressed,Anxious  Motor Activity:Normal: Yes  Motor Activity: Decreased  Interview Behavior: Cooperative  Preception: [place] to Person,[place] to Time,[place] to Place,[place] to Situation  Attention:Normal: No  Attention: Distractible  Thought Processes: Circumstantial  Thought Content:Normal: No  Thought Content: Preoccupations  Hallucinations: None  Delusions: No  Memory:Normal: No  Memory: Poor Recent  Insight and Judgment: No  Insight and Judgment: Poor Judgment,Poor Insight  Present Suicidal Ideation: No  Present Homicidal Ideation: No    Daily Assessment Last Entry:   Daily Sleep (WDL): Within Defined Limits         Patient Currently in Pain: Denies  Daily Nutrition (WDL): Within Defined Limits    Patient Monitoring:  Frequency of Checks: 4 times per hour, close    Psychiatric Symptoms:   Depression Symptoms  Depression Symptoms: Loss of interest,Isolative  Anxiety Symptoms  Anxiety Symptoms: Generalized  [place] Symptoms  [place] Symptoms: No problems reported or observed. Psychosis Symptoms  Delusion Type: No problems reported or observed. Suicide Risk CSSR-S:  1) Within the past month, have you wished you were dead or wished you could go to sleep and not wake up? : Yes  2) Have you actually had any thoughts of killing yourself? : Yes  3) Have you been thinking about how you might kill yourself? : Yes  5) Have you started to work out or worked out the details of how to kill yourself?  Do you intend to carry out this plan? : No  6) Have you ever done anything, started to do anything, or prepared to do anything to end your life?: No  Change in Result:  Change in Plan of care:      EDUCATION:   Learner Progress Toward Treatment Goals: Reviewed results and recommendations of this team, Reviewed group plan and strategies, Reviewed signs, symptoms and risk of self harm and violent behavior, Reviewed goals and plan of care    Method:  small group, individual verbal education    Outcome: Verbalized by patient but needs reinforcement to obtain goals    PATIENT GOALS:  Short term:  Patient declined to participate  Long term:  Patient declined to participate    PLAN/TREATMENT RECOMMENDATIONS UPDATE:  continue with group therapies, increased socialization, continue planning for after discharge goals, continue with medication compliance    SHORT-TERM GOALS UPDATE:   Time frame for Short-Term Goals:  5-7 days    LONG-TERM GOALS UPDATE:   Time frame for Long-Term Goals:  6 months    Members Present in Team Meeting:   See signature sheet  Katherine Boggs, CTRS

## 2022-03-20 PROCEDURE — 99223 1ST HOSP IP/OBS HIGH 75: CPT | Performed by: INTERNAL MEDICINE

## 2022-03-20 PROCEDURE — 6370000000 HC RX 637 (ALT 250 FOR IP): Performed by: PSYCHIATRY & NEUROLOGY

## 2022-03-20 PROCEDURE — 1240000000 HC EMOTIONAL WELLNESS R&B

## 2022-03-20 PROCEDURE — 99232 SBSQ HOSP IP/OBS MODERATE 35: CPT | Performed by: PSYCHIATRY & NEUROLOGY

## 2022-03-20 PROCEDURE — APPSS30 APP SPLIT SHARED TIME 16-30 MINUTES: Performed by: NURSE PRACTITIONER

## 2022-03-20 RX ORDER — FLUOXETINE 10 MG/1
10 CAPSULE ORAL DAILY
Status: DISCONTINUED | OUTPATIENT
Start: 2022-03-21 | End: 2022-03-21

## 2022-03-20 RX ADMIN — ACETAMINOPHEN 650 MG: 325 TABLET, FILM COATED ORAL at 18:54

## 2022-03-20 RX ADMIN — CYCLOBENZAPRINE 10 MG: 10 TABLET, FILM COATED ORAL at 20:30

## 2022-03-20 RX ADMIN — TRAZODONE HYDROCHLORIDE 50 MG: 50 TABLET ORAL at 21:18

## 2022-03-20 RX ADMIN — HYDROXYZINE HYDROCHLORIDE 50 MG: 50 TABLET, FILM COATED ORAL at 20:30

## 2022-03-20 RX ADMIN — DICYCLOMINE HYDROCHLORIDE 20 MG: 10 CAPSULE ORAL at 20:30

## 2022-03-20 RX ADMIN — CLONIDINE HYDROCHLORIDE 0.1 MG: 0.1 TABLET ORAL at 20:30

## 2022-03-20 RX ADMIN — ONDANSETRON HYDROCHLORIDE 4 MG: 4 TABLET, FILM COATED ORAL at 20:30

## 2022-03-20 ASSESSMENT — PAIN SCALES - GENERAL
PAINLEVEL_OUTOF10: 7
PAINLEVEL_OUTOF10: 1
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
## 2022-03-20 NOTE — PROGRESS NOTES
Daily Progress Note  3/20/2022    Patient Name: Syed Chicas    CHIEF COMPLAINT:  Suicidal ideation          Emergency Medications: Has required opiate withdrawal medications Catapres 0.1 mg the previous evening at 8:30 PM for a COWS score of 5. Scheduled medications: No scheduled medications at this time    SUBJECTIVE:     Patient seen face-to-face for follow-up assessment. He is resting in bed. Reports less severe withdrawal symptoms today, but does still note chills and sweats. Also endorses anxiety, which he rates 4 out of 10, with 10 indicating the most severe. Required 0.1 mg of clonidine previous evening, but does feel that it was beneficial.  No pupillary dilation observed. Vitals within appropriate range. Patient states that he has had little energy or motivation to leave his room today. States that he feels down and depressed. Endorses some feelings of hopelessness. Does verbalize desire for AOD treatment post discharge. Explained that he will need to start making phone calls tomorrow to determine availability of bed status, and patient is agreeable. Patient does express desire for antidepressant to help with mood. States that even prior to withdrawal, he was feeling down and depressed and was having suicidal thoughts. He has tried Effexor in the past and felt that it was too stimulating and he constantly felt that he was on edge. He denies any problems with sleep typically, but does verbalize anxiety and depression almost daily. Patient does not feel he is able to contract for safety off unit. Significant concern that he will continue to use an overdose. He is denying perceptual disturbances or psychotic phenomena. Requires inpatient hospitalization for safety and stability.     Appetite:  [x] Normal/Adequate/Unchanged  [] Increased  [] Decreased      Sleep:       [] Normal/Adequate/Unchanged  [x] Fair  [] Poor      Group Attendance on Unit:   [] Yes  [] Selectively    [x] No    Medication Side Effects: denies         Mental Status Exam  Level of consciousness: Alert and awake   Appearance: Appropriate attire for setting, resting in bed, with poor grooming and hygiene   Behavior/Motor: Approachable, appears uncomfortable  Attitude toward examiner: Cooperative, attentive, fair eye contact  Speech: spontaneous and low productivity, good articulation  Mood: Patient reports \"hopeless\"  Affect: Flat  Thought processes: linear and goal directed   Thought content: Denies homicidal ideation  Suicidal Ideation: Endorses active suicidal ideation, unable to contract for safety off unit  Delusions: Denies  Perceptual Disturbance: Denies. Does not appear to be responding to internal stimuli. Cognition: Oriented to self, location, time, and situation  Memory: intact  Insight & Judgement: poor     Data   height is 5' 10\" (1.778 m) and weight is 175 lb (79.4 kg). His oral temperature is 98.1 °F (36.7 °C). His blood pressure is 129/84 and his pulse is 56. His respiration is 14 and oxygen saturation is 98%. Labs:   No visits with results within 2 Day(s) from this visit. Latest known visit with results is:   Admission on 03/17/2022, Discharged on 03/17/2022   Component Date Value Ref Range Status    Specimen Description 03/17/2022 . NASOPHARYNGEAL SWAB   Final    SARS-CoV-2, Rapid 03/17/2022 Not Detected  Not Detected Final    Comment:       Rapid NAAT:  The specimen is NEGATIVE for SARS-CoV-2, the novel coronavirus associated with   COVID-19. The ID NOW COVID-19 assay is designed to detect the virus that causes COVID-19 in patients   with signs and symptoms of infection who are suspected of COVID-19. An individual without symptoms of COVID-19 and who is not shedding SARS-CoV-2 virus would   expect to have a negative (not detected) result in this assay.   Negative results should be treated as presumptive and, if inconsistent with clinical signs   and symptoms or necessary for patient management,  should be tested with an alternative molecular assay. Negative results do not preclude   SARS-CoV-2 infection and   should not be used as the sole basis for patient management decisions.          Fact sheet for Healthcare Providers: Ken.es  Fact sheet for Patients: Ken.es          Methodology: Isothermal Nucleic Acid Amplification      Ethanol 03/17/2022 <10  <10 mg/dL Final    Ethanol percent 03/17/2022 <0.010  <0.010 % Final    WBC 03/17/2022 6.5  3.5 - 11.3 k/uL Final    RBC 03/17/2022 4.46  4.21 - 5.77 m/uL Final    Hemoglobin 03/17/2022 13.5  13.0 - 17.0 g/dL Final    Hematocrit 03/17/2022 42.1  40.7 - 50.3 % Final    MCV 03/17/2022 94.4  82.6 - 102.9 fL Final    MCH 03/17/2022 30.3  25.2 - 33.5 pg Final    MCHC 03/17/2022 32.1  28.4 - 34.8 g/dL Final    RDW 03/17/2022 12.4  11.8 - 14.4 % Final    Platelets 45/17/5597 406  138 - 453 k/uL Final    MPV 03/17/2022 8.2  8.1 - 13.5 fL Final    NRBC Automated 03/17/2022 0.0  0.0 per 100 WBC Final    Seg Neutrophils 03/17/2022 45  36 - 65 % Final    Lymphocytes 03/17/2022 42  24 - 43 % Final    Monocytes 03/17/2022 9  3 - 12 % Final    Eosinophils % 03/17/2022 3  1 - 4 % Final    Basophils 03/17/2022 1  0 - 2 % Final    Immature Granulocytes 03/17/2022 0  0 % Final    Segs Absolute 03/17/2022 2.99  1.50 - 8.10 k/uL Final    Absolute Lymph # 03/17/2022 2.74  1.10 - 3.70 k/uL Final    Absolute Mono # 03/17/2022 0.59  0.10 - 1.20 k/uL Final    Absolute Eos # 03/17/2022 0.17  0.00 - 0.44 k/uL Final    Basophils Absolute 03/17/2022 0.03  0.00 - 0.20 k/uL Final    Absolute Immature Granulocyte 03/17/2022 <0.03  0.00 - 0.30 k/uL Final    Glucose 03/17/2022 78  70 - 99 mg/dL Final    BUN 03/17/2022 11  6 - 20 mg/dL Final    CREATININE 03/17/2022 1.13  0.70 - 1.20 mg/dL Final    Calcium 03/17/2022 9.2  8.6 - 10.4 mg/dL Final    Sodium 03/17/2022 141  135 - 144 mmol/L Final    Potassium 03/17/2022 3.8  3.7 - 5.3 mmol/L Final    Chloride 03/17/2022 104  98 - 107 mmol/L Final    CO2 03/17/2022 27  20 - 31 mmol/L Final    Anion Gap 03/17/2022 10  9 - 17 mmol/L Final    Alkaline Phosphatase 03/17/2022 79  40 - 129 U/L Final    ALT 03/17/2022 12  5 - 41 U/L Final    AST 03/17/2022 19  <40 U/L Final    Total Bilirubin 03/17/2022 0.18* 0.3 - 1.2 mg/dL Final    Total Protein 03/17/2022 7.0  6.4 - 8.3 g/dL Final    Albumin 03/17/2022 4.1  3.5 - 5.2 g/dL Final    Albumin/Globulin Ratio 03/17/2022 1.4  1.0 - 2.5 Final    GFR Non- 03/17/2022 >60  >60 mL/min Final    GFR  03/17/2022 >60  >60 mL/min Final    GFR Comment 03/17/2022        Final    Comment: Average GFR for 30-36 years old:   80 mL/min/1.73sq m  Chronic Kidney Disease:   <60 mL/min/1.73sq m  Kidney failure:   <15 mL/min/1.73sq m              eGFR calculated using average adult body mass.  Additional eGFR calculator available at:        [employer]            Amphetamine Screen, Ur 03/17/2022 NEGATIVE  NEGATIVE Final    Comment:       (Positive cutoff 1000 ng/mL)                  Barbiturate Screen, Ur 03/17/2022 NEGATIVE  NEGATIVE Final    Comment:       (Positive cutoff 200 ng/mL)                  Benzodiazepine Screen, Urine 03/17/2022 NEGATIVE  NEGATIVE Final    Comment:       (Positive cutoff 200 ng/mL)                  Cocaine Metabolite, Urine 03/17/2022 POSITIVE* NEGATIVE Final    Comment:       (Positive cutoff 300 ng/mL)                  Methadone Screen, Urine 03/17/2022 NEGATIVE  NEGATIVE Final    Comment:       (Positive cutoff 300 ng/mL)                  Opiates, Urine 03/17/2022 NEGATIVE  NEGATIVE Final    Comment:       (Positive cutoff 300 ng/mL)                  Phencyclidine, Urine 03/17/2022 NEGATIVE  NEGATIVE Final    Comment:       (Positive cutoff 25 ng/mL)                  Cannabinoid Scrn, Ur 03/17/2022 POSITIVE* NEGATIVE Final    Comment:       (Positive cutoff 50 ng/mL)                  Oxycodone Screen, Ur 03/17/2022 NEGATIVE  NEGATIVE Final    Comment:       (Positive cutoff 100 ng/mL)                  Test Information 03/17/2022 Assay provides medical screening only. The absence of expected drug(s) and/or metabolite(s) may indicate diluted or adulterated urine, limitations of testing or timing of collection. Final    Comment: Testing for legal purposes should be confirmed by another method. To request confirmation   of test result, please call the lab within 7 days of sample submission. Reviewed patient's current plan of care and vital signs with nursing staff. Labs reviewed: [x] Yes  Last EKG in EMR reviewed: [x] Yes    Medications  Current Facility-Administered Medications: ondansetron (ZOFRAN) tablet 4 mg, 4 mg, Oral, Q8H PRN  cloNIDine (CATAPRES) tablet 0.1 mg, 0.1 mg, Oral, Q4H PRN  dicyclomine (BENTYL) capsule 20 mg, 20 mg, Oral, 4x Daily PRN  cyclobenzaprine (FLEXERIL) tablet 10 mg, 10 mg, Oral, TID PRN  acetaminophen (TYLENOL) tablet 650 mg, 650 mg, Oral, Q6H PRN  ibuprofen (ADVIL;MOTRIN) tablet 400 mg, 400 mg, Oral, Q6H PRN  hydrOXYzine (ATARAX) tablet 50 mg, 50 mg, Oral, TID PRN  traZODone (DESYREL) tablet 50 mg, 50 mg, Oral, Nightly PRN  polyethylene glycol (GLYCOLAX) packet 17 g, 17 g, Oral, Daily PRN  aluminum & magnesium hydroxide-simethicone (MAALOX) 200-200-20 MG/5ML suspension 30 mL, 30 mL, Oral, Q6H PRN  nicotine polacrilex (NICORETTE) gum 2 mg, 2 mg, Oral, Q2H PRN    ASSESSMENT  Severe recurrent major depression without psychotic features (HCC)    Opiate use disorder  Cocaine use disorder  Marijuana use disorder         HANDOFF  Patient symptoms are: remain unstable  Medications as determined by attending physician  Consider starting sertraline 25 mg to help with mood  Encourage participation in groups and milieu.   Probable discharge is to be determined by MD      Electronically signed by ROXANNE Posada CNP on 3/20/2022 at 5:21 PM    **This report has been created using voice recognition software. It may contain minor errors which are inherent in voice recognition technology. **    I independently saw and evaluated the patient. I reviewed the nurse practitioners documentation above. Any additional comments or changes to the nurse practitioners documentation are stated below otherwise agree with assessment. Plan will be as follows:  Patient reporting improvement in his withdrawal symptoms. Finally eating and drinking. Still reporting depression. Discussed medications now that he is able to tolerate oral intake and he is agreeable to Prozac. Requested not to be on Effexor as he did not tolerate that well in the past.  Working on inpatient substance use rehab programming  PLAN  Patient s symptoms   are improving  Add Prozac 10 mg daily  Attempt to develop insight  Psycho-education conducted. Supportive Therapy conducted.   Probable discharge is 2 to 3 days  Follow-up daily while on inpatient unit

## 2022-03-20 NOTE — GROUP NOTE
Group Therapy Note    Date: 3/20/2022    Group Start Time: 1330  Group End Time: 1430  Group Topic: Recreational    [address] [place] [address], [facility]    Pt did not attend recreational therapy group at 1330 d/t resting in room despite staff invitation to attend. 1:1 talk time offered as alternative to group session.      Signature:  Marisela Hudson, [address]

## 2022-03-20 NOTE — PLAN OF CARE
Problem: Altered Mood, Depressive Behavior:  Goal: Ability to disclose and discuss suicidal ideas will improve  Description: Ability to disclose and discuss suicidal ideas will improve  3/20/2022 1138 by Harini Frost RN  Outcome: Met This Shift  Note: Fleetting suicidal ideations, no plan, does feel safe on the unit and can maintain his own safety  Problem: Altered Mood, Depressive Behavior:  Goal: Able to verbalize acceptance of life and situations over which he or she has no control  Description: Able to verbalize acceptance of life and situations over which he or she has no control  3/20/2022 1138 by Harini Frost RN  Outcome: Ongoing  Note: Patient accepting of hospital stay, feeling much better, only has minor withdrawal symptoms currently, pleasant and cooperative    Problem: Tobacco Use:  Goal: Inpatient tobacco use cessation counseling participation  Description: Inpatient tobacco use cessation counseling participation  3/20/2022 1138 by Harini Frost RN  Outcome: Ongoing    Problem: Pain:  Goal: Control of chronic pain  Description: Control of chronic pain  3/20/2022 1138 by Harini Frost RN  Outcome: Ongoing

## 2022-03-20 NOTE — BH NOTE
Patient from 121 entered patient's room. Staff intervened and escorted the patient from [facility] out of [facility] room. Staff asked patient if he was okay and if anything had happened. Karen Oliver reported that he was fine and was just thrown off because he was sound asleep and was woken up to a large women sitting on his bed staring at him. Karen Melody reported that he was fine.

## 2022-03-21 ENCOUNTER — APPOINTMENT (OUTPATIENT)
Dept: GENERAL RADIOLOGY | Age: 32
DRG: 753 | End: 2022-03-21
Attending: PSYCHIATRY & NEUROLOGY
Payer: MEDICARE

## 2022-03-21 PROCEDURE — 70100 X-RAY EXAM OF JAW <4VIEWS: CPT

## 2022-03-21 PROCEDURE — 1240000000 HC EMOTIONAL WELLNESS R&B

## 2022-03-21 PROCEDURE — 6370000000 HC RX 637 (ALT 250 FOR IP): Performed by: PSYCHIATRY & NEUROLOGY

## 2022-03-21 PROCEDURE — APPSS30 APP SPLIT SHARED TIME 16-30 MINUTES: Performed by: NURSE PRACTITIONER

## 2022-03-21 PROCEDURE — 99232 SBSQ HOSP IP/OBS MODERATE 35: CPT | Performed by: PSYCHIATRY & NEUROLOGY

## 2022-03-21 PROCEDURE — 99232 SBSQ HOSP IP/OBS MODERATE 35: CPT | Performed by: INTERNAL MEDICINE

## 2022-03-21 RX ORDER — FLUOXETINE HYDROCHLORIDE 20 MG/1
20 CAPSULE ORAL DAILY
Status: DISCONTINUED | OUTPATIENT
Start: 2022-03-22 | End: 2022-03-22 | Stop reason: HOSPADM

## 2022-03-21 RX ADMIN — TRAZODONE HYDROCHLORIDE 50 MG: 50 TABLET ORAL at 20:36

## 2022-03-21 RX ADMIN — FLUOXETINE 10 MG: 10 CAPSULE ORAL at 08:22

## 2022-03-21 RX ADMIN — HYDROXYZINE HYDROCHLORIDE 50 MG: 50 TABLET, FILM COATED ORAL at 20:36

## 2022-03-21 ASSESSMENT — PAIN SCALES - GENERAL: PAINLEVEL_OUTOF10: 0

## 2022-03-21 NOTE — PROGRESS NOTES
Daily Progress Note  3/21/2022    Patient Name: Tedd Kocher    CHIEF COMPLAINT:  Suicidal ideation          Emergency Medications: Has required opiate withdrawal medications Catapres 0.1 mg the previous evening at 8:30 PM for a COWS score of 7. Scheduled medications: No scheduled medications at this time    SUBJECTIVE:     Patient seen face-to-face for follow-up assessment. He is resting in bed. Less engaged in assessment today compared to the previous 2 days. He is irritable. Reports continued opiate withdrawal symptoms. States that he feels uncomfortable. Agitated throughout discussion. Reviewed vitals. Within appropriate range. No pupillary dilation observed. Does verbalize desire for AOD treatment. Social work has provided patient a list of different facilities in the area. Encouraged patient to start making phone calls today. Patient was started on fluoxetine 10 mg yesterday and is now received 1 dose. Reviewed side effects and patient denies all. He states that he slept poorly overnight and is feeling tired and fatigued throughout the day today. Reports feeling down and depressed, with feelings of hopelessness and helplessness. States he has continued suicidal ideation and is unable to contract for safety off unit. Explored for plan, patient states he fears he may overdose if he were not on the unit. Rates depression as 8 out of 10, with 10 indicating the most severe. Reports increased anxiety within the past 24 hours, but states it is less intense at this time. Patient has yet to demonstrate stability and requires inpatient hospitalization for safety.     Appetite:  [x] Normal/Adequate/Unchanged  [] Increased  [] Decreased      Sleep:       [] Normal/Adequate/Unchanged  [x] Fair  [] Poor      Group Attendance on Unit:   [] Yes  [] Selectively    [x] No    Medication Side Effects: denies         Mental Status Exam  Level of consciousness: Alert and awake   Appearance: Appropriate attire for setting, resting in bed, with poor grooming and hygiene, appears disheveled  Behavior/Motor: Approachable, appears uncomfortable  Attitude toward examiner: Semicooperative, irritable, no eye contact  Speech: Increased latency, irritable tone, poor articulation  Mood: Patient reports \"hopeless\"  Affect: Flat   Thought processes: linear and goal directed   Thought content: Denies homicidal ideation  Suicidal Ideation: Endorses active suicidal ideation, unable to contract for safety off unit  Delusions: Denies  Perceptual Disturbance: Denies. Does not appear to be responding to internal stimuli. Cognition: Oriented to self, location, time, and situation  Memory: intact  Insight & Judgement: poor     Data   height is 5' 10\" (1.778 m) and weight is 175 lb (79.4 kg). His oral temperature is 98.2 °F (36.8 °C). His blood pressure is 123/72 and his pulse is 55. His respiration is 14 and oxygen saturation is 98%. Labs:   No visits with results within 2 Day(s) from this visit. Latest known visit with results is:   Admission on 03/17/2022, Discharged on 03/17/2022   Component Date Value Ref Range Status    Specimen Description 03/17/2022 . NASOPHARYNGEAL SWAB   Final    SARS-CoV-2, Rapid 03/17/2022 Not Detected  Not Detected Final    Comment:       Rapid NAAT:  The specimen is NEGATIVE for SARS-CoV-2, the novel coronavirus associated with   COVID-19. The ID NOW COVID-19 assay is designed to detect the virus that causes COVID-19 in patients   with signs and symptoms of infection who are suspected of COVID-19. An individual without symptoms of COVID-19 and who is not shedding SARS-CoV-2 virus would   expect to have a negative (not detected) result in this assay. Negative results should be treated as presumptive and, if inconsistent with clinical signs   and symptoms or necessary for patient management,  should be tested with an alternative molecular assay.  Negative results do not preclude   SARS-CoV-2 infection and   should not be used as the sole basis for patient management decisions.          Fact sheet for Healthcare Providers: [employer]  Fact sheet for Patients: [employer]          Methodology: Isothermal Nucleic Acid Amplification      Ethanol 03/17/2022 <10  <10 mg/dL Final    Ethanol percent 03/17/2022 <0.010  <0.010 % Final    WBC 03/17/2022 6.5  3.5 - 11.3 k/uL Final    RBC 03/17/2022 4.46  4.21 - 5.77 m/uL Final    Hemoglobin 03/17/2022 13.5  13.0 - 17.0 g/dL Final    Hematocrit 03/17/2022 42.1  40.7 - 50.3 % Final    MCV 03/17/2022 94.4  82.6 - 102.9 fL Final    MCH 03/17/2022 30.3  25.2 - 33.5 pg Final    MCHC 03/17/2022 32.1  28.4 - 34.8 g/dL Final    RDW 03/17/2022 12.4  11.8 - 14.4 % Final    Platelets 22/67/5039 406  138 - 453 k/uL Final    MPV 03/17/2022 8.2  8.1 - 13.5 fL Final    NRBC Automated 03/17/2022 0.0  0.0 per 100 WBC Final    Seg Neutrophils 03/17/2022 45  36 - 65 % Final    Lymphocytes 03/17/2022 42  24 - 43 % Final    Monocytes 03/17/2022 9  3 - 12 % Final    Eosinophils % 03/17/2022 3  1 - 4 % Final    Basophils 03/17/2022 1  0 - 2 % Final    Immature Granulocytes 03/17/2022 0  0 % Final    Segs Absolute 03/17/2022 2.99  1.50 - 8.10 k/uL Final    Absolute Lymph # 03/17/2022 2.74  1.10 - 3.70 k/uL Final    Absolute Mono # 03/17/2022 0.59  0.10 - 1.20 k/uL Final    Absolute Eos # 03/17/2022 0.17  0.00 - 0.44 k/uL Final    Basophils Absolute 03/17/2022 0.03  0.00 - 0.20 k/uL Final    Absolute Immature Granulocyte 03/17/2022 <0.03  0.00 - 0.30 k/uL Final    Glucose 03/17/2022 78  70 - 99 mg/dL Final    BUN 03/17/2022 11  6 - 20 mg/dL Final    CREATININE 03/17/2022 1.13  0.70 - 1.20 mg/dL Final    Calcium 03/17/2022 9.2  8.6 - 10.4 mg/dL Final    Sodium 03/17/2022 141  135 - 144 mmol/L Final    Potassium 03/17/2022 3.8  3.7 - 5.3 mmol/L Final    Chloride 03/17/2022 104  98 - 107 mmol/L Final  CO2 03/17/2022 27  20 - 31 mmol/L Final    Anion Gap 03/17/2022 10  9 - 17 mmol/L Final    Alkaline Phosphatase 03/17/2022 79  40 - 129 U/L Final    ALT 03/17/2022 12  5 - 41 U/L Final    AST 03/17/2022 19  <40 U/L Final    Total Bilirubin 03/17/2022 0.18* 0.3 - 1.2 mg/dL Final    Total Protein 03/17/2022 7.0  6.4 - 8.3 g/dL Final    Albumin 03/17/2022 4.1  3.5 - 5.2 g/dL Final    Albumin/Globulin Ratio 03/17/2022 1.4  1.0 - 2.5 Final    GFR Non- 03/17/2022 >60  >60 mL/min Final    GFR  03/17/2022 >60  >60 mL/min Final    GFR Comment 03/17/2022        Final    Comment: Average GFR for 30-36 years old:   80 mL/min/1.73sq m  Chronic Kidney Disease:   <60 mL/min/1.73sq m  Kidney failure:   <15 mL/min/1.73sq m              eGFR calculated using average adult body mass.  Additional eGFR calculator available at:        [employer]            Amphetamine Screen, Ur 03/17/2022 NEGATIVE  NEGATIVE Final    Comment:       (Positive cutoff 1000 ng/mL)                  Barbiturate Screen, Ur 03/17/2022 NEGATIVE  NEGATIVE Final    Comment:       (Positive cutoff 200 ng/mL)                  Benzodiazepine Screen, Urine 03/17/2022 NEGATIVE  NEGATIVE Final    Comment:       (Positive cutoff 200 ng/mL)                  Cocaine Metabolite, Urine 03/17/2022 POSITIVE* NEGATIVE Final    Comment:       (Positive cutoff 300 ng/mL)                  Methadone Screen, Urine 03/17/2022 NEGATIVE  NEGATIVE Final    Comment:       (Positive cutoff 300 ng/mL)                  Opiates, Urine 03/17/2022 NEGATIVE  NEGATIVE Final    Comment:       (Positive cutoff 300 ng/mL)                  Phencyclidine, Urine 03/17/2022 NEGATIVE  NEGATIVE Final    Comment:       (Positive cutoff 25 ng/mL)                  Cannabinoid Scrn, Ur 03/17/2022 POSITIVE* NEGATIVE Final    Comment:       (Positive cutoff 50 ng/mL)                  Oxycodone Screen, Ur 03/17/2022 NEGATIVE  NEGATIVE Final    Comment:       (Positive cutoff 100 ng/mL)                  Test Information 03/17/2022 Assay provides medical screening only. The absence of expected drug(s) and/or metabolite(s) may indicate diluted or adulterated urine, limitations of testing or timing of collection. Final    Comment: Testing for legal purposes should be confirmed by another method. To request confirmation   of test result, please call the lab within 7 days of sample submission. Reviewed patient's current plan of care and vital signs with nursing staff. Labs reviewed: [x] Yes  Last EKG in EMR reviewed: [x] Yes    Medications  Current Facility-Administered Medications: FLUoxetine (PROZAC) capsule 10 mg, 10 mg, Oral, Daily  ondansetron (ZOFRAN) tablet 4 mg, 4 mg, Oral, Q8H PRN  cloNIDine (CATAPRES) tablet 0.1 mg, 0.1 mg, Oral, Q4H PRN  dicyclomine (BENTYL) capsule 20 mg, 20 mg, Oral, 4x Daily PRN  cyclobenzaprine (FLEXERIL) tablet 10 mg, 10 mg, Oral, TID PRN  acetaminophen (TYLENOL) tablet 650 mg, 650 mg, Oral, Q6H PRN  ibuprofen (ADVIL;MOTRIN) tablet 400 mg, 400 mg, Oral, Q6H PRN  hydrOXYzine (ATARAX) tablet 50 mg, 50 mg, Oral, TID PRN  traZODone (DESYREL) tablet 50 mg, 50 mg, Oral, Nightly PRN  polyethylene glycol (GLYCOLAX) packet 17 g, 17 g, Oral, Daily PRN  aluminum & magnesium hydroxide-simethicone (MAALOX) 200-200-20 MG/5ML suspension 30 mL, 30 mL, Oral, Q6H PRN  nicotine polacrilex (NICORETTE) gum 2 mg, 2 mg, Oral, Q2H PRN    ASSESSMENT  Severe recurrent major depression without psychotic features ([facility]    Opiate use disorder  Cocaine use disorder  Marijuana use disorder         HANDOFF  Patient symptoms are: remain unstable  Medications as determined by attending physician  Encourage participation in groups and milieu.   Social work assisting with AOD treatment placement  Probable discharge is to be determined by MD      Electronically signed by ROXANNE Posada CNP on 3/21/2022 at 4:52 PM    **This report has been created using voice recognition software. It may contain minor errors which are inherent in voice recognition technology. **    I independently saw and evaluated the patient. I reviewed the nurse practitioners documentation above. Any additional comments or changes to the nurse practitioners documentation are stated below otherwise agree with assessment. Plan will be as follows:  Patient finally able to get out of bed and begin engaging in his own care and planning. He is trying to get into programming. Does not feel safe where he did discharge without addressing his substance use problems. Denying side effects to the fluoxetine. Likely need 2 to 3 days for discharge stabilization as patient symptoms have just improved enough to begin actively engaging in his discharge planning. PLAN  Patient s symptoms   are improving  Increase Prozac to 20 mg daily  Attempt to develop insight  Psycho-education conducted. Supportive Therapy conducted.   Probable discharge is 2 to 3 days  Follow-up daily while on inpatient unit

## 2022-03-21 NOTE — GROUP NOTE
Group Therapy Note    Date: 3/21/2022    Group Start Time: 1000  Group End Time: 8798  Group Topic: Group Therapy    [facility]    DANTE Coleman        Group Therapy Note  Pt declined to attend psychotherapy at 1000 am despite encouragement. Pt offered 1:1 and refused.     Attendees: 3/15               Signature:  DANTE Coleman

## 2022-03-21 NOTE — CARE COORDINATION
KISHAN spoke with pt regarding inpatient treatment. SW supplied pt with the green AoD resource folder and encouraged pt to place calls to places that he maybe interested in.

## 2022-03-21 NOTE — GROUP NOTE
Group Therapy Note    Date: 3/21/2022    Group Start Time: 5344  Group End Time: 1430  Group Topic: Music Therapy    JEREMIAH Santamaria Fruit        Group Therapy Note    Pt did not attend music therapy group d/t resting in room despite staff invitation to attend. 1:1 talk time offered as alternative to group session, pt declined.

## 2022-03-21 NOTE — PLAN OF CARE
Problem: Altered Mood, Depressive Behavior:  Goal: Able to verbalize acceptance of life and situations over which he or she has no control  Description: Able to verbalize acceptance of life and situations over which he or she has no control  3/21/2022 1956 by Marcellus Khan RN  Outcome: Ongoing  Note: Patient did not want to talk about this at this time. Problem: Altered Mood, Depressive Behavior:  Goal: Ability to disclose and discuss suicidal ideas will improve  Description: Ability to disclose and discuss suicidal ideas will improve  3/21/2022 1956 by Marcellus Khan RN  Outcome: Ongoing  Note: Patient denies any thoughts to suicidal ideations and no signs of self harm were noted. Patient encouraged to inform staff if he starts to develop any thoughts to harm self. Patient voiced understanding. Problem: Coping - Ineffective, Individual:  Goal: Ability to identify and develop effective coping behavior will improve  Description: Ability to identify and develop effective coping behavior will improve  3/21/2022 1956 by Marcellus Khan RN  Outcome: Ongoing  Note: Patient was watching television as a coping skill. Pt encouraged to attend groups to learn more coping skills. Pt voiced understanding. Problem: Tobacco Use:  Goal: Inpatient tobacco use cessation counseling participation  Description: Inpatient tobacco use cessation counseling participation  3/21/2022 1956 by Marcellus Khan RN  Outcome: Ongoing  Note: Pt reports no desire to quit smoking at this time. Problem: Pain:  Goal: Pain level will decrease  Description: Pain level will decrease  3/21/2022 1956 by Marcellus Khan RN  Outcome: Ongoing  Note: Patient denies any pain currently. Patient encouraged to inform staff if he develops any pain. Patient voiced understanding.        Problem: Pain:  Goal: Control of acute pain  Description: Control of acute pain  3/21/2022 1956 by Marcellus Khan RN  Outcome: Ongoing  Note: Patient denies any acute pain currently. Patient encouraged to inform staff if he develops any pain. Patient voiced understanding. Problem: Pain:  Goal: Control of chronic pain  Description: Control of chronic pain  3/21/2022 1956 by Gen Arce RN  Outcome: Ongoing  Note: Patient denies any chronic pain currently. Patient encouraged to inform staff if he develops any pain. Patient voiced understanding.

## 2022-03-21 NOTE — BH NOTE
Patient is complaining of anxiety at this time. Stating that they feel restless and are having trouble calming down in order to rest this evening. Medication was given as prescribed for increased anxiety see MAR.
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
## 2022-03-21 NOTE — PLAN OF CARE
Problem: Altered Mood, Depressive Behavior:  Goal: Able to verbalize acceptance of life and situations over which he or she has no control  Description: Able to verbalize acceptance of life and situations over which he or she has no control  3/21/2022 0104 by Rayshawn Estrada RN  Outcome: Ongoing     Problem: Altered Mood, Depressive Behavior:  Goal: Ability to disclose and discuss suicidal ideas will improve  Description: Ability to disclose and discuss suicidal ideas will improve  3/21/2022 0104 by Rayshawn Estrada RN  Outcome: Ongoing  Note:  Pt denies suicidal ideations at this time. Pt agreed to seek staff at anytime he/she felt like any urges to harm self would arise. Safety checks maintained [id number]. Problem: Coping - Ineffective, Individual:  Goal: Ability to identify and develop effective coping behavior will improve  Description: Ability to identify and develop effective coping behavior will improve  Outcome: Ongoing     Problem: Tobacco Use:  Goal: Inpatient tobacco use cessation counseling participation  Description: Inpatient tobacco use cessation counseling participation  3/21/2022 0104 by Rayshawn Estrada RN  Outcome: Ongoing     Problem: Pain:  Goal: Pain level will decrease  Description: Pain level will decrease  Outcome: Ongoing  Note: Patient reports no new pain, or increase in chronic pain. Will continue to monitor and provide as needed pain medication.        Problem: Pain:  Goal: Control of acute pain  Description: Control of acute pain  Outcome: Ongoing     Problem: Pain:  Goal: Control of chronic pain  Description: Control of chronic pain  3/21/2022 0104 by Rayshawn Estrada RN  Outcome: Ongoing

## 2022-03-21 NOTE — BH NOTE
As needed medication follow-up note:    As needed medication of Atarax 50 mg po was effective as evidence by stating they are feeling better and able to relax in bed. Patient denies medication side effects. Will continue to monitor and provide support as needed.

## 2022-03-22 VITALS
BODY MASS INDEX: 25.05 KG/M2 | WEIGHT: 175 LBS | RESPIRATION RATE: 14 BRPM | DIASTOLIC BLOOD PRESSURE: 74 MMHG | HEIGHT: 70 IN | TEMPERATURE: 98.8 F | HEART RATE: 68 BPM | SYSTOLIC BLOOD PRESSURE: 138 MMHG | OXYGEN SATURATION: 98 %

## 2022-03-22 PROCEDURE — 99239 HOSP IP/OBS DSCHRG MGMT >30: CPT | Performed by: PSYCHIATRY & NEUROLOGY

## 2022-03-22 PROCEDURE — 6370000000 HC RX 637 (ALT 250 FOR IP): Performed by: PSYCHIATRY & NEUROLOGY

## 2022-03-22 RX ORDER — FLUOXETINE HYDROCHLORIDE 20 MG/1
20 CAPSULE ORAL DAILY
Qty: 30 CAPSULE | Refills: 0 | Status: ON HOLD | OUTPATIENT
Start: 2022-03-22 | End: 2022-08-10

## 2022-03-22 RX ORDER — HYDROXYZINE 50 MG/1
50 TABLET, FILM COATED ORAL 3 TIMES DAILY PRN
Qty: 30 TABLET | Refills: 0 | Status: SHIPPED | OUTPATIENT
Start: 2022-03-22 | End: 2022-04-01

## 2022-03-22 RX ORDER — TRAZODONE HYDROCHLORIDE 50 MG/1
50 TABLET ORAL NIGHTLY PRN
Qty: 30 TABLET | Refills: 0 | Status: ON HOLD | OUTPATIENT
Start: 2022-03-22 | End: 2022-08-10

## 2022-03-22 RX ADMIN — FLUOXETINE 20 MG: 20 CAPSULE ORAL at 08:05

## 2022-03-22 RX ADMIN — ACETAMINOPHEN 650 MG: 325 TABLET, FILM COATED ORAL at 09:58

## 2022-03-22 ASSESSMENT — PAIN SCALES - GENERAL: PAINLEVEL_OUTOF10: 3

## 2022-03-22 NOTE — BH NOTE
Patient given tobacco quitline number 18680044957 at this time, refusing to call at this time, states \" I just dont want to quit now\"- patient given information as to the dangers of long term tobacco use. Continue to reinforce the importance of tobacco cessation.

## 2022-03-22 NOTE — GROUP NOTE
Group Therapy Note    Date: 3/22/2022    Group Start Time: 0900  Group End Time: 0930  Group Topic: Community Meeting    JEREMIAH Bennett        Group Therapy Note    Attendees: 3         Patient's Goal:  To stay sober      Status After Intervention:  Improved    Participation Level:  Active Listener    Participation Quality: Appropriate      Speech:  normal      Thought Process/Content: Logical      Affective Functioning: Flat      Mood: euthymic      Level of consciousness:  Alert and Oriented x4      Response to Learning: Able to verbalize current knowledge/experience      Endings: None Reported    Modes of Intervention: Education      Discipline Responsible: [facility] [address] [facility] [employer]      Signature:  Tita De Guzman
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
## 2022-03-22 NOTE — BH NOTE
[facility]  Discharge Note    Pt discharged with followings belongings:       Patient discharged to his private residence via cab. Valuables sent home with patient or returned to patient. Patient education on aftercare instructions: yes, and voiced understanding. Information faxed to [facility] by staff  at 1:59 PM .Patient verbalize understanding of AVS:  Yes, by read back method.     Status EXAM upon discharge:  Status and Exam  Normal: Yes  Facial Expression: Brightened  Affect: Appropriate  Level of Consciousness: Alert  Mood:Normal: No  Mood: Anxious  Motor Activity:Normal: No  Motor Activity: Decreased  Interview Behavior: Cooperative  Preception: [place] to Person,[place] to Time,[place] to Place,[place] to Situation  Attention:Normal: No  Attention: Distractible  Thought Processes: Circumstantial  Thought Content:Normal: No  Thought Content: Preoccupations  Hallucinations: None  Delusions: No  Memory:Normal: Yes  Memory: Poor Recent,Poor Remote  Insight and Judgment: Yes  Insight and Judgment: Poor Judgment,Poor Insight  Present Suicidal Ideation: No  Present Homicidal Ideation: No      Metabolic Screening:    Lab Results   Component Value Date    LABA1C 5.6 06/18/2020       Lab Results   Component Value Date    CHOL 187 06/18/2020    CHOL 194 04/16/2018     Lab Results   Component Value Date    TRIG 98 06/18/2020    TRIG 120 04/16/2018     Lab Results   Component Value Date    HDL 50 06/18/2020    HDL 44 04/16/2018     No components found for: LDLCAL  No results found for: LABVLDL    Berry Guy LPN

## 2022-03-22 NOTE — CARE COORDINATION
Name: Kathie Arias    : 1990    Discharge Date: 3/22/22    Primary Auth/Cert #: [id number]    Destination: home     Discharge Medications:      Medication List      START taking these medications    FLUoxetine 20 MG capsule  Commonly known as: PROZAC  Take 1 capsule by mouth daily  Notes to patient: To help with depression     hydrOXYzine 50 MG tablet  Commonly known as: ATARAX  Take 1 tablet by mouth 3 times daily as needed for Anxiety  Notes to patient: To help with anxiety        CONTINUE taking these medications    traZODone 50 MG tablet  Commonly known as: DESYREL  Take 1 tablet by mouth nightly as needed for Sleep  Notes to patient:  To help promote sleep as needed        STOP taking these medications    venlafaxine 75 MG extended release capsule  Commonly known as: EFFEXOR XR           Where to Get Your Medications      These medications were sent to [facility]  [address] [place] [address]    Phone: [phone]   · FLUoxetine 20 MG capsule  · hydrOXYzine 50 MG tablet  · traZODone 50 MG tablet         Follow Up Appointment: [address]  [address] [facility] [phone]  On 3/28/2022  Pt has appointment at 12:30 pm

## 2022-03-22 NOTE — PLAN OF CARE
Problem: Altered Mood, Depressive Behavior:  Goal: Able to verbalize acceptance of life and situations over which he or she has no control  Description: Able to verbalize acceptance of life and situations over which he or she has no control  3/22/2022 0719 by Claudean Biles Durrani  Outcome: Ongoing     Problem: Altered Mood, Depressive Behavior:  Goal: Ability to disclose and discuss suicidal ideas will improve  Description: Ability to disclose and discuss suicidal ideas will improve  3/22/2022 0719 by Claudean Biles Durrani  Outcome: Ongoing

## 2022-03-22 NOTE — PROGRESS NOTES
CLINICAL PHARMACY NOTE: MEDS TO BEDS    Total # of Prescriptions Filled: 3   The following medications were delivered to the patient:  · Hydroxyzine  · Trazodone  · Fluoxetine    Additional Documentation:

## 2022-03-31 ENCOUNTER — HOSPITAL ENCOUNTER (EMERGENCY)
Age: 32
Discharge: HOME | End: 2022-03-31
Attending: EMERGENCY MEDICINE
Payer: MEDICARE

## 2022-03-31 VITALS
SYSTOLIC BLOOD PRESSURE: 139 MMHG | RESPIRATION RATE: 16 BRPM | WEIGHT: 180 LBS | TEMPERATURE: 98.4 F | HEART RATE: 89 BPM | BODY MASS INDEX: 25.77 KG/M2 | OXYGEN SATURATION: 98 % | DIASTOLIC BLOOD PRESSURE: 86 MMHG | HEIGHT: 70 IN

## 2022-03-31 DIAGNOSIS — R76.8 HEPATITIS C ANTIBODY POSITIVE IN BLOOD: Primary | ICD-10-CM

## 2022-03-31 PROCEDURE — 99283 EMERGENCY DEPT VISIT LOW MDM: CPT

## 2022-03-31 ASSESSMENT — ENCOUNTER SYMPTOMS
DIARRHEA: 0
EYE REDNESS: 0
SHORTNESS OF BREATH: 0
COUGH: 0
COLOR CHANGE: 0
VOMITING: 0
CONSTIPATION: 0
ABDOMINAL PAIN: 0
EYE DISCHARGE: 0
FACIAL SWELLING: 0

## 2022-03-31 NOTE — ED PROVIDER NOTES
[address] [place] [address] ED  EMERGENCY DEPARTMENT ENCOUNTER      Pt Name: Meche Banks  MRN: [id number]  [place] 1990  Date of evaluation: 3/31/2022  Provider: Bee Boone MD    CHIEF COMPLAINT       Chief Complaint   Patient presents with    Abnormal Lab     pt tested + for hep C         HISTORY OF PRESENT ILLNESS  (Location/Symptom, Timing/Onset, Context/Setting, Quality, Duration, Modifying Factors, Severity.)   Meche Banks is a 27 y.o. male who presents to the emergency department he tested positive for hepatitis C. He has no symptoms at all. He tested positive when he was tested for plasma donation. He has donated previously and never had a positive test.  He is not at all worried about other STDs and has no penile drainage. Nursing Notes were reviewed.     ALLERGIES     Vicodin [hydrocodone-acetaminophen]    CURRENT MEDICATIONS       Previous Medications    FLUOXETINE (PROZAC) 20 MG CAPSULE    Take 1 capsule by mouth daily    HYDROXYZINE (ATARAX) 50 MG TABLET    Take 1 tablet by mouth 3 times daily as needed for Anxiety    TRAZODONE (DESYREL) 50 MG TABLET    Take 1 tablet by mouth nightly as needed for Sleep       PAST MEDICAL HISTORY         Diagnosis Date    [facility]ism [facility]     Anxiety     Bipolar 1 disorder ([facility]     Cocaine abuse ([facility] [facility]     Depression     Hepatitis C antibody test positive     Hypertension     Irregular heartbeat     Mild tetrahydrocannabinol (THC) abuse     Suicidal ideation        SURGICAL HISTORY           Procedure Laterality Date    DIALYSIS FISTULA CREATION Right 6/4/2017    RIGHT WRIST WOUND EXPLORATION WITH ARTERIAL REPAIR ULNAR AND RADIAL ARTERIES RIGHT WRIST performed by Vanesa Bah MD at [place] [id number] Right 6/4/2017    WOUND EXPLORATION AND/OR REVISION performed by Vanesa Bah MD at [address] [place] [address] Rd Left     OTHER SURGICAL HISTORY Right 06/04/2017    exp and repair of unlar and radial artery with exp and repair of 12 tendons and 2 nerves         FAMILY HISTORY           Problem Relation Age of Onset    Heart Disease Father 52    Asthma Brother     Hypertension Maternal Grandmother     Lung Cancer Maternal Grandmother         Kidney cancer, liver cancer    Stroke Maternal Grandmother     Heart Disease Mother 46         of presumed heart disease in her sleep     Family Status   Relation Name Status    Father      Brother  (Not Specified)    MGM  (Not Specified)    Mother          SOCIAL HISTORY      reports that he has been smoking cigarettes. He has a 7.00 pack-year smoking history. He has never used smokeless tobacco. He reports previous alcohol use. He reports current drug use. Drugs: Marijuana (Weed) and Cocaine. REVIEW OF SYSTEMS    (2-9 systems for level 4, 10 or more for level 5)     Review of Systems   Constitutional: Negative for chills, fatigue and fever. HENT: Negative for congestion, ear discharge and facial swelling. Eyes: Negative for discharge and redness. Respiratory: Negative for cough and shortness of breath. Cardiovascular: Negative for chest pain. Gastrointestinal: Negative for abdominal pain, constipation, diarrhea and vomiting. Genitourinary: Negative for dysuria and hematuria. Musculoskeletal: Negative for arthralgias. Skin: Negative for color change and rash. Neurological: Negative for syncope, numbness and headaches. Hematological: Negative for adenopathy. Psychiatric/Behavioral: Negative for confusion. The patient is not nervous/anxious. Except as noted above the remainder of the review of systems was reviewed and negative. PHYSICAL EXAM    (up to 7 for level 4, 8 or more for level 5)     There were no vitals filed for this visit. Physical Exam  Vitals reviewed. Constitutional:       General: He is not in acute distress. Appearance: He is well-developed. He is not diaphoretic.    HENT:      Head: Normocephalic and atraumatic. Eyes:      General: No scleral icterus. Right eye: No discharge. Left eye: No discharge. Cardiovascular:      Rate and Rhythm: Normal rate and regular rhythm. Pulmonary:      Effort: Pulmonary effort is normal. No respiratory distress. Breath sounds: Normal breath sounds. No stridor. No wheezing or rales. Abdominal:      General: There is no distension. Palpations: Abdomen is soft. Tenderness: There is no abdominal tenderness. Musculoskeletal:         General: Normal range of motion. Cervical back: Neck supple. Lymphadenopathy:      Cervical: No cervical adenopathy. Skin:     General: Skin is warm and dry. Findings: No erythema or rash. Neurological:      Mental Status: He is alert and oriented to person, place, and time. Psychiatric:         Behavior: Behavior normal.             DIAGNOSTIC RESULTS     EKG: All EKG's are interpreted by the Emergency Department Physician who either signs or Co-signs this chart in the absence of a cardiologist.    Not indicated    RADIOLOGY:   Non-plain film images such as CT, Ultrasound and MRI are read by the radiologist. Plain radiographic images are visualized and preliminarily interpreted by the emergency physician with the below findings:    Not indicated    Interpretation per the Radiologist below, if available at the time of this note:        LABS:  Labs Reviewed - No data to display    All other labs were within normal range or not returned as of this dictation. EMERGENCY DEPARTMENT COURSE and DIFFERENTIAL DIAGNOSIS/MDM:   Vitals: There were no vitals filed for this visit. No orders of the defined types were placed in this encounter. Medical Decision Making: He is referred to appropriate physician for follow-up. He does not have a PCP. Treatment diagnosis and follow-up were discussed with the patient      CONSULTS:  None    PROCEDURES:  None    FINAL IMPRESSION      1.  Hepatitis C antibody positive in blood          DISPOSITION/PLAN   DISPOSITION Decision To Discharge 03/31/2022 07:49:37 PM      PATIENT REFERRED TO:   [facility] ED  [address] [facility]  [phone]    If symptoms worsen    Jaxson Meier MD  [address] [place] [address] [place], Claudia Sosa . [place] [address]  [phone]            DISCHARGE MEDICATIONS:     New Prescriptions    No medications on file       The care is provided during an unprecedented national emergency due to the novel coronavirus, COVID-19.     (Please note that portions of this note were completed with a voice recognition program.  Efforts were made to edit the dictations but occasionally words are mis-transcribed.)    Js Pickering MD  Attending Emergency Physician           Js Pickering MD  03/31/22 [id number]

## 2022-04-28 ENCOUNTER — HOSPITAL ENCOUNTER (EMERGENCY)
Age: 32
Discharge: OTHER INSTITUTION | End: 2022-04-28
Attending: EMERGENCY MEDICINE
Payer: MEDICARE

## 2022-04-28 VITALS
OXYGEN SATURATION: 98 % | HEART RATE: 60 BPM | DIASTOLIC BLOOD PRESSURE: 71 MMHG | TEMPERATURE: 97 F | RESPIRATION RATE: 16 BRPM | SYSTOLIC BLOOD PRESSURE: 113 MMHG

## 2022-04-28 DIAGNOSIS — R45.851 SUICIDAL IDEATION: Primary | ICD-10-CM

## 2022-04-28 LAB
-: NORMAL
ABSOLUTE EOS #: 0.26 K/UL (ref 0–0.4)
ABSOLUTE IMMATURE GRANULOCYTE: 0 K/UL (ref 0–0.3)
ABSOLUTE LYMPH #: 2.13 K/UL (ref 1–4.8)
ABSOLUTE MONO #: 0.36 K/UL (ref 0.1–0.8)
ACETAMINOPHEN LEVEL: <5 UG/ML (ref 10–30)
ALBUMIN SERPL-MCNC: 4 G/DL (ref 3.5–5.2)
ALBUMIN/GLOBULIN RATIO: 1.6 (ref 1–2.5)
ALP BLD-CCNC: 105 U/L (ref 40–129)
ALT SERPL-CCNC: 181 U/L (ref 5–41)
AMPHETAMINE SCREEN URINE: POSITIVE
ANION GAP SERPL CALCULATED.3IONS-SCNC: 9 MMOL/L (ref 9–17)
AST SERPL-CCNC: 99 U/L
BARBITURATE SCREEN URINE: NEGATIVE
BASOPHILS # BLD: 0 % (ref 0–2)
BASOPHILS ABSOLUTE: 0 K/UL (ref 0–0.2)
BENZODIAZEPINE SCREEN, URINE: NEGATIVE
BILIRUB SERPL-MCNC: 0.3 MG/DL (ref 0.3–1.2)
BILIRUBIN URINE: ABNORMAL
BUN BLDV-MCNC: 15 MG/DL (ref 6–20)
CALCIUM SERPL-MCNC: 9 MG/DL (ref 8.6–10.4)
CANNABINOID SCREEN URINE: POSITIVE
CASTS UA: NORMAL /LPF (ref 0–8)
CHLORIDE BLD-SCNC: 102 MMOL/L (ref 98–107)
CO2: 26 MMOL/L (ref 20–31)
COCAINE METABOLITE, URINE: POSITIVE
COLOR: ABNORMAL
CREAT SERPL-MCNC: 0.83 MG/DL (ref 0.7–1.2)
EOSINOPHILS RELATIVE PERCENT: 5 % (ref 1–4)
EPITHELIAL CELLS UA: NORMAL /HPF (ref 0–5)
ETHANOL PERCENT: <0.01 %
ETHANOL: <10 MG/DL
GFR AFRICAN AMERICAN: >60 ML/MIN
GFR NON-AFRICAN AMERICAN: >60 ML/MIN
GFR SERPL CREATININE-BSD FRML MDRD: ABNORMAL ML/MIN/{1.73_M2}
GLUCOSE BLD-MCNC: 103 MG/DL (ref 70–99)
GLUCOSE URINE: NEGATIVE
HCT VFR BLD CALC: 38.5 % (ref 40.7–50.3)
HEMOGLOBIN: 12.7 G/DL (ref 13–17)
IMMATURE GRANULOCYTES: 0 %
KETONES, URINE: ABNORMAL
LEUKOCYTE ESTERASE, URINE: NEGATIVE
LYMPHOCYTES # BLD: 41 % (ref 24–44)
MCH RBC QN AUTO: 30.1 PG (ref 25.2–33.5)
MCHC RBC AUTO-ENTMCNC: 33 G/DL (ref 28.4–34.8)
MCV RBC AUTO: 91.2 FL (ref 82.6–102.9)
METHADONE SCREEN, URINE: NEGATIVE
MONOCYTES # BLD: 7 % (ref 1–7)
MORPHOLOGY: NORMAL
NITRITE, URINE: NEGATIVE
NRBC AUTOMATED: 0 PER 100 WBC
OPIATES, URINE: NEGATIVE
OXYCODONE SCREEN URINE: NEGATIVE
PDW BLD-RTO: 12.4 % (ref 11.8–14.4)
PH UA: 5.5 (ref 5–8)
PHENCYCLIDINE, URINE: NEGATIVE
PLATELET # BLD: 353 K/UL (ref 138–453)
PMV BLD AUTO: 9.1 FL (ref 8.1–13.5)
POTASSIUM SERPL-SCNC: 3.5 MMOL/L (ref 3.7–5.3)
PROTEIN UA: ABNORMAL
RBC # BLD: 4.22 M/UL (ref 4.21–5.77)
RBC UA: NORMAL /HPF (ref 0–4)
SALICYLATE LEVEL: <1 MG/DL (ref 3–10)
SARS-COV-2, RAPID: NOT DETECTED
SEG NEUTROPHILS: 47 % (ref 36–66)
SEGMENTED NEUTROPHILS ABSOLUTE COUNT: 2.45 K/UL (ref 1.8–7.7)
SODIUM BLD-SCNC: 137 MMOL/L (ref 135–144)
SPECIFIC GRAVITY UA: 1.04 (ref 1–1.03)
SPECIMEN DESCRIPTION: NORMAL
TEST INFORMATION: ABNORMAL
TOTAL PROTEIN: 6.5 G/DL (ref 6.4–8.3)
TOXIC TRICYCLIC SC,BLOOD: NEGATIVE
TURBIDITY: CLEAR
URINE HGB: NEGATIVE
UROBILINOGEN, URINE: NORMAL
WBC # BLD: 5.2 K/UL (ref 3.5–11.3)
WBC UA: NORMAL /HPF (ref 0–5)

## 2022-04-28 PROCEDURE — 81001 URINALYSIS AUTO W/SCOPE: CPT

## 2022-04-28 PROCEDURE — 80143 DRUG ASSAY ACETAMINOPHEN: CPT

## 2022-04-28 PROCEDURE — 99285 EMERGENCY DEPT VISIT HI MDM: CPT

## 2022-04-28 PROCEDURE — 85025 COMPLETE CBC W/AUTO DIFF WBC: CPT

## 2022-04-28 PROCEDURE — 93005 ELECTROCARDIOGRAM TRACING: CPT

## 2022-04-28 PROCEDURE — 80179 DRUG ASSAY SALICYLATE: CPT

## 2022-04-28 PROCEDURE — 80053 COMPREHEN METABOLIC PANEL: CPT

## 2022-04-28 PROCEDURE — 80307 DRUG TEST PRSMV CHEM ANLYZR: CPT

## 2022-04-28 PROCEDURE — 87635 SARS-COV-2 COVID-19 AMP PRB: CPT

## 2022-04-28 PROCEDURE — G0480 DRUG TEST DEF 1-7 CLASSES: HCPCS

## 2022-04-28 ASSESSMENT — ENCOUNTER SYMPTOMS
PHOTOPHOBIA: 0
RHINORRHEA: 0
WHEEZING: 0
SHORTNESS OF BREATH: 0
NAUSEA: 0
VOMITING: 0
BACK PAIN: 0

## 2022-04-28 ASSESSMENT — PAIN - FUNCTIONAL ASSESSMENT: PAIN_FUNCTIONAL_ASSESSMENT: NONE - DENIES PAIN

## 2022-04-28 ASSESSMENT — LIFESTYLE VARIABLES: HOW OFTEN DO YOU HAVE A DRINK CONTAINING ALCOHOL: MONTHLY OR LESS

## 2022-04-28 NOTE — ED PROVIDER NOTES
FACULTY SIGN-OUT  ADDENDUM       Patient: Lynda Matamoros   MRN: [id number]  PCP:  GENERIC HIGH  Attestation  I was available and discussed any additional care issues that arose and coordinated the management plans with the resident(s) caring for the patient during my duty period. Any areas of disagreement with resident's documentation of care or procedures are noted on the chart. I was personally present for the key portions of any/all procedures during my duty period. I have documented in the chart those procedures where I was not present during the key portions. The patient's initial evaluation and plan have been discussed with the prior provider who initially evaluated the patient. Pertinent Comments: The patient is a 27 y.o. male taken in signout with suicidal ideation with history of bipolar disease and suicidal ideation in the past  We are awaiting psychiatric social worker evaluation    ED COURSE      The patient was given the following medications:  No orders of the defined types were placed in this encounter.       RECENT VITALS:   BP: (!) 144/75  Pulse: 67  Resp: 18  Temp: 97.9 °F (36.6 °C) SpO2: 99 %    (Please note that portions of this note were completed with a voice recognition program.  Efforts were made to edit the dictations but occasionally words are mis-transcribed.)    MD Katina Massey  Attending Emergency Medicine Physician       Lianna Thornton MD  04/28/22 [id number]

## 2022-04-28 NOTE — ED PROVIDER NOTES
[facility]     Emergency Department     Faculty Attestation    I performed a history and physical examination of the patient and discussed management with the resident. I have reviewed and agree with the residents findings including all diagnostic interpretations, and treatment plans as written. Any areas of disagreement are noted on the chart. I was personally present for the key portions of any procedures. I have documented in the chart those procedures where I was not present during the key portions. I have reviewed the emergency nurses triage note. I agree with the chief complaint, past medical history, past surgical history, allergies, medications, social and family history as documented unless otherwise noted below. Documentation of the HPI, Physical Exam and Medical Decision Making performed by scribozzy is based on my personal performance of the HPI, PE and MDM. For Physician Assistant/ Nurse Practitioner cases/documentation I have personally evaluated this patient and have completed at least one if not all key elements of the E/M (history, physical exam, and MDM). Additional findings are as noted. 28 yo M c/o suicidal ideation, pt denies injury or ingestion at this time,   No cp, no fever, no vomit,   pe vss, no finding of acute psychosis, no pressured speech, cooperative, gcs 15  No cervical tenderness, chest non tender, abdomen non tender, no distension,     Plan to transfer to [facility], care turned over to day shift    EKG Interpretation    Interpreted by me  Sinus bradycardia, heart rate 57, no ischemia, normal axis, QT corrected 457, compared with EKG from February 2022, stable,    CRITICAL CARE: There was a high probability of clinically significant/life threatening deterioration in this patient's condition which required my urgent intervention. Total critical care time was 5 minutes. This excludes any time for separately reportable procedures. HAMIDADzilth-Na-[facility]-Dith-Hle Health CenterKeyonRio Lindaaliyah 24, DO  04/28/22 [address] [place] [address], DO  04/28/22 . Patrick 16, DO  04/28/22 [id number]

## 2022-04-28 NOTE — ED PROVIDER NOTES
[facility] ED  Emergency Department Encounter  EmergencyMedicine Resident     Pt Phoenix Dhaliwal  MRN: [id number]  [place] 1990  Date of evaluation: 4/28/22  PCP:  GENERIC HIGH    This patient was evaluated in the Emergency Department for symptoms described in the history of present illness. The patient was evaluated in the context of the global COVID-19 pandemic, which necessitated consideration that the patient might be at risk for infection with the SARS-CoV-2 virus that causes COVID-19. Institutional protocols and algorithms that pertain to the evaluation of patients at risk for COVID-19 are in a state of rapid change based on information released by regulatory bodies including the CDC and federal and state organizations. These policies and algorithms were followed during the patient's care in the ED. CHIEF COMPLAINT       Chief Complaint   Patient presents with    Suicidal     with plan to cut himself, hx of cutting, off meds x1 month, drug use today       HISTORY OF PRESENT ILLNESS  (Location/Symptom, Timing/Onset, Context/Setting, Quality, Duration, Modifying Factors, Severity.)      Alyssa Rubi is a 27 y.o. male who presents with complaints of suicidal ideation with plan to self and history of cutting in past.  Denies taking any regular medications. Does note use of fentanyl with last using it 7 hours ago. Denied any alcohol use or any other drug use. Denies any chest pain, shortness breath, abdominal pain. No vomiting or diarrhea. On chart review was recently admitted for SI with plan and discharge 3/22/2022. At that time patient was instructed to start Atarax and Prozac.     PAST MEDICAL / SURGICAL / SOCIAL / FAMILY HISTORY      has a past medical history of Alcoholism ([facility] [address] Anxiety, Bipolar 1 disorder ([facility] [address] Cocaine abuse ([facility] [address] Depression, Hepatitis C antibody test positive, Hypertension, Irregular heartbeat, Mild tetrahydrocannabinol (THC) abuse, and Suicidal ideation. Reviewed with patient     has a past surgical history that includes other surgical history (Right, 06/04/2017); Dialysis fistula creation (Right, 6/4/2017); EXPLORATION OF WOUND OF EXTREMITY (Right, 6/4/2017); and Femur Surgery (Left). Reviewed with patient    Social History     Socioeconomic History    Marital status: Single     Spouse name: magalys    Number of children: 1    Years of education: Not on file    Highest education level: Not on file   Occupational History    Occupation: unemployed     Comment: used to work at [employer] 6 months ago   Tobacco Use    Smoking status: Current Every Day Smoker     Packs/day: 0.50     Years: 14.00     Pack years: 7.00     Types: Cigarettes    Smokeless tobacco: Never Used    Tobacco comment: 3 cigs per day per patient   Vaping Use    Vaping Use: Every day   Substance and Sexual Activity    Alcohol use: Not Currently    Drug use: Yes     Types: Marijuana ([place]), Cocaine     Comment: states uses fentanyl    Sexual activity: Yes     Partners: Female   Other Topics Concern    Not on file   Social History Narrative    ** Merged History Encounter **         Lives with fiance and 3year old daughter     Social Determinants of Health     Financial Resource Strain:     Difficulty of Paying Living Expenses: Not on file   Food Insecurity:     Worried About [address] in the Last Year: Not on file    Oswaldo of Food in the Last Year: Not on file   Transportation Needs:     Lack of Transportation (Medical): Not on file    Lack of Transportation (Non-Medical):  Not on file   Physical Activity:     Days of Exercise per Week: Not on file    Minutes of Exercise per Session: Not on file   Stress:     Feeling of Stress : Not on file   Social Connections:     Frequency of Communication with Friends and Family: Not on file    Frequency of Social Gatherings with Friends and Family: Not on file    Attends Buddhism Services: Not on file   Ardyth Moritz Active Member of Clubs or Organizations: Not on file    Attends Club or Organization Meetings: Not on file    Marital Status: Not on file   Intimate Partner Violence:     Fear of Current or Ex-Partner: Not on file    Emotionally Abused: Not on file    Physically Abused: Not on file    Sexually Abused: Not on file   Housing Stability:     Unable to Pay for Housing in the Last Year: Not on file    Number of [place] in the Last Year: Not on file    Unstable Housing in the Last Year: Not on file       Family History   Problem Relation Age of Onset    Heart Disease Father 52    Asthma Brother     Hypertension Maternal Grandmother     Lung Cancer Maternal Grandmother         Kidney cancer, liver cancer    Stroke Maternal Grandmother     Heart Disease Mother 46         of presumed heart disease in her sleep       Allergies:  Vicodin [hydrocodone-acetaminophen]    Home Medications:  Prior to Admission medications    Medication Sig Start Date End Date Taking? Authorizing Provider   traZODone (DESYREL) 50 MG tablet Take 1 tablet by mouth nightly as needed for Sleep  Patient not taking: Reported on 2022 3/22/22   Tariq Sanabria MD   FLUoxetine (PROZAC) 20 MG capsule Take 1 capsule by mouth daily  Patient not taking: Reported on 2022 3/22/22   Tariq Sanabria MD       REVIEW OF SYSTEMS    (2-9 systems for level 4, 10 or more for level 5)      Review of Systems   Constitutional: Negative for chills and fever. HENT: Negative for congestion and rhinorrhea. Eyes: Negative for photophobia and visual disturbance. Respiratory: Negative for shortness of breath and wheezing. Cardiovascular: Negative for chest pain and palpitations. Gastrointestinal: Negative for nausea and vomiting. Genitourinary: Negative for dysuria and frequency. Musculoskeletal: Negative for back pain and neck pain. Skin: Negative for rash and wound. Neurological: Negative for dizziness and headaches. Psychiatric/Behavioral: Positive for suicidal ideas. PHYSICAL EXAM   (up to 7 for level 4, 8 or more for level 5)      INITIAL VITALS:   BP (!) 144/75   Pulse 67   Temp 97.9 °F (36.6 °C) (Oral)   Resp 18   SpO2 99%     Physical Exam  Vitals and nursing note reviewed. Constitutional:       General: He is not in acute distress. HENT:      Head: Normocephalic and atraumatic. Right Ear: External ear normal.      Left Ear: External ear normal.      Nose: Nose normal.      Mouth/Throat:      Mouth: Mucous membranes are moist.      Pharynx: Oropharynx is clear. Eyes:      Conjunctiva/sclera: Conjunctivae normal.   Cardiovascular:      Rate and Rhythm: Normal rate and regular rhythm. Pulmonary:      Effort: Pulmonary effort is normal. No respiratory distress. Breath sounds: Normal breath sounds. Abdominal:      Palpations: Abdomen is soft. Tenderness: There is no abdominal tenderness. Musculoskeletal:         General: Normal range of motion. Cervical back: Normal range of motion. Skin:     General: Skin is warm and dry. Capillary Refill: Capillary refill takes less than 2 seconds. Neurological:      General: No focal deficit present. Mental Status: He is alert and oriented to person, place, and time. DIFFERENTIAL  DIAGNOSIS     PLAN (LABS / IMAGING / EKG):  Orders Placed This Encounter   Procedures    COVID-19, Rapid    CBC with Auto Differential    Comprehensive Metabolic Panel    TOX SCR, BLD, ED    Urinalysis with Reflex to Culture    DRUG SCREEN MULTI URINE    Microscopic Urinalysis    Inpatient consult to Social Work    EKG 12 Lead       MEDICATIONS ORDERED:  No orders of the defined types were placed in this encounter.       DDX: SI with plan, substance use disorder    DIAGNOSTIC RESULTS / EMERGENCY DEPARTMENT COURSE / MDM   LAB RESULTS:  Results for orders placed or performed during the hospital encounter of 04/28/22   COVID-19, Rapid Specimen: Nasopharyngeal Swab   Result Value Ref Range    Specimen Description . NASOPHARYNGEAL SWAB     SARS-CoV-2, Rapid Not Detected Not Detected   CBC with Auto Differential   Result Value Ref Range    WBC 5.2 3.5 - 11.3 k/uL    RBC 4.22 4.21 - 5.77 m/uL    Hemoglobin 12.7 (L) 13.0 - 17.0 g/dL    Hematocrit 38.5 (L) 40.7 - 50.3 %    MCV 91.2 82.6 - 102.9 fL    MCH 30.1 25.2 - 33.5 pg    MCHC 33.0 28.4 - 34.8 g/dL    RDW 12.4 11.8 - 14.4 %    Platelets 320 840 - 325 k/uL    MPV 9.1 8.1 - 13.5 fL    NRBC Automated 0.0 0.0 per 100 WBC    Immature Granulocytes 0 0 %    Seg Neutrophils 47 36 - 66 %    Lymphocytes 41 24 - 44 %    Monocytes 7 1 - 7 %    Eosinophils % 5 (H) 1 - 4 %    Basophils 0 0 - 2 %    Absolute Immature Granulocyte 0.00 0.00 - 0.30 k/uL    Segs Absolute 2.45 1.8 - 7.7 k/uL    Absolute Lymph # 2.13 1.0 - 4.8 k/uL    Absolute Mono # 0.36 0.1 - 0.8 k/uL    Absolute Eos # 0.26 0.0 - 0.4 k/uL    Basophils Absolute 0.00 0.0 - 0.2 k/uL    Morphology Normal    Comprehensive Metabolic Panel   Result Value Ref Range    Glucose 103 (H) 70 - 99 mg/dL    BUN 15 6 - 20 mg/dL    CREATININE 0.83 0.70 - 1.20 mg/dL    Calcium 9.0 8.6 - 10.4 mg/dL    Sodium 137 135 - 144 mmol/L    Potassium 3.5 (L) 3.7 - 5.3 mmol/L    Chloride 102 98 - 107 mmol/L    CO2 26 20 - 31 mmol/L    Anion Gap 9 9 - 17 mmol/L    Alkaline Phosphatase 105 40 - 129 U/L     (H) 5 - 41 U/L    AST 99 (H) <40 U/L    Total Bilirubin 0.30 0.3 - 1.2 mg/dL    Total Protein 6.5 6.4 - 8.3 g/dL    Albumin 4.0 3.5 - 5.2 g/dL    Albumin/Globulin Ratio 1.6 1.0 - 2.5    GFR Non-African American >60 >60 mL/min    GFR African American >60 >60 mL/min    GFR Comment         TOX SCR, BLD, ED   Result Value Ref Range    Acetaminophen Level <5 (L) 10 - 30 ug/mL    Ethanol <10 <10 mg/dL    Ethanol percent <1.754 <5.493 %    Salicylate Lvl <1 (L) 3 - 10 mg/dL    Toxic Tricyclic Sc,Blood NEGATIVE NEGATIVE   Urinalysis with Reflex to Culture    Specimen: Urine Result Value Ref Range    Color, UA Dark Yellow (A) Yellow    Turbidity UA Clear Clear    Glucose, Ur NEGATIVE NEGATIVE    Bilirubin Urine NEGATIVE  Verified by ictotest. (A) NEGATIVE    Ketones, Urine TRACE (A) NEGATIVE    Specific Gravity, UA 1.038 (H) 1.005 - 1.030    Urine Hgb NEGATIVE NEGATIVE    pH, UA 5.5 5.0 - 8.0    Protein, UA TRACE (A) NEGATIVE    Urobilinogen, Urine Normal Normal    Nitrite, Urine NEGATIVE NEGATIVE    Leukocyte Esterase, Urine NEGATIVE NEGATIVE   DRUG SCREEN MULTI URINE   Result Value Ref Range    Amphetamine Screen, Ur POSITIVE (A) NEGATIVE    Barbiturate Screen, Ur NEGATIVE NEGATIVE    Benzodiazepine Screen, Urine NEGATIVE NEGATIVE    Cocaine Metabolite, Urine POSITIVE (A) NEGATIVE    Methadone Screen, Urine NEGATIVE NEGATIVE    Opiates, Urine NEGATIVE NEGATIVE    Phencyclidine, Urine NEGATIVE NEGATIVE    Cannabinoid Scrn, Ur POSITIVE (A) NEGATIVE    Oxycodone Screen, Ur NEGATIVE NEGATIVE    Test Information       Assay provides medical screening only. The absence of expected drug(s) and/or metabolite(s) may indicate diluted or adulterated urine, limitations of testing or timing of collection. Microscopic Urinalysis   Result Value Ref Range    -          WBC, UA 2 TO 5 0 - 5 /HPF    RBC, UA 2 TO 5 0 - 4 /HPF    Casts UA  0 - 8 /LPF     5 TO 10 HYALINE Reference range defined for non-centrifuged specimen. Epithelial Cells UA 0 TO 2 0 - 5 /HPF       IMPRESSION: Suicidal ideation    RADIOLOGY:  No orders to display       EKG  No ST changes    All EKG's are interpreted by the Emergency Department Physician who either signs or Co-signs this chart in the absence of a cardiologist.    EMERGENCY DEPARTMENT COURSE:  28-year-old male, history of SI in past with previous admissions, substance use disorder, presented to ED with complaints of SI over the last few days with plan to cut wrists and noted using fentanyl last 7 hours ago. Denied any other drug use or alcohol use.   On exam, afebrile, nontachycardic, abdomen soft, lungs clear. Work-up unremarkable. EKG nonacute. Patient signed out to Dr. Antonio Blanchard with COVID swab pending. Likely BHI. ED Course as of 22 0739   Thu 2022   0612 WBC: 5.2 [AR]   7353 Amphetamine Screen, Ur(!): POSITIVE [AR]   1186 Cocaine Metabolite, Urine(!): POSITIVE [AR]   7495 Cannabinoid Scrn, Ur(!): POSITIVE [AR]      ED Course User Index  [AR] Sergey Tracey MD       No notes of EC Admission Criteria type on file. PROCEDURES:  None    CONSULTS:  IP CONSULT TO SOCIAL WORK    CRITICAL CARE:  None    FINAL IMPRESSION      1. Suicidal ideation          DISPOSITION / PLAN     DISPOSITION Decision To Transfer 2022 06:47:33 AM      PATIENT REFERRED TO:  No follow-up provider specified.     DISCHARGE MEDICATIONS:  New Prescriptions    No medications on file       Arnold Khan MD  Emergency Medicine Resident    (Please note that portions of thisnote were completed with a voice recognition program.  Efforts were made to edit the dictations but occasionally words are mis-transcribed.)      Sergey Tracey MD  Resident  22 [phone]

## 2022-04-28 NOTE — ED PROVIDER NOTES
[address] [facility]  Emergency Department  Emergency Medicine Resident Sign-out     Care of Renetta Celestin was assumed from Dr. Vinetta Goodpasture and is being seen for Suicidal (with plan to cut himself, hx of cutting, off meds x1 month, drug use today)  . The patient's initial evaluation and plan have been discussed with the prior provider who initially evaluated the patient. EMERGENCY DEPARTMENT COURSE / MEDICAL DECISION MAKING:       MEDICATIONS GIVEN:  No orders of the defined types were placed in this encounter. LABS / RADIOLOGY:     Labs Reviewed   CBC WITH AUTO DIFFERENTIAL - Abnormal; Notable for the following components:       Result Value    Hemoglobin 12.7 (*)     Hematocrit 38.5 (*)     Eosinophils % 5 (*)     All other components within normal limits   COMPREHENSIVE METABOLIC PANEL - Abnormal; Notable for the following components:    Glucose 103 (*)     Potassium 3.5 (*)      (*)     AST 99 (*)     All other components within normal limits   TOX SCR, BLD, ED - Abnormal; Notable for the following components:    Acetaminophen Level <5 (*)     Salicylate Lvl <1 (*)     All other components within normal limits   URINALYSIS WITH REFLEX TO CULTURE - Abnormal; Notable for the following components:    Color, UA Dark Yellow (*)     Bilirubin Urine NEGATIVE  Verified by ictotest. (*)     Ketones, Urine TRACE (*)     Specific Gravity, UA 1.038 (*)     Protein, UA TRACE (*)     All other components within normal limits   URINE DRUG SCREEN - Abnormal; Notable for the following components:    Amphetamine Screen, Ur POSITIVE (*)     Cocaine Metabolite, Urine POSITIVE (*)     Cannabinoid Scrn, Ur POSITIVE (*)     All other components within normal limits   COVID-19, RAPID   MICROSCOPIC URINALYSIS       No results found. RECENT VITALS:     Temp: 97 °F (36.1 °C),  Pulse: 60, Resp: 16, BP: 113/71, SpO2: 98 %      This patient is a 27 y.o.  Male with complaints of suicidal ideation with plan to cut wrists. Did admit to using fentanyl 7 hours ago but denies any other drug use. No alcohol use. On exam afebrile, abdomen soft lungs clear. Patient signed out with COVID pending. Social work aware of patient. Awaiting COVID back. ED Course as of 04/28/22 0947   Thu Apr 28, 2022   0612 WBC: 5.2 [AR]   2876 Amphetamine Screen, Ur(!): POSITIVE [AR]   5509 Cocaine Metabolite, Urine(!): POSITIVE [AR]   3340 Cannabinoid Scrn, Ur(!): POSITIVE [AR]   0804 COVID-19, Rapid:    Specimen Description . NASOPHARYNGEAL SWAB   SARS-CoV-2, Rapid Not Detected  Patient medically clear for transfer to [facility] once accepted. [AF]   [facility] came to evaluate patient. They would like to take him to appointment. Will discharge into the custody. [AF]      ED Course User Index  [AF] Albania Hernandez DO  [AR] Kimberly Pacheco MD       OUTSTANDING TASKS / RECOMMENDATIONS:    1. Follow-up COVID  2. Follow-up social work     FINAL IMPRESSION:     1. Suicidal ideation        DISPOSITION:         DISPOSITION:  [x]  Discharge   [x]  Transfer -    []  Admission -     []  Against Medical Advice   []  Eloped   FOLLOW-UP: No follow-up provider specified.    DISCHARGE MEDICATIONS: New Prescriptions    No medications on file          Albania Hernandez DO  Emergency Medicine Resident  [facility]       Albania Hernandez [place]  Resident  04/28/22 8552

## 2022-04-28 NOTE — ED TRIAGE NOTES
Pt ambulatory to [facility] from triage. Pt is a/o x4. Pt states that he is suicidal, denies HI. Pt states that he has a plan to kill himself by cutting. Pt states that he has cut himself in the past but denies doing it tonight. Pt states that he has not taken his meds for a month, that he gave up on them. Pt admits to using Fentanyl for the past 7 month, with daily use, last being a few hours ago. Pt vitals assessed and noted. Warm blankets provided. NAD noted. Will continue to monitor. Sitter at bedside.

## 2022-04-28 NOTE — ED NOTES
The following labs labeled with pt sticker and tubed to lab:     [] Blue     [] Lavender   [] on ice  [] Green/yellow  [] Green/black [] on ice  [] Yellow  [] Red  [] Pink      [x] COVID-19 swab    [] Rapid  [] PCR  [] Flu swab  [] Peds Viral Panel     [] Urine Sample  [] Pelvic Cultures  [] Blood Cultures            Dang Lucas RN  04/28/22 1978

## 2022-04-28 NOTE — ED NOTES
SW met with patient who is in the ER for SI with a plan to cut himself. Patient tells SW he is a \"cutter\" and does this often. Patient has frequent admissions to the [facility] but is non-compliant with his follow-up appointments and medications. Patient most recently in the [facility] from 3/17/22 to 3/31/22. Patient uses illicit drugs and is positive for amphetamines, cocaine, THC, and admits to Fentanyl usage last night. Patient denies any legal issues and states he lives with family. Patient could benefit from being connected back with community mental health. SW called in and faxed a referral to [facility] for admission to their inpatient crisis stabilization unit. Patient agreeable to plan. Await admission decision. Rosanne Phelps.  [address], Sushila [address] [place] [address]  04/28/22 3217

## 2022-04-28 NOTE — ED NOTES
[] Pasquale    [] [facility]    [x]  [facility] ASSESSMENT      Y  N     [x] [] In the past two weeks have you had thoughts of hurting yourself in any way? [x] [] In the past two weeks have you had thoughts that you would be better off dead? [] [x] Have you made a suicide attempt in the past two months? [x] [] Do you have a plan for hurting yourself or suicide? [] [x] Presence of hallucinations/voices related to hurting himself or herself or someone else. SUICIDE/SECURITY WATCH PRECAUTION CHECKLIST     Orders    [x]  Suicide/Security Watch Precautions initiated as checked below:   4/28/22 6:03 AM EDT BH31/BH31A    [x] Notified physician:  Mariposa Carr DO  4/28/22 6:03 AM EDT    [x] Orders obtained as appropriate:     [x] 1:1 Observer     [] Psych Consult     [] Psych Consult    Name:  Date:  Time:    [x] 1:1 Observer, Notified by:  Purnima Sanchez RN    Contact Nurse Supervisor    [x] Remove all personal clothes from room and place in snap/paper gown/pants. Slipper only    [x] Remove all personal belongings from room and secured away from patient. Documentation    [x] Initiate Suicide/Security Watch Precaution Flow Sheet    [x] Initiate individualized Care Plan/Problem    [x] Document why precautions initiated on flow sheet (Initiate Nursing Care Plan/Problem)    [x] 1:1 Observer in place; instructions provided. Suicide precautions require observer be within arms length. [x] Nurse-Observer Communication Hand-off initiated by RN, reviewed with Observer. Subsequently used as Hand Off between Observers. [x] Initiate every 15 minute observations per observer as delegated by the RN.     [x] Initiate RN assessment and documentation    Environmental Scan  Search Criteria and Process: OPTIONAL, see Search Policy    [] Reason for search:    [x] Nursing in presence of second person to search patient    [x] Patient notified of reason for body assessment and belongings search:     Persons present during search:MPD   Results of search and disposition:       Searchers Name: MPD     These items or items similar should be removed from the room:   [] Chairs   [x] Telephone   [] Trash cans and liners   [] Plastic utensils (order Patient Safety tray)   [] Empty or remove Sharps containers   [x] All personal clothing/belongings removed   [x] All unnecessary lead wires, electrical cords, draw cords, etc.   [] Flowers and plants   [x] Double check for lighters, matches, razors, any glass items etc that can be used as weapons. Person completing Checklist: Jeffrey Rush RN       GENERAL INFORMATION     Y  N     [x] [] Has the patient been informed that they are on a watch and what that means? [x] [] Can the patient get out of Bed without nursing assistance? [x] [] Can the patient use the restroom without nursing assistance? [] [x] Can the patient walk the halls to [place] their legs? \"   [] [x] Does the patient have metal utensils? [x] [] Have the patient's belongings been placed out of control of the patient? [x] [] Have the patient and his/her belongings been checked for contraband? [x] [] Is the patient under any visitor restrictions? If Yes, explain:   [] [x] Is the patient under an alias? Alias Name:   Authorized visitors (no more than two are to be on the list)   Name/Relationship:   Name/Relationship:    Name of Staff member that you  Received this information from?:     General Description:    Cristian Whitmore BH31/BH31A male 27 y.o. Admission weight:      Race: []  [x] Black  []   []   [] [place] [] Other  Facial Hair:  [x] Yes  [] No  If yes, please describe: Identifying Marks (i.e. Visible tattoos, scars, etc... ):     NURSING CARE PLAN    Nursing Diagnosis: Risk of Self Directed Harm  [x] Actual  [] Potential  Date Started: 4/28/22      Etiological Factors: (related to)  [x] Expressed or implied suicidal ideation/behavior  [] Depression  [x] Suicide attempt      [] Low self-esteem  [] Hallucinations      [] Feeling of Hopelessness  [x] Substance abuse or withdrawal    [] Dysfunctional family  [] Major traumatic event, eg., divorce, etc   [] Excessive stress/anxiety    4/28/22    Expected Outcomes    Patient will:   [x] Patient will remain safe for the duration of their stay   [x] Patient's environment will be safe, eg. Free of potential suicide weapons   [] Verbalize Recovery from suicidal episode and improvement in self-worth   [x] Discuss feeling that precipitated suicide attempt/thoughts/behavior   [] Will describe available resources for crisis prevention and management   [] Will verbalize positive coping skills     Nursing Intervention   [x] Assessment and Observations hourly   [x] Suicide Precautions implemented with patient, should be 1:1 observation   [x] Document observation [id number] and RN assessment hourly   [] Consult physician for:    [] Psychiatric consult    [] Pharmacological therapy    [] Other:    [x] Patient search completed by security   [x] Initiated appropriate safety protocols by removing from the patient's environment anything that could be used to inflict self injury, eg. Order safe tray, snap gown, etc   [x] Maintain open, warm, caring, non-judgmental attitude/manner towards patient   [] Discuss advantages and disadvantages of existing coping methods/skills   [x] Assist and educate patient with identifying present strengths and coping skills   [x] Keep patient informed regarding plan of care and provide clear concise explanations. Provide the patient/family education information as well as telephone numbers and other information about crisis centers, hot lines, and counselors.     Discharge Planning:   [] Referral  [] Groups [] Health agencies  [] Other:          Theodora Cheung RN  04/28/22 5351

## 2022-04-28 NOTE — ED NOTES
[address] [facility]. staff at bedside     Barak Bernstein, [address] [place] [address]  04/28/22 9893

## 2022-04-28 NOTE — ED NOTES
Patient discharged in the care of [facility]. Giselle Andrade.  Neil BryantShoh[place], [place]  04/28/22 0812

## 2022-04-28 NOTE — ED NOTES
KISHAN spoke with Mirella Munoz and they will be here within the hour to assess patient for crisis services. Severo Sins.  [address], [address]  04/28/22 4564

## 2022-04-28 NOTE — ED NOTES
[address] [facility]. staff stating that they want to take pt for his inpatient appointment at [facility] and then stated that they pt did not want to go today and that he could be DC'ed and they will pick him up tomorrow for his appointment. Dr. Efren Vo notified and at bedside.       Ishaan Tripp RN  04/28/22 7946

## 2022-04-28 NOTE — ED NOTES
Writer called [facility] Kassidy LANGE to release belongings for DC to [address], [facility]  04/28/22 1630

## 2022-04-28 NOTE — ED NOTES
Report to [address] [place] [address], RN. All questions answered.       Shy Farfan, VISH  04/28/22 9293

## 2022-04-29 LAB
EKG ATRIAL RATE: 57 BPM
EKG P AXIS: 66 DEGREES
EKG P-R INTERVAL: 140 MS
EKG Q-T INTERVAL: 470 MS
EKG QRS DURATION: 90 MS
EKG QTC CALCULATION (BAZETT): 457 MS
EKG R AXIS: 61 DEGREES
EKG T AXIS: 40 DEGREES
EKG VENTRICULAR RATE: 57 BPM

## 2022-04-29 PROCEDURE — 93010 ELECTROCARDIOGRAM REPORT: CPT | Performed by: INTERNAL MEDICINE

## 2022-08-09 ENCOUNTER — HOSPITAL ENCOUNTER (EMERGENCY)
Age: 32
Discharge: PSYCHIATRIC OR SUBSTANCE USE FACILITY | End: 2022-08-09
Attending: EMERGENCY MEDICINE
Payer: MEDICARE

## 2022-08-09 ENCOUNTER — HOSPITAL ENCOUNTER (INPATIENT)
Age: 32
LOS: 3 days | Discharge: HOME | DRG: 751 | End: 2022-08-12
Attending: PSYCHIATRY & NEUROLOGY | Admitting: PSYCHIATRY & NEUROLOGY
Payer: MEDICARE

## 2022-08-09 VITALS
RESPIRATION RATE: 16 BRPM | HEART RATE: 58 BPM | DIASTOLIC BLOOD PRESSURE: 86 MMHG | TEMPERATURE: 98 F | OXYGEN SATURATION: 99 % | SYSTOLIC BLOOD PRESSURE: 133 MMHG

## 2022-08-09 DIAGNOSIS — Z00.00 EVALUATION BY MEDICAL SERVICE REQUIRED: Primary | ICD-10-CM

## 2022-08-09 PROBLEM — F19.10 POLYSUBSTANCE ABUSE (HCC): Status: ACTIVE | Noted: 2022-08-09

## 2022-08-09 PROBLEM — F23 ACUTE PSYCHOSIS (HCC): Status: ACTIVE | Noted: 2022-08-09

## 2022-08-09 PROBLEM — F18.10 ABUSE OF SMOKED SUBSTANCE (HCC): Status: ACTIVE | Noted: 2022-08-09

## 2022-08-09 LAB
ABSOLUTE EOS #: 0.32 K/UL (ref 0–0.44)
ABSOLUTE IMMATURE GRANULOCYTE: <0.03 K/UL (ref 0–0.3)
ABSOLUTE LYMPH #: 3.98 K/UL (ref 1.1–3.7)
ABSOLUTE MONO #: 0.66 K/UL (ref 0.1–1.2)
ACETAMINOPHEN LEVEL: <5 UG/ML (ref 10–30)
ALBUMIN SERPL-MCNC: 4.2 G/DL (ref 3.5–5.2)
ALBUMIN/GLOBULIN RATIO: 1.4 (ref 1–2.5)
ALP BLD-CCNC: 79 U/L (ref 40–129)
ALT SERPL-CCNC: 98 U/L (ref 5–41)
AMPHETAMINE SCREEN URINE: POSITIVE
ANION GAP SERPL CALCULATED.3IONS-SCNC: 9 MMOL/L (ref 9–17)
AST SERPL-CCNC: 66 U/L
BARBITURATE SCREEN URINE: NEGATIVE
BASOPHILS # BLD: 1 % (ref 0–2)
BASOPHILS ABSOLUTE: 0.05 K/UL (ref 0–0.2)
BENZODIAZEPINE SCREEN, URINE: NEGATIVE
BILIRUB SERPL-MCNC: 0.53 MG/DL (ref 0.3–1.2)
BUN BLDV-MCNC: 11 MG/DL (ref 6–20)
CALCIUM SERPL-MCNC: 9.5 MG/DL (ref 8.6–10.4)
CANNABINOID SCREEN URINE: POSITIVE
CHLORIDE BLD-SCNC: 103 MMOL/L (ref 98–107)
CO2: 26 MMOL/L (ref 20–31)
COCAINE METABOLITE, URINE: POSITIVE
CREAT SERPL-MCNC: 0.88 MG/DL (ref 0.7–1.2)
EOSINOPHILS RELATIVE PERCENT: 5 % (ref 1–4)
ETHANOL PERCENT: <0.01 %
ETHANOL: <10 MG/DL
FENTANYL URINE: POSITIVE
GFR AFRICAN AMERICAN: >60 ML/MIN
GFR NON-AFRICAN AMERICAN: >60 ML/MIN
GFR SERPL CREATININE-BSD FRML MDRD: ABNORMAL ML/MIN/{1.73_M2}
GLUCOSE BLD-MCNC: 80 MG/DL (ref 70–99)
HCT VFR BLD CALC: 39.9 % (ref 40.7–50.3)
HEMOGLOBIN: 12.9 G/DL (ref 13–17)
IMMATURE GRANULOCYTES: 0 %
LYMPHOCYTES # BLD: 57 % (ref 24–43)
MCH RBC QN AUTO: 30.1 PG (ref 25.2–33.5)
MCHC RBC AUTO-ENTMCNC: 32.3 G/DL (ref 28.4–34.8)
MCV RBC AUTO: 93 FL (ref 82.6–102.9)
METHADONE SCREEN, URINE: NEGATIVE
MONOCYTES # BLD: 10 % (ref 3–12)
NRBC AUTOMATED: 0 PER 100 WBC
OPIATES, URINE: NEGATIVE
OXYCODONE SCREEN URINE: NEGATIVE
PDW BLD-RTO: 13.4 % (ref 11.8–14.4)
PHENCYCLIDINE, URINE: NEGATIVE
PLATELET # BLD: 293 K/UL (ref 138–453)
PMV BLD AUTO: 8.9 FL (ref 8.1–13.5)
POTASSIUM SERPL-SCNC: 3.5 MMOL/L (ref 3.7–5.3)
RBC # BLD: 4.29 M/UL (ref 4.21–5.77)
SALICYLATE LEVEL: <1 MG/DL (ref 3–10)
SEG NEUTROPHILS: 27 % (ref 36–65)
SEGMENTED NEUTROPHILS ABSOLUTE COUNT: 1.82 K/UL (ref 1.5–8.1)
SODIUM BLD-SCNC: 138 MMOL/L (ref 135–144)
TEST INFORMATION: ABNORMAL
TOTAL PROTEIN: 7.3 G/DL (ref 6.4–8.3)
TOXIC TRICYCLIC SC,BLOOD: NEGATIVE
WBC # BLD: 6.8 K/UL (ref 3.5–11.3)

## 2022-08-09 PROCEDURE — 99223 1ST HOSP IP/OBS HIGH 75: CPT | Performed by: INTERNAL MEDICINE

## 2022-08-09 PROCEDURE — G0480 DRUG TEST DEF 1-7 CLASSES: HCPCS

## 2022-08-09 PROCEDURE — 80143 DRUG ASSAY ACETAMINOPHEN: CPT

## 2022-08-09 PROCEDURE — 80053 COMPREHEN METABOLIC PANEL: CPT

## 2022-08-09 PROCEDURE — 1240000000 HC EMOTIONAL WELLNESS R&B

## 2022-08-09 PROCEDURE — 80307 DRUG TEST PRSMV CHEM ANLYZR: CPT

## 2022-08-09 PROCEDURE — 85025 COMPLETE CBC W/AUTO DIFF WBC: CPT

## 2022-08-09 PROCEDURE — 80179 DRUG ASSAY SALICYLATE: CPT

## 2022-08-09 PROCEDURE — 99285 EMERGENCY DEPT VISIT HI MDM: CPT

## 2022-08-09 RX ORDER — HALOPERIDOL 5 MG
5 TABLET ORAL EVERY 6 HOURS PRN
Status: DISCONTINUED | OUTPATIENT
Start: 2022-08-09 | End: 2022-08-12 | Stop reason: HOSPADM

## 2022-08-09 RX ORDER — IBUPROFEN 400 MG/1
400 TABLET ORAL EVERY 6 HOURS PRN
Status: DISCONTINUED | OUTPATIENT
Start: 2022-08-09 | End: 2022-08-12 | Stop reason: HOSPADM

## 2022-08-09 RX ORDER — POLYETHYLENE GLYCOL 3350 17 G/17G
17 POWDER, FOR SOLUTION ORAL DAILY PRN
Status: DISCONTINUED | OUTPATIENT
Start: 2022-08-09 | End: 2022-08-12 | Stop reason: HOSPADM

## 2022-08-09 RX ORDER — TRAZODONE HYDROCHLORIDE 50 MG/1
50 TABLET ORAL NIGHTLY PRN
Status: DISCONTINUED | OUTPATIENT
Start: 2022-08-09 | End: 2022-08-12 | Stop reason: HOSPADM

## 2022-08-09 RX ORDER — DIPHENHYDRAMINE HYDROCHLORIDE 50 MG/ML
50 INJECTION INTRAMUSCULAR; INTRAVENOUS EVERY 6 HOURS PRN
Status: DISCONTINUED | OUTPATIENT
Start: 2022-08-09 | End: 2022-08-12 | Stop reason: HOSPADM

## 2022-08-09 RX ORDER — ACETAMINOPHEN 325 MG/1
650 TABLET ORAL EVERY 6 HOURS PRN
Status: DISCONTINUED | OUTPATIENT
Start: 2022-08-09 | End: 2022-08-12 | Stop reason: HOSPADM

## 2022-08-09 RX ORDER — MAGNESIUM HYDROXIDE/ALUMINUM HYDROXICE/SIMETHICONE 120; 1200; 1200 MG/30ML; MG/30ML; MG/30ML
30 SUSPENSION ORAL EVERY 6 HOURS PRN
Status: DISCONTINUED | OUTPATIENT
Start: 2022-08-09 | End: 2022-08-12 | Stop reason: HOSPADM

## 2022-08-09 RX ORDER — HYDROXYZINE 50 MG/1
50 TABLET, FILM COATED ORAL 3 TIMES DAILY PRN
Status: DISCONTINUED | OUTPATIENT
Start: 2022-08-09 | End: 2022-08-12 | Stop reason: HOSPADM

## 2022-08-09 RX ORDER — NICOTINE 21 MG/24HR
1 PATCH, TRANSDERMAL 24 HOURS TRANSDERMAL DAILY
Status: DISCONTINUED | OUTPATIENT
Start: 2022-08-10 | End: 2022-08-12 | Stop reason: HOSPADM

## 2022-08-09 RX ORDER — HALOPERIDOL 5 MG/ML
5 INJECTION INTRAMUSCULAR EVERY 6 HOURS PRN
Status: DISCONTINUED | OUTPATIENT
Start: 2022-08-09 | End: 2022-08-12 | Stop reason: HOSPADM

## 2022-08-09 ASSESSMENT — ENCOUNTER SYMPTOMS
DIARRHEA: 0
SHORTNESS OF BREATH: 0
EYE PAIN: 0
VOMITING: 0
CHEST TIGHTNESS: 0
SORE THROAT: 0
ABDOMINAL PAIN: 0
NAUSEA: 0
FACIAL SWELLING: 0
CONSTIPATION: 0
PHOTOPHOBIA: 0

## 2022-08-09 ASSESSMENT — PAIN DESCRIPTION - LOCATION
LOCATION: HAND;KNEE
LOCATION: KNEE;HAND

## 2022-08-09 ASSESSMENT — SLEEP AND FATIGUE QUESTIONNAIRES
DO YOU HAVE DIFFICULTY SLEEPING: YES
DO YOU USE A SLEEP AID: COMMENT
SLEEP PATTERN: DIFFICULTY FALLING ASLEEP
AVERAGE NUMBER OF SLEEP HOURS: 3

## 2022-08-09 ASSESSMENT — PATIENT HEALTH QUESTIONNAIRE - PHQ9: SUM OF ALL RESPONSES TO PHQ QUESTIONS 1-9: 20

## 2022-08-09 ASSESSMENT — PAIN SCALES - GENERAL
PAINLEVEL_OUTOF10: 8
PAINLEVEL_OUTOF10: 8

## 2022-08-09 ASSESSMENT — LIFESTYLE VARIABLES
HOW MANY STANDARD DRINKS CONTAINING ALCOHOL DO YOU HAVE ON A TYPICAL DAY: 1 OR 2
HOW OFTEN DO YOU HAVE A DRINK CONTAINING ALCOHOL: MONTHLY OR LESS

## 2022-08-09 NOTE — ED NOTES
Patient accepted to the [address] [place] [address] by Dr. Soni Lara for depression with SI. Voluntary signed and faxed. Await bed placement. Jade Bautista.  [address], [address]  08/09/22 2004

## 2022-08-09 NOTE — ED NOTES
Safety maintained 1:1 maintained. Continues to rest on cot without distress. Waiting breakfast trays to arrive.       Marcus Schuster RN  08/09/22 9095

## 2022-08-09 NOTE — ED NOTES
1:1 maintained  Resting quietly on cot  NAD noted  Report called, questions answered, pt updated  Awaiting transport arrival     Moe Do  08/09/22 6274

## 2022-08-09 NOTE — ED TRIAGE NOTES
Pt arrived to ED with suicidal and homicidal thoughts. Pt denies having a plan an actually plan but states he hasn't seen his kids in awhile and also lost an uncle. Pt A&O x 4, does not appear in acute distress, RR even and unlabored, resting comfortably on stretcher with eyes open and call light in reach. Vital signs obtained, medical hx and allergies reviewed with pt. Initial assessment performed by physician, Hussain Boland will carry out initial orders/tasks and reassess pt. MPD at bedside during change out. Pt safely changed into paper gown and personal belongings secured. Safety sitter/guard at bedside.

## 2022-08-09 NOTE — ED PROVIDER NOTES
[facility]  [facility]  Emergency Department  Emergency Medicine Resident Sign-out     Care of Patricio Thompson was assumed from Dr. Saad Riggins and is being seen for Suicidal and Homicidal  .  The patient's initial evaluation and plan have been discussed with the prior provider who initially evaluated the patient. EMERGENCY DEPARTMENT COURSE / MEDICAL DECISION MAKING:       MEDICATIONS GIVEN:  No orders of the defined types were placed in this encounter. LABS / RADIOLOGY:     Labs Reviewed   COMPREHENSIVE METABOLIC PANEL - Abnormal; Notable for the following components:       Result Value    Potassium 3.5 (*)     ALT 98 (*)     AST 66 (*)     All other components within normal limits   CBC WITH AUTO DIFFERENTIAL - Abnormal; Notable for the following components:    Hemoglobin 12.9 (*)     Hematocrit 39.9 (*)     Seg Neutrophils 27 (*)     Lymphocytes 57 (*)     Eosinophils % 5 (*)     Absolute Lymph # 3.98 (*)     All other components within normal limits   URINE DRUG SCREEN - Abnormal; Notable for the following components:    Amphetamine Screen, Ur POSITIVE (*)     Cocaine Metabolite, Urine POSITIVE (*)     Cannabinoid Scrn, Ur POSITIVE (*)     Fentanyl, Ur POSITIVE (*)     All other components within normal limits   TOX SCR, BLD, ED - Abnormal; Notable for the following components:    Acetaminophen Level <5 (*)     Salicylate Lvl <1 (*)     All other components within normal limits       No results found. RECENT VITALS:     Temp: 97.2 °F (36.2 °C),  Heart Rate: 73, Resp: 17, BP: 119/73, SpO2: 98 %      This patient is a 27 y.o. Male with SI wo plan and homicidal ideation wo plan. Medically cleared. Patient ultimately excepted at [facility]. Reevaluated several times throughout his stay VSS nontoxic-appearing. Patient was transferred to [facility]      ED Course as of 08/09/22 1333   Tue Aug 09, 2022   7063 Care taken over from Dr. Saad Riggins [ZE]   [phone] Patient accepted at [facility] awaiting transport. Comfortably bed [ZE]   1100 At bedside. Moving all extremities no acute distress. Updated on status [ZE]   1227 Evaluate bedside. Still awaiting transport nontoxic-appearing. Answering questions appropriately [ZE]      ED Course User Index  [ZE] Vito Esteban DO       OUTSTANDING TASKS / RECOMMENDATIONS:    F/u BHI plan     FINAL IMPRESSION:     1.  Evaluation by medical service required        DISPOSITION:         DISPOSITION:  []  Discharge   []  Transfer -    []  Admission -     []  Against Medical Advice   []  Eloped   FOLLOW-UP: [facility]  [address] [place] [address]  [phone]  Go to   If symptoms worsen     DISCHARGE MEDICATIONS: New Prescriptions    No medications on file          Aminah Olea DO  Emergency Medicine Resident  [facility]        Vito Esteban [place]  Resident  08/09/22 8610

## 2022-08-09 NOTE — ED NOTES
Sw attempted to call [facility] Access twice for possible inpatient admission. Waiting for callback.       CYNTHIA Nicole  08/09/22 4140

## 2022-08-09 NOTE — ED NOTES
Provisional Diagnosis:  Depression with suicidal ideation       Psychosocial and Contextual Factors: Pt reports he lives with his brother. Pt reports substance abuse. C-SSRS Summary:    Patient: X  Family:  Agency:    Present Suicidal Behavior:    Verbal: yes    Attempt:no    Past Suicidal Behavior:    Verbal: yes    Attempt: yes    Self-Injurious/Self-Mutilation: Self mutilation by cutting      Protective Factors: Willing to go inpatient, has health insurance, reports past success with receiving inpatient care. Risk Factors:  Substance abuse, not currently taking medications and poor follow-up      Clinical Summary:  Pt is a 27year old male who presents to the ED with suicidal and homicidal ideation. Pt reports he has thoughts of \"shooting up and overdosing\" as his plan. Pt denies specific plan or person for homicidal ideation just \"feels a lot of anger\". Pt reports recent loss of his uncle and not seeing his kids in some time as contributing factors to his SI. Pt reports hx of bipolar disorder, depression, and suicide attempt by overdose approximately a year and a half  ago. Pt reports he is currently not linked with a mental health care provider or agency and is not taking psychotropic medications. Pt further reports his last inpatient admission at [address] [place] [address] was in March of 2022 and did not follow-up with outpatient care. Pt was unsure why he missed his appointment. Pt reports he has been using heroin approximately every other day and marijuana daily. 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 He does report past AOD treatment, but has been unsuccessful at staying sober for long periods of time. Per pt report he currently resides with his brother at [address] [place] [address] and reports his brother as a good support system, when he is present. Pt reports he is his own guardian and denies hallucinations or current legal issues.  Pt would be willing to go inpatient and stated \"I need the help. I'm just tired of everything\". Level of Care Disposition:  SW discussed with Dr. Lluvia Bautista and will contact [facility] to speak with on call psychiatrist once pt is medically cleared and labs have resulted .         CYNTHIA Montes  08/09/22 8015

## 2022-08-09 NOTE — ED NOTES
Report recd from Carin Sanon pt resting on cot without distress/safely. 1:1 maintained. Planning to transfer awaiting further. orders.       Familia Calderon RN  08/09/22 9518

## 2022-08-09 NOTE — BH NOTE
Patient admitted to unit [facility] at [address] per provider order. Patient scanned with metal detector. Patient admitted voluntary from [address] [place] [address]. Patient is sucidal with plan to overdose on drugs. Patient is homicidal towards no one specific. Patient reports increase anxiety and depression due to losing a family member, and not being able to see his kids. Patient has history of drug abuse-positive for amphetamines, cocaine, fentanyl, marijuana. Patient is non compliant with home medications and [place] [address] [facility] since last discharge, 3/22. On admission, patient admits to having suicidal and homicidal thoughts, denies plan, contracts for safety. Patient reports not having any tactile, gustatory, auditory, olfactory, or visual hallucinations. Patient lethargic on admission. Patient is currently denying having drug withdrawals. Patient wants drug rehab when discharged. Will monitor patient for safety and behavior.

## 2022-08-09 NOTE — H&P
[address] [facility] Internal Medicine  Bird Burns MD; Van Young MD; Anival Maldonado MD; MD Jennyfer Kim MD; MD NELI [facility]   [address] / HISTORY AND PHYSICAL EXAMINATION            Date:   8/9/2022  Patient name:  Maldonado Cortés  Date of admission:  8/9/2022  1:38 PM  MRN:   [id number]  Account:  [de-identified]  YOB: 1990  PCP:    Sharol Koyanagi HIGH  Room:   [address] [place] [address]  Code Status:    Full Code    Physician Requesting Consult: Brayden Aguilar MD    Reason for Consult: Medical management    Chief Complaint:     No chief complaint on file.   Acute psychosis and suicidal ideations    History Obtained From:     patient    History of Present Illness:       76-year-old gentleman with underlying history of anxiety and depression and bipolar disorder, polysubstance abuse, alcoholism, smoker, essential hypertension, diet-controlled, admitted to inpatient psych for suicidal ideations    Past Medical History:     Past Medical History:   Diagnosis Date    Al[facility]ism [facility] [address]     Anxiety     Bipolar 1 disorder ([facility] [address]     Cocaine abuse ([facility] [address]     Depression     Hepatitis C antibody test positive     Hypertension     Irregular heartbeat     Mild tetrahydrocannabinol (THC) abuse     Suicidal ideation         Past Surgical History:     Past Surgical History:   Procedure Laterality Date    DIALYSIS FISTULA CREATION Right 6/4/2017    RIGHT WRIST WOUND EXPLORATION WITH ARTERIAL REPAIR ULNAR AND RADIAL ARTERIES RIGHT WRIST performed by Vinay Singh MD at [address] Right 6/4/2017    WOUND EXPLORATION AND/OR REVISION performed by Vinay Singh MD at [address] Left     OTHER SURGICAL HISTORY Right 06/04/2017    exp and repair of unlar and radial artery with exp and repair of 12 tendons and 2 nerves        Medications Prior to Admission:     Prior to Admission medications    Medication Sig Start Date End Date Taking? Authorizing Provider   traZODone (DESYREL) 50 MG tablet Take 1 tablet by mouth nightly as needed for Sleep  Patient not taking: Reported on 2022 3/22/22   Derick Vasquez MD   FLUoxetine (PROZAC) 20 MG capsule Take 1 capsule by mouth daily  Patient not taking: Reported on 2022 3/22/22   Derick Vasquez MD        Allergies:     Vicodin [hydrocodone-acetaminophen]    Social History:     Tobacco:    reports that he has been smoking cigarettes. He has a 7.00 pack-year smoking history. He has never used smokeless tobacco.  Alcohol:      reports that he does not currently use alcohol. Drug Use:  reports current drug use. Drugs: Marijuana (Weed) and Cocaine. Family History:     Family History   Problem Relation Age of Onset    Heart Disease Father 52    Asthma Brother     Hypertension Maternal Grandmother     Lung Cancer Maternal Grandmother         Kidney cancer, liver cancer    Stroke Maternal Grandmother     Heart Disease Mother 46         of presumed heart disease in her sleep       Review of Systems:     Positive and Negative as described in HPI. CONSTITUTIONAL:  negative for fevers, chills, sweats, fatigue, weight loss  HEENT:  negative for vision, hearing changes, runny nose, throat pain  RESPIRATORY:  negative for shortness of breath, cough, congestion, wheezing. CARDIOVASCULAR:  negative for chest pain, palpitations.   GASTROINTESTINAL:  negative for nausea, vomiting, diarrhea, constipation, change in bowel habits, abdominal pain   GENITOURINARY:  negative for difficulty of urination, burning with urination, frequency   INTEGUMENT:  negative for rash, skin lesions, easy bruising   HEMATOLOGIC/LYMPHATIC:  negative for swelling/edema   ALLERGIC/IMMUNOLOGIC:  negative for urticaria , itching  ENDOCRINE:  negative increase in drinking, increase in urination, hot or cold intolerance  MUSCULOSKELETAL:  negative joint pains, muscle aches, swelling of joints  NEUROLOGICAL:  negative for headaches, dizziness, lightheadedness, numbness, pain, tingling extremities    Physical Exam:     BP (!) 140/64   Pulse 63   Temp 97.5 °F (36.4 °C) (Oral)   Resp 14   Ht 5' 10\" (1.778 m)   Wt 130 lb (59 kg)   BMI 18.65 kg/m²   Temp (24hrs), Av.5 °F (36.4 °C), Min:97.2 °F (36.2 °C), Max:98.1 °F (36.7 °C)    No results for input(s): POCGLU in the last 72 hours. No intake or output data in the 24 hours ending 22 1828    General Appearance:  alert, well appearing, and in no acute distress  Mental status: oriented to person, place, and time with normal affect  Head:  normocephalic, atraumatic. Eye: no icterus, redness, pupils equal and reactive, extraocular eye movements intact, conjunctiva clear  Ear: normal external ear, no discharge, hearing intact  Nose:  no drainage noted  Mouth: mucous membranes moist  Neck: supple, no carotid bruits, thyroid not palpable  Lungs: Bilateral equal air entry, clear to ausculation, no wheezing, rales or rhonchi, normal effort  Cardiovascular: normal rate, regular rhythm, no murmur, gallop, rub.   Abdomen: Soft, nontender, nondistended, normal bowel sounds, no hepatomegaly or splenomegaly  Neurologic: There are no new focal motor or sensory deficits, normal muscle tone and bulk, no abnormal sensation, normal speech, cranial nerves II through XII grossly intact  Skin: No gross lesions, rashes, bruising or bleeding on exposed skin area  Extremities:  peripheral pulses palpable, no pedal edema or calf pain with palpation  Psych: normal affect    Investigations:      Laboratory Testing:  Recent Results (from the past 24 hour(s))   Comprehensive Metabolic Panel    Collection Time: 22  4:47 AM   Result Value Ref Range    Glucose 80 70 - 99 mg/dL    BUN 11 6 - 20 mg/dL    Creatinine 0.88 0.70 - 1.20 mg/dL    Calcium 9.5 8.6 - 10.4 mg/dL    Sodium 138 135 - 144 mmol/L    Potassium 3.5 (L) 3.7 - 5.3 mmol/L    Chloride 103 98 - 107 mmol/L    CO2 26 20 - 31 mmol/L    Anion Gap 9 9 - 17 mmol/L    Alkaline Phosphatase 79 40 - 129 U/L    ALT 98 (H) 5 - 41 U/L    AST 66 (H) <40 U/L    Total Bilirubin 0.53 0.3 - 1.2 mg/dL    Total Protein 7.3 6.4 - 8.3 g/dL    Albumin 4.2 3.5 - 5.2 g/dL    Albumin/Globulin Ratio 1.4 1.0 - 2.5    GFR Non-African American >60 >60 mL/min    GFR African American >60 >60 mL/min    GFR Comment         CBC with Auto Differential    Collection Time: 08/09/22  4:47 AM   Result Value Ref Range    WBC 6.8 3.5 - 11.3 k/uL    RBC 4.29 4. 21 - 5.77 m/uL    Hemoglobin 12.9 (L) 13.0 - 17.0 g/dL    Hematocrit 39.9 (L) 40.7 - 50.3 %    MCV 93.0 82.6 - 102.9 fL    MCH 30.1 25.2 - 33.5 pg    MCHC 32.3 28.4 - 34.8 g/dL    RDW 13.4 11.8 - 14.4 %    Platelets 707 500 - 535 k/uL    MPV 8.9 8.1 - 13.5 fL    NRBC Automated 0.0 0.0 per 100 WBC    Seg Neutrophils 27 (L) 36 - 65 %    Lymphocytes 57 (H) 24 - 43 %    Monocytes 10 3 - 12 %    Eosinophils % 5 (H) 1 - 4 %    Basophils 1 0 - 2 %    Immature Granulocytes 0 0 %    Segs Absolute 1.82 1.50 - 8.10 k/uL    Absolute Lymph # 3.98 (H) 1.10 - 3.70 k/uL    Absolute Mono # 0.66 0.10 - 1.20 k/uL    Absolute Eos # 0.32 0.00 - 0.44 k/uL    Basophils Absolute 0.05 0.00 - 0.20 k/uL    Absolute Immature Granulocyte <0.03 0.00 - 0.30 k/uL   TOX SCR, BLD, ED    Collection Time: 08/09/22  4:47 AM   Result Value Ref Range    Acetaminophen Level <5 (L) 10 - 30 ug/mL    Ethanol <10 <10 mg/dL    Ethanol percent <0.530 <7.951 %    Salicylate Lvl <1 (L) 3 - 10 mg/dL    Toxic Tricyclic Sc,Blood NEGATIVE NEGATIVE   Urine Drug Screen    Collection Time: 08/09/22  4:51 AM   Result Value Ref Range    Amphetamine Screen, Ur POSITIVE (A) NEGATIVE    Barbiturate Screen, Ur NEGATIVE NEGATIVE    Benzodiazepine Screen, Urine NEGATIVE NEGATIVE    Cocaine Metabolite, Urine POSITIVE (A) NEGATIVE    Methadone Screen, Urine NEGATIVE NEGATIVE    Opiates, Urine NEGATIVE NEGATIVE    Phencyclidine, Urine NEGATIVE NEGATIVE    Cannabinoid Scrn, Ur POSITIVE (A) NEGATIVE    Oxycodone Screen, Ur NEGATIVE NEGATIVE    Fentanyl, Ur POSITIVE (A) NEGATIVE    Test Information       Assay provides medical screening only. The absence of expected drug(s) and/or metabolite(s) may indicate diluted or adulterated urine, limitations of testing or timing of collection. Imaging/Diagonstics:  No results found. Assessment :      Hospital Problems             Last Modified POA    * (Principal) Acute psychosis ([address] 8/9/2022 Yes    Polysubstance abuse ([address] 8/9/2022 Yes    Abuse of smoked substance ([address] 8/9/2022 Yes    Cocaine abuse ([address] (Chronic) 8/9/2022 Yes    Mild tetrahydrocannabinol (THC) abuse (Chronic) 5/6/8210 Yes    Uncomplicated alcohol dependence ([address] 8/9/2022 Yes    Essential hypertension 8/9/2022 Yes    Severe major depression ([address] 8/9/2022 Yes    Opiate use 8/9/2022 Yes    Marijuana use 8/9/2022 Yes       Plan:     27-year-old gentleman with underlying history of anxiety, depression, bipolar, admitted to inpatient psych for acute psychosis and suicidal ideations,  Polysubstance abuse, watch for any withdrawal,  Current smoker, counseling done to quit smoking,  Cocaine abuse, no chest pain at this time, continue to watch any cardiac symptoms,  Essential hypertension, continue to monitor blood pressure, diet controlled at this time,  Opioid abuse, watch for any constipation,  DVT prophylaxis, patient mobile,  Full CODE STATUS    Consultations:   Lisbet Barrios MD  8/9/2022  6:28 PM    Copy sent to Dr. Isabell Panda    Please note that this chart was generated using voice recognition Dragon dictation software. Although every effort was made to ensure the accuracy of this automated transcription, some errors in transcription may have occurred.

## 2022-08-09 NOTE — ED NOTES
KISHAN attempted to present case to Kota Melchor again. No answer so KISHAN left another message requesting a return call, await reply. Jaclyn Gonzalez.  [place], [place]  08/09/22 [id number]

## 2022-08-09 NOTE — ED NOTES
The following labs labeled with pt sticker and tubed to lab:     [] Blue     [x] Lavender   [] on ice  [x] Green/yellow  [] Green/black [] on ice  [] Yellow  [] Red  [] Pink      [] COVID-19 swab    [] Rapid  [] PCR  [] Flu swab  [] Peds Viral Panel     [x] Urine Sample  [] Pelvic Cultures  [] Blood Cultures          Cesar Calabrese LPN  04/86/48 [id number]

## 2022-08-09 NOTE — ED NOTES
belongings search:     Persons present during search:   Results of search and disposition:       Searchers Name:     These items or items similar should be removed from the room:   [x] Chairs    Telephone   [][x] Trash cans and liners   [x] Plastic utensils (order Patient Safety tray)   [x] Empty or remove Sharps containers   [x] All personal clothing/belongings removed   [x] All unnecessary lead wires, electrical cords, draw cords, etc.   [x] Flowers and plants   [x] Double check for lighters, matches, razors, any glass items etc that can be used as weapons. Person completing Checklist: Yanet Rondon LPN       GENERAL INFORMATION     Y  N     [x] [] Has the patient been informed that they are on a watch and what that means? [x] [] Can the patient get out of Bed without nursing assistance? [] [x] Can the patient use the restroom without nursing assistance? [] [x] Can the patient walk the halls to [place] their legs? \"   [] [x] Does the patient have metal utensils? [x] [] Have the patient's belongings been placed out of control of the patient? [x] [] Have the patient and his/her belongings been checked for contraband? [] [x] Is the patient under any visitor restrictions? If Yes, explain:   [] [] Is the patient under an alias? [place] [id number] Name:   Authorized visitors (no more than two are to be on the list)   Name/Relationship:   Name/Relationship:    Name of Staff member that you  Received this information from?:     General Description:    Alex Keller [birth year] male 27 y.o. Admission weight:      Race: []  [x] Black  []   []   [] [place] [] Other  Facial Hair:  [x] Yes  [] No  If yes, please describe: Identifying Marks (i.e. Visible tattoos, scars, etc... ):     NURSING CARE PLAN    Nursing Diagnosis: Risk of Self Directed Harm  [] Actual  [x] Potential  Date Started: 8/9/22      Etiological Factors: (related to)  [] Expressed or implied suicidal ideation/behavior  [x]

## 2022-08-09 NOTE — ED PROVIDER NOTES
[facility]  ED  Emergency Department Encounter  Emergency Medicine Resident     Pt Kimberly Dunaway  MRN: [id number]  [place] 1990  Date of evaluation: 8/9/22  PCP:  Amelia Shane       Chief Complaint   Patient presents with    Suicidal    Homicidal       HISTORY OF PRESENT ILLNESS  (Location/Symptom, Timing/Onset, Context/Setting, Quality, Duration, Modifying Factors, Severity.)      Felipe Salgado is a 27 y.o. male who presents with suicidal and homicidal ideation with no plan or attempt. Patient states that he has been suicidal in the past and states he had 1 previous attempt in which he attempted to overdose on his trazodone pills. Patient states that he had been fine until about a month ago, during this time he lost his uncle, with whom he was close, and has not been able to see his children. Patient denies ingesting any medication [over-the-counter, prescribed, or illicit] or alcohol. Patient denies any harm tonight. Patient denies having harmed anyone or any plans of hurting others. Patient denies any ongoing medical conditions and is on no medications. He states he has no chest or abdominal pain, shortness of breath, headache, weakness/loss of sensation, visual disturbances, nausea/vomiting, constipation, or diarrhea. PAST MEDICAL / SURGICAL / SOCIAL / FAMILY HISTORY      has a past medical history of Alcoholism ([facility] [address] Anxiety, Bipolar 1 disorder ([facility] [address] Cocaine abuse ([facility] [address] Depression, Hepatitis C antibody test positive, Hypertension, Irregular heartbeat, Mild tetrahydrocannabinol (THC) abuse, and Suicidal ideation. has a past surgical history that includes other surgical history (Right, 06/04/2017); Dialysis fistula creation (Right, 6/4/2017); EXPLORATION OF WOUND OF EXTREMITY (Right, 6/4/2017); and Femur Surgery (Left).       Social History     Socioeconomic History    Marital status: Single     Spouse name: magalys    Number of children: 1 Years of education: Not on file    Highest education level: Not on file   Occupational History    Occupation: unemployed     Comment: used to work at "[employer] 6 months ago   Tobacco Use    Smoking status: Every Day     Packs/day: 0.50     Years: 14.00     Pack years: 7.00     Types: Cigarettes    Smokeless tobacco: Never    Tobacco comments:     3 cigs per day per patient   Vaping Use    Vaping Use: Every day   Substance and Sexual Activity    Alcohol use: Not Currently    Drug use: Yes     Types: Marijuana (Michelle Sermon), Cocaine     Comment: states uses fentanyl    Sexual activity: Yes     Partners: Female   Other Topics Concern    Not on file   Social History Narrative    ** Merged History Encounter **         Lives with fiance and 3year old daughter     Social Determinants of Health     Financial Resource Strain: Not on file   Food Insecurity: Not on file   Transportation Needs: Not on file   Physical Activity: Not on file   Stress: Not on file   Social Connections: Not on file   Intimate Partner Violence: Not on file   Housing Stability: Not on file       Family History   Problem Relation Age of Onset    Heart Disease Father 52    Asthma Brother     Hypertension Maternal Grandmother     Lung Cancer Maternal Grandmother         Kidney cancer, liver cancer    Stroke Maternal Grandmother     Heart Disease Mother 46         of presumed heart disease in her sleep       Allergies:  Vicodin [hydrocodone-acetaminophen]    Home Medications:  Prior to Admission medications    Medication Sig Start Date End Date Taking?  Authorizing Provider   traZODone (DESYREL) 50 MG tablet Take 1 tablet by mouth nightly as needed for Sleep  Patient not taking: Reported on 2022 3/22/22   Malgorzata Nguyen MD   FLUoxetine (PROZAC) 20 MG capsule Take 1 capsule by mouth daily  Patient not taking: Reported on 2022 3/22/22   Malgorzata Nguyen MD       REVIEW OF SYSTEMS    (2-9 systems for level 4, 10 or more for level 5)      Review of Systems   Constitutional:  Negative for chills and fever. HENT:  Negative for facial swelling, sore throat and tinnitus. Eyes:  Negative for photophobia, pain and visual disturbance. Respiratory:  Negative for chest tightness and shortness of breath. Cardiovascular:  Negative for chest pain, palpitations and leg swelling. Gastrointestinal:  Negative for abdominal pain, constipation, diarrhea, nausea and vomiting. Skin:  Negative for rash and wound. Neurological:  Negative for dizziness, syncope, weakness, light-headedness, numbness and headaches. Psychiatric/Behavioral:  Positive for suicidal ideas. Negative for self-injury. PHYSICAL EXAM   (up to 7 for level 4, 8 or more for level 5)      INITIAL VITALS:   BP (!) 146/91   Pulse 62   Temp 97.2 °F (36.2 °C) (Oral)   Resp 18   SpO2 98%     Physical Exam  Constitutional:       Appearance: He is not toxic-appearing or diaphoretic. HENT:      Head: Normocephalic and atraumatic. Mouth/Throat:      Mouth: Mucous membranes are moist.      Pharynx: Oropharynx is clear. Eyes:      Extraocular Movements: Extraocular movements intact. Pupils: Pupils are equal, round, and reactive to light. Cardiovascular:      Rate and Rhythm: Normal rate and regular rhythm. Pulses: Normal pulses. Pulmonary:      Effort: Pulmonary effort is normal.      Breath sounds: Normal breath sounds. Abdominal:      General: Abdomen is flat. Tenderness: There is no abdominal tenderness. There is no guarding or rebound. Skin:     General: Skin is warm. Capillary Refill: Capillary refill takes less than 2 seconds. Findings: No erythema or rash. Neurological:      General: No focal deficit present. Mental Status: He is alert and oriented to person, place, and time. Cranial Nerves: No cranial nerve deficit. Sensory: No sensory deficit. Motor: No weakness.    Psychiatric:         Attention and Perception: Attention normal.         Mood and Affect: Mood is depressed. Speech: Speech normal.         Behavior: Behavior normal. Behavior is cooperative. Thought Content: Thought content includes homicidal and suicidal ideation. Thought content does not include homicidal or suicidal plan. Cognition and Memory: Cognition normal.         Judgment: Judgment normal.       DIFFERENTIAL  DIAGNOSIS     PLAN (LABS / IMAGING / EKG):  Orders Placed This Encounter   Procedures    Comprehensive Metabolic Panel    CBC with Auto Differential    Urine Drug Screen    TOX SCR, BLD, ED    Suicide precautions       MEDICATIONS ORDERED:  No orders of the defined types were placed in this encounter. DDX: Suicidal ideation, self-harm [ingestion, trauma], homicidal ideation, and harm to others were all considered in the differential diagnosis. DIAGNOSTIC RESULTS / EMERGENCY DEPARTMENT COURSE / MDM   LAB RESULTS:  Results for orders placed or performed during the hospital encounter of 08/09/22   Comprehensive Metabolic Panel   Result Value Ref Range    Glucose 80 70 - 99 mg/dL    BUN 11 6 - 20 mg/dL    Creatinine 0.88 0.70 - 1.20 mg/dL    Calcium 9.5 8.6 - 10.4 mg/dL    Sodium 138 135 - 144 mmol/L    Potassium 3.5 (L) 3.7 - 5.3 mmol/L    Chloride 103 98 - 107 mmol/L    CO2 26 20 - 31 mmol/L    Anion Gap 9 9 - 17 mmol/L    Alkaline Phosphatase 79 40 - 129 U/L    ALT 98 (H) 5 - 41 U/L    AST 66 (H) <40 U/L    Total Bilirubin 0.53 0.3 - 1.2 mg/dL    Total Protein 7.3 6.4 - 8.3 g/dL    Albumin 4.2 3.5 - 5.2 g/dL    Albumin/Globulin Ratio 1.4 1.0 - 2.5    GFR Non-African American >60 >60 mL/min    GFR African American >60 >60 mL/min    GFR Comment         CBC with Auto Differential   Result Value Ref Range    WBC 6.8 3.5 - 11.3 k/uL    RBC 4.29 4. 21 - 5.77 m/uL    Hemoglobin 12.9 (L) 13.0 - 17.0 g/dL    Hematocrit 39.9 (L) 40.7 - 50.3 %    MCV 93.0 82.6 - 102.9 fL    MCH 30.1 25.2 - 33.5 pg    MCHC 32.3 28.4 - 34.8 g/dL    RDW 13.4 11.8 - 14.4 %    Platelets 500 390 - 832 k/uL    MPV 8.9 8.1 - 13.5 fL    NRBC Automated 0.0 0.0 per 100 WBC    Seg Neutrophils 27 (L) 36 - 65 %    Lymphocytes 57 (H) 24 - 43 %    Monocytes 10 3 - 12 %    Eosinophils % 5 (H) 1 - 4 %    Basophils 1 0 - 2 %    Immature Granulocytes 0 0 %    Segs Absolute 1.82 1.50 - 8.10 k/uL    Absolute Lymph # 3.98 (H) 1.10 - 3.70 k/uL    Absolute Mono # 0.66 0.10 - 1.20 k/uL    Absolute Eos # 0.32 0.00 - 0.44 k/uL    Basophils Absolute 0.05 0.00 - 0.20 k/uL    Absolute Immature Granulocyte <0.03 0.00 - 0.30 k/uL   Urine Drug Screen   Result Value Ref Range    Amphetamine Screen, Ur POSITIVE (A) NEGATIVE    Barbiturate Screen, Ur NEGATIVE NEGATIVE    Benzodiazepine Screen, Urine NEGATIVE NEGATIVE    Cocaine Metabolite, Urine POSITIVE (A) NEGATIVE    Methadone Screen, Urine NEGATIVE NEGATIVE    Opiates, Urine NEGATIVE NEGATIVE    Phencyclidine, Urine NEGATIVE NEGATIVE    Cannabinoid Scrn, Ur POSITIVE (A) NEGATIVE    Oxycodone Screen, Ur NEGATIVE NEGATIVE    Fentanyl, Ur POSITIVE (A) NEGATIVE    Test Information       Assay provides medical screening only. The absence of expected drug(s) and/or metabolite(s) may indicate diluted or adulterated urine, limitations of testing or timing of collection. TOX SCR, BLD, ED   Result Value Ref Range    Acetaminophen Level <5 (L) 10 - 30 ug/mL    Ethanol <10 <10 mg/dL    Ethanol percent <8.234 <8.199 %    Salicylate Lvl <1 (L) 3 - 10 mg/dL    Toxic Tricyclic Sc,Blood NEGATIVE NEGATIVE       IMPRESSION: No acute injury or emergent condition identified. Medically cleared for behavioral health admission. RADIOLOGY:  No orders to display       EKG    All EKG's are interpreted by the Emergency Department Physician who either signs or Co-signs this chart in the absence of a cardiologist.    EMERGENCY DEPARTMENT COURSE:  Patient examined and history obtained. Patient was placed on suicide precautions.   Social work was consulted and the necessary investigations were initiated. Social work began admission work for [facility]. Care of patient handed assumed by Dr. Javed Rivers. ED Course as of 08/09/22 0747   Tue Aug 09, 2022   0712 Care taken over from Dr. Ashlee Knowles [ZE]      ED Course User Index  [ZE] Dio Sen DO       No notes of [facility] Admission Criteria type on file. PROCEDURES:  None    CONSULTS:  None    CRITICAL CARE:  None  ED Course as of 08/09/22 0747   Tue Aug 09, 2022   0712 Care taken over from Dr. Ashlee Knowles [ZE]      ED Course User Index  [ZE] Dio Sen DO       FINAL IMPRESSION      1. Evaluation by medical service required          DISPOSITION / PLAN     DISPOSITION        PATIENT REFERRED TO:  No follow-up provider specified.     DISCHARGE MEDICATIONS:  New Prescriptions    No medications on file       Azalea Perera MD  Emergency Medicine Resident    (Please note that portions of thisnote were completed with a voice recognition program.  Efforts were made to edit the dictations but occasionally words are mis-transcribed.)       Azalea Perera MD  Resident  08/09/22 Bryn Morrell MD  Resident  08/09/22 [id number]

## 2022-08-09 NOTE — BH NOTE
[facility]  Admission Note     Admission Type:   Admission Type: Voluntary    Reason for admission:  Reason for Admission: Patient is sucidal with plan to overdose on drugs. Patient is homicidal towards no one specific. Patient reports increase anxiety and depression due to losing a family member, and not being able to see his kids. Patient has history of drug abuse-positive for amphetamines, cocaine, fentanyl, marijuana.  Patient is non compliant with home medications and [place] [id number] [facility] since last discharge 3/22      Addictive Behavior:   Addictive Behavior  In the Past 3 Months, Have You Felt or Has Someone Told You That You Have a Problem With  : None    Medical Problems:   Past Medical History:   Diagnosis Date    [facility] ([facility]     Anxiety     Bipolar 1 disorder (HCC)     Cocaine abuse (HCC)     Depression     Hepatitis C antibody test positive     Hypertension     Irregular heartbeat     Mild tetrahydrocannabinol (THC) abuse     Suicidal ideation        Status EXAM:  Mental Status and Behavioral Exam  Normal: No  Level of Assistance: Independent/Self  Facial Expression: Avoids Gaze, Expressionless  Affect: Blunt  Level of Consciousness: Lethargic  Frequency of Checks: 4 times per hour, close  Mood:Normal: No  Mood: Depressed, Anxious, Empty, Helpless  Motor Activity:Normal: No  Motor Activity: Decreased, Unusual posture/gait (lethargic on admission)  Eye Contact: Poor  Observed Behavior: Cooperative, Preoccupied, Withdrawn  Sexual Misconduct History: Current - no  Preception: [place] to person, [place] to time, [place] to place, [place] to situation  Attention:Normal: No  Attention: Unable to concentrate, Distractible  Thought Processes: Blocking  Thought Content:Normal: No  Thought Content: Preoccupations  Depression Symptoms: Feelings of hopelessess, Feelings of worthlessness, Impaired concentration, Feelings of helplessness  Anxiety Symptoms: Generalized  [place] Symptoms: No problems reported or observed. Hallucinations: None  Delusions: No  Memory:Normal: Yes  Insight and Judgment: No  Insight and Judgment: Poor judgment, Poor insight    Tobacco Screening:  Practical Counseling, on admission, rachna X, if applicable and completed (first 3 are required if patient doesn't refuse): ( x) Recognizing danger situations (included triggers and roadblocks)                    ( x) Coping skills (new ways to manage stress,relaxation techniques, changing routine, distraction)                                                           ( ) Basic information about quitting (benefits of quitting, techniques in how to quit, available resources  ( ) Referral for counseling faxed to [facility]                                                                                                                   ( ) Patient refused counseling  ( ) Patient has not smoked in the last 30 days    Metabolic Screening:    Lab Results   Component Value Date    LABA1C 5.6 06/18/2020       Lab Results   Component Value Date    CHOL 187 06/18/2020    CHOL 194 04/16/2018     Lab Results   Component Value Date    TRIG 98 06/18/2020    TRIG 120 04/16/2018     Lab Results   Component Value Date    HDL 50 06/18/2020    HDL 44 04/16/2018     No components found for: LDLCAL  No results found for: LABVLDL      Body mass index is 18.65 kg/m². BP Readings from Last 2 Encounters:   08/09/22 (!) 140/64   08/09/22 133/86           Pt admitted with followings belongings:  Dental Appliances: None  Vision - Corrective Lenses: None  Hearing Aid: None  Jewelry: Necklace  Body Piercings Removed: N/A  Clothing: Socks, Shirt, Undergarments, Shorts, Footwear  Other Valuables: Other (Comment) (VISA [id number], [id number], [facility] 1521)    Martha Moran RN          [facility] 133[address] RM # 212 per provider order. Patient scanned with metal detector. Patient admitted voluntary from [facility].   Patient is sucidal with plan to overdose on drugs. Patient is homicidal towards no one specific. Patient reports increase anxiety and depression due to losing a family member, and not being able to see his kids. Patient has history of drug abuse-positive for amphetamines, cocaine, fentanyl, marijuana. Patient is non compliant with home medications and [place] [phone] [facility] since last discharge, 3/22. On admission, patient admits to having suicidal and homicidal thoughts, denies plan, contracts for safety. Patient reports not having any tactile, gustatory, auditory, olfactory, or visual hallucinations. Patient lethargic on admission. Patient is currently denying having drug withdrawals. Will monitor patient for safety and behavior.

## 2022-08-09 NOTE — BH NOTE
Dr Moody Bound notified of best practice advisory suggesting to place patient on suicide precautions.  Provider to discontinue the order as patient does not meet criteria for suicide precautions at this time

## 2022-08-09 NOTE — ED NOTES
Pt resting on cot with eyes closed. Pt showing no s/s of distress at this time. Respirations even and unlabored. Guard remains at bedside.           Brandy Parham, CURT  22/21/23 [id number]

## 2022-08-09 NOTE — ED NOTES
Patient will go to the [facility], room  #212, at 1:00pm by Jay Dai. RN has transfer forms and will call report. Nena Louise.  [place], [place]  08/09/22 1012

## 2022-08-10 PROCEDURE — 6370000000 HC RX 637 (ALT 250 FOR IP): Performed by: PSYCHIATRY & NEUROLOGY

## 2022-08-10 PROCEDURE — 6370000000 HC RX 637 (ALT 250 FOR IP)

## 2022-08-10 PROCEDURE — 1240000000 HC EMOTIONAL WELLNESS R&B

## 2022-08-10 PROCEDURE — 90792 PSYCH DIAG EVAL W/MED SRVCS: CPT | Performed by: PSYCHIATRY & NEUROLOGY

## 2022-08-10 PROCEDURE — APPSS60 APP SPLIT SHARED TIME 46-60 MINUTES

## 2022-08-10 RX ORDER — CYCLOBENZAPRINE HCL 10 MG
10 TABLET ORAL 3 TIMES DAILY PRN
Status: DISCONTINUED | OUTPATIENT
Start: 2022-08-10 | End: 2022-08-12 | Stop reason: HOSPADM

## 2022-08-10 RX ORDER — MIRTAZAPINE 15 MG/1
7.5 TABLET, FILM COATED ORAL NIGHTLY
Status: DISCONTINUED | OUTPATIENT
Start: 2022-08-10 | End: 2022-08-12 | Stop reason: HOSPADM

## 2022-08-10 RX ORDER — ONDANSETRON 4 MG/1
4 TABLET, FILM COATED ORAL EVERY 8 HOURS PRN
Status: DISCONTINUED | OUTPATIENT
Start: 2022-08-10 | End: 2022-08-12 | Stop reason: HOSPADM

## 2022-08-10 RX ORDER — DICYCLOMINE HYDROCHLORIDE 10 MG/1
10 CAPSULE ORAL 3 TIMES DAILY PRN
Status: DISCONTINUED | OUTPATIENT
Start: 2022-08-10 | End: 2022-08-12 | Stop reason: HOSPADM

## 2022-08-10 RX ADMIN — HYDROXYZINE HYDROCHLORIDE 50 MG: 50 TABLET, FILM COATED ORAL at 13:31

## 2022-08-10 RX ADMIN — DICYCLOMINE HYDROCHLORIDE 10 MG: 10 CAPSULE ORAL at 13:31

## 2022-08-10 RX ADMIN — CYCLOBENZAPRINE 10 MG: 10 TABLET, FILM COATED ORAL at 13:30

## 2022-08-10 RX ADMIN — ONDANSETRON HYDROCHLORIDE 4 MG: 4 TABLET, FILM COATED ORAL at 13:31

## 2022-08-10 NOTE — H&P
Department of Psychiatry  Attending Physician Psychiatric Assessment     Reason for Admission to Psychiatric Unit:  Concerns about patient's safety in the community    CHIEF COMPLAINT: Depression with suicidal and homicidal ideation    History obtained from: Patient, electronic medical record          HISTORY OF PRESENT ILLNESS:    Km Meehan is a 27 y.o. male who has a past medical history of polysubstance abuse, mental illness, and hypertension who presents to the ER with increased depression with homicidal and suicidal ideation. Patient reports that his suicidal plan is to overdose on illegal drugs. Patient reports homicidal ideation is not towards anyone specific but just towards anyone in general.  Patient is known to this writer from multiple previous admissions. He is reluctantly agreeable to interview at bedside today stating that he is too tired to participate. However with encouragement patient did answer some questions however minimally. Daysi Small reports that he is dealing with [place] lot of different stressors\" currently. He will not go into detail however per documentation from ER he recently lost an uncle who he was close with as well as feels depressed due to not being able to see his children. Daysi Small also reports that this writer that he has been using multiple drugs and this is stressful for him. He reports that he is interested in substance use rehab once he is discharged from the hospital.  Due to increased life stressors patient reports increased depression for the past couple of weeks with a low mood all day nearly every day. He endorses poor sleep, significant anhedonia, poor energy, and decreased concentration. He reports that his appetite has been poor lately. He endorses significant feelings of hopelessness and helplessness. Patient endorses suicidal ideation and states that he has a plan to \"shoot up and overdose. \"  He continues to endorse homicidal ideation however denies that it is towards anyone in particular. He feels that at this time he would be extremely unsafe out of the hospital due to suicidal and homicidal thoughts. Madan Hippo he denies ever having a time in his past where he is gone 3 or more days without sleep and felt increased energy, absent the use of drugs. He denies increase in goal-directed activity or grandiose thoughts of himself. He denies symptoms of psychosis including auditory and visual hallucinations. He denies paranoia. He denies symptoms of anxiety including excess worry,, feeling restless and on edge, and increased muscle tension from anxiety. He denies a history of panic attacks. He denies obsessive-compulsive thoughts or behaviors. He does endorse a history of trauma however is very reluctant to speak about his trauma. He does report that he has nightmares and vivid flashbacks to reported trauma. He is very hyper avoidant and does not want to discuss traumatic history. Patient does endorse history of self harming behaviors by cutting. He states that he often feels chronically empty inside and has poor self-esteem. He endorses mood swings throughout the day with intense anger outbursts and volcanic eruptions of emotion. Patient has a significant history of drug use. Patient admits to this writer that he has been using fentanyl and crack cocaine daily. He also endorses marijuana use. He denies alcohol use. Patient's UDS is positive for amphetamines, cannabis, cocaine, and fentanyl. Patient has gone through withdrawal in the past however denies that he is going through any withdrawal right now. We will still order as needed medications for symptoms of withdrawal.           History of head trauma: [x] Yes [] No    History of seizures: [] Yes [x] No    History of violence or aggression: [x] Yes [] No         PSYCHIATRIC HISTORY:  [x] Yes [] No    Patient reports that he is not currently linked with anyone.   Has previously been linked with [employer] source of income  Marital Status: Never   Children: 4 children whom he does not have custody of and does not see regularly  Residence: Has been living with his brother  Stressors: Chronic mental illness, drug abuse, lack of support  Patient Assets/Supportive Factors: Patient is seeking additional support         DRUG USE HISTORY  Social History     Tobacco Use   Smoking Status Every Day    Packs/day: 0.50    Years: 14.00    Pack years: 7.00    Types: Cigarettes   Smokeless Tobacco Never   Tobacco Comments    3 cigs per day per patient     Social History     Substance and Sexual Activity   Alcohol Use Not Currently     Social History     Substance and Sexual Activity   Drug Use Yes    Types: Marijuana ([place]), Cocaine    Comment: states uses fentanyl       Patient has a significant history of drug use. Patient admits to this writer that he has been using fentanyl and crack cocaine daily. He also endorses marijuana use. He denies alcohol use. Patient's UDS is positive for amphetamines, cannabis, cocaine, and fentanyl. Patient has gone through withdrawal in the past however denies that he is going through any withdrawal right now. We will still order as needed medications for symptoms of withdrawal.          LEGAL HISTORY:   HISTORY OF INCARCERATION: [x] Yes [] No    Family History:       Problem Relation Age of Onset    Heart Disease Father 52    Asthma Brother     Hypertension Maternal Grandmother     Lung Cancer Maternal Grandmother         Kidney cancer, liver cancer    Stroke Maternal Grandmother     Heart Disease Mother 46         of presumed heart disease in her sleep       Psychiatric Family History    Patient denies psychiatric family history.      Suicides in family: [] Yes [x] No    Substance use in family: [x] Yes [] No         PHYSICAL EXAM:  Vitals:  /62   Pulse 79   Temp 97.6 °F (36.4 °C) (Oral)   Resp 14   Ht 5' 10\" (1.778 m)   Wt 130 lb (59 kg)   BMI 18.65 kg/m²     Pain Level: Denies    LABS:  Labs reviewed: [x] Yes  Last EKG in EMR reviewed: [x] Yes  QTc: 457          Review of Systems   Constitutional: Negative for chills and weight loss. HENT: Negative for ear pain and nosebleeds. Eyes: Negative for blurred vision and photophobia. Respiratory: Negative for cough, shortness of breath and wheezing. Cardiovascular: Negative for chest pain and palpitations. Gastrointestinal: Negative for abdominal pain, diarrhea and vomiting. Genitourinary: Negative for dysuria and urgency. Musculoskeletal: Negative for falls and joint pain. Skin: Negative for itching and rash. Neurological: Negative for tremors, seizures and weakness. Endo/Heme/Allergies: Does not bruise/bleed easily. Physical Exam:   Constitutional:  Appears well-developed and well-nourished, no acute distress. HENT:   Head: Normocephalic and atraumatic. Eyes: Conjunctivae are normal. Right eye exhibits no discharge. Left eye exhibits no discharge. No scleral icterus. Neck: Normal range of motion. Neck supple. Pulmonary/Chest:  No respiratory distress or accessory muscle use, no wheezing. Cardiac: Regular rate and rhythm. Abdominal: Soft. Non-tender. Exhibits no distension. Musculoskeletal: Normal range of motion. Exhibits no edema. Neurological: cranial nerves II-XII grossly in tact, normal gait and station. Skin: Skin is warm and dry. Patient is not diaphoretic. No erythema. Mental Status Examination:    Level of consciousness: Awake and alert  Appearance:  Appropriate attire, resting in bed, poor grooming and hygiene   Behavior/Motor: Evasive, withdrawn  Attitude toward examiner:  Semi-cooperative, fairly attentive, poor eye contact  Speech: Normal rate, volume, and irritable tone. Mood: Depressed  Affect: Mood-congruent  Thought processes: Linear and coherent  Thought content: Active suicidal ideations, with a  current plan or intent, contracts for safety on the unit. Endorses homicidal ideations               Denies visual hallucinations. Denies auditory hallucinations. Denies delusions              Denies paranoia  Cognition:  Oriented to self, location, time, situation  Concentration: Clinically adequate  Memory: Intact  Insight &Judgment: Poor         DSM-5 Diagnosis    Principal Problem: Severe episode of recurrent major depressive disorder, without psychotic features (HCC)    Cannabis Use Disorder  Stimulant Use Disorder (Cocaine and Methamphetamines)  Opioid Use Disorder    Psychosocial and Contextual factors:  Financial: Endorses  Occupational: Denies  Relationship: Endorses  Legal: Denies  Living situation: Endorses  Educational: Denies    Past Medical History:   Diagnosis Date    [facility]ism ([facility]     Anxiety     [facility]     Cocaine abuse ([facility]     Depression     Hepatitis C antibody test positive     Hypertension     Irregular heartbeat     Mild tetrahydrocannabinol (THC) abuse     Suicidal ideation         TREATMENT CONSIDERATIONS    Continue inpatient psychiatric treatment. Home medications reviewed. Start Remeron 7.5 mg nightly  Problem list updated. Monitor need and frequency of PRN medications. Attempt to develop insight. Follow-up daily while inpatient. Reviewed risks and benefits as well as potential side effects with patient. CONSULTS [x] Yes [] No  Internal Medicine for Medical H&P    Risk Management: close watch per standard protocol      Psychotherapy: participation in milieu and group and individual sessions with Attending Physician,  and Physician Assistant/CNP      Estimated length of stay:  2-14 days      GENERAL PATIENT/FAMILY EDUCATION  Patient will understand basic signs and symptoms, patient will understand benefits/risks and potential side effects from proposed medications, and patient will understand their role in recovery. Family is not active in patient's care.    Patient assets that may be helpful during treatment include: Intent to participate and engage in treatment, sufficient fund of knowledge and intellect to understand and utilize treatments. Goals:    1) Remission of depression with suicidal and homicidal ideation. 2) Stabilization of symptoms prior to discharge. 3) Establish efficacy and tolerability of medications. Behavioral Services  Medicare Certification     Admission Day 1  I certify that this patient's inpatient psychiatric hospital admission is medically necessary for:    x (1) treatment which could reasonably be expected to improve this patient's condition, or    x (2) diagnostic study or its equivalent. Time Spent: 60 minutes    Felipe Salgado is a 27 y.o. male being evaluated face to face    --ROXANNE Mulligan CNP on 8/10/2022 at 11:30 AM    An electronic signature was used to authenticate this note. Psychiatry Attending Attestation     I independently saw and evaluated the patient. I reviewed the Advance Practice Provider's documentation above. Any additional comments or changes to the Advance Practice Provider's documentation are stated below otherwise agree with assessment. Patient is 48-year-old male with history of polysubstance abuse-cocaine cannabis admitted for worsening suicidal and homicidal thoughts. Had plans to kill self by overdosing on drugs. Reports that he has been feel increasingly pressed for last several weeks now. Has been off of his psychotropic medications. Reports trying Zoloft and Effexor and other psychotropic medications in the past.  Reports some trouble falling asleep and staying asleep. Reports constant feelings of helplessness and hopelessness. Somewhat somnolent during the conversation today. PLAN  We will start Remeron and titrate to effect to help with his mood  Attempt to develop insight  Psycho-education conducted. Supportive Therapy conducted.   Probable discharge is TBD  Follow-up TBD    Electronically signed by Mark Nolasco MD on 8/10/22 at 11:52 AM EDT

## 2022-08-10 NOTE — PLAN OF CARE
5 [facility]  Initial Interdisciplinary Treatment Plan NO      Original treatment plan Date & Time: 8/10/2022   1028    Admission Type:  Admission Type: Voluntary    Reason for admission:   Reason for Admission: Patient is sucidal with plan to overdose on drugs. Patient is homicidal towards no one specific. Patient reports increase anxiety and depression due to losing a family member, and not being able to see his kids. Patient has history of drug abuse-positive for amphetamines, cocaine, fentanyl, marijuana.  Patient is non compliant with home medications and [address] since last discharge 3/22    Estimated Length of Stay:  5-7days  Estimated Discharge Date: to be determined by physician    PATIENT STRENGTHS:  Patient Strengths:   Patient Strengths and Limitations:   Addictive Behavior: Addictive Behavior  In the Past 3 Months, Have You Felt or Has Someone Told You That You Have a Problem With  : None  Medical Problems:  Past Medical History:   Diagnosis Date    Alcoholism ([facility]     Anxiety     Bipolar 1 disorder ([facility]     Cocaine abuse ([facility]     Depression     Hepatitis C antibody test positive     Hypertension     Irregular heartbeat     Mild tetrahydrocannabinol (THC) abuse     Suicidal ideation      Status EXAM:Mental Status and Behavioral Exam  Normal: No  Level of Assistance: Independent/Self  Facial Expression: Avoids Gaze  Affect: Blunt  Level of Consciousness: Alert  Frequency of Checks: 4 times per hour, close  Mood:Normal: No  Mood: Depressed, Anxious  Motor Activity:Normal: Yes  Motor Activity: Decreased, Unusual posture/gait (lethargic on admission)  Eye Contact: Poor  Observed Behavior: Cooperative, Guarded, Withdrawn  Sexual Misconduct History: Current - no  Preception: [place] to person, [place] to time, Park to place, [place] to situation  Attention:Normal: No  Attention: Distractible  Thought Processes: Blocking  Thought Content:Normal: No  Thought Content: Poverty of content,

## 2022-08-10 NOTE — PROGRESS NOTES
Behavioral Services  Medicare Certification Upon Admission    I certify that this patient's inpatient psychiatric hospital admission is medically necessary for:    [x] (1) Treatment which could reasonably be expected to improve this patient's condition,       [x] (2) Or for diagnostic study;     AND     [x](2) The inpatient psychiatric services are provided while the individual is under the care of a physician and are included in the individualized plan of care.     Estimated length of stay/service 3-5 days    Plan for post-hospital care [facility]    Electronically signed by Yasmani Hannah MD on 8/10/2022 at 10:17 AM

## 2022-08-10 NOTE — GROUP NOTE
Group Therapy Note    Date: 8/10/2022    Group Start Time: 1430  Group End Time: 8968  Group Topic: Healthy Living/Wellness    JEREMIAH Marino        Group Therapy Note    Attendees: 2/11  Health/Wellness Group Note        Date: 8/10/2022   Start Time: 2:30pm  End Time: 2:45pm      Number of Participants in Group & Unit Census:  2/11    Topic: Afternoon wellness group session    Goal of Group:Practice gratitude with intention      Comments:     Patient did not participate in Health/Wellness group, despite staff encouragement and explanation of benefits. Patient remain seclusive to self. Q15 minute safety checks maintained for patient safety and will continue to encourage patient to attend unit programming.            Discipline Responsible: [facility] [employer]      Signature:  Haroon Or

## 2022-08-10 NOTE — GROUP NOTE
Group Therapy Note    Date: 8/10/2022    Group Start Time: 1100  Group End Time: 8851  Group Topic: Music Therapy    JEREMIAH Stack Therapy Group Note        Date: 8/10/2022   Start Time: 11am  End Time: 11:45am      Number of Participants in Group & Unit Census:  4/11    Topic: Advice group    Goal of Group:Patients shared music and analyzed themes in the lyrics of the songs, and offered peers advice based on the topic from their music. Patient goals to engage in peers support; Increase motivation; Increase self-expression; Increase sense of community and noramlization of the environement. Comments:     Patient did not participate in Music Therapy group, despite staff encouragement and explanation of benefits. Patient remain seclusive to self. Q15 minute safety checks maintained for patient safety and will continue to encourage patient to attend unit programming.

## 2022-08-10 NOTE — GROUP NOTE
Group Therapy Note    Date: 8/10/2022    Group Start Time: 1330  Group End Time: 7008  Group Topic: Music Therapy    CZ BHANGELA Levine      Recreation Group Note        Date: 8/10/2022   Start Time: 1:30pm  End Time: 2:15pm      Number of Participants in Group & Unit Census:  2/11    Topic: Recreation group    Goal of Group: Goals to engage in leisure opportunities; Increase socialization; Increase sense of community; Normalization of the environment      Comments:     Patient did not participate in Recreation group, despite staff encouragement and explanation of benefits. Patient remain seclusive to self. Q15 minute safety checks maintained for patient safety and will continue to encourage patient to attend unit programming.

## 2022-08-10 NOTE — PLAN OF CARE
Problem: Self Harm/Suicidality  Goal: Will have no self-injury during hospital stay  Description: INTERVENTIONS:  1. Q 30 MINUTES: Routine safety checks  2. Q SHIFT & PRN: Assess risk to determine if routine checks are adequate to maintain patient safety  Outcome: Progressing   Denies wanting to harm self & behaviors reflect same. Problem: Depression/Self Harm  Goal: Effect of psychiatric condition will be minimized and patient will be protected from self harm  Description: INTERVENTIONS:  1. Assess impact of patient's symptoms on level of functioning, self care needs and offer support as indicated  2. Assess patient/family knowledge of depression, impact on illness and need for teaching  3. Provide emotional support, presence and reassurance  4. Assess for possible suicidal thoughts or ideation. If patient expresses suicidal thoughts or statements do not leave alone, initiate Suicide Precautions, move to a room close to the nursing station and obtain sitter  5. Initiate consults as appropriate with Mental Health Professional, Spiritual Care, Psychosocial CNS, and consider a recommendation to the LIP for a Psychiatric Consultation  Outcome: Progressing   Safe milieu provided. Problem: Drug Abuse/Detox  Goal: Will have no detox symptoms and will verbalize plan for changing drug-related behavior  Description: INTERVENTIONS:  1. Administer medication as ordered  2. Monitor physical status  3. Provide emotional support with 1:1 interaction with staff  4. Encourage  recovery focused treatment   Outcome: Progressing   Denies withdrawal symptoms.

## 2022-08-10 NOTE — PLAN OF CARE
Problem: Self Harm/Suicidality  Goal: Will have no self-injury during hospital stay  Description: INTERVENTIONS:  1. Q 30 MINUTES: Routine safety checks  2. Q SHIFT & PRN: Assess risk to determine if routine checks are adequate to maintain patient safety  8/10/2022 0929 by Tasia Guillen LPN  Outcome: Progressing     Problem: Depression/Self Harm  Goal: Effect of psychiatric condition will be minimized and patient will be protected from self harm  Description: INTERVENTIONS:  1. Assess impact of patient's symptoms on level of functioning, self care needs and offer support as indicated  2. Assess patient/family knowledge of depression, impact on illness and need for teaching  3. Provide emotional support, presence and reassurance  4. Assess for possible suicidal thoughts or ideation. If patient expresses suicidal thoughts or statements do not leave alone, initiate Suicide Precautions, move to a room close to the nursing station and obtain sitter  5. Initiate consults as appropriate with Mental Health Professional, Spiritual Care, Psychosocial CNS, and consider a recommendation to the LIP for a Psychiatric Consultation  8/10/2022 0929 by Tasia Guillen LPN  Outcome: Progressing   Patient denies intent to harm self, remains free from self harm.  Support and encouragement provided

## 2022-08-10 NOTE — CARE COORDINATION
Psychosocial Assessment    Current Level of Psychosocial Functioning     Independent X  Dependent    Minimal Assist     Comments:      Psychosocial High Risk Factors (check all that apply)    Unable to obtain meds   Chronic illness/pain    Substance abuse  X  Lack of Family Support   Financial stress   Isolation   Inadequate Community Resources  Suicide attempt(s)  Not taking medications  X  Victim of crime   Developmental Delay  Unable to manage personal needs    Age 72 or older   Homeless  No transportation   Readmission within 30 days  Unemployment  Traumatic Event    Family/Supports identified: Pt reports brother is supportive    Sexual Orientation:  NA    Patient Strengths: Stable housing and insurance    Patient Barriers: Substance use and does not stay compliant with treatment     Safety plan: NA    CMHC/MH history: Wants linked with [facility]. Has been linked with [facility] in the past but has not followed up     Plan of Care:  medication management, group/individual therapies, family meetings, psycho -education, treatment team meetings to assist with stabilization    Initial Discharge Plan:  Return to brothers and link with [facility]    Clinical Summary:  Pt is a 27year old admitted to the [facility] for safety. Pt agreed to assessment at bedside, but refused to remove the blanket from face. Pt reports suicidal ideation with no plan. Pt reports homicidal ideations but does not have anyone in mind that he wants to harm. Pt reports using cocaine, marijuana and IV use of heroin on 8/8/2022. Pt then started to answer all questions with \"No.\" Pt declined the AoD resource folder at this time.

## 2022-08-10 NOTE — GROUP NOTE
Group Therapy Note    Date: 8/10/2022    Group Start Time: 0900  Group End Time: 0915  Group Topic: Community Meeting    [facility]    Ese Hassan        Group Therapy Note    Attendees: 4/11    Community Meeting Group Note        Date: 8/10/2022 Start Time: 9am  End Time:  7781      Number of Participants in Group & Unit Census:  4/11      Goal of Group Discuss daily goals      Comments:     Patient did not participate in [employer] group, despite staff encouragement and explanation of benefits. Patient remain seclusive to self. Q15 minute safety checks maintained for patient safety and will continue to encourage patient to attend unit programming.

## 2022-08-11 PROCEDURE — 6370000000 HC RX 637 (ALT 250 FOR IP)

## 2022-08-11 PROCEDURE — 99232 SBSQ HOSP IP/OBS MODERATE 35: CPT | Performed by: PSYCHIATRY & NEUROLOGY

## 2022-08-11 PROCEDURE — 1240000000 HC EMOTIONAL WELLNESS R&B

## 2022-08-11 PROCEDURE — 6370000000 HC RX 637 (ALT 250 FOR IP): Performed by: PSYCHIATRY & NEUROLOGY

## 2022-08-11 PROCEDURE — APPSS30 APP SPLIT SHARED TIME 16-30 MINUTES: Performed by: NURSE PRACTITIONER

## 2022-08-11 RX ADMIN — TRAZODONE HYDROCHLORIDE 50 MG: 50 TABLET ORAL at 20:47

## 2022-08-11 RX ADMIN — MIRTAZAPINE 7.5 MG: 15 TABLET, FILM COATED ORAL at 20:47

## 2022-08-11 RX ADMIN — HYDROXYZINE HYDROCHLORIDE 50 MG: 50 TABLET, FILM COATED ORAL at 20:47

## 2022-08-11 NOTE — PLAN OF CARE
Problem: Self Harm/Suicidality  Goal: Will have no self-injury during hospital stay  Description: INTERVENTIONS:  1. Q 30 MINUTES: Routine safety checks  2. Q SHIFT & PRN: Assess risk to determine if routine checks are adequate to maintain patient safety  Outcome: Progressing  Patient has made no attempt to harm self at this time. Problem: Depression/Self Harm  Goal: Effect of psychiatric condition will be minimized and patient will be protected from self harm  Description: INTERVENTIONS:  1. Assess impact of patient's symptoms on level of functioning, self care needs and offer support as indicated  2. Assess patient/family knowledge of depression, impact on illness and need for teaching  3. Provide emotional support, presence and reassurance  4. Assess for possible suicidal thoughts or ideation. If patient expresses suicidal thoughts or statements do not leave alone, initiate Suicide Precautions, move to a room close to the nursing station and obtain sitter  5. Initiate consults as appropriate with Mental Health Professional, Spiritual Care, Psychosocial CNS, and consider a recommendation to the LIP for a Psychiatric Consultation  Outcome: Progressing  Patient denies homicidal ideation and hallucinations at this time. He reports suicidal ideation with no plan but states he feels safe while here. He is isolative to room. He is marginally cooperative with assessment but refused evening medications. Problem: Drug Abuse/Detox  Goal: Will have no detox symptoms and will verbalize plan for changing drug-related behavior  Description: INTERVENTIONS:  1. Administer medication as ordered  2. Monitor physical status  3. Provide emotional support with 1:1 interaction with staff  4. Encourage  recovery focused treatment   Outcome: Progressing  Patient reports no withdrawal symptoms.

## 2022-08-11 NOTE — PROGRESS NOTES
Daily Progress Note  8/11/2022    Patient Name: Neymar Nunn    CHIEF COMPLAINT:  Depression with suicidal and homicidal ideation           SUBJECTIVE:   Patient was seen for follow-up assessment today. Nursing staff report patient refused scheduled Remeron last night. He has not required any emergency medications since admission. Patient was resting in bed on approach but responded to verbal stimuli after a few attempts at waking him. Writer attempted to engage patient in sustained conversation and he looked at writer mumbled something incoherently and rolled over on his side away from the writer. He refused to participate in assessment. Later in the afternoon writer was approached by nursing staff reporting that patient came out and was irritable demanding to see a provider because he reports he felt that he was ready to go. Patient has not been attending groups and has been spending much of his time resting in his room, coming out for meals and needs only. At this time patient is unable to contract for safety in the community and warrants further hospitalization for safety and stabilization.     Appetite:  [x] Normal/Adequate/Unchanged  [] Increased  [] Decreased      Sleep:       [] Normal/Adequate/Unchanged  [x] Fair  [] Poor      Group Attendance:   [] Yes  [] Selectively    [x] No    Medication Side Effects: N/A - patient is refusing medication         Mental Status Exam  Level of consciousness: Somnolent  Appearance: Appropriate attire for setting, resting in bed, with poor grooming and hygiene   Behavior/Motor: Approachable, psychomotor slowing  Attitude toward examiner: uncooperative, inattentive, minimal eye contact   Speech: slow, whispered, poverty of speech, and low productivity   Mood: Constricted  Affect: Flat  Thought processes: incoherent   Thought content: Denies homicidal ideation  Suicidal Ideation: Present; unable to contract for safety in community  Delusions: Not evident  Perceptual Disturbance: patient is not observed responding to internal stimuli; denies AVH  Cognition: Oriented to self, location, time, and situation  Memory: intact  Insight & Judgement: poor     Data   height is 5' 10\" (1.778 m) and weight is 130 lb (59 kg). His oral temperature is 98.3 °F (36.8 °C). His blood pressure is 136/83 and his pulse is 56. His respiration is 14. Labs:   No visits with results within 2 Day(s) from this visit. Latest known visit with results is:   Admission on 08/09/2022, Discharged on 08/09/2022   Component Date Value Ref Range Status    Glucose 08/09/2022 80  70 - 99 mg/dL Final    BUN 08/09/2022 11  6 - 20 mg/dL Final    Creatinine 08/09/2022 0.88  0.70 - 1.20 mg/dL Final    Calcium 08/09/2022 9.5  8.6 - 10.4 mg/dL Final    Sodium 08/09/2022 138  135 - 144 mmol/L Final    Potassium 08/09/2022 3.5 (A) 3.7 - 5.3 mmol/L Final    Chloride 08/09/2022 103  98 - 107 mmol/L Final    CO2 08/09/2022 26  20 - 31 mmol/L Final    Anion Gap 08/09/2022 9  9 - 17 mmol/L Final    Alkaline Phosphatase 08/09/2022 79  40 - 129 U/L Final    ALT 08/09/2022 98 (A) 5 - 41 U/L Final    AST 08/09/2022 66 (A) <40 U/L Final    Total Bilirubin 08/09/2022 0.53  0.3 - 1.2 mg/dL Final    Total Protein 08/09/2022 7.3  6.4 - 8.3 g/dL Final    Albumin 08/09/2022 4.2  3.5 - 5.2 g/dL Final    Albumin/Globulin Ratio 08/09/2022 1.4  1.0 - 2.5 Final    GFR Non- 08/09/2022 >60  >60 mL/min Final    GFR  08/09/2022 >60  >60 mL/min Final    GFR Comment 08/09/2022        Final    Comment: Average GFR for 30-36 years old:   80 mL/min/1.73sq m  Chronic Kidney Disease:   <60 mL/min/1.73sq m  Kidney failure:   <15 mL/min/1.73sq m              eGFR calculated using average adult body mass. Additional eGFR calculator available at:        [email or web address]            WBC 08/09/2022 6.8  3.5 - 11.3 k/uL Final    RBC 08/09/2022 4.29  4. 21 - 5.77 m/uL Final    Hemoglobin 08/09/2022 12.9 (A) 13.0 - 17.0 g/dL Final    Hematocrit 08/09/2022 39.9 (A) 40.7 - 50.3 % Final    MCV 08/09/2022 93.0  82.6 - 102.9 fL Final    MCH 08/09/2022 30.1  25.2 - 33.5 pg Final    MCHC 08/09/2022 32.3  28.4 - 34.8 g/dL Final    RDW 08/09/2022 13.4  11.8 - 14.4 % Final    Platelets 96/60/9355 293  138 - 453 k/uL Final    MPV 08/09/2022 8.9  8.1 - 13.5 fL Final    NRBC Automated 08/09/2022 0.0  0.0 per 100 WBC Final    Seg Neutrophils 08/09/2022 27 (A) 36 - 65 % Final    Lymphocytes 08/09/2022 57 (A) 24 - 43 % Final    Monocytes 08/09/2022 10  3 - 12 % Final    Eosinophils % 08/09/2022 5 (A) 1 - 4 % Final    Basophils 08/09/2022 1  0 - 2 % Final    Immature Granulocytes 08/09/2022 0  0 % Final    Segs Absolute 08/09/2022 1.82  1.50 - 8.10 k/uL Final    Absolute Lymph # 08/09/2022 3.98 (A) 1.10 - 3.70 k/uL Final    Absolute Mono # 08/09/2022 0.66  0.10 - 1.20 k/uL Final    Absolute Eos # 08/09/2022 0.32  0.00 - 0.44 k/uL Final    Basophils Absolute 08/09/2022 0.05  0.00 - 0.20 k/uL Final    Absolute Immature Granulocyte 08/09/2022 <0.03  0.00 - 0.30 k/uL Final    Amphetamine Screen, Ur 08/09/2022 POSITIVE (A) NEGATIVE Final    Comment:       (Positive cutoff 1000 ng/mL)                  Barbiturate Screen, Ur 08/09/2022 NEGATIVE  NEGATIVE Final    Comment:       (Positive cutoff 200 ng/mL)                  Benzodiazepine Screen, Urine 08/09/2022 NEGATIVE  NEGATIVE Final    Comment:       (Positive cutoff 200 ng/mL)                  Cocaine Metabolite, Urine 08/09/2022 POSITIVE (A) NEGATIVE Final    Comment:       (Positive cutoff 300 ng/mL)                  Methadone Screen, Urine 08/09/2022 NEGATIVE  NEGATIVE Final    Comment:       (Positive cutoff 300 ng/mL)                  Opiates, Urine 08/09/2022 NEGATIVE  NEGATIVE Final    Comment:       (Positive cutoff 300 ng/mL)                  Phencyclidine, Urine 08/09/2022 NEGATIVE  NEGATIVE Final    Comment:       (Positive cutoff 25 ng/mL)                  Cannabinoid Scrn, Ur 08/09/2022 POSITIVE (A) NEGATIVE Final    Comment:       (Positive cutoff 50 ng/mL)                  Oxycodone Screen, Ur 08/09/2022 NEGATIVE  NEGATIVE Final    Comment:       (Positive cutoff 100 ng/mL)                  Fentanyl, Ur 08/09/2022 POSITIVE (A) NEGATIVE Final    Comment:       (Positive cutoff  5 ng/ml)            Test Information 08/09/2022 Assay provides medical screening only. The absence of expected drug(s) and/or metabolite(s) may indicate diluted or adulterated urine, limitations of testing or timing of collection. Final    Comment: Testing for legal purposes should be confirmed by another method. To request confirmation   of test result, please call the lab within 7 days of sample submission. Acetaminophen Level 08/09/2022 <5 (A) 10 - 30 ug/mL Final    Ethanol 08/09/2022 <10  <10 mg/dL Final    Ethanol percent 08/09/2022 <0.010  <9.892 % Final    Salicylate Lvl 95/82/6602 <1 (A) 3 - 10 mg/dL Final    Toxic Tricyclic Sc,Blood 83/21/5222 NEGATIVE  NEGATIVE Final         Reviewed patient's current plan of care and vital signs with nursing staff.     Labs reviewed: [x] Yes  Last EKG in EMR reviewed: [x] Yes    Medications  Current Facility-Administered Medications: mirtazapine (REMERON) tablet 7.5 mg, 7.5 mg, Oral, Nightly  dicyclomine (BENTYL) capsule 10 mg, 10 mg, Oral, TID PRN  cyclobenzaprine (FLEXERIL) tablet 10 mg, 10 mg, Oral, TID PRN  ondansetron (ZOFRAN) tablet 4 mg, 4 mg, Oral, Q8H PRN  acetaminophen (TYLENOL) tablet 650 mg, 650 mg, Oral, Q6H PRN  ibuprofen (ADVIL;MOTRIN) tablet 400 mg, 400 mg, Oral, Q6H PRN  hydrOXYzine HCl (ATARAX) tablet 50 mg, 50 mg, Oral, TID PRN  traZODone (DESYREL) tablet 50 mg, 50 mg, Oral, Nightly PRN  polyethylene glycol (GLYCOLAX) packet 17 g, 17 g, Oral, Daily PRN  aluminum & magnesium hydroxide-simethicone (MAALOX) 200-200-20 MG/5ML suspension 30 mL, 30 mL, Oral, Q6H PRN  haloperidol lactate (HALDOL) injection 5 mg, 5 mg, IntraMUSCular, Q6H PRN **AND** diphenhydrAMINE (BENADRYL) injection 50 mg, 50 mg, IntraMUSCular, Q6H PRN  haloperidol (HALDOL) tablet 5 mg, 5 mg, Oral, Q6H PRN  nicotine (NICODERM CQ) 14 MG/24HR 1 patch, 1 patch, TransDERmal, Daily    ASSESSMENT  Severe episode of recurrent major depressive disorder, without psychotic features ([facility] [address]         HANDOFF  Patient symptoms: remain unstable   Medications as determined by attending physician  Encourage participation in groups and milieu. Probable discharge is to be determined by MD    Electronically signed by ROXANNE Ramos CNP on 8/11/2022 at 3:41 PM    **This report has been created using voice recognition software. It may contain minor errors which are inherent in voice recognition technology. **                                          Psychiatry Attending Attestation     I independently saw and evaluated the patient. I reviewed the Advance Practice Provider's documentation above. Any additional comments or changes to the Advance Practice Provider's documentation are stated below otherwise agree with assessment. Patient laying in bed, withdrawn. Reports his mood is up and down. Patient is irritable off and on. Reports concentration is poor. Patient feels helpless ,hopless and worthless. Patient is oriented to time place and person. Denies any hallucinations or delusions. Sleep is fluctuating, appetite is poor. Patient feels down depressed nervous and anxious. Patient has marked anticipatory anxiety that was explored during the session, support was given and clarification done. No side effects from medication reported. Side-effect of medication were discussed with the patient. Case was discussed with the  and with the staff. Session was supportive. Will continue same treatment. PLAN  Patient s symptoms show no change  Encourage medication compliance   Attempt to develop insight  Psycho-education conducted. Supportive Therapy conducted.   Probable discharge is 2-3 days  Follow-up TBD    Electronically signed by Sherly Carey MD on 8/11/22 at 5:24 PM EDT

## 2022-08-11 NOTE — GROUP NOTE
Group Therapy Note    Date: 8/11/2022    Group Start Time: 1000  Group End Time: 9468  Group Topic: Psychotherapy    [address], MSW, LSW        Group Therapy Note    Attendees: 4/13       Patient was offered group therapy today but declined to participate despite encouragement from staff. 1:1 was offered.         Signature:  Daniela Delgado MSW, LSW

## 2022-08-11 NOTE — GROUP NOTE
Group Therapy Note    Date: 8/11/2022    Group Start Time: 4268  Group End Time: 6610  Group Topic: Recreational    [address]        Group Therapy Note    Attendees: 6/12       Patient's Goal:  Open recreation group; Patients had opportunities to engage in a variety of activities including, games, music, and art. Goals to increase socialization, increase sense of community, and normalization of the environment. Notes:  Patient attended and participated in last 10 minutes of group. Requested music and was singing along to songs. Status After Intervention:  Improved    Participation Level:  Active Listener and Interactive    Participation Quality: Appropriate and Attentive      Speech:  normal      Thought Process/Content: Logical  Linear      Affective Functioning: Congruent      Mood: euthymic      Level of consciousness:  Alert and Attentive      Response to Learning: Able to verbalize current knowledge/experience and Progressing to goal      Endings: None Reported    Modes of Intervention: Socialization, Activity, and Reality-testing      Discipline Responsible: Psychoeducational Specialist      Signature:  Nroi Pierre

## 2022-08-11 NOTE — PLAN OF CARE
Problem: Self Harm/Suicidality  Goal: Will have no self-injury during hospital stay  Description: INTERVENTIONS:  1. Q 15 MINUTES: Routine safety checks  2. Q SHIFT & PRN: Assess risk to determine if routine checks are adequate to maintain patient safety  8/11/2022 1029 by Hunter Klein RN  Outcome: Progressing     Problem: Depression/Self Harm  Goal: Effect of psychiatric condition will be minimized and patient will be protected from self harm  Description: INTERVENTIONS:  1. Assess impact of patient's symptoms on level of functioning, self care needs and offer support as indicated  2. Assess patient/family knowledge of depression, impact on illness and need for teaching  3. Provide emotional support, presence and reassurance  4. Assess for possible suicidal thoughts or ideation. If patient expresses suicidal thoughts or statements do not leave alone, initiate Suicide Precautions, move to a room close to the nursing station and obtain sitter  5. Initiate consults as appropriate with Mental Health Professional, Spiritual Care, Psychosocial CNS, and consider a recommendation to the LIP for a Psychiatric Consultation  8/11/2022 1029 by Hunter Klein RN  Outcome: Progressing     Problem: Drug Abuse/Detox  Goal: Will have no detox symptoms and will verbalize plan for changing drug-related behavior  Description: INTERVENTIONS:  1. Administer medication as ordered  2. Monitor physical status  3. Provide emotional support with 1:1 interaction with staff  4. Encourage  recovery focused treatment   8/11/2022 1029 by Hunter Klein RN  Outcome: Progressing    Pt admits to thoughts of self-harm, safety checks Q15 minutes. Agrees to be safe on unit. Staff offers support. Pt reports high levels of anxiety and increased symptoms of depression. Flat and isolates to room. Guarded on assessment. Pt reports poor sleep. Out for meals, no complaints about appetite.  Free from hallucinations and any symptoms of withdrawal.

## 2022-08-11 NOTE — GROUP NOTE
Group Therapy Note    Date: 8/11/2022    Group Start Time: 1105  Group End Time: 9759  Group Topic: Music Therapy    JEREMIAH Alonso    Music Therapy Group Note        Date: 8/11/2022   Start Time: 11:05am  End Time: 11:40am      Number of Participants in Group & Unit Census:  4/13    Topic: Positive affirmations    Goal of Group:increase self-esteem; increase self-expression; Increase sense of community; Engage in education of DBT skill Positive Affirmations      Comments:     Patient did not participate in Music Therapy group, despite staff encouragement and explanation of benefits. Patient remain seclusive to self. Q15 minute safety checks maintained for patient safety and will continue to encourage patient to attend unit programming.

## 2022-08-12 VITALS
SYSTOLIC BLOOD PRESSURE: 163 MMHG | BODY MASS INDEX: 18.61 KG/M2 | DIASTOLIC BLOOD PRESSURE: 97 MMHG | WEIGHT: 130 LBS | HEART RATE: 76 BPM | HEIGHT: 70 IN | OXYGEN SATURATION: 100 % | TEMPERATURE: 97.4 F | RESPIRATION RATE: 14 BRPM

## 2022-08-12 PROCEDURE — 6370000000 HC RX 637 (ALT 250 FOR IP): Performed by: PSYCHIATRY & NEUROLOGY

## 2022-08-12 PROCEDURE — 99239 HOSP IP/OBS DSCHRG MGMT >30: CPT | Performed by: PSYCHIATRY & NEUROLOGY

## 2022-08-12 RX ORDER — TRAZODONE HYDROCHLORIDE 50 MG/1
50 TABLET ORAL NIGHTLY PRN
Qty: 30 TABLET | Refills: 0 | Status: SHIPPED | OUTPATIENT
Start: 2022-08-12

## 2022-08-12 RX ORDER — HYDROXYZINE 50 MG/1
50 TABLET, FILM COATED ORAL 3 TIMES DAILY PRN
Qty: 90 TABLET | Refills: 0 | Status: SHIPPED | OUTPATIENT
Start: 2022-08-12 | End: 2022-09-11

## 2022-08-12 RX ORDER — MIRTAZAPINE 7.5 MG/1
7.5 TABLET, FILM COATED ORAL NIGHTLY
Qty: 30 TABLET | Refills: 0 | Status: SHIPPED | OUTPATIENT
Start: 2022-08-12

## 2022-08-12 ASSESSMENT — PAIN SCALES - GENERAL: PAINLEVEL_OUTOF10: 0

## 2022-08-12 NOTE — BH NOTE
Patient given tobacco quitline number 7-409-328-704.622.7106 at this time, refusing to call at this time, states \" I just dont want to quit now\"- patient given information as to the dangers of long term tobacco use. Continue to reinforce the importance of tobacco cessation.

## 2022-08-12 NOTE — GROUP NOTE
Group Therapy Note    Date: 8/12/2022    Group Start Time: 1330  Group End Time: 3810  Group Topic: Psychoeducation    JEREMIAH BHANGELA Lara, JAMAALS    Group Therapy Note    Attendees: 5       Patient's Goal:  Patient will demonstrate improved interpersonal skills and benefits of leisure for coping. Notes:  Patient attended group and participated. Status After Intervention:  Improved    Participation Level:  Active Listener and Interactive    Participation Quality: Appropriate, Attentive, Sharing, and Supportive      Speech:  normal      Thought Process/Content: Logical      Affective Functioning: Congruent      Mood: euthymic      Level of consciousness:  Alert, Oriented x4, and Attentive      Response to Learning: Able to verbalize current knowledge/experience, Able to verbalize/acknowledge new learning, Able to retain information, Able to change behavior, and Progressing to goal      Endings: None Reported    Modes of Intervention: Education, Support, Socialization, and Exploration      Discipline Responsible: Psychoeducational Specialist      Signature:  Stephanie Campos, [address]

## 2022-08-12 NOTE — PLAN OF CARE
Problem: Self Harm/Suicidality  Goal: Will have no self-injury during hospital stay  Description: INTERVENTIONS:  1. Q 30 MINUTES: Routine safety checks  2. Q SHIFT & PRN: Assess risk to determine if routine checks are adequate to maintain patient safety  Note: Pt denies thoughts of self harm and is agreeable to seeking out should thoughts of self harm arise. Safe environment maintained. Q15 minute checks for safety cont per unit policy. Will cont to monitor for safety and provides support and reassurance as needed. Problem: Depression/Self Harm  Goal: Effect of psychiatric condition will be minimized and patient will be protected from self harm  Description: INTERVENTIONS:  1. Assess impact of patient's symptoms on level of functioning, self care needs and offer support as indicated  2. Assess patient/family knowledge of depression, impact on illness and need for teaching  3. Provide emotional support, presence and reassurance  4. Assess for possible suicidal thoughts or ideation. If patient expresses suicidal thoughts or statements do not leave alone, initiate Suicide Precautions, move to a room close to the nursing station and obtain sitter  5. Initiate consults as appropriate with Mental Health Professional, Spiritual Care, Psychosocial CNS, and consider a recommendation to the LIP for a Psychiatric Consultation  Note: PT states he is feeling better pt unable to rate depression or anxiety. PT is out more this evening and social with select peers on unit. PT attended group this evening and stated his new goal was to be out more and to attend more groups. PT is calm and cooperative this evening. Problem: Drug Abuse/Detox  Goal: Will have no detox symptoms and will verbalize plan for changing drug-related behavior  Description: INTERVENTIONS:  1. Administer medication as ordered  2. Monitor physical status  3. Provide emotional support with 1:1 interaction with staff  4.  Encourage  recovery focused treatment   Note: Pt denies any current signs of symptoms of withdrawal. PT does report poor sleep the night before and takes trazodone to help him sleep this evening. PT has appeared to have rested well during the night.

## 2022-08-12 NOTE — GROUP NOTE
Group Therapy Note    Date: 8/11/2022    Group Start Time: 2035  Group End Time: 2055  Group Topic: Wrap-Up    JEREMIAH BHANGELA Benitez RN        Group Therapy Note    Attendees: 6/12       Patient's Goal:  Get out more    Notes:  PT went to music therapy for a short time today and attended wrap up group    Status After Intervention:  Improved    Participation Level: Interactive    Participation Quality: Appropriate      Speech:  normal      Thought Process/Content: Logical      Affective Functioning: Flat      Mood: depressed      Level of consciousness:  Alert      Response to Learning: Able to verbalize current knowledge/experience      Endings: None Reported    Modes of Intervention: Support and Socialization      Discipline Responsible: Registered Nurse      Signature:  Violetta Cruz RN

## 2022-08-12 NOTE — BH NOTE
[facility]  Discharge Note    Pt discharged with followings belongings:   Dental Appliances: None  Vision - Corrective Lenses: None  Hearing Aid: None  Jewelry: Necklace  Body Piercings Removed: N/A  Clothing: Socks, Shirt, Undergarments, Shorts, Footwear  Other Valuables: Other (Comment) (VISA [id number], [phone])   Valuables sent home with patient . Patient education on aftercare instructions: given to patient  Information faxed to [facility] by nurse  at 4:22 PM .Patient verbalize understanding of AVS:  yes, discharged home via cab. Status EXAM upon discharge:  Mental Status and Behavioral Exam  Normal: Yes  Level of Assistance: Independent/Self  Facial Expression: Brightened  Affect: Appropriate  Level of Consciousness: Alert  Frequency of Checks: 4 times per hour, close  Mood:Normal: No  Mood: Anxious  Motor Activity:Normal: Yes  Motor Activity: Decreased  Eye Contact: Fair  Observed Behavior: Cooperative  Sexual Misconduct History: Current - no  Preception: [place] to person, [place] to time, [place] to place, [place] to situation  Attention:Normal: No  Attention: Distractible  Thought Processes: Circumstantial  Thought Content:Normal: Yes  Thought Content: Preoccupations  Depression Symptoms: No problems reported or observed. Anxiety Symptoms: Generalized  [place] Symptoms: No problems reported or observed.   Hallucinations: None  Delusions: No  Memory:Normal: Yes  Memory: Poor recent  Insight and Judgment: Yes  Insight and Judgment: Poor judgment, Poor insight      Metabolic Screening:    Lab Results   Component Value Date    LABA1C 5.6 06/18/2020       Lab Results   Component Value Date    CHOL 187 06/18/2020    CHOL 194 04/16/2018     Lab Results   Component Value Date    TRIG 98 06/18/2020    TRIG 120 04/16/2018     Lab Results   Component Value Date    HDL 50 06/18/2020    HDL 44 04/16/2018     No components found for: LDLCAL  No results found for: Katina Lopez LPN
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
## 2022-08-12 NOTE — GROUP NOTE
Group Therapy Note    Date: 8/12/2022    Group Start Time: 1000  Group End Time: 7519  Group Topic: Psychotherapy    [facility] LUCIANA Carrillo, DELTAW        Group Therapy Note    Attendees: 7/12       Patient's Goal:  Increase interpersonal relationship skills    Notes:  Patient was an active participant in group discussion    Status After Intervention:  Improved    Participation Level:  Active Listener and Interactive    Participation Quality: Appropriate, Attentive, Sharing, and Supportive      Speech:  normal      Thought Process/Content: Logical  Linear      Affective Functioning: Congruent      Mood: euthymic      Level of consciousness:  Alert, Oriented x4, and Attentive      Response to Learning: Able to verbalize current knowledge/experience, Able to verbalize/acknowledge new learning, Able to retain information, Capable of insight, and Able to change behavior      Endings: None Reported    Modes of Intervention: Support, Socialization, and Exploration      Discipline Responsible: /Counselor      Signature:  LUCIANA Marinelli, CYNTIHA

## 2022-08-12 NOTE — BH NOTE
Patient given tobacco quitline number [phone] at this time, refusing to call at this time, states \" I just dont want to quit now\"- patient given information as to the dangers of long term tobacco use. Continue to reinforce the importance of tobacco cessation.

## 2022-08-12 NOTE — GROUP NOTE
Group Therapy Note    Date: 8/12/2022    Group Start Time: 0900  Group End Time: 7066  Group Topic: Community Meeting    JEREMIAH Dinh        Group Therapy Note    Attendees: 5/12     Community Meeting Group Note        Date: August 12, 2022 Start Time: 9am  End Time:  7154      Number of Participants in Group & Unit Census:  5/12      Topic: Goal setting group    Goal of Group: Set short term goal to focus on throughout the day      Comments:     Patient did not participate in [employer] group, despite staff encouragement and explanation of benefits. Patient remain seclusive to self. Q15 minute safety checks maintained for patient safety and will continue to encourage patient to attend unit programming.

## 2022-08-12 NOTE — DISCHARGE INSTRUCTIONS
Information:  Medications:   Medication summary provided   I understand that I should take only the medications on my list.     -why and when I need to take each medicine.     -which side effects to watch for.     -that I should carry my medication information at all times in case of     Emergency situations. I will take all of my medicines to follow up appointments.     -check with my physician or pharmacist before taking any new    Medication, over the counter product or drink alcohol.    -Ask about food, drug or dietary supplement interactions.    -discard old lists and update records with medication providers. Notify Physician:  Notify physician if you notice:   Always call 911 if you feel your life is in danger  In case of an emergency call 911 immediately! If 911 is not available call your local emergency medical system for help    Behavioral Health Follow Up:  Original Referral Source:[facility]  Discharge Diagnosis: Acute psychosis ([facility] [address] [F23]  Recommendations for Level of Care: follow up  Patient status at discharge: stable  My hospital  was: [facility]  Aftercare plan faxed: yes   -faxed by: RN   -date: 8/12/22   -time: 1330  Prescriptions: Filled at [facility] outpatient pharmacy    Smoking: Quit Smoking. Call the [employer]'s smoking quitline at 9-557-49K-QUIT  Know the signs of a heart attack   If you have any of the following symptoms call 911 immediately, do not wait more    Than five minutes. 1. Pressure, fullness and/ or squeezing in the center of the chest spreading to    The jaw, neck or shoulder. 2. Chest discomfort with light headedness, fainting, sweating, nausea or    Shortness of breath. 3. Upper abdominal pressure or discomfort. 4. Lower chest pain, back pain, unusual fatigue, shortness of breath, nausea   Or dizziness.      General Information:   Questions regarding your bill: Call HELP program [phone]     Suicide Hotline ([address])  (563) 611-2010 Recovery Help line- 685.599.9838      To obtain results of pending studies call Medical Records at: [phone]     For emergencies and 24 hour/7 days a week contact information:  [phone]        Learning About Coronavirus (COVID-19)  What is coronavirus (COVID-19)? COVID-19 is a disease caused by a type of coronavirus. This illness was firstfound in December 2019. It has since spread worldwide. Coronaviruses are a large group of viruses. They cause the common cold. They also cause more serious illnesses like Middle East respiratory syndrome (MERS) and severe acute respiratory syndrome (SARS). COVID-19 is caused by a novelcoronavirus. That means it's a new type that has not been seen in people before. What are the symptoms? COVID-19 symptoms may include:  Fever. Cough. Trouble breathing. Chills or repeated shaking with chills. Muscle and body aches. Headache. Sore throat. New loss of taste or smell. Vomiting. Diarrhea. In severe cases, COVID-19 can cause pneumonia and make it hard to breathewithout help from a machine. It can cause death. How is it diagnosed? COVID-19 is diagnosed with a viral test. This may also be called a PCR test or antigen test. It looks for evidence of the virus in your breathing passages orlungs (respiratory system). The test is most often done on a sample from the nose, throat, or lungs. It's sometimes done on a sample of saliva. One way a sample is collected is byputting a long swab into the back of your nose. If you have questions about COVID-19 testing, ask your doctor or go to cdc.govto use the COVID-19 Viral Testing Tool. How is it treated? Mild cases of COVID-19 can be treated at home. Serious cases need treatment in the hospital. Treatment may include medicines, plus breathing support such as oxygen therapy or a ventilator. Some people may be placed on their belly tohelp their oxygen levels.   Treatments that may help people who have COVID-19 include:  Antiviral medicines. These medicines treat viral infections. Immune-based therapy. These medicines help the immune system fight COVID-19. Examples include monoclonal antibodies. Blood thinners. These medicines help prevent blood clots. People with severe illness are at risk for blood clots. How can you protect yourself and others? Stay up to date on your COVID-19 vaccines. Avoid sick people, and stay away from others if you are sick. Stay at least 6 feet away from other people. Avoid crowds, especially inside. Get tested for COVID-19 before you have an indoor visit with people who don't live with you. Improve the airflow when you spend time indoors with people who don't live with you. If you can, open windows and doors. Or you can use a fan to blow air away from people and out a window. Cover your mouth with a tissue when you cough or sneeze. Wash your hands often, especially after you cough or sneeze. Use soap and water, and scrub for at least 20 seconds. If soap and water aren't available, use an alcohol-based hand . Avoid touching your mouth, nose, and eyes. Check the CDC website at cdc.gov for the most current information on how to protect yourself. And if you have questions, ask your doctor or go to cdc.govto use the COVID-19 Quarantine and Isolation Calculator. Here are some other steps you may need to take. If you are not up to date on your COVID-19 vaccines:  Wear a mask with the best fit, protection, and comfort for you. A mask can protect you even when others aren't wearing one. This might be especially important if you:  Have certain health conditions. Live with someone who has a compromised immune system. Live with someone who is not up to date on their COVID-19 vaccines. If you have been exposed to the virus AND are not up to date on your COVID-19 vaccines:  Talk to your doctor as soon as you can.  Your doctor might have you take medicine to help prevent serious illness. Get a COVID-19 test. You may need to be tested more than once. And if your test is positive, follow the instructions below. Stay home. Try to separate from other people where you live. Don't go to school, work, or public areas. Wear a well-fitting mask around other people for a full 10 days. Avoid travel, and stay away from people at high risk for serious illness. Watch for symptoms. If you have been exposed AND either tested positive for COVID-19 in the last 90 days and have recovered or you are up to date on your COVID-19 vaccines:  Talk to your doctor as soon as you can. Your doctor may have you take medicine to help prevent serious illness. Get a COVID-19 test. Wait 5 days after you were last exposed. You may need to be tested more than once. And if your test is positive, follow the instructions below. Wear a well-fitting mask around other people for a full 10 days. Avoid travel and stay away from people at high risk for serious illness. Watch for symptoms. If you tested positive for COVID-19 in the last 90 days and have not recovered, another COVID-19 test may not be needed. If you're sick or test positive for COVID-19:  Talk to your doctor as soon as you can. Your doctor may have you take medicine to help prevent serious illness. Get a COVID-19 test unless you have already been tested. You may need to be tested more than once. Stay home. Leave only if you need to get medical care. If you were seriously ill or if you have a weakened immune system, you may need to isolate for several weeks. For a full 10 days, wear a well-fitting mask whenever you're around other people. Avoid travel and stay away from people at high risk for serious illness. Limit contact with pets and people in your home. If possible, stay in a separate bedroom and use a separate bathroom. Clean and disinfect your home every day. Use household  and disinfectant wipes or sprays.  Take special care to clean things that you touch with your hands. How can you self-isolate when you have COVID-19? If you have COVID-19, there are things you can do to help avoid spreading thevirus to others. Stay home, and avoid contact with other people. Limit contact with people in your home. If possible, stay in a separate bedroom and use a separate bathroom. Wear a well-fitting mask when you are around other people. Avoid contact with pets and other animals. Cover your mouth and nose with a tissue when you cough or sneeze. Then throw it in the trash right away. Wash your hands often, especially after you cough or sneeze. Use soap and water, and scrub for at least 20 seconds. If soap and water aren't available, use an alcohol-based hand . Don't share personal household items. These include bedding, towels, cups and glasses, and eating utensils. [address] in the warmest water allowed for the fabric type, and dry it completely. It's okay to wash other people's laundry with yours. Clean and disinfect your home. Use household  and disinfectant wipes or sprays. If you have questions, visit cdc.gov to check the Quarantine and IsolationCalculator. When should you call for help? Call 911 anytime you think you may need emergency care. For example, call if you have life-threatening symptoms, such as: You have severe trouble breathing. (You can't talk at all.)     You have constant chest pain or pressure. You are severely dizzy or lightheaded. You are confused or can't think clearly. You have pale, gray, or blue-colored skin or lips. You pass out (lose consciousness) or are very hard to wake up. You have loss of balance or trouble walking. You have trouble seeing out of one or both eyes. You have weakness or drooping on one side of the face. You have weakness or numbness in an arm or a leg. You have trouble speaking. You have a severe headache. You have a seizure. Call your doctor now or seek immediate medical care if:    You have moderate trouble breathing. (You can't speak a full sentence.)     You are coughing up blood. You have signs of low blood pressure. These include feeling lightheaded; being too weak to stand; and having cold, pale, clammy skin. Watch closely for changes in your health, and be sure to contact your doctor if:    Your symptoms get worse. You are not getting better as expected. You have new or worse symptoms of anxiety, depression, nightmares, or flashbacks. Call before you go to the doctor's office. Follow their instructions. And wear a mask. Where can you learn more? Go to [email or web address] org and sign in to your [employer] account. Enter C008 in the Gizmo[employer] box to learn more about \"Learning About Coronavirus (COVID-19). \"     If you do not have an account, please click on the \"Sign Up Now\" link. Current as of: May 28, 2022               Content Version: 13.3  © 2006-2022 Healthwise, Incorporated. Care instructions adapted under license by [facility] ([facility]). If you have questions about a medical condition or this instruction, always ask your healthcare professional. [address] any warranty or liability for your use of this information.

## 2022-08-12 NOTE — BH NOTE
Writer contacted [employer] for cab for hospital discharge and was hung up on twice. Contacted charge nurse for approval for [employer] cab.

## 2022-08-12 NOTE — PLAN OF CARE
5 [facility]  Day 3 Interdisciplinary Treatment Plan NOTE    Review Date & Time: 8/12/22 0929    Admission Type:   Admission Type: Voluntary    Reason for admission:  Reason for Admission: Patient is sucidal with plan to overdose on drugs. Patient is homicidal towards no one specific. Patient reports increase anxiety and depression due to losing a family member, and not being able to see his kids. Patient has history of drug abuse-positive for amphetamines, cocaine, fentanyl, marijuana.  Patient is non compliant with home medications and [address] since last discharge 3/22  Estimated Length of Stay:  5-7 days  Estimated Discharge Date Update:   to be determined by physician    PATIENT STRENGTHS:  Patient Strengths:   Patient Strengths and Limitations:   Addictive Behavior:Addictive Behavior  In the Past 3 Months, Have You Felt or Has Someone Told You That You Have a Problem With  : None  Medical Problems:  Past Medical History:   Diagnosis Date    Alcoholism ([facility]     Anxiety     Bipolar 1 disorder ([facility]     Cocaine abuse (HCC)     Depression     Hepatitis C antibody test positive     Hypertension     Irregular heartbeat     Mild tetrahydrocannabinol (THC) abuse     Suicidal ideation        Risk:  Fall Risk   Joao Scale Joao Scale Score: 22  BVC    Change in scores:  No Changes to plan of Care:  No    Status EXAM:   Mental Status and Behavioral Exam  Normal: No  Level of Assistance: Independent/Self  Facial Expression: Avoids Gaze, Flat  Affect: Blunt  Level of Consciousness: Alert  Frequency of Checks: 4 times per hour, close  Mood:Normal: No  Mood: Depressed, Anxious  Motor Activity:Normal: Yes  Motor Activity: Decreased  Eye Contact: Fair  Observed Behavior: Withdrawn  Sexual Misconduct History: Past - no  Preception: [place] to person, [place] to time, [place] to place, [place] to situation  Attention:Normal: No  Attention: Distractible  Thought Processes: Circumstantial  Thought Content:Normal: No  Thought Content: Preoccupations  Depression Symptoms: Feelings of helplessness, Feelings of hopelessess, Isolative  Anxiety Symptoms: Generalized  Lizett Symptoms: No problems reported or observed. Hallucinations: None  Delusions: No  Memory:Normal: No  Memory: Poor recent  Insight and Judgment: No  Insight and Judgment: Poor judgment, Poor insight    Daily Assessment Last Entry:   Daily Sleep (WDL): Exceptions to WDL            Daily Nutrition (WDL): Within Defined Limits  Level of Assistance: Independent/Self    Patient Monitoring:  Frequency of Checks: 4 times per hour, close    Psychiatric Symptoms:   Depression Symptoms  Depression Symptoms: Feelings of helplessness, Feelings of hopelessess, Isolative  Anxiety Symptoms  Anxiety Symptoms: Generalized  Lizett Symptoms  Lizett Symptoms: No problems reported or observed. Suicide Risk CSSR-S:  1) Within the past month, have you wished you were dead or wished you could go to sleep and not wake up? : Yes  2) Have you actually had any thoughts of killing yourself? : Yes  3) Have you been thinking about how you might kill yourself? : Yes  5) Have you started to work out or worked out the details of how to kill yourself?  Do you intend to carry out this plan? : Yes  6) Have you ever done anything, started to do anything, or prepared to do anything to end your life?: Yes  Change in Result:   Change in Plan of care:        EDUCATION:   Learner Progress Toward Treatment Goals:   Reviewed results and recommendations of this team, Reviewed group plan and strategies, Reviewed signs, symptoms and risk of self harm and violent behavior, Reviewed goals and plan of care    Method:  small group, individual verbal education    Outcome:   Verbalized by patient but needs reinforcement to obtain goals    PATIENT GOALS:  Short term:  follow up outpatient  Long term:  staying sober    PLAN/TREATMENT RECOMMENDATIONS UPDATE:  continue with group therapies, increased socialization, continue planning for after discharge goals, continue with medication compliance    SHORT-TERM GOALS UPDATE:   Time frame for Short-Term Goals:  5-7 days    LONG-TERM GOALS UPDATE:   Time frame for Long-Term Goals:  6 months    Members Present in Team Meeting:   See signature sheet  Quan Soto RN 8/12/

## 2022-08-12 NOTE — PROGRESS NOTES
CLINICAL PHARMACY NOTE: MEDS TO BEDS    Total # of Prescriptions Filled: 3   The following medications were delivered to the patient:  Mirtazapine 7.5mg  Trazodone HCL 50mg  Hydroxyzine HCL 50mg    Additional Documentation:  Delivered medications to nurses station

## 2022-08-16 NOTE — CARE COORDINATION
Name: Abdon Archer    : 1990    Discharge Date: 22    Primary Auth/Cert #: [id number]    Destination: home     Discharge Medications:      Medication List        START taking these medications      hydrOXYzine HCl 50 MG tablet  Commonly known as: ATARAX  Take 1 tablet by mouth 3 times daily as needed for Anxiety  Notes to patient: anxiety     mirtazapine 7.5 MG tablet  Commonly known as: REMERON  Take 1 tablet by mouth nightly  Notes to patient: Lowers depression            CONTINUE taking these medications      traZODone 50 MG tablet  Commonly known as: DESYREL  Take 1 tablet by mouth nightly as needed for Sleep  Notes to patient: Sleep aid               Where to Get Your Medications        These medications were sent to [facility] [address] [address]      Phone: [phone]   hydrOXYzine HCl 50 MG tablet  mirtazapine 7.5 MG tablet  traZODone 50 MG tablet         Follow Up Appointment: [address] [place] [address]  [phone]  Follow up on 8/15/2022  You are scheduled with the med clinic at 8:15 AM.

## 2022-11-06 ENCOUNTER — HOSPITAL ENCOUNTER (EMERGENCY)
Age: 32
Discharge: OTHER INSTITUTION | End: 2022-11-06
Attending: EMERGENCY MEDICINE
Payer: MEDICARE

## 2022-11-06 ENCOUNTER — HOSPITAL ENCOUNTER (INPATIENT)
Age: 32
LOS: 4 days | Discharge: HOME | DRG: 751 | End: 2022-11-10
Attending: PSYCHIATRY & NEUROLOGY | Admitting: PSYCHIATRY & NEUROLOGY
Payer: MEDICARE

## 2022-11-06 VITALS
HEART RATE: 59 BPM | SYSTOLIC BLOOD PRESSURE: 142 MMHG | OXYGEN SATURATION: 100 % | RESPIRATION RATE: 16 BRPM | TEMPERATURE: 97.5 F | DIASTOLIC BLOOD PRESSURE: 84 MMHG

## 2022-11-06 DIAGNOSIS — F32.A DEPRESSION, UNSPECIFIED DEPRESSION TYPE: Primary | ICD-10-CM

## 2022-11-06 LAB
ABSOLUTE EOS #: 0.26 K/UL (ref 0–0.44)
ABSOLUTE IMMATURE GRANULOCYTE: <0.03 K/UL (ref 0–0.3)
ABSOLUTE LYMPH #: 1.64 K/UL (ref 1.1–3.7)
ABSOLUTE MONO #: 0.51 K/UL (ref 0.1–1.2)
ACETAMINOPHEN LEVEL: <5 UG/ML (ref 10–30)
ALBUMIN SERPL-MCNC: 4 G/DL (ref 3.5–5.2)
ALBUMIN/GLOBULIN RATIO: 1.5 (ref 1–2.5)
ALP BLD-CCNC: 60 U/L (ref 40–129)
ALT SERPL-CCNC: 76 U/L (ref 5–41)
ANION GAP SERPL CALCULATED.3IONS-SCNC: 10 MMOL/L (ref 9–17)
AST SERPL-CCNC: 64 U/L
BASOPHILS # BLD: 1 % (ref 0–2)
BASOPHILS ABSOLUTE: <0.03 K/UL (ref 0–0.2)
BILIRUB SERPL-MCNC: 0.7 MG/DL (ref 0.3–1.2)
BUN BLDV-MCNC: 11 MG/DL (ref 6–20)
CALCIUM SERPL-MCNC: 9.2 MG/DL (ref 8.6–10.4)
CHLORIDE BLD-SCNC: 103 MMOL/L (ref 98–107)
CO2: 24 MMOL/L (ref 20–31)
CREAT SERPL-MCNC: 0.86 MG/DL (ref 0.7–1.2)
EOSINOPHILS RELATIVE PERCENT: 6 % (ref 1–4)
ETHANOL PERCENT: <0.01 %
ETHANOL: <10 MG/DL
GFR SERPL CREATININE-BSD FRML MDRD: >60 ML/MIN/1.73M2
GLUCOSE BLD-MCNC: 99 MG/DL (ref 70–99)
HCT VFR BLD CALC: 41.1 % (ref 40.7–50.3)
HEMOGLOBIN: 13.5 G/DL (ref 13–17)
IMMATURE GRANULOCYTES: 0 %
LYMPHOCYTES # BLD: 39 % (ref 24–43)
MCH RBC QN AUTO: 30.2 PG (ref 25.2–33.5)
MCHC RBC AUTO-ENTMCNC: 32.8 G/DL (ref 28.4–34.8)
MCV RBC AUTO: 91.9 FL (ref 82.6–102.9)
MONOCYTES # BLD: 12 % (ref 3–12)
NRBC AUTOMATED: 0 PER 100 WBC
PDW BLD-RTO: 13.1 % (ref 11.8–14.4)
PLATELET # BLD: 190 K/UL (ref 138–453)
PMV BLD AUTO: 9.1 FL (ref 8.1–13.5)
POTASSIUM SERPL-SCNC: 4 MMOL/L (ref 3.7–5.3)
RBC # BLD: 4.47 M/UL (ref 4.21–5.77)
SALICYLATE LEVEL: <1 MG/DL (ref 3–10)
SEG NEUTROPHILS: 42 % (ref 36–65)
SEGMENTED NEUTROPHILS ABSOLUTE COUNT: 1.73 K/UL (ref 1.5–8.1)
SODIUM BLD-SCNC: 137 MMOL/L (ref 135–144)
TOTAL PROTEIN: 6.6 G/DL (ref 6.4–8.3)
TOXIC TRICYCLIC SC,BLOOD: NEGATIVE
WBC # BLD: 4.2 K/UL (ref 3.5–11.3)

## 2022-11-06 PROCEDURE — 80307 DRUG TEST PRSMV CHEM ANLYZR: CPT

## 2022-11-06 PROCEDURE — 99285 EMERGENCY DEPT VISIT HI MDM: CPT

## 2022-11-06 PROCEDURE — 80143 DRUG ASSAY ACETAMINOPHEN: CPT

## 2022-11-06 PROCEDURE — 1240000000 HC EMOTIONAL WELLNESS R&B

## 2022-11-06 PROCEDURE — 99223 1ST HOSP IP/OBS HIGH 75: CPT | Performed by: INTERNAL MEDICINE

## 2022-11-06 PROCEDURE — 80179 DRUG ASSAY SALICYLATE: CPT

## 2022-11-06 PROCEDURE — 85025 COMPLETE CBC W/AUTO DIFF WBC: CPT

## 2022-11-06 PROCEDURE — 80053 COMPREHEN METABOLIC PANEL: CPT

## 2022-11-06 PROCEDURE — G0480 DRUG TEST DEF 1-7 CLASSES: HCPCS

## 2022-11-06 RX ORDER — HALOPERIDOL 5 MG/1
5 TABLET ORAL EVERY 6 HOURS PRN
Status: DISCONTINUED | OUTPATIENT
Start: 2022-11-06 | End: 2022-11-10 | Stop reason: HOSPADM

## 2022-11-06 RX ORDER — HALOPERIDOL 5 MG/ML
5 INJECTION INTRAMUSCULAR EVERY 6 HOURS PRN
Status: DISCONTINUED | OUTPATIENT
Start: 2022-11-06 | End: 2022-11-10 | Stop reason: HOSPADM

## 2022-11-06 RX ORDER — TRAZODONE HYDROCHLORIDE 50 MG/1
50 TABLET ORAL NIGHTLY PRN
Status: DISCONTINUED | OUTPATIENT
Start: 2022-11-06 | End: 2022-11-10 | Stop reason: HOSPADM

## 2022-11-06 RX ORDER — MAGNESIUM HYDROXIDE/ALUMINUM HYDROXICE/SIMETHICONE 120; 1200; 1200 MG/30ML; MG/30ML; MG/30ML
30 SUSPENSION ORAL EVERY 6 HOURS PRN
Status: DISCONTINUED | OUTPATIENT
Start: 2022-11-06 | End: 2022-11-10 | Stop reason: HOSPADM

## 2022-11-06 RX ORDER — DIPHENHYDRAMINE HYDROCHLORIDE 50 MG/ML
50 INJECTION INTRAMUSCULAR; INTRAVENOUS EVERY 6 HOURS PRN
Status: DISCONTINUED | OUTPATIENT
Start: 2022-11-06 | End: 2022-11-10 | Stop reason: HOSPADM

## 2022-11-06 RX ORDER — ACETAMINOPHEN 325 MG/1
650 TABLET ORAL EVERY 6 HOURS PRN
Status: DISCONTINUED | OUTPATIENT
Start: 2022-11-06 | End: 2022-11-10 | Stop reason: HOSPADM

## 2022-11-06 RX ORDER — IBUPROFEN 400 MG/1
400 TABLET ORAL EVERY 6 HOURS PRN
Status: DISCONTINUED | OUTPATIENT
Start: 2022-11-06 | End: 2022-11-10 | Stop reason: HOSPADM

## 2022-11-06 RX ORDER — HYDROXYZINE 50 MG/1
50 TABLET, FILM COATED ORAL 3 TIMES DAILY PRN
Status: DISCONTINUED | OUTPATIENT
Start: 2022-11-06 | End: 2022-11-10 | Stop reason: HOSPADM

## 2022-11-06 RX ORDER — POLYETHYLENE GLYCOL 3350 17 G/17G
17 POWDER, FOR SOLUTION ORAL DAILY PRN
Status: DISCONTINUED | OUTPATIENT
Start: 2022-11-06 | End: 2022-11-10 | Stop reason: HOSPADM

## 2022-11-06 ASSESSMENT — PAIN DESCRIPTION - ORIENTATION: ORIENTATION: RIGHT

## 2022-11-06 ASSESSMENT — SLEEP AND FATIGUE QUESTIONNAIRES
DO YOU USE A SLEEP AID: YES
DO YOU HAVE DIFFICULTY SLEEPING: YES
SLEEP PATTERN: DIFFICULTY FALLING ASLEEP;INSOMNIA
AVERAGE NUMBER OF SLEEP HOURS: 3

## 2022-11-06 ASSESSMENT — ENCOUNTER SYMPTOMS
VOMITING: 0
SORE THROAT: 0
NAUSEA: 0
SHORTNESS OF BREATH: 0
CONSTIPATION: 0
DIARRHEA: 0
ABDOMINAL PAIN: 0

## 2022-11-06 ASSESSMENT — PAIN DESCRIPTION - PAIN TYPE: TYPE: CHRONIC PAIN

## 2022-11-06 ASSESSMENT — PATIENT HEALTH QUESTIONNAIRE - PHQ9: SUM OF ALL RESPONSES TO PHQ QUESTIONS 1-9: 20

## 2022-11-06 ASSESSMENT — PAIN SCALES - GENERAL: PAINLEVEL_OUTOF10: 7

## 2022-11-06 ASSESSMENT — LIFESTYLE VARIABLES
HOW OFTEN DO YOU HAVE A DRINK CONTAINING ALCOHOL: NEVER
HOW MANY STANDARD DRINKS CONTAINING ALCOHOL DO YOU HAVE ON A TYPICAL DAY: PATIENT DOES NOT DRINK

## 2022-11-06 ASSESSMENT — PAIN DESCRIPTION - LOCATION: LOCATION: KNEE

## 2022-11-06 ASSESSMENT — PAIN - FUNCTIONAL ASSESSMENT: PAIN_FUNCTIONAL_ASSESSMENT: NONE - DENIES PAIN

## 2022-11-06 ASSESSMENT — PAIN DESCRIPTION - DESCRIPTORS: DESCRIPTORS: ACHING

## 2022-11-06 NOTE — BH NOTE
[facility]  Admission Note     Admission Type:   Admission Type: Voluntary    Reason for admission:  Reason for Admission: +SI with no specific plans, +HI no one specific      Addictive Behavior:   Addictive Behavior  In the Past 3 Months, Have You Felt or Has Someone Told You That You Have a Problem With  : None    Medical Problems:   Past Medical History:   Diagnosis Date    Alcoholism ([facility]     Anxiety     Bipolar 1 disorder (HCC)     Cocaine abuse (HCC)     Depression     Hepatitis C antibody test positive     Hypertension     Irregular heartbeat     Mild tetrahydrocannabinol (THC) abuse     Suicidal ideation        Status EXAM:  Mental Status and Behavioral Exam  Normal: No  Level of Assistance: Independent/Self  Facial Expression: Avoids Gaze  Affect: Appropriate  Level of Consciousness: Alert  Frequency of Checks: 4 times per hour, close  Mood:Normal: No  Mood: Depressed, Anxious  Motor Activity:Normal: Yes  Eye Contact: Good  Observed Behavior: Cooperative  Sexual Misconduct History: Current - no  Preception: [place] to person, [place] to time, [place] to place, [place] to situation  Attention:Normal: No  Attention: Unable to concentrate  Thought Processes: Circumstantial  Thought Content:Normal: No  Thought Content: Preoccupations  Depression Symptoms: Feelings of worthlessness, Feelings of hopelessess  Anxiety Symptoms: Generalized  Lizett Symptoms: No problems reported or observed.   Hallucinations: None  Delusions: No  Memory:Normal: Yes  Insight and Judgment: No  Insight and Judgment: Poor insight, Poor judgment    Tobacco Screening:  Practical Counseling, on admission, rachna X, if applicable and completed (first 3 are required if patient doesn't refuse):            ( ) Recognizing danger situations (included triggers and roadblocks)                    ( ) Coping skills (new ways to manage stress,relaxation techniques, changing routine, distraction) ( ) Basic information about quitting (benefits of quitting, techniques in how to quit, available resources  ( ) Referral for counseling faxed to [facility]                                                                                                                   ( x) Patient refused counseling  ( ) Patient has not smoked in the last 30 days    Metabolic Screening:    Lab Results   Component Value Date    LABA1C 5.6 06/18/2020       Lab Results   Component Value Date    CHOL 187 06/18/2020    CHOL 194 04/16/2018     Lab Results   Component Value Date    TRIG 98 06/18/2020    TRIG 120 04/16/2018     Lab Results   Component Value Date    HDL 50 06/18/2020    HDL 44 04/16/2018     No components found for: LDLCAL  No results found for: LABVLDL      Body mass index is 21.52 kg/m².     BP Readings from Last 2 Encounters:   11/06/22 133/86   11/06/22 (!) 142/84           Pt admitted with followings belongings:  Dental Appliances: None  Vision - Corrective Lenses: None  Hearing Aid: None  Jewelry: None  Body Piercings Removed: N/A  Clothing: Pants, Shirt, Jacket/Coat  Other Valuables: Lighter/Matches, Other (Comment) (narcan)    Tanna Pedraza RN

## 2022-11-06 NOTE — ED PROVIDER NOTES
[facility]  Emergency Department  Faculty Attestation     I performed a history and physical examination of the patient and discussed management with the resident. I reviewed the residents note and agree with the documented findings and plan of care. Any areas of disagreement are noted on the chart. I was personally present for the key portions of any procedures. I have documented in the chart those procedures where I was not present during the key portions. I have reviewed the emergency nurses triage note. I agree with the chief complaint, past medical history, past surgical history, allergies, medications, social and family history as documented unless otherwise noted below. For Physician Assistant/ Nurse Practitioner cases/documentation I have personally evaluated this patient and have completed at least one if not all key elements of the E/M (history, physical exam, and MDM). Additional findings are as noted. Primary Care Physician:  GENERIC HIGH    Screenings:  [unfilled]    CHIEF COMPLAINT     No chief complaint on file. RECENT VITALS:   Temp: 98.2 °F (36.8 °C),  Heart Rate: 64, Resp: 14, BP: 128/82    LABS:  Labs Reviewed   CBC WITH AUTO DIFFERENTIAL   COMPREHENSIVE METABOLIC PANEL   URINE DRUG SCREEN   TOX SCR, BLD, ED       Radiology  No orders to display       Attending Physician Additional  Notes    Patient has suicidal ideation with a plan to overdose on fentanyl. He is not acted on this plan. No use of weapons. He had a recent admission for psychiatric disease and states he is out of his trazodone and Remeron for the past 2 weeks. He denies alcohol or drug use today. No recent overdose of medications. He had a recent stressor with the death of his uncle. He had a hospitalization last month for similar symptoms. He has homicidal thoughts but no plan.   No physical complaints such as headache chest pain shortness of breath abdominal pain vomiting fever chills or sweats. He does have sleep disturbance, loss of interest in activities, no crying spells. On exam is nontoxic afebrile vital signs normal.  He does have flat affect. Normal pupils. Skin is warm and dry. No obvious toxidrome. Impression is depression with suicidal thoughts and ideation. Plan is social work evaluation, laboratory screening, psychiatric consultation. Emilia Morrissey.  Yanet Brito MD, [address]  Attending Emergency  Physician                Power Hurtado MD  11/06/22 1687

## 2022-11-06 NOTE — ED NOTES
Writer contact [facility] to present patient for psych consult. SW spoke with Rupali Crawford. Med Clearance will need to be documented in the case note before process can begin.         LUCIANA Sewell  11/06/22 1424

## 2022-11-06 NOTE — ED NOTES
[] Pasquale    [] [address]    [x]  [facility]      Y  N     [x] [] In the past two weeks have you had thoughts of hurting yourself in any way? [x] [] In the past two weeks have you had thoughts that you would be better off dead? [x] [] Have you made a suicide attempt in the past two months? [x] [] Do you have a plan for hurting yourself or suicide? [] [x] Presence of hallucinations/voices related to hurting himself or herself or someone else. SUICIDE/SECURITY WATCH PRECAUTION CHECKLIST     Orders    [x]  Suicide/Security Watch Precautions initiated as checked below:   11/6/22 11:31 AM EST BH31/BH31A    [x] Notified physician:  Victor Manuel Lynn MD  11/6/22 11:31 AM EST    [x] Orders obtained as appropriate:     [x] 1:1 Observer     [] Psych Consult     [] Psych Consult    Name:  Date:  Time:    [x] 1:1 Observer, Notified by:  Joon Keller RN    Contact Nurse Supervisor    [x] Remove all personal clothes from room and place in snap/paper gown/pants. Slipper only    [x] Remove all personal belongings from room and secured away from patient. Documentation    [x] Initiate Suicide/Security Watch Precaution Flow Sheet    [x] Initiate individualized Care Plan/Problem    [x] Document why precautions initiated on flow sheet (Initiate Nursing Care Plan/Problem)    [x] 1:1 Observer in place; instructions provided. Suicide precautions require observer be within arms length. [x] Nurse-Observer Communication Hand-off initiated by RN, reviewed with Observer. Subsequently used as Hand Off between Observers. [x] Initiate every 15 minute observations per observer as delegated by the RN.     [x] Initiate RN assessment and documentation    Environmental Scan  Search Criteria and Process: OPTIONAL, see Search Policy    [] Reason for search:    [x] Nursing in presence of second person to search patient    [x] Patient notified of reason for body assessment and belongings search:     Persons present during search:   Results of search and disposition:       Searchers Name: [employer]    These items or items similar should be removed from the room:   [] Chairs   [] Telephone   [] Trash cans and liners   [] Plastic utensils (order Patient Safety tray)   [] Empty or remove Sharps containers   [] All personal clothing/belongings removed   [] All unnecessary lead wires, electrical cords, draw cords, etc.   [] Flowers and plants   [] Double check for lighters, matches, razors, any glass items etc that can be used as weapons. Person completing Checklist: Mardell Osler, RN       GENERAL INFORMATION     Y  N     [x] [] Has the patient been informed that they are on a watch and what that means? [x] [] Can the patient get out of Bed without nursing assistance? [x] [] Can the patient use the restroom without nursing assistance? [x] [] Can the patient walk the halls to [place] their legs? \"   [] [x] Does the patient have metal utensils? [x] [] Have the patient's belongings been placed out of control of the patient? [x] [] Have the patient and his/her belongings been checked for contraband? [] [x] Is the patient under any visitor restrictions? If Yes, explain:   [] [x] Is the patient under an alias? Alias Name:   Authorized visitors (no more than two are to be on the list)   Name/Relationship:   Name/Relationship:    Name of Staff member that you  Received this information from?:     General Description:    Leodan Yap BH31/BH31A male 34 y.o. Admission weight:      Race: []  [] Black  [x]   []   [] [place] [] Other  Facial Hair:  [x] Yes  [] No  If yes, please describe: Identifying Marks (i.e. Visible tattoos, scars, etc... ):     NURSING CARE PLAN    Nursing Diagnosis: Risk of Self Directed Harm  [] Actual  [x] Potential  Date Started: 11/6/22      Etiological Factors: (related to)  [x] Expressed or implied suicidal ideation/behavior  [] Depression  [x] Suicide attempt      [] Low self-esteem  [] Hallucinations      [] Feeling of Hopelessness  [] Substance abuse or withdrawal    [] Dysfunctional family  [] Major traumatic event, eg., divorce, etc   [] Excessive stress/anxiety    11/6/22    Expected Outcomes    Patient will:   [x] Patient will remain safe for the duration of their stay   [x] Patient's environment will be safe, eg. Free of potential suicide weapons   [] Verbalize Recovery from suicidal episode and improvement in self-worth   [x] Discuss feeling that precipitated suicide attempt/thoughts/behavior   [] Will describe available resources for crisis prevention and management   [] Will verbalize positive coping skills     Nursing Intervention   [x] Assessment and Observations hourly   [x] Suicide Precautions implemented with patient, should be 1:1 observation   [x] Document observation [id number] and RN assessment hourly   [] Consult physician for:    [] Psychiatric consult    [] Pharmacological therapy    [] Other:    [x] Patient search completed by security   [x] Initiated appropriate safety protocols by removing from the patient's environment anything that could be used to inflict self injury, eg. Order safe tray, snap gown, etc   [x] Maintain open, warm, caring, non-judgmental attitude/manner towards patient   [] Discuss advantages and disadvantages of existing coping methods/skills   [x] Assist and educate patient with identifying present strengths and coping skills   [x] Keep patient informed regarding plan of care and provide clear concise explanations. Provide the patient/family education information as well as telephone numbers and other information about crisis centers, hot lines, and counselors.     Discharge Planning:   [] Referral  [] Groups [] Health agencies  [] Other:          Lesly Lin RN  11/06/22 9145

## 2022-11-06 NOTE — BH NOTE
Patient given tobacco quitline number [phone] at this time, refusing to call at this time, states \" I just dont want to quit now\"- patient given information as to the dangers of long term tobacco use. Continue to reinforce the importance of tobacco cessation.

## 2022-11-06 NOTE — ED NOTES
Writer contacted [facility] to present patient for psych consult. Writer spoke with Krissy Sanders charge nurse Katy, and Dr Jyoti Barber. Per Dr. Jyoti Barber patient will be admitted to [facility] with a diagnosis of Depression with suicidal ideations.       Ligia Agudelo, LUCIANA  11/06/22 3856

## 2022-11-06 NOTE — ED NOTES
Patient is a 34 Y. O. male who presented to the Ed with suicidal ideations with plan to ingest fentanyl. Patient reports homicidal ideation toward no one in particular. Patient reports feeling suicidal for the last month with the thoughts lasting all day. Patient reports 3 previous attempts in his life time. Patient reports his first attempt he took all of his sleeping medication, second attempt he tried to get hit by a card, and third attempt he '' shot up.'' Patient report feeling down, depressed and hopeless. Patient report no income and residing with others. Patient reported decreased in appetite eating one meal sometimes two meals a day. Patient report decrease in sleep stating he sleeps at least four hours night. Patient reported decline in daily routine. Patient reported sadness as his uncle passed away one month ago. Patient report history of Major Depression diagnosis. Patient report he is prescribed Trazodone and one other medication he was unable to name. Patient deny any community mental health linkage. Patient reported last inpatient admission occurred in August 2022. He reported he was recommended to go to [facility] and he missed that appointment. Patient non-compliant with taking medication. Patient admitted to drug usage and denied alcohol usage. Patient report using 1/2 gram of Fentanyl every other day by snorting. Patent reported never being in treatment to address his drug abuse however he would like to get help now. Patient denies any legal issues pending. Patient denies being a victim of physical, sexual, or emotional abuse. Patient reported some family and friend support in his community. Patient report if accepted for inpatient psychiatric treatment he would sign in voluntarily. Disposition: Pending, patient will be presented to [facility] for psych consult.       LUCIANA Becker  11/06/22 1468

## 2022-11-06 NOTE — ED NOTES
The following labs were labeled with appropriate pt sticker and tubed to lab:     [] Blue     [x] Lavender   [] on ice  [x] Green/yellow  [] Green/black [] on ice  [] Seretha Keepers  [] on ice  [] Yellow  [x] Red  [] Type/ Screen  [] ABG  [] VBG    [] COVID-19 swab    [] Rapid  [] PCR  [] Flu swab  [] Peds Viral Panel     [] Urine Sample  [] Pelvic Cultures  [] Blood Cultures  [] X 2  [] STREP Cultures       Mando Morataya RN  11/06/22 4638

## 2022-11-06 NOTE — ED NOTES
Writer received phone call from Katy of the [facility]. Patient will be placed in room 126 bed 2 at [facility].       LUCIANA Sorto  11/06/22 6223

## 2022-11-06 NOTE — BH NOTE
[facility]  Admission Note     Admission Type:   Admission Type: Voluntary    Reason for admission:  Reason for Admission: +SI with no specific plans, +HI no one specific      Addictive Behavior:   Addictive Behavior  In the Past 3 Months, Have You Felt or Has Someone Told You That You Have a Problem With  : None    Medical Problems:   Past Medical History:   Diagnosis Date    Alcoholism ([facility]     Anxiety     Bipolar 1 disorder (HCC)     Cocaine abuse (HCC)     Depression     Hepatitis C antibody test positive     Hypertension     Irregular heartbeat     Mild tetrahydrocannabinol (THC) abuse     Suicidal ideation        Status EXAM:  Mental Status and Behavioral Exam  Normal: No  Level of Assistance: Independent/Self  Facial Expression: Avoids Gaze  Affect: Appropriate  Level of Consciousness: Alert  Frequency of Checks: 4 times per hour, close  Mood:Normal: No  Mood: Depressed, Anxious  Motor Activity:Normal: Yes  Eye Contact: Good  Observed Behavior: Cooperative  Sexual Misconduct History: Current - no  Preception: [place] to person, [place] to time, [place] to place, [place] to situation  Attention:Normal: No  Attention: Unable to concentrate  Thought Processes: Circumstantial  Thought Content:Normal: No  Thought Content: Preoccupations  Depression Symptoms: Feelings of worthlessness, Feelings of hopelessess  Anxiety Symptoms: Generalized  Lizett Symptoms: No problems reported or observed.   Hallucinations: None  Delusions: No  Memory:Normal: Yes  Insight and Judgment: No  Insight and Judgment: Poor insight, Poor judgment    Tobacco Screening:  Practical Counseling, on admission, rachna X, if applicable and completed (first 3 are required if patient doesn't refuse):            ( ) Recognizing danger situations (included triggers and roadblocks)                    ( ) Coping skills (new ways to manage stress,relaxation techniques, changing routine, distraction) ( ) Basic information about quitting (benefits of quitting, techniques in how to quit, available resources  ( ) Referral for counseling faxed to [facility]                                                                                                                   (x ) Patient refused counseling  ( ) Patient has not smoked in the last 30 days    Metabolic Screening:    Lab Results   Component Value Date    LABA1C 5.6 06/18/2020       Lab Results   Component Value Date    CHOL 187 06/18/2020    CHOL 194 04/16/2018     Lab Results   Component Value Date    TRIG 98 06/18/2020    TRIG 120 04/16/2018     Lab Results   Component Value Date    HDL 50 06/18/2020    HDL 44 04/16/2018     No components found for: LDLCAL  No results found for: LABVLDL      Body mass index is 21.52 kg/m².     BP Readings from Last 2 Encounters:   11/06/22 133/86   11/06/22 (!) 142/84           Pt admitted with followings belongings:       Andrea Baez RN

## 2022-11-06 NOTE — BH NOTE
Patient reports to writer he is going to withdraw from fentanyl. Patient reports his last use as yesterday. Writer perfectserved Dr. Yun Freitas. Per Doctor, patient needs to submit a urine drug screen first. Writer informed patient of this. Patient declined wanting to submit a urine at this time. Writer informed patient that if at any time he changes his mind to let a staff member know.

## 2022-11-06 NOTE — ED PROVIDER NOTES
[facility] ED  Emergency Department Encounter  Emergency Medicine Resident     Pt Name: Emi Finney  MRN: [id number]  [place] 1990  Date of evaluation: 11/6/22  PCP:  Danni POWELL    CHIEF COMPLAINT       No chief complaint on file. HISTORY OFPRESENT ILLNESS  (Location/Symptom, Timing/Onset, Context/Setting, Quality, Duration, Modifying Factors,Severity.)      Emi Finney is a 34 y.o. male who presents with concerns for suicidal ideation. Patient was previously admitted to the [address] [place] [address] back and August with a suicidal ideation without a plan and homicidal ideation without a plan. Patient states that he is having suicidal ideations today, plans to shoot himself up with fentanyl to kill himself. Denies any homicidal ideation at this time. Patient states that he was feeling well after his last admission to [facility], however he states that he is lost a couple family members recently and this has brought back these feelings. Patient denies any other complaints at this time. No concerns for any injuries. PAST MEDICAL / SURGICAL / SOCIAL / FAMILY HISTORY      has a past medical history of Alcoholism ([facility] [address] Anxiety, Bipolar 1 disorder ([facility] [address] Cocaine abuse ([facility] [address] Depression, Hepatitis C antibody test positive, Hypertension, Irregular heartbeat, Mild tetrahydrocannabinol (THC) abuse, and Suicidal ideation. has a past surgical history that includes other surgical history (Right, 06/04/2017); Dialysis fistula creation (Right, 6/4/2017); EXPLORATION OF WOUND OF EXTREMITY (Right, 6/4/2017); and Femur Surgery (Left).     Social History     Socioeconomic History    Marital status: Single     Spouse name: magalys    Number of children: 1    Years of education: Not on file    Highest education level: Not on file   Occupational History    Occupation: unemployed     Comment: used to work at [employer]. 6 months ago   Tobacco Use    Smoking status: Every Day     Packs/day: 0.50     Years: 14.00 Pack years: 7.00     Types: Cigarettes    Smokeless tobacco: Never    Tobacco comments:     3 cigs per day per patient   Vaping Use    Vaping Use: Every day   Substance and Sexual Activity    Alcohol use: Not Currently    Drug use: Yes     Types: Marijuana Radha Coad), Cocaine     Comment: states uses fentanyl    Sexual activity: Yes     Partners: Female   Other Topics Concern    Not on file   Social History Narrative    ** Merged History Encounter **         Lives with fiance and 3year old daughter     Social Determinants of Health     Financial Resource Strain: Not on file   Food Insecurity: Not on file   Transportation Needs: Not on file   Physical Activity: Not on file   Stress: Not on file   Social Connections: Not on file   Intimate Partner Violence: Not on file   Housing Stability: Not on file       Family History   Problem Relation Age of Onset    Heart Disease Father 52    Asthma Brother     Hypertension Maternal Grandmother     Lung Cancer Maternal Grandmother         Kidney cancer, liver cancer    Stroke Maternal Grandmother     Heart Disease Mother 46         of presumed heart disease in her sleep       Allergies:  Vicodin [hydrocodone-acetaminophen]    Home Medications:  Prior to Admission medications    Medication Sig Start Date End Date Taking? Authorizing Provider   mirtazapine (REMERON) 7.5 MG tablet Take 1 tablet by mouth nightly 22   Ryan Mancuso MD   traZODone (DESYREL) 50 MG tablet Take 1 tablet by mouth nightly as needed for Sleep 22   Ryan Mancuso MD       REVIEW OF SYSTEMS    (2-9 systems for level 4, 10 or more for level 5)      Review of Systems   Constitutional:  Negative for chills and fever. HENT:  Negative for ear pain, hearing loss and sore throat. Eyes:  Negative for visual disturbance. Respiratory:  Negative for shortness of breath. Cardiovascular:  Negative for chest pain.    Gastrointestinal:  Negative for abdominal pain, constipation, diarrhea, nausea and vomiting. Genitourinary:  Negative for difficulty urinating and dysuria. Musculoskeletal:  Negative for arthralgias and myalgias. Neurological:  Negative for numbness. Psychiatric/Behavioral:  Positive for suicidal ideas. Negative for agitation, confusion, hallucinations and self-injury. The patient is not nervous/anxious. PHYSICAL EXAM   (up to 7 for level 4, 8 or more for level 5)     INITIAL VITALS:    oral temperature is 97.5 °F (36.4 °C). His blood pressure is 142/84 (abnormal) and his pulse is 59. His respiration is 16 and oxygen saturation is 100%. Physical Exam  Vitals and nursing note reviewed. Constitutional:       General: He is not in acute distress. Appearance: He is well-developed. He is not diaphoretic. HENT:      Head: Normocephalic and atraumatic. Right Ear: External ear normal.      Left Ear: External ear normal.      Nose: Nose normal.   Eyes:      Conjunctiva/sclera: Conjunctivae normal.   Neck:      Trachea: No tracheal deviation. Cardiovascular:      Rate and Rhythm: Normal rate and regular rhythm. Heart sounds: Normal heart sounds. No murmur heard. No friction rub. No gallop. Pulmonary:      Effort: Pulmonary effort is normal. No respiratory distress. Breath sounds: Normal breath sounds. Abdominal:      General: Bowel sounds are normal.      Palpations: Abdomen is soft. Tenderness: There is no abdominal tenderness. Musculoskeletal:         General: No tenderness. Normal range of motion. Cervical back: Neck supple. Skin:     General: Skin is warm and dry. Capillary Refill: Capillary refill takes less than 2 seconds. Neurological:      Mental Status: He is alert and oriented to person, place, and time. Motor: No abnormal muscle tone.        DIFFERENTIAL  DIAGNOSIS     PLAN (LABS / IMAGING / EKG):  Orders Placed This Encounter   Procedures    CBC with Auto Differential    Comprehensive Metabolic Panel Urine Drug Screen    TOX SCR, BLD, ED       MEDICATIONS ORDERED:  No orders of the defined types were placed in this encounter. DDX: Suicidal ideation, depression    Initial MDM/Plan: 34 y.o. male who presents with concerns for suicidal ideation. Time initial lamination patient is in no acute distress, signs are stable. Plan for [facility] labs. Will speak with social work. Potential admission/transfer to [facility]. DIAGNOSTIC RESULTS / EMERGENCY DEPARTMENT COURSE / MDM     LABS:  Labs Reviewed   CBC WITH AUTO DIFFERENTIAL - Abnormal; Notable for the following components:       Result Value    Eosinophils % 6 (*)     All other components within normal limits   COMPREHENSIVE METABOLIC PANEL - Abnormal; Notable for the following components:    ALT 76 (*)     AST 64 (*)     All other components within normal limits   TOX SCR, BLD, ED - Abnormal; Notable for the following components:    Acetaminophen Level <5 (*)     Salicylate Lvl <1 (*)     All other components within normal limits   URINE DRUG SCREEN         RADIOLOGY:  No results found. EKG  All EKG's are interpreted by the Emergency Department Physician who either signs or Co-signs this chart in the absence of a cardiologist.    EMERGENCY DEPARTMENT COURSE:  ED Course as of 11/08/22 1413   Sun Nov 06, 2022   1310 Medically stable for transfer to [facility]. [JS]   4550 Patient has been accepted to [facility]. Plan for transfer. [JS]      ED Course User Index  [JS] Jazmyn Elizondo DO     PROCEDURES:  None    CONSULTS:  None    CRITICAL CARE:  Please see attending note    FINAL IMPRESSION      1. Depression, unspecified depression type        DISPOSITION / PLAN     DISPOSITION Decision To Transfer 11/06/2022 02:57:42 PM    PATIENTREFERRED TO:  No follow-up provider specified.     DISCHARGE MEDICATIONS:  Discharge Medication List as of 11/6/2022  4:42 PM          Tuckerlalita Luque DO  EmergencyMedicine Resident    (Please note that portions of this note were completed with a voice recognition program.  Efforts were made to edit the dictations but occasionally words are mis-transcribed.)       Maya Chowdhury DO  Resident  11/08/22 0837

## 2022-11-06 NOTE — ED TRIAGE NOTES
Pt came ambulatory to ED with c/o being suicidal. Pt states h's feeling down and depressed this past two weeks and doesn't feel safe at home. He states that people at home are controlling him. Pt denies auditory and visual hallucinations. Pt states he has no plan on how to act on killing himself but last week he took too much fentanyl to try end his life but nothing happened. Last use of fentanyl was yesterday as per pt.

## 2022-11-06 NOTE — ED NOTES
Pt report given to [place]. All questions answered.       Krissy Sousa RN  11/06/22 3869

## 2022-11-06 NOTE — BH NOTE
Patient arrived on the unit at this time. Patient declined to sign paperwork but was in hospital attire.

## 2022-11-06 NOTE — ED NOTES
Writer contacted [facility] of the [facility]. Writer provided ETA  of 5:00 PM-5:30 PM.   Katy provided number of [phone] for nurse to nurse report as patient will be placed on the [facility] Unit bed 126 bed 2.       Maricruz Backer, LUCIANA  11/06/22 1054

## 2022-11-06 NOTE — ED NOTES
Writer made phone call to [address] [place] [address] to obtain ETA for patient transport. Writer spoke with Megan Whipple.  Writer given an ETA for   is between 5:00 PM-5:30 PM.     LUCIANA Doherty  11/06/22 3209

## 2022-11-07 PROCEDURE — 99223 1ST HOSP IP/OBS HIGH 75: CPT | Performed by: PSYCHIATRY & NEUROLOGY

## 2022-11-07 PROCEDURE — 1240000000 HC EMOTIONAL WELLNESS R&B

## 2022-11-07 PROCEDURE — 6370000000 HC RX 637 (ALT 250 FOR IP): Performed by: PSYCHIATRY & NEUROLOGY

## 2022-11-07 PROCEDURE — 99232 SBSQ HOSP IP/OBS MODERATE 35: CPT | Performed by: INTERNAL MEDICINE

## 2022-11-07 PROCEDURE — APPSS60 APP SPLIT SHARED TIME 46-60 MINUTES: Performed by: PSYCHIATRY & NEUROLOGY

## 2022-11-07 RX ORDER — ONDANSETRON 4 MG/1
4 TABLET, FILM COATED ORAL EVERY 8 HOURS PRN
Status: DISCONTINUED | OUTPATIENT
Start: 2022-11-07 | End: 2022-11-10 | Stop reason: HOSPADM

## 2022-11-07 RX ORDER — MIRTAZAPINE 15 MG/1
7.5 TABLET, FILM COATED ORAL NIGHTLY
Status: DISCONTINUED | OUTPATIENT
Start: 2022-11-07 | End: 2022-11-08

## 2022-11-07 RX ADMIN — MIRTAZAPINE 7.5 MG: 15 TABLET, FILM COATED ORAL at 20:06

## 2022-11-07 RX ADMIN — ONDANSETRON HYDROCHLORIDE 4 MG: 4 TABLET, FILM COATED ORAL at 20:52

## 2022-11-07 RX ADMIN — TRAZODONE HYDROCHLORIDE 50 MG: 50 TABLET ORAL at 20:06

## 2022-11-07 RX ADMIN — HYDROXYZINE HYDROCHLORIDE 50 MG: 50 TABLET, FILM COATED ORAL at 20:06

## 2022-11-07 ASSESSMENT — LIFESTYLE VARIABLES
HOW MANY STANDARD DRINKS CONTAINING ALCOHOL DO YOU HAVE ON A TYPICAL DAY: PATIENT DOES NOT DRINK
HOW OFTEN DO YOU HAVE A DRINK CONTAINING ALCOHOL: NEVER

## 2022-11-07 NOTE — GROUP NOTE
Group Therapy Note    Date: 11/7/2022    Group Start Time: 1330  Group End Time: 4097  Group Topic: Psychoeducation    [facility]    SHANNAN Kennedy        Group Therapy Note    Attendees: 6/16    Psych-Ed/Relapse Prevention Group Note        Date: November 7, 2022 Start Time: 1:30pm  End Time: 2:15pm      Number of Participants in Group & Unit Census:  6/16    Topic: creative expression, communication, concentration     Goal of Group: To improve creative expression and concentration through collaborating with peers and focusing on a presented task. Comments:     Patient did not participate in Psych-Ed/Relapse Prevention group, despite staff encouragement and explanation of benefits. Patient remain seclusive to self. Q15 minute safety checks maintained for patient safety and will continue to encourage patient to attend unit programming.         Signature:  SHANNAN Kennedy

## 2022-11-07 NOTE — PLAN OF CARE
Problem: Behavior  Goal: Pt/Family maintain appropriate behavior and adhere to behavioral management agreement, if implemented  Description: INTERVENTIONS:  1. Assess patient/family's coping skills and  non-compliant behavior (including use of illegal substances)  2. Notify security of behavior or suspected illegal substances which indicate the need for search of the family and/or belongings  3. Encourage verbalization of thoughts and concerns in a socially appropriate manner  4. Utilize positive, consistent limit setting strategies supporting safety of patient, staff and others  5. Encourage participation in the decision making process about the behavioral management agreement  6. If a visitor's behavior poses a threat to safety call refer to organization policy. 7. Initiate consult with , Psychosocial CNS, Spiritual Care as appropriate  Outcome: Progressing, Patient affect flat, non complaint, isolative to room need much encouragement. 100 % meal and fluid  intake.      Problem: Pain  Goal: Verbalizes/displays adequate comfort level or baseline comfort level  Outcome: Progressing, patient denied  Pain 15 MIN safety checks

## 2022-11-07 NOTE — H&P
Department of Psychiatry  Attending Physician Psychiatric Assessment     Reason for Admission to Psychiatric Unit:  A mental disorder causing major disability in social, interpersonal, occupational, and/or educational functioning that is leading to dangerous or life-threatening functioning, and that can only be addressed in an acute inpatient setting   Concerns about patient's safety in the community    CHIEF COMPLAINT: Depression with suicidal ideation and plan to overdose on fentanyl by injection    History obtained from: Patient, electronic medical record          HISTORY OF PRESENT ILLNESS:    Emi Finney is a 34 y.o. male who has a past medical history of mental illness, hypertension and polysubstance abuse. Patient presented to the ED at [facility] where he was experiencing intense suicidal ideation and plan to inject himself with fentanyl to end his own life. Per emergency department documentation:Patient has suicidal ideation with a plan to overdose on fentanyl. He is not acted on this plan. No use of weapons. He had a recent admission for psychiatric disease and states he is out of his trazodone and Remeron for the past 2 weeks. He denies alcohol or drug use today. No recent overdose of medications. He had a recent stressor with the death of his uncle. He had a hospitalization last month for similar symptoms. He has homicidal thoughts but no plan. No physical complaints such as headache chest pain shortness of breath abdominal pain vomiting fever chills or sweats. He does have sleep disturbance, loss of interest in activities, no crying spells. On exam is nontoxic afebrile vital signs normal.  He does have flat affect. Normal pupils. Skin is warm and dry. No obvious toxidrome. Impression is depression with suicidal thoughts and ideation. Plan is social work evaluation, laboratory screening, psychiatric consultation.     At diagnostic assessment patient is somewhat withdrawn. He does have a breakfast tray at bedside that has been consumed 100%. He confirms that he has been smoking crack cocaine and utilizing fentanyl by inhalation and has been feeling helpless and hopeless as he is unable to manage his illicit drug addiction. He is requesting help as he is experiencing significant thoughts of suicide. He reports that he is interested in substance use rehab once he is discharged from the hospital.  Due to increased life stressors patient reports increased depression for the past couple of weeks with a low mood all day nearly every day. He endorses poor sleep, significant anhedonia, poor energy, and decreased concentration. He reports that his appetite has been poor lately. He endorses significant feelings of hopelessness and helplessness. Patient endorses suicidal ideation and states that he has a plan to \"shoot up and overdose. \"     Unable to elicit any symptoms of ashish and patient denies decreased need for sleep, increased goal-directed activity with elevated mood or rapid speech. Without the use of illicit drugs he denies auditory or visual hallucinations or concerns that people are watching or talking about him. He does endorse experiencing perceptual disturbances previously including seeing shadows and hearing voices telling him to hurt himself and others. He denies receiving messages from the media or believing that he has magical snell. He does not identify with symptoms of anxiety or having ever experienced a panic attack. He denies intrusive and persistent thoughts that are relieved by repetitive behaviors. Paz Hansen does endorse a history of trauma and states that he has nightmares once a week. Patient reports avoidance behavior, hyperarousal and increased startle reflex and persistently reexperiencing the events. Patient does report his childhood was very good up until his father passed away when he was 14years old.   He also reports that his mother passed away 8 years ago and that this is traumatic for him. He does not like talking about the death of his mother. Patient reports that he has poor self-esteem and endorses a chronic feeling of emptiness that cannot be filled by anything. He reports that he fears rejection from loved ones and has a pattern of unstable relationships. He endorses mood swings throughout the day with intense anger outbursts. Patient reports that he has a history of cutting to relieve tension. Patient denies binging purging or utilizing other caloric restrictive means based on his concern for physique. He does endorse a forensic history is unwilling to discuss details. He denies homicidal ideation or any intent to harm anyone in the immediate area. He did report homicidal ideation in the emergency department but shares that this has improved with the safety of the unit. He continues to endorse significant suicidal ideation and is not able to contract for safety if he were in the community. He agrees to increase his oral hydration and is provided ice water as he is aware that we are awaiting a specimen for urine toxicology.          History of head trauma: [x] Yes [] No concussion    History of seizures: [] Yes [x] No    History of violence or aggression: [] Yes [x] No         PSYCHIATRIC HISTORY:  [] Yes [] No    Currently follows with no community provider and is requesting to transition to [facility] once his symptoms are stabilized    3 lifetime suicide attempts by overdose and cutting    Multiple psychiatric hospital admissions including [facility] 8/9/2022, 3/17/2022, 2/12/2022, 11/19/2021    Home Medication Compliance: [x] Yes [] No    Past psychiatric medications includes: Zoloft, trazodone and Effexor    Adverse reactions from psychotropic medications: [] Yes [] No         Lifetime Psychiatric Review of Systems         Depression: Endorses     Anxiety: Denies     Panic Attacks: Denies     Lizett or Hypomania: Denies     Phobias: Denies     Obsessions and Compulsions: Denies     Body or Vocal Tics: Denies     Visual Hallucinations: Endorses per history only     Auditory Hallucinations: Endorses per history only     Delusions/Paranoia: Denies     PTSD: Endorses    Past Medical History:        Diagnosis Date    Alcoholism ([facility]     Anxiety     Bipolar 1 disorder (H[facility])     Cocaine abuse ([facility]     Depression     Hepatitis C antibody test positive     Hypertension     Irregular heartbeat     Mild tetrahydrocannabinol (THC) abuse     Suicidal ideation        Past Surgical History:        Procedure Laterality Date    DIALYSIS FISTULA CREATION Right 6/4/2017    RIGHT WRIST WOUND EXPLORATION WITH ARTERIAL REPAIR ULNAR AND RADIAL ARTERIES RIGHT WRIST performed by Pardeep Paiz MD at [address] Right 6/4/2017    WOUND EXPLORATION AND/OR REVISION performed by Pardeep Paiz MD at [address] Left     OTHER SURGICAL HISTORY Right 06/04/2017    exp and repair of unlar and radial artery with exp and repair of 12 tendons and 2 nerves       Allergies:  Vicodin [hydrocodone-acetaminophen]         Social History:     Born in: [place], [place]  Family: Reports that he was raised by his mother and father until his father passed when he was 14. He reports that his mother passed away about 8 years ago. He states that he has one brother and one sister whom he is close with.   Highest Level of Education: GED   Occupation: Unemployed  Marital Status: Never   Children: 4 children Residence: Reports he has been living on the streets of [place]  Stressors: Illicit drug use  Patient Assets/Supportive Factors: Patient is seeking additional support         DRUG USE HISTORY  Social History     Tobacco Use   Smoking Status Every Day    Packs/day: 0.50    Years: 14.00    Pack years: 7.00    Types: Cigarettes   Smokeless Tobacco Never   Tobacco Comments    3 cigs per day per patient     Social History     Substance and Sexual Activity   Alcohol Use Not Currently     Social History     Substance and Sexual Activity   Drug Use Yes    Types: Marijuana (Lisa Caridad), Cocaine    Comment: states uses fentanyl     reports crack cocaine and fentanyl via inhalation. Denies IV use. Denies other illicit drugs or alcohol use. Urine drug screen specimen pending. LEGAL HISTORY:   HISTORY OF INCARCERATION: [x] Yes [] No    Family History:       Problem Relation Age of Onset    Heart Disease Father 52    Asthma Brother     Hypertension Maternal Grandmother     Lung Cancer Maternal Grandmother         Kidney cancer, liver cancer    Stroke Maternal Grandmother     Heart Disease Mother 46         of presumed heart disease in her sleep       Psychiatric Family History  Patient denies psychiatric family history. Suicides in family: [] Yes [x] No    Substance use in family: [x] Yes [] No mother and father with drug use         PHYSICAL EXAM:  Vitals:  /86   Pulse 66   Temp 97.5 °F (36.4 °C) (Temporal)   Resp 16   Ht 5' 10\" (1.778 m)   Wt 150 lb (68 kg)   BMI 21.52 kg/m²   Pain Level: Denies current pain or discomfort    LABS:  Labs reviewed: [x] Yes  Last EKG in EMR reviewed: [x] Yes          Review of Systems   Constitutional: Negative for chills and weight loss. HENT: Negative for ear pain and nosebleeds. Eyes: Negative for blurred vision and photophobia. Respiratory: Negative for cough, shortness of breath and wheezing. Cardiovascular: Negative for chest pain and palpitations. Gastrointestinal: Negative for abdominal pain, diarrhea and vomiting. Genitourinary: Negative for dysuria and urgency. Musculoskeletal: Negative for falls and joint pain. Skin: Negative for itching and rash. Neurological: Negative for tremors, seizures and weakness. Endo/Heme/Allergies: Does not bruise/bleed easily. Physical Exam:   Constitutional:  Appears well-developed and well-nourished, no acute distress.   HENT:   Head: Normocephalic and atraumatic. Eyes: Conjunctivae are normal. Right eye exhibits no discharge. Left eye exhibits no discharge. No scleral icterus. Neck: Normal range of motion. Neck supple. Pulmonary/Chest:  No respiratory distress or accessory muscle use, no wheezing. Cardiac: Regular rate and rhythm. Abdominal: Soft. Non-tender. Exhibits no distension. Musculoskeletal: Normal range of motion. Exhibits no edema. Neurological: cranial nerves II-XII grossly in tact, normal gait and station. Skin: Skin is warm and dry. Patient is not diaphoretic. No erythema. Mental Status Examination:    Level of consciousness: Awake and alert  Appearance:  Appropriate attire, resting in bed, fair grooming   Behavior/Motor: Approachable, withdrawn, no psychomotor abnormalities noted  Attitude toward examiner:  Cooperative, attentive, avoids extended eye contact  Speech: Delayed rate, decreased volume, and monotone   mood: Depressed  Affect: Blunted  Thought processes:   linear, slow, coherent  Thought content: Active suicidal ideations, with a  current plan or intent, contracts for safety on the unit.                Denies homicidal ideations               Denies hallucinations              Denies delusions              Denies paranoia  Cognition:  Oriented to self, location, time, situation  Concentration: Clinically adequate  Memory: Intact  Insight &Judgment: Poor         DSM-5 Diagnosis    Principal Problem: Severe episode of recurrent major depressive disorder, without psychotic features ([facility]    Stimulant use disorder-cocaine  Opiate use disorder-fentanyl of  Significant traits cluster B personality disorder    Psychosocial and Contextual factors:  Financial   Occupational   Relationship   Legal   Living situation   Educational     Past Medical History:   Diagnosis Date    Alcoholism ([facility]     Anxiety     Bipolar 1 disorder ([facility]     Cocaine abuse ([facility]     Depression     Hepatitis C antibody test positive Hypertension     Irregular heartbeat     Mild tetrahydrocannabinol (THC) abuse     Suicidal ideation         TREATMENT CONSIDERATIONS    Continue inpatient psychiatric treatment. Home medications reviewed. Medications per attending physician  Monitor need and frequency of PRN medications. Attempt to develop insight. Follow-up daily while inpatient. Reviewed risks and benefits as well as potential side effects with patient. CONSULT:  [x] Yes [] No  Internal medicine for medical management/medical H&P      Risk Management: close watch per standard protocol      Psychotherapy: participation in milieu and group and individual sessions with Attending Physician,  and Physician Assistant/CNP      Estimated length of stay:  2-14 days      GENERAL PATIENT/FAMILY EDUCATION  Patient will understand basic signs and symptoms, patient will understand benefits/risks and potential side effects from proposed medications, and patient will understand their role in recovery. Family is not active in patient's care. Patient assets that may be helpful during treatment include: Intent to participate and engage in treatment, sufficient fund of knowledge and intellect to understand and utilize treatments. Goals:    1) Remission of suicidal ideation   2) Stabilization of symptoms prior to discharge. 3) Establish efficacy and tolerability of medications. Behavioral Services  Medicare Certification     Admission Day 1  I certify that this patient's inpatient psychiatric hospital admission is medically necessary for:    x (1) treatment which could reasonably be expected to improve this patient's condition, or    x (2) diagnostic study or its equivalent. Time Spent:60 minutes    Abraham Mcgregor is a 34 y.o. male being evaluated face to face    --ROXANNE Billings CNP on 11/7/2022 at 9:59 AM    An electronic signature was used to authenticate this note.        I independently saw and evaluated the patient. I reviewed the nurse practitioners documentation above. Any additional comments or changes to the nurse practitioners documentation are stated below otherwise agree with assessment. Plan will be as follows:  Patient reports that he fell off of taking his medication on an outpatient basis. States over the past month he is in worsening mood, increased anxiety, poor sleep and ultimately relapsed on substances. Reported feeling suicidal, no point to life and had intention and plan of ending his life. Reached out to get help at the hospital.  When asked about substance use rehab at present time he indicates that he may want to go but states \"I cannot stop thinking about killing myself right now, I cannot think about that\". Reports that the mood is so poor that he is not sure how he will feel when his mood is better. He does feel the Remeron helped him in the past and is agreeable to restart that.   Time spent: 60 minutes in face-to-face, review of records, and discussion of treatment plan  Electronically signed by Gm Bolanos MD on 11/7/2022 at 2:37 PM

## 2022-11-07 NOTE — H&P
[facility] Internal Medicine  Chris Venegas MD; Gertrude Khoury MD; Marisa Aparicio MD; Erling Mcardle, MD Larance Lah, MD; MD [facility]   [facility]    HISTORY AND PHYSICAL EXAMINATION            Date:   11/6/2022  Patient name:  Ayde Pop  Date of admission:  11/6/2022  4:51 PM  MRN:   [id number]  Account:  [de-identified]  YOB: 1990  PCP:    GENERIC HIGH  Room:   [address] [place] [address]  Code Status:    Full Code    Chief Complaint:     No chief complaint on file. Htn    History Obtained From:     Patient medical record nursing staff    History of Present Illness:     Ayde Pop is a 34 y.o. Non- / non  male who presents with No chief complaint on file. and is admitted to the hospital for the management of Depression with suicidal ideation.     HTN  Onset more than 2 years ago  carmen mild to mod  Controlled with current  no meds  Not associated with headaches or blurry vision  No chest pain      Past Medical History:     Past Medical History:   Diagnosis Date    Alcoholism ([facility]     Anxiety     Bipolar 1 disorder ([facility]     Cocaine abuse ([facility]     Depression     Hepatitis C antibody test positive     Hypertension     Irregular heartbeat     Mild tetrahydrocannabinol (THC) abuse     Suicidal ideation         Past Surgical History:     Past Surgical History:   Procedure Laterality Date    DIALYSIS FISTULA CREATION Right 6/4/2017    RIGHT WRIST WOUND EXPLORATION WITH ARTERIAL REPAIR ULNAR AND RADIAL ARTERIES RIGHT WRIST performed by Dru Bradley MD at [address] Right 6/4/2017    WOUND EXPLORATION AND/OR REVISION performed by Dru [facility] MD at [facility]     OTHER SURGICAL HISTORY Right 06/04/2017    exp and repair of unlar and radial artery with exp and repair of 12 tendons and 2 nerves        Medications Prior to Admission: Prior to Admission medications    Medication Sig Start Date End Date Taking? Authorizing Provider   mirtazapine (REMERON) 7.5 MG tablet Take 1 tablet by mouth nightly 22   Cece Kwon MD   traZODone (DESYREL) 50 MG tablet Take 1 tablet by mouth nightly as needed for Sleep 22   Cece Kwon MD        Allergies:     Vicodin [hydrocodone-acetaminophen]    Social History:     Tobacco:    reports that he has been smoking cigarettes. He has a 7.00 pack-year smoking history. He has never used smokeless tobacco.  Alcohol:      reports that he does not currently use alcohol. Drug Use:  reports current drug use. Drugs: Marijuana (Weed) and Cocaine. Family History:     Family History   Problem Relation Age of Onset    Heart Disease Father 52    Asthma Brother     Hypertension Maternal Grandmother     Lung Cancer Maternal Grandmother         Kidney cancer, liver cancer    Stroke Maternal Grandmother     Heart Disease Mother 46         of presumed heart disease in her sleep       Review of Systems:     Positive and Negative as described in HPI.     CONSTITUTIONAL:  negative for fevers, chills, sweats, fatigue, weight loss  HEENT:  negative for vision, hearing changes, runny nose, throat pain  RESPIRATORY:  negative for shortness of breath, cough, congestion, wheezing  CARDIOVASCULAR:  negative for chest pain, palpitations  GASTROINTESTINAL:  negative for nausea, vomiting, diarrhea, constipation, change in bowel habits, abdominal pain   GENITOURINARY:  negative for difficulty of urination, burning with urination, frequency   INTEGUMENT:  negative for rash, skin lesions, easy bruising   HEMATOLOGIC/LYMPHATIC:  negative for swelling/edema   ALLERGIC/IMMUNOLOGIC:  negative for urticaria , itching  ENDOCRINE:  negative increase in drinking, increase in urination, hot or cold intolerance  MUSCULOSKELETAL:  negative joint pains, muscle aches, swelling of joints  NEUROLOGICAL:  negative for headaches, dizziness, lightheadedness, numbness, pain, tingling extremities  BEHAVIOR/PSYCH:  negative for depression, anxiety    Physical Exam:   /86   Pulse 66   Temp 97.5 °F (36.4 °C) (Temporal)   Resp 16   Ht 5' 10\" (1.778 m)   Wt 150 lb (68 kg)   BMI 21.52 kg/m²   Temp (24hrs), Av.7 °F (36.5 °C), Min:97.5 °F (36.4 °C), Max:98.2 °F (36.8 °C)    No results for input(s): POCGLU in the last 72 hours.   No intake or output data in the 24 hours ending 22 1905    General Appearance: alert, well appearing, and in no acute distress  Mental status: oriented to person, place, and time  Head: normocephalic, atraumatic  Eye: no icterus, redness, pupils equal and reactive, extraocular eye movements intact, conjunctiva clear  Ear: normal external ear, no discharge, hearing intact  Nose: no drainage noted  Mouth: mucous membranes moist  Neck: supple, no carotid bruits, thyroid not palpable  Lungs: Bilateral equal air entry, clear to ausculation, no wheezing, rales or rhonchi, normal effort  Cardiovascular: normal rate, regular rhythm, no murmur, gallop, rub  Abdomen: Soft, nontender, nondistended, normal bowel sounds, no hepatomegaly or splenomegaly  Neurologic: There are no new focal motor or sensory deficits, normal muscle tone and bulk, no abnormal sensation, normal speech, cranial nerves II through XII grossly intact  Skin: No gross lesions, rashes, bruising or bleeding on exposed skin area  Extremities: peripheral pulses palpable, no pedal edema or calf pain with palpation  Psych: normal affect    Investigations:      Laboratory Testing:  Recent Results (from the past 24 hour(s))   CBC with Auto Differential    Collection Time: 22 12:06 PM   Result Value Ref Range    WBC 4.2 3.5 - 11.3 k/uL    RBC 4.47 4.21 - 5.77 m/uL    Hemoglobin 13.5 13.0 - 17.0 g/dL    Hematocrit 41.1 40.7 - 50.3 %    MCV 91.9 82.6 - 102.9 fL    MCH 30.2 25.2 - 33.5 pg    MCHC 32.8 28.4 - 34.8 g/dL    RDW 13.1 11.8 - 14.4 % Platelets 621 202 - 680 k/uL    MPV 9.1 8.1 - 13.5 fL    NRBC Automated 0.0 0.0 per 100 WBC    Seg Neutrophils 42 36 - 65 %    Lymphocytes 39 24 - 43 %    Monocytes 12 3 - 12 %    Eosinophils % 6 (H) 1 - 4 %    Basophils 1 0 - 2 %    Immature Granulocytes 0 0 %    Segs Absolute 1.73 1.50 - 8.10 k/uL    Absolute Lymph # 1.64 1.10 - 3.70 k/uL    Absolute Mono # 0.51 0.10 - 1.20 k/uL    Absolute Eos # 0.26 0.00 - 0.44 k/uL    Basophils Absolute <0.03 0.00 - 0.20 k/uL    Absolute Immature Granulocyte <0.03 0.00 - 0.30 k/uL   Comprehensive Metabolic Panel    Collection Time: 11/06/22 12:06 PM   Result Value Ref Range    Glucose 99 70 - 99 mg/dL    BUN 11 6 - 20 mg/dL    Creatinine 0.86 0.70 - 1.20 mg/dL    Est, Glom Filt Rate >60 >60 mL/min/1.73m2    Calcium 9.2 8.6 - 10.4 mg/dL    Sodium 137 135 - 144 mmol/L    Potassium 4.0 3.7 - 5.3 mmol/L    Chloride 103 98 - 107 mmol/L    CO2 24 20 - 31 mmol/L    Anion Gap 10 9 - 17 mmol/L    Alkaline Phosphatase 60 40 - 129 U/L    ALT 76 (H) 5 - 41 U/L    AST 64 (H) <40 U/L    Total Bilirubin 0.7 0.3 - 1.2 mg/dL    Total Protein 6.6 6.4 - 8.3 g/dL    Albumin 4.0 3.5 - 5.2 g/dL    Albumin/Globulin Ratio 1.5 1.0 - 2.5   TOX SCR, BLD, ED    Collection Time: 11/06/22 12:06 PM   Result Value Ref Range    Acetaminophen Level <5 (L) 10 - 30 ug/mL    Ethanol <10 <10 mg/dL    Ethanol percent <0.942 <2.331 %    Salicylate Lvl <1 (L) 3 - 10 mg/dL    Toxic Tricyclic Sc,Blood NEGATIVE NEGATIVE       Imaging/Diagnostics:  No results found.     Assessment :      Hospital Problems             Last Modified POA    * (Principal) Depression with suicidal ideation 11/6/2022 Yes       Plan:     59-year-old gentleman with history of hypertension  Blood pressure log reviewed controlled without medication  Will monitor without antihypertensive medication at this time  Elevated ALT at 76 and AST at 60  Repeat in 6 weeks if still high will need hepatitis C screening patient is agreeable to follow-up with PCP    Carina Guy MD  11/6/2022  7:05 PM    Copy sent to Dr. Sharri Arevalo    Please note that this chart was generated using voice recognition Dragon dictation software. Although every effort was made to ensure the accuracy of this automated transcription, some errors in transcription may have occurred.

## 2022-11-07 NOTE — PROGRESS NOTES
Behavioral Services  Medicare Certification Upon Admission    I certify that this patient's inpatient psychiatric hospital admission is medically necessary for:    [x] (1) Treatment which could reasonably be expected to improve this patient's condition,       [x] (2) Or for diagnostic study;     AND     [x](2) The inpatient psychiatric services are provided while the individual is under the care of a physician and are included in the individualized plan of care.     Estimated length of stay/service 4 to 7 days    Plan for post-hospital care Home with outpatient community mental health follow-up    Electronically signed by Cb Sylvester MD on 11/7/2022 at 2:34 PM

## 2022-11-07 NOTE — PLAN OF CARE
[facility]  Initial Interdisciplinary Treatment Plan NO      Original treatment plan Date & Time: 11/7/2022  1245    Admission Type:  Admission Type: Voluntary    Reason for admission:   Reason for Admission: +SI with no specific plans, +HI no one specific    Estimated Length of Stay:  5-7days  Estimated Discharge Date: to be determined by physician    PATIENT STRENGTHS:  Patient Strengths:   Patient Strengths and Limitations:Limitations: Difficult relationships / poor social skills, Multiple barriers to leisure interests, Inappropriate/potentially harmful leisure interests, Difficulty problem solving/relies on others to help solve problems, Tendency to isolate self, Perceives need for assistance with self-care  Addictive Behavior: Addictive Behavior  In the Past 3 Months, Have You Felt or Has Someone Told You That You Have a Problem With  : None  Medical Problems:  Past Medical History:   Diagnosis Date    Alcoholism ([facility]     Anxiety     Bipolar 1 disorder ([facility]     Cocaine abuse ([facility]     Depression     Hepatitis C antibody test positive     Hypertension     Irregular heartbeat     Mild tetrahydrocannabinol (THC) abuse     Suicidal ideation      Status EXAM:Mental Status and Behavioral Exam  Normal: No  Level of Assistance: Independent/Self  Facial Expression: Avoids Gaze  Affect: Appropriate  Level of Consciousness: Alert  Frequency of Checks: 4 times per hour, close  Mood:Normal: No  Mood: Depressed  Motor Activity:Normal: Yes  Eye Contact: Fair  Observed Behavior: Cooperative  Sexual Misconduct History: Current - no  Preception: [place] to person, [place] to time, [place] to place, [place] to situation  Attention:Normal: No  Attention: Unable to concentrate  Thought Processes: Circumstantial  Thought Content:Normal: No  Thought Content: Preoccupations  Depression Symptoms: Feelings of hopelessess, Feelings of helplessness, Isolative, Loss of interest  Anxiety Symptoms: Generalized  [place] Symptoms: No problems reported or observed.   Hallucinations: None  Delusions: No  Memory:Normal: Yes  Insight and Judgment: No  Insight and Judgment: Poor insight    EDUCATION:   Learner Progress Toward Treatment Goals: reviewed group plans and strategies for care    Method:group therapy, medication compliance, individualized assessments and care planning    Outcome: needs reinforcement    PATIENT GOALS: to be discussed with patient within 72 hours    PLAN/TREATMENT RECOMMENDATIONS:     continue group therapy , medications compliance, goal setting, individualized assessments and care, continue to monitor pt on unit      SHORT-TERM GOALS: per psychoeducational specialist, patient refused to attend treatment team to discuss goals at this time  Time frame for Short-Term Goals: 5-7 days    LONG-TERM GOALS: per psychoeducational specialist, patient refused to attend treatment team to discuss goals at this time  Time frame for Long-Term Goals: 6 months  Members Present in Team Meeting: See Signature Sheet    Emilee Santamaria RN

## 2022-11-07 NOTE — PROGRESS NOTES
[address] [facility] Internal Medicine  Parish Nuñez MD; Melecio Marin MD; Cass Chavez MD; MD Dora Murdock MD; Augustine Brittle, MD JOHN [facility]   [facility]    HISTORY AND PHYSICAL EXAMINATION            Date:   11/7/2022  Patient name:  Dixie Quinonez  Date of admission:  11/6/2022  4:51 PM  MRN:   [id number]  Account:  [de-identified]  YOB: 1990  PCP:    GENERIC HIGH  Room:   [address] [place] [address]  Code Status:    Full Code    Chief Complaint:     No chief complaint on file. Htn    History Obtained From:     Patient medical record nursing staff    History of Present Illness:     Dixie Quinonez is a 34 y.o. Non- / non  male who presents with No chief complaint on file. and is admitted to the hospital for the management of Severe episode of recurrent major depressive disorder, without psychotic features ([facility]     HTN  Onset more than 2 years ago  carmen mild to mod  Controlled with current  no meds  Not associated with headaches or blurry vision  No chest pain      Past Medical History:     Past Medical History:   Diagnosis Date    Alcoholism ([address]     Anxiety     Bipolar 1 disorder ([facility]     Cocaine abuse ([facility]     Depression     Hepatitis C antibody test positive     Hypertension     Irregular heartbeat     Mild tetrahydrocannabinol (THC) abuse     Suicidal ideation         Past Surgical History:     Past Surgical History:   Procedure Laterality Date    DIALYSIS FISTULA CREATION Right 6/4/2017    RIGHT WRIST WOUND EXPLORATION WITH ARTERIAL REPAIR ULNAR AND RADIAL ARTERIES RIGHT WRIST performed by Shamir Pierre MD at [address] Right 6/4/2017    WOUND EXPLORATION AND/OR REVISION performed by Shamir Pierre MD at [address] Left     OTHER SURGICAL HISTORY Right 06/04/2017    exp and repair of unlar and radial artery with exp and repair of 12 tendons and 2 nerves        Medications Prior to Admission:     Prior to Admission medications    Medication Sig Start Date End Date Taking? Authorizing Provider   mirtazapine (REMERON) 7.5 MG tablet Take 1 tablet by mouth nightly 22   Arlene Morris MD   traZODone (DESYREL) 50 MG tablet Take 1 tablet by mouth nightly as needed for Sleep 22   Arlene Morris MD        Allergies:     Vicodin [hydrocodone-acetaminophen]    Social History:     Tobacco:    reports that he has been smoking cigarettes. He has a 7.00 pack-year smoking history. He has never used smokeless tobacco.  Alcohol:      reports that he does not currently use alcohol. Drug Use:  reports current drug use. Drugs: Marijuana (Weed) and Cocaine. Family History:     Family History   Problem Relation Age of Onset    Heart Disease Father 52    Asthma Brother     Hypertension Maternal Grandmother     Lung Cancer Maternal Grandmother         Kidney cancer, liver cancer    Stroke Maternal Grandmother     Heart Disease Mother 46         of presumed heart disease in her sleep       Review of Systems:     Positive and Negative as described in HPI.     CONSTITUTIONAL:  negative for fevers, chills, sweats, fatigue, weight loss  HEENT:  negative for vision, hearing changes, runny nose, throat pain  RESPIRATORY:  negative for shortness of breath, cough, congestion, wheezing  CARDIOVASCULAR:  negative for chest pain, palpitations  GASTROINTESTINAL:  negative for nausea, vomiting, diarrhea, constipation, change in bowel habits, abdominal pain   GENITOURINARY:  negative for difficulty of urination, burning with urination, frequency   INTEGUMENT:  negative for rash, skin lesions, easy bruising   HEMATOLOGIC/LYMPHATIC:  negative for swelling/edema   ALLERGIC/IMMUNOLOGIC:  negative for urticaria , itching  ENDOCRINE:  negative increase in drinking, increase in urination, hot or cold intolerance  MUSCULOSKELETAL:  negative joint pains, muscle aches, swelling of joints  NEUROLOGICAL:  negative for headaches, dizziness, lightheadedness, numbness, pain, tingling extremities  BEHAVIOR/PSYCH:  negative for depression, anxiety    Physical Exam:   /86   Pulse 66   Temp 97.5 °F (36.4 °C) (Temporal)   Resp 16   Ht 5' 10\" (1.778 m)   Wt 150 lb (68 kg)   BMI 21.52 kg/m²   Temp (24hrs), Av.5 °F (36.4 °C), Min:97.5 °F (36.4 °C), Max:97.5 °F (36.4 °C)    No results for input(s): POCGLU in the last 72 hours. No intake or output data in the 24 hours ending 22 1518    General Appearance: alert, well appearing, and in no acute distress  Mental status: oriented to person, place, and time  Head: normocephalic, atraumatic  Eye: no icterus, redness, pupils equal and reactive, extraocular eye movements intact, conjunctiva clear  Ear: normal external ear, no discharge, hearing intact  Nose: no drainage noted  Mouth: mucous membranes moist  Neck: supple, no carotid bruits, thyroid not palpable  Lungs: Bilateral equal air entry, clear to ausculation, no wheezing, rales or rhonchi, normal effort  Cardiovascular: normal rate, regular rhythm, no murmur, gallop, rub  Abdomen: Soft, nontender, nondistended, normal bowel sounds, no hepatomegaly or splenomegaly  Neurologic: There are no new focal motor or sensory deficits, normal muscle tone and bulk, no abnormal sensation, normal speech, cranial nerves II through XII grossly intact  Skin: No gross lesions, rashes, bruising or bleeding on exposed skin area  Extremities: peripheral pulses palpable, no pedal edema or calf pain with palpation  Psych: normal affect    Investigations:      Laboratory Testing:  No results found for this or any previous visit (from the past 24 hour(s)). Imaging/Diagnostics:  No results found.     Assessment :      Hospital Problems             Last Modified POA    * (Principal) Severe episode of recurrent major depressive disorder, without psychotic features [facility] 2022 Yes Depression with suicidal ideation 11/7/2022 Yes       Plan:     67-year-old gentleman with history of hypertension  Blood pressure log reviewed controlled without medication  Will monitor without antihypertensive medication at this time  Elevated ALT at 76 and AST at 60    11/7   Patient, told me that he was diagnosed with hepatitis C in the past  He need to quit polysubstance abuse  Need to follow-up with GI as outpatient for potential treatment of hepatitis C once he will be clean for 6 months  Explained to patient he voiced understanding  Blood pressures, controlled  We will sign off, please call with questions  Aurelio Tejeda MD  11/7/2022  3:18 PM    Copy sent to Dr. Kvng Del Real    Please note that this chart was generated using voice recognition Dragon dictation software. Although every effort was made to ensure the accuracy of this automated transcription, some errors in transcription may have occurred.

## 2022-11-07 NOTE — CARE COORDINATION
Psychosocial Assessment    Current Level of Psychosocial Functioning     Independent  X  Dependent    Minimal Assist     Comments:      Psychosocial High Risk Factors (check all that apply)    Unable to obtain meds   Chronic illness/pain    Substance abuse  X  Lack of Family Support   Financial stress   Isolation   Inadequate Community Resources  Suicide attempt(s) X  Not taking medications X  Victim of crime   Developmental Delay  Unable to manage personal needs    Age 72 or older   Homeless  No transportation   Readmission within 30 days  Unemployment  Traumatic Event    Family/Supports identified: Pt reports brother is supportive    Sexual Orientation:  NA    Patient Strengths: Stable housing and insurance     Patient Barriers: Substance abuse, non compliance with appointments and medications. Safety plan: NA    CMHC/MH history: Wants linked with [facility]. AoD folder was given for possible inpatient. Plan of Care:  medication management, group/individual therapies, family meetings, psycho -education, treatment team meetings to assist with stabilization    Initial Discharge Plan:  Possible inpatient. Pt can return to brothers home and follow up with [facility]    Clinical Summary:  Pt is a 34year old male admitted to the [facility] from [facility] for safety. Pt reports suicidal ideations with plan to overdose. Pt reports feeling \"a little\" homicidal towards no one in particular. Pt reports smoking crack cocaine and nasal use of Fentanyl. Pt denies any past abuse. Pt reports 3 past attempts of suicide. Pt is considering inpatient treatment and AoD folder was provided.

## 2022-11-07 NOTE — PLAN OF CARE
Problem: Anxiety  Goal: Will report anxiety at manageable levels  Description: INTERVENTIONS:  1. Administer medication as ordered  2. Teach and rehearse alternative coping skills  3. Provide emotional support with 1:1 interaction with staff  Outcome: Progressing  Note: Pt encouraged to explore coping skills for reducing anxiety. Problem: Behavior  Goal: Pt/Family maintain appropriate behavior and adhere to behavioral management agreement, if implemented  Description: INTERVENTIONS:  1. Assess patient/family's coping skills and  non-compliant behavior (including use of illegal substances)  2. Notify security of behavior or suspected illegal substances which indicate the need for search of the family and/or belongings  3. Encourage verbalization of thoughts and concerns in a socially appropriate manner  4. Utilize positive, consistent limit setting strategies supporting safety of patient, staff and others  5. Encourage participation in the decision making process about the behavioral management agreement  6. If a visitor's behavior poses a threat to safety call refer to organization policy. 7. Initiate consult with , Psychosocial CNS, Spiritual Care as appropriate  Outcome: Progressing  Note: Patient has been calm, controlled and med complaint. Accepting of 1:1 talk time with staff. Problem: Pain  Goal: Verbalizes/displays adequate comfort level or baseline comfort level  Outcome: Progressing  Note: Patient is isolative to room at this time.

## 2022-11-07 NOTE — GROUP NOTE
Group Therapy Note    Date: 11/7/2022    Group Start Time: 1100  Group End Time: 4943  Group Topic: Cognitive Skills    [facility]    Reinaldo Camilo, [address]        Group Therapy Note    Attendees: 7/16    Cognitive Skills Group Note        Date: November 7, 2022 Start Time: 11am  End Time: 11:45am      Number of Participants in Group & Unit Census:  7/16    Topic: concentration, decision making, interpersonal skills    Goal of Group: To improve concentration and decision making skills through collaborating with peers and focusing on a presented task. Comments:     Patient did not participate in Cognitive Skills group, despite staff encouragement and explanation of benefits. Patient remain seclusive to self. Q15 minute safety checks maintained for patient safety and will continue to encourage patient to attend unit programming.         Signature:  JAMAAL AguirreS

## 2022-11-08 PROCEDURE — 6370000000 HC RX 637 (ALT 250 FOR IP): Performed by: PSYCHIATRY & NEUROLOGY

## 2022-11-08 PROCEDURE — 99232 SBSQ HOSP IP/OBS MODERATE 35: CPT | Performed by: PSYCHIATRY & NEUROLOGY

## 2022-11-08 PROCEDURE — 1240000000 HC EMOTIONAL WELLNESS R&B

## 2022-11-08 PROCEDURE — APPSS30 APP SPLIT SHARED TIME 16-30 MINUTES: Performed by: PSYCHIATRY & NEUROLOGY

## 2022-11-08 RX ORDER — MIRTAZAPINE 15 MG/1
15 TABLET, FILM COATED ORAL NIGHTLY
Status: DISCONTINUED | OUTPATIENT
Start: 2022-11-08 | End: 2022-11-10 | Stop reason: HOSPADM

## 2022-11-08 RX ADMIN — HYDROXYZINE HYDROCHLORIDE 50 MG: 50 TABLET, FILM COATED ORAL at 20:41

## 2022-11-08 RX ADMIN — MIRTAZAPINE 15 MG: 15 TABLET, FILM COATED ORAL at 20:41

## 2022-11-08 RX ADMIN — TRAZODONE HYDROCHLORIDE 50 MG: 50 TABLET ORAL at 20:41

## 2022-11-08 RX ADMIN — IBUPROFEN 400 MG: 400 TABLET ORAL at 14:52

## 2022-11-08 ASSESSMENT — PAIN DESCRIPTION - LOCATION: LOCATION: HEAD

## 2022-11-08 ASSESSMENT — PAIN SCALES - GENERAL
PAINLEVEL_OUTOF10: 0
PAINLEVEL_OUTOF10: 4

## 2022-11-08 NOTE — GROUP NOTE
Group Therapy Note    Date: 11/8/2022    Group Start Time: 1100  Group End Time: 1200  Group Topic: Psychoeducation    JEREMIAH BHANGELA TILLMAN    SHANNAN Alvarez        Group Therapy Note    Attendees: 6/18    Psych-Ed/Relapse Prevention Group Note        Date: November 8, 2022 Start Time: 11am  End Time: 12pm      Number of Participants in Group & Unit Census:  6/18    Topic:  self-expression, interpersonal skills, reminiscing     Goal of Group: To improve self-expression and reminiscing skills through collaborating with peers and utilizing music. Comments:     Patient did not participate in Psych-Ed/Relapse Prevention group, despite staff encouragement and explanation of benefits. Patient remain seclusive to self. Q15 minute safety checks maintained for patient safety and will continue to encourage patient to attend unit programming.         Signature:  SHANNAN Alvarez

## 2022-11-08 NOTE — PLAN OF CARE
Problem: Anxiety  Goal: Will report anxiety at manageable levels  Description: INTERVENTIONS:  1. Administer medication as ordered  2. Teach and rehearse alternative coping skills  3. Provide emotional support with 1:1 interaction with staff  11/7/2022 2135 by Shaila Enriquez LPN  Note: Patient reports mild depression and anxiety. He has been isolative to his room but out for needs. Q15min safety checks continue     Problem: Behavior  Goal: Pt/Family maintain appropriate behavior and adhere to behavioral management agreement, if implemented  Description: INTERVENTIONS:  1. Assess patient/family's coping skills and  non-compliant behavior (including use of illegal substances)  2. Notify security of behavior or suspected illegal substances which indicate the need for search of the family and/or belongings  3. Encourage verbalization of thoughts and concerns in a socially appropriate manner  4. Utilize positive, consistent limit setting strategies supporting safety of patient, staff and others  5. Encourage participation in the decision making process about the behavioral management agreement  6. If a visitor's behavior poses a threat to safety call refer to organization policy. 7. Initiate consult with , Psychosocial CNS, Spiritual Care as appropriate  11/7/2022 2135 by Shaila Enriquez LPN  Note: Patient initially denied suicidal thoughts. He later came to writer and stated he was having fleeting suicidal thoughts and didn't feel safe. He contracted for safety and his room was moved closer to the nurses station. He agreed to seek out staff should his thoughts worsen. He was grateful for having his room moved and has been resting.  Q15min safety checks continue

## 2022-11-08 NOTE — PROGRESS NOTES
Daily Progress Note  11/8/2022    Patient Name: Eliecer Gross    CHIEF COMPLAINT: Depression with suicidal ideation and plan to overdose on fentanyl by injection         SUBJECTIVE:    Nursing staff report the patient has maintained medication adherence and has not required emergency medications since his admission yesterday. He was moved closer to the nurses station yesterday afternoon as he was unable to contract for safety and is currently on line of sight. He is agreeable to assessment at bedside where he reports his mood has slow. He does report somewhat improved sleep after taking Remeron last night and denies any side effects associated with utilizing. He continues to endorse suicidal ideation and is not able to contract for safety if in the community. He is verbalizing some interest in rehabilitation treatment once his symptoms are stabilized. He has remained isolative and writer encouraged patient to attend groups on the unit. At this time, the patient is not appropriate for a lower level of care. There is risk of decompensation and patient warrants further hospitalization for safety and stabilization. Appetite:  [x] Normal/Adequate/Unchanged  [] Increased  [] Decreased      Sleep:       [] Normal/Adequate/Unchanged  [x] Fair  [] Poor      Group Attendance on Unit:   [] Yes  [] Selectively    [x] No    Medication Side Effects: Patient denies any medication side effects at the time of assessment. Mental Status Exam  Level of consciousness: Alert and awake. Appearance: Appropriate attire for setting, resting in bed, with poor grooming and hygiene. Behavior/Motor: Approachable, no psychomotor abnormalities. Attitude toward examiner: Cooperative, attentive, good eye contact. Speech: Normal rate, normal volume, normal tone. Mood:  Patient reports \" down\". Affect: Blunted  Thought processes: Linear, coherent, and slow. Thought content: Denies homicidal ideation.   Suicidal Ideation: Active suicidal ideations, with a current plan or intent, contracts for safety on the unit. Delusions: No evidence of delusions denies paranoia. Perceptual Disturbance: Patient does not appear to be responding to internal stimuli. Denies current auditory hallucinations. Denies current visual hallucinations. Cognition: Oriented to self, location, time, and situation. Memory: Intact. Insight & Judgement: Poor. Data   height is 5' 10\" (1.778 m) and weight is 150 lb (68 kg). His temporal temperature is 97.5 °F (36.4 °C). His blood pressure is 109/69 and his pulse is 61. His respiration is 20. Labs:   No visits with results within 2 Day(s) from this visit.    Latest known visit with results is:   Admission on 11/06/2022, Discharged on 11/06/2022   Component Date Value Ref Range Status    WBC 11/06/2022 4.2  3.5 - 11.3 k/uL Final    RBC 11/06/2022 4.47  4.21 - 5.77 m/uL Final    Hemoglobin 11/06/2022 13.5  13.0 - 17.0 g/dL Final    Hematocrit 11/06/2022 41.1  40.7 - 50.3 % Final    MCV 11/06/2022 91.9  82.6 - 102.9 fL Final    MCH 11/06/2022 30.2  25.2 - 33.5 pg Final    MCHC 11/06/2022 32.8  28.4 - 34.8 g/dL Final    RDW 11/06/2022 13.1  11.8 - 14.4 % Final    Platelets 14/47/6608 190  138 - 453 k/uL Final    MPV 11/06/2022 9.1  8.1 - 13.5 fL Final    NRBC Automated 11/06/2022 0.0  0.0 per 100 WBC Final    Seg Neutrophils 11/06/2022 42  36 - 65 % Final    Lymphocytes 11/06/2022 39  24 - 43 % Final    Monocytes 11/06/2022 12  3 - 12 % Final    Eosinophils % 11/06/2022 6 (A)  1 - 4 % Final    Basophils 11/06/2022 1  0 - 2 % Final    Immature Granulocytes 11/06/2022 0  0 % Final    Segs Absolute 11/06/2022 1.73  1.50 - 8.10 k/uL Final    Absolute Lymph # 11/06/2022 1.64  1.10 - 3.70 k/uL Final    Absolute Mono # 11/06/2022 0.51  0.10 - 1.20 k/uL Final    Absolute Eos # 11/06/2022 0.26  0.00 - 0.44 k/uL Final    Basophils Absolute 11/06/2022 <0.03  0.00 - 0.20 k/uL Final    Absolute Immature Granulocyte 11/06/2022 <0.03  0.00 - 0.30 k/uL Final    Glucose 11/06/2022 99  70 - 99 mg/dL Final    BUN 11/06/2022 11  6 - 20 mg/dL Final    Creatinine 11/06/2022 0.86  0.70 - 1.20 mg/dL Final    Est, Glom Filt Rate 11/06/2022 >60  >60 mL/min/1.73m2 Final    Comment:       Effective Oct 3, 2022        These results are not intended for use in patients <25years of age. eGFR results are calculated without a race factor using the 2021 CKD-EPI equation. Careful clinical correlation is recommended, particularly when comparing to results   calculated using previous equations. The CKD-EPI equation is less accurate in patients with extremes of muscle mass, extra-renal   metabolism of creatine, excessive creatine ingestion, or following therapy that affects   renal tubular secretion. Calcium 11/06/2022 9.2  8.6 - 10.4 mg/dL Final    Sodium 11/06/2022 137  135 - 144 mmol/L Final    Potassium 11/06/2022 4.0  3.7 - 5.3 mmol/L Final    Chloride 11/06/2022 103  98 - 107 mmol/L Final    CO2 11/06/2022 24  20 - 31 mmol/L Final    Anion Gap 11/06/2022 10  9 - 17 mmol/L Final    Alkaline Phosphatase 11/06/2022 60  40 - 129 U/L Final    ALT 11/06/2022 76 (A)  5 - 41 U/L Final    AST 11/06/2022 64 (A)  <40 U/L Final    Total Bilirubin 11/06/2022 0.7  0.3 - 1.2 mg/dL Final    Total Protein 11/06/2022 6.6  6.4 - 8.3 g/dL Final    Albumin 11/06/2022 4.0  3.5 - 5.2 g/dL Final    Albumin/Globulin Ratio 11/06/2022 1.5  1.0 - 2.5 Final    Acetaminophen Level 11/06/2022 <5 (A)  10 - 30 ug/mL Final    Ethanol 11/06/2022 <10  <10 mg/dL Final    Ethanol percent 11/06/2022 <0.010  <1.991 % Final    Salicylate Lvl 91/89/8878 <1 (A)  3 - 10 mg/dL Final    Toxic Tricyclic Sc,Blood 12/65/8292 NEGATIVE  NEGATIVE Final         Reviewed patient's current plan of care and vital signs with nursing staff.     Labs reviewed: [x] Yes  Last EKG in EMR reviewed: [x] Yes  QTc: 457    Medications  Current Facility-Administered Medications: mirtazapine (REMERON) tablet 15 mg, 15 mg, Oral, Nightly  ondansetron (ZOFRAN) tablet 4 mg, 4 mg, Oral, Q8H PRN  acetaminophen (TYLENOL) tablet 650 mg, 650 mg, Oral, Q6H PRN  ibuprofen (ADVIL;MOTRIN) tablet 400 mg, 400 mg, Oral, Q6H PRN  hydrOXYzine HCl (ATARAX) tablet 50 mg, 50 mg, Oral, TID PRN  traZODone (DESYREL) tablet 50 mg, 50 mg, Oral, Nightly PRN  polyethylene glycol (GLYCOLAX) packet 17 g, 17 g, Oral, Daily PRN  aluminum & magnesium hydroxide-simethicone (MAALOX) 200-200-20 MG/5ML suspension 30 mL, 30 mL, Oral, Q6H PRN  nicotine polacrilex (NICORETTE) gum 2 mg, 2 mg, Oral, Q2H PRN  haloperidol lactate (HALDOL) injection 5 mg, 5 mg, IntraMUSCular, Q6H PRN **AND** diphenhydrAMINE (BENADRYL) injection 50 mg, 50 mg, IntraMUSCular, Q6H PRN  haloperidol (HALDOL) tablet 5 mg, 5 mg, Oral, Q6H PRN    ASSESSMENT  Severe episode of recurrent major depressive disorder, without psychotic features ([facility] [address]         HANDOFF  Patient symptoms remain unstable  Medications as determined by attending physician  Encourage participation in groups and milieu. Probable discharge is to be determined by MD    Electronically signed by ROXANNE Villavicencio CNP on 11/8/2022 at 2:10 PM    **This report has been created using voice recognition software. It may contain minor errors which are inherent in voice recognition technology. **      I independently saw and evaluated the patient. I reviewed the nurse practitioners documentation above. Any additional comments or changes to the nurse practitioners documentation are stated below otherwise agree with assessment. Plan will be as follows:  Patient's room needed to be moved. Last night the patient stated he could not contract for safety on the unit. Moved into a line of sight room. He is agreeable to further titrate Remeron.   Can now contract for safety on the unit  PLAN  Patient s symptoms   show no change  Increase Remeron to 15 mg by mouth at bedtime  Attempt to develop insight  Psycho-education conducted. Supportive Therapy conducted.   Probable discharge is undetermined at this time  Follow-up daily while on inpatient unit

## 2022-11-08 NOTE — VIRTUAL HEALTH
6:33 AM   Result Value Ref Range    Hemoglobin A1C 5.6 4.0 - 6.0 %    Estimated Avg Glucose 114 mg/dL   TSH without Reflex    Collection Time: 06/18/20  6:33 AM   Result Value Ref Range    TSH 0.50 0.30 - 5.00 mIU/L   T4, free    Collection Time: 06/18/20  6:33 AM   Result Value Ref Range    Thyroxine, Free 1.16 0.93 - 1.70 ng/dL   Lipid panel - fasting    Collection Time: 06/18/20  6:33 AM   Result Value Ref Range    Cholesterol 187 <200 mg/dL    HDL 50 >40 mg/dL    LDL Cholesterol 117 0 - 130 mg/dL    Chol/HDL Ratio 3.7 <5    Triglycerides 98 <150 mg/dL    VLDL NOT REPORTED 1 - 30 mg/dL       Medications  Current Facility-Administered Medications   Medication Dose Route Frequency Provider Last Rate Last Dose    sertraline (ZOLOFT) tablet 50 mg  50 mg Oral Daily [facility] ROXANNE Wooten - CNP   50 mg at 06/19/20 1713    traZODone (DESYREL) tablet 100 mg  100 mg Oral Nightly PRN [facility] ROXANNE Wooten - CNP   100 mg at 06/19/20 2131    acetaminophen (TYLENOL) tablet 650 mg  650 mg Oral Q4H PRN ROXANNE Elizabeth - SAMUEL        benztropine mesylate (COGENTIN) injection 2 mg  2 mg Intramuscular BID PRN ROXANNE Elizabeth CNP        magnesium hydroxide (MILK OF MAGNESIA) 400 MG/5ML suspension 30 mL  30 mL Oral Daily PRN ROXANNE Elizabeth CNP        aluminum & magnesium hydroxide-simethicone (MAALOX) 200-200-20 MG/5ML suspension 30 mL  30 mL Oral Q6H PRN ROXANNE Elizabeth - CNP        nicotine (NICODERM CQ) 14 MG/24HR 1 patch  1 patch Transdermal Daily ROXANNE Elizabeth CNP   1 patch at 06/19/20 1714    amLODIPine (NORVASC) tablet 5 mg  5 mg Oral Daily ROXANNE Elizabeth CNP   5 mg at 06/19/20 1713    hydrOXYzine (ATARAX) tablet 25 mg  25 mg Oral TID PRN ROXANNE Elizabeth CNP   25 mg at 06/19/20 2131         sertraline  50 mg Oral Daily    nicotine  1 patch Transdermal Daily    amLODIPine  5 mg Oral Daily       ASSESSMENT  Severe episode of recurrent major depressive PAST MEDICAL HISTORY:  2019 novel coronavirus disease (COVID-19) 2020    Hypothyroid

## 2022-11-08 NOTE — GROUP NOTE
Group Therapy Note    Date: 11/7/2022    Group Start Time: 2000  Group End Time: 2030  Group Topic: Recreational    [facility]    Arnold Becker RN        Group Therapy Note    Attendees: 8     patient refused to attend recreational group at 8 pm after encouragement from staff.   1:1 talk time was offered but declined as an  alternative to group session   Signature:  Arnold Becker RN

## 2022-11-08 NOTE — GROUP NOTE
Group Therapy Note    Date: 11/8/2022    Group Start Time: 1000  Group End Time: 1055  Group Topic: Psychotherapy    [address] [facility], [place]        Group Therapy Note    Attendees: 5/19         Patient refused to attend psychotherapy group at 10:00 am after encouragement from staff. 1:1 talk time provided as alternative to group session.     Discipline Responsible: /Counselor      Signature:  LUCIANA Griffin LSW

## 2022-11-08 NOTE — GROUP NOTE
Group Therapy Note    Date: 11/8/2022    Group Start Time: 6751  Group End Time: 1430  Group Topic: Cognitive Skills    [address] [facility]        Group Therapy Note    Attendees: 5/15       Patient's Goal:  To improve concentration and decision-making skills through collaborating with peers and focusing on a presented task. Notes:  Patient attended and participated in group. Patient was able to demonstrate decision-making skills, collaborate with peers and concentrate on a presented task. Patient was pleasant and cooperative. Status After Intervention:  Improved    Participation Level:  Active Listener and Interactive    Participation Quality: Appropriate, Attentive, Sharing, and Supportive      Speech:  normal      Thought Process/Content: Logical  Linear      Affective Functioning: Constricted/Restricted      Mood: dysphoric      Level of consciousness:  Alert and Attentive      Response to Learning: Able to retain information and Progressing to goal      Endings: None Reported    Modes of Intervention: Socialization, Exploration, and Activity      Discipline Responsible: Psychoeducational Specialist      Signature:  Orion Alvarez, [address]

## 2022-11-08 NOTE — PLAN OF CARE
Problem: Anxiety  Goal: Will report anxiety at manageable levels  Description: INTERVENTIONS:  1. Administer medication as ordered  2. Teach and rehearse alternative coping skills  3. Provide emotional support with 1:1 interaction with staff  Outcome: Progressing     Problem: Behavior  Goal: Pt/Family maintain appropriate behavior and adhere to behavioral management agreement, if implemented  Description: INTERVENTIONS:  1. Assess patient/family's coping skills and  non-compliant behavior (including use of illegal substances)  2. Notify security of behavior or suspected illegal substances which indicate the need for search of the family and/or belongings  3. Encourage verbalization of thoughts and concerns in a socially appropriate manner  4. Utilize positive, consistent limit setting strategies supporting safety of patient, staff and others  5. Encourage participation in the decision making process about the behavioral management agreement  6. If a visitor's behavior poses a threat to safety call refer to organization policy. 7. Initiate consult with , Psychosocial CNS, Spiritual Care as appropriate  Outcome: Progressing     Problem: Pain  Goal: Verbalizes/displays adequate comfort level or baseline comfort level  Outcome: Progressing     Patient isolative to room out for needs only, social to selective peers, cooperative, friendly,  medication compliant, behavior controlled.

## 2022-11-09 PROCEDURE — 99232 SBSQ HOSP IP/OBS MODERATE 35: CPT | Performed by: PSYCHIATRY & NEUROLOGY

## 2022-11-09 PROCEDURE — 90833 PSYTX W PT W E/M 30 MIN: CPT | Performed by: PSYCHIATRY & NEUROLOGY

## 2022-11-09 PROCEDURE — APPSS30 APP SPLIT SHARED TIME 16-30 MINUTES: Performed by: NURSE PRACTITIONER

## 2022-11-09 PROCEDURE — 1240000000 HC EMOTIONAL WELLNESS R&B

## 2022-11-09 PROCEDURE — 6370000000 HC RX 637 (ALT 250 FOR IP): Performed by: PSYCHIATRY & NEUROLOGY

## 2022-11-09 RX ORDER — MIRTAZAPINE 15 MG/1
15 TABLET, FILM COATED ORAL NIGHTLY
Qty: 30 TABLET | Refills: 0 | Status: SHIPPED | OUTPATIENT
Start: 2022-11-09

## 2022-11-09 RX ORDER — TRAZODONE HYDROCHLORIDE 50 MG/1
50 TABLET ORAL NIGHTLY PRN
Qty: 30 TABLET | Refills: 0 | Status: SHIPPED | OUTPATIENT
Start: 2022-11-09

## 2022-11-09 RX ORDER — HYDROXYZINE 50 MG/1
50 TABLET, FILM COATED ORAL 3 TIMES DAILY PRN
Qty: 30 TABLET | Refills: 0 | Status: SHIPPED | OUTPATIENT
Start: 2022-11-09 | End: 2022-11-19

## 2022-11-09 RX ADMIN — HYDROXYZINE HYDROCHLORIDE 50 MG: 50 TABLET, FILM COATED ORAL at 20:45

## 2022-11-09 RX ADMIN — TRAZODONE HYDROCHLORIDE 50 MG: 50 TABLET ORAL at 20:45

## 2022-11-09 RX ADMIN — MIRTAZAPINE 15 MG: 15 TABLET, FILM COATED ORAL at 20:45

## 2022-11-09 NOTE — PROGRESS NOTES
Daily Progress Note  11/9/2022    Patient Name: Suri Brand    CHIEF COMPLAINT: Depression with suicidal ideation and plan to overdose on fentanyl by injection         SUBJECTIVE:      Patient seen face-to-face for follow-up assessment. He is resting in bed but is easily arousable to verbal stimuli. Patient reports that his suicidal thoughts are improving. States that he is feeling much safer from himself today, which is a drastic difference compared to yesterday when he endorsed suicidal thoughts and was not able to contract for safety on the unit. Patient believes that staff support and titration of his medications have helped with his mood. His mirtazapine was titrated to 15 mg the previous evening and he notes improved sleep. Denies any side effects to his regimen at this time. Patient verbalizes feeling more forward-looking and states that he misses his daughter. At time of admission, patient had endorsed using crack cocaine and fentanyl. Did encourage patient to consider AOD treatment program as that would be the best to help with long-term stability, but patient states that he would prefer to go home. Staff report that patient has been cooperative on the unit and active in his treatment. He is attending group programming. Has been in behavioral control and has not required any emergency medications. Patient showing modest improvement but has yet to demonstrate stability and requires inpatient hospitalization for safety. Appetite:  [x] Normal/Adequate/Unchanged  [] Increased  [] Decreased      Sleep:       [] Normal/Adequate/Unchanged  [x] Fair  [] Poor      Group Attendance on Unit:   [] Yes  [] Selectively    [x] No    Medication Side Effects: Patient denies any medication side effects at the time of assessment. Mental Status Exam  Level of consciousness: Alert and awake. Appearance: Appropriate attire for setting, resting in bed, with poor grooming and hygiene. Behavior/Motor: Approachable, no psychomotor abnormalities. Attitude toward examiner: Cooperative, attentive, good eye contact. Speech: Normal rate, normal volume, normal tone. Mood:  Patient reports \" down\". Affect: Blunted  Thought processes: Linear, coherent, and slow. Thought content: Denies homicidal ideation. Suicidal Ideation: Active suicidal ideations, with a current plan or intent, contracts for safety on the unit. Delusions: No evidence of delusions denies paranoia. Perceptual Disturbance: Patient does not appear to be responding to internal stimuli. Denies current auditory hallucinations. Denies current visual hallucinations. Cognition: Oriented to self, location, time, and situation. Memory: Intact. Insight & Judgement: Poor. Data   height is 5' 10\" (1.778 m) and weight is 150 lb (68 kg). His temporal temperature is 97.5 °F (36.4 °C). His blood pressure is 138/103 (abnormal) and his pulse is 73. His respiration is 18 and oxygen saturation is 100%. Labs:   No visits with results within 2 Day(s) from this visit.    Latest known visit with results is:   Admission on 11/06/2022, Discharged on 11/06/2022   Component Date Value Ref Range Status    WBC 11/06/2022 4.2  3.5 - 11.3 k/uL Final    RBC 11/06/2022 4.47  4.21 - 5.77 m/uL Final    Hemoglobin 11/06/2022 13.5  13.0 - 17.0 g/dL Final    Hematocrit 11/06/2022 41.1  40.7 - 50.3 % Final    MCV 11/06/2022 91.9  82.6 - 102.9 fL Final    MCH 11/06/2022 30.2  25.2 - 33.5 pg Final    MCHC 11/06/2022 32.8  28.4 - 34.8 g/dL Final    RDW 11/06/2022 13.1  11.8 - 14.4 % Final    Platelets 67/91/5290 190  138 - 453 k/uL Final    MPV 11/06/2022 9.1  8.1 - 13.5 fL Final    NRBC Automated 11/06/2022 0.0  0.0 per 100 WBC Final    Seg Neutrophils 11/06/2022 42  36 - 65 % Final    Lymphocytes 11/06/2022 39  24 - 43 % Final    Monocytes 11/06/2022 12  3 - 12 % Final    Eosinophils % 11/06/2022 6 (A)  1 - 4 % Final    Basophils 11/06/2022 1  0 - 2 % Final    Immature Granulocytes 11/06/2022 0  0 % Final    Segs Absolute 11/06/2022 1.73  1.50 - 8.10 k/uL Final    Absolute Lymph # 11/06/2022 1.64  1.10 - 3.70 k/uL Final    Absolute Mono # 11/06/2022 0.51  0.10 - 1.20 k/uL Final    Absolute Eos # 11/06/2022 0.26  0.00 - 0.44 k/uL Final    Basophils Absolute 11/06/2022 <0.03  0.00 - 0.20 k/uL Final    Absolute Immature Granulocyte 11/06/2022 <0.03  0.00 - 0.30 k/uL Final    Glucose 11/06/2022 99  70 - 99 mg/dL Final    BUN 11/06/2022 11  6 - 20 mg/dL Final    Creatinine 11/06/2022 0.86  0.70 - 1.20 mg/dL Final    Est, Glom Filt Rate 11/06/2022 >60  >60 mL/min/1.73m2 Final    Comment:       Effective Oct 3, 2022        These results are not intended for use in patients <25years of age. eGFR results are calculated without a race factor using the 2021 CKD-EPI equation. Careful clinical correlation is recommended, particularly when comparing to results   calculated using previous equations. The CKD-EPI equation is less accurate in patients with extremes of muscle mass, extra-renal   metabolism of creatine, excessive creatine ingestion, or following therapy that affects   renal tubular secretion.       Calcium 11/06/2022 9.2  8.6 - 10.4 mg/dL Final    Sodium 11/06/2022 137  135 - 144 mmol/L Final    Potassium 11/06/2022 4.0  3.7 - 5.3 mmol/L Final    Chloride 11/06/2022 103  98 - 107 mmol/L Final    CO2 11/06/2022 24  20 - 31 mmol/L Final    Anion Gap 11/06/2022 10  9 - 17 mmol/L Final    Alkaline Phosphatase 11/06/2022 60  40 - 129 U/L Final    ALT 11/06/2022 76 (A)  5 - 41 U/L Final    AST 11/06/2022 64 (A)  <40 U/L Final    Total Bilirubin 11/06/2022 0.7  0.3 - 1.2 mg/dL Final    Total Protein 11/06/2022 6.6  6.4 - 8.3 g/dL Final    Albumin 11/06/2022 4.0  3.5 - 5.2 g/dL Final    Albumin/Globulin Ratio 11/06/2022 1.5  1.0 - 2.5 Final    Acetaminophen Level 11/06/2022 <5 (A)  10 - 30 ug/mL Final    Ethanol 11/06/2022 <10  <10 mg/dL Final    Ethanol percent 11/06/2022 <0.010  <7.265 % Final    Salicylate Lvl 26/09/4008 <1 (A)  3 - 10 mg/dL Final    Toxic Tricyclic Sc,Blood 29/73/3422 NEGATIVE  NEGATIVE Final         Reviewed patient's current plan of care and vital signs with nursing staff. Labs reviewed: [x] Yes  Last EKG in EMR reviewed: [x] Yes  QTc: 457    Medications  Current Facility-Administered Medications: mirtazapine (REMERON) tablet 15 mg, 15 mg, Oral, Nightly  ondansetron (ZOFRAN) tablet 4 mg, 4 mg, Oral, Q8H PRN  acetaminophen (TYLENOL) tablet 650 mg, 650 mg, Oral, Q6H PRN  ibuprofen (ADVIL;MOTRIN) tablet 400 mg, 400 mg, Oral, Q6H PRN  hydrOXYzine HCl (ATARAX) tablet 50 mg, 50 mg, Oral, TID PRN  traZODone (DESYREL) tablet 50 mg, 50 mg, Oral, Nightly PRN  polyethylene glycol (GLYCOLAX) packet 17 g, 17 g, Oral, Daily PRN  aluminum & magnesium hydroxide-simethicone (MAALOX) 200-200-20 MG/5ML suspension 30 mL, 30 mL, Oral, Q6H PRN  nicotine polacrilex (NICORETTE) gum 2 mg, 2 mg, Oral, Q2H PRN  haloperidol lactate (HALDOL) injection 5 mg, 5 mg, IntraMUSCular, Q6H PRN **AND** diphenhydrAMINE (BENADRYL) injection 50 mg, 50 mg, IntraMUSCular, Q6H PRN  haloperidol (HALDOL) tablet 5 mg, 5 mg, Oral, Q6H PRN    ASSESSMENT  Severe episode of recurrent major depressive disorder, without psychotic features ([facility] [address]         HANDOFF  Patient symptoms are modestly improving  Medications as determined by attending physician  Encourage participation in groups and milieu. Probable discharge is to be determined by MD    Electronically signed by ROXANNE Santlilan CNP on 11/9/2022 at 4:44 PM    **This report has been created using voice recognition software. It may contain minor errors which are inherent in voice recognition technology. **    I independently saw and evaluated the patient. I reviewed the nurse practitioners documentation above. Any additional comments or changes to the nurse practitioners documentation are stated below otherwise agree with assessment. Plan will be as follows:  Spent 30 minutes with the patient, of that greater than 16 minutes was spent in supportive psychotherapy. Patient denying side effects to medication. Reporting improvement in mood. Denying suicidal or homicidal ideation intent or plan. Denying psychotic symptoms. Forward-looking and constructive. Discussed if continued stability or improvement through tomorrow would consider discharge and patient is in agreement    PLAN  Patient s symptoms   are improving  Continue with current medication for now  Attempt to develop insight  Psycho-education conducted. Supportive Therapy conducted.   Probable discharge is tomorrow  Follow-up daily while on inpatient unit

## 2022-11-09 NOTE — GROUP NOTE
Group Therapy Note    Date: 11/9/2022    Group Start Time: 1000  Group End Time: 1020  Group Topic: Psychotherapy    [facility]    LUCIANA Castillo LSW        Group Therapy Note    Attendees:2/15     Patient refused to attend psychotherapy group at 10:00 am after encouragement from staff. 1:1 talk time provided as alternative to group session.     Discipline Responsible: /Counselor      Signature:  LUICANA Castillo LSW

## 2022-11-09 NOTE — PLAN OF CARE
[address] [place] [address]  Day 3 Interdisciplinary Treatment Plan NOTE    Review Date & Time: 11/9/2022  1300    Admission Type:   Admission Type: Voluntary    Reason for admission:  Reason for Admission: +SI with no specific plans, +HI no one specific  Estimated Length of Stay: 5-7 days  Estimated Discharge Date Update: to be determined by physician    PATIENT STRENGTHS:  Patient Strengths    Patient Strengths and Limitations:Limitations: Difficult relationships / poor social skills, Multiple barriers to leisure interests, Inappropriate/potentially harmful leisure interests, Difficulty problem solving/relies on others to help solve problems, Tendency to isolate self, Perceives need for assistance with self-care  Addictive Behavior:Addictive Behavior  In the Past 3 Months, Have You Felt or Has Someone Told You That You Have a Problem With  : None  Medical Problems:  Past Medical History:   Diagnosis Date    Alcoholism ([facility]     Anxiety     Bipolar 1 disorder ([facility]     Cocaine abuse ([facility]     Depression     Hepatitis C antibody test positive     Hypertension     Irregular heartbeat     Mild tetrahydrocannabinol (THC) abuse     Suicidal ideation        Risk:  Fall Risk   Joao Scale Joao Scale Score: 23  BVC    Change in scores no Changes to plan of Care no    Status EXAM:   Mental Status and Behavioral Exam  Normal: No  Level of Assistance: Independent/Self  Facial Expression: Flat  Affect: Appropriate  Level of Consciousness: Alert  Frequency of Checks: 4 times per hour, close  Mood:Normal: No  Mood: Depressed, Anxious  Motor Activity:Normal: Yes  Eye Contact: Fair  Observed Behavior: Friendly, Guarded, Cooperative  Sexual Misconduct History: Current - no  Preception: Solon to person, Solon to time, Solon to place  Attention:Normal: No  Attention: Distractible  Thought Processes: Blocking, Circumstantial  Thought Content:Normal: No  Thought Content: Poverty of content  Depression Symptoms: No problems reported or observed. Anxiety Symptoms: Generalized  Lizett Symptoms: No problems reported or observed. Hallucinations: None  Delusions: No  Memory:Normal: No  Insight and Judgment: No  Insight and Judgment: Poor insight    Daily Assessment Last Entry:   Daily Sleep (WDL): Exceptions to WDL            Daily Nutrition (WDL): Exceptions to WDL  Appetite Change: Normal for patient  Level of Assistance: Independent/Self    Patient Monitoring:  Frequency of Checks: 4 times per hour, close    Psychiatric Symptoms:   Depression Symptoms  Depression Symptoms: No problems reported or observed. Anxiety Symptoms  Anxiety Symptoms: Generalized  Lizett Symptoms  Lizett Symptoms: No problems reported or observed. Suicide Risk CSSR-S:  1) Within the past month, have you wished you were dead or wished you could go to sleep and not wake up? : Yes  2) Have you actually had any thoughts of killing yourself? : Yes  3) Have you been thinking about how you might kill yourself? : Yes  5) Have you started to work out or worked out the details of how to kill yourself?  Do you intend to carry out this plan? : No  6) Have you ever done anything, started to do anything, or prepared to do anything to end your life?: No  Change in Result NO Change in Plan of care NO      EDUCATION:   EDUCATION:   Learner Progress Toward Treatment Goals: Reviewed results and recommendations of this team, Reviewed group plan and strategies, Reviewed signs, symptoms and risk of self harm and violent behavior, Reviewed goals and plan of care    Method:small group, individual verbal education    Outcome:verbalized by patient, but needs reinforcement to obtain goals    PATIENT GOALS:  Short term:per psychoeducational specialist, patient refused to attend treatment team to discuss goals at this time  Long term:per psychoeducational specialist, patient refused to attend treatment team to discuss goals at this time    PLAN/TREATMENT RECOMMENDATIONS UPDATE: continue with group therapies, increased socialization, continue planning for after discharge goals, continue with medication compliance    SHORT-TERM GOALS UPDATE:   Time frame for Short-Term Goals: 5-7 days    LONG-TERM GOALS UPDATE:   Time frame for Long-Term Goals: 6 months  Members Present in Team Meeting: See Signature Sheet    Baldomero Copeland RN

## 2022-11-09 NOTE — PLAN OF CARE
Problem: Behavior  Goal: Pt/Family maintain appropriate behavior and adhere to behavioral management agreement, if implemented  Description: INTERVENTIONS:  1. Assess patient/family's coping skills and  non-compliant behavior (including use of illegal substances)  2. Notify security of behavior or suspected illegal substances which indicate the need for search of the family and/or belongings  3. Encourage verbalization of thoughts and concerns in a socially appropriate manner  4. Utilize positive, consistent limit setting strategies supporting safety of patient, staff and others  5. Encourage participation in the decision making process about the behavioral management agreement  6. If a visitor's behavior poses a threat to safety call refer to organization policy. 7. Initiate consult with , Psychosocial CNS, Spiritual Care as appropriate       Problem: Anxiety  Goal: Will report anxiety at manageable levels  Description: INTERVENTIONS:  1. Administer medication as ordered  2. Teach and rehearse alternative coping skills  3. Provide emotional support with 1:1 interaction with staff       Pt denies suicidal ideations at this time. Pt agreed to seek staff at anytime he felt like any urges to harm self would arise. Safety checks maintained [id number]. Pt remains free from self harm this shift. Pt denies wanting to cause harm to self or others at this time. Pt encouraged to seek nursing staff at anytime if he felt at danger to themselves or others. Pt states understanding. Safety checks maintained [id number]    Patient is complaining of anxiety at this time. Stating that they feel restless and are having trouble sleeping and calming down in order to rest this evening. Medication was given as prescribed for increased anxiety see MAR.

## 2022-11-09 NOTE — PROGRESS NOTES
Pharmacy Med Education Group Note    Date: 11/9/22  Start Time: 1430  End Time: 1572    Number Participants in Group:  4    Goal:  Patient will demonstrate an understanding of the medications intended purpose and possible adverse effects  Topic: [place] for Pharmacy Med Ed Group    Discipline Responsible:     OT  AT  [facility].  RT     X Other       Participation Level:     None  Minimal      X Active Listener    X Interactive    Monopolizing         Participation Quality:    X Appropriate  Inappropriate     X       Attentive        Intrusive          Sharing        Resistant          Supportive        Lethargic       Affective:     X Congruent  Incongruent  Blunted  Flat    Constricted  Anxious  Elated  Angry    Labile  Depressed  Other         Cognitive:    X Alert  Oriented PPTP     Concentration   X G  F  P   Attention Span   X G  F  P   Short-Term Memory   X G  F  P   Long-Term Memory  G  F  P   ProblemSolving/  Decision Making  G  F  P   Ability to Process  Information   X G  F  P      Contributing Factors             Delusional             Hallucinating             Flight of Ideas             Other:       Modes of Intervention:    X Education   X Support  Exploration    Clarifying  Problem Solving  Confrontation    Socialization  Limit Setting  Reality Testing    Activity  Movement  Media    Other:            Response to Learning:    X Able to verbalize current knowledge/experience    Able to verbalize/acknowledge new learning    Able to retain information    Capable of insight    Able to change behavior    Progressing to goal    Other:        Comments:   Rudi Varma RPH,PharmD,  11/9/2022, 3:24 PM

## 2022-11-09 NOTE — PLAN OF CARE
Problem: Anxiety  Goal: Will report anxiety at manageable levels  Description: INTERVENTIONS:  1. Administer medication as ordered  2. Teach and rehearse alternative coping skills  3. Provide emotional support with 1:1 interaction with staff  11/9/2022 1204 by Chuck Sanchez LPN  Outcome: Progressing  11/9/2022 0315 by Fermin Wright RN  Outcome: Progressing     Problem: Behavior  Goal: Pt/Family maintain appropriate behavior and adhere to behavioral management agreement, if implemented  Description: INTERVENTIONS:  1. Assess patient/family's coping skills and  non-compliant behavior (including use of illegal substances)  2. Notify security of behavior or suspected illegal substances which indicate the need for search of the family and/or belongings  3. Encourage verbalization of thoughts and concerns in a socially appropriate manner  4. Utilize positive, consistent limit setting strategies supporting safety of patient, staff and others  5. Encourage participation in the decision making process about the behavioral management agreement  6. If a visitor's behavior poses a threat to safety call refer to organization policy. 7. Initiate consult with , Psychosocial CNS, Spiritual Care as appropriate  Outcome: Progressing      Patient denies all, is cooperative, friendly, out and social with select peers, attending groups, medication compliant, behavior controlled, and flat affect.   Problem: Pain  Goal: Verbalizes/displays adequate comfort level or baseline comfort level  Outcome: Progressing

## 2022-11-09 NOTE — PROGRESS NOTES
CLINICAL PHARMACY NOTE: MEDS TO BEDS    Total # of Prescriptions Filled: 3   The following medications were delivered to the patient:  Trazodone HCL 50mg  Hydroxyzine HCL 50mg  Mirtazapine 15mg    Additional Documentation:  Delivered Medication to [facility]

## 2022-11-09 NOTE — DISCHARGE INSTRUCTIONS
Information:  Medications:   Medication summary provided   I understand that I should take only the medications on my list.     -why and when I need to take each medicine.     -which side effects to watch for.     -that I should carry my medication information at all times in case of     Emergency situations. I will take all of my medicines to follow up appointments.     -check with my physician or pharmacist before taking any new    Medication, over the counter product or drink alcohol.    -Ask about food, drug or dietary supplement interactions.    -discard old lists and update records with medication providers. Notify Physician:  Notify physician if you notice:   Always call 911 if you feel your life is in danger  In case of an emergency call 911 immediately! If 911 is not available call your local emergency medical system for help    Behavioral Health Follow Up:  Original Referral Source:[facility]  Discharge Diagnosis: Depression with suicidal ideation [F32. A, R45.851]  Recommendations for Level of Care: Follow up  Patient status at discharge: Alert and oriented, denies any thought to harm self  My hospital  was: [facility]  Aftercare plan faxed:    -faxed by: Professor Yimi Sanchez 192   -date: 11/10   -time: 1230  Prescriptions: [facility]    Smoking: Quit Smoking. Call the [employer]'s smoking quitline at 0-308-21Z-QUIT  Know the signs of a heart attack   If you have any of the following symptoms call 911 immediately, do not wait more    Than five minutes. 1. Pressure, fullness and/ or squeezing in the center of the chest spreading to    The jaw, neck or shoulder. 2. Chest discomfort with light headedness, fainting, sweating, nausea or    Shortness of breath. 3. Upper abdominal pressure or discomfort. 4. Lower chest pain, back pain, unusual fatigue, shortness of breath, nausea   Or dizziness.      General Information:   Questions regarding your bill: Call HELP program [phone]     Suicide Hotline (Domenic Brian)  ([phone] 985-9496      Recovery Help line- 387.249.2360      To obtain results of pending studies call Medical Records at: [phone]     For emergencies and 24 hour/7 days a week contact information:  [phone]          Learning About COVID-19 and Flu Symptoms  How can you tell COVID-19 from the flu? COVID-19 and the flu have similar symptoms. The two can be hard to tell apart. The only way to know for sure which illness you have is to be tested. If you have questions about COVID-19 testing, ask your doctor or go to cdc.gov to use the COVID-19 Viral Testing Tool. Since the symptoms are so alike, it makes sense to act as if you have COVID-19 until your test results come back. This means staying home and limiting contact with people in your home. You'll need to wash your hands often and disinfect surfaces that you touch. And be sure to wear a mask when you're around other people. This is also good advice if you think you have the flu. COVID-19 and the flu have these symptoms in common:  Fever or chills  Cough  Shortness of breath  Fatigue (tiredness)  Sore throat  Runny or stuffy nose  Muscle and body aches  Headache  Vomiting and diarrhea (more common in children than adults)  COVID-19 has another symptom that also may occur:  New loss of taste or smell  COVID-19 symptoms may appear from 2 to 14 days after infection. Flu symptoms usually appear 1 to 4 days after infection. Why should you get the flu vaccine? It's important to get your yearly flu vaccine. Both the flu and COVID-19 can be active at the same time. You can get sick with both infections at once. And having both may make you more sick than getting just one. The flu vaccine won't protect you from COVID-19. But it can help prevent the flu or reduce its symptoms.  If fewer people get very ill with the flu, this will help free up medical resources that are needed for people who need urgent care, such as those suffering from COVID-19, heart attacks, and injuries from accidents. Where can you learn more? Go to [email or web address] org and sign in to your [employer] account. Enter C123 in the [facility] box to learn more about \"Learning About COVID-19 and Flu Symptoms. \"     If you do not have an account, please click on the \"Sign Up Now\" link. Current as of: July 28, 2022               Content Version: 13.4  © 2006-2022 Healthwise, Incorporated. Care instructions adapted under license by [facility] [place] ([facility]). If you have questions about a medical condition or this instruction, always ask your healthcare professional. [address] any warranty or liability for your use of this information.

## 2022-11-09 NOTE — BH NOTE
Dr. Annita Price notified via perfect serve of patient blood pressure running high , no new orders given at this time.

## 2022-11-10 VITALS
TEMPERATURE: 97.3 F | RESPIRATION RATE: 14 BRPM | HEART RATE: 68 BPM | SYSTOLIC BLOOD PRESSURE: 145 MMHG | DIASTOLIC BLOOD PRESSURE: 107 MMHG | BODY MASS INDEX: 21.47 KG/M2 | HEIGHT: 70 IN | WEIGHT: 150 LBS | OXYGEN SATURATION: 100 %

## 2022-11-10 PROCEDURE — 99239 HOSP IP/OBS DSCHRG MGMT >30: CPT | Performed by: PSYCHIATRY & NEUROLOGY

## 2022-11-10 NOTE — PROGRESS NOTES
Patient given tobacco quitline number 3-905-770-097-894-4165 at this time, refusing to call at this time, states \" I just dont want to quit now\"- patient given information as to the dangers of long term tobacco use. Continue to reinforce the importance of tobacco cessation.
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
## 2022-11-10 NOTE — PLAN OF CARE
Problem: Anxiety  Goal: Will report anxiety at manageable levels  Description: INTERVENTIONS:  1. Administer medication as ordered  2. Teach and rehearse alternative coping skills  3. Provide emotional support with 1:1 interaction with staff  11/10/2022 0018 by Dee Mcintyre RN  Outcome: Progressing     Problem: Behavior  Goal: Pt/Family maintain appropriate behavior and adhere to behavioral management agreement, if implemented  Description: INTERVENTIONS:  1. Assess patient/family's coping skills and  non-compliant behavior (including use of illegal substances)  2. Notify security of behavior or suspected illegal substances which indicate the need for search of the family and/or belongings  3. Encourage verbalization of thoughts and concerns in a socially appropriate manner  4. Utilize positive, consistent limit setting strategies supporting safety of patient, staff and others  5. Encourage participation in the decision making process about the behavioral management agreement  6. If a visitor's behavior poses a threat to safety call refer to organization policy. 7. Initiate consult with , Psychosocial CNS, Spiritual Care as appropriate  11/10/2022 0018 by Dee Mcintyre RN  Outcome: Progressing     Patient denied suicidal ideations and contracted for safety on the unit. Patient encouraged to seek out staff if thoughts of self harm arise. A safe environment and Q 15 min checks maintained. Patient remains in behavorial control and compliant with evening medications. Writer will continue to provide emotional support, presence and reassurance.

## 2022-11-10 NOTE — PROGRESS NOTES
[facility]  Discharge Note    Pt discharged with followings belongings:   Dental Appliances: None  Vision - Corrective Lenses: None  Hearing Aid: None  Jewelry: None  Body Piercings Removed: N/A  Clothing: Pants, Shirt, Jacket/Coat  Other Valuables: Lighter/Matches, Other (Comment) (narcan)   Valuables sent home with pt or returned to patient. Patient educated on aftercare instructions: yes  [place] [facility] [place]  at 8:14 AM .Patient verbalize understanding of AVS:  yes. Status EXAM upon discharge:  Mental Status and Behavioral Exam  Normal: Yes  Level of Assistance: Independent/Self  Facial Expression: Brightened  Affect: Appropriate  Level of Consciousness: Alert  Frequency of Checks: 4 times per hour, close  Mood:Normal: Yes  Mood: Anxious  Motor Activity:Normal: Yes  Eye Contact: Fair  Observed Behavior: Cooperative  Sexual Misconduct History: Current - no  Preception: [place] to person, [place] to time, [place] to place, [place] to situation  Attention:Normal: Yes  Attention: Distractible  Thought Processes: Other (comment) (linear and coherent)  Thought Content:Normal: Yes  Thought Content: Poverty of content  Depression Symptoms: No problems reported or observed. Anxiety Symptoms: No problems reported or observed. Lizett Symptoms: No problems reported or observed.   Hallucinations: None  Delusions: No  Memory:Normal: Yes  Insight and Judgment: Yes  Insight and Judgment: Poor judgment, Poor insight    Tobacco Screening:  Practical Counseling, on admission, rachna X, if applicable and completed (first 3 are required if patient doesn't refuse):            ( ) Recognizing danger situations (included triggers and roadblocks)                    ( ) Coping skills (new ways to manage stress,relaxation techniques, changing routine, distraction)                                                           ( ) Basic information about quitting (benefits of quitting, techniques in how to quit, available resources  ( ) Referral for counseling faxed to [facility]                                                                                                                   (X ) Patient refused counseling  ( ) Patient refused referral  ( ) Patient refused prescription upon discharge  ( ) Patient has not smoked in the last 30 days    Metabolic Screening:    Lab Results   Component Value Date    LABA1C 5.6 06/18/2020       Lab Results   Component Value Date    CHOL 187 06/18/2020    CHOL 194 04/16/2018     Lab Results   Component Value Date    TRIG 98 06/18/2020    TRIG 120 04/16/2018     Lab Results   Component Value Date    HDL 50 06/18/2020    HDL 44 04/16/2018     No components found for: LDLCAL  No results found for: LABVLDL    Pt discharged to brother's house. All valuables et belongings sent with pt. Pt verbalizes all discharge instructions.      Rickey Oliveira RN

## 2022-12-03 ENCOUNTER — HOSPITAL ENCOUNTER (INPATIENT)
Age: 32
LOS: 4 days | Discharge: HOME | DRG: 751 | End: 2022-12-07
Attending: PSYCHIATRY & NEUROLOGY | Admitting: PSYCHIATRY & NEUROLOGY
Payer: MEDICARE

## 2022-12-03 ENCOUNTER — HOSPITAL ENCOUNTER (EMERGENCY)
Age: 32
Discharge: PSYCHIATRIC OR SUBSTANCE USE FACILITY | End: 2022-12-03
Attending: EMERGENCY MEDICINE
Payer: MEDICARE

## 2022-12-03 VITALS
TEMPERATURE: 98.8 F | OXYGEN SATURATION: 98 % | DIASTOLIC BLOOD PRESSURE: 80 MMHG | HEART RATE: 76 BPM | SYSTOLIC BLOOD PRESSURE: 130 MMHG | RESPIRATION RATE: 17 BRPM

## 2022-12-03 DIAGNOSIS — R45.851 SUICIDAL IDEATION: Primary | ICD-10-CM

## 2022-12-03 DIAGNOSIS — R45.850 HOMICIDAL IDEATION: ICD-10-CM

## 2022-12-03 LAB
ABSOLUTE EOS #: 0.2 K/UL (ref 0–0.44)
ABSOLUTE IMMATURE GRANULOCYTE: <0.03 K/UL (ref 0–0.3)
ABSOLUTE LYMPH #: 2.63 K/UL (ref 1.1–3.7)
ABSOLUTE MONO #: 0.47 K/UL (ref 0.1–1.2)
ACETAMINOPHEN LEVEL: <5 UG/ML (ref 10–30)
ALBUMIN SERPL-MCNC: 3.9 G/DL (ref 3.5–5.2)
ALBUMIN/GLOBULIN RATIO: 1.3 (ref 1–2.5)
ALP BLD-CCNC: 74 U/L (ref 40–129)
ALT SERPL-CCNC: 77 U/L (ref 5–41)
AMPHETAMINE SCREEN URINE: NEGATIVE
ANION GAP SERPL CALCULATED.3IONS-SCNC: 11 MMOL/L (ref 9–17)
AST SERPL-CCNC: 86 U/L
BARBITURATE SCREEN URINE: NEGATIVE
BASOPHILS # BLD: 1 % (ref 0–2)
BASOPHILS ABSOLUTE: 0.04 K/UL (ref 0–0.2)
BENZODIAZEPINE SCREEN, URINE: NEGATIVE
BILIRUB SERPL-MCNC: 0.3 MG/DL (ref 0.3–1.2)
BUN BLDV-MCNC: 9 MG/DL (ref 6–20)
CALCIUM SERPL-MCNC: 9 MG/DL (ref 8.6–10.4)
CANNABINOID SCREEN URINE: POSITIVE
CHLORIDE BLD-SCNC: 99 MMOL/L (ref 98–107)
CO2: 25 MMOL/L (ref 20–31)
COCAINE METABOLITE, URINE: POSITIVE
CREAT SERPL-MCNC: 0.78 MG/DL (ref 0.7–1.2)
EOSINOPHILS RELATIVE PERCENT: 3 % (ref 1–4)
ETHANOL PERCENT: <0.01 %
ETHANOL: <10 MG/DL
FENTANYL URINE: POSITIVE
GFR SERPL CREATININE-BSD FRML MDRD: >60 ML/MIN/1.73M2
GLUCOSE BLD-MCNC: 174 MG/DL (ref 70–99)
HCT VFR BLD CALC: 39.8 % (ref 40.7–50.3)
HEMOGLOBIN: 13.3 G/DL (ref 13–17)
IMMATURE GRANULOCYTES: 0 %
LYMPHOCYTES # BLD: 45 % (ref 24–43)
MCH RBC QN AUTO: 30.9 PG (ref 25.2–33.5)
MCHC RBC AUTO-ENTMCNC: 33.4 G/DL (ref 28.4–34.8)
MCV RBC AUTO: 92.6 FL (ref 82.6–102.9)
METHADONE SCREEN, URINE: NEGATIVE
MONOCYTES # BLD: 8 % (ref 3–12)
NRBC AUTOMATED: 0 PER 100 WBC
OPIATES, URINE: NEGATIVE
OXYCODONE SCREEN URINE: NEGATIVE
PDW BLD-RTO: 12.6 % (ref 11.8–14.4)
PHENCYCLIDINE, URINE: NEGATIVE
PLATELET # BLD: 348 K/UL (ref 138–453)
PMV BLD AUTO: 8.4 FL (ref 8.1–13.5)
POTASSIUM SERPL-SCNC: 3.4 MMOL/L (ref 3.7–5.3)
RBC # BLD: 4.3 M/UL (ref 4.21–5.77)
SALICYLATE LEVEL: <1 MG/DL (ref 3–10)
SEG NEUTROPHILS: 43 % (ref 36–65)
SEGMENTED NEUTROPHILS ABSOLUTE COUNT: 2.53 K/UL (ref 1.5–8.1)
SODIUM BLD-SCNC: 135 MMOL/L (ref 135–144)
TEST INFORMATION: ABNORMAL
TOTAL PROTEIN: 7 G/DL (ref 6.4–8.3)
TOXIC TRICYCLIC SC,BLOOD: NEGATIVE
WBC # BLD: 5.9 K/UL (ref 3.5–11.3)

## 2022-12-03 PROCEDURE — 6370000000 HC RX 637 (ALT 250 FOR IP): Performed by: PSYCHIATRY & NEUROLOGY

## 2022-12-03 PROCEDURE — 85025 COMPLETE CBC W/AUTO DIFF WBC: CPT

## 2022-12-03 PROCEDURE — 80179 DRUG ASSAY SALICYLATE: CPT

## 2022-12-03 PROCEDURE — APPSS30 APP SPLIT SHARED TIME 16-30 MINUTES: Performed by: PSYCHIATRY & NEUROLOGY

## 2022-12-03 PROCEDURE — 1240000000 HC EMOTIONAL WELLNESS R&B

## 2022-12-03 PROCEDURE — G0480 DRUG TEST DEF 1-7 CLASSES: HCPCS

## 2022-12-03 PROCEDURE — 80053 COMPREHEN METABOLIC PANEL: CPT

## 2022-12-03 PROCEDURE — 80307 DRUG TEST PRSMV CHEM ANLYZR: CPT

## 2022-12-03 PROCEDURE — 80143 DRUG ASSAY ACETAMINOPHEN: CPT

## 2022-12-03 PROCEDURE — 99285 EMERGENCY DEPT VISIT HI MDM: CPT

## 2022-12-03 RX ORDER — HYDROXYZINE 50 MG/1
50 TABLET, FILM COATED ORAL 3 TIMES DAILY PRN
Status: DISCONTINUED | OUTPATIENT
Start: 2022-12-03 | End: 2022-12-07 | Stop reason: HOSPADM

## 2022-12-03 RX ORDER — TRAZODONE HYDROCHLORIDE 50 MG/1
50 TABLET ORAL NIGHTLY PRN
Status: DISCONTINUED | OUTPATIENT
Start: 2022-12-03 | End: 2022-12-07 | Stop reason: HOSPADM

## 2022-12-03 RX ORDER — IBUPROFEN 400 MG/1
400 TABLET ORAL EVERY 6 HOURS PRN
Status: DISCONTINUED | OUTPATIENT
Start: 2022-12-03 | End: 2022-12-07 | Stop reason: HOSPADM

## 2022-12-03 RX ORDER — ACETAMINOPHEN 325 MG/1
650 TABLET ORAL EVERY 6 HOURS PRN
Status: DISCONTINUED | OUTPATIENT
Start: 2022-12-03 | End: 2022-12-07 | Stop reason: HOSPADM

## 2022-12-03 RX ORDER — HYDROXYZINE 50 MG/1
50 TABLET, FILM COATED ORAL 3 TIMES DAILY PRN
COMMUNITY

## 2022-12-03 RX ORDER — MIRTAZAPINE 15 MG/1
7.5 TABLET, FILM COATED ORAL NIGHTLY
Status: DISCONTINUED | OUTPATIENT
Start: 2022-12-03 | End: 2022-12-07 | Stop reason: HOSPADM

## 2022-12-03 RX ORDER — POLYETHYLENE GLYCOL 3350 17 G/17G
17 POWDER, FOR SOLUTION ORAL DAILY PRN
Status: DISCONTINUED | OUTPATIENT
Start: 2022-12-03 | End: 2022-12-07 | Stop reason: HOSPADM

## 2022-12-03 RX ORDER — MAGNESIUM HYDROXIDE/ALUMINUM HYDROXICE/SIMETHICONE 120; 1200; 1200 MG/30ML; MG/30ML; MG/30ML
30 SUSPENSION ORAL EVERY 6 HOURS PRN
Status: DISCONTINUED | OUTPATIENT
Start: 2022-12-03 | End: 2022-12-07 | Stop reason: HOSPADM

## 2022-12-03 RX ADMIN — TRAZODONE HYDROCHLORIDE 50 MG: 50 TABLET ORAL at 22:07

## 2022-12-03 RX ADMIN — MIRTAZAPINE 7.5 MG: 15 TABLET, FILM COATED ORAL at 22:06

## 2022-12-03 RX ADMIN — HYDROXYZINE HYDROCHLORIDE 50 MG: 50 TABLET, FILM COATED ORAL at 22:07

## 2022-12-03 ASSESSMENT — PATIENT HEALTH QUESTIONNAIRE - PHQ9: SUM OF ALL RESPONSES TO PHQ QUESTIONS 1-9: 19

## 2022-12-03 ASSESSMENT — SLEEP AND FATIGUE QUESTIONNAIRES
AVERAGE NUMBER OF SLEEP HOURS: 4
DO YOU USE A SLEEP AID: YES
DO YOU HAVE DIFFICULTY SLEEPING: YES
SLEEP PATTERN: DIFFICULTY FALLING ASLEEP;DISTURBED/INTERRUPTED SLEEP

## 2022-12-03 ASSESSMENT — ENCOUNTER SYMPTOMS
SORE THROAT: 0
SHORTNESS OF BREATH: 0
VOMITING: 0
ABDOMINAL PAIN: 0
COUGH: 0
BACK PAIN: 0

## 2022-12-03 ASSESSMENT — PAIN SCALES - GENERAL: PAINLEVEL_OUTOF10: 0

## 2022-12-03 ASSESSMENT — PAIN - FUNCTIONAL ASSESSMENT
PAIN_FUNCTIONAL_ASSESSMENT: NONE - DENIES PAIN
PAIN_FUNCTIONAL_ASSESSMENT: NONE - DENIES PAIN

## 2022-12-03 NOTE — ED PROVIDER NOTES
[address] HANDOFF       Handoff taken on the following patient from prior Attending Physician:  Pt Name: Juan Soliz  PCP:  [address]  I was available and discussed any additional care issues that arose and coordinated the management plans with the resident(s) caring for the patient during my duty period. Any areas of disagreement with resident's documentation of care or procedures are noted on the chart. I was personally present for the key portions of any/all procedures during my duty period. I have documented in the chart those procedures where I was not present during the key portions. CHIEF COMPLAINT       Chief Complaint   Patient presents with    Suicidal         CURRENT MEDICATIONS     Previous Medications  Previous Medications    MIRTAZAPINE (REMERON) 15 MG TABLET    Take 1 tablet by mouth nightly    TRAZODONE (DESYREL) 50 MG TABLET    Take 1 tablet by mouth nightly as needed for Sleep       Encounter Medications  No orders of the defined types were placed in this encounter. ALLERGIES     is allergic to vicodin [hydrocodone-acetaminophen].       RECENT VITALS:   Temp: 97.3 °F (36.3 °C),  Heart Rate: 73, Resp: 12, BP: 131/85    RADIOLOGY:   No orders to display       LABS:  Labs Reviewed   CBC WITH AUTO DIFFERENTIAL - Abnormal; Notable for the following components:       Result Value    Hematocrit 39.8 (*)     Lymphocytes 45 (*)     All other components within normal limits   COMPREHENSIVE METABOLIC PANEL - Abnormal; Notable for the following components:    Glucose 174 (*)     Potassium 3.4 (*)     ALT 77 (*)     AST 86 (*)     All other components within normal limits   TOX SCR, BLD, ED - Abnormal; Notable for the following components:    Acetaminophen Level <5 (*)     Salicylate Lvl <1 (*)     All other components within normal limits   URINE DRUG SCREEN - Abnormal; Notable for the following components:    Cocaine Metabolite, Urine POSITIVE (*)     Cannabinoid Scrn, Ur POSITIVE (*)     Fentanyl, Ur POSITIVE (*)     All other components within normal limits     Suicidal ideation, polysubstance use      PLAN/ TASKS OUTSTANDING     Talk screen, reassess, social work consultation      (Please note that portions of this note were completed with a voice recognition program.  Efforts were made to edit the dictations but occasionally words are mis-transcribed. )    Susie Benavidez MD,, MD, F.A.C.E.P.   Attending Emergency Physician        Susie Benavidez MD  12/03/22 [id number]

## 2022-12-03 NOTE — PROGRESS NOTES
Pharmacy Medication History Note      List of current medications patient is taking is complete. Source of information: [facility], Recent [facility] admission      Changes made to medication list:  Medications removed (include reason, ex. therapy complete or physician discontinued, noncompliance):  None    Medications added/doses adjusted:  Hydroxyzine 50 mg three times daily as needed    Other notes (ex. Recent course of antibiotics, Coumadin dosing):  Patient was discharged from the [facility] on 11/10 with a new prescription for Atarax      Current Home Medication List at Time of Admission:  Prior to Admission medications    Medication Sig Start Date End Date Taking? Authorizing Provider   hydrOXYzine HCl (ATARAX) 50 MG tablet Take 50 mg by mouth 3 times daily as needed for Anxiety   Yes Historical Provider, MD   mirtazapine (REMERON) 15 MG tablet Take 1 tablet by mouth nightly 11/9/22   Joycie Brittle, MD   traZODone (DESYREL) 50 MG tablet Take 1 tablet by mouth nightly as needed for Sleep 11/9/22   Joycie Brittle, MD         Please let me know if you have any questions about this encounter. Thank you!     Electronically signed by WINTER Hanks [facility] on 12/3/2022 at 2:51 PM

## 2022-12-03 NOTE — CARE COORDINATION
Psychosocial Assessment    Current Level of Psychosocial Functioning     Independent X  Dependent    Minimal Assist     Comments:      Psychosocial High Risk Factors (check all that apply)    Unable to obtain meds   Chronic illness/pain    Substance abuse  X  Lack of Family Support   Financial stress   Isolation   Inadequate Community Resources  Suicide attempt(s) X   Not taking medications X  Victim of crime   Developmental Delay  Unable to manage personal needs    Age 71 or older   Homeless  No transportation   Readmission within 30 days  Unemployment  Traumatic Event    Family/Supports identified: Pt reports brother is supportive    Sexual Orientation:  NA    Patient Strengths: Lives with Brother, has insurance     Patient Barriers: Substance use, non compliant with medications and outpatient appointments. Safety plan: NA    CMHC/MH history: Last linked with [facility] but has not followed up with appointments    Plan of Care:  medication management, group/individual therapies, family meetings, psycho -education, treatment team meetings to assist with stabilization    Initial Discharge Plan:  Return to brothers and follow-up with [facility]     Clinical Summary:  Pt is a 34year old admitted to the [facility] for safety from [facility] ED. Pt reports suicidal ideation with no plan. Pt denies homicidal ideations and hallucinations. Pt reports daily use of crack and fentanyl. Pt denies any physical, emotional, verbal, or sexual abuse. Pt was cooperative, reporting symptoms of withdrawal as tired,and nausea. Pt requested to sleep and assessment was ended.

## 2022-12-03 NOTE — ED NOTES
KISHAN contacted [facility] Access to present patient for psych consult. KISHAN spoke with Nurse Pastor Gastelum. SW presented patient to [address] [place] [address] and Dr. Garrett Linder. Per Dr. Garrett Linder patient will be admitted to [facility] with diagnosis of Depression with suicidal ideations.         Hyla Fleischer, LUCIANA  12/03/22 0346

## 2022-12-03 NOTE — PROGRESS NOTES
Leisure assessment unable to be completed on 12/3/22. Leisure assessment will be completed on 12/4/22.

## 2022-12-03 NOTE — BH NOTE
Patient arrived on the unit. Patient was changed into a hospital gown and scanned with a metal detector before oriented to the unit. Patient remains safe at this time. Patient is closely monitored.

## 2022-12-03 NOTE — ED PROVIDER NOTES
[facility]  ED  Emergency Department Encounter  Emergency Medicine Resident     Pt Urmila Long  MRN: [id number]  [place] 1990  Date of evaluation: 12/3/22  PCP:  Amelia Shane       Chief Complaint   Patient presents with    Suicidal       HISTORY OF PRESENT ILLNESS  (Location/Symptom, Timing/Onset, Context/Setting, Quality, Duration, Modifying Factors, Severity.)      Anahi Hicks is a 34 y.o. male who presents with suicidal and homicidal ideations, patient denies plans at this time. States that he has been feeling that way for the last month. Patient has not been taking his daily medications like he should as he does not have access to them. Reports fentanyl and crack use, last use was earlier this morning. Patient denies alcohol use. No hallucinations. No other complaints at this time. No physical symptoms. PAST MEDICAL / SURGICAL / SOCIAL / FAMILY HISTORY      has a past medical history of Alcoholism ([facility] [address] Anxiety, Bipolar 1 disorder ([facility] [address] Cocaine abuse ([facility] [address] Depression, Hepatitis C antibody test positive, Hypertension, Irregular heartbeat, Mild tetrahydrocannabinol (THC) abuse, and Suicidal ideation. has a past surgical history that includes other surgical history (Right, 06/04/2017); Dialysis fistula creation (Right, 6/4/2017); EXPLORATION OF WOUND OF EXTREMITY (Right, 6/4/2017); and Femur Surgery (Left).     Social History     Socioeconomic History    Marital status: Single     Spouse name: magalys    Number of children: 1    Years of education: Not on file    Highest education level: Not on file   Occupational History    Occupation: unemployed     Comment: used to work at [employer] 6 months ago   Tobacco Use    Smoking status: Every Day     Packs/day: 0.50     Years: 14.00     Pack years: 7.00     Types: Cigarettes    Smokeless tobacco: Never    Tobacco comments:     3 cigs per day per patient   Vaping Use    Vaping Use: Every day   Substance for light-headedness and headaches. Psychiatric/Behavioral:  Negative for confusion. PHYSICAL EXAM   (up to 7 for level 4, 8 or more for level 5)      INITIAL VITALS:   /80   Pulse 76   Temp 98.8 °F (37.1 °C) (Oral)   Resp 17   SpO2 98%     Physical Exam  Constitutional:       Appearance: Normal appearance. HENT:      Head: Normocephalic. Nose: Nose normal.      Mouth/Throat:      Mouth: Mucous membranes are moist.   Eyes:      Extraocular Movements: Extraocular movements intact. Pupils: Pupils are equal, round, and reactive to light. Cardiovascular:      Rate and Rhythm: Normal rate and regular rhythm. Pulses: Normal pulses. Heart sounds: Normal heart sounds. Pulmonary:      Effort: Pulmonary effort is normal.      Breath sounds: Normal breath sounds. Abdominal:      Palpations: Abdomen is soft. Tenderness: There is no abdominal tenderness. There is no right CVA tenderness or left CVA tenderness. Musculoskeletal:      Right lower leg: No edema. Left lower leg: No edema. Skin:     General: Skin is warm. Capillary Refill: Capillary refill takes less than 2 seconds. Neurological:      General: No focal deficit present. Mental Status: He is alert and oriented to person, place, and time. Mental status is at baseline. DIFFERENTIAL  DIAGNOSIS     PLAN (LABS / IMAGING / EKG):  Orders Placed This Encounter   Procedures    CBC with Auto Differential    Comprehensive Metabolic Panel    TOX SCR, BLD, ED    Urine Drug Screen     MEDICATIONS ORDERED:  No orders of the defined types were placed in this encounter.     DDX: Homicidal ideation, suicidal ideation, hallucinations, acute psychosis, substance abuse    DIAGNOSTIC RESULTS / [facility]   LAB RESULTS:  Results for orders placed or performed during the hospital encounter of 12/03/22   CBC with Auto Differential   Result Value Ref Range    WBC 5.9 3.5 - 11.3 k/uL    RBC 4.30 4.21 - 5.77 m/uL    Hemoglobin 13.3 13.0 - 17.0 g/dL    Hematocrit 39.8 (L) 40.7 - 50.3 %    MCV 92.6 82.6 - 102.9 fL    MCH 30.9 25.2 - 33.5 pg    MCHC 33.4 28.4 - 34.8 g/dL    RDW 12.6 11.8 - 14.4 %    Platelets 489 094 - 351 k/uL    MPV 8.4 8.1 - 13.5 fL    NRBC Automated 0.0 0.0 per 100 WBC    Seg Neutrophils 43 36 - 65 %    Lymphocytes 45 (H) 24 - 43 %    Monocytes 8 3 - 12 %    Eosinophils % 3 1 - 4 %    Basophils 1 0 - 2 %    Immature Granulocytes 0 0 %    Segs Absolute 2.53 1.50 - 8.10 k/uL    Absolute Lymph # 2.63 1.10 - 3.70 k/uL    Absolute Mono # 0.47 0.10 - 1.20 k/uL    Absolute Eos # 0.20 0.00 - 0.44 k/uL    Basophils Absolute 0.04 0.00 - 0.20 k/uL    Absolute Immature Granulocyte <0.03 0.00 - 0.30 k/uL   Comprehensive Metabolic Panel   Result Value Ref Range    Glucose 174 (H) 70 - 99 mg/dL    BUN 9 6 - 20 mg/dL    Creatinine 0.78 0.70 - 1.20 mg/dL    Est, Glom Filt Rate >60 >60 mL/min/1.73m2    Calcium 9.0 8.6 - 10.4 mg/dL    Sodium 135 135 - 144 mmol/L    Potassium 3.4 (L) 3.7 - 5.3 mmol/L    Chloride 99 98 - 107 mmol/L    CO2 25 20 - 31 mmol/L    Anion Gap 11 9 - 17 mmol/L    Alkaline Phosphatase 74 40 - 129 U/L    ALT 77 (H) 5 - 41 U/L    AST 86 (H) <40 U/L    Total Bilirubin 0.3 0.3 - 1.2 mg/dL    Total Protein 7.0 6.4 - 8.3 g/dL    Albumin 3.9 3.5 - 5.2 g/dL    Albumin/Globulin Ratio 1.3 1.0 - 2.5   TOX SCR, BLD, ED   Result Value Ref Range    Acetaminophen Level <5 (L) 10 - 30 ug/mL    Ethanol <10 <10 mg/dL    Ethanol percent <6.977 <8.257 %    Salicylate Lvl <1 (L) 3 - 10 mg/dL    Toxic Tricyclic Sc,Blood NEGATIVE NEGATIVE   Urine Drug Screen   Result Value Ref Range    Amphetamine Screen, Ur NEGATIVE NEGATIVE    Barbiturate Screen, Ur NEGATIVE NEGATIVE    Benzodiazepine Screen, Urine NEGATIVE NEGATIVE    Cocaine Metabolite, Urine POSITIVE (A) NEGATIVE    Methadone Screen, Urine NEGATIVE NEGATIVE    Opiates, Urine NEGATIVE NEGATIVE    Phencyclidine, Urine NEGATIVE NEGATIVE    Cannabinoid Scrn, Ur POSITIVE (A) NEGATIVE    Oxycodone Screen, Ur NEGATIVE NEGATIVE    Fentanyl, Ur POSITIVE (A) NEGATIVE    Test Information       Assay provides medical screening only. The absence of expected drug(s) and/or metabolite(s) may indicate diluted or adulterated urine, limitations of testing or timing of collection. IMPRESSION: 59-year-old gentleman presents to the emergency department with suicidal and homicidal ideations, recently used fentanyl and crack. Vital signs stable, physical exam grossly unremarkable. Social work notified. [facility] labs obtained. Medically cleared for transportation to [facility]. RADIOLOGY:  No orders to display     EMERGENCY DEPARTMENT COURSE:  ED Course as of 12/03/22 1122   Sat Dec 03, 2022   0716 Cocaine Metabolite, Urine(!): POSITIVE [EM]   3878 Cannabinoid Scrn, Ur(!): POSITIVE [EM]   0716 Fentanyl, Ur(!): POSITIVE [EM]      ED Course User Index  [EM] Gari Riedel, MD       No notes of EC Admission Criteria type on file. PROCEDURES:  None    CONSULTS:  None    CRITICAL CARE:  None    FINAL IMPRESSION      1. Suicidal ideation    2.  Homicidal ideation          DISPOSITION / PLAN     DISPOSITION Decision To Transfer 12/03/2022 09:18:34 AM      PATIENT REFERRED TO:  [facility]  NO FAMILY DOC ONLY    Schedule an appointment as soon as possible for a visit   For follow up    [facility] ED  [address] [place] [address]  [phone]  Go to   As needed    DISCHARGE MEDICATIONS:  New Prescriptions    No medications on file       Gari Riedel, MD  Emergency Medicine Resident    (Please note that portions of thisnote were completed with a voice recognition program.  Efforts were made to edit the dictations but occasionally words are mis-transcribed.)        Gari Riedel, MD  Resident  12/03/22 1122

## 2022-12-03 NOTE — ED NOTES
The patient is a 34year old male that has a history of Recurring Depression. Patient came to the ED today with a main complaint of feeling suicidal and homicidal. Patient reports that he has been feeling very depressed for the past few months. Patient reports that he has no plans to harm himself. Patient reports that he does have a history of suicide attempts by cutting his wrists and over dosing on sleeping pills. Patient also reports that he is homicidal. Patient, \"I am homicidal towards everyone. \" Patient denied any specific plan or person. Patient denied any ashish or psychosis. Patient states that he has been using Fentanyl and cocaine. Patient reports that he is homeless. When SW asked about CMHC follow up the the patient states that he does not follow up anywhere. Patient was referred to [facility] for outpatient but never followed through with his appointment. Plan to contact [facility] when patient is medically cleared.       Alfred Moses [facility]  12/03/22 5930

## 2022-12-03 NOTE — BH NOTE
[facility]  Admission Note     Admission Type:   Admission Type: Voluntary    Reason for admission:  Reason for Admission: +SI with plan to OD or cut wrists, +HI towards no one specific with no specific plans; polysubstance abuse      Addictive Behavior:   Addictive Behavior  In the Past 3 Months, Have You Felt or Has Someone Told You That You Have a Problem With  : None    Medical Problems:   Past Medical History:   Diagnosis Date    Alcoholism ([facility] [address]     Anxiety     Bipolar 1 disorder (HCC)     Cocaine abuse (HCC)     Depression     Hepatitis C antibody test positive     Hypertension     Irregular heartbeat     Mild tetrahydrocannabinol (THC) abuse     Suicidal ideation        Status EXAM:  Mental Status and Behavioral Exam  Normal: No  Level of Assistance: Independent/Self  Facial Expression: Flat, Sad  Affect: Blunt  Level of Consciousness: Alert  Frequency of Checks: 4 times per hour, close  Mood:Normal: No  Mood: Anxious, Depressed, Helpless, Sad  Motor Activity:Normal: Yes  Eye Contact: Fair  Observed Behavior: Guarded, Tearful, Cooperative  Sexual Misconduct History: Current - no  Preception: [place] to person, [place] to time, [place] to place, [place] to situation  Attention:Normal: No  Attention: Distractible  Thought Processes: Circumstantial  Thought Content:Normal: No  Thought Content: Preoccupations  Depression Symptoms: Feelings of helplessness, Feelings of hopelessess, Impaired concentration, Loss of interest  Anxiety Symptoms: Generalized  Lizett Symptoms: No problems reported or observed.   Hallucinations: None  Delusions: No  Memory:Normal: No  Memory: Poor recent, Poor remote  Insight and Judgment: No  Insight and Judgment: Poor insight, Poor judgment    Tobacco Screening:  Practical Counseling, on admission, rachna X, if applicable and completed (first 3 are required if patient doesn't refuse):            ( ) Recognizing danger situations (included triggers and roadblocks) ( ) Coping skills (new ways to manage stress,relaxation techniques, changing routine, distraction)                                                           ( ) Basic information about quitting (benefits of quitting, techniques in how to quit, available resources  ( ) Referral for counseling faxed to [facility]                                                                                                                   (X) Patient refused counseling  ( ) Patient has not smoked in the last 30 days    Metabolic Screening:    Lab Results   Component Value Date    LABA1C 5.6 06/18/2020       Lab Results   Component Value Date    CHOL 187 06/18/2020    CHOL 194 04/16/2018     Lab Results   Component Value Date    TRIG 98 06/18/2020    TRIG 120 04/16/2018     Lab Results   Component Value Date    HDL 50 06/18/2020    HDL 44 04/16/2018     No components found for: LDLCAL  No results found for: LABVLDL      Body mass index is 25.83 kg/m². BP Readings from Last 2 Encounters:   12/03/22 (!) 152/87   12/03/22 130/80           Pt admitted with followings belongings:  Dental Appliances: None  Vision - Corrective Lenses: None  Hearing Aid: None  Jewelry: None  Body Piercings Removed: N/A  Clothing: Footwear, Hat, Jacket/Coat, Pants, [place], Socks, Undergarments  Other Valuables: Other (Comment) (none)    Patient admitted to room 125 bed [facility] Unit at 1432. Patient changed into hospital attire, scanned with metal detector. Food and beverages provided to patient. Patient currently endorses thoughts of self harm with no specific plan, denies thoughts of homicidal ideations. Patient was tired during admission process and only willing to sign voluntary, all other paperwork left unsigned.           Kyle Martinez RN

## 2022-12-03 NOTE — PLAN OF CARE
MINUTES: Routine safety checks  2. Q SHIFT & PRN: Assess risk to determine if routine checks are adequate to maintain patient safety  Outcome: Progressing - patient admits to thoughts of harming himself with no specific plan at this time, he remains free from self-inflicted injuries; safety checks maintained and continue every 15 minutes and at random intervals     Problem: Depression  Goal: Will be euthymic at discharge  Description: INTERVENTIONS:  1. Administer medication as ordered  2. Provide emotional support via 1:1 interaction with staff  3. Encourage involvement in milieu/groups/activities  4. Monitor for social isolation  Outcome: Progressing - patient endorses depression rating it a 9/10     Problem: Anxiety  Goal: Will report anxiety at manageable levels  Description: INTERVENTIONS:  1. Administer medication as ordered  2. Teach and rehearse alternative coping skills  3. Provide emotional support with 1:1 interaction with staff  Outcome: Progressing - patient endorses anxiety rating it a 9/10     Problem: Drug Abuse/Detox  Goal: Will have no detox symptoms and will verbalize plan for changing drug-related behavior  Description: INTERVENTIONS:  1. Administer medication as ordered  2. Monitor physical status  3. Provide emotional support with 1:1 interaction with staff  4.  Encourage  recovery focused treatment   Outcome: Progressing - upon admission, patient exhibits no signs of withdrawal; monitoring continues

## 2022-12-03 NOTE — H&P
Department of Psychiatry  Attending Physician Psychiatric Assessment     Reason for Admission to Psychiatric Unit:  Threat to self requiring 24 hour professional observation  Concerns about patient's safety in the community    CHIEF COMPLAINT: Depression with suicidal ideation and plans to end his own life    History obtained from: Patient, electronic medical record          HISTORY OF PRESENT ILLNESS:    Leodan Yap is a 34 y.o. male who has a past medical history of mental illness, polysubstance use disorder, & hypertension. Patient presented to the ED at [facility] due to intense thoughts to end his own life. Per emergency department documentation :Leodan Yap is a 34 y.o. male who presents with suicidal and homicidal ideations, patient denies plans at this time. States that he has been feeling that way for the last month. Patient has not been taking his daily medications like he should as he does not have access to them. Reports fentanyl and crack use, last use was earlier this morning. Patient denies alcohol use. No hallucinations. No other complaints at this time. No physical symptoms    Patient is difficult to engage at bedside as he has snoring on and off. He does offer several \"grunts\" in response. He confirms that he has been utilizing crack cocaine and fentanyl and experiencing significant suicidal ideation. He has confirmed with social work team he has been living with his brother and plans to return to this  residence once his symptoms are stabilized. Patient is well known to this team and per historical documentation:      Patient has experienced significant symptoms of depressions previously and had reported improvement with symptoms utilizing Remeron. He has a long history of utilization of illicit drugs with limited interest in alcohol or other drug treatment facilities.   He has been linked with community mental health providers however has not fully participated in outpatient care. Patient as previously denied symptoms of anxiety or ever experiencing panic attacks. He denied symptoms of ashish including increased goal-directed activity with decreased need for sleep, elevated mood or rapid speech without the use of stimulants. He has endorsed experiencing perceptual disturbances previously both auditory or visual hallucinations. He has denied concerns that people are watching or talking about him or that he receives messages from the media or believes that he has magical snell. He denied intrusive or persistent thoughts that are relieved by repetitive behaviors. Patient has previously reported a significant history of trauma while he was growing up explaining that he had a good childhood until his father passed away in his teens. He has shared that he experienced trauma both protecting himself and from other individuals in his neighborhood although he was unwilling to discuss in detail. Patient shared during his last admission that he still struggles with the loss of his mother who  8 years ago. He has identified with cluster B personality traits including poor self-esteem, chronic feelings of emptiness and a long history of unstable relationships. He has previously identified with impulsivity and labile mood and shares that he does have a history of self injury to help \"relieve stress \". Patient has previously denied significant forensic history although does share that he has been in group home. He has denied a history of aggression or violence towards others. He does share that he has been struggling with use of fentanyl and crack cocaine for a long time. He has previously been encouraged to consider transitioning to alcohol or other drug treatment at this time. Today he continues to endorse significant suicidal ideation and confirms that he would have ended his own life if he had remained in the community.   We discussed previous medications that he has trialed and he shares that Remeron has been beneficial previously to both his mood as well as his sleep pattern.        History of head trauma: [x] Yes [] No Reports history of multiple concussions    History of seizures: [] Yes [x] No    History of violence or aggression: [] Yes [x] No         PSYCHIATRIC HISTORY:  [x] Yes [] No    Currently follows with no community provider however he has previously been linked to [facility]    Multiple lifetime suicide attempts by overdose and self-inflicted injury    Multiple psychiatric hospital [facility], 8/9/2022, 3/17/2022, 2/12/2022, 11/19/2021    Home Medication Compliance: [] Yes [x] No    Past psychiatric medications includes:  Remeron, Zoloft, trazodone and Effexor    Adverse reactions from psychotropic medications: [] Yes [x] No         Lifetime Psychiatric Review of Systems    Depression: Endorses     Anxiety: Denies     Panic Attacks: Denies     Lizett or Hypomania: Denies     Phobias: Denies     Obsessions and Compulsions: Denies     Body or Vocal Tics: Denies     Visual Hallucinations: Endorses per history only     Auditory Hallucinations: Endorses per history only     Delusions/Paranoia: Denies     PTSD: Endorses      Past Medical History:        Diagnosis Date    Alcoholism ([facility]     Anxiety     Bipolar 1 disorder ([facility]     Cocaine abuse ([facility]     Depression     Hepatitis C antibody test positive     Hypertension     Irregular heartbeat     Mild tetrahydrocannabinol (THC) abuse     Suicidal ideation        Past Surgical History:        Procedure Laterality Date    DIALYSIS FISTULA CREATION Right 6/4/2017    RIGHT WRIST WOUND EXPLORATION WITH ARTERIAL REPAIR ULNAR AND RADIAL ARTERIES RIGHT WRIST performed by Nick Riley MD at [address] Right 6/4/2017    WOUND EXPLORATION AND/OR REVISION [address] Nick Riley MD [address] Left     OTHER SURGICAL HISTORY Right 2017    exp and repair of unlar and radial artery with exp and repair of 12 tendons and 2 nerves       Allergies:  Vicodin [hydrocodone-acetaminophen]         Social History:     Born in: [place], [place]  Family: Reports that he was raised by his mother and father until his father passed when he was 14. He reports that his mother passed away about 8 years ago. He states that he has one brother and one sister whom he is close with. Highest Level of Education: GED   Occupation: Unemployed  Marital Status: Never   Children: 4 children   Residence: Shared with social work team he has been staying with one of his brothers  Stressors: Illicit drug use  Patient Assets/Supportive Factors: Patient is seeking additional support       DRUG USE HISTORY  Social History     Tobacco Use   Smoking Status Every Day    Packs/day: 0.50    Years: 14.00    Pack years: 7.00    Types: Cigarettes   Smokeless Tobacco Never   Tobacco Comments    3 cigs per day per patient     Social History     Substance and Sexual Activity   Alcohol Use Not Currently     Social History     Substance and Sexual Activity   Drug Use Yes    Types: Marijuana (Morena Crowe), Cocaine    Comment: states uses fentanyl     Reports crack cocaine and fentanyl via inhalation. Denies IV use. Denies other illicit drugs or alcohol use. Urine drug screen specimen pending. LEGAL HISTORY:   HISTORY OF INCARCERATION: [x] Yes [] No    Family History:       Problem Relation Age of Onset    Heart Disease Father 52    Asthma Brother     Hypertension Maternal Grandmother     Lung Cancer Maternal Grandmother         Kidney cancer, liver cancer    Stroke Maternal Grandmother     Heart Disease Mother 46         of presumed heart disease in her sleep       Psychiatric Family History  Patient denies psychiatric family history.      Suicides in family: [] Yes [x] No    Substance use in family: [x] Yes [] No Mother and father with history of drug use         PHYSICAL EXAM:  Vitals:  BP (!) 152/87   Pulse 74   Temp 98.1 °F (36.7 °C) (Oral)   Resp 14   Ht 5' 10\" (1.778 m)   Wt 180 lb (81.6 kg)   SpO2 100%   BMI 25.83 kg/m²   Pain Level: Denies    LABS:  Labs reviewed: [x] Yes potassium 3.4, glucose 174, ALT 77, AST 86, hematocrit 39.8, lymphocytes 45  Blood alcohol level unremarkable  Urine toxicology positive for cocaine metabolite, positive fentanyl, positive for cannabinoids  Last EKG in EMR reviewed: [x] Yes  QTc 457       Review of Systems   Constitutional: Negative for chills and weight loss. HENT: Negative for ear pain and nosebleeds. Eyes: Negative for blurred vision and photophobia. Respiratory: Negative for cough, shortness of breath and wheezing. Cardiovascular: Negative for chest pain and palpitations. Gastrointestinal: Negative for abdominal pain, diarrhea and vomiting. Genitourinary: Negative for dysuria and urgency. Musculoskeletal: Negative for falls and joint pain. Skin: Negative for itching and rash. Neurological: Negative for tremors, seizures and weakness. Endo/Heme/Allergies: Does not bruise/bleed easily. Physical Exam:   Constitutional:  Appears well-developed and well-nourished, no acute distress. HENT:   Head: Normocephalic and atraumatic. Eyes: Conjunctivae are normal. Right eye exhibits no discharge. Left eye exhibits no discharge. No scleral icterus. Neck: Normal range of motion. Neck supple. Pulmonary/Chest:  No respiratory distress or accessory muscle use, no wheezing. Cardiac: Regular rate and rhythm. Abdominal: Soft. Non-tender. Exhibits no distension. Musculoskeletal: Normal range of motion. Exhibits no edema. Neurological: cranial nerves II-XII grossly in tact, normal gait and station. Skin: Skin is warm and dry. Patient is not diaphoretic. No erythema.       Mental Status Examination:    Level of consciousness: Lethargic  Appearance:  Appropriate attire, resting in bed, poor grooming   Behavior/Motor: Approachable, withdrawn, no psychomotor abnormalities noted  Attitude toward examiner: Uncooperative, inattentive and shows little interest in participating in diagnostic assessment   speech: Delayed rate, decreased volume, mumbling vague \"yes no responses  mood: Depressed  Affect: Blunted  Thought processes:   Slow, poverty of thought  Thought content: Active suicidal ideations, with a  current plan or intent, contracts for safety on the unit. Denies homicidal ideations               Denies hallucinations              Denies delusions              Denies paranoia  Cognition:  Oriented to self only currently and not willing to participate fully in didactic assessment  Concentration: Distractible  Memory: Impaired  Insight &Judgment: Poor         DSM-5 Diagnosis    Principal Problem: Severe episode of recurrent major depressive disorder, without psychotic features ([facility]    Stimulant use disorder-cocaine  Opiate use disorder-fentanyl     Psychosocial and Contextual factors:  Financial   Occupational   Relationship   Legal   Living situation   Educational     Past Medical History:   Diagnosis Date    Alcoholism ([facility]     Anxiety     Bipolar 1 disorder ([facility]     Cocaine abuse ([facility]     Depression     Hepatitis C antibody test positive     Hypertension     Irregular heartbeat     Mild tetrahydrocannabinol (THC) abuse     Suicidal ideation         TREATMENT CONSIDERATIONS    Continue inpatient psychiatric treatment. Home medications reviewed. Restart home medications:  Remeron 7.5 mg and titrate as tolerated  Monitor need and frequency of PRN medications. Attempt to develop insight. Follow-up daily while inpatient. Reviewed risks and benefits as well as potential side effects with patient.     CONSULT:  [x] Yes [] No  Internal medicine for medical management/medical H&P      Risk Management: close watch per standard protocol      Psychotherapy: participation in milieu and group and individual sessions with Attending Physician,  and Physician Assistant/CNP      Estimated length of stay:  2-14 days      GENERAL PATIENT/FAMILY EDUCATION  Patient will understand basic signs and symptoms, patient will understand benefits/risks and potential side effects from proposed medications, and patient will understand their role in recovery. Family is not currently active in patient's care. Patient assets that may be helpful during treatment include: Intent to participate and engage in treatment, sufficient fund of knowledge and intellect to understand and utilize treatments. Goals:    1) Remission of suicidal ideation  2) Stabilization of symptoms prior to discharge. 3) Establish efficacy and tolerability of medications. Behavioral Services  Medicare Certification     Admission Day 1  I certify that this patient's inpatient psychiatric hospital admission is medically necessary for:    x (1) treatment which could reasonably be expected to improve this patient's condition, or    x (2) diagnostic study or its equivalent. Time Spent: 60 minutes    Anahi Hicks is a 34 y.o. male being evaluated face to face    --ROXANNE Nichols CNP on 12/3/2022 at 4:03 PM    An electronic signature was used to authenticate this note. Psychiatry Attending Attestation     I independently saw and evaluated the patient. I reviewed the Advance Practice Provider's documentation above. Any additional comments or changes to the Advance Practice Provider's documentation are stated below otherwise agree with assessment. Patient is a 59-year-old male with history of crack cocaine abuse admitted for worsening suicidal thoughts and intent to kill self. Was also reporting having thoughts to hurt other people. Was unable to contract for safety following which she was admitted. Patient is known to us from previous admissions.   Reports feeling helpless and hopeless. Extremely somnolent and was hard to awake during the interview. Reports that he did go off of his Remeron. Reports dealing with some housing issues. Discussed with him about restarting Remeron and titrating to effect and patient is agreeable to the plan. PLAN  Will start Remeron and titrate to effect  Attempt to develop insight  Psycho-education conducted. Supportive Therapy conducted.   Probable discharge is tbd  Follow-up TBD    Electronically signed by Ruth Ann Mcfarland MD on 12/3/22 at 7:57 PM EST

## 2022-12-03 NOTE — ED NOTES
SW met with patient at bedside. SW completed introductions. Patient was up in bed adjusting himself. Patient acknowledge SW and stated he was okay. KISHAN will follow up with presenting patient for inpatient admission.       LUCIANA Perkins  12/03/22 2100

## 2022-12-03 NOTE — ED NOTES
Writer received phone call from Osvaldo Funez. ETA is 3 PM for .       LUCIANA Padgett  12/03/22 1102

## 2022-12-03 NOTE — ED NOTES
Pt came to ED c/o of Suicidal Ideation. Pt said he been into a lot of things, don't know what to do with his life, just thinking of ending his life. Pt is depressed, tears in his eyes. Pt did not elaborate further of his plan. Pt requested for food to eat, was given a meal after then.      Vicente Stearns, VISH  12/03/22 Emily [id number], RN  12/03/22 9835

## 2022-12-03 NOTE — ED NOTES
Writer sent medical necessity form to [address] [place] [address] via fax to schedule patient transport to [facility].      LUCIANA Love  12/03/22 2105

## 2022-12-03 NOTE — ED NOTES
Voluntary admission form sent to [facility] via fax.      LUCIANA Escobar  12/03/22 3584

## 2022-12-03 NOTE — ED NOTES
[] Pasquale    [x] [facility]    [x]  [address]       SUICIDE RISK ASSESSMENT      Y  N     [x] [] In the past two weeks have you had thoughts of hurting yourself in any way? [x] [] In the past two weeks have you had thoughts that you would be better off dead? [x] [] Have you made a suicide attempt in the past two months? [x] [] Do you have a plan for hurting yourself or suicide? [] [x] Presence of hallucinations/voices related to hurting himself or herself or someone else. SUICIDE/SECURITY WATCH PRECAUTION CHECKLIST     Orders    [x]  Suicide/Security Watch Precautions initiated as checked below:   12/3/22 9:39 AM EST BH31/BH31B    [x] Notified physician:  Mendel Boykin MD  12/3/22 9:39 AM EST    [x] Orders obtained as appropriate:     [x] 1:1 Observer     [] Psych Consult     [] Psych Consult    Name:  Date:  Time:    [x] 1:1 Observer, Notified by:  Mckayla Kumar RN    Contact Nurse Supervisor    [x] Remove all personal clothes from room and place in snap/paper gown/pants. Slipper only    [x] Remove all personal belongings from room and secured away from patient. Documentation    [x] Initiate Suicide/Security Watch Precaution Flow Sheet    [x] Initiate individualized Care Plan/Problem    [x] Document why precautions initiated on flow sheet (Initiate Nursing Care Plan/Problem)    [x] 1:1 Observer in place; instructions provided. Suicide precautions require observer be within arms length. [x] Nurse-Observer Communication Hand-off initiated by RN, reviewed with Observer. Subsequently used as Hand Off between Observers. [x] Initiate every 15 minute observations per observer as delegated by the RN.     [x] Initiate RN assessment and documentation    Environmental Scan  Search Criteria and Process: OPTIONAL, see Search Policy    [x] Reason for search:    [x] Nursing in presence of second person to search patient    [x] Patient notified of reason for body assessment and belongings search:     Persons present during search:   Results of search and disposition:       Searchers Name: MPD     These items or items similar should be removed from the room:   [x] Chairs   [x] Telephone   [x] Trash cans and liners   [x] Plastic utensils (order Patient Safety tray)   [x] Empty or remove Sharps containers   [x] All personal clothing/belongings removed   [x] All unnecessary lead wires, electrical cords, draw cords, etc.   [x] Flowers and plants   [x] Double check for lighters, matches, razors, any glass items etc that can be used as weapons. Person completing Checklist: Jessica Shore RN       GENERAL INFORMATION     Y  N     [x] [] Has the patient been informed that they are on a watch and what that means? [x] [] Can the patient get out of Bed without nursing assistance? [x] [] Can the patient use the restroom without nursing assistance? [x] [] Can the patient walk the halls to [place] their legs? \"   [] [x] Does the patient have metal utensils? [x] [] Have the patient's belongings been placed out of control of the patient? [x] [] Have the patient and his/her belongings been checked for contraband? [] [x] Is the patient under any visitor restrictions? If Yes, explain:   [x] [] Is the patient under an alias? Alias Name:   Authorized visitors (no more than two are to be on the list)   Name/Relationship:   Name/Relationship:    Name of Staff member that you  Received this information from?: nehal     General Description:    [address] male 34 y.o. Admission weight:      Race: []  [x] Black  []   []   [] [place] [] Other  Facial Hair:  [] Yes  [] No  If yes, please describe: Identifying Marks (i.e. Visible tattoos, scars, etc... ):     NURSING CARE PLAN    Nursing Diagnosis: Risk of Self Directed Harm  [] Actual  [x] Potential  Date Started: 12/3/22      Etiological Factors: (related to)  [x] Expressed or implied suicidal ideation/behavior  [x] Depression  [] Suicide attempt      [x] Low self-esteem  [] Hallucinations      [x] Feeling of Hopelessness  [x] Substance abuse or withdrawal    [] Dysfunctional family  [] Major traumatic event, eg., divorce, etc   [] Excessive stress/anxiety    12/3/22    Expected Outcomes    Patient will:   [x] Patient will remain safe for the duration of their stay   [x] Patient's environment will be safe, eg. Free of potential suicide weapons   [] Verbalize Recovery from suicidal episode and improvement in self-worth   [x] Discuss feeling that precipitated suicide attempt/thoughts/behavior   [] Will describe available resources for crisis prevention and management   [] Will verbalize positive coping skills     Nursing Intervention   [x] Assessment and Observations hourly   [x] Suicide Precautions implemented with patient, should be 1:1 observation   [x] Document observation [id number] and RN assessment hourly   [] Consult physician for:    [] Psychiatric consult    [] Pharmacological therapy    [] Other:    [x] Patient search completed by security   [x] Initiated appropriate safety protocols by removing from the patient's environment anything that could be used to inflict self injury, eg. Order safe tray, snap gown, etc   [x] Maintain open, warm, caring, non-judgmental attitude/manner towards patient   [] Discuss advantages and disadvantages of existing coping methods/skills   [x] Assist and educate patient with identifying present strengths and coping skills   [x] Keep patient informed regarding plan of care and provide clear concise explanations. Provide the patient/family education information as well as telephone numbers and other information about crisis centers, hot lines, and counselors.     Discharge Planning:   [] Referral  [] Groups [] Health agencies  [] Other:            Felipa Patiño RN  12/03/22 8966

## 2022-12-03 NOTE — ED NOTES
Writer received phone call from [address] [place] [address]. Patient ETA will be within the next 25-35 minutes.      Hyla Fleischer, MSW  12/03/22 7125

## 2022-12-03 NOTE — ED NOTES
SW updated patient on psych admission. Patient signed voluntary admission form.       LUCIANA Lovett  12/03/22 1016

## 2022-12-03 NOTE — BH NOTE
Patient given tobacco quitline number 29761558762 at this time, refusing to call at this time, states \" I just dont want to quit now\"- patient given information as to the dangers of long term tobacco use. Continue to reinforce the importance of tobacco cessation.

## 2022-12-04 PROCEDURE — 6370000000 HC RX 637 (ALT 250 FOR IP): Performed by: PSYCHIATRY & NEUROLOGY

## 2022-12-04 PROCEDURE — 6370000000 HC RX 637 (ALT 250 FOR IP): Performed by: INTERNAL MEDICINE

## 2022-12-04 PROCEDURE — 1240000000 HC EMOTIONAL WELLNESS R&B

## 2022-12-04 RX ORDER — ONDANSETRON 4 MG/1
4 TABLET, ORALLY DISINTEGRATING ORAL EVERY 8 HOURS PRN
Status: DISCONTINUED | OUTPATIENT
Start: 2022-12-04 | End: 2022-12-07 | Stop reason: HOSPADM

## 2022-12-04 RX ORDER — POTASSIUM CHLORIDE 20 MEQ/1
40 TABLET, EXTENDED RELEASE ORAL ONCE
Status: COMPLETED | OUTPATIENT
Start: 2022-12-04 | End: 2022-12-04

## 2022-12-04 RX ORDER — HYDROCHLOROTHIAZIDE 25 MG/1
25 TABLET ORAL DAILY
Status: DISCONTINUED | OUTPATIENT
Start: 2022-12-04 | End: 2022-12-07 | Stop reason: HOSPADM

## 2022-12-04 RX ADMIN — TRAZODONE HYDROCHLORIDE 50 MG: 50 TABLET ORAL at 21:12

## 2022-12-04 RX ADMIN — HYDROCHLOROTHIAZIDE 25 MG: 25 TABLET ORAL at 15:29

## 2022-12-04 RX ADMIN — HYDROXYZINE HYDROCHLORIDE 50 MG: 50 TABLET, FILM COATED ORAL at 21:12

## 2022-12-04 RX ADMIN — MIRTAZAPINE 7.5 MG: 15 TABLET, FILM COATED ORAL at 21:12

## 2022-12-04 RX ADMIN — ONDANSETRON 4 MG: 4 TABLET, ORALLY DISINTEGRATING ORAL at 15:25

## 2022-12-04 RX ADMIN — POTASSIUM CHLORIDE 40 MEQ: 1500 TABLET, EXTENDED RELEASE ORAL at 17:31

## 2022-12-04 NOTE — H&P
Patient, refused to talk to me, was lying in prone position  Lab work reviewed, has hypokalemia, will replace potassium, do BMP tomorrow, will try to see patient tomorrow  Readings of high blood pressure, added hydrochlorothiazide

## 2022-12-04 NOTE — GROUP NOTE
Group Therapy Note    Date: 12/4/2022    Group Start Time: 1400  Group End Time: 1407  Group Topic: Cognitive Skills    [address] [place], [place]        Group Therapy Note    Attendees: 6/18    Cognitive Skills Group Note        Date: December 4, 2022 Start Time: 2pm  End Time: 2:45pm      Number of Participants in Group & Unit Census:  6/18    Topic: interpersonal skills, creative expression, concentration     Goal of Group: To increase interpersonal skills and creative expression through collaborating with peers and concentrating on a presented task. Comments:     Patient did not participate in Cognitive Skills group, despite staff encouragement and explanation of benefits. Patient remain seclusive to self. Q15 minute safety checks maintained for patient safety and will continue to encourage patient to attend unit programming.         Signature:  JAMAAL AndradeS

## 2022-12-04 NOTE — PLAN OF CARE
[facility]  Initial Interdisciplinary Treatment Plan NO      Original treatment plan Date & Time: 12/4/22 12:45pm    Admission Type:  Admission Type: Voluntary    Reason for admission:   Reason for Admission: +SI with plan to OD or cut wrists, +HI towards no one specific with no specific plans; polysubstance abuse    Estimated Length of Stay:  5-7days  Estimated Discharge Date: to be determined by physician    PATIENT STRENGTHS:  Patient Strengths:   Patient Strengths and Limitations:   Addictive Behavior: Addictive Behavior  In the Past 3 Months, Have You Felt or Has Someone Told You That You Have a Problem With  : None  Medical Problems:  Past Medical History:   Diagnosis Date    Alcoholism ([facility]     Anxiety     Bipolar 1 disorder ([facility] [facility]     [facility]     Depression     Hepatitis C antibody test positive     Hypertension     Irregular heartbeat     Mild tetrahydrocannabinol (THC) abuse     Suicidal ideation      Status EXAM:Mental Status and Behavioral Exam  Normal: No  Level of Assistance: Independent/Self  Facial Expression: Flat, Sad  Affect: Blunt  Level of Consciousness: Alert  Frequency of Checks: 4 times per hour, close  Mood:Normal: No  Mood: Depressed, Anxious  Motor Activity:Normal: No  Motor Activity: Decreased  Eye Contact: Fair  Observed Behavior: Cooperative  Sexual Misconduct History: Current - no  Preception: Superior to person, Superior to time, Superior to place, Superior to situation  Attention:Normal: No  Attention: Distractible  Thought Processes: Circumstantial  Thought Content:Normal: No  Thought Content: Preoccupations  Depression Symptoms: Loss of interest, Impaired concentration, Feelings of helplessness, Feelings of hopelessess  Anxiety Symptoms: Generalized  [place] Symptoms: No problems reported or observed.   Hallucinations: None  Delusions: No  Memory:Normal: No  Memory: Poor recent, Poor remote  Insight and Judgment: No  Insight and Judgment: Poor judgment, Poor insight    EDUCATION:   Learner Progress Toward Treatment Goals: reviewed group plans and strategies for care    Method:group therapy, medication compliance, individualized assessments and care planning    Outcome: needs reinforcement    PATIENT GOALS: Patient declines to participate. PLAN/TREATMENT RECOMMENDATIONS:     continue group therapy , medications compliance, goal setting, individualized assessments and care, continue to monitor pt on unit      SHORT-TERM GOALS: Patient declines to participate. Time frame for Short-Term Goals: 5-7 days    LONG-TERM GOALS: Patient declines to participate.    Time frame for Long-Term Goals: 6 months  Members Present in Team Meeting: See Signature Sheet    Jackie Raymond RN

## 2022-12-04 NOTE — GROUP NOTE
Group Therapy Note    Date: 12/4/2022    Group Start Time: 0900  Group End Time: 0930  Group Topic: Community Meeting    JEREMIAH Hurtadojonathan Merino        Group Therapy Note    Attendees: 6/18       Community Meeting Group Note        Date: December 4, 2022 Start Time: 9am  End Time:  0930      Number of Participants in Group & Unit Census:  6/18    Topic: goal setting    Goal of Group: Set short term goal for the day      Comments:     Patient did not participate in [employer] group, despite staff encouragement and explanation of benefits. Patient remain seclusive to self. Q15 minute safety checks maintained for patient safety and will continue to encourage patient to attend unit programming.

## 2022-12-04 NOTE — PROGRESS NOTES
Daily Progress Note  12/4/2022    Patient Name: Dixie Quinonez    CHIEF COMPLAINT: Depression with suicidal ideation and plans to end his own life         SUBJECTIVE:    Nursing staff report the patient has maintained medication adherence and has not required emergency medications since his admission yesterday. He is agreeable to assessment at bedside where he reports his mood has slow. He does report somewhat improved sleep after taking Remeron last night and denies any side effects associated with utilizing. He continues to endorse suicidal ideation and is not able to contract for safety if in the community. He is experiencing withdrawal related to fentanyl including GI discomfort and some diaphoresis. He is encouraged as needed medications and his Zofran has been added today. He is verbalizing some interest in rehabilitation treatment once his symptoms are stabilized. He has remained isolative and writer encouraged patient to attend groups on the unit. At this time, the patient is not appropriate for a lower level of care. There is risk of decompensation and patient warrants further hospitalization for safety and stabilization    Appetite:  [] Normal/Adequate/Unchanged  [] Increased  [x] Decreased      Sleep:       [] Normal/Adequate/Unchanged  [x] Fair  [] Poor      Group Attendance on Unit:   [] Yes  [] Selectively    [x] No    Medication Side Effects: Patient denies any medication side effects at the time of assessment. Mental Status Exam  Level of consciousness: Resting, responds to verbal stimuli  Appearance: Appropriate attire for setting, resting in bed, with poor grooming and hygiene. Prefers not to uncover fully from the bed sheet. Behavior/Motor: Approachable, no psychomotor abnormalities. Attitude toward examiner: Minimally cooperative, difficulty maintaining attention and avoids extended eye contact.   Speech: Delayed rate, decreased volume, and monotone  Mood:  Patient reports \" bad\".   Affect: Congruent, blunted  Thought processes: coherent, slow, and poverty of thought. Thought content: Denies homicidal ideation. Suicidal Ideation: Active suicidal ideations, with a  current plan or intent, contracts for safety on the unit. Delusions: No evidence of delusions. Endorses paranoia. Perceptual Disturbance: Patient does not appear to be responding to internal stimuli. Denies auditory hallucinations. Denies visual hallucinations. Cognition: Oriented to self, location, time, and situation. Memory: Impaired. Insight & Judgement: Poor. Data   height is 5' 10\" (1.778 m) and weight is 180 lb (81.6 kg). His oral temperature is 98.1 °F (36.7 °C). His blood pressure is 152/87 (abnormal) and his pulse is 74. His respiration is 14 and oxygen saturation is 100%.    Labs:   Admission on 12/03/2022, Discharged on 12/03/2022   Component Date Value Ref Range Status    WBC 12/03/2022 5.9  3.5 - 11.3 k/uL Final    RBC 12/03/2022 4.30  4.21 - 5.77 m/uL Final    Hemoglobin 12/03/2022 13.3  13.0 - 17.0 g/dL Final    Hematocrit 12/03/2022 39.8 (A)  40.7 - 50.3 % Final    MCV 12/03/2022 92.6  82.6 - 102.9 fL Final    MCH 12/03/2022 30.9  25.2 - 33.5 pg Final    MCHC 12/03/2022 33.4  28.4 - 34.8 g/dL Final    RDW 12/03/2022 12.6  11.8 - 14.4 % Final    Platelets 50/88/5321 348  138 - 453 k/uL Final    MPV 12/03/2022 8.4  8.1 - 13.5 fL Final    NRBC Automated 12/03/2022 0.0  0.0 per 100 WBC Final    Seg Neutrophils 12/03/2022 43  36 - 65 % Final    Lymphocytes 12/03/2022 45 (A)  24 - 43 % Final    Monocytes 12/03/2022 8  3 - 12 % Final    Eosinophils % 12/03/2022 3  1 - 4 % Final    Basophils 12/03/2022 1  0 - 2 % Final    Immature Granulocytes 12/03/2022 0  0 % Final    Segs Absolute 12/03/2022 2.53  1.50 - 8.10 k/uL Final    Absolute Lymph # 12/03/2022 2.63  1.10 - 3.70 k/uL Final    Absolute Mono # 12/03/2022 0.47  0.10 - 1.20 k/uL Final    Absolute Eos # 12/03/2022 0.20  0.00 - 0.44 k/uL Final Basophils Absolute 12/03/2022 0.04  0.00 - 0.20 k/uL Final    Absolute Immature Granulocyte 12/03/2022 <0.03  0.00 - 0.30 k/uL Final    Glucose 12/03/2022 174 (A)  70 - 99 mg/dL Final    BUN 12/03/2022 9  6 - 20 mg/dL Final    Creatinine 12/03/2022 0.78  0.70 - 1.20 mg/dL Final    Est, Glom Filt Rate 12/03/2022 >60  >60 mL/min/1.73m2 Final    Comment:       Effective Oct 3, 2022        These results are not intended for use in patients <25years of age. eGFR results are calculated without a race factor using the 2021 CKD-EPI equation. Careful clinical correlation is recommended, particularly when comparing to results   calculated using previous equations. The CKD-EPI equation is less accurate in patients with extremes of muscle mass, extra-renal   metabolism of creatine, excessive creatine ingestion, or following therapy that affects   renal tubular secretion.       Calcium 12/03/2022 9.0  8.6 - 10.4 mg/dL Final    Sodium 12/03/2022 135  135 - 144 mmol/L Final    Potassium 12/03/2022 3.4 (A)  3.7 - 5.3 mmol/L Final    Chloride 12/03/2022 99  98 - 107 mmol/L Final    CO2 12/03/2022 25  20 - 31 mmol/L Final    Anion Gap 12/03/2022 11  9 - 17 mmol/L Final    Alkaline Phosphatase 12/03/2022 74  40 - 129 U/L Final    ALT 12/03/2022 77 (A)  5 - 41 U/L Final    AST 12/03/2022 86 (A)  <40 U/L Final    Total Bilirubin 12/03/2022 0.3  0.3 - 1.2 mg/dL Final    Total Protein 12/03/2022 7.0  6.4 - 8.3 g/dL Final    Albumin 12/03/2022 3.9  3.5 - 5.2 g/dL Final    Albumin/Globulin Ratio 12/03/2022 1.3  1.0 - 2.5 Final    Acetaminophen Level 12/03/2022 <5 (A)  10 - 30 ug/mL Final    Ethanol 12/03/2022 <10  <10 mg/dL Final    Ethanol percent 12/03/2022 <0.010  <7.037 % Final    Salicylate Lvl 42/02/7264 <1 (A)  3 - 10 mg/dL Final    Toxic Tricyclic Sc,Blood 98/82/9039 NEGATIVE  NEGATIVE Final    Amphetamine Screen, Ur 12/03/2022 NEGATIVE  NEGATIVE Final    Comment:       (Positive cutoff 1000 ng/mL) Barbiturate Screen, Ur 12/03/2022 NEGATIVE  NEGATIVE Final    Comment:       (Positive cutoff 200 ng/mL)                  Benzodiazepine Screen, Urine 12/03/2022 NEGATIVE  NEGATIVE Final    Comment:       (Positive cutoff 200 ng/mL)                  Cocaine Metabolite, Urine 12/03/2022 POSITIVE (A)  NEGATIVE Final    Comment:       (Positive cutoff 300 ng/mL)                  Methadone Screen, Urine 12/03/2022 NEGATIVE  NEGATIVE Final    Comment:       (Positive cutoff 300 ng/mL)                  Opiates, Urine 12/03/2022 NEGATIVE  NEGATIVE Final    Comment:       (Positive cutoff 300 ng/mL)                  Phencyclidine, Urine 12/03/2022 NEGATIVE  NEGATIVE Final    Comment:       (Positive cutoff 25 ng/mL)                  Cannabinoid Scrn, Ur 12/03/2022 POSITIVE (A)  NEGATIVE Final    Comment:       (Positive cutoff 50 ng/mL)                  Oxycodone Screen, Ur 12/03/2022 NEGATIVE  NEGATIVE Final    Comment:       (Positive cutoff 100 ng/mL)                  Fentanyl, Ur 12/03/2022 POSITIVE (A)  NEGATIVE Final    Comment:       (Positive cutoff  5 ng/ml)            Test Information 12/03/2022 Assay provides medical screening only. The absence of expected drug(s) and/or metabolite(s) may indicate diluted or adulterated urine, limitations of testing or timing of collection. Final    Comment: Testing for legal purposes should be confirmed by another method. To request confirmation   of test result, please call the lab within 7 days of sample submission. Reviewed patient's current plan of care and vital signs with nursing staff.     Labs reviewed: [x] Yes  Last EKG in EMR reviewed: [x] Yes  QTc: 457    Medications  Current Facility-Administered Medications: hydroCHLOROthiazide (HYDRODIURIL) tablet 25 mg, 25 mg, Oral, Daily  acetaminophen (TYLENOL) tablet 650 mg, 650 mg, Oral, Q6H PRN  ibuprofen (ADVIL;MOTRIN) tablet 400 mg, 400 mg, Oral, Q6H PRN  hydrOXYzine HCl (ATARAX) tablet 50 mg, 50 mg, Oral, TID PRN  traZODone (DESYREL) tablet 50 mg, 50 mg, Oral, Nightly PRN  polyethylene glycol (GLYCOLAX) packet 17 g, 17 g, Oral, Daily PRN  aluminum & magnesium hydroxide-simethicone (MAALOX) 200-200-20 MG/5ML suspension 30 mL, 30 mL, Oral, Q6H PRN  nicotine polacrilex (NICORETTE) gum 2 mg, 2 mg, Oral, Q2H PRN  mirtazapine (REMERON) tablet 7.5 mg, 7.5 mg, Oral, Nightly    ASSESSMENT  Severe episode of recurrent major depressive disorder, without psychotic features ([facility] [address]         PLAN:  Patient symptoms are: unstable  Ordered Zofran disintegrating 4 mg tablet every 8 hours as needed for nausea  Consider titration of Remeron tomorrow if GI distress has improved  Encourage participation in groups and milieu. Probable discharge is to be determined by MD    Electronically signed by ROXANNE Tian CNP on 12/4/2022 at 2:57 PM    **This report has been created using voice recognition software. It may contain minor errors which are inherent in voice recognition technology. **

## 2022-12-04 NOTE — PROGRESS NOTES
Behavioral Services  Medicare Certification Upon Admission    I certify that this patient's inpatient psychiatric hospital admission is medically necessary for:    [x] (1) Treatment which could reasonably be expected to improve this patient's condition,       [x] (2) Or for diagnostic study;     AND     [x](2) The inpatient psychiatric services are provided while the individual is under the care of a physician and are included in the individualized plan of care.     Estimated length of stay/service 3-5 days    Plan for post-hospital care [facility]    Electronically signed by Emma Neumann MD on 12/3/2022 at 7:57 PM

## 2022-12-04 NOTE — PLAN OF CARE
Problem: Behavior  Goal: Pt/Family maintain appropriate behavior and adhere to behavioral management agreement, if implemented  Description: INTERVENTIONS:  1. Assess patient/family's coping skills and  non-compliant behavior (including use of illegal substances)  2. Notify security of behavior or suspected illegal substances which indicate the need for search of the family and/or belongings  3. Encourage verbalization of thoughts and concerns in a socially appropriate manner  4. Utilize positive, consistent limit setting strategies supporting safety of patient, staff and others  5. Encourage participation in the decision making process about the behavioral management agreement  6. If a visitor's behavior poses a threat to safety call refer to organization policy. 7. Initiate consult with , Psychosocial CNS, Spiritual Care as appropriate  Outcome: Progressing  Flowsheets (Taken 12/4/2022 1442 by Kellen Barrera RN)  Patient/family maintains appropriate behavior and adheres to behavioral management agreement, if implemented:   Encourage participation in the decision making process about the behavioral management agreement   Encourage verbalization of thoughts and concerns in a socially appropriate manner   Pt isolates in bed. Refuses to acknowledge staff for vital signs. Pt. Does however get up and come out to dayroom when breakfast trays are announced. Did medications from nurse. Problem: Depression/Self Harm  Goal: Effect of psychiatric condition will be minimized and patient will be protected from self harm  Description: INTERVENTIONS:  1. Assess impact of patient's symptoms on level of functioning, self care needs and offer support as indicated  2. Assess patient/family knowledge of depression, impact on illness and need for teaching  3. Provide emotional support, presence and reassurance  4. Assess for possible suicidal thoughts or ideation.  If patient expresses suicidal thoughts or statements do not leave alone, initiate Suicide Precautions, move to a room close to the nursing station and obtain sitter  5. Initiate consults as appropriate with Mental Health Professional, Spiritual Care, Psychosocial CNS, and consider a recommendation to the LIP for a Psychiatric Consultation  Outcome: Progressing  Flowsheets (Taken 12/4/2022 1442 by Ahsan Saab RN)  Effect of psychiatric condition will be minimized and patient will be protected from self harm: (Patient refused.) Provide emotional support, presence and reassurance   Pt relates he is depressed over his recent drug use. Flat and sad. Isolates in bed. Pt relates to fleeting suicidal thoughts. Gives verbal contract that he will not hurt himself. Pt. Remains safe on the unit. Q 15 minute checks for safety maintained. Problem: Anxiety  Goal: Will report anxiety at manageable levels  Description: INTERVENTIONS:  1. Administer medication as ordered  2. Teach and rehearse alternative coping skills  3. Provide emotional support with 1:1 interaction with staff  Outcome: Susy Brito (Taken 12/4/2022 1442 by Ahsan Saab RN)  Will report anxiety at manageable levels: (Patient refused.) --3  Pt says he is anxious and depressed. Isolates for long periods of time. Problem: Drug Abuse/Detox  Goal: Will have no detox symptoms and will verbalize plan for changing drug-related behavior  Description: INTERVENTIONS:  1. Administer medication as ordered  2. Monitor physical status  3. Provide emotional support with 1:1 interaction with staff  4. Encourage  recovery focused treatment   Outcome: Progressing  Flowsheets (Taken 12/4/2022 1445 by Ahsan Saab RN)  Will have no detox symptoms and will verbalize plan for changing drug-related behavior: Monitor physical status  Pt relates to recent relapse on drugs and is depressed and sad over this. Pt. Remains safe on the unit. Q 15 minute checks for safety maintained.

## 2022-12-04 NOTE — PLAN OF CARE
Patient was seclusive to his room this evening. Patient is guarded and irritable at times. Patient refused vital signs but was compliant with scheduled medications this evening. Patient requested prn oral medications for anxiety and sleep this evening. Patient denies any suicidal thoughts at this time. Patient agrees to come talk with staff if having any thoughts to harm himself this shift. 15 min rounds continued for patient safety. Problem: Behavior  Goal: Pt/Family maintain appropriate behavior and adhere to behavioral management agreement, if implemented  Description: INTERVENTIONS:  1. Assess patient/family's coping skills and  non-compliant behavior (including use of illegal substances)  2. Notify security of behavior or suspected illegal substances which indicate the need for search of the family and/or belongings  3. Encourage verbalization of thoughts and concerns in a socially appropriate manner  4. Utilize positive, consistent limit setting strategies supporting safety of patient, staff and others  5. Encourage participation in the decision making process about the behavioral management agreement  6. If a visitor's behavior poses a threat to safety call refer to organization policy. 7. Initiate consult with , Psychosocial CNS, Spiritual Care as appropriate  12/3/2022 2356 by Mecca Deleon, RN  Outcome: Progressing  12/3/2022 1622 by Sary De La Cruz RN  Outcome: Progressing     Problem: Depression/Self Harm  Goal: Effect of psychiatric condition will be minimized and patient will be protected from self harm  Description: INTERVENTIONS:  1. Assess impact of patient's symptoms on level of functioning, self care needs and offer support as indicated  2. Assess patient/family knowledge of depression, impact on illness and need for teaching  3. Provide emotional support, presence and reassurance  4. Assess for possible suicidal thoughts or ideation.  If patient expresses suicidal thoughts or Progressing  12/3/2022 1622 by Zo Espinoza RN  Outcome: Progressing

## 2022-12-05 PROCEDURE — 6370000000 HC RX 637 (ALT 250 FOR IP): Performed by: PSYCHIATRY & NEUROLOGY

## 2022-12-05 PROCEDURE — APPSS30 APP SPLIT SHARED TIME 16-30 MINUTES: Performed by: PSYCHIATRY & NEUROLOGY

## 2022-12-05 PROCEDURE — 1240000000 HC EMOTIONAL WELLNESS R&B

## 2022-12-05 RX ADMIN — MIRTAZAPINE 7.5 MG: 15 TABLET, FILM COATED ORAL at 20:46

## 2022-12-05 RX ADMIN — HYDROXYZINE HYDROCHLORIDE 50 MG: 50 TABLET, FILM COATED ORAL at 20:46

## 2022-12-05 RX ADMIN — TRAZODONE HYDROCHLORIDE 50 MG: 50 TABLET ORAL at 20:46

## 2022-12-05 RX ADMIN — ONDANSETRON 4 MG: 4 TABLET, ORALLY DISINTEGRATING ORAL at 15:34

## 2022-12-05 NOTE — GROUP NOTE
Group Therapy Note    Date: 12/5/2022    Group Start Time: 0900  Group End Time: 0930  Group Topic: Community Meeting    [address] [place] Meeting Group Note        Date: December 5, 2022 Start Time: 0900  End Time: 0930      Number of Participants in Group & Unit Census:  6/20    Topic: Goals group    Goal of Group: Patient will identify 1-2 short term goals patient would like to accomplish by the end of the day. Comments:     Patient did not participate in [employer] group, despite staff encouragement and explanation of benefits. Patient remain seclusive to self. Q15 minute safety checks maintained for patient safety and will continue to encourage patient to attend unit programming.      Group Therapy Note    Attendees: 14/20     Signature:  Savi Barclay

## 2022-12-05 NOTE — GROUP NOTE
Recreation Group Note        Date: December 5, 2022 Start Time: 11am  End Time: 11:45am      Number of Participants in Group & Unit Census:  8    Topic: Leisure skills    Goal of Group:Patient will identify benefits of leisure for coping. Comments:     Patient did not participate in [employer] group, despite staff encouragement and explanation of benefits. Patient remain seclusive to self. Q15 minute safety checks maintained for patient safety and will continue to encourage patient to attend unit programming.          Signature:  Vivienne Montano, [address]

## 2022-12-05 NOTE — GROUP NOTE
Group Therapy Note    Date: 12/5/2022    Group Start Time: 1000  Group End Time: 0448  Group Topic: Psychotherapy    [address] [facility], [place], [place]        Group Therapy Note    Attendees: 4/22     Patient refused to attend psychotherapy group at 10:00 am after encouragement from staff. 1:1 talk time provided as alternative to group session.     Discipline Responsible: /Counselor      Signature:  [place]UCIANA Navarro LSW

## 2022-12-05 NOTE — PLAN OF CARE
Problem: Behavior  Goal: Pt/Family maintain appropriate behavior and adhere to behavioral management agreement, if implemented  Description: INTERVENTIONS:  1. Assess patient/family's coping skills and  non-compliant behavior (including use of illegal substances)  2. Notify security of behavior or suspected illegal substances which indicate the need for search of the family and/or belongings  3. Encourage verbalization of thoughts and concerns in a socially appropriate manner  4. Utilize positive, consistent limit setting strategies supporting safety of patient, staff and others  5. Encourage participation in the decision making process about the behavioral management agreement  6. If a visitor's behavior poses a threat to safety call refer to organization policy. 7. Initiate consult with , Psychosocial CNS, Spiritual Care as appropriate  12/4/2022 2026 by Rae Zaidi RN  Outcome: Progressing     Problem: Self Harm/Suicidality  Goal: Will have no self-injury during hospital stay  Description: INTERVENTIONS:  1. Q 30 MINUTES: Routine safety checks  2. Q SHIFT & PRN: Assess risk to determine if routine checks are adequate to maintain patient safety  12/4/2022 2026 by Rae Zaidi RN  Outcome: Progressing  Note: Patient remains absent of self injury during this shift. Problem: Depression  Goal: Will be euthymic at discharge  Description: INTERVENTIONS:  1. Administer medication as ordered  2. Provide emotional support via 1:1 interaction with staff  3. Encourage involvement in milieu/groups/activities  4. Monitor for social isolation  12/4/2022 2026 by Rae Zaidi RN  Outcome: Progressing  Note: Patient is isolative to room most of shift. He is selective mute and out for needs only. He is medication compliant. He reports fleeting suicidal thoughts contracts for safety while on unit. Staff maintains Q 15 minute safety checks.       Problem: Anxiety  Goal: Will report anxiety at manageable levels  Description: INTERVENTIONS:  1. Administer medication as ordered  2. Teach and rehearse alternative coping skills  3.  Provide emotional support with 1:1 interaction with staff  12/4/2022 2026 by Matias Sainz RN  Outcome: Progressing

## 2022-12-05 NOTE — PROGRESS NOTES
Daily Progress Note  12/5/2022    Patient Name: Dana Hamm    CHIEF COMPLAINT: Depression with suicidal ideation and plans to end his own life         SUBJECTIVE:    Nursing staff report the patient has maintained medication adherence and has not required emergency medications or exhibited acute behavioral changes in the last 24 hours. Patient is agreeable to assessment and privacy of his room where his eye contact has improved. He continues to endorse suicidal ideation and is not able to contract for safety if in the community. He is experiencing withdrawal related to fentanyl including GI discomfort and he is reminded that Zofran has been ordered, he is encouraged to utilize. He has remained isolative and writer encouraged patient to attend groups on the unit. At this time, the patient is not appropriate for a lower level of care. There is risk of decompensation and patient warrants further hospitalization for safety and stabilization    Appetite:  [] Normal/Adequate/Unchanged  [] Increased  [x] Decreased      Sleep:       [] Normal/Adequate/Unchanged  [x] Fair  [] Poor      Group Attendance on Unit:   [] Yes  [] Selectively    [x] No    Medication Side Effects: Patient denies any medication side effects at the time of assessment. Mental Status Exam  Level of consciousness: Resting, responds to verbal stimuli  Appearance: Appropriate attire for setting, resting in bed, with poor grooming and hygiene. Prefers not to uncover fully from the bed sheet. Behavior/Motor: Approachable, no psychomotor abnormalities. Attitude toward examiner: Minimally cooperative, difficulty maintaining attention and avoids extended eye contact. Speech: Delayed rate, decreased volume, and monotone  Mood:  Patient reports \" not so good\". Affect: Congruent, blunted  Thought processes: coherent, slow, and poverty of thought. Thought content: Denies homicidal ideation.   Suicidal Ideation: Active suicidal ideations, with a  current plan or intent, contracts for safety on the unit. Delusions: No evidence of delusions. Endorses paranoia. Perceptual Disturbance: Patient does not appear to be responding to internal stimuli. Denies auditory hallucinations. Denies visual hallucinations. Cognition: Oriented to self, location, time, and situation. Memory: Impaired. Insight & Judgement: Poor. Data   height is 5' 10\" (1.778 m) and weight is 180 lb (81.6 kg). His temperature is 97.1 °F (36.2 °C). His blood pressure is 138/86 and his pulse is 65. His respiration is 14 and oxygen saturation is 100%. Labs:   No visits with results within 2 Day(s) from this visit.    Latest known visit with results is:   Admission on 12/03/2022, Discharged on 12/03/2022   Component Date Value Ref Range Status    WBC 12/03/2022 5.9  3.5 - 11.3 k/uL Final    RBC 12/03/2022 4.30  4.21 - 5.77 m/uL Final    Hemoglobin 12/03/2022 13.3  13.0 - 17.0 g/dL Final    Hematocrit 12/03/2022 39.8 (A)  40.7 - 50.3 % Final    MCV 12/03/2022 92.6  82.6 - 102.9 fL Final    MCH 12/03/2022 30.9  25.2 - 33.5 pg Final    MCHC 12/03/2022 33.4  28.4 - 34.8 g/dL Final    RDW 12/03/2022 12.6  11.8 - 14.4 % Final    Platelets 49/85/6190 348  138 - 453 k/uL Final    MPV 12/03/2022 8.4  8.1 - 13.5 fL Final    NRBC Automated 12/03/2022 0.0  0.0 per 100 WBC Final    Seg Neutrophils 12/03/2022 43  36 - 65 % Final    Lymphocytes 12/03/2022 45 (A)  24 - 43 % Final    Monocytes 12/03/2022 8  3 - 12 % Final    Eosinophils % 12/03/2022 3  1 - 4 % Final    Basophils 12/03/2022 1  0 - 2 % Final    Immature Granulocytes 12/03/2022 0  0 % Final    Segs Absolute 12/03/2022 2.53  1.50 - 8.10 k/uL Final    Absolute Lymph # 12/03/2022 2.63  1.10 - 3.70 k/uL Final    Absolute Mono # 12/03/2022 0.47  0.10 - 1.20 k/uL Final    Absolute Eos # 12/03/2022 0.20  0.00 - 0.44 k/uL Final    Basophils Absolute 12/03/2022 0.04  0.00 - 0.20 k/uL Final    Absolute Immature Granulocyte 12/03/2022 <0.03 0.00 - 0.30 k/uL Final    Glucose 12/03/2022 174 (A)  70 - 99 mg/dL Final    BUN 12/03/2022 9  6 - 20 mg/dL Final    Creatinine 12/03/2022 0.78  0.70 - 1.20 mg/dL Final    Est, Glom Filt Rate 12/03/2022 >60  >60 mL/min/1.73m2 Final    Comment:       Effective Oct 3, 2022        These results are not intended for use in patients <25years of age. eGFR results are calculated without a race factor using the 2021 CKD-EPI equation. Careful clinical correlation is recommended, particularly when comparing to results   calculated using previous equations. The CKD-EPI equation is less accurate in patients with extremes of muscle mass, extra-renal   metabolism of creatine, excessive creatine ingestion, or following therapy that affects   renal tubular secretion.       Calcium 12/03/2022 9.0  8.6 - 10.4 mg/dL Final    Sodium 12/03/2022 135  135 - 144 mmol/L Final    Potassium 12/03/2022 3.4 (A)  3.7 - 5.3 mmol/L Final    Chloride 12/03/2022 99  98 - 107 mmol/L Final    CO2 12/03/2022 25  20 - 31 mmol/L Final    Anion Gap 12/03/2022 11  9 - 17 mmol/L Final    Alkaline Phosphatase 12/03/2022 74  40 - 129 U/L Final    ALT 12/03/2022 77 (A)  5 - 41 U/L Final    AST 12/03/2022 86 (A)  <40 U/L Final    Total Bilirubin 12/03/2022 0.3  0.3 - 1.2 mg/dL Final    Total Protein 12/03/2022 7.0  6.4 - 8.3 g/dL Final    Albumin 12/03/2022 3.9  3.5 - 5.2 g/dL Final    Albumin/Globulin Ratio 12/03/2022 1.3  1.0 - 2.5 Final    Acetaminophen Level 12/03/2022 <5 (A)  10 - 30 ug/mL Final    Ethanol 12/03/2022 <10  <10 mg/dL Final    Ethanol percent 12/03/2022 <0.010  <1.690 % Final    Salicylate Lvl 52/59/7830 <1 (A)  3 - 10 mg/dL Final    Toxic Tricyclic Sc,Blood 27/82/9252 NEGATIVE  NEGATIVE Final    Amphetamine Screen, Ur 12/03/2022 NEGATIVE  NEGATIVE Final    Comment:       (Positive cutoff 1000 ng/mL)                  Barbiturate Screen, Ur 12/03/2022 NEGATIVE  NEGATIVE Final    Comment:       (Positive cutoff 200 ng/mL) Benzodiazepine Screen, Urine 12/03/2022 NEGATIVE  NEGATIVE Final    Comment:       (Positive cutoff 200 ng/mL)                  Cocaine Metabolite, Urine 12/03/2022 POSITIVE (A)  NEGATIVE Final    Comment:       (Positive cutoff 300 ng/mL)                  Methadone Screen, Urine 12/03/2022 NEGATIVE  NEGATIVE Final    Comment:       (Positive cutoff 300 ng/mL)                  Opiates, Urine 12/03/2022 NEGATIVE  NEGATIVE Final    Comment:       (Positive cutoff 300 ng/mL)                  Phencyclidine, Urine 12/03/2022 NEGATIVE  NEGATIVE Final    Comment:       (Positive cutoff 25 ng/mL)                  Cannabinoid Scrn, Ur 12/03/2022 POSITIVE (A)  NEGATIVE Final    Comment:       (Positive cutoff 50 ng/mL)                  Oxycodone Screen, Ur 12/03/2022 NEGATIVE  NEGATIVE Final    Comment:       (Positive cutoff 100 ng/mL)                  Fentanyl, Ur 12/03/2022 POSITIVE (A)  NEGATIVE Final    Comment:       (Positive cutoff  5 ng/ml)            Test Information 12/03/2022 Assay provides medical screening only. The absence of expected drug(s) and/or metabolite(s) may indicate diluted or adulterated urine, limitations of testing or timing of collection. Final    Comment: Testing for legal purposes should be confirmed by another method. To request confirmation   of test result, please call the lab within 7 days of sample submission. Reviewed patient's current plan of care and vital signs with nursing staff.     Labs reviewed: [x] Yes  Last EKG in EMR reviewed: [x] Yes  QTc: 457    Medications  Current Facility-Administered Medications: hydroCHLOROthiazide (HYDRODIURIL) tablet 25 mg, 25 mg, Oral, Daily  ondansetron (ZOFRAN-ODT) disintegrating tablet 4 mg, 4 mg, Oral, Q8H PRN  acetaminophen (TYLENOL) tablet 650 mg, 650 mg, Oral, Q6H PRN  ibuprofen (ADVIL;MOTRIN) tablet 400 mg, 400 mg, Oral, Q6H PRN  hydrOXYzine HCl (ATARAX) tablet 50 mg, 50 mg, Oral, TID PRN  traZODone (DESYREL) tablet 50 mg, 50 mg, Oral, Nightly PRN  polyethylene glycol (GLYCOLAX) packet 17 g, 17 g, Oral, Daily PRN  aluminum & magnesium hydroxide-simethicone (MAALOX) 200-200-20 MG/5ML suspension 30 mL, 30 mL, Oral, Q6H PRN  nicotine polacrilex (NICORETTE) gum 2 mg, 2 mg, Oral, Q2H PRN  mirtazapine (REMERON) tablet 7.5 mg, 7.5 mg, Oral, Nightly    ASSESSMENT  Severe episode of recurrent major depressive disorder, without psychotic features ([facility] [address]         HANDOFF:  Patient symptoms are: Showing modest improvement  Medications per attending physician  Consider titration of Remeron tomorrow if GI distress has improved, patient encouraged to utilize Zofran  Encourage participation in groups and milieu. Probable discharge is to be determined by MD    Electronically signed by ROXANNE Grant CNP on 12/5/2022 at 3:29 PM    **This report has been created using voice recognition software. It may contain minor errors which are inherent in voice recognition technology. **                                         Psychiatry Attending Attestation     I independently saw and evaluated the patient. I reviewed the Advance Practice Provider's documentation above. Any additional comments or changes to the Advance Practice Provider's documentation are stated below otherwise agree with assessment. Patient laying in bed, withdrawn. Reports his mood is up and down. Notes that suicidal thoughts are largely unchanged patient is irritable off and on. Reports concentration is poor. Patient feels helpless ,hopless and worthless. Patient is oriented to time place and person. Denies any hallucinations or delusions. Sleep is fluctuating, appetite is poor. Patient feels down depressed nervous and anxious. Patient has marked anticipatory anxiety that was explored during the session, support was given and clarification done. No side effects from medication reported. Side-effect of medication were discussed with the patient.  Case was discussed with the social worker and with the staff. Session was supportive. Will continue same treatment. PLAN  Patient s symptoms show no change  We will observe her GI symptoms and then titrate from  Attempt to develop insight  Psycho-education conducted. Supportive Therapy conducted.   Probable discharge is TBD  Follow-up TBD    Electronically signed by Trini Caruso MD on 12/5/22 at 3:39 PM EST

## 2022-12-05 NOTE — PLAN OF CARE
Problem: Depression  Goal: Will be euthymic at discharge  Description: INTERVENTIONS:  1. Administer medication as ordered  2. Provide emotional support via 1:1 interaction with staff  3. Encourage involvement in milieu/groups/activities  4. Monitor for social isolation  Outcome: Progressing  Note: Patient admits to feelings of depression rated 8/10 on a scale of 0-10 with 10 being the worst. Offered patient 1:1 emotional support but patient refused. Encouraged patient to seek out staff for support in the future. Patient agreed to seek out staff for support if needed. Q15 minute safety checks maintained. Patient remains safe at this time.

## 2022-12-05 NOTE — GROUP NOTE
Psych-Ed/Relapse Prevention Group Note        Date: December 5, 2022 Start Time: 1:30pm  End Time:  2:15pm      Number of Participants in Group & Unit Census:  9    Topic: Socialization and support    Goal of Group:Patient will demonstrate improved interpersonal skills. Comments:     Patient did not participate in Psych-Ed/Relapse Prevention group, despite staff encouragement and explanation of benefits. Patient remain seclusive to self. Q15 minute safety checks maintained for patient safety and will continue to encourage patient to attend unit programming.          Signature:  Tianna Diaz, [address]

## 2022-12-06 LAB
ANION GAP SERPL CALCULATED.3IONS-SCNC: 5 MMOL/L (ref 9–17)
BUN BLDV-MCNC: 12 MG/DL (ref 6–20)
CALCIUM SERPL-MCNC: 9.8 MG/DL (ref 8.6–10.4)
CHLORIDE BLD-SCNC: 103 MMOL/L (ref 98–107)
CO2: 31 MMOL/L (ref 20–31)
CREAT SERPL-MCNC: 1.16 MG/DL (ref 0.7–1.2)
GFR SERPL CREATININE-BSD FRML MDRD: >60 ML/MIN/1.73M2
GLUCOSE BLD-MCNC: 121 MG/DL (ref 70–99)
POTASSIUM SERPL-SCNC: 5.1 MMOL/L (ref 3.7–5.3)
SODIUM BLD-SCNC: 139 MMOL/L (ref 135–144)

## 2022-12-06 PROCEDURE — APPSS30 APP SPLIT SHARED TIME 16-30 MINUTES: Performed by: NURSE PRACTITIONER

## 2022-12-06 PROCEDURE — 80048 BASIC METABOLIC PNL TOTAL CA: CPT

## 2022-12-06 PROCEDURE — 6370000000 HC RX 637 (ALT 250 FOR IP): Performed by: PSYCHIATRY & NEUROLOGY

## 2022-12-06 PROCEDURE — 6370000000 HC RX 637 (ALT 250 FOR IP): Performed by: INTERNAL MEDICINE

## 2022-12-06 PROCEDURE — 1240000000 HC EMOTIONAL WELLNESS R&B

## 2022-12-06 PROCEDURE — 36415 COLL VENOUS BLD VENIPUNCTURE: CPT

## 2022-12-06 RX ADMIN — ONDANSETRON 4 MG: 4 TABLET, ORALLY DISINTEGRATING ORAL at 17:40

## 2022-12-06 RX ADMIN — MIRTAZAPINE 7.5 MG: 15 TABLET, FILM COATED ORAL at 20:48

## 2022-12-06 RX ADMIN — HYDROCHLOROTHIAZIDE 25 MG: 25 TABLET ORAL at 08:54

## 2022-12-06 RX ADMIN — TRAZODONE HYDROCHLORIDE 50 MG: 50 TABLET ORAL at 20:48

## 2022-12-06 RX ADMIN — HYDROXYZINE HYDROCHLORIDE 50 MG: 50 TABLET, FILM COATED ORAL at 20:48

## 2022-12-06 NOTE — GROUP NOTE
Group Therapy Note    Date: 12/6/2022    Group Start Time: 1000  Group End Time: 4110  Group Topic: Psychotherapy    [address] [facility], [place], [place]        Group Therapy Note    Attendees: 6/21       Patient refused to attend psychotherapy group at 10:00 am after encouragement from staff. 1:1 talk time provided as alternative to group session.     Discipline Responsible: /Counselor      Signature:  LUCIANA Britton LSW

## 2022-12-06 NOTE — BH NOTE
[facility]  Day 3 Interdisciplinary Treatment Plan NOTE    Review Date & Time: 12/6 1323    Admission Type:   Admission Type: Voluntary    Reason for admission:  Reason for Admission: +SI with plan to OD or cut wrists, +HI towards no one specific with no specific plans; polysubstance abuse  Estimated Length of Stay:  5-7 days  Estimated Discharge Date Update:   to be determined by physician    PATIENT STRENGTHS:  Patient Strengths:   Patient Strengths and Limitations:Limitations: Difficulty problem solving/relies on others to help solve problems, Tendency to isolate self, Inappropriate/potentially harmful leisure interests, Multiple barriers to leisure interests, Difficult relationships / poor social skills, Perceives need for assistance with self-care  Addictive Behavior:Addictive Behavior  In the Past 3 Months, Have You Felt or Has Someone Told You That You Have a Problem With  : None  Medical Problems:  Past Medical History:   Diagnosis Date    Alcoholism ([facility] [address]     Anxiety     B[facility] 1 [facility]     Cocaine abuse ([facility]     Depression     Hepatitis C antibody test positive     Hypertension     Irregular heartbeat     Mild tetrahydrocannabinol (THC) abuse     Suicidal ideation        Risk:  Fall Risk   Joao Scale Joao Scale Score: 22  BVC    Change in scores:  No Changes to plan of Care:  No    Status EXAM:   Mental Status and Behavioral Exam  Normal: No  Level of Assistance: Independent/Self  Facial Expression: Flat  Affect: Appropriate  Level of Consciousness: Alert  Frequency of Checks: 4 times per hour, close  Mood:Normal: No  Mood: Depressed, Anxious  Motor Activity:Normal: No  Motor Activity: Decreased  Eye Contact: Fair  Observed Behavior: Cooperative, Friendly, Preoccupied  Sexual Misconduct History: Current - no  Preception: [place] to person, [place] to time, [place] to place, [place] to situation  Attention:Normal: Yes  Attention: Distractible  Thought Processes: Circumstantial  Thought Content:Normal: No  Thought Content: Preoccupations  Depression Symptoms: Isolative, Loss of interest  Anxiety Symptoms: Generalized  [place] Symptoms: No problems reported or observed. Hallucinations: None  Delusions: No  Memory:Normal: Yes  Memory: Poor recent, Poor remote  Insight and Judgment: No  Insight and Judgment: Poor judgment, Poor insight, Unmotivated    Daily Assessment Last Entry:   Daily Sleep (WDL): Within Defined Limits            Daily Nutrition (WDL): Within Defined Limits  Appetite Change: Normal for patient  Level of Assistance: Independent/Self    Patient Monitoring:  Frequency of Checks: 4 times per hour, close    Psychiatric Symptoms:   Depression Symptoms  Depression Symptoms: Isolative, Loss of interest  Anxiety Symptoms  Anxiety Symptoms: Generalized  [place] Symptoms  [place] Symptoms: No problems reported or observed. Suicide Risk CSSR-S:  1) Within the past month, have you wished you were dead or wished you could go to sleep and not wake up? : Yes  2) Have you actually had any thoughts of killing yourself? : Yes  3) Have you been thinking about how you might kill yourself? : Yes  5) Have you started to work out or worked out the details of how to kill yourself?  Do you intend to carry out this plan? : No  6) Have you ever done anything, started to do anything, or prepared to do anything to end your life?: No  Change in Result:   Change in Plan of care:        EDUCATION:   Learner Progress Toward Treatment Goals:   Reviewed results and recommendations of this team, Reviewed group plan and strategies, Reviewed signs, symptoms and risk of self harm and violent behavior, Reviewed goals and plan of care    Method:  small group, individual verbal education    Outcome:   Verbalized by patient but needs reinforcement to obtain goals    PATIENT GOALS:  Short term:  pt refused to attend treatment team meeting  Long term:      PLAN/TREATMENT RECOMMENDATIONS UPDATE: continue with group therapies, increased socialization, continue planning for after discharge goals, continue with medication compliance    SHORT-TERM GOALS UPDATE:   Time frame for Short-Term Goals:  5-7 days    LONG-TERM GOALS UPDATE:   Time frame for Long-Term Goals:  6 months    Members Present in Team Meeting:     Gregory Dominguez RN

## 2022-12-06 NOTE — PLAN OF CARE
Problem: Behavior  Goal: Pt/Family maintain appropriate behavior and adhere to behavioral management agreement, if implemented  Description: INTERVENTIONS:  1. Assess patient/family's coping skills and  non-compliant behavior (including use of illegal substances)  2. Notify security of behavior or suspected illegal substances which indicate the need for search of the family and/or belongings  3. Encourage verbalization of thoughts and concerns in a socially appropriate manner  4. Utilize positive, consistent limit setting strategies supporting safety of patient, staff and others  5. Encourage participation in the decision making process about the behavioral management agreement  6. If a visitor's behavior poses a threat to safety call refer to organization policy. 7. Initiate consult with , Psychosocial CNS, Spiritual Care as appropriate  Outcome: Progressing  Note: Patient pleasant and cooperative with staff this shift. Patient denies suicidal/homicidal ideations and hallucinations this shift. Patient out in dayroom, aloof of peers this shift. Patient educated and agrees to come to staff if needed this shift. Patient monitored every 15 minutes with environmental safety checks. Problem: Depression/Self Harm  Goal: Effect of psychiatric condition will be minimized and patient will be protected from self harm  Description: INTERVENTIONS:  1. Assess impact of patient's symptoms on level of functioning, self care needs and offer support as indicated  2. Assess patient/family knowledge of depression, impact on illness and need for teaching  3. Provide emotional support, presence and reassurance  4. Assess for possible suicidal thoughts or ideation. If patient expresses suicidal thoughts or statements do not leave alone, initiate Suicide Precautions, move to a room close to the nursing station and obtain sitter  5.  Initiate consults as appropriate with Mental Health Professional, Spiritual Care, Psychosocial CNS, and consider a recommendation to the LIP for a Psychiatric Consultation  Outcome: Progressing  Note: Patient admits to some depression this shift. Patient states depression at a 2 on a scale of 0-10. Patient denies thoughts of self harm this shift. Patient educated and agrees to come to staff if thoughts of self harm arise. Patient monitored every 15 minutes with environmental safety checks. Problem: Self Harm/Suicidality  Goal: Will have no self-injury during hospital stay  Description: INTERVENTIONS:  1. Q 30 MINUTES: Routine safety checks  2. Q SHIFT & PRN: Assess risk to determine if routine checks are adequate to maintain patient safety  Outcome: Progressing  Note: Patient is free of self harm at this time. Patient agrees to seek out staff if thoughts to harm self arise. Staff will provide support and reassurance as needed. Safety checks maintained every 15 minutes. Problem: Depression  Goal: Will be euthymic at discharge  Description: INTERVENTIONS:  1. Administer medication as ordered  2. Provide emotional support via 1:1 interaction with staff  3. Encourage involvement in milieu/groups/activities  4. Monitor for social isolation  12/6/2022 0058 by Hilario Spain LPN  Outcome: Progressing     Problem: Anxiety  Goal: Will report anxiety at manageable levels  Description: INTERVENTIONS:  1. Administer medication as ordered  2. Teach and rehearse alternative coping skills  3. Provide emotional support with 1:1 interaction with staff  Outcome: Progressing  Note: Patient admits to anxiety this shift. Patient states anxiety at a 2 on a scale of 0-10. Patient isolative to room most of this shift. Patient aloof of peers out in the dayroom. Patient monitored every 15 minutes with environmental safety checks. Problem: Drug Abuse/Detox  Goal: Will have no detox symptoms and will verbalize plan for changing drug-related behavior  Description: INTERVENTIONS:  1.  Administer medication as ordered  2. Monitor physical status  3. Provide emotional support with 1:1 interaction with staff  4. Encourage  recovery focused treatment   Outcome: Progressing  Note: Patient has no complaints or withdrawal this shift. Patient educated and agrees to come to staff if symptoms occur. Patient monitored every 15 minutes with environmental safety checks.

## 2022-12-06 NOTE — GROUP NOTE
Group Therapy Note    Date: 12/6/2022    Group Start Time: 1330  Group End Time: 9915  Group Topic: Psychoeducation    JEREMIAH BHANGELA Lara, JAMAALS    Group Therapy Note    Attendees: 7     Patient's Goal:  Patient will identify benefits of creative expression for coping and stress management. Notes:  Patient attended group and participated. Status After Intervention:  Improved    Participation Level:  Active Listener and Interactive    Participation Quality: Appropriate, Attentive, Sharing, and Supportive      Speech:  normal      Thought Process/Content: Logical  Linear      Affective Functioning: Constricted      Mood: euthymic      Level of consciousness:  Alert, Oriented x4, and Attentive      Response to Learning: Able to verbalize current knowledge/experience, Able to verbalize/acknowledge new learning, Able to retain information, Capable of insight, Able to change behavior, and Progressing to goal      Endings: None Reported    Modes of Intervention: Education, Support, Socialization, and Exploration      Discipline Responsible: Psychoeducational Specialist      Signature:  Morro Washington, [address]

## 2022-12-06 NOTE — GROUP NOTE
Psych-Ed/Relapse Prevention Group Note        Date: December 6, 2022 Start Time: 11am  End Time: 11:45am      Number of Participants in Group & Unit Census:  4    Topic: Socialization and community resources    Goal of Group:Patient will demonstrate improved interpersonal skills. Comments:     Patient did not participate in Psych-Ed/Relapse Prevention group, despite staff encouragement and explanation of benefits. Patient remain seclusive to self. Q15 minute safety checks maintained for patient safety and will continue to encourage patient to attend unit programming.          Signature:  Vivienne Montano, [address]

## 2022-12-06 NOTE — PLAN OF CARE
Potassium normal, blood pressure better    Thank you for the consult. Medicine will sign off. Please do not hesitate to contact us for any issues or concerns.    Monica Hoyos MD

## 2022-12-06 NOTE — GROUP NOTE
Group Therapy Note    Date: 12/5/2022    Group Start Time: 2030  Group End Time: 2100  Group Topic: Wrap-Up    JEREMIAH TILLMAN    Abi Shaw      Group Therapy Note    Attendees: 8/17      Patient's Goal: Patient will verbalize today's goals as well as for post discharge. Patient will also offer supportive listening and interact with peers to clarify and understand clearly set goals. Notes: Patient is making progress AEB participating in group discussion, actively listening, and supporting other group members. Status After Intervention: Improved      Participation Level:  Active Listener and Interactive      Participation Quality: Appropriate, Attentive, Sharing, and Supportive      Speech: normal      Thought Process/Content: Logical, Linear      Affective Functioning: Congruent      Mood: anxious      Level of consciousness: Oriented x4      Response to Learning: Able to verbalize current knowledge/experience, Able to verbalize/acknowledge new learning,      Able to retain information, and Capable of insight      Endings: None Reported      Modes of Intervention: Education, Support, Socialization, and Problem-solving        Discipline Responsible: Behavorial Health Tech      Signature: Alexandro Jules

## 2022-12-06 NOTE — PLAN OF CARE
Problem: Behavior  Goal: Pt/Family maintain appropriate behavior and adhere to behavioral management agreement, if implemented  Description: INTERVENTIONS:  1. Assess patient/family's coping skills and  non-compliant behavior (including use of illegal substances)  2. Notify security of behavior or suspected illegal substances which indicate the need for search of the family and/or belongings  3. Encourage verbalization of thoughts and concerns in a socially appropriate manner  4. Utilize positive, consistent limit setting strategies supporting safety of patient, staff and others  5. Encourage participation in the decision making process about the behavioral management agreement  6. If a visitor's behavior poses a threat to safety call refer to organization policy. 7. Initiate consult with , Psychosocial CNS, Spiritual Care as appropriate  12/6/2022 1101 by Fredderick Goodell, RN  Outcome: Progressing  Note: Patient's behavior is controlled at this time. Patient isolative to room but cooperative with medication administration and interview. Problem: Depression/Self Harm  Goal: Effect of psychiatric condition will be minimized and patient will be protected from self harm  Description: INTERVENTIONS:  1. Assess impact of patient's symptoms on level of functioning, self care needs and offer support as indicated  2. Assess patient/family knowledge of depression, impact on illness and need for teaching  3. Provide emotional support, presence and reassurance  4. Assess for possible suicidal thoughts or ideation. If patient expresses suicidal thoughts or statements do not leave alone, initiate Suicide Precautions, move to a room close to the nursing station and obtain sitter  5.  Initiate consults as appropriate with Mental Health Professional, Spiritual Care, Psychosocial CNS, and consider a recommendation to the LIP for a Psychiatric Consultation  12/6/2022 1101 by Fredderick Goodell, RN  Outcome: Progressing  Note: Patient denies suicidal ideations and thoughts to harm self or others. Patient monitored every 15 minutes during rounds. Patient does endorse depression symptoms but states that \"he feels better today\". Problem: Self Harm/Suicidality  Goal: Will have no self-injury during hospital stay  Description: INTERVENTIONS:  1. Q 30 MINUTES: Routine safety checks  2. Q SHIFT & PRN: Assess risk to determine if routine checks are adequate to maintain patient safety  12/6/2022 1101 by Janusz Jung RN  Outcome: Progressing     Problem: Depression  Goal: Will be euthymic at discharge  Description: INTERVENTIONS:  1. Administer medication as ordered  2. Provide emotional support via 1:1 interaction with staff  3. Encourage involvement in milieu/groups/activities  4. Monitor for social isolation  12/6/2022 1101 by Janusz Jung RN  Outcome: Progressing     Problem: Anxiety  Goal: Will report anxiety at manageable levels  Description: INTERVENTIONS:  1. Administer medication as ordered  2. Teach and rehearse alternative coping skills  3. Provide emotional support with 1:1 interaction with staff  12/6/2022 1101 by Janusz Jung RN  Outcome: Progressing     Problem: Drug Abuse/Detox  Goal: Will have no detox symptoms and will verbalize plan for changing drug-related behavior  Description: INTERVENTIONS:  1. Administer medication as ordered  2. Monitor physical status  3. Provide emotional support with 1:1 interaction with staff  4. Encourage  recovery focused treatment   12/6/2022 1101 by Janusz Jung RN  Outcome: Progressing  Note: Patient denies withdrawal symptoms and none are observed.

## 2022-12-06 NOTE — PROGRESS NOTES
Daily Progress Note  12/6/2022    Patient Name: Dixie Quinonez    CHIEF COMPLAINT: Depression with suicidal ideation         SUBJECTIVE:      Patient is seen today for a follow up assessment. Magaly Torres is interviewed today bedside, staff reports that he remains isolative though is coming out for meals only. He reports improvement in his opiate withdrawal symptoms, states that he is able to keep down food. He continues to endorse depression, hopelessness and helplessness and feels that it is unchanged. He reports suicidal thoughts and does not feel that he could contract for safety if discharged from the hospital.  He continues to express some paranoia, denying auditory or visual hallucinations. Appetite:  [] Adequate/Unchanged  [] Increased  [x] Decreased      Sleep:       [] Adequate/Unchanged  [x] Fair  [] Poor      Group Attendance on Unit:   [] Yes   [] Selectively    [x] No    Compliant with scheduled medications: [x] Yes  [] No    Received emergency medications in past 24 hrs: [] Yes   [x] No    Medication Side Effects: Denies          Mental Status Exam  Level of consciousness: Alert and awake   Appearance: Appropriate attire for setting, resting in bed, with poor grooming and hygiene   Behavior/Motor: Approachable, psychomotor slowing, engages with interviewer  Attitude toward examiner: Cooperative, attentive, good eye contact  Speech: Slow, quiet, depressed tone  Mood: Depressed  Affect: Congruent, isolative  Thought processes: Linear and coherent  Thought content:  Denies homicidal ideation  Suicidal Ideation: Active suicidal ideations, contracts for safety on the unit. Delusions: Endorses paranoia  Perceptual Disturbance: Does not appear to attend to internal stimuli  Cognition: Oriented to self, location, time, and situation  Memory: intact  Insight: fair   Judgement: fair       Data   height is 5' 10\" (1.778 m) and weight is 180 lb (81.6 kg). His oral temperature is 97.3 °F (36.3 °C).  His blood pressure is 122/91 (abnormal) and his pulse is 83. His respiration is 14 and oxygen saturation is 100%. Labs:   Admission on 12/03/2022   Component Date Value Ref Range Status    Glucose 12/06/2022 121 (A)  70 - 99 mg/dL Final    BUN 12/06/2022 12  6 - 20 mg/dL Final    Creatinine 12/06/2022 1.16  0.70 - 1.20 mg/dL Final    Est, Glom Filt Rate 12/06/2022 >60  >60 mL/min/1.73m2 Final    Comment:       Effective Oct 3, 2022        These results are not intended for use in patients <25years of age. eGFR results are calculated without a race factor using the 2021 CKD-EPI equation. Careful clinical correlation is recommended, particularly when comparing to results   calculated using previous equations. The CKD-EPI equation is less accurate in patients with extremes of muscle mass, extra-renal   metabolism of creatine, excessive creatine ingestion, or following therapy that affects   renal tubular secretion. Calcium 12/06/2022 9.8  8.6 - 10.4 mg/dL Final    Sodium 12/06/2022 139  135 - 144 mmol/L Final    Potassium 12/06/2022 5.1  3.7 - 5.3 mmol/L Final    Chloride 12/06/2022 103  98 - 107 mmol/L Final    CO2 12/06/2022 31  20 - 31 mmol/L Final    Anion Gap 12/06/2022 5 (A)  9 - 17 mmol/L Final         Reviewed patient's current plan of care and vital signs with nursing staff.     Labs reviewed: [x] Yes    Medications  Current Facility-Administered Medications: hydroCHLOROthiazide (HYDRODIURIL) tablet 25 mg, 25 mg, Oral, Daily  ondansetron (ZOFRAN-ODT) disintegrating tablet 4 mg, 4 mg, Oral, Q8H PRN  acetaminophen (TYLENOL) tablet 650 mg, 650 mg, Oral, Q6H PRN  ibuprofen (ADVIL;MOTRIN) tablet 400 mg, 400 mg, Oral, Q6H PRN  hydrOXYzine HCl (ATARAX) tablet 50 mg, 50 mg, Oral, TID PRN  traZODone (DESYREL) tablet 50 mg, 50 mg, Oral, Nightly PRN  polyethylene glycol (GLYCOLAX) packet 17 g, 17 g, Oral, Daily PRN  aluminum & magnesium hydroxide-simethicone (MAALOX) 200-200-20 MG/5ML suspension 30 mL, 30 mL, Oral, Q6H PRN  nicotine polacrilex (NICORETTE) gum 2 mg, 2 mg, Oral, Q2H PRN  mirtazapine (REMERON) tablet 7.5 mg, 7.5 mg, Oral, Nightly    ASSESSMENT  Severe episode of recurrent major depressive disorder, without psychotic features ([facility] [address]         HANDOFF  Patient symptoms are: Minimally improving  Medications as determined by attending physician  Encourage participation in groups and milieu. Probable discharge is to be determined by MD    Electronically signed by ROXANNE Gutierrez CNP on 12/6/2022 at 1:51 PM    **This report has been created using voice recognition software. It may contain minor errors which are inherent in voice recognition technology. **                                          Psychiatry Attending Attestation     I independently saw and evaluated the patient. I reviewed the Advance Practice Provider's documentation above. Any additional comments or changes to the Advance Practice Provider's documentation are stated below otherwise agree with assessment. Patient feels better than before. Mood and affect are better. Patient reports fleeting suicidal thoughts with no intent or plan. Patient notes that these thoughts are occurring less frequently. Denies any homicidal thoughts, that was explored with the patient. Oriented to time place and person. Recent and remote memory is intact. Patient feels hopeful. Sleep and appetite is good. No side effect from medication reported. Side-effect of medication were discussed with the patient . Patient is responding to current treatment. Discharge soon, if patient continues to show improvement. Case discussed with the staff. PLAN  Patient s symptoms are improving  Continue same medication today and observe  Attempt to develop insight  Psycho-education conducted. Supportive Therapy conducted.   Probable discharge is tomorrow  Follow-up TBD    Electronically signed by Ruth Ann Mcfarland MD on 12/6/22 at 3:18 PM EST

## 2022-12-07 VITALS
RESPIRATION RATE: 14 BRPM | TEMPERATURE: 97.6 F | HEART RATE: 78 BPM | DIASTOLIC BLOOD PRESSURE: 98 MMHG | HEIGHT: 70 IN | BODY MASS INDEX: 25.77 KG/M2 | OXYGEN SATURATION: 100 % | WEIGHT: 180 LBS | SYSTOLIC BLOOD PRESSURE: 142 MMHG

## 2022-12-07 PROCEDURE — 6370000000 HC RX 637 (ALT 250 FOR IP): Performed by: INTERNAL MEDICINE

## 2022-12-07 RX ORDER — HYDROCHLOROTHIAZIDE 25 MG/1
25 TABLET ORAL DAILY
Qty: 30 TABLET | Refills: 0 | Status: SHIPPED | OUTPATIENT
Start: 2022-12-07

## 2022-12-07 RX ORDER — TRAZODONE HYDROCHLORIDE 50 MG/1
50 TABLET ORAL NIGHTLY PRN
Qty: 30 TABLET | Refills: 0 | Status: SHIPPED | OUTPATIENT
Start: 2022-12-07

## 2022-12-07 RX ORDER — MIRTAZAPINE 7.5 MG/1
7.5 TABLET, FILM COATED ORAL NIGHTLY
Qty: 30 TABLET | Refills: 0 | Status: SHIPPED | OUTPATIENT
Start: 2022-12-07

## 2022-12-07 RX ADMIN — HYDROCHLOROTHIAZIDE 25 MG: 25 TABLET ORAL at 11:09

## 2022-12-07 NOTE — DISCHARGE INSTR - DIET
Good nutrition is important when healing from an illness, injury, or surgery. Follow any nutrition recommendations given to you during your hospital stay. If you were given an oral nutrition supplement while in the hospital, continue to take this supplement at home. You can take it with meals, in-between meals, and/or before bedtime. These supplements can be purchased at most local grocery stores, pharmacies, and chain [employer]-stores. If you have any questions about your diet or nutrition, call the hospital and ask for the dietitian.   Continue with recommended diet

## 2022-12-07 NOTE — GROUP NOTE
Group Therapy Note    Date: 12/7/2022    Group Start Time: 1000  Group End Time: 1040  Group Topic: Psychotherapy    JEREMIAH TILLMAN    LUCIANA Barnett LSW        Group Therapy Note    Attendees: 7/20       Patient's Goal:  Discussion on Healthy vs Unhealthy Coping Strategies        Status After Intervention:  Improved    Participation Level:  Active Listener and Interactive    Participation Quality: Appropriate and Attentive      Speech:  normal      Thought Process/Content: Logical      Affective Functioning: Congruent      Mood: euthymic      Level of consciousness:  Alert and Attentive      Response to Learning: Able to verbalize current knowledge/experience      Endings: None Reported    Modes of Intervention: Education, Support, and Socialization      Discipline Responsible: /Counselor      Signature:  LUCIANA Barnett LSW

## 2022-12-07 NOTE — DISCHARGE INSTRUCTIONS
Information:  Medications:   Medication summary provided   I understand that I should take only the medications on my list.     -why and when I need to take each medicine.     -which side effects to watch for.     -that I should carry my medication information at all times in case of     Emergency situations. I will take all of my medicines to follow up appointments.     -check with my physician or pharmacist before taking any new    Medication, over the counter product or drink alcohol.    -Ask about food, drug or dietary supplement interactions.    -discard old lists and update records with medication providers. Notify Physician:  Notify physician if you notice:   Always call 911 if you feel your life is in danger  In case of an emergency call 911 immediately! If 911 is not available call your local emergency medical system for help    Behavioral Health Follow Up:  Original Referral Source:[facility] ED  Discharge Diagnosis: Depression with suicidal ideation [F32. A, R45.851]  Recommendations for Level of Care: take all medications as ordered and  attend all follow up appts   Patient status at discharge: stable  My hospital  was: [facility]   Aftercare plan faxed: ***   -faxed by: 12/7/22   -date: 12/7/22   -time: 1500  Prescriptions: Sent to [facility]     Smoking: Quit Smoking. Call the [employer]'s smoking quitline at 5-015-50X-QUIT  Know the signs of a heart attack   If you have any of the following symptoms call 911 immediately, do not wait more    Than five minutes. 1. Pressure, fullness and/ or squeezing in the center of the chest spreading to    The jaw, neck or shoulder. 2. Chest discomfort with light headedness, fainting, sweating, nausea or    Shortness of breath. 3. Upper abdominal pressure or discomfort. 4. Lower chest pain, back pain, unusual fatigue, shortness of breath, nausea   Or dizziness.      General Information:   Questions regarding your bill: Call HELP program [phone]     Suicide Hotline (Domenic Brian)  (623) 477-1714      Recovery Help line- 552.874.5317      To obtain results of pending studies call Medical Records at: [phone]     For emergencies and 24 hour/7 days a week contact information:  [phone]        Learning About COVID-19 and Flu Symptoms  How can you tell COVID-19 from the flu? COVID-19 and the flu have similar symptoms. The two can be hard to tell apart. The only way to know for sure which illness you have is to be tested. If you have questions about COVID-19 testing, ask your doctor or go to cdc.gov to use the COVID-19 Viral Testing Tool. Since the symptoms are so alike, it makes sense to act as if you have COVID-19 until your test results come back. This means staying home and limiting contact with people in your home. You'll need to wash your hands often and disinfect surfaces that you touch. And be sure to wear a mask when you're around other people. This is also good advice if you think you have the flu. COVID-19 and the flu have these symptoms in common:  Fever or chills  Cough  Shortness of breath  Fatigue (tiredness)  Sore throat  Runny or stuffy nose  Muscle and body aches  Headache  Vomiting and diarrhea (more common in children than adults)  COVID-19 has another symptom that also may occur:  New loss of taste or smell  COVID-19 symptoms may appear from 2 to 14 days after infection. Flu symptoms usually appear 1 to 4 days after infection. Why should you get the flu vaccine? It's important to get your yearly flu vaccine. Both the flu and COVID-19 can be active at the same time. You can get sick with both infections at once. And having both may make you more sick than getting just one. The flu vaccine won't protect you from COVID-19. But it can help prevent the flu or reduce its symptoms.  If fewer people get very ill with the flu, this will help free up medical resources that are needed for people who need urgent care, such as those suffering from COVID-19, heart attacks, and injuries from accidents. Where can you learn more? Go to [email or web address] and sign in to your [employer] account. Enter C123 in the [facility] box to learn more about \"Learning About COVID-19 and Flu Symptoms. \"     If you do not have an account, please click on the \"Sign Up Now\" link. Current as of: July 28, 2022               Content Version: 13.4  © 8169-2786 Healthwise, Incorporated. Care instructions adapted under license by [facility] ([facility]). If you have questions about a medical condition or this instruction, always ask your healthcare professional. [address] any warranty or liability for your use of this information.

## 2022-12-07 NOTE — PLAN OF CARE
Problem: Behavior  Goal: Pt/Family maintain appropriate behavior and adhere to behavioral management agreement, if implemented  Description: INTERVENTIONS:  1. Assess patient/family's coping skills and  non-compliant behavior (including use of illegal substances)  2. Notify security of behavior or suspected illegal substances which indicate the need for search of the family and/or belongings  3. Encourage verbalization of thoughts and concerns in a socially appropriate manner  4. Utilize positive, consistent limit setting strategies supporting safety of patient, staff and others  5. Encourage participation in the decision making process about the behavioral management agreement  6. If a visitor's behavior poses a threat to safety call refer to organization policy. 7. Initiate consult with , Psychosocial CNS, Spiritual Care as appropriate  12/6/2022 2151 by Jg Phillips LPN  Outcome: Progressing  12/6/2022 1101 by Blake Greenfield RN  Outcome: Progressing  Note: Patient's behavior is controlled at this time. Patient isolative to room but cooperative with medication administration and interview. Problem: Depression/Self Harm  Goal: Effect of psychiatric condition will be minimized and patient will be protected from self harm  Description: INTERVENTIONS:  1. Assess impact of patient's symptoms on level of functioning, self care needs and offer support as indicated  2. Assess patient/family knowledge of depression, impact on illness and need for teaching  3. Provide emotional support, presence and reassurance  4. Assess for possible suicidal thoughts or ideation. If patient expresses suicidal thoughts or statements do not leave alone, initiate Suicide Precautions, move to a room close to the nursing station and obtain sitter  5.  Initiate consults as appropriate with Mental Health Professional, Spiritual Care, Psychosocial CNS, and consider a recommendation to the LIP for a Psychiatric Consultation  12/6/2022 2151 by Sarah Chang LPN  Outcome: Progressing  12/6/2022 1101 by Ruma Robbins RN  Outcome: Progressing  Note: Patient denies suicidal ideations and thoughts to harm self or others. Patient monitored every 15 minutes during rounds. Patient does endorse depression symptoms but states that \"he feels better today\". Problem: Self Harm/Suicidality  Goal: Will have no self-injury during hospital stay  Description: INTERVENTIONS:  1. Q 30 MINUTES: Routine safety checks  2. Q SHIFT & PRN: Assess risk to determine if routine checks are adequate to maintain patient safety  12/6/2022 2151 by Sarah Chang LPN  Outcome: Progressing  12/6/2022 1101 by Ruma Robbins RN  Outcome: Progressing     Problem: Depression  Goal: Will be euthymic at discharge  Description: INTERVENTIONS:  1. Administer medication as ordered  2. Provide emotional support via 1:1 interaction with staff  3. Encourage involvement in milieu/groups/activities  4. Monitor for social isolation  12/6/2022 2151 by Sarah Chang LPN  Outcome: Progressing  12/6/2022 1101 by Ruma Robbins RN  Outcome: Progressing     Problem: Anxiety  Goal: Will report anxiety at manageable levels  Description: INTERVENTIONS:  1. Administer medication as ordered  2. Teach and rehearse alternative coping skills  3. Provide emotional support with 1:1 interaction with staff  12/6/2022 2151 by Sarah Chang LPN  Outcome: Progressing  12/6/2022 1101 by Ruma Robbins RN  Outcome: Progressing     Problem: Drug Abuse/Detox  Goal: Will have no detox symptoms and will verbalize plan for changing drug-related behavior  Description: INTERVENTIONS:  1. Administer medication as ordered  2. Monitor physical status  3. Provide emotional support with 1:1 interaction with staff  4.  Encourage  recovery focused treatment   12/6/2022 2151 by Sarah Chang LPN  Outcome: Progressing  12/6/2022 1101 by Ruma Robbins RN  Outcome: Progressing  Note: Patient denies withdrawal symptoms and none are observed. Patient remains free from injury and self-harm. Patient endorses anxiety, but states it is mainly because he's ready to leave tomorrow. Patient denies suicidal ideations or thoughts of self-harm. 15 minute safety checks in place and will continue. Patient endorses adequate food and beverage intake and has no complaints at this time.

## 2022-12-07 NOTE — GROUP NOTE
Group Note    12/7/22           Start time: 9:18 AM      Number of participants in Group & unit census: 14/20    Topic:Orientation    Goal of Group:Orientation     Comments:  Patient did not participate in Orientation group, despite staff encouragement and explanation of benefits. Patient remains seclusive to self. Every 15 minute safety checks maintained for patient safety and will continue to encourage patient to attend unit programming.

## 2022-12-07 NOTE — BH NOTE
Patient given tobacco quitline number 15299844155 at this time, refusing to call at this time, states \" I just dont want to quit now\"- patient given information as to the dangers of long term tobacco use.

## 2022-12-07 NOTE — BH NOTE
[facility]  Discharge Note    Pt discharged with followings belongings:   Dental Appliances: None  Vision - Corrective Lenses: None  Hearing Aid: None  Jewelry: None  Body Piercings Removed: N/A  Clothing: Footwear, Hat, Jacket/Coat, Pants, [place], Socks, Undergarments  Other Valuables: Other (Comment) (none)   Valuables sent home with patient. Patient discharged to private residence via [employer]. Patient educated on aftercare instructions: yes  Information faxed to [facility]  by staff  at 2:46 PM .Patient verbalize understanding of AVS:  yes. Status EXAM upon discharge:stable  Mental Status and Behavioral Exam  Normal: No  Level of Assistance: Independent/Self  Facial Expression: Flat  Affect: Appropriate  Level of Consciousness: Alert  Frequency of Checks: 4 times per hour, close  Mood:Normal: No  Mood: Anxious  Motor Activity:Normal: Yes  Motor Activity: Decreased  Eye Contact: Fair  Observed Behavior: Cooperative, Friendly, Preoccupied, Guarded  Sexual Misconduct History: Current - no  Preception: Barry to person, Barry to time, Barry to place, Barry to situation  Attention:Normal: Yes  Attention: Distractible  Thought Processes: Circumstantial  Thought Content:Normal: No  Thought Content: Preoccupations  Depression Symptoms: No problems reported or observed. Anxiety Symptoms: Generalized  [place] Symptoms: No problems reported or observed.   Hallucinations: None  Delusions: No  Memory:Normal: Yes  Memory: Poor recent, Poor remote  Insight and Judgment: No  Insight and Judgment: Poor insight    Tobacco Screening:  Practical Counseling, on admission, rachna X, if applicable and completed (first 3 are required if patient doesn't refuse):            ( ) Recognizing danger situations (included triggers and roadblocks)                    ( ) Coping skills (new ways to manage stress,relaxation techniques, changing routine, distraction) ( ) Basic information about quitting (benefits of quitting, techniques in how to quit, available resources  ( ) Referral for counseling faxed to [facility]                                                                                                                   ( ) Patient refused counseling  ( X ) Patient refused referral  ( ) Patient refused prescription upon discharge  ( ) Patient has not smoked in the last 30 days    Metabolic Screening:    Lab Results   Component Value Date    LABA1C 5.6 06/18/2020       Lab Results   Component Value Date    CHOL 187 06/18/2020    CHOL 194 04/16/2018     Lab Results   Component Value Date    TRIG 98 06/18/2020    TRIG 120 04/16/2018     Lab Results   Component Value Date    HDL 50 06/18/2020    HDL 44 04/16/2018     No components found for: LDLCAL  No results found for: Vin Vasquez, JEFFREYN
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
## 2022-12-08 NOTE — CARE COORDINATION
Name: Alexis Lopez    : 1990    Discharge Date: 22    Primary Auth/Cert #: [id number]    Destination: home     Discharge Medications:      Medication List        START taking these medications      hydroCHLOROthiazide 25 MG tablet  Commonly known as: HYDRODIURIL  Take 1 tablet by mouth daily            CHANGE how you take these medications      mirtazapine 7.5 MG tablet  Commonly known as: REMERON  Take 1 tablet by mouth nightly  What changed:   medication strength  how much to take            CONTINUE taking these medications      hydrOXYzine HCl 50 MG tablet  Commonly known as: ATARAX     traZODone 50 MG tablet  Commonly known as: DESYREL  Take 1 tablet by mouth nightly as needed for Sleep               Where to Get Your Medications        These medications were sent to [facility], [address] [facility]  [address] [facility], [address] [facility]      Phone: [phone]   hydroCHLOROthiazide 25 MG tablet  mirtazapine 7.5 MG tablet  traZODone 50 MG tablet         Follow Up Appointment: [address] [place] [address] [address] [phone]  Follow up on 2022  You are scheduled at 9:45 AM with the [facility] clinic

## 2022-12-17 ENCOUNTER — HOSPITAL ENCOUNTER (EMERGENCY)
Age: 32
Discharge: OTHER INSTITUTION | End: 2022-12-17
Attending: EMERGENCY MEDICINE
Payer: MEDICARE

## 2022-12-17 VITALS
HEART RATE: 95 BPM | SYSTOLIC BLOOD PRESSURE: 142 MMHG | TEMPERATURE: 98.3 F | RESPIRATION RATE: 16 BRPM | WEIGHT: 180 LBS | BODY MASS INDEX: 25.77 KG/M2 | HEIGHT: 70 IN | DIASTOLIC BLOOD PRESSURE: 86 MMHG | OXYGEN SATURATION: 97 %

## 2022-12-17 DIAGNOSIS — R45.851 SUICIDAL IDEATION: Primary | ICD-10-CM

## 2022-12-17 PROCEDURE — 99285 EMERGENCY DEPT VISIT HI MDM: CPT

## 2022-12-17 ASSESSMENT — ENCOUNTER SYMPTOMS
SHORTNESS OF BREATH: 0
ABDOMINAL PAIN: 0
BACK PAIN: 0
CHEST TIGHTNESS: 0
SORE THROAT: 0

## 2022-12-17 ASSESSMENT — PAIN - FUNCTIONAL ASSESSMENT: PAIN_FUNCTIONAL_ASSESSMENT: NONE - DENIES PAIN

## 2022-12-17 ASSESSMENT — LIFESTYLE VARIABLES: HOW OFTEN DO YOU HAVE A DRINK CONTAINING ALCOHOL: NEVER

## 2022-12-17 NOTE — ED NOTES
Patient resting comfortably and in no acute distress. Respirations even and nonlabored. Patient denies any needs at this time. Bed in lowest position. Call light within reach. Will continue to monitor.  1-on-1 sitter at bedside     Ulises Barajas, [address] [place] [address]  12/17/22 9060

## 2022-12-17 NOTE — ED PROVIDER NOTES
[facility] ED     Emergency Department     Faculty Attestation    I performed a history and physical examination of the patient and discussed management with the resident. I reviewed the residents note and agree with the documented findings and plan of care. Any areas of disagreement are noted on the chart. I was personally present for the key portions of any procedures. I have documented in the chart those procedures where I was not present during the key portions. I have reviewed the emergency nurses triage note. I agree with the chief complaint, past medical history, past surgical history, allergies, medications, social and family history as documented unless otherwise noted below. For Physician Assistant/ Nurse Practitioner cases/documentation I have personally evaluated this patient and have completed at least one if not all key elements of the E/M (history, physical exam, and MDM). Additional findings are as noted. Patient presents stating that he has been having thoughts of wanting to hurt himself. He says he has tried to hurt himself in the past but has not tried anything in the last several days. He says he was using cocaine a couple of days ago. He denies any drug use today. He says he has been taking all of his psychiatric medications. He denies any recent illness or injury. On my exam, patient is resting comfortably in the bed and appears well. He is alert and oriented and answering questions appropriately. We will have social work speak with the patient.       Santana Gaines MD  Attending Emergency  Physician            Aileen Ndiaye MD  12/17/22 [phone]

## 2022-12-17 NOTE — ED NOTES
Patient walked out to triage by writer after discharge. Patient was stopped by [facility] to establish car ride over to [facility] for inpatient psych admission.  Patient agreeable with [address], RN  12/17/22 5214

## 2022-12-17 NOTE — DISCHARGE INSTRUCTIONS
Call today or tomorrow to follow up with [facility]  in 2 days. You were seen today for suicidal ideation after using fentanyl. We appreciate you coming in to seek help and have been able to find you placement in a rehabilitation center. Upon discharge from the centers acute female suicidal, homicidal, or have thoughts of harming yourself, please return to the emergency department immediately. Use ibuprofen or Tylenol (unless prescribed medications that have Tylenol in it) for pain. You can take over the counter Ibuprofen (advil) tablets (4 tablets every 8 hours or 3 tablets every 6 hours or 2 tablets every 4 hours)    Return to the emergency department for worsening of pain, fever > 101.5, excessive nausea or vomiting, any other care or concern.

## 2022-12-17 NOTE — ED NOTES
SW met with patient who denies SI/HI to SW presently and states he just wants to get help for his drug usage. Patient states he uses various drugs but mostly crack cocaine and occasional Fentanyl. Patient states it has been 2 days since he used Fentanyl. 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 Patient does not receive SSI benefits at this time. Patient recently at the [facility] from 11/6/2022 to 11/10/2022 and 12/3/22 to 12/7/22. Patient was to follow-up with [facility] for community mental health and did not because he was \"nervous\". Patient states he is taking his Remeron, Atarax, and Trazodone as prescribed. Patient denies having been in any treatment programs previously. Also denies ETOH usage or legal issues. Patient agreeable to going to [facility] for stabilization and admission to their unit. KISHAN called [facility] and they are familiar with patient and agreeable to taking him. Dr. Clau Maldonado updated. Tatiana Holden.  Hilda PondPayne, [place]  12/17/22 3017

## 2022-12-17 NOTE — ED NOTES
Patient with hx depression and SI/plan/attempt in the past presents to ED for mental health evaluation. Patient reports hx drug abuse including fentanyl IV and cocaine - last use of both two days ago. Patient reports suicidal ideation with plans to overdose on drugs. Patient denies homicidal ideation, hallucinations.      Zena Cline RN  12/17/22 3030

## 2022-12-17 NOTE — ED PROVIDER NOTES
[facility]  ED  Emergency Department Encounter  Emergency Medicine Resident     Pt Tanvi Ross  MRN: [id number]  Derek 1990  Date of evaluation: 12/17/22  PCP:  Danelle POWELL      CHIEF COMPLAINT       Chief Complaint   Patient presents with    Suicidal    Mental Health Problem       HISTORY OF PRESENT ILLNESS  (Location/Symptom, Timing/Onset, Context/Setting, Quality, Duration, Modifying Factors, Severity.)      Leodan Yap is a 34 y.o. male who presents with 1 day history of suicidality. Patient states that he has been using crack and fentanyl for the last 10 days, and yesterday after snorting fentanyl he began to feel suicidal while he was high. Patient states he is no longer high and he was no longer suicidal but does want to help. The patient states that they are not homicidal.  This is not his first episode of suicidality, and his last episode was about 10 days ago. He has been to [place] [id number] prior, who have accepted him appetite has been taking him on. Patient states that he takes Atarax, trazodone, and Remeron, for his bipolar disorder and he is compliant with the medication. PAST MEDICAL / SURGICAL / SOCIAL / FAMILY HISTORY      has a past medical history of Alcoholism ([address] Anxiety, Bipolar 1 disorder ([address] Cocaine abuse ([facility] [address] Depression, Hepatitis C antibody test positive, Hypertension, Irregular heartbeat, Mild tetrahydrocannabinol (THC) abuse, and Suicidal ideation. has a past surgical history that includes other surgical history (Right, 06/04/2017); Dialysis fistula creation (Right, 6/4/2017); EXPLORATION OF WOUND OF EXTREMITY (Right, 6/4/2017); and Femur Surgery (Left).       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   Tobacco Use    Smoking status: Every Day     Packs/day: 0.50     Years: 14.00     Pack years: 7.00     Types: Cigarettes    Smokeless tobacco: Never    Tobacco comments:     3 cigs per day per patient   Vaping Use    Vaping Use: Every day   Substance and Sexual Activity    Alcohol use: Not Currently    Drug use: Yes     Types: Marijuana (Gina Liriano), Cocaine     Comment: states uses fentanyl    Sexual activity: Yes     Partners: Female   Other Topics Concern    Not on file   Social History Narrative    ** Merged History Encounter **         Lives with fiance and 3year old daughter     Social Determinants of Health     Financial Resource Strain: Not on file   Food Insecurity: Not on file   Transportation Needs: Not on file   Physical Activity: Not on file   Stress: Not on file   Social Connections: Not on file   Intimate Partner Violence: Not on file   Housing Stability: Not on file       Family History   Problem Relation Age of Onset    Heart Disease Father 52    Asthma Brother     Hypertension Maternal Grandmother     Lung Cancer Maternal Grandmother         Kidney cancer, liver cancer    Stroke Maternal Grandmother     Heart Disease Mother 46         of presumed heart disease in her sleep       Allergies:  Vicodin [hydrocodone-acetaminophen]    Home Medications:  Prior to Admission medications    Medication Sig Start Date End Date Taking?  Authorizing Provider   mirtazapine (REMERON) 7.5 MG tablet Take 1 tablet by mouth nightly 22   Krzysztof Gallardo MD   traZODone (DESYREL) 50 MG tablet Take 1 tablet by mouth nightly as needed for Sleep 22   Krzysztof Gallardo MD   hydroCHLOROthiazide (HYDRODIURIL) 25 MG tablet Take 1 tablet by mouth daily 22   Krzysztof Gallardo MD   hydrOXYzine HCl (ATARAX) 50 MG tablet Take 50 mg by mouth 3 times daily as needed for Anxiety    Historical Provider, MD       REVIEW OF SYSTEMS    (2-9 systems for level 4, 10 or more for level 5) Review of Systems   Constitutional:  Negative for chills, fatigue and fever. HENT:  Negative for sore throat. Eyes:  Negative for visual disturbance. Respiratory:  Negative for chest tightness and shortness of breath. Cardiovascular:  Negative for chest pain. Gastrointestinal:  Negative for abdominal pain. Endocrine: Negative for polydipsia and polyuria. Musculoskeletal:  Negative for back pain. Skin:  Negative for rash. Allergic/Immunologic: Negative for environmental allergies and food allergies. Neurological:  Negative for seizures and syncope. Psychiatric/Behavioral:  Positive for behavioral problems, self-injury and suicidal ideas. PHYSICAL EXAM   (up to 7 for level 4, 8 or more for level 5)      INITIAL VITALS:   BP (!) 142/86   Pulse 95   Temp 98.3 °F (36.8 °C) (Oral)   Resp 16   Ht 5' 10\" (1.778 m)   Wt 180 lb (81.6 kg)   SpO2 97%   BMI 25.83 kg/m²     Physical Exam  Constitutional:       Appearance: He is normal weight. HENT:      Head: Atraumatic. Right Ear: External ear normal.      Left Ear: External ear normal.      Nose: Nose normal.      Mouth/Throat:      Mouth: Mucous membranes are moist.      Pharynx: Oropharynx is clear. Eyes:      Extraocular Movements: Extraocular movements intact. Pupils: Pupils are equal, round, and reactive to light. Cardiovascular:      Rate and Rhythm: Normal rate and regular rhythm. Pulses: Normal pulses. Pulmonary:      Effort: Pulmonary effort is normal. No respiratory distress. Breath sounds: Normal breath sounds. No wheezing. Abdominal:      General: Abdomen is flat. Bowel sounds are normal.      Palpations: Abdomen is soft. Musculoskeletal:      Cervical back: Normal range of motion and neck supple. Skin:     General: Skin is warm. Capillary Refill: Capillary refill takes less than 2 seconds. Neurological:      General: No focal deficit present.       Mental Status: He is alert and oriented to person, place, and time. Psychiatric:         Mood and Affect: Mood is depressed. Affect is blunt and flat. Behavior: Behavior is cooperative. Thought Content: Thought content includes suicidal ideation. Thought content does not include homicidal ideation. Thought content does not include homicidal or suicidal plan. DIFFERENTIAL  DIAGNOSIS     PLAN (LABS / IMAGING / EKG):  Orders Placed This Encounter   Procedures    Inpatient consult to Social Work       MEDICATIONS ORDERED:  No orders of the defined types were placed in this encounter. DDX: Bipolar disorder, suicidal ideation, depression, intoxication    DIAGNOSTIC RESULTS / [facility]   LAB RESULTS:  No results found for this visit on 12/17/22. IMPRESSION: This is a 68-year-old male with a past medical history of bipolar disorder type I, and substance abuse using crack cocaine and fentanyl who presents to the emergency department with suicidality any thoughts of harming themselves. The patient states that they do not want to follow through with these thoughts and would like to be admitted. RADIOLOGY:  No orders to display         EKG  None    All EKG's are interpreted by the Emergency Department Physician who either signs or Co-signs this chart in the absence of a cardiologist.    EMERGENCY DEPARTMENT COURSE:  I spoke with the patient and performed a history and physical examination. Social work has been able to secure than a bed at a detox and rehabilitation center. He will be discharged to that center. No notes of EC Admission Criteria type on file. PROCEDURES:  None    CONSULTS:  IP CONSULT TO SOCIAL WORK    CRITICAL CARE:  None    FINAL IMPRESSION      1.  Suicidal ideation          DISPOSITION / PLAN     DISPOSITION Decision To Discharge 12/17/2022 10:19:47 AM      PATIENT REFERRED TO:  [facility]  NO FAMILY DOC ONLY    In 2 days  As needed    DISCHARGE MEDICATIONS:  Discharge Medication List as of 12/17/2022 10:21 AM          Sukhjinder El MD  Emergency Medicine Resident    (Please note that portions of thisnote were completed with a voice recognition program.  Efforts were made to edit the dictations but occasionally words are mis-transcribed.)       Sukhjinder El MD  Resident  12/17/22 1031       Sukhjinder El MD  Resident  12/19/22 1229       Sukhjinder El MD  Resident  12/20/22 1675

## 2023-02-01 ENCOUNTER — HOSPITAL ENCOUNTER (INPATIENT)
Age: 33
LOS: 1 days | Discharge: OTHER INSTITUTION | DRG: 817 | End: 2023-02-02
Attending: EMERGENCY MEDICINE | Admitting: INTERNAL MEDICINE
Payer: MEDICAID

## 2023-02-01 ENCOUNTER — APPOINTMENT (OUTPATIENT)
Dept: GENERAL RADIOLOGY | Age: 33
DRG: 817 | End: 2023-02-01
Payer: MEDICAID

## 2023-02-01 DIAGNOSIS — T40.602A INTENTIONAL OPIATE OVERDOSE, INITIAL ENCOUNTER (HCC): Primary | ICD-10-CM

## 2023-02-01 DIAGNOSIS — N17.9 AKI (ACUTE KIDNEY INJURY) (HCC): ICD-10-CM

## 2023-02-01 DIAGNOSIS — E87.6 HYPOKALEMIA: ICD-10-CM

## 2023-02-01 PROBLEM — T40.412A: Status: ACTIVE | Noted: 2023-02-01

## 2023-02-01 LAB
ABSOLUTE EOS #: 0.11 K/UL (ref 0–0.44)
ABSOLUTE IMMATURE GRANULOCYTE: 0.03 K/UL (ref 0–0.3)
ABSOLUTE LYMPH #: 1.8 K/UL (ref 1.1–3.7)
ABSOLUTE MONO #: 0.63 K/UL (ref 0.1–1.2)
ACETAMINOPHEN LEVEL: <5 UG/ML (ref 10–30)
ALBUMIN SERPL-MCNC: 4.3 G/DL (ref 3.5–5.2)
ALBUMIN/GLOBULIN RATIO: 1.5 (ref 1–2.5)
ALP SERPL-CCNC: 68 U/L (ref 40–129)
ALT SERPL-CCNC: 143 U/L (ref 5–41)
AMPHETAMINE SCREEN URINE: NEGATIVE
ANION GAP SERPL CALCULATED.3IONS-SCNC: 17 MMOL/L (ref 9–17)
ANION GAP SERPL CALCULATED.3IONS-SCNC: 9 MMOL/L (ref 9–17)
AST SERPL-CCNC: 129 U/L
BARBITURATE SCREEN URINE: NEGATIVE
BASOPHILS # BLD: 0 % (ref 0–2)
BASOPHILS ABSOLUTE: <0.03 K/UL (ref 0–0.2)
BENZODIAZEPINE SCREEN, URINE: NEGATIVE
BILIRUB SERPL-MCNC: 0.6 MG/DL (ref 0.3–1.2)
BUN SERPL-MCNC: 12 MG/DL (ref 6–20)
BUN SERPL-MCNC: 24 MG/DL (ref 6–20)
CALCIUM SERPL-MCNC: 7.7 MG/DL (ref 8.6–10.4)
CALCIUM SERPL-MCNC: 8.4 MG/DL (ref 8.6–10.4)
CANNABINOID SCREEN URINE: POSITIVE
CHLORIDE SERPL-SCNC: 108 MMOL/L (ref 98–107)
CHLORIDE SERPL-SCNC: 98 MMOL/L (ref 98–107)
CK SERPL-CCNC: 2990 U/L (ref 39–308)
CO2 SERPL-SCNC: 20 MMOL/L (ref 20–31)
CO2 SERPL-SCNC: 22 MMOL/L (ref 20–31)
COCAINE METABOLITE, URINE: POSITIVE
CREAT SERPL-MCNC: 0.78 MG/DL (ref 0.7–1.2)
CREAT SERPL-MCNC: 1.29 MG/DL (ref 0.7–1.2)
EOSINOPHILS RELATIVE PERCENT: 1 % (ref 1–4)
ETHANOL PERCENT: <0.01 %
ETHANOL: <10 MG/DL
FENTANYL URINE: POSITIVE
GFR SERPL CREATININE-BSD FRML MDRD: >60 ML/MIN/1.73M2
GFR SERPL CREATININE-BSD FRML MDRD: >60 ML/MIN/1.73M2
GLUCOSE SERPL-MCNC: 116 MG/DL (ref 70–99)
GLUCOSE SERPL-MCNC: 116 MG/DL (ref 70–99)
HAV IGM SER QL: NONREACTIVE
HBV CORE IGM SER QL: NONREACTIVE
HBV SURFACE AG SER QL: NONREACTIVE
HCT VFR BLD AUTO: 39.1 % (ref 40.7–50.3)
HCV AB SER QL: REACTIVE
HGB BLD-MCNC: 13 G/DL (ref 13–17)
IMMATURE GRANULOCYTES: 0 %
LYMPHOCYTES # BLD: 24 % (ref 24–43)
MCH RBC QN AUTO: 29.8 PG (ref 25.2–33.5)
MCHC RBC AUTO-ENTMCNC: 33.2 G/DL (ref 28.4–34.8)
MCV RBC AUTO: 89.7 FL (ref 82.6–102.9)
METHADONE SCREEN, URINE: NEGATIVE
MONOCYTES # BLD: 8 % (ref 3–12)
MYOGLOBIN SERPL-MCNC: 576 NG/ML (ref 28–72)
NRBC AUTOMATED: 0.3 PER 100 WBC
OPIATES, URINE: NEGATIVE
OXYCODONE SCREEN URINE: NEGATIVE
PDW BLD-RTO: 12.2 % (ref 11.8–14.4)
PHENCYCLIDINE, URINE: NEGATIVE
PLATELET # BLD AUTO: 262 K/UL (ref 138–453)
PMV BLD AUTO: 8.5 FL (ref 8.1–13.5)
POTASSIUM SERPL-SCNC: 3.2 MMOL/L (ref 3.7–5.3)
POTASSIUM SERPL-SCNC: 4.3 MMOL/L (ref 3.7–5.3)
PROT SERPL-MCNC: 7.2 G/DL (ref 6.4–8.3)
RBC # BLD: 4.36 M/UL (ref 4.21–5.77)
SALICYLATE LEVEL: <1 MG/DL (ref 3–10)
SEG NEUTROPHILS: 67 % (ref 36–65)
SEGMENTED NEUTROPHILS ABSOLUTE COUNT: 5.05 K/UL (ref 1.5–8.1)
SODIUM SERPL-SCNC: 137 MMOL/L (ref 135–144)
SODIUM SERPL-SCNC: 137 MMOL/L (ref 135–144)
TEST INFORMATION: ABNORMAL
TOXIC TRICYCLIC SC,BLOOD: NEGATIVE
TSH SERPL-ACNC: 1.85 UIU/ML (ref 0.3–5)
WBC # BLD AUTO: 7.6 K/UL (ref 3.5–11.3)

## 2023-02-01 PROCEDURE — 80048 BASIC METABOLIC PNL TOTAL CA: CPT

## 2023-02-01 PROCEDURE — 85025 COMPLETE CBC W/AUTO DIFF WBC: CPT

## 2023-02-01 PROCEDURE — 2580000003 HC RX 258

## 2023-02-01 PROCEDURE — 6360000002 HC RX W HCPCS: Performed by: EMERGENCY MEDICINE

## 2023-02-01 PROCEDURE — 99291 CRITICAL CARE FIRST HOUR: CPT | Performed by: INTERNAL MEDICINE

## 2023-02-01 PROCEDURE — 96375 TX/PRO/DX INJ NEW DRUG ADDON: CPT

## 2023-02-01 PROCEDURE — 80307 DRUG TEST PRSMV CHEM ANLYZR: CPT

## 2023-02-01 PROCEDURE — 80053 COMPREHEN METABOLIC PANEL: CPT

## 2023-02-01 PROCEDURE — G0480 DRUG TEST DEF 1-7 CLASSES: HCPCS

## 2023-02-01 PROCEDURE — 80143 DRUG ASSAY ACETAMINOPHEN: CPT

## 2023-02-01 PROCEDURE — 6370000000 HC RX 637 (ALT 250 FOR IP): Performed by: EMERGENCY MEDICINE

## 2023-02-01 PROCEDURE — 2580000003 HC RX 258: Performed by: EMERGENCY MEDICINE

## 2023-02-01 PROCEDURE — 6360000002 HC RX W HCPCS

## 2023-02-01 PROCEDURE — 71045 X-RAY EXAM CHEST 1 VIEW: CPT

## 2023-02-01 PROCEDURE — 2000000000 HC ICU R&B

## 2023-02-01 PROCEDURE — 82550 ASSAY OF CK (CPK): CPT

## 2023-02-01 PROCEDURE — 6370000000 HC RX 637 (ALT 250 FOR IP): Performed by: STUDENT IN AN ORGANIZED HEALTH CARE EDUCATION/TRAINING PROGRAM

## 2023-02-01 PROCEDURE — 84443 ASSAY THYROID STIM HORMONE: CPT

## 2023-02-01 PROCEDURE — 99285 EMERGENCY DEPT VISIT HI MDM: CPT

## 2023-02-01 PROCEDURE — 94761 N-INVAS EAR/PLS OXIMETRY MLT: CPT

## 2023-02-01 PROCEDURE — 6360000002 HC RX W HCPCS: Performed by: INTERNAL MEDICINE

## 2023-02-01 PROCEDURE — 2580000003 HC RX 258: Performed by: INTERNAL MEDICINE

## 2023-02-01 PROCEDURE — 80074 ACUTE HEPATITIS PANEL: CPT

## 2023-02-01 PROCEDURE — 80179 DRUG ASSAY SALICYLATE: CPT

## 2023-02-01 PROCEDURE — 83874 ASSAY OF MYOGLOBIN: CPT

## 2023-02-01 PROCEDURE — 87641 MR-STAPH DNA AMP PROBE: CPT

## 2023-02-01 PROCEDURE — 36415 COLL VENOUS BLD VENIPUNCTURE: CPT

## 2023-02-01 PROCEDURE — 2700000000 HC OXYGEN THERAPY PER DAY

## 2023-02-01 PROCEDURE — 93005 ELECTROCARDIOGRAM TRACING: CPT

## 2023-02-01 PROCEDURE — 96376 TX/PRO/DX INJ SAME DRUG ADON: CPT

## 2023-02-01 PROCEDURE — 96374 THER/PROPH/DIAG INJ IV PUSH: CPT

## 2023-02-01 RX ORDER — NALOXONE HYDROCHLORIDE 1 MG/ML
1 INJECTION INTRAMUSCULAR; INTRAVENOUS; SUBCUTANEOUS ONCE
Status: COMPLETED | OUTPATIENT
Start: 2023-02-01 | End: 2023-02-01

## 2023-02-01 RX ORDER — ONDANSETRON 4 MG/1
4 TABLET, ORALLY DISINTEGRATING ORAL EVERY 8 HOURS PRN
Status: DISCONTINUED | OUTPATIENT
Start: 2023-02-01 | End: 2023-02-02 | Stop reason: HOSPADM

## 2023-02-01 RX ORDER — SODIUM CHLORIDE 0.9 % (FLUSH) 0.9 %
5-40 SYRINGE (ML) INJECTION PRN
Status: DISCONTINUED | OUTPATIENT
Start: 2023-02-01 | End: 2023-02-02 | Stop reason: HOSPADM

## 2023-02-01 RX ORDER — ONDANSETRON 2 MG/ML
4 INJECTION INTRAMUSCULAR; INTRAVENOUS ONCE
Status: COMPLETED | OUTPATIENT
Start: 2023-02-01 | End: 2023-02-01

## 2023-02-01 RX ORDER — SODIUM CHLORIDE 0.9 % (FLUSH) 0.9 %
5-40 SYRINGE (ML) INJECTION EVERY 12 HOURS SCHEDULED
Status: DISCONTINUED | OUTPATIENT
Start: 2023-02-01 | End: 2023-02-02 | Stop reason: HOSPADM

## 2023-02-01 RX ORDER — ONDANSETRON 2 MG/ML
4 INJECTION INTRAMUSCULAR; INTRAVENOUS EVERY 6 HOURS PRN
Status: DISCONTINUED | OUTPATIENT
Start: 2023-02-01 | End: 2023-02-02 | Stop reason: HOSPADM

## 2023-02-01 RX ORDER — SODIUM CHLORIDE 9 MG/ML
INJECTION, SOLUTION INTRAVENOUS PRN
Status: DISCONTINUED | OUTPATIENT
Start: 2023-02-01 | End: 2023-02-02 | Stop reason: HOSPADM

## 2023-02-01 RX ORDER — POTASSIUM CHLORIDE 7.45 MG/ML
10 INJECTION INTRAVENOUS
Status: COMPLETED | OUTPATIENT
Start: 2023-02-01 | End: 2023-02-01

## 2023-02-01 RX ORDER — POLYETHYLENE GLYCOL 3350 17 G/17G
17 POWDER, FOR SOLUTION ORAL DAILY PRN
Status: DISCONTINUED | OUTPATIENT
Start: 2023-02-01 | End: 2023-02-02 | Stop reason: HOSPADM

## 2023-02-01 RX ORDER — ACETAMINOPHEN 650 MG/1
650 SUPPOSITORY RECTAL EVERY 6 HOURS PRN
Status: DISCONTINUED | OUTPATIENT
Start: 2023-02-01 | End: 2023-02-02 | Stop reason: HOSPADM

## 2023-02-01 RX ORDER — ACETAMINOPHEN 325 MG/1
650 TABLET ORAL EVERY 6 HOURS PRN
Status: DISCONTINUED | OUTPATIENT
Start: 2023-02-01 | End: 2023-02-02 | Stop reason: HOSPADM

## 2023-02-01 RX ORDER — CLONIDINE HYDROCHLORIDE 0.1 MG/1
0.1 TABLET ORAL 2 TIMES DAILY
Status: DISCONTINUED | OUTPATIENT
Start: 2023-02-01 | End: 2023-02-02 | Stop reason: HOSPADM

## 2023-02-01 RX ORDER — 0.9 % SODIUM CHLORIDE 0.9 %
1000 INTRAVENOUS SOLUTION INTRAVENOUS ONCE
Status: COMPLETED | OUTPATIENT
Start: 2023-02-01 | End: 2023-02-01

## 2023-02-01 RX ORDER — SODIUM CHLORIDE 9 MG/ML
INJECTION, SOLUTION INTRAVENOUS CONTINUOUS
Status: DISCONTINUED | OUTPATIENT
Start: 2023-02-01 | End: 2023-02-02

## 2023-02-01 RX ORDER — NALOXONE HYDROCHLORIDE 0.4 MG/ML
0.4 INJECTION, SOLUTION INTRAMUSCULAR; INTRAVENOUS; SUBCUTANEOUS ONCE
Status: COMPLETED | OUTPATIENT
Start: 2023-02-01 | End: 2023-02-01

## 2023-02-01 RX ORDER — LORAZEPAM 2 MG/ML
1 INJECTION INTRAMUSCULAR EVERY 6 HOURS PRN
Status: DISCONTINUED | OUTPATIENT
Start: 2023-02-01 | End: 2023-02-02

## 2023-02-01 RX ORDER — ENOXAPARIN SODIUM 100 MG/ML
40 INJECTION SUBCUTANEOUS DAILY
Status: DISCONTINUED | OUTPATIENT
Start: 2023-02-01 | End: 2023-02-02 | Stop reason: HOSPADM

## 2023-02-01 RX ADMIN — SODIUM CHLORIDE 1000 ML: 9 INJECTION, SOLUTION INTRAVENOUS at 05:23

## 2023-02-01 RX ADMIN — ENOXAPARIN SODIUM 40 MG: 100 INJECTION SUBCUTANEOUS at 08:29

## 2023-02-01 RX ADMIN — SODIUM CHLORIDE: 9 INJECTION, SOLUTION INTRAVENOUS at 06:48

## 2023-02-01 RX ADMIN — NALOXONE HYDROCHLORIDE 1 MG: 1 INJECTION PARENTERAL at 04:37

## 2023-02-01 RX ADMIN — NALOXONE HYDROCHLORIDE 1 MG: 1 INJECTION PARENTERAL at 02:43

## 2023-02-01 RX ADMIN — POTASSIUM BICARBONATE 40 MEQ: 782 TABLET, EFFERVESCENT ORAL at 04:37

## 2023-02-01 RX ADMIN — POTASSIUM CHLORIDE 10 MEQ: 7.46 INJECTION, SOLUTION INTRAVENOUS at 08:13

## 2023-02-01 RX ADMIN — SODIUM CHLORIDE 1000 ML: 9 INJECTION, SOLUTION INTRAVENOUS at 04:01

## 2023-02-01 RX ADMIN — POTASSIUM CHLORIDE 10 MEQ: 7.46 INJECTION, SOLUTION INTRAVENOUS at 07:01

## 2023-02-01 RX ADMIN — NALOXONE HYDROCHLORIDE 0.6 MG/HR: 1 INJECTION PARENTERAL at 12:54

## 2023-02-01 RX ADMIN — ONDANSETRON 4 MG: 2 INJECTION INTRAMUSCULAR; INTRAVENOUS at 02:18

## 2023-02-01 RX ADMIN — NALOXONE HYDROCHLORIDE 0.4 MG/HR: 1 INJECTION PARENTERAL at 05:44

## 2023-02-01 RX ADMIN — SODIUM CHLORIDE, PRESERVATIVE FREE 10 ML: 5 INJECTION INTRAVENOUS at 08:29

## 2023-02-01 RX ADMIN — SODIUM CHLORIDE: 9 INJECTION, SOLUTION INTRAVENOUS at 15:50

## 2023-02-01 RX ADMIN — CLONIDINE HYDROCHLORIDE 0.1 MG: 0.1 TABLET ORAL at 20:50

## 2023-02-01 RX ADMIN — SODIUM CHLORIDE 1000 ML: 9 INJECTION, SOLUTION INTRAVENOUS at 02:18

## 2023-02-01 RX ADMIN — NALOXONE HYDROCHLORIDE 0.4 MG: 0.4 INJECTION, SOLUTION INTRAMUSCULAR; INTRAVENOUS; SUBCUTANEOUS at 02:18

## 2023-02-01 ASSESSMENT — PAIN SCALES - GENERAL
PAINLEVEL_OUTOF10: 7
PAINLEVEL_OUTOF10: 10

## 2023-02-01 ASSESSMENT — PAIN DESCRIPTION - DESCRIPTORS: DESCRIPTORS: SHOOTING

## 2023-02-01 ASSESSMENT — PAIN DESCRIPTION - PAIN TYPE: TYPE: ACUTE PAIN

## 2023-02-01 ASSESSMENT — PAIN DESCRIPTION - FREQUENCY: FREQUENCY: CONTINUOUS

## 2023-02-01 ASSESSMENT — PAIN DESCRIPTION - ONSET: ONSET: ON-GOING

## 2023-02-01 ASSESSMENT — PAIN DESCRIPTION - LOCATION
LOCATION: LEG;CHEST
LOCATION: LEG

## 2023-02-01 NOTE — PLAN OF CARE
Problem: Self Harm/Suicidality  Goal: Will have no self-injury during hospital stay  Description: INTERVENTIONS:  1. Ensure constant observer at bedside with Q15M safety checks  2. Maintain a safe environment  3. Secure patient belongings  4. Ensure family/visitors adhere to safety recommendations  5. Ensure safety tray has been added to patient's diet order  6.   Every shift and PRN: Re-assess suicidal risk via Frequent Screener    2/1/2023 0857 by Rabia Coleman RN  Outcome: Progressing  2/1/2023 0856 by Rabia Coleman RN  Outcome: Progressing

## 2023-02-01 NOTE — PROGRESS NOTES
Pt admitted to room 3020. All belongings brought up from ED. Pt belongings include 2 coats, 1 tshirt, bag of skittles, axe body spray, wallet with venmo card, [employer] card, and $1.50, chick ivana A card, phone, 2 lighters, boots, belt and jeans, socks, underwear, shorts, and 2 bracelets. All belongings collected and secured per suicide precautions order.

## 2023-02-01 NOTE — ED NOTES
SW made attempt to meet with the patient for a second time. Patient still unable to stay away. Patient does still admit to suicidal thoughts no plan. Patient reports again that his overdose was accidental. After discuss with ER Resident, the patient will be admitted to the [address] [place] [address] recommended patient has a telepsyc when on the floor for psychiatric recommendations.       Aster Loja [facility]  02/01/23 8470
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
## 2023-02-01 NOTE — ED NOTES
Pt presents to the ED via ambulatory to room voluntary with complaints of SI and states he overdosed on fentanyl and was given narcan pta by unknown person   Pt states he snorted it but unsure exact time   Pt states he is suicidal but without a specific plan   Pt denies HI  Pt lethargic upon arrival  Pt changed out by [employer] police, belongings checked and locked up  Pt placed on cont cardiac monitor, ekg completed, iv established, labs drawn, labeled and will send to lab via orders Pt alert  respirations even and unlabored. Pt acting age appropriate.  White board updated, will continue to plan of care            Ag Alvarez RN  02/01/23 [id number]

## 2023-02-01 NOTE — ED PROVIDER NOTES
[facility]  Emergency Department Encounter  Emergency Medicine Resident     Pt Albert Fernandez  MRN: [id number]  [place] 1990  Date of evaluation: 2/1/23  PCP:  No primary care provider on file. Note Started: 3:03 AM EST      CHIEF COMPLAINT       Chief Complaint   Patient presents with    Suicidal    Drug Overdose       HISTORY OF PRESENT ILLNESS  (Location/Symptom, Timing/Onset, Context/Setting, Quality, Duration, Modifying Factors, Severity.)      Esequiel Lopez is a 34 y.o. male who presents with suicidal ideation. The patient states he is suicidal without a plan. Additionally, the patient states he snorted fentanyl at an unknown time yesterday. He states he thinks he was given Narcan, however he does not know who gave him Narcan. He presents now with suicidal ideation. He is intermittently drowsy but arousable, poorly answering questions. PAST MEDICAL / SURGICAL / SOCIAL / FAMILY HISTORY      has a past medical history of Alcoholism ([facility] [address] Anxiety, [facility] Cocaine abuse ([facility] Depression, Hepatitis C antibody test positive, Hypertension, Irregular heartbeat, Mild tetrahydrocannabinol (THC) abuse, and Suicidal ideation. has a past surgical history that includes other surgical history (Right, 06/04/2017); Dialysis fistula creation (Right, 6/4/2017); EXPLORATION OF WOUND OF EXTREMITY (Right, 6/4/2017); and Femur Surgery (Left).       Social History     Socioeconomic History    Marital status: Single     Spouse name: magalys    Number of children: 1    Years of education: Not on file    Highest education level: Not on file   Occupational History    Occupation: unemployed     Comment: used to work at [employer] 6 months ago   Tobacco Use    Smoking status: Every Day     Packs/day: 0.50     Years: 14.00     Pack years: 7.00     Types: Cigarettes    Smokeless tobacco: Never    Tobacco comments:     3 cigs per day per patient   Vaping Use    Vaping Use: Every day Substance and Sexual Activity    Alcohol use: Not Currently    Drug use: Yes     Types: Marijuana Kenard Snooks), Cocaine     Comment: states uses fentanyl    Sexual activity: Yes     Partners: Female   Other Topics Concern    Not on file   Social History Narrative    ** Merged History Encounter **         Lives with fivishnu and 3year old daughter     Social Determinants of Health     Financial Resource Strain: Not on file   Food Insecurity: Not on file   Transportation Needs: Not on file   Physical Activity: Not on file   Stress: Not on file   Social Connections: Not on file   Intimate Partner Violence: Not on file   Housing Stability: Not on file       Family History   Problem Relation Age of Onset    Heart Disease Father 52    Asthma Brother     Hypertension Maternal Grandmother     Lung Cancer Maternal Grandmother         Kidney cancer, liver cancer    Stroke Maternal Grandmother     Heart Disease Mother 46         of presumed heart disease in her sleep       Allergies:  Vicodin [hydrocodone-acetaminophen]    Home Medications:  Prior to Admission medications    Medication Sig Start Date End Date Taking? Authorizing Provider   mirtazapine (REMERON) 7.5 MG tablet Take 1 tablet by mouth nightly 22   Iram Abdi MD   traZODone (DESYREL) 50 MG tablet Take 1 tablet by mouth nightly as needed for Sleep 22   Iram Abdi MD   hydroCHLOROthiazide (HYDRODIURIL) 25 MG tablet Take 1 tablet by mouth daily 22   Iram Abdi MD   hydrOXYzine HCl (ATARAX) 50 MG tablet Take 50 mg by mouth 3 times daily as needed for Anxiety    Historical Provider, MD       REVIEW OF SYSTEMS       Review of Systems   Unable to perform ROS: Mental status change     PHYSICAL EXAM      INITIAL VITALS:   /88   Pulse 77   Temp 98.6 °F (37 °C) (Oral)   Resp 24   Ht 5' 10\" (1.778 m)   Wt 201 lb 15.1 oz (91.6 kg)   SpO2 97%   BMI 28.98 kg/m²     Physical Exam  Vitals reviewed.    Constitutional: General: He is not in acute distress. Appearance: Normal appearance. He is not toxic-appearing. Comments: Drowsy appearing, arousable but quick to resume sleeping. HENT:      Head: Normocephalic and atraumatic. Nose: No congestion or rhinorrhea. Mouth/Throat:      Mouth: Mucous membranes are moist.   Eyes:      Conjunctiva/sclera: Conjunctivae normal.      Pupils: Pupils are equal, round, and reactive to light. Cardiovascular:      Rate and Rhythm: Normal rate and regular rhythm. Pulses: Normal pulses. Heart sounds: No murmur heard. Pulmonary:      Effort: Pulmonary effort is normal. No respiratory distress. Breath sounds: Normal breath sounds. No wheezing. Abdominal:      General: Bowel sounds are normal. There is no distension. Palpations: Abdomen is soft. Tenderness: There is no abdominal tenderness. There is no guarding or rebound. Musculoskeletal:      Right lower leg: No edema. Left lower leg: No edema. Skin:     General: Skin is warm and dry. Psychiatric:         Mood and Affect: Mood normal.         Behavior: Behavior normal.         Judgment: Judgment normal.         DDX/DIAGNOSTIC RESULTS / EMERGENCY DEPARTMENT COURSE / MDM     Medical Decision Making  65-year-old male who presents with concerns for suicidality. Additionally, patient states he overdosed on fentanyl. Patient states he received Narcan prior to arrival.  On exam, he is tachycardic to the 130s, vital signs otherwise stable. Patient is drowsy, but arousable. The patient is quite tachycardic, will give IV fluids as well as obtain an EKG. Will obtain psychiatric admission labs including serum ethanol, CBC, CMP, UDS. Additionally, we will give the patient 0.4 of Narcan as he continues to be somnolent. Amount and/or Complexity of Data Reviewed  Labs: ordered. Decision-making details documented in ED Course. Radiology: ordered.   ECG/medicine tests: ordered. Risk  Prescription drug management. Decision regarding hospitalization. Critical Care  Total time providing critical care: < 30 minutes      EKG  Sinus tach, heart rate 134, no ischemia, PAC, normal axis, QT corrected 456    All EKG's are interpreted by the Emergency Department Physician who either signs or Co-signs this chart in the absence of a cardiologist.    EMERGENCY DEPARTMENT COURSE:    ED Course as of 02/01/23 0654   Wed Feb 01, 2023   0450 Patient has now received 2.4 mg total of Narcan from us in the department. Patient continues to be somnolent and drowsy. Patient is arousable, however is quick to sleep again. On questioning, patient reports he uses fentanyl every other day. He states today, he used significantly more than usual in an attempt to harm himself. At this time, will initiate Narcan drip and admit the patient to the medicine service for further management. [BL]   [id number] Spoke with hospitalist service who states Narcan drips are not able to be admitted to stepdown. Patient requiring ICU. We will reach out to critical care for admission. [BL]   0521 Creatinine(!): 1.29 [BL]   0522 Potassium(!): 3.2 [BL]   0522 Cannabinoid Scrn, Ur(!): POSITIVE [BL]   0522 Fentanyl, Ur(!): POSITIVE [BL]   0522 Cocaine Metabolite, Urine(!): POSITIVE [BL]   0522 AST(!): 129 [BL]   0522 ALT(!): 143 [BL]   0522 Patient's urine drug screen positive for cocaine, cannabis, fentanyl. Additionally patient noted to have a elevation in his AST/ALT from his previous readings. Patient is not having any abdominal pain at this time. Of note, patient does have a history of elevated LFTs as high as 180. Patient has elevated creatinine 1.29, last measure 1.1, however previous were as low as 0.8. Patient has been given 2 L of IV fluids.  [BL]      ED Course User Index  [BL] Alec Abad DO       PROCEDURES:  N/A    CONSULTS:  IP CONSULT TO CRITICAL CARE    CRITICAL CARE:  There was significant risk of life threatening deterioration of patient's condition requiring my direct management. Critical care time 15 minutes, excluding any documented procedures. FINAL IMPRESSION      1. Intentional opiate overdose, initial encounter ([facility] [address]    2. Hypokalemia    3. TEJAS (acute kidney injury) ([facility] [address]          DISPOSITION / PLAN     DISPOSITION Admitted 02/01/2023 05:29:40 AM      PATIENT REFERRED TO:  No follow-up provider specified.     DISCHARGE MEDICATIONS:  Current Discharge Medication List          Ayesha Acharya DO  Emergency Medicine Resident    (Please note that portions of thisnote were completed with a voice recognition program.  Efforts were made to edit the dictations but occasionally words are mis-transcribed.)        Ayesha Acharya DO  Resident  02/01/23 [id number]

## 2023-02-01 NOTE — ED PROVIDER NOTES
[facility]     Emergency Department     Faculty Attestation    I performed a history and physical examination of the patient and discussed management with the resident. I have reviewed and agree with the residents findings including all diagnostic interpretations, and treatment plans as written. Any areas of disagreement are noted on the chart. I was personally present for the key portions of any procedures. I have documented in the chart those procedures where I was not present during the key portions. I have reviewed the emergency nurses triage note. I agree with the chief complaint, past medical history, past surgical history, allergies, medications, social and family history as documented unless otherwise noted below. Documentation of the HPI, Physical Exam and Medical Decision Making performed by natashaibozzy is based on my personal performance of the HPI, PE and MDM. For Physician Assistant/ Nurse Practitioner cases/documentation I have personally evaluated this patient and have completed at least one if not all key elements of the E/M (history, physical exam, and MDM). Additional findings are as noted. 35 yo M c/o using fentanyl & waking up sweaty, feeling depressed but not currently suicidal, no plan to harm self at this time,   No injury, no vomit,   PE airway intact, flat affect, herb, no cervical tenderness, crepitus or deformity, chest non tender & symmetric, abdomen non tender, no distension, no rigidity,     -needing repeat narcan due to persistent somnolence,   Narcan gtt / admit    EKG Interpretation    Interpreted by me  Sinus tach, heart rate 134, no ischemia, PAC, normal axis, QT corrected 456    CRITICAL CARE: There was a high probability of clinically significant/life threatening deterioration in this patient's condition which required my urgent intervention. Total critical care time was 10 minutes.   This excludes any time for separately reportable procedures.        [place], DO  02/01/23 [id number] Monie Morgan, DO  02/01/23 [id number] Monie Morgan, DO  02/01/23 413 Bernice Morgan Ne, DO  02/01/23 2222

## 2023-02-01 NOTE — ED NOTES
[] Pasquale    [] [address]    [x]  One [facility] ASSESSMENT      Y  N     [x] [] In the past two weeks have you had thoughts of hurting yourself in any way? [x] [] In the past two weeks have you had thoughts that you would be better off dead? [] [x] Have you made a suicide attempt in the past two months? [] [x] Do you have a plan for hurting yourself or suicide? [] [x] Presence of hallucinations/voices related to hurting himself or herself or someone else. SUICIDE/SECURITY WATCH PRECAUTION CHECKLIST     Orders    [x]  Suicide/Security Watch Precautions initiated as checked below:   2/1/23 2:58 AM EST 02/02    [x] Notified physician:  Jean Pierre Gaming DO  2/1/23 2:58 AM EST    [x] Orders obtained as appropriate:     [] 1:1 Observer     [] Psych Consult     [] Psych Consult    Name:  Date:  Time:    [x] 1:1 Observer, Notified by:  Sandra Bowles RN    Contact Nurse Supervisor    [x] Remove all personal clothes from room and place in snap/paper gown/pants. Slipper only    [] Remove all personal belongings from room and secured away from patient. Documentation    [x] Initiate Suicide/Security Watch Precaution Flow Sheet    [x] Initiate individualized Care Plan/Problem    [x] Document why precautions initiated on flow sheet (Initiate Nursing Care Plan/Problem)    [x] 1:1 Observer in place; instructions provided. Suicide precautions require observer be within arms length. [x] Nurse-Observer Communication Hand-off initiated by RN, reviewed with Observer. Subsequently used as Hand Off between Observers. [x] Initiate every 15 minute observations per observer as delegated by the RN.     [x] Initiate RN assessment and documentation    Environmental Scan  Search Criteria and Process: OPTIONAL, see Search Policy    [x] Reason for search:SI    [x] Nursing in presence of second person to search patient    [x] Patient notified of reason for body assessment and belongings search:     Persons present during search: SI   Results of search and disposition:       Searchers Name: Nohelia Pradhanmarlin BONILLA    These items or items similar should be removed from the room:   [x] Chairs   [x] Telephone   [x] Trash cans and liners   [x] Plastic utensils (order Patient Safety tray)   [x] Empty or remove Sharps containers   [x] All personal clothing/belongings removed   [x] All unnecessary lead wires, electrical cords, draw cords, etc.   [x] Flowers and plants   [x] Double check for lighters, matches, razors, any glass items etc that can be used as weapons. Person completing Checklist: Tahira Lopez RN       GENERAL INFORMATION     Y  N     [x] [] Has the patient been informed that they are on a watch and what that means? [] [x] Can the patient get out of Bed without nursing assistance? [] [x] Can the patient use the restroom without nursing assistance? [] [x] Can the patient walk the halls to [place] their legs? \"   [] [x] Does the patient have metal utensils? [x] [] Have the patient's belongings been placed out of control of the patient? [x] [x] Have the patient and his/her belongings been checked for contraband? [x] [] Is the patient under any visitor restrictions? If Yes, explain:SI   [] [x] Is the patient under an alias? [facility] Name:   Authorized visitors (no more than two are to be on the list)   Name/Relationship:   Name/Relationship:    Name of Staff member that you  Received this information from?:MICHELLE BORGES RN    General Description:    Abraham Mcgregor [birth year] male 34 y.o. Admission weight:      Race: []  [] Black  []   []   [] [place] [] Other  Facial Hair:  [] Yes  [] No  If yes, please describe: Identifying Marks (i.e. Visible tattoos, scars, etc... ):     NURSING CARE PLAN    Nursing Diagnosis: Risk of Self Directed Harm  [x] Actual  [] Potential  Date Started:2/1/23  Etiological Factors: (related to)  [] Expressed or implied suicidal ideation/behavior  [] Depression  [] Suicide attempt      [] Low self-esteem  [] Hallucinations      [] Feeling of Hopelessness  [] Substance abuse or withdrawal    [] Dysfunctional family  [] Major traumatic event, eg., divorce, etc   [] Excessive stress/anxiety    2/1/23    Expected Outcomes    Patient will:   [] Patient will remain safe for the duration of their stay   [] Patient's environment will be safe, eg. Free of potential suicide weapons   [] Verbalize Recovery from suicidal episode and improvement in self-worth   [] Discuss feeling that precipitated suicide attempt/thoughts/behavior   [] Will describe available resources for crisis prevention and management   [] Will verbalize positive coping skills     Nursing Intervention   [x] Assessment and Observations hourly   [x] Suicide Precautions implemented with patient, should be 1:1 observation   [x] Document observation [id number] and RN assessment hourly   [] Consult physician for:    [] Psychiatric consult    [] Pharmacological therapy    [] Other:    [x] Patient search completed by security   [x] Initiated appropriate safety protocols by removing from the patient's environment anything that could be used to inflict self injury, eg. Order safe tray, snap gown, etc   [x] Maintain open, warm, caring, non-judgmental attitude/manner towards patient   [] Discuss advantages and disadvantages of existing coping methods/skills   [x] Assist and educate patient with identifying present strengths and coping skills   [x] Keep patient informed regarding plan of care and provide clear concise explanations. Provide the patient/family education information as well as telephone numbers and other information about crisis centers, hot lines, and counselors.     Discharge Planning:   [] Referral  [] Groups [] Health agencies  [] Other:            Savage Schuler RN  02/01/23 0104

## 2023-02-01 NOTE — ED NOTES
The patient is a 34year old male that came to the ED today due to a possible fentanyl overdose and feeling depressed. Patient reports that he was using fentanyl today and feel asleep. Patient reports that he woke up sweating and then came to the ED. Patient unsure if he was given narcan. Patient reports that he is depressed. Patient denied that this overdose was intentional. Patient denied any suicidal plans or intention of self harm. SW unable to gather any more information at this time. Will complete assessment when patient is cleared.       Kathie Sparrow [facility]  02/01/23 2061

## 2023-02-02 ENCOUNTER — HOSPITAL ENCOUNTER (INPATIENT)
Age: 33
LOS: 3 days | Discharge: HOME | DRG: 751 | End: 2023-02-05
Attending: PSYCHIATRY & NEUROLOGY | Admitting: PSYCHIATRY & NEUROLOGY
Payer: MEDICAID

## 2023-02-02 VITALS
HEART RATE: 52 BPM | OXYGEN SATURATION: 97 % | DIASTOLIC BLOOD PRESSURE: 96 MMHG | RESPIRATION RATE: 16 BRPM | BODY MASS INDEX: 28.91 KG/M2 | WEIGHT: 201.94 LBS | HEIGHT: 70 IN | SYSTOLIC BLOOD PRESSURE: 135 MMHG | TEMPERATURE: 98.5 F

## 2023-02-02 LAB
ABSOLUTE EOS #: 0.21 K/UL (ref 0–0.44)
ABSOLUTE IMMATURE GRANULOCYTE: <0.03 K/UL (ref 0–0.3)
ABSOLUTE LYMPH #: 3.06 K/UL (ref 1.1–3.7)
ABSOLUTE MONO #: 0.62 K/UL (ref 0.1–1.2)
ALBUMIN SERPL-MCNC: 3.3 G/DL (ref 3.5–5.2)
ALBUMIN/GLOBULIN RATIO: 1.5 (ref 1–2.5)
ALP SERPL-CCNC: 48 U/L (ref 40–129)
ALT SERPL-CCNC: 87 U/L (ref 5–41)
ANION GAP SERPL CALCULATED.3IONS-SCNC: 6 MMOL/L (ref 9–17)
AST SERPL-CCNC: 56 U/L
BASOPHILS # BLD: 0 % (ref 0–2)
BASOPHILS ABSOLUTE: <0.03 K/UL (ref 0–0.2)
BILIRUB SERPL-MCNC: 0.6 MG/DL (ref 0.3–1.2)
BUN SERPL-MCNC: 8 MG/DL (ref 6–20)
CALCIUM SERPL-MCNC: 7.8 MG/DL (ref 8.6–10.4)
CHLORIDE SERPL-SCNC: 108 MMOL/L (ref 98–107)
CK SERPL-CCNC: 863 U/L (ref 39–308)
CO2 SERPL-SCNC: 25 MMOL/L (ref 20–31)
CREAT SERPL-MCNC: 0.7 MG/DL (ref 0.7–1.2)
EOSINOPHILS RELATIVE PERCENT: 3 % (ref 1–4)
GFR SERPL CREATININE-BSD FRML MDRD: >60 ML/MIN/1.73M2
GLUCOSE SERPL-MCNC: 98 MG/DL (ref 70–99)
HCT VFR BLD AUTO: 34.5 % (ref 40.7–50.3)
HGB BLD-MCNC: 11.4 G/DL (ref 13–17)
IMMATURE GRANULOCYTES: 0 %
LYMPHOCYTES # BLD: 49 % (ref 24–43)
MCH RBC QN AUTO: 30.2 PG (ref 25.2–33.5)
MCHC RBC AUTO-ENTMCNC: 33 G/DL (ref 28.4–34.8)
MCV RBC AUTO: 91.5 FL (ref 82.6–102.9)
MONOCYTES # BLD: 10 % (ref 3–12)
MRSA, DNA, NASAL: NEGATIVE
MYOGLOBIN SERPL-MCNC: 25 NG/ML (ref 28–72)
NRBC AUTOMATED: 0 PER 100 WBC
PDW BLD-RTO: 12.1 % (ref 11.8–14.4)
PLATELET # BLD AUTO: 231 K/UL (ref 138–453)
PMV BLD AUTO: 8.5 FL (ref 8.1–13.5)
POTASSIUM SERPL-SCNC: 3.6 MMOL/L (ref 3.7–5.3)
PROT SERPL-MCNC: 5.5 G/DL (ref 6.4–8.3)
RBC # BLD: 3.77 M/UL (ref 4.21–5.77)
SEG NEUTROPHILS: 38 % (ref 36–65)
SEGMENTED NEUTROPHILS ABSOLUTE COUNT: 2.42 K/UL (ref 1.5–8.1)
SODIUM SERPL-SCNC: 139 MMOL/L (ref 135–144)
SPECIMEN DESCRIPTION: NORMAL
WBC # BLD AUTO: 6.3 K/UL (ref 3.5–11.3)

## 2023-02-02 PROCEDURE — 82550 ASSAY OF CK (CPK): CPT

## 2023-02-02 PROCEDURE — 6370000000 HC RX 637 (ALT 250 FOR IP): Performed by: STUDENT IN AN ORGANIZED HEALTH CARE EDUCATION/TRAINING PROGRAM

## 2023-02-02 PROCEDURE — 6360000002 HC RX W HCPCS

## 2023-02-02 PROCEDURE — 99232 SBSQ HOSP IP/OBS MODERATE 35: CPT | Performed by: INTERNAL MEDICINE

## 2023-02-02 PROCEDURE — 99253 IP/OBS CNSLTJ NEW/EST LOW 45: CPT | Performed by: PSYCHIATRY & NEUROLOGY

## 2023-02-02 PROCEDURE — 36415 COLL VENOUS BLD VENIPUNCTURE: CPT

## 2023-02-02 PROCEDURE — 1240000000 HC EMOTIONAL WELLNESS R&B

## 2023-02-02 PROCEDURE — 97535 SELF CARE MNGMENT TRAINING: CPT

## 2023-02-02 PROCEDURE — 97116 GAIT TRAINING THERAPY: CPT

## 2023-02-02 PROCEDURE — 2700000000 HC OXYGEN THERAPY PER DAY

## 2023-02-02 PROCEDURE — 97165 OT EVAL LOW COMPLEX 30 MIN: CPT

## 2023-02-02 PROCEDURE — 80053 COMPREHEN METABOLIC PANEL: CPT

## 2023-02-02 PROCEDURE — 97161 PT EVAL LOW COMPLEX 20 MIN: CPT

## 2023-02-02 PROCEDURE — 94761 N-INVAS EAR/PLS OXIMETRY MLT: CPT

## 2023-02-02 PROCEDURE — 2580000003 HC RX 258

## 2023-02-02 PROCEDURE — 83874 ASSAY OF MYOGLOBIN: CPT

## 2023-02-02 PROCEDURE — 6370000000 HC RX 637 (ALT 250 FOR IP)

## 2023-02-02 PROCEDURE — 85025 COMPLETE CBC W/AUTO DIFF WBC: CPT

## 2023-02-02 RX ORDER — ACETAMINOPHEN 650 MG/1
650 SUPPOSITORY RECTAL EVERY 6 HOURS PRN
Status: CANCELLED | OUTPATIENT
Start: 2023-02-02

## 2023-02-02 RX ORDER — ACETAMINOPHEN 325 MG/1
650 TABLET ORAL EVERY 6 HOURS PRN
Status: CANCELLED | OUTPATIENT
Start: 2023-02-02

## 2023-02-02 RX ORDER — HALOPERIDOL 5 MG/ML
5 INJECTION INTRAMUSCULAR EVERY 6 HOURS PRN
Status: DISCONTINUED | OUTPATIENT
Start: 2023-02-02 | End: 2023-02-02 | Stop reason: HOSPADM

## 2023-02-02 RX ORDER — CLONIDINE HYDROCHLORIDE 0.1 MG/1
0.1 TABLET ORAL 2 TIMES DAILY
Status: DISCONTINUED | OUTPATIENT
Start: 2023-02-03 | End: 2023-02-05 | Stop reason: HOSPADM

## 2023-02-02 RX ORDER — MIRTAZAPINE 15 MG/1
7.5 TABLET, FILM COATED ORAL NIGHTLY
Status: DISCONTINUED | OUTPATIENT
Start: 2023-02-02 | End: 2023-02-05 | Stop reason: HOSPADM

## 2023-02-02 RX ORDER — ACETAMINOPHEN 650 MG/1
650 SUPPOSITORY RECTAL EVERY 6 HOURS PRN
Status: DISCONTINUED | OUTPATIENT
Start: 2023-02-02 | End: 2023-02-05 | Stop reason: HOSPADM

## 2023-02-02 RX ORDER — POTASSIUM CHLORIDE 20 MEQ/1
40 TABLET, EXTENDED RELEASE ORAL ONCE
Status: COMPLETED | OUTPATIENT
Start: 2023-02-02 | End: 2023-02-02

## 2023-02-02 RX ORDER — CLONIDINE HYDROCHLORIDE 0.1 MG/1
0.1 TABLET ORAL 2 TIMES DAILY
Status: CANCELLED | OUTPATIENT
Start: 2023-02-02

## 2023-02-02 RX ORDER — HYDROXYZINE 50 MG/1
50 TABLET, FILM COATED ORAL 3 TIMES DAILY PRN
Status: CANCELLED | OUTPATIENT
Start: 2023-02-02

## 2023-02-02 RX ORDER — MIRTAZAPINE 15 MG/1
7.5 TABLET, FILM COATED ORAL NIGHTLY
Status: CANCELLED | OUTPATIENT
Start: 2023-02-02

## 2023-02-02 RX ORDER — TRAZODONE HYDROCHLORIDE 50 MG/1
50 TABLET ORAL NIGHTLY PRN
Status: DISCONTINUED | OUTPATIENT
Start: 2023-02-02 | End: 2023-02-05 | Stop reason: HOSPADM

## 2023-02-02 RX ORDER — TRAZODONE HYDROCHLORIDE 50 MG/1
50 TABLET ORAL NIGHTLY PRN
Status: CANCELLED | OUTPATIENT
Start: 2023-02-02

## 2023-02-02 RX ORDER — ACETAMINOPHEN 325 MG/1
650 TABLET ORAL EVERY 6 HOURS PRN
Status: DISCONTINUED | OUTPATIENT
Start: 2023-02-02 | End: 2023-02-05 | Stop reason: HOSPADM

## 2023-02-02 RX ORDER — HYDROXYZINE 50 MG/1
50 TABLET, FILM COATED ORAL 3 TIMES DAILY PRN
Status: DISCONTINUED | OUTPATIENT
Start: 2023-02-02 | End: 2023-02-05 | Stop reason: HOSPADM

## 2023-02-02 RX ADMIN — ENOXAPARIN SODIUM 40 MG: 100 INJECTION SUBCUTANEOUS at 08:14

## 2023-02-02 RX ADMIN — MIRTAZAPINE 7.5 MG: 15 TABLET, FILM COATED ORAL at 22:22

## 2023-02-02 RX ADMIN — POTASSIUM CHLORIDE 40 MEQ: 1500 TABLET, EXTENDED RELEASE ORAL at 08:13

## 2023-02-02 RX ADMIN — SODIUM CHLORIDE, PRESERVATIVE FREE 10 ML: 5 INJECTION INTRAVENOUS at 08:14

## 2023-02-02 RX ADMIN — CLONIDINE HYDROCHLORIDE 0.1 MG: 0.1 TABLET ORAL at 08:14

## 2023-02-02 RX ADMIN — TRAZODONE HYDROCHLORIDE 50 MG: 50 TABLET ORAL at 22:22

## 2023-02-02 RX ADMIN — CLONIDINE HYDROCHLORIDE 0.1 MG: 0.1 TABLET ORAL at 20:17

## 2023-02-02 ASSESSMENT — PAIN DESCRIPTION - PAIN TYPE: TYPE: ACUTE PAIN

## 2023-02-02 ASSESSMENT — PAIN SCALES - GENERAL
PAINLEVEL_OUTOF10: 7
PAINLEVEL_OUTOF10: 0
PAINLEVEL_OUTOF10: 7
PAINLEVEL_OUTOF10: 4
PAINLEVEL_OUTOF10: 4

## 2023-02-02 ASSESSMENT — PATIENT HEALTH QUESTIONNAIRE - PHQ9: SUM OF ALL RESPONSES TO PHQ QUESTIONS 1-9: 17

## 2023-02-02 ASSESSMENT — SLEEP AND FATIGUE QUESTIONNAIRES
AVERAGE NUMBER OF SLEEP HOURS: 4
DO YOU USE A SLEEP AID: NO
SLEEP PATTERN: DIFFICULTY FALLING ASLEEP;DISTURBED/INTERRUPTED SLEEP
DO YOU HAVE DIFFICULTY SLEEPING: YES

## 2023-02-02 ASSESSMENT — PAIN DESCRIPTION - LOCATION: LOCATION: GENERALIZED

## 2023-02-02 ASSESSMENT — PAIN DESCRIPTION - DESCRIPTORS: DESCRIPTORS: DISCOMFORT

## 2023-02-02 NOTE — PLAN OF CARE
Problem: Discharge Planning  Goal: Discharge to home or other facility with appropriate resources  2/1/2023 2105 by Margoth Akins RN  Outcome: Progressing  2/1/2023 0825 by Shawna Masterson RN  Outcome: Progressing     Problem: Pain  Goal: Verbalizes/displays adequate comfort level or baseline comfort level  2/1/2023 2105 by Margoth Akins RN  Outcome: Media Dominick  2/1/2023 0825 by Shawna Masterson RN  Outcome: Progressing     Problem: Skin/Tissue Integrity  Goal: Absence of new skin breakdown  Description: 1. Monitor for areas of redness and/or skin breakdown  2. Assess vascular access sites hourly  3. Every 4-6 hours minimum:  Change oxygen saturation probe site  4. Every 4-6 hours:  If on nasal continuous positive airway pressure, respiratory therapy assess nares and determine need for appliance change or resting period. 2/1/2023 0825 by Shawna Masterson RN  Outcome: Progressing     Problem: Safety - Adult  Goal: Free from fall injury  2/1/2023 2105 by Margoth Akins RN  Outcome: Progressing  2/1/2023 0825 by Shawna Masterson RN  Outcome: Progressing     Problem: Self Harm/Suicidality  Goal: Will have no self-injury during hospital stay  Description: INTERVENTIONS:  1. Ensure constant observer at bedside with Q15M safety checks  2. Maintain a safe environment  3. Secure patient belongings  4. Ensure family/visitors adhere to safety recommendations  5. Ensure safety tray has been added to patient's diet order  6.   Every shift and PRN: Re-assess suicidal risk via Frequent Screener    2/1/2023 2105 by Margoth Akins RN  Outcome: Progressing  2/1/2023 0857 by Shawna Masterson RN  Outcome: Progressing  2/1/2023 0856 by Shawna Masterson RN  Outcome: Progressing

## 2023-02-02 NOTE — DISCHARGE SUMMARY
[facility]     Department of Internal Medicine - Critical Care Service    INPATIENT DISCHARGE SUMMARY      PATIENT IDENTIFICATION:  NAME:  Magdalena Abraham   :   1990  MRN:    [id number]     Acct:    [de-identified]   Admit Date:  2023  Discharge date:  No discharge date for patient encounter. Attending Provider: Javier Purvis MD                                     Principal Problem:    Suicide attempt by fentanyl overdose [facility])  Resolved Problems:    * No resolved hospital problems. *       REASON FOR HOSPITALIZATION:   Chief Complaint   Patient presents with    Suicidal    Drug Overdose          Hospital Course    32yoM, hx polysubstance abuse, bipolar disorder admitted for fentanyl od, intentional.      : Hep C positive, keep on Narcan drip and increase dose as tolerated, plan to keep on 0.6 right now. Patient on 2L NC and maintain airway. CK/Myoglobin labs ordered. + Clonidine. + Ativan as needed    : has been off of narcan gtt since 400. Patient still having suicidal ideations. Psych consult. Accepted by psych. [place] slipped. Plan for transfer to [facility]. Consults:   psychiatry    Procedures:  n/a     Any Hospital Acquired Infections: no     PATIENT'S DISCHARGE CONDITION:      PATIENT/FAMILY INSTRUCTIONS: go to [facility] for inpatient psychiatric treatment             Activity: activity as tolerated    Diet: regular diet    Disposition: [facility] for inpatient psych treatment     Follow-up:  as directed by Psychiatry team after inpatient psych treatment     Electronically signed by Gela Bernal DO on 2023 at 12:50 PM     Time Spent on discharge is more than 30 minutes in the examination, evaluation, counseling and review of medications and discharge plan.       Gela Bernal DO  Emergency Medicine Resident  Critical Care Service    Electronically signed by Gela Bernal DO on 2023 at 8:30 PM

## 2023-02-02 NOTE — CARE COORDINATION
Transitional planning. Psych recommending [facility], Dr. Berta Castillo completed Tami Jose Antonio for pt.     [phone] Spoke to [address], informed her of pt discharge and pink slip. She asked that writer fax pink slip to  [phone] to [employer] with [employer], provided her with requested information. Transferred call to [address] [place] [address], pt's nurse to speak to [facility] RN and Physician. Per Paty Tao RN, they were unable to connect with [facility] Physician and will call back. Faxed pink slip to number above. [phone] called [employer], spoke to Elmer. Informed her that pt's RN has not received a call back yet. She tried to connect to [employer] and left a HIPAA compliant message asking for call back. [id number] informed by pt's RN that physician called back and pt is accepted. Called Elena AVILES RN Charge, she confirmed pt is accepted and will be going to room 125 bed 2,     [address] [place] [address] with  [address] confirmed a 9pm ambulance transport. Informed Elena AVILES Charge RN of transport time. Report number:  [phone]     [phone] informed [address] [place] [address] of transport time and provided report number to her. She will inform pt of transport time and ask if he wants anyone notified.

## 2023-02-02 NOTE — PROGRESS NOTES
Critical Care Team - Daily Progress Note      Date and time: 2023 9:09 AM  Patient's name:  Naveen Washington Record Number: [id number]  Patient's account/billing number: [de-identified]  Patient's YOB: 1990  Age: 34 y.o. Date of Admission: 2023  1:51 AM  Length of stay during current admission: 1      Primary Care Physician: No primary care provider on file. ICU Attending Physician: Dr. Tereza Worthy Status: Full Code    Reason for ICU admission:   Chief Complaint   Patient presents with    Suicidal    Drug Overdose       Subjective:     OVERNIGHT EVENTS:       No acute events overnight, no nursing issues. Off of narcan drip at 0400. No respiratory depression. This am patient is easily arousible, states he \"feels like shit still\" although is not able to tell me exactly what is bothering him. PO diet ordered although patient has not wanted to eat much per nursing, easily took effer-K orally this am.     Narcan off at 0400, discussed with Social Work team who contacted [facility] who typically recommends being off of narcan drip for 12-24 hours without return of symptoms, they recommended obtaining initial consult today to begin the process as patient continues to have suicidal ideation this am stating that he would cut himself with a knife and that he has before. Denied homicidal ideation. Off of end tidal, on room air. Plan for psych consult today if able to schedule.           OBJECTIVE:     VITAL SIGNS:  /80   Pulse 59   Temp 97.5 °F (36.4 °C) (Oral)   Resp 13   Ht 5' 10\" (1.778 m)   Wt 201 lb 15.1 oz (91.6 kg)   SpO2 96%   BMI 28.98 kg/m²   Tmax over 24 hours:  Temp (24hrs), Av.7 °F (37.1 °C), Min:97.5 °F (36.4 °C), Max:99 °F (37.2 °C)      Patient Vitals for the past 8 hrs:   BP Temp Temp src Pulse Resp SpO2   23 0900 131/80 -- -- 59 13 96 %   23 0850 -- -- -- 59 12 99 %   23 0800 124/83 97.5 °F (36.4 °C) Oral 59 18 --   23 0738 -- -- -- 57 12 99 %   02/02/23 0700 124/76 -- -- 56 18 --   02/02/23 0630 -- -- -- 56 12 97 %   02/02/23 0615 -- -- -- 56 15 97 %   02/02/23 0600 129/77 -- -- 56 12 100 %   02/02/23 0545 -- -- -- 54 11 98 %   02/02/23 0530 -- -- -- 57 15 100 %   02/02/23 0515 -- -- -- 57 13 100 %   02/02/23 0500 129/82 -- -- 55 14 100 %   02/02/23 0445 -- -- -- 73 27 98 %   02/02/23 0430 -- -- -- 55 14 99 %   02/02/23 0415 -- -- -- 54 15 100 %   02/02/23 0400 (!) 122/92 98.7 °F (37.1 °C) Axillary 56 16 98 %   02/02/23 0345 -- -- -- 57 14 98 %   02/02/23 0330 -- -- -- 59 16 99 %   02/02/23 0315 -- -- -- 67 12 97 %   02/02/23 0300 131/86 -- -- 57 18 99 %   02/02/23 0245 -- -- -- 60 18 98 %   02/02/23 0230 -- -- -- 61 14 97 %   02/02/23 0215 -- -- -- 57 18 100 %   02/02/23 0200 132/89 98.8 °F (37.1 °C) Axillary 54 17 100 %   02/02/23 0145 -- -- -- 59 15 99 %   02/02/23 0130 -- -- -- 59 11 99 %   02/02/23 0115 -- -- -- 55 14 98 %         Intake/Output Summary (Last 24 hours) at 2/2/2023 0909  Last data filed at 2/2/2023 0850  Gross per 24 hour   Intake 2725.47 ml   Output 2450 ml   Net 275.47 ml     Date 02/02/23 0000 - 02/02/23 2359   Shift 2624-8061 5616-2644 1091-5969 24 Hour Total   INTAKE   I.V.(mL/kg) 978.6(10.7) 288. 3(3.1)  2748.4(49.8)   Shift Total(mL/kg) 978.6(10.7) 288. 3(3.1)  1266.9(13.8)   OUTPUT   Urine(mL/kg/hr)  850  850   Shift Total(mL/kg)  850(9.3)  850(9.3)   Weight (kg) 91.6 91.6 91.6 91.6     Wt Readings from Last 3 Encounters:   02/01/23 201 lb 15.1 oz (91.6 kg)   12/17/22 180 lb (81.6 kg)   03/31/22 180 lb (81.6 kg)     Body mass index is 28.98 kg/m². PHYSICAL EXAM:  Constitutional: sleeping although easily arousible. No acute distress, speaking in full sentecnes  HEENT: PERRL, EOMI,  Respiratory: clear to auscultation, no wheezes or rales and unlabored breathing.  On room air   Cardiovascular: regular rate and rhythm on the monitor   Abdomen: soft, nontender  NEUROLOGIC: Awake, alert, oriented to name, place and time.  grossly intact. Motor is 5 out of 5 bilaterally. Extremities:  moving all 4 extremities equally, no focal deficits        Any additional physical findings: n/a       MEDICATIONS:  Scheduled Meds:   sodium chloride flush  5-40 mL IntraVENous 2 times per day    enoxaparin  40 mg SubCUTAneous Daily    cloNIDine  0.1 mg Oral BID     Continuous Infusions:   sodium chloride      sodium chloride 50 mL/hr at 02/02/23 0859     PRN Meds:   sodium chloride flush, 5-40 mL, PRN  sodium chloride, , PRN  ondansetron, 4 mg, Q8H PRN   Or  ondansetron, 4 mg, Q6H PRN  polyethylene glycol, 17 g, Daily PRN  acetaminophen, 650 mg, Q6H PRN   Or  acetaminophen, 650 mg, Q6H PRN  LORazepam, 1 mg, Q6H PRN      SUPPORT DEVICES: [] Ventilator [] BIPAP  [] Nasal Cannula [x] Room Air    VENT SETTINGS (Comprehensive) (if applicable):      Additional Respiratory Assessments  Heart Rate: 59  Resp: 13  SpO2: 96 %  End Tidal CO2: 20 (%)    ABGs:     Lab Results   Component Value Date/Time    PHART 7.434 09/25/2017 05:10 AM    QWL3GYP 36.9 09/25/2017 05:10 AM    PO2ART 92.1 09/25/2017 05:10 AM    ABF6VVT 24.7 09/25/2017 05:10 AM    X9TTHCYT 96.7 09/25/2017 05:10 AM    FIO2 INFORMATION NOT PROVIDED 04/14/2018 02:12 PM     Lactic Acid: No results found for: LACTA      DATA:  Complete Blood Count:   Recent Labs     02/01/23 0212 02/02/23  0417   WBC 7.6 6.3   HGB 13.0 11.4*   MCV 89.7 91.5    231   RBC 4.36 3.77*   HCT 39.1* 34.5*   MCH 29.8 30.2   MCHC 33.2 33.0   RDW 12.2 12.1   MPV 8.5 8.5        PT/INR:    Lab Results   Component Value Date/Time    PROTIME 10.5 04/14/2018 02:12 PM    INR 1.0 04/14/2018 02:12 PM     PTT:    Lab Results   Component Value Date/Time    APTT 19.3 04/14/2018 02:12 PM       Basal Metabolic Profile:   Recent Labs     02/01/23 0212 02/01/23  1833 02/02/23  0417    137 139   K 3.2* 4.3 3.6*   BUN 24* 12 8   CREATININE 1.29* 0.78 0.70   CL 98 108* 108*   CO2 22 20 25      Magnesium:   Lab Results Component Value Date/Time    MG 2.2 11/07/2019 11:17 AM    MG 2.0 04/15/2018 05:24 AM     Phosphorus: No results found for: PHOS  S. Calcium:  Recent Labs     02/02/23 0417   CALCIUM 7.8*     S. Ionized Calcium:No results for input(s): IONCA in the last 72 hours. Urinalysis:   Lab Results   Component Value Date/Time    NITRU NEGATIVE 04/28/2022 06:04 AM    COLORU Dark Yellow 04/28/2022 06:04 AM    PHUR 5.5 04/28/2022 06:04 AM    WBCUA 2 TO 5 04/28/2022 06:04 AM    RBCUA 2 TO 5 04/28/2022 06:04 AM    SPECGRAV 1.038 04/28/2022 06:04 AM    LEUKOCYTESUR NEGATIVE 04/28/2022 06:04 AM    UROBILINOGEN Normal 04/28/2022 06:04 AM    BILIRUBINUR NEGATIVE  Verified by ictotest. 04/28/2022 06:04 AM    GLUCOSEU NEGATIVE 04/28/2022 06:04 AM    KETUA TRACE 04/28/2022 06:04 AM       CARDIAC ENZYMES: No results for input(s): CKMB, CKMBINDEX, TROPONINI in the last 72 hours. Invalid input(s): CKTOTAL;3  BNP: No results for input(s): BNP in the last 72 hours. LFTS  Recent Labs     02/01/23 0212 02/02/23  0417   ALKPHOS 68 48   * 87*   * 56*   BILITOT 0.6 0.6   LABALBU 4.3 3.3*       AMYLASE/LIPASE/AMMONIA  No results for input(s): AMYLASE, LIPASE, AMMONIA in the last 72 hours. Last 3 Blood Glucose:   Recent Labs     02/01/23  0212 02/01/23  1833 02/02/23  0417   GLUCOSE 116* 116* 98      HgBA1c:    Lab Results   Component Value Date/Time    LABA1C 5.6 06/18/2020 06:33 AM         TSH:    Lab Results   Component Value Date/Time    TSH 1.85 02/01/2023 02:12 AM     ANEMIA STUDIES  No results for input(s): LABIRON, TIBC, FERRITIN, [id number], FOLATE, OCCULTBLD in the last 72 hours.       Cultures during this admission:     Blood cultures:                 [x] None drawn      [] Negative             []  Positive (Details:  )  Urine Culture:                   [x] None drawn      [] Negative             []  Positive (Details:  )  Sputum Culture:               [x] None drawn       [] Negative             [] Positive (Details:  )   Endotracheal aspirate:     [x] None drawn       [] Negative             []  Positive (Details:  )        ASSESSMENT:     Principal Problem:    Suicide attempt by fentanyl overdose ([address]  Resolved Problems:    * No resolved hospital problems. *        PLAN:         Neuro:  Sedation: N/A  Pain control: Tylenol  UDS cannabinoid, cocaine, fentanyl  Narcan drip off since 0400  Psychiatry consult today if able, consult placed, on the list, awaiting appointment time      CV:  Telemetry  BP and HR normal     Heme:  Hgb 11.4   No bleeding   D/c cbc     Resp:  Satting well on RA     /Fluids/Electrolytes:  BUN // Cr: 8 // 0.7   Monitor UO  Voiding, no ochoa   Electrolytes reviewed, replace PRN. Will d/c bmp today. Fluids 50 ml/hr IVF weaned down, plan to DC once taking in adequate PO      GI/Nutrition:  Regular diet  Bowel regimen: glycolax   F/u hepatitis panel, liver enzymes elevated ALT/ AST downtrending      ID:  WBC 6.3  Afebrile     Endo:  Monitor glucose  Glc     Lines/Drains:  PIV     Prophylaxis:  DVT: Lovenox, SCD     Dispo: Suspect patient okay for transfer out of the ICU with sitter once seen by attending physician            Crys Jeronimo DO,              Emergency Medicine Resident, PGY-3  Critical Care Service. [facility]          2/2/2023, 9:09 AM    Attending Physician Statement  I have discussed the care of Ciaran Llanos, including pertinent history and exam findings,  with the resident. I have seen and examined the patient and the key elements of all parts of the encounter have been performed by me. I agree with the assessment, plan and orders as documented by the resident with additions . Alvarez resolved   Off narcan gtt   Medically cleared for [facility]HI     Electronically signed by Dewey Weiss MD on   2/2/23 at 12:49 PM EST    Please note that this chart was generated using voice recognition Dragon dictation software.   Although every effort was made to ensure the accuracy of this automated transcription, some errors in transcription may have occurred.

## 2023-02-02 NOTE — PLAN OF CARE
Problem: Discharge Planning  Goal: Discharge to home or other facility with appropriate resources  2/2/2023 0909 by Roger Lion RN  Outcome: Progressing  2/1/2023 2105 by Lino Pelaez RN  Outcome: Progressing     Problem: Pain  Goal: Verbalizes/displays adequate comfort level or baseline comfort level  2/2/2023 0909 by Roger Lion RN  Outcome: Progressing  2/1/2023 2105 by Lino Pelaez RN  Outcome: Progressing     Problem: Skin/Tissue Integrity  Goal: Absence of new skin breakdown  Description: 1. Monitor for areas of redness and/or skin breakdown  2. Assess vascular access sites hourly  3. Every 4-6 hours minimum:  Change oxygen saturation probe site  4. Every 4-6 hours:  If on nasal continuous positive airway pressure, respiratory therapy assess nares and determine need for appliance change or resting period. Outcome: Progressing     Problem: Safety - Adult  Goal: Free from fall injury  2/2/2023 0909 by Roger Lion RN  Outcome: Progressing  2/1/2023 2105 by Lino Pelaez RN  Outcome: Progressing     Problem: Self Harm/Suicidality  Goal: Will have no self-injury during hospital stay  Description: INTERVENTIONS:  1. Ensure constant observer at bedside with Q15M safety checks  2. Maintain a safe environment  3. Secure patient belongings  4. Ensure family/visitors adhere to safety recommendations  5. Ensure safety tray has been added to patient's diet order  6.   Every shift and PRN: Re-assess suicidal risk via Frequent Screener    2/2/2023 0909 by Roger Lion RN  Outcome: Progressing  2/1/2023 2105 by Lino Pelaez RN  Outcome: Progressing

## 2023-02-02 NOTE — DISCHARGE INSTRUCTIONS
Continue clonidine as prescribed  Discontinue hctz  Suicide precautions   Transfer to [facility]  Need psychiatry and pcp care outpatient

## 2023-02-02 NOTE — PROGRESS NOTES
Attending Physician Statement  I have discussed the care of HIMA Olivares, including pertinent history and exam findings,  with the resident. I have seen and examined the patient and the key elements of all parts of the encounter have been performed by me. I agree with the assessment, plan and orders as documented by the resident with additions . Principal Problem:    Suicide attempt by fentanyl overdose ([address]  Resolved Problems:    * No resolved hospital problems. *   Poly substance abuse   TEJAS   Non traumatic rhabdomyolysis   Plan   Wean of narcan gtt   Prn ativan   Iv fluids   Psych consult once awake          Total critical care time caring for this patient with life threatening, unstable organ failure, including direct patient contact, management of life support systems, review of data including imaging and labs, discussions with other team members and physicians at least 27   Min so far today, excluding procedures. Electronically signed by Batool Aranda MD on   2/1/23 at 9:45 PM EST    Please note that this chart was generated using voice recognition Dragon dictation software. Although every effort was made to ensure the accuracy of this automated transcription, some errors in transcription may have occurred.

## 2023-02-02 NOTE — PROGRESS NOTES
Occupational Therapy  Facility/Department: [facility] CAR 3- MICU  Occupational Therapy Initial Assessment    Name: Juan Soliz  : 1990  MRN: [id number]  Date of Service: 2023  Chief Complaint   Patient presents with    Suicidal    Drug Overdose     Discharge Recommendations:   No therapy recommended at discharge. Patient Diagnosis(es): The primary encounter diagnosis was Intentional opiate overdose, initial encounter ([facility] Diagnoses of Hypokalemia and TEJAS (acute kidney injury) ([facility] were also pertinent to this visit. Past Medical History:  has a past medical history of Alcoholism ([facility] Anxiety, Bipolar 1 disorder ([facility] Cocaine abuse ([facility] Depression, Hepatitis C antibody test positive, Hypertension, Irregular heartbeat, Mild tetrahydrocannabinol (THC) abuse, and Suicidal ideation. Past Surgical History:  has a past surgical history that includes other surgical history (Right, 2017); Dialysis fistula creation (Right, 2017); EXPLORATION OF WOUND OF EXTREMITY (Right, 2017); and Femur Surgery (Left). Assessment   Assessment: Patient is completing functional activities at baseline function with no acute deficits identified. OT to defer balance deficits observed during functional mobility to PT at this time d/t patient progressing without device to complete household distance <> bathroom with 0 LOB and completing ADLs independently or at Mod I. Please re-consult if functional deficits arise impacting patients independence with ADL/IADL tasks.   Prognosis: Good  Decision Making: Low Complexity  No Skilled OT: At baseline function  REQUIRES OT FOLLOW-UP: Yes  Activity Tolerance  Activity Tolerance: Patient Tolerated treatment well        Plan         Restrictions  Restrictions/Precautions  Restrictions/Precautions: Fall Risk, Up as Tolerated  Required Braces or Orthoses?: No    Subjective   General  Patient assessed for rehabilitation services?: Yes  Family / Caregiver Present: No  General Comment  Comments: RN ok'd patient for OT/PT evaluation. Pt pleasant, cooperative and agreeable. Pt reports 8/10 pain in the legs, progressing to 5/10 by end of session, asked for pain medication, communicated to RN. Pt reports improvement with activity during evaluation. Social/Functional History  Social/Functional History  Lives With: Family (brother and aunt)  Type of Home: Apartment  Home Layout: Able to Live on Main level with bedroom/bathroom, One level  Home Access: Stairs to enter with rails  Entrance Stairs - Number of Steps: 3  Entrance Stairs - Rails: Both  Bathroom Shower/Tub: Tub/Shower unit  Bathroom Toilet: Standard  Bathroom Equipment: Hand-held shower  ADL Assistance: Independent  Homemaking Assistance: Independent  Homemaking Responsibilities: Yes  Laundry Responsibility: Primary  Cleaning Responsibility: Primary  Shopping Responsibility: Primary  Ambulation Assistance: Independent  Transfer Assistance: Independent  Active : No  Patient's  Info: Friends/ family/ walks  Mode of Transportation: Walk, Family, Friends  Occupation: Self employed  Type of Occupation: making music, rapping  Leisure & Hobbies: football and basketball  Additional Comments: Reports having supportive family at discharge     Objective   Safety Devices  Type of Devices: Sitter present;Nurse notified;Gait belt;Left in bed;Call light within reach; Bed alarm in place  Restraints  Restraints Initially in Place: No  Balance  Sitting: Intact  Standing: With support (SBA progressing to supervision d/t decreased gross balance; for safety additionally to reduce fall risk; ~3-4 min during toileting tasks, EOB, sinkside)  Transfer Training  Transfer Training: Yes  Overall Level of Assistance: Stand-by assistance  Sit to Stand: Stand-by assistance  Stand to Sit: Independent  Gait  Overall Level of Assistance: Stand-by assistance (grossly during household distances using RW, improvement as session progressed; OT to defer to PT for long distance balance deficits at this time)  Assistive Device: Gait belt;Walker, rolling (EOB > household distances <> bathroom > EOB; Progressed to no device once returning to room to complete mobility to bathroom; improvement in overall balance)     AROM: Within functional limits  Strength: Within functional limits (B UE strength 4/5 grossly)  Coordination: Within functional limits  Tone: Normal  Sensation: Intact  ADL  Feeding: Independent  Grooming: Independent  UE Bathing: Independent  LE Bathing: Modified independent   UE Dressing: Independent  LE Dressing: Modified independent   Toileting: Modified independent   Additional Comments: Patient donned B socks seated EOB independently, able to complete hip flexion to reach distal extremities to complete tasks with 0 LOB and increased time. Pt stood at toilet and completed urination independently, able to manage clothing appropriately. Pt stood sinkside and completed hand hygiene independently with 0 LOB and sequencing through task appropriately. Activity Tolerance  Activity Tolerance: Patient tolerated evaluation without incident  Bed mobility  Rolling to Right: Independent  Supine to Sit: Independent  Sit to Supine: Independent     Vision  Vision: Within Functional Limits  Hearing  Hearing: Within functional limits  Cognition  Overall Cognitive Status: [facility]  Cognition Comment: pt slightly lethargic but seems aware of current medical condtions and mobilty status  Orientation  Overall Orientation Status: Within Functional Limits     Education Given To: Patient  Education Provided: Plan of Care;Role of Therapy;Transfer Training;Equipment; Fall Prevention Strategies; ADL Adaptive Strategies  Education Method: Verbal  Barriers to Learning: None  Education Outcome: Verbalized understanding  AM-PAC Score        AM-[facility] Inpatient Daily Activity Raw Score: 24 (02/02/23 1639)  AM-PAC Inpatient ADL T-Scale Score : 57.54 (02/02/23 1639)  ADL Inpatient CMS 0-100% Score: 0 (02/02/23 1639)  ADL Inpatient CMS G-Code Modifier : [facility] (02/02/23 2549)  Goals          Therapy Time   Individual Concurrent Group Co-treatment   Time In 1402         Time Out 1423         Minutes 21         Timed Code Treatment Minutes: 8 Minutes     Shlomo Monroe OTR/L

## 2023-02-02 NOTE — CARE COORDINATION
Case Management Assessment  Initial Evaluation    Date/Time of Evaluation: 2/2/2023 9:25 AM  Assessment Completed by: Lisa Burr RN    If patient is discharged prior to next notation, then this note serves as note for discharge by case management. Patient Name: Dana Hamm                   YOB: 1990  Diagnosis: Hypokalemia [E87.6]  TEJAS (acute kidney injury) ([facility] [N17.9]  Intentional opiate overdose, initial encounter ([facility] Louis Wong  Suicide attempt by fentanyl overdose ([facility]                   Date / Time: 2/1/2023  1:51 AM    Patient Admission Status: Inpatient   Readmission Risk (Low < 19, Mod (19-27), High > 27): Readmission Risk Score: 21.7    Current PCP: No primary care provider on file. PCP verified by CM? (P) No (No PCP, PCP list placed with belongs)    Chart Reviewed: Yes      History Provided by: (P) Patient  Patient Orientation: (P) Alert and Oriented    Patient Cognition: (P) Alert    Hospitalization in the last 30 days (Readmission):  No    If yes, Readmission Assessment in CM Navigator will be completed. Advance Directives:      Code Status: Full Code   Patient's Primary Decision Maker is:        Discharge Planning:    Patient lives with: (P) Family Members Type of Home: (P) Other (Comment) (dependent on psych consult recs)  Primary Care Giver: (P) Self  Patient Support Systems include: (P) Family Members   Current Financial resources: (P) Medicaid ([facility])  Current community resources:    Current services prior to admission: (P) None            Current DME:              Type of Home Care services:       ADLS  Prior functional level: (P) Independent in ADLs/IADLs  Current functional level: (P) Independent in ADLs/IADLs    PT AM-PAC:   /24  OT AM-PAC:   /24    Family can provide assistance at DC: (P) No  Would you like Case Management to discuss the discharge plan with any other family members/significant others, and if so, who?     Plans to Return to Present Housing: (P) Unknown at present  Other Identified Issues/Barriers to RETURNING to current housing: dependent on Psych exam  Potential Assistance needed at discharge: (P) Other (Comment)            Potential DME:    Patient expects to discharge to: (P) Other (comment) (TBD- Psych consult today)  Plan for transportation at discharge:      Financial    Payor: Yanely Munson / Plan: Yanely Munson / Product Type: *No Product type* /     Does insurance require precert for SNF: Yes    Potential assistance Purchasing Medications: (P) No  Meds-to-Beds request: Yes      [address] [facility]  [address]  Jarvis [address]  [address] [facility] [address]  Phone: [phone] Fax: [phone] [address] [facility], [facility]  [address] [address]  Phone: [phone] Fax: [address] [place] [address] [phone] Jennifer Carry [phone]  [address] [place] [address]  Phone: [phone] Fax: [phone]      Notes:    Factors facilitating achievement of predicted outcomes: Cooperative    Barriers to discharge: Psych consult recommendations     Additional Case Management Notes: Psych Consult today, sitter in room, suicide precautions. ECHO, PT/OT ordered. Pt states he has new insurance [facility] Medicaid card at home. The Plan for Transition of Care is related to the following treatment goals of Hypokalemia [E87.6]  TEJAS (acute kidney injury) ([facility] [N17.9]  Intentional opiate overdose, initial encounter ([facility] Emani Padron  Suicide attempt by fentanyl overdose ([facility] [K47.151T]    IF APPLICABLE: The Patient and/or patient representative Lidia Frey and his family were provided with a choice of provider and agrees with the discharge plan.  Freedom of choice list with basic dialogue that supports the patient's individualized plan of care/goals and shares the quality data associated with the providers was provided to: (P) Patient   Patient Representative Name:       The Patient and/or Patient Representative Agree with the Discharge Plan?  (P) Yes    Reina Fay RN  Case Management Department  Ph: [phone] Fax: [phone]

## 2023-02-02 NOTE — CONSULTS
Department of Psychiatry   Psychiatric Assessment      Thank you very much for allowing us to participate in the care of this patient. Reason for Consult:  Suicide attempt by overdose    HISTORY OF PRESENT ILLNESS:    Patient is a 59-year-old male with history of hypertension and extensive polysubstance abuse-cocaine opioids admitted following an intentional overdose on fentanyl and reporting that this was a suicide attempt. Patient does report that he has been feeling extremely depressed for last several weeks now. Identifies stressors as recurrent drug use. Reports that he has also been off of his psychotropic medications since last discharged from [facility] in December. Reports consequence of helplessness hopelessness and worthlessness. Endorses anhedonia. Reports some trouble falling asleep and staying asleep. Reports poor appetite. Currently reports withdrawal symptoms from opioids as restlessness diaphoresis muscle cramps and severe nausea. Reports that he has been using half a gram of fentanyl every day by intranasal route. Could not elicit any manic or hypomanic symptoms today. Denies any psychotic symptoms today. Discussed with him about inpatient psychiatric hospitalization and he is agreeable to the plan.       PSYCHIATRIC HISTORY:      Currently follows with no community provider however he has previously been linked to [facility]     Multiple lifetime suicide attempts by overdose and self-inflicted injury     Multiple psychiatric hospital admissions including [facility]     Home Medication Compliance: [] Yes [x] No     Past psychiatric medications includes:  Remeron, Zoloft, trazodone and Effexor     Adverse reactions from psychotropic medications: [] Yes [x] No    Lifetime Psychiatric Review of Systems per records   Depression: Endorses     Anxiety: Denies     Panic Attacks: Denies     Lizett or Hypomania: Denies     Phobias: Denies     Obsessions and Compulsions: Denies     Body or Vocal Tics: Denies     Visual Hallucinations: Endorses per history only     Auditory Hallucinations: Endorses per history only     Delusions/Paranoia: Denies     PTSD: Endorses       Prior to Admission medications    Medication Sig Start Date End Date Taking?  Authorizing Provider   mirtazapine (REMERON) 7.5 MG tablet Take 1 tablet by mouth nightly 12/7/22   Lena Callahan MD   traZODone (DESYREL) 50 MG tablet Take 1 tablet by mouth nightly as needed for Sleep 12/7/22   Lena Callahan MD   hydroCHLOROthiazide (HYDRODIURIL) 25 MG tablet Take 1 tablet by mouth daily 12/7/22   Lena Callahan MD   hydrOXYzine HCl (ATARAX) 50 MG tablet Take 50 mg by mouth 3 times daily as needed for Anxiety    Historical Provider, MD        Medications:    Current Facility-Administered Medications: sodium chloride flush 0.9 % injection 5-40 mL, 5-40 mL, IntraVENous, 2 times per day  sodium chloride flush 0.9 % injection 5-40 mL, 5-40 mL, IntraVENous, PRN  0.9 % sodium chloride infusion, , IntraVENous, PRN  enoxaparin (LOVENOX) injection 40 mg, 40 mg, SubCUTAneous, Daily  ondansetron (ZOFRAN-ODT) disintegrating tablet 4 mg, 4 mg, Oral, Q8H PRN **OR** ondansetron (ZOFRAN) injection 4 mg, 4 mg, IntraVENous, Q6H PRN  polyethylene glycol (GLYCOLAX) packet 17 g, 17 g, Oral, Daily PRN  acetaminophen (TYLENOL) tablet 650 mg, 650 mg, Oral, Q6H PRN **OR** acetaminophen (TYLENOL) suppository 650 mg, 650 mg, Rectal, Q6H PRN  0.9 % sodium chloride infusion, , IntraVENous, Continuous  cloNIDine (CATAPRES) tablet 0.1 mg, 0.1 mg, Oral, BID     Past Medical History:        Diagnosis Date    Alcoholism ([facility]     Anxiety     Bipolar 1 disorder (HCC)     [facility]     Depression     Hepatitis C antibody test positive     Hypertension     Irregular heartbeat     Mild tetrahydrocannabinol (THC) abuse     Suicidal ideation        Past Surgical History:        Procedure Laterality Date DIALYSIS FISTULA CREATION Right 2017    RIGHT WRIST WOUND EXPLORATION WITH ARTERIAL REPAIR ULNAR AND RADIAL ARTERIES RIGHT WRIST performed by Vikram See MD at [address] Right 2017    WOUND EXPLORATION AND/OR REVISION performed by Vikram See MD at [address] Left     OTHER SURGICAL HISTORY Right 2017    exp and repair of unlar and radial artery with exp and repair of 12 tendons and 2 nerves       Allergies: Vicodin [hydrocodone-acetaminophen]      Social History:  Per records  Born in: [place], [place]  Family: Reports that he was raised by his mother and father until his father passed when he was 14. He reports that his mother passed away about 8 years ago. He states that he has one brother and one sister whom he is close with. Highest Level of Education: GED   Occupation: Unemployed  Marital Status: Never   Children: 4 children   Residence: Shared with social work team he has been staying with one of his brothers  Stressors: Illicit drug use  Patient Assets/Supportive Factors: Patient is seeking additional support    SUBSTANCE USE HISTORY: Extensive hx of crack cocaine and fentanyl use    Family Medical and Psychiatric History:      Mother and father has hx of drug use        Problem Relation Age of Onset    Heart Disease Father 52    Asthma Brother     Hypertension Maternal Grandmother     Lung Cancer Maternal Grandmother         Kidney cancer, liver cancer    Stroke Maternal Grandmother     Heart Disease Mother 46         of presumed heart disease in her sleep       Physical  /82   Pulse 61   Temp 97.3 °F (36.3 °C) (Oral)   Resp 15   Ht 5' 10\" (1.778 m)   Wt 201 lb 15.1 oz (91.6 kg)   SpO2 97%   BMI 28.98 kg/m²     Mental Status Examination:  Level of consciousness: somnolent  Appearance: hospital attire, lying in bed, fair grooming  Behavior/Motor: psychomotor retardation  Attitude toward examiner:  eyes closed, indifferent  Speech:  soft mostly monotone  Mood:  depressed  Affect: mood congruent  Thought processes:  Linear, goal directed, coherent  Thought content: passive suicidal ideations   Denies homicidal ideations    Denies hallucinations   Denies delusions  Cognition:  Oriented to self, situation, location, date  Concentration clinically adequate  Memory age appropriate  Insight & Judgment:  fair    DSM-5 DIAGNOSIS:  Depression with suicidal ideation  Opioid use disorder severe dependence  Acute opioid withdrawals    Stressors     Severity of stressors is moderate  Source of stressors include: Other psychosocial and environmental stressors    PLAN:    Continue 1:1 sitter  Please admit to [facility] after patient is medically stable. He is agreeable to the plan      Thank you very much for allowing us to participate in the care of this patient.       Electronically signed by Claudia Ramon MD on 2/2/23 at 12:28 PM EST

## 2023-02-03 PROCEDURE — 6360000002 HC RX W HCPCS: Performed by: NURSE PRACTITIONER

## 2023-02-03 PROCEDURE — 6370000000 HC RX 637 (ALT 250 FOR IP): Performed by: STUDENT IN AN ORGANIZED HEALTH CARE EDUCATION/TRAINING PROGRAM

## 2023-02-03 PROCEDURE — 1240000000 HC EMOTIONAL WELLNESS R&B

## 2023-02-03 RX ORDER — AMLODIPINE BESYLATE 5 MG/1
5 TABLET ORAL DAILY
Status: ON HOLD | COMMUNITY
End: 2023-02-05 | Stop reason: HOSPADM

## 2023-02-03 RX ORDER — BUPRENORPHINE AND NALOXONE 8; 2 MG/1; MG/1
1 FILM, SOLUBLE BUCCAL; SUBLINGUAL DAILY
Status: ON HOLD | COMMUNITY
End: 2023-02-05 | Stop reason: HOSPADM

## 2023-02-03 RX ORDER — LORAZEPAM 1 MG/1
2 TABLET ORAL EVERY 6 HOURS PRN
Status: DISCONTINUED | OUTPATIENT
Start: 2023-02-03 | End: 2023-02-05 | Stop reason: HOSPADM

## 2023-02-03 RX ORDER — HALOPERIDOL 5 MG/ML
5 INJECTION INTRAMUSCULAR EVERY 6 HOURS PRN
Status: DISCONTINUED | OUTPATIENT
Start: 2023-02-03 | End: 2023-02-05 | Stop reason: HOSPADM

## 2023-02-03 RX ORDER — HALOPERIDOL 5 MG/1
5 TABLET ORAL EVERY 6 HOURS PRN
Status: DISCONTINUED | OUTPATIENT
Start: 2023-02-03 | End: 2023-02-05 | Stop reason: HOSPADM

## 2023-02-03 RX ORDER — LORAZEPAM 2 MG/ML
2 INJECTION INTRAMUSCULAR EVERY 6 HOURS PRN
Status: DISCONTINUED | OUTPATIENT
Start: 2023-02-03 | End: 2023-02-05 | Stop reason: HOSPADM

## 2023-02-03 RX ORDER — HYDROCHLOROTHIAZIDE 25 MG/1
25 TABLET ORAL DAILY
Status: ON HOLD | COMMUNITY
End: 2023-02-05 | Stop reason: HOSPADM

## 2023-02-03 RX ORDER — DIPHENHYDRAMINE HYDROCHLORIDE 50 MG/ML
50 INJECTION INTRAMUSCULAR; INTRAVENOUS EVERY 6 HOURS PRN
Status: DISCONTINUED | OUTPATIENT
Start: 2023-02-03 | End: 2023-02-05 | Stop reason: HOSPADM

## 2023-02-03 RX ADMIN — MIRTAZAPINE 7.5 MG: 15 TABLET, FILM COATED ORAL at 21:37

## 2023-02-03 RX ADMIN — HALOPERIDOL LACTATE 5 MG: 5 INJECTION, SOLUTION INTRAMUSCULAR at 13:58

## 2023-02-03 RX ADMIN — TRAZODONE HYDROCHLORIDE 50 MG: 50 TABLET ORAL at 21:37

## 2023-02-03 RX ADMIN — LORAZEPAM 2 MG: 2 INJECTION INTRAMUSCULAR; INTRAVENOUS at 13:58

## 2023-02-03 RX ADMIN — CLONIDINE HYDROCHLORIDE 0.1 MG: 0.1 TABLET ORAL at 09:12

## 2023-02-03 RX ADMIN — DIPHENHYDRAMINE HYDROCHLORIDE 50 MG: 50 INJECTION, SOLUTION INTRAMUSCULAR; INTRAVENOUS at 13:58

## 2023-02-03 RX ADMIN — HYDROXYZINE HYDROCHLORIDE 50 MG: 50 TABLET, FILM COATED ORAL at 21:37

## 2023-02-03 NOTE — GROUP NOTE
Psych-Ed/Relapse Prevention Group Note        Date: February 3, 2023 Start Time: 11am  End Time: 11:45am      Number of Participants in Group & Unit Census:  9/20    Topic: Coping skills    Goal of Group:Patient will identify benefits of music for coping and stress management. Comments:     Patient did not participate in Psych-Ed/Relapse Prevention group, despite staff encouragement and explanation of benefits. Patient remain seclusive to self. Q15 minute safety checks maintained for patient safety and will continue to encourage patient to attend unit programming.          Signature:  Minta Breath, [address]

## 2023-02-03 NOTE — BH NOTE
Patient given tobacco quitline number [phone] at this time, refusing to call at this time, states \" I just dont want to quit now\"- patient given information as to the dangers of long term tobacco use. Continue to reinforce the importance of tobacco cessation.

## 2023-02-03 NOTE — PROGRESS NOTES
Behavioral Services  Medicare Certification Upon Admission    I certify that this patient's inpatient psychiatric hospital admission is medically necessary for:    [x] (1) Treatment which could reasonably be expected to improve this patient's condition,       [x] (2) Or for diagnostic study;     AND     [x](2) The inpatient psychiatric services are provided while the individual is under the care of a physician and are included in the individualized plan of care.     Estimated length of stay/service 2-9 days    Plan for post-hospital care -outpatient care    Electronically signed by Dajuan Garcia MD on 2/3/2023 at 9:09 AM

## 2023-02-03 NOTE — BH NOTE
[facility]  Admission Note     Admission Type:   Admission Type: Involuntary    Reason for admission:  Reason for Admission: Suicidal thoughts to cut self due to drug abuse issues, non compliant with medications      Addictive Behavior:        Medical Problems:   Past Medical History:   Diagnosis Date    Alcoholism ([address]     Anxiety     Bipolar 1 disorder (HCC)     Cocaine abuse (HCC)     Depression     Hepatitis C antibody test positive     Hypertension     Irregular heartbeat     Mild tetrahydrocannabinol (THC) abuse     Suicidal ideation        Status EXAM:  Mental Status and Behavioral Exam  Normal: No  Level of Assistance: Independent/Self  Facial Expression: Flat  Affect: Blunt  Level of Consciousness: Alert  Frequency of Checks: 4 times per hour, close  Mood:Normal: No  Mood: Depressed, Anxious, Guilty, Labile  Motor Activity:Normal: Yes  Eye Contact: Fair  Observed Behavior: Withdrawn, Guarded  Sexual Misconduct History: Past - no  Preception: [place] to person, [place] to time, [place] to place, [place] to situation  Attention:Normal: No  Attention: Distractible  Thought Processes: Circumstantial  Thought Content:Normal: No  Thought Content: Poverty of content  Depression Symptoms: Impaired concentration, Increased irritability, Feelings of helplessness, Feelings of hopelessess  Anxiety Symptoms: Generalized  Lizett Symptoms: No problems reported or observed.   Hallucinations: None  Delusions: No  Memory:Normal: Yes  Insight and Judgment: No  Insight and Judgment: Poor judgment, Poor insight, Unmotivated    Tobacco Screening:  Practical Counseling, on admission, rachna X, if applicable and completed (first 3 are required if patient doesn't refuse):            ( ) Recognizing danger situations (included triggers and roadblocks)                    ( ) Coping skills (new ways to manage stress,relaxation techniques, changing routine, distraction) ( ) Basic information about quitting (benefits of quitting, techniques in how to quit, available resources  ( ) Referral for counseling faxed to [facility]                                                                                                                   ( X) Patient refused counseling  ( ) Patient has not smoked in the last 30 days    Metabolic Screening:    Lab Results   Component Value Date    LABA1C 5.6 06/18/2020       Lab Results   Component Value Date    CHOL 187 06/18/2020    CHOL 194 04/16/2018     Lab Results   Component Value Date    TRIG 98 06/18/2020    TRIG 120 04/16/2018     Lab Results   Component Value Date    HDL 50 06/18/2020    HDL 44 04/16/2018     No components found for: LDLCAL  No results found for: LABVLDL      Body mass index is 28.84 kg/m². BP Readings from Last 2 Encounters:   02/02/23 (!) 137/92   02/02/23 (!) 135/96           Pt admitted with followings belongings:  Dental Appliances: None  Vision - Corrective Lenses: None  Hearing Aid: None  Jewelry: Bracelet  Body Piercings Removed: N/A  Clothing: Footwear, [place], Jacket/Coat, [place], [place], Shorts, Socks, Undergarments, [place]  Other Valuables: Money, PAULINO, Lighter/Matches ($1.60)    Sivan Mike RN       PT admitted to unit and wanded for safety. PT having fleeting suicidal thoughts to cut him self. PT had recent attempt by overdosing on opiates. PT depressed due to drug use. PT has been non compliant with medications since last admission in December.

## 2023-02-03 NOTE — H&P
Department of Psychiatry  Attending Physician Psychiatric Assessment     Reason for Admission to Psychiatric Unit:  A mental disorder causing major disability in social, interpersonal, occupational, and/or educational functioning that is leading to dangerous or life-threatening functioning, and that can only be addressed in an acute inpatient setting   Concerns about patient's safety in the community    CHIEF COMPLAINT: Suicide attempt - patient attempted to end his life by overdosing on fentanyl    History obtained from: Patient, electronic medical record          HISTORY OF PRESENT ILLNESS:    Mahnaz Newton is a 34 y.o. male who has a past medical history of hypertension, mental illness, and polysubstance use. Patient presented to the ED following an intentional overdose of fentanyl. He was admitted to medical floor IV Narcan treatment. Patient is known to this facility and has had multiple [facility] admissions, with last admission in December 2022. Patient was seen for initial evaluation today. He was resting in bed on approach but responded to verbal stimuli. He was irritable and minimally engaged in conversation. He did reply to assessment questions but with minimal detail. Patient reports that his mood today is \"not so good\". He is endorsing current symptoms of depression including poor sleep, decreased appetite, anhedonia, low motivation, feelings of worthlessness, hopelessness, and helplessness, poor energy and concentration, and increasing suicidal ideation over the last month. Patient denies any significant triggers or events that caused an increase in symptoms of depression or suicidal ideation but he does state that he was released from [facility] inpatient rehab on 1/26/2023. He has been living with his brother. Patient relapsed on cocaine, marijuana, and fentanyl on January 31. Patient continues to endorse suicidal ideation today with thoughts to overdose.   He reports that he did not maintain medication adherence after discharge and has not taken any psychiatric medications since December. Patient is open to restarting medication. Patient has no interest in continuing conversation and requests to stop assessment. Therefore much of documentation was completed via use of EMR. According to documentation, patient has denied a history of anxiety and panic attacks. He denies past symptoms of ashish. He has endorsed experiencing auditory and visual hallucinations when utilizing substances. He denied any paranoia. He has denied symptoms of OCD, PTSD, and phobias. Patient has previously reported a significant history of trauma while he was growing up explaining that he had a good childhood until his father passed away in his teens. He has shared that he experienced trauma both protecting himself and from other individuals in his neighborhood although he was unwilling to discuss in detail. Patient shared during his last admission that he still struggles with the loss of his mother who  8 years ago. He has identified with cluster B personality traits including poor self-esteem, chronic feelings of emptiness and a long history of unstable relationships. He has previously identified with impulsivity and labile mood and shares that he does have a history of self injury to help \"relieve stress \". Patient's drug screen on 2023 was positive for marijuana, cocaine, and fentanyl. Blood alcohol level was negative. Patient would like to discharge to an inpatient AOD facility. He is unsure at this time which when he would like to attend. At this time patient is not able to contract for safety in the community and warrants hospitalization for safety and stabilization.          History of head trauma: [x] Yes [] No    History of seizures: [] Yes [x] No    History of violence or aggression: [] Yes [x] No         PSYCHIATRIC HISTORY:  [x] Yes [] No    Currently follows with [facility]  Multiple lifetime suicide attempts  Multiple psychiatric hospital admissions    Home Medication Compliance: [] Yes [x] No    Past psychiatric medications includes:   Remeron, trazodone, Zoloft, Effexor, Atarax    Adverse reactions from psychotropic medications: [] Yes [x] No         Lifetime Psychiatric Review of Systems         Depression: Endorses     Anxiety: Denies     Panic Attacks: Denies     Lizett or Hypomania: Denies     Phobias: Denies     Obsessions and Compulsions: Denies     Body or Vocal Tics: Denies     Visual Hallucinations: Endorses per history only     Auditory Hallucinations: Endorses per history only     Delusions/Paranoia: Denies     PTSD: Denies    Past Medical History:        Diagnosis Date    Al[facility]ism ([facility]     Anxiety     Bipolar 1 disorder (HCC)     Cocaine abuse ([facility]     Depression     Hepatitis C antibody test positive     Hypertension     Irregular heartbeat     Mild tetrahydrocannabinol (THC) abuse     Suicidal ideation        Past Surgical History:        Procedure Laterality Date    DIALYSIS FISTULA CREATION Right 6/4/2017    RIGHT WRIST WOUND EXPLORATION WITH ARTERIAL REPAIR ULNAR AND RADIAL ARTERIES RIGHT WRIST performed by Jamel Prajapati MD at [address] Right 6/4/2017    WOUND EXPLORATION AND/OR REVISION performed by Jamel Prajapati MD at [address] Left     OTHER SURGICAL HISTORY Right 06/04/2017    exp and repair of unlar and radial artery with exp and repair of 12 tendons and 2 nerves       Allergies:  Vicodin [hydrocodone-acetaminophen]         Social History:     Born in: [place], [place]  Family: Raised by mother and father until father passed when patient was 14; mother passed away 8 years ago; patient has a brother and sister that he is close with  Highest Level of Education: GED   Occupation: Unemployed  Marital Status: Never   Children: 4 children   Residence: Lives with brother  Stressors: Addiction, chronic mental illness, financial  Patient Assets/Supportive Factors: Willingness to seek treatment         DRUG USE HISTORY  Social History     Tobacco Use   Smoking Status Every Day    Packs/day: 0.50    Years: 14.00    Pack years: 7.00    Types: Cigarettes   Smokeless Tobacco Never   Tobacco Comments    3 cigs per day per patient     Social History     Substance and Sexual Activity   Alcohol Use Not Currently     Social History     Substance and Sexual Activity   Drug Use Yes    Types: Marijuana ([place]), Cocaine    Comment: states uses fentanyl     23: UDS positive cannabis, cocaine, fentanyl; EtOH negative         LEGAL HISTORY:   HISTORY OF INCARCERATION: [x] Yes [] No    Family History:       Problem Relation Age of Onset    Heart Disease Father 52    Asthma Brother     Hypertension Maternal Grandmother     Lung Cancer Maternal Grandmother         Kidney cancer, liver cancer    Stroke Maternal Grandmother     Heart Disease Mother 46         of presumed heart disease in her sleep       Psychiatric Family History  Patient denies psychiatric family history. Suicides in family: [] Yes [x] No    Substance use in family: [] Yes [x] No  Parents       PHYSICAL EXAM:  Vitals:  BP (!) 141/82   Pulse 82   Temp 98 °F (36.7 °C) (Temporal)   Resp 14   Ht 5' 10\" (1.778 m)   Wt 201 lb (91.2 kg)   BMI 28.84 kg/m²   Pain Level: 0    LABS:  Labs reviewed: [x] Yes  Metabolic Screening:  [] Yes [x] No (order HgBA1C/Lipid panel if not screened in past year)  Last EKG in EMR reviewed: [x] Yes          Review of Systems   Constitutional: Negative for chills and weight loss. HENT: Negative for ear pain and nosebleeds. Eyes: Negative for blurred vision and photophobia. Respiratory: Negative for cough, shortness of breath and wheezing. Cardiovascular: Negative for chest pain and palpitations. Gastrointestinal: Negative for abdominal pain, diarrhea and vomiting. Genitourinary: Negative for dysuria and urgency.    Musculoskeletal: Negative for falls and joint pain. Skin: Negative for itching and rash. Neurological: Negative for tremors, seizures and weakness. Endo/Heme/Allergies: Does not bruise/bleed easily. Mental Status Examination:    Level of consciousness: Somnolent  Appearance:  Appropriate attire, resting in bed, fair grooming and hygiene  Behavior/Motor: Guarded, withdrawn, no psychomotor abnormalities  Attitude toward examiner: Minimally cooperative, inattentive, poor eye contact  Speech: Normal rate, volume, and irritable tone. Mood: Irritable  Affect: Blunted  Thought processes: Coherent  Thought content: Active suicidal ideations, with a  current plan or intent, contracts for safety on the unit; unable to contract for safety in community              Denies homicidal ideations               Denies hallucinations              Denies delusions              Denies paranoia  Cognition:  Oriented to self, location, time, situation  Concentration: Clinically adequate  Memory: Intact  Insight &Judgment: Poor         DSM-5 Diagnosis    Principal Problem: Major depressive disorder, recurrent severe without psychotic features (HCC)  Cocaine use disorder  Fentanyl use disorder  Cannabis use disorder    Cluster B personality disorder traits    Psychosocial and Contextual factors:  Financial X  Occupational X  Relationship   Legal   Living situation X  Educational     Past Medical History:   Diagnosis Date    Al[facility]ism ([facility]     Anxiety     [facility]     Cocaine abuse ([facility]     Depression     Hepatitis C antibody test positive     Hypertension     Irregular heartbeat     Mild tetrahydrocannabinol (THC) abuse     Suicidal ideation         PLAN:  Continue inpatient psychiatric treatment. Home medications reviewed. Remeron 7.5 mg by mouth nightly restarted  Clonidine 0.1 mg by mouth 2 times daily  Monitor need and frequency of PRN medications. Attempt to develop insight. Follow-up daily while inpatient. Reviewed medications risks and benefits as well as potential side effects with patient. CONSULT:  [x] Yes [] No  Internal medicine for medical management/medical H&P    Risk Management: close watch per standard protocol      Psychotherapy: participation in milieu and group and individual sessions with Attending Physician,  and Physician Assistant/CNP      Estimated length of stay:  2-14 days      GENERAL PATIENT/FAMILY EDUCATION  Patient will understand basic signs and symptoms, patient will understand benefits/risks and potential side effects from proposed medications, and patient will understand their role in recovery. Family is active in patient's care. Patient assets that may be helpful during treatment include: Intent to participate and engage in treatment, sufficient fund of knowledge and intellect to understand and utilize treatments. Goals:    1) Remission of suicidal ideation. 2) Stabilization of symptoms prior to discharge. 3) Establish efficacy and tolerability of medications. Behavioral Services  Medicare Certification     Admission Day 1  I certify that this patient's inpatient psychiatric hospital admission is medically necessary for:    x (1) treatment which could reasonably be expected to improve this patient's condition, or    x (2) diagnostic study or its equivalent. Time Spent: 60 minutes    Dae Holden is a 34 y.o. male being evaluated face to face    --ROXANNE Melissa CNP on 2/3/2023 at 12:45 PM    An electronic signature was used to authenticate this note. Please note that this chart was generated using voice recognition Dragon dictation software. Although every effort was made to ensure the accuracy of this automated transcription, some errors in transcription may have occurred.

## 2023-02-03 NOTE — PLAN OF CARE
Problem: Self Harm/Suicidality  Goal: Will have no self-injury during hospital stay  Description: INTERVENTIONS:  1. Ensure constant observer at bedside with Q15M safety checks  2. Maintain a safe environment  3. Secure patient belongings  4. Ensure family/visitors adhere to safety recommendations  5. Ensure safety tray has been added to patient's diet order  6. Every shift and PRN: Re-assess suicidal risk via Frequent Screener    2/3/2023 1701 by Ariana Johnson LPN  Outcome: Progressing    Problem: Drug Abuse/Detox  Goal: Will have no detox symptoms and will verbalize plan for changing drug-related behavior  Description: INTERVENTIONS:  1. Administer medication as ordered  2. Monitor physical status  3. Provide emotional support with 1:1 interaction with staff  4. Encourage  recovery focused treatment   2/3/2023 1701 by Ariana Johnson LPN  Outcome: Progressing     Patient is irritable, evasive, and abrasive. Denies suicidal/homicidal ideations, auditory/visual hallucinations, or any detox symptoms. Patient repeats for every question, \"I'm good. I want to go. \"  Patient is focused on discharge and became agitated when told he was not going to be discharged. Patient stated he was going to \"start throwing shit and blow up\" if not allowed to leave. Patient required emergency PRN medications this afternoon after refusing oral PRN's. Patient was accepting of injections, and they were effective.

## 2023-02-03 NOTE — GROUP NOTE
Psych-Ed/Relapse Prevention Group Note        Date: February 3, 2023 Start Time: 1:30pm  End Time:  2:15pm      Number of Participants in Group & Unit Census:  7/16    Topic: Mental health education and advocacy    Goal of Group:Patient will identify benefits of educating and discussing mental health in their lives. Comments:     Patient did not participate in Psych-Ed/Relapse Prevention group, despite staff encouragement and explanation of benefits. Patient remain seclusive to self. Q15 minute safety checks maintained for patient safety and will continue to encourage patient to attend unit programming.          Signature:  Darrell Lopez [place]

## 2023-02-03 NOTE — PROGRESS NOTES
Pharmacy Medication History Note      List of current medications patient is taking is complete. Source of information: [facility], fill history, recent admission in December 2022, [facility]    Changes made to medication list:  Medications removed (include reason, ex. therapy complete or physician discontinued, noncompliance):  - none    Medications added/doses adjusted:  Suboxone 8/2 films, last filled 1/20/23, 7 for 7 days  Amlodipine 5 mg daily  Remeron 7.5 mg changed to 15 mg nightly (was discharged from [facility] in Dec on 7.5 mg)    Other notes (ex. Recent course of antibiotics, Coumadin dosing):  Hydrochlorothiazide - has not taken since discharge, no fill history since December 7, 2022 upon discharge from [address] [place] [address] Medication List at Time of Admission:  Prior to Admission medications    Medication Sig Start Date End Date Taking? Authorizing Provider   buprenorphine-naloxone (SUBOXONE) 8-2 MG FILM SL film Place 1 Film under the tongue daily. Yes Historical Provider, MD   amLODIPine (NORVASC) 5 MG tablet Take 5 mg by mouth daily   Yes Historical Provider, MD   mirtazapine (REMERON) 7.5 MG tablet Take 1 tablet by mouth nightly  Patient taking differently: Take 15 mg by mouth nightly 12/7/22   Lorenzo Castro MD   traZODone (DESYREL) 50 MG tablet Take 1 tablet by mouth nightly as needed for Sleep 12/7/22   Lorenzo Castro MD   hydroCHLOROthiazide (HYDRODIURIL) 25 MG tablet Take 1 tablet by mouth daily 12/7/22 2/3/23  Lorenzo Castro MD   hydrOXYzine HCl (ATARAX) 50 MG tablet Take 50 mg by mouth 3 times daily as needed for Anxiety    Historical Provider, MD         Please let me know if you have any questions about this encounter. Thank you!     Electronically signed by WINTER Eisenberg [facility] on 2/3/2023 at 8:34 AM

## 2023-02-03 NOTE — PLAN OF CARE
[facility]  Initial Interdisciplinary Treatment Plan NO      Original treatment plan Date & Time: 2/3/23  1245    Admission Type:  Admission Type:  Involuntary    Reason for admission:   Reason for Admission: Suicidal thoughts to cut self due to drug abuse issues, non compliant with medications    Estimated Length of Stay:  5-7days  Estimated Discharge Date: to be determined by physician    PATIENT STRENGTHS:  Patient Strengths:   Patient Strengths and Limitations:Limitations: Difficult relationships / poor social skills, Difficulty problem solving/relies on others to help solve problems, Inappropriate/potentially harmful leisure interests, Apathetic / unmotivated  Addictive Behavior: Addictive Behavior  In the Past 3 Months, Have You Felt or Has Someone Told You That You Have a Problem With  :  Sherren Lot)  Medical Problems:  Past Medical History:   Diagnosis Date    Al[facility]ism ([facility]     Anxiety     [facility]     Cocaine abuse ([facility]     Depression     Hepatitis C antibody test positive     Hypertension     Irregular heartbeat     Mild tetrahydrocannabinol (THC) abuse     Suicidal ideation      Status EXAM:Mental Status and Behavioral Exam  Normal: No  Level of Assistance: Independent/Self  Facial Expression: Flat  Affect: Blunt  Level of Consciousness: Alert  Frequency of Checks: 4 times per hour, close  Mood:Normal: No  Mood: Depressed, Anxious, Guilty, Labile  Motor Activity:Normal: Yes  Eye Contact: Fair  Observed Behavior: Withdrawn, Guarded  Sexual Misconduct History: Past - no  Preception: [place] to person, [place] to time, [place] to place, [place] to situation  Attention:Normal: No  Attention: Distractible  Thought Processes: Circumstantial  Thought Content:Normal: No  Thought Content: Poverty of content  Depression Symptoms: Impaired concentration, Increased irritability, Feelings of helplessness, Feelings of hopelessess  Anxiety Symptoms: Generalized  Lizett Symptoms: No problems reported or observed.   Hallucinations: None  Delusions: No  Memory:Normal: Yes  Insight and Judgment: No  Insight and Judgment: Poor judgment, Poor insight, Unmotivated    EDUCATION:   Learner Progress Toward Treatment Goals: reviewed group plans and strategies for care    Method:group therapy, medication compliance, individualized assessments and care planning    Outcome: needs reinforcement    PATIENT GOALS: to be discussed with patient within 72 hours    PLAN/TREATMENT RECOMMENDATIONS:     continue group therapy , medications compliance, goal setting, individualized assessments and care, continue to monitor pt on unit      SHORT-TERM GOALS:     Declines participation in treatment team    LONG-TERM GOALS:  Time frame for Long-Term Goals: 6 months  Members Present in Team Meeting: See Signature Sheet    Caterina Rodriguez RN

## 2023-02-03 NOTE — PLAN OF CARE
Problem: Discharge Planning  Goal: Discharge to home or other facility with appropriate resources  2/2/2023 2043 by Wendi Roberson RN  Outcome: Progressing  2/2/2023 0909 by Sara Somers RN  Outcome: Progressing     Problem: Pain  Goal: Verbalizes/displays adequate comfort level or baseline comfort level  2/2/2023 2043 by Wendi Roberson RN  Outcome: Progressing  2/2/2023 0909 by Sara Somers RN  Outcome: Progressing     Problem: Skin/Tissue Integrity  Goal: Absence of new skin breakdown  Description: 1. Monitor for areas of redness and/or skin breakdown  2. Assess vascular access sites hourly  3. Every 4-6 hours minimum:  Change oxygen saturation probe site  4. Every 4-6 hours:  If on nasal continuous positive airway pressure, respiratory therapy assess nares and determine need for appliance change or resting period. 2/2/2023 2043 by Wendi Roberson RN  Outcome: Progressing  2/2/2023 0909 by Sara Somers RN  Outcome: Progressing     Problem: Safety - Adult  Goal: Free from fall injury  2/2/2023 2043 by Wendi Roberson RN  Outcome: Progressing  2/2/2023 0909 by Sara Somers RN  Outcome: Progressing     Problem: Self Harm/Suicidality  Goal: Will have no self-injury during hospital stay  Description: INTERVENTIONS:  1. Ensure constant observer at bedside with Q15M safety checks  2. Maintain a safe environment  3. Secure patient belongings  4. Ensure family/visitors adhere to safety recommendations  5. Ensure safety tray has been added to patient's diet order  6.   Every shift and PRN: Re-assess suicidal risk via Frequent Screener    2/2/2023 2043 by Wendi Roberson RN  Outcome: Progressing  2/2/2023 0909 by Sara Somers RN  Outcome: Progressing

## 2023-02-03 NOTE — CARE COORDINATION
BHI Biopsychosocial Assessment    Current Level of Psychosocial Functioning     Independent X  Dependent    Minimal Assist     Comments:    Psychosocial High Risk Factors (check all that apply)    Unable to obtain meds   Chronic illness/pain    Substance abuse X  Lack of Family Support   Financial stress   Isolation   Inadequate Community Resources  Suicide attempt(s)  Not taking medications   Victim of crime   Developmental Delay  Unable to manage personal needs    Age 72 or older   Homeless  No transportation   Readmission within 30 days  Unemployment  Traumatic Event    Comments:   Psychiatric Advanced Directives: n/a    Family to [employer] in Treatment: LENNIE    Sexual Orientation:  n/a    Patient Strengths: insurance    Patient Barriers: substance use, suicidal ideations      Opiate Education Provided:  not at this time- patient refuses      [facility]/mental health history: [facility]    Plan of Care   medication management, group/individual therapies, family meetings, psycho -education, treatment team meetings to assist with stabilization    Initial Discharge Plan:  TBD by patient       Clinical Summary:    Jennifer Martinez is a 35 y/o male admitted to [facility] on emergency admission application for suicidal ideations. Patient refused to participate in assessment with social work today. He presented to the ED at [facility] for accidental overdose on fentanyl and while being treated for the overdose reported suicidal ideations related to increase in depression. Patient's living situation, any support system he may have, and compliance with mental health treatment is not known at this time. Social work will continue to offer support to patient while engaging in discharge planning as his symptoms begin to improve.

## 2023-02-03 NOTE — BH NOTE
Patient calmly came out of room after sleeping for some time, requested something to eat and drink, and sat down quietly and ate. Writer spoke with patient to see how he was feeling, and he reports feeling better. Patient also apologized for his behavior and what he said earlier.

## 2023-02-03 NOTE — BH NOTE
Emergency Medication Follow-Up Note:    PRN medication of IM Haldol 5mg, Benadryl 50mg, Ativan 2mg, was effective as evidence by patient regaining behavioral control and able to rest quietly in room. Patient denies medication side effects. Will continue to monitor and provide support as needed.

## 2023-02-03 NOTE — BH NOTE
Emergency PRN Medication Administration Note:      Patient is Agitated as evidence by threatening to \"flip out and start throwing shit if you don't get me out of here. \" Patient has become increasingly more agitated as the day has progressed because he wants to be discharged. Staff attempted to find and relieve the distress by Talking to patient, Refocusing on new activity, and Administer PRN medications Patient is currently accepting of PRN medications. Medication Administered as prescribed: PRN IM Haldol 5mg, Benadryl 50mg, and Ativan 2mg. Patient Tolerated medication administration. Will continue to monitor, offer support, and reassess.

## 2023-02-04 PROCEDURE — 6370000000 HC RX 637 (ALT 250 FOR IP): Performed by: STUDENT IN AN ORGANIZED HEALTH CARE EDUCATION/TRAINING PROGRAM

## 2023-02-04 PROCEDURE — 1240000000 HC EMOTIONAL WELLNESS R&B

## 2023-02-04 RX ADMIN — TRAZODONE HYDROCHLORIDE 50 MG: 50 TABLET ORAL at 21:17

## 2023-02-04 RX ADMIN — MIRTAZAPINE 7.5 MG: 15 TABLET, FILM COATED ORAL at 21:17

## 2023-02-04 RX ADMIN — HYDROXYZINE HYDROCHLORIDE 50 MG: 50 TABLET, FILM COATED ORAL at 18:33

## 2023-02-04 RX ADMIN — CLONIDINE HYDROCHLORIDE 0.1 MG: 0.1 TABLET ORAL at 09:50

## 2023-02-04 RX ADMIN — CLONIDINE HYDROCHLORIDE 0.1 MG: 0.1 TABLET ORAL at 21:17

## 2023-02-04 NOTE — PLAN OF CARE
Problem: Drug Abuse/Detox  Goal: Will have no detox symptoms and will verbalize plan for changing drug-related behavior  Description: INTERVENTIONS:  1. Administer medication as ordered  2. Monitor physical status  3. Provide emotional support with 1:1 interaction with staff  4. Encourage  recovery focused treatment   2/4/2023 1218 by Iam Soni LPN  Outcome: Not Progressing     Problem: Self Harm/Suicidality  Goal: Will have no self-injury during hospital stay  Description: INTERVENTIONS:  1. Ensure constant observer at bedside with Q15M safety checks  2. Maintain a safe environment  3. Secure patient belongings  4. Ensure family/visitors adhere to safety recommendations  5. Ensure safety tray has been added to patient's diet order  6. Every shift and PRN: Re-assess suicidal risk via Frequent Screener    2/4/2023 1218 by Iam Soni LPN  Outcome: Progressing     Problem: Drug Abuse/Detox  Goal: Will have no detox symptoms and will verbalize plan for changing drug-related behavior  Description: INTERVENTIONS:  1. Administer medication as ordered  2. Monitor physical status  3. Provide emotional support with 1:1 interaction with staff  4. Encourage  recovery focused treatment   2/4/2023 1218 by Iam Soni LPN  Outcome: Not Progressing  2/3/2023 2351 by Albert Waldrop RN  Outcome: Progressing  Patient denies intent to harm self.  Support and encouragement provided

## 2023-02-04 NOTE — PLAN OF CARE
Patient has been seclusive to his room this evening. Patient reports some depression and anxiety. Patient stated that he has been eating and sleeping okay. Patient was compliant with medications. Patient denies any suicidal thoughts at this time. Patient agrees to come talk with staff if having any thoughts to harm himself this shift. 15 min rounds continued for patient safety. Problem: Self Harm/Suicidality  Goal: Will have no self-injury during hospital stay  Description: INTERVENTIONS:  1. Ensure constant observer at bedside with Q15M safety checks  2. Maintain a safe environment  3. Secure patient belongings  4. Ensure family/visitors adhere to safety recommendations  5. Ensure safety tray has been added to patient's diet order  6. Every shift and PRN: Re-assess suicidal risk via Frequent Screener    2/3/2023 2351 by Jared Rosas RN  Outcome: Progressing  2/3/2023 1701 by Ariana Johnson LPN  Outcome: Progressing  2/3/2023 1357 by Shahrzad Sanders RN  Outcome: Progressing     Problem: Drug Abuse/Detox  Goal: Will have no detox symptoms and will verbalize plan for changing drug-related behavior  Description: INTERVENTIONS:  1. Administer medication as ordered  2. Monitor physical status  3. Provide emotional support with 1:1 interaction with staff  4.  Encourage  recovery focused treatment   2/3/2023 2351 by Jared Rosas RN  Outcome: Progressing  2/3/2023 1701 by Ariana Johnson LPN  Outcome: Progressing  2/3/2023 1357 by Shahrzad Sanders RN  Outcome: Progressing

## 2023-02-04 NOTE — GROUP NOTE
Group Therapy Note    Date: 2/4/2023    Group Start Time: 1400  Group End Time: 1440  Group Topic: Psychoeducation    JEREMIAH TILLMAN    SHANNAN Lam        Group Therapy Note    Attendees: 5/15    Psych-Ed/Relapse Prevention Group Note        Date: February 4, 2023 Start Time: 2pm  End Time: 2:40pm      Number of Participants in Group & Unit Census:  5/15    Topic:  interpersonal skills, coping skills, self-expression     Goal of Group: To improve interpersonal skills and coping skills awareness through collaborating with peers and demonstrating self-expression. Comments:     Patient did not participate in Psych-Ed/Relapse Prevention group, despite staff encouragement and explanation of benefits. Patient remain seclusive to self. Q15 minute safety checks maintained for patient safety and will continue to encourage patient to attend unit programming.         Signature:  SHANNAN Lam

## 2023-02-04 NOTE — PROGRESS NOTES
Daily Progress Note  2/4/2023    Patient Name: Miguel Ángel Rodriguez    CHIEF COMPLAINT:  Suicide attempt by overdose on fentanyl          SUBJECTIVE:      Patient is seen today for a follow up assessment. Richa Maki is compliant with scheduled medications. He did require emergency IM Benadryl, Haldol, and Ativan yesterday 2/3 around 2 PM.  Per nursing documentation patient was threatening to flip out and start throwing things if he was not discharged. Richa Maki is agreeable to assessment at bedside today. He is calm and cooperative. When asked about events leading up to hospitalization he does report that he was using Fentanyl however denies that it was a suicide attempt. He appears to be somewhat minimizing of this. He denies that he is depressed or anxious today. He reports improvement in suicidal ideation. He denies homicidal ideation. He denies perceptual disturbances. Richa Maki does appear to be minimizing of his overdose and overall symptoms. He does not appear to want health with his mental health symptoms. We discussed substance use rehabilitation however he reports that he is not interested in going to rehabilitation at this time and just wants to go home. We discussed that if stable through tomorrow, we will discharge and he is agreeable to this.   Will discuss with social work team.    Appetite:  [x] Adequate/Unchanged  [] Increased  [] Decreased      Sleep:       [x] Adequate/Unchanged  [] Fair  [] Poor      Group Attendance on Unit:   [] Yes   [] Selectively    [x] No    Compliant with scheduled medications: [x] Yes  [] No    Received emergency medications in past 24 hrs: [x] Yes   [] No    Medication Side Effects: Denies         Mental Status Exam  Level of consciousness: Alert and awake   Appearance: Appropriate attire for setting, resting in bed, with poor grooming and hygiene, disheveled  Behavior/Motor: Approachable, engages with interviewer, withdrawn but calm  Attitude toward examiner: Cooperative, attentive, good eye contact  Speech: spontaneous, normal rate, and normal volume   Mood:  Depressed  Affect: Mood-congruent  Thought processes: linear and coherent   Thought content:  Denies homicidal ideation  Suicidal Ideation: Reports improvement in suicidal ideations, contracts for safety on the unit. Delusions: No evidence of delusions. Perceptual Disturbance:  Patient does not appear to be responding to internal stimuli. Cognition: Oriented to self, location, time, and situation  Memory: intact  Insight: poor   Judgement: poor     Data   height is 5' 10\" (1.778 m) and weight is 201 lb (91.2 kg). His temporal temperature is 98.2 °F (36.8 °C). His blood pressure is 128/66 and his pulse is 53. His respiration is 14. Labs:   No visits with results within 2 Day(s) from this visit.    Latest known visit with results is:   Admission on 02/01/2023, Discharged on 02/02/2023   Component Date Value Ref Range Status    WBC 02/01/2023 7.6  3.5 - 11.3 k/uL Final    RBC 02/01/2023 4.36  4.21 - 5.77 m/uL Final    Hemoglobin 02/01/2023 13.0  13.0 - 17.0 g/dL Final    Hematocrit 02/01/2023 39.1 (A)  40.7 - 50.3 % Final    MCV 02/01/2023 89.7  82.6 - 102.9 fL Final    MCH 02/01/2023 29.8  25.2 - 33.5 pg Final    MCHC 02/01/2023 33.2  28.4 - 34.8 g/dL Final    RDW 02/01/2023 12.2  11.8 - 14.4 % Final    Platelets 94/92/0219 262  138 - 453 k/uL Final    MPV 02/01/2023 8.5  8.1 - 13.5 fL Final    NRBC Automated 02/01/2023 0.3 (A)  0.0 per 100 WBC Final    Seg Neutrophils 02/01/2023 67 (A)  36 - 65 % Final    Lymphocytes 02/01/2023 24  24 - 43 % Final    Monocytes 02/01/2023 8  3 - 12 % Final    Eosinophils % 02/01/2023 1  1 - 4 % Final    Basophils 02/01/2023 0  0 - 2 % Final    Immature Granulocytes 02/01/2023 0  0 % Final    Segs Absolute 02/01/2023 5.05  1.50 - 8.10 k/uL Final    Absolute Lymph # 02/01/2023 1.80  1.10 - 3.70 k/uL Final    Absolute Mono # 02/01/2023 0.63  0.10 - 1.20 k/uL Final    Absolute Eos # 02/01/2023 0. 11  0.00 - 0.44 k/uL Final    Basophils Absolute 02/01/2023 <0.03  0.00 - 0.20 k/uL Final    Absolute Immature Granulocyte 02/01/2023 0.03  0.00 - 0.30 k/uL Final    Ventricular Rate 02/01/2023 134  BPM Preliminary    Atrial Rate 02/01/2023 120  BPM Preliminary    QRS Duration 02/01/2023 78  ms Preliminary    Q-T Interval 02/01/2023 306  ms Preliminary    QTc Calculation (Bazett) 02/01/2023 456  ms Preliminary    R [place] 02/01/2023 34  degrees Preliminary    T Axis 02/01/2023 8  degrees Preliminary    TSH 02/01/2023 1.85  0.30 - 5.00 uIU/mL Final    Acetaminophen Level 02/01/2023 <5 (A)  10 - 30 ug/mL Final    Ethanol 02/01/2023 <10  <10 mg/dL Final    Ethanol percent 02/01/2023 <0.010  <9.395 % Final    Salicylate Lvl 23/30/7578 <1 (A)  3 - 10 mg/dL Final    Toxic Tricyclic Sc,Blood 92/99/6417 NEGATIVE  NEGATIVE Final    Amphetamine Screen, Ur 02/01/2023 NEGATIVE  NEGATIVE Final    Comment:       (Positive cutoff 1000 ng/mL)                  Barbiturate Screen, Ur 02/01/2023 NEGATIVE  NEGATIVE Final    Comment:       (Positive cutoff 200 ng/mL)                  Benzodiazepine Screen, Urine 02/01/2023 NEGATIVE  NEGATIVE Final    Comment:       (Positive cutoff 200 ng/mL)                  Cocaine Metabolite, Urine 02/01/2023 POSITIVE (A)  NEGATIVE Final    Comment:       (Positive cutoff 300 ng/mL)                  Methadone Screen, Urine 02/01/2023 NEGATIVE  NEGATIVE Final    Comment:       (Positive cutoff 300 ng/mL)                  Opiates, Urine 02/01/2023 NEGATIVE  NEGATIVE Final    Comment:       (Positive cutoff 300 ng/mL)                  Phencyclidine, Urine 02/01/2023 NEGATIVE  NEGATIVE Final    Comment:       (Positive cutoff 25 ng/mL)                  Cannabinoid Scrn, Ur 02/01/2023 POSITIVE (A)  NEGATIVE Final    Comment:       (Positive cutoff 50 ng/mL)                  Oxycodone Screen, Ur 02/01/2023 NEGATIVE  NEGATIVE Final    Comment:       (Positive cutoff 100 ng/mL)                  Fentanyl, Ur 02/01/2023 POSITIVE (A)  NEGATIVE Final    Comment:       (Positive cutoff  5 ng/ml)            Test Information 02/01/2023 Assay provides medical screening only. The absence of expected drug(s) and/or metabolite(s) may indicate diluted or adulterated urine, limitations of testing or timing of collection. Final    Comment: Testing for legal purposes should be confirmed by another method. To request confirmation   of test result, please call the lab within 7 days of sample submission. Glucose 02/01/2023 116 (A)  70 - 99 mg/dL Final    BUN 02/01/2023 24 (A)  6 - 20 mg/dL Final    Creatinine 02/01/2023 1.29 (A)  0.70 - 1.20 mg/dL Final    Est, Glom Filt Rate 02/01/2023 >60  >60 mL/min/1.73m2 Final    Comment:       These results are not intended for use in patients <25years of age. eGFR results are calculated without a race factor using the 2021 CKD-EPI equation. Careful clinical correlation is recommended, particularly when comparing to results   calculated using previous equations. The CKD-EPI equation is less accurate in patients with extremes of muscle mass, extra-renal   metabolism of creatine, excessive creatine ingestion, or following therapy that affects   renal tubular secretion.       Calcium 02/01/2023 8.4 (A)  8.6 - 10.4 mg/dL Final    Sodium 02/01/2023 137  135 - 144 mmol/L Final    Potassium 02/01/2023 3.2 (A)  3.7 - 5.3 mmol/L Final    Chloride 02/01/2023 98  98 - 107 mmol/L Final    CO2 02/01/2023 22  20 - 31 mmol/L Final    Anion Gap 02/01/2023 17  9 - 17 mmol/L Final    Alkaline Phosphatase 02/01/2023 68  40 - 129 U/L Final    ALT 02/01/2023 143 (A)  5 - 41 U/L Final    AST 02/01/2023 129 (A)  <40 U/L Final    Total Bilirubin 02/01/2023 0.6  0.3 - 1.2 mg/dL Final    Total Protein 02/01/2023 7.2  6.4 - 8.3 g/dL Final    Albumin 02/01/2023 4.3  3.5 - 5.2 g/dL Final    Albumin/Globulin Ratio 02/01/2023 1.5  1.0 - 2.5 Final    Hepatitis B Surface Ag 02/01/2023 NONREACTIVE  NONREACTIVE Final    Hepatitis C Ab 02/01/2023 REACTIVE (A)  NONREACTIVE Final    Comment:       The hepatitis C procedure used in our laboratory is a Chemiluminescent test specific for   three recombinant HCV antigens. A negative anti-HCV result indicates that the antibodies to   hepatitis C virus are not present at this time. Individuals with reactive anti-HCV should be considered infected and infectious until proven   otherwise. Confirmation of all equivocal or reactive results is recommended by ordering   HCV RNA by PCR. Results reported to the appropriate Health Department      Hep B Core Ab, IgM 02/01/2023 NONREACTIVE  NONREACTIVE Final    Hep A IgM 02/01/2023 NONREACTIVE  NONREACTIVE Final    Glucose 02/01/2023 116 (A)  70 - 99 mg/dL Final    BUN 02/01/2023 12  6 - 20 mg/dL Final    Creatinine 02/01/2023 0.78  0.70 - 1.20 mg/dL Final    Est, Glom Filt Rate 02/01/2023 >60  >60 mL/min/1.73m2 Final    Comment:       These results are not intended for use in patients <25years of age. eGFR results are calculated without a race factor using the 2021 CKD-EPI equation. Careful clinical correlation is recommended, particularly when comparing to results   calculated using previous equations. The CKD-EPI equation is less accurate in patients with extremes of muscle mass, extra-renal   metabolism of creatine, excessive creatine ingestion, or following therapy that affects   renal tubular secretion. Calcium 02/01/2023 7.7 (A)  8.6 - 10.4 mg/dL Final    Sodium 02/01/2023 137  135 - 144 mmol/L Final    Potassium 02/01/2023 4.3  3.7 - 5.3 mmol/L Final    Chloride 02/01/2023 108 (A)  98 - 107 mmol/L Final    CO2 02/01/2023 20  20 - 31 mmol/L Final    Anion Gap 02/01/2023 9  9 - 17 mmol/L Final    Specimen Description 02/01/2023 . NASAL SWAB   Final    MRSA, DNA, Nasal 02/01/2023 NEGATIVE  NEGATIVE Final    Comment: NEGATIVE:  MRSA DNA not detected by nucleic acid amplification. Results should be used as an adjunct to nosocomial control efforts to identify patients   needing enhanced precautions. The test is not intended to identify patients with staphylococcal infections. Results   should not be used to guide or monitor treatment for MRSA infections. Total CK 02/01/2023 2,990 (A)  39 - 308 U/L Final    Myoglobin 02/01/2023 576 (A)  28 - 72 ng/mL Final    WBC 02/02/2023 6.3  3.5 - 11.3 k/uL Final    RBC 02/02/2023 3.77 (A)  4.21 - 5.77 m/uL Final    Hemoglobin 02/02/2023 11.4 (A)  13.0 - 17.0 g/dL Final    Hematocrit 02/02/2023 34.5 (A)  40.7 - 50.3 % Final    MCV 02/02/2023 91.5  82.6 - 102.9 fL Final    MCH 02/02/2023 30.2  25.2 - 33.5 pg Final    MCHC 02/02/2023 33.0  28.4 - 34.8 g/dL Final    RDW 02/02/2023 12.1  11.8 - 14.4 % Final    Platelets 40/29/2020 231  138 - 453 k/uL Final    MPV 02/02/2023 8.5  8.1 - 13.5 fL Final    NRBC Automated 02/02/2023 0.0  0.0 per 100 WBC Final    Seg Neutrophils 02/02/2023 38  36 - 65 % Final    Lymphocytes 02/02/2023 49 (A)  24 - 43 % Final    Monocytes 02/02/2023 10  3 - 12 % Final    Eosinophils % 02/02/2023 3  1 - 4 % Final    Basophils 02/02/2023 0  0 - 2 % Final    Immature Granulocytes 02/02/2023 0  0 % Final    Segs Absolute 02/02/2023 2.42  1.50 - 8.10 k/uL Final    Absolute Lymph # 02/02/2023 3.06  1.10 - 3.70 k/uL Final    Absolute Mono # 02/02/2023 0.62  0.10 - 1.20 k/uL Final    Absolute Eos # 02/02/2023 0.21  0.00 - 0.44 k/uL Final    Basophils Absolute 02/02/2023 <0.03  0.00 - 0.20 k/uL Final    Absolute Immature Granulocyte 02/02/2023 <0.03  0.00 - 0.30 k/uL Final    Glucose 02/02/2023 98  70 - 99 mg/dL Final    BUN 02/02/2023 8  6 - 20 mg/dL Final    Creatinine 02/02/2023 0.70  0.70 - 1.20 mg/dL Final    Est, Glom Filt Rate 02/02/2023 >60  >60 mL/min/1.73m2 Final    Comment:       These results are not intended for use in patients <25years of age.         eGFR results are calculated without a race factor using the 2021 CKD-EPI equation. Careful clinical correlation is recommended, particularly when comparing to results   calculated using previous equations. The CKD-EPI equation is less accurate in patients with extremes of muscle mass, extra-renal   metabolism of creatine, excessive creatine ingestion, or following therapy that affects   renal tubular secretion. Calcium 02/02/2023 7.8 (A)  8.6 - 10.4 mg/dL Final    Sodium 02/02/2023 139  135 - 144 mmol/L Final    Potassium 02/02/2023 3.6 (A)  3.7 - 5.3 mmol/L Final    Chloride 02/02/2023 108 (A)  98 - 107 mmol/L Final    CO2 02/02/2023 25  20 - 31 mmol/L Final    Anion Gap 02/02/2023 6 (A)  9 - 17 mmol/L Final    Alkaline Phosphatase 02/02/2023 48  40 - 129 U/L Final    ALT 02/02/2023 87 (A)  5 - 41 U/L Final    AST 02/02/2023 56 (A)  <40 U/L Final    Total Bilirubin 02/02/2023 0.6  0.3 - 1.2 mg/dL Final    Total Protein 02/02/2023 5.5 (A)  6.4 - 8.3 g/dL Final    Albumin 02/02/2023 3.3 (A)  3.5 - 5.2 g/dL Final    Albumin/Globulin Ratio 02/02/2023 1.5  1.0 - 2.5 Final    Total CK 02/02/2023 863 (A)  39 - 308 U/L Final    Myoglobin 02/02/2023 25 (A)  28 - 72 ng/mL Final         Reviewed patient's current plan of care and vital signs with nursing staff.     Labs reviewed: [x] Yes  EKG reviewed  QTC: 456    Medications  Current Facility-Administered Medications: haloperidol lactate (HALDOL) injection 5 mg, 5 mg, IntraMUSCular, Q6H PRN **AND** LORazepam (ATIVAN) injection 2 mg, 2 mg, IntraMUSCular, Q6H PRN **AND** diphenhydrAMINE (BENADRYL) injection 50 mg, 50 mg, IntraMUSCular, Q6H PRN  haloperidol (HALDOL) tablet 5 mg, 5 mg, Oral, Q6H PRN **AND** LORazepam (ATIVAN) tablet 2 mg, 2 mg, Oral, Q6H PRN  acetaminophen (TYLENOL) tablet 650 mg, 650 mg, Oral, Q6H PRN **OR** acetaminophen (TYLENOL) suppository 650 mg, 650 mg, Rectal, Q6H PRN  cloNIDine (CATAPRES) tablet 0.1 mg, 0.1 mg, Oral, BID  hydrOXYzine HCl (ATARAX) tablet 50 mg, 50 mg, Oral, TID PRN  mirtazapine (REMERON) tablet 7.5 mg, 7.5 mg, Oral, Nightly  traZODone (DESYREL) tablet 50 mg, 50 mg, Oral, Nightly PRN  nicotine polacrilex (NICORETTE) gum 2 mg, 2 mg, Oral, Q1H PRN    ASSESSMENT  Major depressive disorder, recurrent severe without psychotic features ([facility] [address]         PLAN  Patient symptoms are: improving  No Medication Changes Today  Monitor need and frequency of PRN medications. Encourage participation in groups and milieu. Attempt to develop insight. Psycho-education conducted. Probable discharge is tomorrow  Follow-up daily while inpatient. Patient continues to be monitored in the inpatient psychiatric facility at [facility] for safety and stabilization. Patient continues to need, on a daily basis, active treatment furnished directly by or requiring the supervision of inpatient psychiatric personnel. Electronically signed by ROXANNE Blount CNP on 2/4/2023 at 12:10 PM    **This report has been created using voice recognition software. It may contain minor errors which are inherent in voice recognition technology. **

## 2023-02-05 VITALS
BODY MASS INDEX: 28.77 KG/M2 | TEMPERATURE: 97.5 F | HEART RATE: 79 BPM | DIASTOLIC BLOOD PRESSURE: 82 MMHG | RESPIRATION RATE: 14 BRPM | WEIGHT: 201 LBS | HEIGHT: 70 IN | SYSTOLIC BLOOD PRESSURE: 119 MMHG

## 2023-02-05 LAB
EKG ATRIAL RATE: 120 BPM
EKG Q-T INTERVAL: 306 MS
EKG QRS DURATION: 78 MS
EKG QTC CALCULATION (BAZETT): 456 MS
EKG R AXIS: 34 DEGREES
EKG T AXIS: 8 DEGREES
EKG VENTRICULAR RATE: 134 BPM

## 2023-02-05 PROCEDURE — 6370000000 HC RX 637 (ALT 250 FOR IP): Performed by: STUDENT IN AN ORGANIZED HEALTH CARE EDUCATION/TRAINING PROGRAM

## 2023-02-05 RX ORDER — HYDROXYZINE 50 MG/1
50 TABLET, FILM COATED ORAL 3 TIMES DAILY PRN
Qty: 30 TABLET | Refills: 0 | Status: SHIPPED | OUTPATIENT
Start: 2023-02-05

## 2023-02-05 RX ORDER — MIRTAZAPINE 7.5 MG/1
7.5 TABLET, FILM COATED ORAL NIGHTLY
Qty: 30 TABLET | Refills: 0 | Status: SHIPPED | OUTPATIENT
Start: 2023-02-05

## 2023-02-05 RX ORDER — TRAZODONE HYDROCHLORIDE 50 MG/1
50 TABLET ORAL NIGHTLY PRN
Qty: 30 TABLET | Refills: 0 | Status: SHIPPED | OUTPATIENT
Start: 2023-02-05

## 2023-02-05 RX ADMIN — CLONIDINE HYDROCHLORIDE 0.1 MG: 0.1 TABLET ORAL at 08:44

## 2023-02-05 NOTE — DISCHARGE INSTR - DIET

## 2023-02-05 NOTE — DISCHARGE INSTRUCTIONS
Information:  Medications:   Medication summary provided   I understand that I should take only the medications on my list.     -why and when I need to take each medicine.     -which side effects to watch for.     -that I should carry my medication information at all times in case of     Emergency situations. I will take all of my medicines to follow up appointments.     -check with my physician or pharmacist before taking any new    Medication, over the counter product or drink alcohol.    -Ask about food, drug or dietary supplement interactions.    -discard old lists and update records with medication providers. Notify Physician:  Notify physician if you notice:   Always call 911 if you feel your life is in danger  In case of an emergency call 911 immediately! If 911 is not available call your local emergency medical system for help    Behavioral Health Follow Up:  Original Referral Source:[facility]   Discharge Diagnosis: Depression with suicidal ideation [F32. A, R45.851]  Recommendations for Level of Care: Follow up with outpatient facility   Patient status at discharge: Verbalizes readiness for discharge, Denies SI/HI  [facility]  Aftercare plan faxed: Yes   -faxed by: RN   -date: 2/5/23   -time: 1400  Prescriptions: Filled at patients preferred pharmacy     Smoking: Quit Smoking. Call the [employer]'s smoking quitline at 9-188-28O-QUIT  Know the signs of a heart attack   If you have any of the following symptoms call 911 immediately, do not wait more    Than five minutes. 1. Pressure, fullness and/ or squeezing in the center of the chest spreading to    The jaw, neck or shoulder. 2. Chest discomfort with light headedness, fainting, sweating, nausea or    Shortness of breath. 3. Upper abdominal pressure or discomfort. 4. Lower chest pain, back pain, unusual fatigue, shortness of breath, nausea   Or dizziness.      General Information:   Questions regarding your bill: Call HELP program [phone]     Suicide Hotline (Domenic Brian)  [phone]      Recovery Help [facility]- [phone]      To obtain results of pending studies call Medical Records at: [phone]     For emergencies and 24 hour/7 days a week contact information:  [phone]        Learning About COVID-19 and Flu Symptoms  How can you tell COVID-19 from the flu? COVID-19 and the flu have similar symptoms. The two can be hard to tell apart. The only way to know for sure which illness you have is to be tested. If you have questions about COVID-19 testing, ask your doctor or go to cdc.gov to use the COVID-19 Viral Testing Tool. Since the symptoms are so alike, it makes sense to act as if you have COVID-19 until your test results come back. This means staying home and limiting contact with people in your home. You'll need to wash your hands often and disinfect surfaces that you touch. And be sure to wear a mask when you're around other people. This is also good advice if you think you have the flu. COVID-19 and the flu have these symptoms in common:  Fever or chills  Cough  Shortness of breath  Fatigue (tiredness)  Sore throat  Runny or stuffy nose  Muscle and body aches  Headache  Vomiting and diarrhea (more common in children than adults)  COVID-19 has another symptom that also may occur:  New loss of taste or smell  COVID-19 symptoms may appear from 2 to 14 days after infection. Flu symptoms usually appear 1 to 4 days after infection. Why should you get the flu and COVID-19 vaccines? It's important to get your yearly flu vaccine and stay up to date on your COVID-19 vaccines. The flu and COVID-19 can be active at the same time. You can get sick with both infections at once. And having both may make you more sick than getting just one. Getting both vaccines can prevent you and others from getting very sick.  If you do get the flu or COVID-19 after being vaccinated, you're much less likely to get seriously ill. Where can you learn more? Go to http://www.woods.com/ and enter C123 to learn more about \"Learning About COVID-19 and Flu Symptoms. \"  Current as of: October 28, 2022               Content Version: 13.5  © 6922-8729 [employer]. Care instructions adapted under license by [facility] ([facility]). If you have questions about a medical condition or this instruction, always ask your healthcare professional. [address] any warranty or liability for your use of this information.

## 2023-02-05 NOTE — DISCHARGE INSTR - OTHER ORDERS
Make sure to follow up with outpatient facility    Avoid drugs and alcohol while taking medications     Take all medications as instructed

## 2023-02-05 NOTE — PLAN OF CARE
Patient has been ou in the milieu aloof of peers. Patient states he has been eating and sleeping okay. Patient was compliant with scheduled medications this evening. Patient states mood is improving and hes feeling safe and hoping for discharge soon. Patient denies any suicidal thoughts at this time. Patient agrees to come talk with staff if having any thoughts to harm himself this shift. 15 min rounds continued for patient safety. Problem: Self Harm/Suicidality  Goal: Will have no self-injury during hospital stay  Description: INTERVENTIONS:  1. Ensure constant observer at bedside with Q15M safety checks  2. Maintain a safe environment  3. Secure patient belongings  4. Ensure family/visitors adhere to safety recommendations  5. Ensure safety tray has been added to patient's diet order  6. Every shift and PRN: Re-assess suicidal risk via Frequent Screener    2/4/2023 2218 by Yolanda James RN  Outcome: Progressing  2/4/2023 1218 by Krishna Marquez LPN  Outcome: Progressing     Problem: Drug Abuse/Detox  Goal: Will have no detox symptoms and will verbalize plan for changing drug-related behavior  Description: INTERVENTIONS:  1. Administer medication as ordered  2. Monitor physical status  3. Provide emotional support with 1:1 interaction with staff  4. Encourage  recovery focused treatment   2/4/2023 2218 by Yolanda James RN  Outcome: Progressing  2/4/2023 1218 by Krishna Marquez LPN  Outcome: Not Progressing     Problem: Drug Abuse/Detox  Goal: Will have no detox symptoms and will verbalize plan for changing drug-related behavior  Description: INTERVENTIONS:  1. Administer medication as ordered  2. Monitor physical status  3. Provide emotional support with 1:1 interaction with staff  4.  Encourage  recovery focused treatment   2/4/2023 2218 by Yolanda James RN  Outcome: Progressing  2/4/2023 1218 by Krishna Marquez LPN  Outcome: Not Progressing
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
## 2023-02-05 NOTE — BH NOTE
[facility]  Discharge Note    Pt discharged with followings belongings:   Dental Appliances: None  Vision - Corrective Lenses: None  Hearing Aid: None  Jewelry: Bracelet  Body Piercings Removed: N/A  Clothing: Footwear, Hat, Jacket/Coat, [place], [place], Shorts, Socks, Undergarments, [place]  Other Valuables: Money, Wallet, Lighter/Matches ($1.60)   Valuables sent home with patient. Patient educated on aftercare instructions: Yes  Information faxed to [facility] by RN  at 10:52 AM .Patient verbalize understanding of AVS:  Yes. Status EXAM upon discharge:  Mental Status and Behavioral Exam  Normal: No  Level of Assistance: Independent/Self  Facial Expression: Avoids Gaze  Affect: Blunt  Level of Consciousness: Alert  Frequency of Checks: 4 times per hour, close  Mood:Normal: No  Mood: Depressed  Motor Activity:Normal: Yes  Motor Activity: Decreased  Eye Contact: Fair  Observed Behavior: Preoccupied, Guarded, Cooperative  Sexual Misconduct History: Current - no  Preception: [place] to person, [place] to time, [place] to place, [place] to situation  Attention:Normal: No  Attention: Distractible  Thought Processes: Circumstantial  Thought Content:Normal: No  Thought Content: Preoccupations  Depression Symptoms: Isolative, Loss of interest  Anxiety Symptoms: Generalized  [place] Symptoms: No problems reported or observed.   Hallucinations: None  Delusions: No  Memory:Normal: Yes  Insight and Judgment: No  Insight and Judgment: Poor insight, Poor judgment    Tobacco Screening:  Practical Counseling, on admission, rachna X, if applicable and completed (first 3 are required if patient doesn't refuse):            ( ) Recognizing danger situations (included triggers and roadblocks)                    ( ) Coping skills (new ways to manage stress,relaxation techniques, changing routine, distraction)                                                           ( ) Basic information about quitting (benefits of quitting, techniques in how to quit, available resources  ( ) Referral for counseling faxed to [facility]                                                                                                                   (x ) Patient refused counseling  ( ) Patient refused referral  ( ) Patient refused prescription upon discharge  ( ) Patient has not smoked in the last 30 days    Metabolic Screening:    Lab Results   Component Value Date    LABA1C 5.6 06/18/2020       Lab Results   Component Value Date    CHOL 187 06/18/2020    CHOL 194 04/16/2018     Lab Results   Component Value Date    TRIG 98 06/18/2020    TRIG 120 04/16/2018     Lab Results   Component Value Date    HDL 50 06/18/2020    HDL 44 04/16/2018     No components found for: LDLCAL  No results found for: LABVLDL    Patient was discharged from unit at 1050. Patient was ambulatory off unit. Patient was discharged to home, transportation by [facility], approved by management due to no insurance transportation coverage. Patient denied suicidal and homicidal ideations. Al belongings verified and sent along with patient.      Paris Persaud RN

## 2023-02-06 NOTE — CARE COORDINATION
Name: Vivian Freitas    : 1990    Discharge Date: 23    Primary Auth/Cert #: [id number]    Destination: home     Discharge Medications:      Medication List        CHANGE how you take these medications      mirtazapine 7.5 MG tablet  Commonly known as: REMERON  Take 1 tablet by mouth nightly  What changed: how much to take  Notes to patient: Helps with sleep             CONTINUE taking these medications      hydrOXYzine HCl 50 MG tablet  Commonly known as: ATARAX  Take 1 tablet by mouth 3 times daily as needed for Anxiety  Notes to patient: Helps lower anxiety      traZODone 50 MG tablet  Commonly known as: DESYREL  Take 1 tablet by mouth nightly as needed for Sleep  Notes to patient: Helps with sleep            STOP taking these medications      amLODIPine 5 MG tablet  Commonly known as: NORVASC     hydroCHLOROthiazide 25 MG tablet  Commonly known as: HYDRODIURIL     Suboxone 8-2 MG Film SL film  Generic drug: buprenorphine-naloxone               Where to Get Your Medications        These medications were sent to [address] [place] [address].  Tami Higgins [phone] Demarcus Hernandez [phone]  [facility]      Phone: [phone]   hydrOXYzine HCl 50 MG tablet  mirtazapine 7.5 MG tablet  traZODone 50 MG tablet     Pharmacy Instructions:    Filled at patients preferred pharmacy            Follow Up Appointment: [address]  [phone]    Follow up on 2023  Social Work will call you with your scheduled appointment Monday

## 2023-09-05 ENCOUNTER — HOSPITAL ENCOUNTER (EMERGENCY)
Age: 33
Discharge: NURSING, REHAB OR LONG-TERM CARE | End: 2023-09-05
Attending: EMERGENCY MEDICINE
Payer: MEDICAID

## 2023-09-05 VITALS
BODY MASS INDEX: 24.39 KG/M2 | HEART RATE: 70 BPM | OXYGEN SATURATION: 98 % | SYSTOLIC BLOOD PRESSURE: 129 MMHG | TEMPERATURE: 97.3 F | DIASTOLIC BLOOD PRESSURE: 78 MMHG | RESPIRATION RATE: 18 BRPM | WEIGHT: 170 LBS

## 2023-09-05 DIAGNOSIS — R45.851 SUICIDAL IDEATION: Primary | ICD-10-CM

## 2023-09-05 LAB
ALBUMIN SERPL-MCNC: 4.4 G/DL (ref 3.5–5.2)
ALBUMIN/GLOB SERPL: 1.3 {RATIO} (ref 1–2.5)
ALP SERPL-CCNC: 79 U/L (ref 40–129)
ALT SERPL-CCNC: 72 U/L (ref 5–41)
AMPHET UR QL SCN: NEGATIVE
ANION GAP SERPL CALCULATED.3IONS-SCNC: 11 MMOL/L (ref 9–17)
AST SERPL-CCNC: 55 U/L
BARBITURATES UR QL SCN: NEGATIVE
BASOPHILS # BLD: <0.03 K/UL (ref 0–0.2)
BASOPHILS NFR BLD: 0 % (ref 0–2)
BENZODIAZ UR QL: NEGATIVE
BILIRUB SERPL-MCNC: 0.3 MG/DL (ref 0.3–1.2)
BUN SERPL-MCNC: 13 MG/DL (ref 6–20)
CALCIUM SERPL-MCNC: 10.1 MG/DL (ref 8.6–10.4)
CANNABINOIDS UR QL SCN: POSITIVE
CHLORIDE SERPL-SCNC: 100 MMOL/L (ref 98–107)
CO2 SERPL-SCNC: 26 MMOL/L (ref 20–31)
COCAINE UR QL SCN: POSITIVE
CREAT SERPL-MCNC: 0.7 MG/DL (ref 0.7–1.2)
EKG ATRIAL RATE: 68 BPM
EKG P AXIS: 59 DEGREES
EKG P-R INTERVAL: 132 MS
EKG Q-T INTERVAL: 408 MS
EKG QRS DURATION: 90 MS
EKG QTC CALCULATION (BAZETT): 433 MS
EKG R AXIS: 41 DEGREES
EKG T AXIS: 5 DEGREES
EKG VENTRICULAR RATE: 68 BPM
EOSINOPHIL # BLD: 0.11 K/UL (ref 0–0.44)
EOSINOPHILS RELATIVE PERCENT: 2 % (ref 1–4)
ERYTHROCYTE [DISTWIDTH] IN BLOOD BY AUTOMATED COUNT: 12.6 % (ref 11.8–14.4)
ETHANOL PERCENT: <0.01 %
ETHANOLAMINE SERPL-MCNC: <10 MG/DL
FENTANYL UR QL: POSITIVE
GFR SERPL CREATININE-BSD FRML MDRD: >60 ML/MIN/1.73M2
GLUCOSE SERPL-MCNC: 103 MG/DL (ref 70–99)
HCT VFR BLD AUTO: 44.5 % (ref 40.7–50.3)
HGB BLD-MCNC: 14.1 G/DL (ref 13–17)
IMM GRANULOCYTES # BLD AUTO: <0.03 K/UL (ref 0–0.3)
IMM GRANULOCYTES NFR BLD: 0 %
LYMPHOCYTES NFR BLD: 1.75 K/UL (ref 1.1–3.7)
LYMPHOCYTES RELATIVE PERCENT: 26 % (ref 24–43)
MCH RBC QN AUTO: 29.6 PG (ref 25.2–33.5)
MCHC RBC AUTO-ENTMCNC: 31.7 G/DL (ref 28.4–34.8)
MCV RBC AUTO: 93.5 FL (ref 82.6–102.9)
METHADONE UR QL: NEGATIVE
MONOCYTES NFR BLD: 0.45 K/UL (ref 0.1–1.2)
MONOCYTES NFR BLD: 7 % (ref 3–12)
NEUTROPHILS NFR BLD: 65 % (ref 36–65)
NEUTS SEG NFR BLD: 4.3 K/UL (ref 1.5–8.1)
NRBC BLD-RTO: 0 PER 100 WBC
OPIATES UR QL SCN: NEGATIVE
OXYCODONE UR QL SCN: NEGATIVE
PCP UR QL SCN: NEGATIVE
PLATELET # BLD AUTO: 288 K/UL (ref 138–453)
PMV BLD AUTO: 8.8 FL (ref 8.1–13.5)
POTASSIUM SERPL-SCNC: 4.7 MMOL/L (ref 3.7–5.3)
PROT SERPL-MCNC: 7.9 G/DL (ref 6.4–8.3)
RBC # BLD AUTO: 4.76 M/UL (ref 4.21–5.77)
SARS-COV-2 RDRP RESP QL NAA+PROBE: NOT DETECTED
SODIUM SERPL-SCNC: 137 MMOL/L (ref 135–144)
SPECIMEN DESCRIPTION: NORMAL
TEST INFORMATION: ABNORMAL
WBC OTHER # BLD: 6.7 K/UL (ref 3.5–11.3)

## 2023-09-05 PROCEDURE — 99285 EMERGENCY DEPT VISIT HI MDM: CPT

## 2023-09-05 PROCEDURE — 85025 COMPLETE CBC W/AUTO DIFF WBC: CPT

## 2023-09-05 PROCEDURE — 80053 COMPREHEN METABOLIC PANEL: CPT

## 2023-09-05 PROCEDURE — 80307 DRUG TEST PRSMV CHEM ANLYZR: CPT

## 2023-09-05 PROCEDURE — 93005 ELECTROCARDIOGRAM TRACING: CPT | Performed by: STUDENT IN AN ORGANIZED HEALTH CARE EDUCATION/TRAINING PROGRAM

## 2023-09-05 PROCEDURE — 87635 SARS-COV-2 COVID-19 AMP PRB: CPT

## 2023-09-05 PROCEDURE — 93010 ELECTROCARDIOGRAM REPORT: CPT | Performed by: INTERNAL MEDICINE

## 2023-09-05 PROCEDURE — G0480 DRUG TEST DEF 1-7 CLASSES: HCPCS

## 2023-09-05 ASSESSMENT — ENCOUNTER SYMPTOMS
DIARRHEA: 0
ABDOMINAL PAIN: 0
SHORTNESS OF BREATH: 0
VOMITING: 0
NAUSEA: 0
COLOR CHANGE: 0

## 2023-09-05 ASSESSMENT — PAIN SCALES - GENERAL: PAINLEVEL_OUTOF10: 0

## 2023-09-05 ASSESSMENT — PAIN - FUNCTIONAL ASSESSMENT: PAIN_FUNCTIONAL_ASSESSMENT: 0-10

## 2023-09-05 NOTE — ED NOTES
LYNN documentation reviewed and completed. Sariah Hussein.  [address], [address] , [place]  09/05/23 5778
[facility] called for property release      Ike Jones RN  09/05/23 8766
[place] [employer] handed pt property to EMS transport staff      Caitlyn Cotter RN  09/05/23 2114
Provisional Diagnosis:  Depression with SI      Psychosocial and Contextual Factors:     Pt reports he lives with his brother    C-SSRS Summary:    Patient: X   Family:  Agency:    Present Suicidal Behavior:    Verbal: Yes    Attempt:Previous hx     Past Suicidal Behavior:    Verbal: Yes    Attempt: Yes    Self-Injurious/Self-Mutilation: Reported cutting himself       Protective Factors: Willing to go inpatient, has health insurance       Risk Factors: Substance abuse      Clinical Summary:  Pt is a 28-year-old male who presents to the ED with SI with plan to overdose on Fentanyl. Pt reported he tried to kill himself by overdosing & cutting himself in the past. After reviewing pt's medical record pt does have previous hx of overdose and SI attempts resulting in pt being in ICU. Pt reports he has not been on his psychotropic medications for a few weeks due to losing it, but says he felt better when taking them. Patient's last admission at [facility] was 2/2/23-2/5/23 per medical charting. Pt has prior diagnosis of major depressive disorder, anxiety, acute psychosis, previous suicide attempt, alcohol abuse, opiate use, marijuana use, and cocaine abuse. Pt was positive for cocaine, marijuana, and fentanyl. Pt reports daily use of fentanyl and crack cocaine. 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 Pt further reports he lives with his brother and is his own guardian. Pt denies current legal concerns including being on probation or parole. Pt appears solemn and was calm and cooperative in the interview process. Level of Care Disposition:  SW discussed with Dr. Neo Adame and will contact [facility] to initiate an inpatient behavioral health admission. Due to [employer] SW will seek placement elsewhere than [facility].        Natty Del Rio, [facility]  09/05/23 [address] [address], [facility]  09/05/23 9705
Pt to ED10 with c/o suicidal ideation for a couple of days now. Pt tried to kill himself by overdosing & cutting himself. States that he plans to kill himself by overdosing fentanyl. Vital signs taken & recorded,  Pt changed to paper gown.      Zachery Olson RN  09/05/23 1750
Pt was accepted at [address]  [phone] by Dr. Monica Merino. Per request of accepting facility pt must be on a pink slip. Dr. Jumana Ochoa informed and SW will fax once completed. Facility reports pt can't arrive till after 1200. [facility] ETA 1100 transport paper faxed. SW called and spoke to [facility] from [address] to inform of ETA for them being 1230. RN to RN report # is [phone] or [phone]    SW will fax pink slip once completed to [phone] per [facility].      Care of pt being handed over to [address] [place] [address]  09/05/23 9766
Report called to [facility], all questions addressed.       Parisa Merritt RN  09/05/23 8522
Report given to ANISHA Pierre RN  09/05/23 3459
SW provided information to [facility] to begin looking for placement. Pt is not able to go to [facility] due to having Norberto Ruiz. Pt made aware of this and in agreement to be transferred elsewhere.       CYNTHIA Roberson  09/05/23 7726
ideation/behavior  [] Depression  [] Suicide attempt      [] Low self-esteem  [] Hallucinations      [] Feeling of Hopelessness  [] Substance abuse or withdrawal    [] Dysfunctional family  [] Major traumatic event, eg., divorce, etc   [] Excessive stress/anxiety    9/5/23    Expected Outcomes    Patient will:   [x] Patient will remain safe for the duration of their stay   [x] Patient's environment will be safe, eg. Free of potential suicide weapons   [] Verbalize Recovery from suicidal episode and improvement in self-worth   [x] Discuss feeling that precipitated suicide attempt/thoughts/behavior   [] Will describe available resources for crisis prevention and management   [] Will verbalize positive coping skills     Nursing Intervention   [x] Assessment and Observations hourly   [x] Suicide Precautions implemented with patient, should be 1:1 observation   [x] Document observation [id number] and RN assessment hourly   [] Consult physician for:    [] Psychiatric consult    [] Pharmacological therapy    [] Other:    [x] Patient search completed by security   [x] Initiated appropriate safety protocols by removing from the patient's environment anything that could be used to inflict self injury, eg. Order safe tray, snap gown, etc   [x] Maintain open, warm, caring, non-judgmental attitude/manner towards patient   [] Discuss advantages and disadvantages of existing coping methods/skills   [x] Assist and educate patient with identifying present strengths and coping skills   [x] Keep patient informed regarding plan of care and provide clear concise explanations. Provide the patient/family education information as well as telephone numbers and other information about crisis centers, hot lines, and counselors.     Discharge Planning:   [] Referral  [] Groups [] Health agencies  [] Other:           Gm Valenzuela RN  09/05/23 0126

## 2024-01-24 ENCOUNTER — HOSPITAL ENCOUNTER (EMERGENCY)
Age: 34
Discharge: OTHER INSTITUTION | End: 2024-01-25
Attending: EMERGENCY MEDICINE
Payer: MEDICAID

## 2024-01-24 ENCOUNTER — APPOINTMENT (OUTPATIENT)
Dept: GENERAL RADIOLOGY | Age: 34
End: 2024-01-24
Payer: MEDICAID

## 2024-01-24 DIAGNOSIS — R45.851 SUICIDAL IDEATION: Primary | ICD-10-CM

## 2024-01-24 DIAGNOSIS — R07.89 OTHER CHEST PAIN: ICD-10-CM

## 2024-01-24 LAB
ALBUMIN SERPL-MCNC: 4 G/DL (ref 3.5–5.2)
ALBUMIN/GLOB SERPL: 1.3 {RATIO} (ref 1–2.5)
ALP SERPL-CCNC: 63 U/L (ref 40–129)
ALT SERPL-CCNC: 59 U/L (ref 5–41)
AMPHET UR QL SCN: NEGATIVE
ANION GAP SERPL CALCULATED.3IONS-SCNC: 6 MMOL/L (ref 9–17)
AST SERPL-CCNC: 54 U/L
BARBITURATES UR QL SCN: NEGATIVE
BASOPHILS # BLD: <0.03 K/UL (ref 0–0.2)
BASOPHILS NFR BLD: 0 % (ref 0–2)
BENZODIAZ UR QL: NEGATIVE
BILIRUB SERPL-MCNC: 0.2 MG/DL (ref 0.3–1.2)
BNP SERPL-MCNC: 48 PG/ML
BUN SERPL-MCNC: 14 MG/DL (ref 6–20)
CALCIUM SERPL-MCNC: 9.2 MG/DL (ref 8.6–10.4)
CANNABINOIDS UR QL SCN: POSITIVE
CHLORIDE SERPL-SCNC: 104 MMOL/L (ref 98–107)
CO2 SERPL-SCNC: 28 MMOL/L (ref 20–31)
COCAINE UR QL SCN: POSITIVE
CREAT SERPL-MCNC: 0.9 MG/DL (ref 0.7–1.2)
EOSINOPHIL # BLD: 0.12 K/UL (ref 0–0.44)
EOSINOPHILS RELATIVE PERCENT: 2 % (ref 1–4)
ERYTHROCYTE [DISTWIDTH] IN BLOOD BY AUTOMATED COUNT: 11.9 % (ref 11.8–14.4)
ETHANOL PERCENT: <0.01 %
ETHANOLAMINE SERPL-MCNC: <10 MG/DL
FENTANYL UR QL: POSITIVE
FLUAV AG SPEC QL: NEGATIVE
FLUBV AG SPEC QL: NEGATIVE
GFR SERPL CREATININE-BSD FRML MDRD: >60 ML/MIN/1.73M2
GLUCOSE SERPL-MCNC: 104 MG/DL (ref 70–99)
HCT VFR BLD AUTO: 41 % (ref 40.7–50.3)
HGB BLD-MCNC: 13.5 G/DL (ref 13–17)
IMM GRANULOCYTES # BLD AUTO: <0.03 K/UL (ref 0–0.3)
IMM GRANULOCYTES NFR BLD: 0 %
LYMPHOCYTES NFR BLD: 1.98 K/UL (ref 1.1–3.7)
LYMPHOCYTES RELATIVE PERCENT: 38 % (ref 24–43)
MCH RBC QN AUTO: 30.1 PG (ref 25.2–33.5)
MCHC RBC AUTO-ENTMCNC: 32.9 G/DL (ref 28.4–34.8)
MCV RBC AUTO: 91.5 FL (ref 82.6–102.9)
METHADONE UR QL: NEGATIVE
MONOCYTES NFR BLD: 0.48 K/UL (ref 0.1–1.2)
MONOCYTES NFR BLD: 9 % (ref 3–12)
NEUTROPHILS NFR BLD: 51 % (ref 36–65)
NEUTS SEG NFR BLD: 2.63 K/UL (ref 1.5–8.1)
NRBC BLD-RTO: 0 PER 100 WBC
OPIATES UR QL SCN: POSITIVE
OXYCODONE UR QL SCN: NEGATIVE
PCP UR QL SCN: NEGATIVE
PLATELET # BLD AUTO: 313 K/UL (ref 138–453)
PMV BLD AUTO: 8.7 FL (ref 8.1–13.5)
POTASSIUM SERPL-SCNC: 4.3 MMOL/L (ref 3.7–5.3)
PROT SERPL-MCNC: 7.2 G/DL (ref 6.4–8.3)
RBC # BLD AUTO: 4.48 M/UL (ref 4.21–5.77)
SARS-COV-2 RDRP RESP QL NAA+PROBE: NOT DETECTED
SODIUM SERPL-SCNC: 138 MMOL/L (ref 135–144)
SPECIMEN DESCRIPTION: NORMAL
TEST INFORMATION: ABNORMAL
TROPONIN I SERPL HS-MCNC: <6 NG/L (ref 0–22)
TROPONIN I SERPL HS-MCNC: <6 NG/L (ref 0–22)
WBC OTHER # BLD: 5.2 K/UL (ref 3.5–11.3)

## 2024-01-24 PROCEDURE — 85025 COMPLETE CBC W/AUTO DIFF WBC: CPT

## 2024-01-24 PROCEDURE — 99285 EMERGENCY DEPT VISIT HI MDM: CPT | Performed by: EMERGENCY MEDICINE

## 2024-01-24 PROCEDURE — 83880 ASSAY OF NATRIURETIC PEPTIDE: CPT

## 2024-01-24 PROCEDURE — 84484 ASSAY OF TROPONIN QUANT: CPT

## 2024-01-24 PROCEDURE — G0480 DRUG TEST DEF 1-7 CLASSES: HCPCS

## 2024-01-24 PROCEDURE — 87635 SARS-COV-2 COVID-19 AMP PRB: CPT

## 2024-01-24 PROCEDURE — 80307 DRUG TEST PRSMV CHEM ANLYZR: CPT

## 2024-01-24 PROCEDURE — 71046 X-RAY EXAM CHEST 2 VIEWS: CPT

## 2024-01-24 PROCEDURE — 93005 ELECTROCARDIOGRAM TRACING: CPT

## 2024-01-24 PROCEDURE — 87804 INFLUENZA ASSAY W/OPTIC: CPT

## 2024-01-24 PROCEDURE — 6370000000 HC RX 637 (ALT 250 FOR IP)

## 2024-01-24 PROCEDURE — 80053 COMPREHEN METABOLIC PANEL: CPT

## 2024-01-24 RX ORDER — ASPIRIN 81 MG/1
324 TABLET, CHEWABLE ORAL ONCE
Status: COMPLETED | OUTPATIENT
Start: 2024-01-24 | End: 2024-01-24

## 2024-01-24 RX ORDER — BUPRENORPHINE 2 MG/1
4 TABLET SUBLINGUAL ONCE
Status: DISCONTINUED | OUTPATIENT
Start: 2024-01-24 | End: 2024-01-24

## 2024-01-24 RX ORDER — BUPRENORPHINE AND NALOXONE 4; 1 MG/1; MG/1
1 FILM, SOLUBLE BUCCAL; SUBLINGUAL ONCE
Status: COMPLETED | OUTPATIENT
Start: 2024-01-24 | End: 2024-01-24

## 2024-01-24 RX ADMIN — BUPRENORPHINE AND NALOXONE 1 FILM: 4; 1 FILM BUCCAL; SUBLINGUAL at 19:47

## 2024-01-24 RX ADMIN — ASPIRIN 81 MG 324 MG: 81 TABLET ORAL at 18:29

## 2024-01-24 NOTE — ED NOTES
[] [facility]    [] [facility]    [x]  [facility]    SUICIDE RISK ASSESSMENT      Y  N     [x] [] In the past two weeks have you had thoughts of hurting yourself in any way?    [x] [] In the past two weeks have you had thoughts that you would be better off dead?   [] [x] Have you made a suicide attempt in the past two months?   [x] [] Do you have a plan for hurting yourself or suicide?   [] [x] Presence of hallucinations/voices related to hurting himself or herself or someone else.    SUICIDE/SECURITY WATCH PRECAUTION CHECKLIST     Orders    [x]  Suicide/Security Watch Precautions initiated as checked below:   1/24/24 6:43 PM EST 26/H26C    [x] Notified physician:  Gómez Vieyra MD  1/24/24 6:43 PM EST    [x] Orders obtained as appropriate:     [x] 1:1 Observer     [] Psych Consult     [] Psych Consult    Name:  Date:  Time:    [x] 1:1 Observer, Notified by:  Carl Neumann RN    Contact Nurse Supervisor    [x] Remove all personal clothes from room and place in snap/paper gown/pants.  Slipper only    [x] Remove all personal belongings from room and secured away from patient.    Documentation    [x] Initiate Suicide/Security Watch Precaution Flow Sheet    [x] Initiate individualized Care Plan/Problem    [x] Document why precautions initiated on flow sheet (Initiate Nursing Care Plan/Problem)    [x] 1:1 Observer in place; instructions provided.  Suicide precautions require observer be within arms length.    [x] Nurse-Observer Communication Hand-off initiated by RN, reviewed with Observer.  Subsequently used as Hand Off between Observers.    [x] Initiate every 15 minute observations per observer as delegated by the RN.    [x] Initiate RN assessment and documentation    Environmental Scan  Search Criteria and Process: OPTIONAL, see Search Policy    [x] Reason for search:suicidal    [x] Nursing in presence of second person to search patient    [x] Patient notified of reason for body assessment and  belongings search:     Persons present during search: phillipDwain ding   Results of search and disposition:       Searchers Name: Dawin crain      These items or items similar should be removed from the room:   [x] Chairs   [x] Telephone   [x] Trash cans and liners   [] Plastic utensils (order Patient Safety tray)   [x] Empty or remove Sharps containers   [x] All personal clothing/belongings removed   [x] All unnecessary lead wires, electrical cords, draw cords, etc.   [x] Flowers and plants   [x] Double check for lighters, matches, razors, any glass items etc that can be used as weapons.    Person completing Checklist: Carl Neumann RN       GENERAL INFORMATION     Y  N     [x] [] Has the patient been informed that they are on a watch and what that means?   [] [x] Can the patient get out of Bed without nursing assistance?   [] [x] Can the patient use the restroom without nursing assistance?   [] [x] Can the patient walk the halls to \"stretch their legs?\"   [] [x] Does the patient have metal utensils?   [x] [] Have the patient's belongings been placed out of control of the patient?   [x] [] Have the patient and his/her belongings been checked for contraband?   [x] [] Is the patient under any visitor restrictions?   If Yes, explain: no visitors   [] [x] Is the patient under an alias?  Alias Name:   Authorized visitors (no more than two are to be on the list)   Name/Relationship:   Name/Relationship:    Name of Staff member that you  Received this information from?:writer    General Description:    Bob Gastelum 26/H26C male 33 y.o. Admission weight:      Race: []  [x] Black  []   []   []  [] Other  Facial Hair:  [x] Yes  [] No  If yes, please describe:  Identifying Marks (i.e. Visible tattoos, scars, etc...):     NURSING CARE PLAN    Nursing Diagnosis: Risk of Self Directed Harm  [x] Actual  [] Potential  Date Started:1/24/2024  Etiological Factors: (related to)  [x]

## 2024-01-24 NOTE — ED TRIAGE NOTES
Pt arrived to ED 26C via triage.   Pt co suicidal ideation.   Pt states that he has a plan to shoot up.   Pt denies taking any action.   Pt denies any auditory or visual hallucinations.   Pt denies any CP or SOB.   Pt admits to using fentanyl yesterday.  Pt is resting on stretcher with sitter at bedside.  Breathing is non labored and no acute distress is noted.   Will continue to follow plan of care

## 2024-01-24 NOTE — ED NOTES
Patient presents with flat affect, reports SI without plan. Patient states if discharged tonight, he'd \"just be homeless\". Patient states he's been suicidal for a month due to his drug use & homelessness. Patient states \"a few months ago\" he attempted to kill himself via fentanyl OD. Patient's last [facility] admission was September 2023. Patient denies HI, a/v hallucinations. Patient states he uses 1/2 gram fentanyl daily, last used yesterday. Patient states he snorts & injects fentanyl. Patient states he's withdrawing with symptoms of diarrhea, sweats & stomach ache. Patient confirms daily crack cocaine use, last used yesterday. Patient's not linked with [facility], is not on psychiatric medications. Patient states he hasn't slept in 4 days, his thoughts have been racing & his concentration is poor. Patient confirms hx hepatitis C. Patient denies legal concerns, states he's his own guardian. Patient requests [facility] transfer due to dual-dx concerns. Writer contacted [facility], gave brief report to [place]. Datical social work will fax chart to [employer] for review.

## 2024-01-24 NOTE — ED PROVIDER NOTES
[facility] ED  Emergency Department Encounter  Emergency Medicine Resident     Pt Name:Bob Gastelum  MRN: [id number]  Birthdate 1990  Date of evaluation: 1/24/24  PCP:  No primary care provider on file.  Note Started: 6:21 PM EST      CHIEF COMPLAINT       Chief Complaint   Patient presents with    Suicidal       HISTORY OF PRESENT ILLNESS  (Location/Symptom, Timing/Onset, Context/Setting, Quality, Duration, Modifying Factors, Severity.)      Bob Gastelum is a 33 y.o. male who presents with complaints of suicidal ideation over the past month associated with fentanyl use with last use yesterday.  Also has history of cocaine use.  Notes over the past few days he has had chest pain on exertion that is alleviated with rest.  No shortness of breath.  No fevers.  No nausea or vomiting.  Notes he feels like he is withdrawing from fentanyl.  Notes he has not been admitted to [facility] in the last month.  No alcohol use.  Notes he has plan to harm himself with overdosing on fentanyl.  Notes he has had increased stress due to instability of housing.    PAST MEDICAL / SURGICAL / SOCIAL / FAMILY HISTORY      has a past medical history of Alcoholism (HCC), Anxiety, Bipolar 1 disorder (HCC), Cocaine abuse (HCC), Depression, Hepatitis C antibody test positive, Hypertension, Irregular heartbeat, Mild tetrahydrocannabinol (THC) abuse, and Suicidal ideation.  Reviewed with patient     has a past surgical history that includes other surgical history (Right, 06/04/2017); Dialysis fistula creation (Right, 6/4/2017); EXPLORATION OF WOUND OF EXTREMITY (Right, 6/4/2017); and Femur Surgery (Left).  Reviewed with patient    Social History     Socioeconomic History    Marital status: Single     Spouse name: magalys    Number of children: 1    Years of education: Not on file    Highest education level: Not on file   Occupational History    Occupation: unemployed     Comment: used to work at [employer] 6 months ago   Tobacco

## 2024-01-24 NOTE — ED PROVIDER NOTES
[facility]  Emergency Department  Faculty Attestation     I performed a history and physical examination of the patient and discussed management with the resident. I reviewed the resident’s note and agree with the documented findings and plan of care. Any areas of disagreement are noted on the chart. I was personally present for the key portions of any procedures. I have documented in the chart those procedures where I was not present during the key portions. I have reviewed the emergency nurses triage note. I agree with the chief complaint, past medical history, past surgical history, allergies, medications, social and family history as documented unless otherwise noted below.    For Physician Assistant/ Nurse Practitioner cases/documentation I have personally evaluated this patient and have completed at least one if not all key elements of the E/M (history, physical exam, and MDM). Additional findings are as noted.    Preliminary note started at 6:45 PM EST    Primary Care Physician:  No primary care provider on file.    Screenings:  [unfilled]    CHIEF COMPLAINT       Chief Complaint   Patient presents with    Suicidal       RECENT VITALS:   /87   Pulse 64   Temp 100.2 °F (37.9 °C) (Oral)   Resp 16   SpO2 98%     LABS:  Labs Reviewed   CBC WITH AUTO DIFFERENTIAL   ETHANOL   COMPREHENSIVE METABOLIC PANEL W/ REFLEX TO MG FOR LOW K   URINE DRUG SCREEN   TROPONIN   TROPONIN   BRAIN NATRIURETIC PEPTIDE       Radiology  XR CHEST (2 VW)    (Results Pending)       CRITICAL CARE: There was a high probability of clinically significant/life threatening deterioration in this patient's condition which required my urgent intervention.  Total critical care time was none minutes.  This excludes any time for separately reportable procedures.     EKG:      Attending Physician Additional  Notes    Patient has relapsed to using fentanyl, last use was last evening/early this morning.

## 2024-01-25 VITALS
OXYGEN SATURATION: 99 % | SYSTOLIC BLOOD PRESSURE: 143 MMHG | TEMPERATURE: 97 F | HEART RATE: 58 BPM | RESPIRATION RATE: 16 BRPM | DIASTOLIC BLOOD PRESSURE: 102 MMHG

## 2024-01-25 NOTE — DISCHARGE INSTRUCTIONS
Thank you for visiting [facility] Emergency Department.    You need to call [facility] to make an appointment as directed for follow up.    Should you have any questions regarding your care or further treatment, please call [facility] Emergency Department at [phone].    Return to emergency department for any new or worrisome symptoms including any return of chest pain, shortness of breath, vomiting or fever.

## 2024-01-25 NOTE — ED NOTES
Per Dr. Valdez, pt does not need to be on monitor anymore and pt can eat. Pt given lunch box. Care ongoing

## 2024-01-25 NOTE — ED PROVIDER NOTES
[facility]   Emergency Department  Emergency Medicine Attending Sign-out   Note started: 3:14 AM EST    Care of Bob Gastelum was assumed from previous attending Dr. Vieyra at 2 AM and is being seen for Suicidal  .  The patient's initial evaluation and plan have been discussed with the prior provider who initially evaluated the patient.     Attestation  I was available and discussed any additional care issues that arose and coordinated the management plans with the resident(s) caring for the patient during my duty period. Any areas of disagreement with resident's documentation of care or procedures are noted on the chart. I was personally present for the key portions of any/all procedures, during my duty period. I have documented in the chart those procedures where I was not present during the key portions.     BRIEF PATIENT SUMMARY/MDM COURSE PER INITIAL PROVIDER:   RECENT VITALS:     Temp: 97 °F (36.1 °C),  Pulse: 58, Respirations: 16, BP: (!) 143/102, SpO2: 99 %    This patient is a 33 y.o. Male with suicidal ideations.  Psychiatric facility    DIAGNOSTICS/MEDICATIONS:     MEDICATIONS GIVEN:  ED Medication Orders (From admission, onward)      Start Ordered     Status Ordering Provider    01/24/24 1915 01/24/24 1908  buprenorphine-naloxone (SUBOXONE) 4-1 MG SL film 1 Film  ONCE         Last MAR action: Given - by LARRY BONILLA on 01/24/24 at 1947 RONALD CABRERA    01/24/24 1830 01/24/24 1822  aspirin chewable tablet 324 mg  ONCE         Last MAR action: Given - by LAMIN VAZQUEZ on 01/24/24 at 1829 RONALD CABRERA            LABS    Labs Reviewed   COMPREHENSIVE METABOLIC PANEL W/ REFLEX TO MG FOR LOW K - Abnormal; Notable for the following components:       Result Value    Anion Gap 6 (*)     Glucose 104 (*)     Total Bilirubin 0.2 (*)     ALT 59 (*)     AST 54 (*)     All other components within normal limits   URINE DRUG SCREEN - Abnormal; Notable for the following components:    Cocaine

## 2024-01-25 NOTE — ED PROVIDER NOTES
[facility] ED  Emergency Department  Emergency Medicine Resident Turn-Over     Note Started: 12:28 AM EST    Care of Bob Gastelum was assumed from Dr. Valdez and is being seen for Suicidal  .  The patient's initial evaluation and plan have been discussed with the prior provider who initially evaluated the patient.     EMERGENCY DEPARTMENT COURSE / MEDICAL DECISION MAKING:       MEDICATIONS GIVEN:  Orders Placed This Encounter   Medications    aspirin chewable tablet 324 mg    DISCONTD: buprenorphine (SUBUTEX) SL tablet 4 mg    buprenorphine-naloxone (SUBOXONE) 4-1 MG SL film 1 Film       LABS / RADIOLOGY:     Labs Reviewed   COMPREHENSIVE METABOLIC PANEL W/ REFLEX TO MG FOR LOW K - Abnormal; Notable for the following components:       Result Value    Anion Gap 6 (*)     Glucose 104 (*)     Total Bilirubin 0.2 (*)     ALT 59 (*)     AST 54 (*)     All other components within normal limits   URINE DRUG SCREEN - Abnormal; Notable for the following components:    Cocaine Metabolite, Urine POSITIVE (*)     Opiates, Urine POSITIVE (*)     Cannabinoid Scrn, Ur POSITIVE (*)     Fentanyl, Ur POSITIVE (*)     All other components within normal limits   COVID-19, RAPID   RAPID INFLUENZA A/B ANTIGENS   CBC WITH AUTO DIFFERENTIAL   ETHANOL   TROPONIN   TROPONIN   BRAIN NATRIURETIC PEPTIDE       XR CHEST (2 VW)    Result Date: 1/24/2024  EXAMINATION: TWO XRAY VIEWS OF THE CHEST 1/24/2024 6:31 pm COMPARISON: None. HISTORY: chest pain FINDINGS: The cardiomediastinal and hilar silhouettes appear unremarkable.  The lungs appear clear. No pleural effusion evident. No pneumothorax is seen. No acute osseous abnormality is identified.  Metallic BB demonstrated anterior soft tissues of the lower chest.     No radiographic evidence of acute cardiopulmonary disease.       RECENT VITALS:     Temp: 97 °F (36.1 °C),  Pulse: 58, Respirations: 16, BP: (!) 143/102, SpO2: 99 %    This patient is a 33 y.o. Male with suicidal  ideation with plan medically cleared transferred to [facility] without issue    ED Course as of 01/25/24 0631   u Jan 25, 2024   0010 Accepted as transfer to [facility] by  [AR]   0027 SI w/plan accepted at  [facility] medically clear [ZE]      ED Course User Index  [AR] Melissa Valdez MD  [ZE] Dustin Vela DO       OUTSTANDING TASKS / RECOMMENDATIONS:    Transport     FINAL IMPRESSION:     1. Suicidal ideation    2. Other chest pain        DISPOSITION:         DISPOSITION:  []  Discharge   [x]  Transfer -    []  Admission -     []  Against Medical Advice   []  Eloped   FOLLOW-UP: [facility] AT [facility]  [address] [place] [address] [id number] [phone]  Call in 2 days      [facility] ED  [address] [id number] [phone]    As needed     DISCHARGE MEDICATIONS: Discharge Medication List as of 1/24/2024 10:03 PM              Dustin Vela DO  Emergency Medicine Resident  [facility]

## 2024-01-26 LAB
EKG ATRIAL RATE: 74 BPM
EKG P AXIS: 58 DEGREES
EKG P-R INTERVAL: 122 MS
EKG Q-T INTERVAL: 378 MS
EKG QRS DURATION: 88 MS
EKG QTC CALCULATION (BAZETT): 419 MS
EKG R AXIS: 54 DEGREES
EKG T AXIS: 41 DEGREES
EKG VENTRICULAR RATE: 74 BPM

## 2024-03-20 ENCOUNTER — HOSPITAL ENCOUNTER (EMERGENCY)
Age: 34
Discharge: ANOTHER HOSPITAL | End: 2024-03-20
Attending: EMERGENCY MEDICINE
Payer: MEDICAID

## 2024-03-20 ENCOUNTER — APPOINTMENT (OUTPATIENT)
Dept: CT IMAGING | Age: 34
End: 2024-03-20
Payer: MEDICAID

## 2024-03-20 VITALS
OXYGEN SATURATION: 100 % | TEMPERATURE: 97.4 F | RESPIRATION RATE: 11 BRPM | DIASTOLIC BLOOD PRESSURE: 90 MMHG | HEART RATE: 58 BPM | BODY MASS INDEX: 25.92 KG/M2 | WEIGHT: 180.67 LBS | SYSTOLIC BLOOD PRESSURE: 139 MMHG

## 2024-03-20 DIAGNOSIS — R45.851 SUICIDAL IDEATION: ICD-10-CM

## 2024-03-20 DIAGNOSIS — S01.01XA LACERATION OF SCALP, INITIAL ENCOUNTER: Primary | ICD-10-CM

## 2024-03-20 LAB
ALBUMIN SERPL-MCNC: 4.1 G/DL (ref 3.5–5.2)
ALBUMIN/GLOB SERPL: 1 {RATIO} (ref 1–2.5)
ALP SERPL-CCNC: 66 U/L (ref 40–129)
ALT SERPL-CCNC: 86 U/L (ref 10–50)
AMPHET UR QL SCN: NEGATIVE
ANION GAP SERPL CALCULATED.3IONS-SCNC: 8 MMOL/L (ref 9–16)
AST SERPL-CCNC: 72 U/L (ref 10–50)
BARBITURATES UR QL SCN: NEGATIVE
BASOPHILS # BLD: 0.03 K/UL (ref 0–0.2)
BASOPHILS NFR BLD: 1 % (ref 0–2)
BENZODIAZ UR QL: NEGATIVE
BILIRUB SERPL-MCNC: 0.2 MG/DL (ref 0–1.2)
BUN SERPL-MCNC: 11 MG/DL (ref 6–20)
CALCIUM SERPL-MCNC: 9 MG/DL (ref 8.6–10.4)
CANNABINOIDS UR QL SCN: POSITIVE
CHLORIDE SERPL-SCNC: 103 MMOL/L (ref 98–107)
CO2 SERPL-SCNC: 28 MMOL/L (ref 20–31)
COCAINE UR QL SCN: POSITIVE
CREAT SERPL-MCNC: 0.8 MG/DL (ref 0.7–1.2)
EOSINOPHIL # BLD: 0.14 K/UL (ref 0–0.44)
EOSINOPHILS RELATIVE PERCENT: 2 % (ref 1–4)
ERYTHROCYTE [DISTWIDTH] IN BLOOD BY AUTOMATED COUNT: 12.5 % (ref 11.8–14.4)
ETHANOL PERCENT: 0 %
ETHANOLAMINE SERPL-MCNC: <10 MG/DL
FENTANYL UR QL: POSITIVE
GFR SERPL CREATININE-BSD FRML MDRD: >60 ML/MIN/1.73M2
GLUCOSE SERPL-MCNC: 116 MG/DL (ref 74–99)
HCT VFR BLD AUTO: 40 % (ref 40.7–50.3)
HGB BLD-MCNC: 12.8 G/DL (ref 13–17)
IMM GRANULOCYTES # BLD AUTO: <0.03 K/UL (ref 0–0.3)
IMM GRANULOCYTES NFR BLD: 0 %
LYMPHOCYTES NFR BLD: 2.86 K/UL (ref 1.1–3.7)
LYMPHOCYTES RELATIVE PERCENT: 48 % (ref 24–43)
MCH RBC QN AUTO: 29.8 PG (ref 25.2–33.5)
MCHC RBC AUTO-ENTMCNC: 32 G/DL (ref 28.4–34.8)
MCV RBC AUTO: 93.2 FL (ref 82.6–102.9)
METHADONE UR QL: NEGATIVE
MONOCYTES NFR BLD: 0.44 K/UL (ref 0.1–1.2)
MONOCYTES NFR BLD: 8 % (ref 3–12)
NEUTROPHILS NFR BLD: 41 % (ref 36–65)
NEUTS SEG NFR BLD: 2.39 K/UL (ref 1.5–8.1)
NRBC BLD-RTO: 0 PER 100 WBC
OPIATES UR QL SCN: NEGATIVE
OXYCODONE UR QL SCN: NEGATIVE
PCP UR QL SCN: NEGATIVE
PLATELET # BLD AUTO: 331 K/UL (ref 138–453)
PMV BLD AUTO: 8.7 FL (ref 8.1–13.5)
POTASSIUM SERPL-SCNC: 4 MMOL/L (ref 3.7–5.3)
PROT SERPL-MCNC: 7.1 G/DL (ref 6.6–8.7)
RBC # BLD AUTO: 4.29 M/UL (ref 4.21–5.77)
SODIUM SERPL-SCNC: 139 MMOL/L (ref 136–145)
TEST INFORMATION: ABNORMAL
WBC OTHER # BLD: 5.9 K/UL (ref 3.5–11.3)

## 2024-03-20 PROCEDURE — 6370000000 HC RX 637 (ALT 250 FOR IP): Performed by: STUDENT IN AN ORGANIZED HEALTH CARE EDUCATION/TRAINING PROGRAM

## 2024-03-20 PROCEDURE — G0480 DRUG TEST DEF 1-7 CLASSES: HCPCS

## 2024-03-20 PROCEDURE — 85025 COMPLETE CBC W/AUTO DIFF WBC: CPT

## 2024-03-20 PROCEDURE — 80053 COMPREHEN METABOLIC PANEL: CPT

## 2024-03-20 PROCEDURE — 99285 EMERGENCY DEPT VISIT HI MDM: CPT

## 2024-03-20 PROCEDURE — 12031 INTMD RPR S/A/T/EXT 2.5 CM/<: CPT

## 2024-03-20 PROCEDURE — 80307 DRUG TEST PRSMV CHEM ANLYZR: CPT

## 2024-03-20 PROCEDURE — 70450 CT HEAD/BRAIN W/O DYE: CPT

## 2024-03-20 PROCEDURE — 72125 CT NECK SPINE W/O DYE: CPT

## 2024-03-20 RX ORDER — IBUPROFEN 400 MG/1
600 TABLET ORAL ONCE
Status: COMPLETED | OUTPATIENT
Start: 2024-03-20 | End: 2024-03-20

## 2024-03-20 RX ORDER — ACETAMINOPHEN 500 MG
500 TABLET ORAL EVERY 6 HOURS PRN
Qty: 15 TABLET | Refills: 1 | Status: SHIPPED | OUTPATIENT
Start: 2024-03-20

## 2024-03-20 RX ORDER — IBUPROFEN 600 MG/1
600 TABLET ORAL EVERY 6 HOURS PRN
Qty: 15 TABLET | Refills: 0 | Status: SHIPPED | OUTPATIENT
Start: 2024-03-20

## 2024-03-20 RX ORDER — GINSENG 100 MG
CAPSULE ORAL ONCE
Status: COMPLETED | OUTPATIENT
Start: 2024-03-20 | End: 2024-03-20

## 2024-03-20 RX ORDER — ACETAMINOPHEN 500 MG
1000 TABLET ORAL ONCE
Status: COMPLETED | OUTPATIENT
Start: 2024-03-20 | End: 2024-03-20

## 2024-03-20 RX ADMIN — IBUPROFEN 600 MG: 400 TABLET, FILM COATED ORAL at 06:59

## 2024-03-20 RX ADMIN — ACETAMINOPHEN 1000 MG: 500 TABLET ORAL at 05:33

## 2024-03-20 RX ADMIN — BACITRACIN: 500 OINTMENT TOPICAL at 06:59

## 2024-03-20 ASSESSMENT — ENCOUNTER SYMPTOMS
SHORTNESS OF BREATH: 0
ABDOMINAL PAIN: 0
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
## 2024-03-20 NOTE — ED PROVIDER NOTES
FACULTY SIGN-OUT  ADDENDUM     Care of this patient was assumed from previous attending physician. The patient's initial evaluation and plan have been discussed with the prior provider who initially evaluated the patient.      Note Started: 7:57 AM EDT    Attestation  I was available and discussed any additional care issues that arose and coordinated the management plans with the resident(s) caring for the patient during my duty period. Any areas of disagreement with resident's documentation of care or procedures are noted on the chart. I was personally present for the key portions of any/all procedures, during my duty period. I have documented in the chart those procedures where I was not present during the key portions.       ED COURSE      The patient was given the following medications:  Orders Placed This Encounter   Medications    DISCONTD: Tetanus-Diphth-Acell Pertussis (BOOSTRIX) injection 0.5 mL    acetaminophen (TYLENOL) tablet 1,000 mg    ibuprofen (ADVIL;MOTRIN) tablet 600 mg    bacitracin ointment    acetaminophen (TYLENOL) 500 MG tablet     Sig: Take 1 tablet by mouth every 6 hours as needed for Pain     Dispense:  15 tablet     Refill:  1    ibuprofen (ADVIL;MOTRIN) 600 MG tablet     Sig: Take 1 tablet by mouth every 6 hours as needed for Pain     Dispense:  15 tablet     Refill:  0       RECENT VITALS:   Temp: 97.7 °F (36.5 °C), Pulse: 72, Respirations: 16, BP: (!) 161/94    MEDICAL DECISION MAKING        Bob Gastelum is a 33 y.o. male who presents to the Emergency Department with complaints of head trauma.  Patient was seen and treated by the prior physicians.  CT scan of the head and neck reportedly negative.  Laceration was repaired.  Prior to discharge patient now stating he is suicidal and will overdose on fentanyl drugs.   has been consulted.  [facility] labs have been ordered and await the results with follow-up discussion with [facility] by .    Accepted by

## 2024-03-20 NOTE — DISCHARGE INSTRUCTIONS
Go to your primary care physician or return to the emergency department in 5-7 days to have your staples removed.    For pain:  Tylenol can be taken every 6 hours. Ibuprofen can be taken every 6 hours. It is recommended you alternate the two every three hours.     Example pain medication schedule:  - 9am: Tylenol (500-1000mg)  - 12 pm/noon: Ibuprofen (400-600mg)  - 3pm: Tylenol  - 6pm: Ibuprofen    Maximum dose of tylenol in a 24 hour period is 4000mg.     You can shower with the laceration, would avoid baths or swimming in lakes / rivers.  Apply bacitracin / triple antibiotic ointment / Neosporin / Vaseline to the wound twice a day.    When you go outside, place sunscreen on the healing wound after the sutures have been removed for the next year to help with scarring.    PLEASE RETURN TO THE EMERGENCY DEPARTMENT IMMEDIATELY for worsening symptoms, redness around the wound or redness streaking up the body part, white drainage from the wound, or if you develop any concerning symptoms such as: high fever not relieved by acetaminophen (Tylenol) and/or ibuprofen (Motrin / Advil), chills, shortness of breath, chest pain, feeling of your heart fluttering or racing, persistent nausea and/or vomiting, vomiting up blood, blood in your stool, numbness, loss of consciousness, weakness or tingling in the arms or legs or change in color of the extremities, changes in mental status, persistent headache, blurry vision, loss of bladder / bowel control, unable to follow up with your physician, or other any other care or concern.

## 2024-03-20 NOTE — ED NOTES
Per report, patient was in the process of being discharged when he reported SI. Writer met with patient who was deeply sleeping on cot. Patient required multiple verbal prompts to wake & engage with writer. Patient stated he has been suicidal for the past 4 days due to his drug use. Patient stated he snorts & injects 1 gram of fentanyl daily, last used sometime this morning. Patient stated he uses crack cocaine daily, last used sometime this morning. Patient reports fentanyl withdrawal symptoms of hot/cold flashes. Patient stated interest in transfer to [facility]. Patient stated if he's not accepted at [facility] he'll shoot up in an attempt to kill himself. Patient stated about a year ago he attempted to OD on sleeping pills. Of note, writer sent patient to [facility] in Jan 2024 after which patient reported he went to [facility]. Patient stated he relapsed after leaving them. Patient reported \"a little\" HI toward no one in particular. Patient denied a/v hallucinations, [facility] link, current legal concerns. Patient is Hep C positive. Writer relayed patient concerns to Attending who will order labs. Once labs result, writer will send packet to [facility] for review.
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
## 2024-03-20 NOTE — ED PROVIDER NOTES
[facility]     Emergency Department     Faculty Attestation    I performed a history and physical examination of the patient and discussed management with the resident. I have reviewed and agree with the resident’s findings including all diagnostic interpretations, and treatment plans as written. Any areas of disagreement are noted on the chart. I was personally present for the key portions of any procedures. I have documented in the chart those procedures where I was not present during the key portions. I have reviewed the emergency nurses triage note. I agree with the chief complaint, past medical history, past surgical history, allergies, medications, social and family history as documented unless otherwise noted below. Documentation of the HPI, Physical Exam and Medical Decision Making performed by natashaibozzy is based on my personal performance of the HPI, PE and MDM. For Physician Assistant/ Nurse Practitioner cases/documentation I have personally evaluated this patient and have completed at least one if not all key elements of the E/M (history, physical exam, and MDM). Additional findings are as noted.    Note Started: 5:15 AM EDT     32 yo M head injury, denies loc, pt complains of feeling sleepy, denies loss of consciousness,   last dT 2020  PE vss gcs 15 herb, linear laceration right parietal scalp, no hematoma, no crepitus, no cervical tenderness, crepitus, or deformity, chest nontender, abdomen nontender,    Ct head -, ct neck -, care turned over to day shift    EKG Interpretation    Interpreted by me      CRITICAL CARE: There was a high probability of clinically significant/life threatening deterioration in this patient's condition which required my urgent intervention.  Total critical care time was 0 minutes.  This excludes any time for separately reportable procedures.       Benny López DO  03/20/24 0517       James

## 2024-04-13 NOTE — CARE COORDINATION
Name: Dana Hamm    : 1990    Discharge Date: 11/10/22    Primary Auth/Cert #: [id number]    Destination: Private residence    Discharge Medications:      Medication List        START taking these medications      hydrOXYzine HCl 50 MG tablet  Commonly known as: ATARAX  Take 1 tablet by mouth 3 times daily as needed for Anxiety  Notes to patient: anxiety            CHANGE how you take these medications      mirtazapine 15 MG tablet  Commonly known as: REMERON  Take 1 tablet by mouth nightly  What changed:   medication strength  how much to take  Notes to patient: depression            CONTINUE taking these medications      traZODone 50 MG tablet  Commonly known as: DESYREL  Take 1 tablet by mouth nightly as needed for Sleep  Notes to patient: Sleep aid               Where to Get Your Medications        These medications were sent to [facility] [address] [place] [id number]      Phone: [phone]   hydrOXYzine HCl 50 MG tablet  mirtazapine 15 MG tablet  traZODone 50 MG tablet         Follow Up Appointment: [address] [place] [address]  [phone]  Follow up on 2022  Pt has appointment at 8:15 am no

## 2024-04-28 ENCOUNTER — HOSPITAL ENCOUNTER (EMERGENCY)
Age: 34
Discharge: HOME | End: 2024-04-29
Attending: EMERGENCY MEDICINE
Payer: MEDICAID

## 2024-04-28 DIAGNOSIS — F19.10 POLYSUBSTANCE ABUSE (HCC): ICD-10-CM

## 2024-04-28 DIAGNOSIS — T50.904A OVERDOSE OF UNDETERMINED INTENT, INITIAL ENCOUNTER: Primary | ICD-10-CM

## 2024-04-28 LAB
ALBUMIN SERPL-MCNC: 4.5 G/DL (ref 3.5–5.2)
ALBUMIN/GLOB SERPL: 2 {RATIO} (ref 1–2.5)
ALP SERPL-CCNC: 63 U/L (ref 40–129)
ALT SERPL-CCNC: 54 U/L (ref 10–50)
ANION GAP SERPL CALCULATED.3IONS-SCNC: 13 MMOL/L (ref 9–16)
AST SERPL-CCNC: 54 U/L (ref 10–50)
BASOPHILS # BLD: <0.03 K/UL (ref 0–0.2)
BASOPHILS NFR BLD: 0 % (ref 0–2)
BILIRUB SERPL-MCNC: 0.3 MG/DL (ref 0–1.2)
BUN SERPL-MCNC: 9 MG/DL (ref 6–20)
CALCIUM SERPL-MCNC: 9.1 MG/DL (ref 8.6–10.4)
CHLORIDE SERPL-SCNC: 104 MMOL/L (ref 98–107)
CO2 SERPL-SCNC: 23 MMOL/L (ref 20–31)
CREAT SERPL-MCNC: 1 MG/DL (ref 0.7–1.2)
EOSINOPHIL # BLD: 0.16 K/UL (ref 0–0.44)
EOSINOPHILS RELATIVE PERCENT: 3 % (ref 1–4)
ERYTHROCYTE [DISTWIDTH] IN BLOOD BY AUTOMATED COUNT: 12.3 % (ref 11.8–14.4)
GFR SERPL CREATININE-BSD FRML MDRD: >90 ML/MIN/1.73M2
GLUCOSE SERPL-MCNC: 91 MG/DL (ref 74–99)
HCT VFR BLD AUTO: 41.8 % (ref 40.7–50.3)
HGB BLD-MCNC: 13.6 G/DL (ref 13–17)
IMM GRANULOCYTES # BLD AUTO: <0.03 K/UL (ref 0–0.3)
IMM GRANULOCYTES NFR BLD: 0 %
LYMPHOCYTES NFR BLD: 3.97 K/UL (ref 1.1–3.7)
LYMPHOCYTES RELATIVE PERCENT: 61 % (ref 24–43)
MCH RBC QN AUTO: 30.6 PG (ref 25.2–33.5)
MCHC RBC AUTO-ENTMCNC: 32.5 G/DL (ref 28.4–34.8)
MCV RBC AUTO: 93.9 FL (ref 82.6–102.9)
MONOCYTES NFR BLD: 0.49 K/UL (ref 0.1–1.2)
MONOCYTES NFR BLD: 8 % (ref 3–12)
NEUTROPHILS NFR BLD: 28 % (ref 36–65)
NEUTS SEG NFR BLD: 1.79 K/UL (ref 1.5–8.1)
NRBC BLD-RTO: 0 PER 100 WBC
PLATELET # BLD AUTO: 326 K/UL (ref 138–453)
PMV BLD AUTO: 8.7 FL (ref 8.1–13.5)
POTASSIUM SERPL-SCNC: 3.8 MMOL/L (ref 3.7–5.3)
PROT SERPL-MCNC: 7.2 G/DL (ref 6.6–8.7)
RBC # BLD AUTO: 4.45 M/UL (ref 4.21–5.77)
WBC OTHER # BLD: 6.4 K/UL (ref 3.5–11.3)

## 2024-04-28 PROCEDURE — 85025 COMPLETE CBC W/AUTO DIFF WBC: CPT

## 2024-04-28 PROCEDURE — 93005 ELECTROCARDIOGRAM TRACING: CPT | Performed by: STUDENT IN AN ORGANIZED HEALTH CARE EDUCATION/TRAINING PROGRAM

## 2024-04-28 PROCEDURE — 99284 EMERGENCY DEPT VISIT MOD MDM: CPT

## 2024-04-28 PROCEDURE — 2580000003 HC RX 258: Performed by: STUDENT IN AN ORGANIZED HEALTH CARE EDUCATION/TRAINING PROGRAM

## 2024-04-28 PROCEDURE — 6360000002 HC RX W HCPCS: Performed by: STUDENT IN AN ORGANIZED HEALTH CARE EDUCATION/TRAINING PROGRAM

## 2024-04-28 PROCEDURE — 80053 COMPREHEN METABOLIC PANEL: CPT

## 2024-04-28 PROCEDURE — 96374 THER/PROPH/DIAG INJ IV PUSH: CPT

## 2024-04-28 RX ORDER — LORAZEPAM 2 MG/ML
1 INJECTION INTRAMUSCULAR ONCE
Status: COMPLETED | OUTPATIENT
Start: 2024-04-28 | End: 2024-04-28

## 2024-04-28 RX ORDER — 0.9 % SODIUM CHLORIDE 0.9 %
1000 INTRAVENOUS SOLUTION INTRAVENOUS ONCE
Status: COMPLETED | OUTPATIENT
Start: 2024-04-28 | End: 2024-04-28

## 2024-04-28 RX ADMIN — LORAZEPAM 1 MG: 2 INJECTION INTRAMUSCULAR; INTRAVENOUS at 16:50

## 2024-04-28 RX ADMIN — SODIUM CHLORIDE 1000 ML: 9 INJECTION, SOLUTION INTRAVENOUS at 16:47

## 2024-04-28 NOTE — ED NOTES
Pt resting comfortably on stretcher with eyes closed.  Pt is in no distress. RR even and unlabored.  Pt remains on cardiac monitor, VSS.   Call light within reach.
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
## 2024-04-29 VITALS
HEART RATE: 67 BPM | TEMPERATURE: 98.1 F | RESPIRATION RATE: 10 BRPM | DIASTOLIC BLOOD PRESSURE: 99 MMHG | OXYGEN SATURATION: 99 % | SYSTOLIC BLOOD PRESSURE: 141 MMHG

## 2024-04-29 NOTE — ED NOTES
KISHAN consulted for pt requesting AOD treatment at [facility]. Pt was found by EMS outside per medical documentation. Pt has long hx of using crack-cocaine and Fentanyl. Charting reports pt has recent and past admissions to [facility] and [facility]. Pt is specifically asking to go to [facility] today for their treatment program.  Pt reports daily crack-cocaine use and Fentanyl and denies intentional overdose. Pt denies any hallucinations, SI or HI at today's visit. Pt short with KISHAN and closed in conversation wanting to sleep.  KISHAN called [facility] and spoke to Jeannine and faxed information with pt providing consent. Transportation will be set up once pt is discharged to [facility] for immediate linkage. [employer] voucher used. Trip # [id number] Pt was walked to [facility].

## 2024-04-29 NOTE — ED NOTES
SW at bedside.  Still requesting to go to [facility].  Pt answering questions appropriately. No distress noted.

## 2024-04-29 NOTE — ED PROVIDER NOTES
[facility] ED  Emergency Department  Emergency Medicine Resident Turn-Over     Note Started: 9:44 PM EDT    Care of Bob Gastelum was assumed from Dr. briseno and is being seen for Drug Overdose  .  The patient's initial evaluation and plan have been discussed with the prior provider who initially evaluated the patient.     EMERGENCY DEPARTMENT COURSE / MEDICAL DECISION MAKING:       MEDICATIONS GIVEN:  Orders Placed This Encounter   Medications    sodium chloride 0.9 % bolus 1,000 mL    LORazepam (ATIVAN) injection 1 mg       LABS / RADIOLOGY:     Labs Reviewed   COMPREHENSIVE METABOLIC PANEL - Abnormal; Notable for the following components:       Result Value    ALT 54 (*)     AST 54 (*)     All other components within normal limits   CBC WITH AUTO DIFFERENTIAL - Abnormal; Notable for the following components:    Neutrophils % 28 (*)     Lymphocytes % 61 (*)     Lymphocytes Absolute 3.97 (*)     All other components within normal limits       No results found.    RECENT VITALS:     Temp: 98.1 °F (36.7 °C),  Pulse: 59, Respirations: 10, BP: (!) 141/99, SpO2: 100 %    This patient is a 33 y.o. Male with intentional overdose.  Patient agitated required some chemical sedation.  Patient became clinically sober able to ambulate without issue neurologic logically intact station week passing insight VSS nontoxic discharged to [facility] for substance abuse    ED Course as of 04/29/24 0616   Sun Apr 28, 2024   1724 CMP and CBC with diff unremarkable [GC]   1726 Pulse: 90  Improved with normal saline [GC]   2135 K2 OD, monitor clinical status [ZE]   2220 Patient reevaluated multiple times during my ED shift and has been resting comfortably.  Has remained hemodynamically stable.  Protecting his airway well.  Only required 1 mg of Ativan during my shift as well.  Patient will awaken to verbal stimuli.  Patient communicating slightly better than arrival on my last evaluation.  [GC]   2221 Signed out to 
      [facility] ED     Emergency Department     Faculty Attestation    I performed a history and physical examination of the patient and discussed management with the resident. I reviewed the resident’s note and agree with the documented findings and plan of care. Any areas of disagreement are noted on the chart. I was personally present for the key portions of any procedures. I have documented in the chart those procedures where I was not present during the key portions. I have reviewed the emergency nurses triage note. I agree with the chief complaint, past medical history, past surgical history, allergies, medications, social and family history as documented unless otherwise noted below. For Physician Assistant/ Nurse Practitioner cases/documentation I have personally evaluated this patient and have completed at least one if not all key elements of the E/M (history, physical exam, and MDM). Additional findings are as noted.    Note Started: 4:55 PM EDT    Patient arrives by EMS for possible overdose unknown substance.  Normal blood sugar for EMS provides independent history patient unable to write any history.  Orients to voice moving all extremities equally yelling airway intact but will not follow commands.  Lungs clear heart regular abdomen soft nontender.  Will give a dose of Ativan monitor closely need for additional workup or interventions.    EKG interpretation sinus tachycardia 107 normal intervals normal axis no acute ST or T changes no acute findings      Critical Care     none    Gómez Hodges MD, FACEP, FAAEM  Attending Emergency  Physician           Gómez Hodges MD  04/28/24 1454    
Faculty Sign-Out Attestation  Handoff taken on the following patient from prior Attending Physician: Carroll  Note Started: 10:59 PM EDT    I was available and discussed any additional care issues that arose and coordinated the management plans with the resident(s) caring for the patient during my duty period. Any areas of disagreement with resident’s documentation of care or procedures are noted on the chart. I was personally present for the key portions of any/all procedures during my duty period. I have documented in the chart those procedures where I was not present during the key portions.    K2 overdose, ativan,   Await awakening & discharge     -pt allowed to wake up,   Tolerating liquids, ambulatory, talkative,   Discharged per plan       Benny Ramirez, DO  04/29/24 0721    
50 MG tablet Take 1 tablet by mouth nightly as needed for Sleep 2/5/23   Mindy Torres, APRN - CNP       REVIEW OF SYSTEMS       Review of Systems   Reason unable to perform ROS: AMS.   Constitutional:  Positive for diaphoresis.       PHYSICAL EXAM      INITIAL VITALS:   BP (!) 141/99   Pulse 62   Temp 98.1 °F (36.7 °C) (Oral)   Resp 10   SpO2 98%     Physical Exam  Constitutional:       Comments: Intoxicated diaphoretic male who is alert however making sounds when responding to questions   HENT:      Head: Normocephalic and atraumatic.      Right Ear: Ear canal and external ear normal.      Left Ear: Ear canal and external ear normal.      Nose: Nose normal. No congestion.      Mouth/Throat:      Mouth: Mucous membranes are moist.      Pharynx: No posterior oropharyngeal erythema.   Eyes:      General:         Right eye: No discharge.         Left eye: No discharge.      Extraocular Movements: Extraocular movements intact.      Conjunctiva/sclera: Conjunctivae normal.      Pupils: Pupils are equal, round, and reactive to light.   Cardiovascular:      Rate and Rhythm: Regular rhythm. Tachycardia present.      Pulses: Normal pulses.      Heart sounds: Normal heart sounds.   Pulmonary:      Effort: Pulmonary effort is normal. No respiratory distress.      Breath sounds: Normal breath sounds.   Abdominal:      General: Abdomen is flat. There is no distension.      Palpations: Abdomen is soft.      Tenderness: There is no abdominal tenderness. There is no guarding or rebound.   Musculoskeletal:         General: No swelling, tenderness, deformity or signs of injury.      Cervical back: Neck supple.   Skin:     General: Skin is warm.      Findings: No erythema.      Comments: Diaphoretic    Neurological:      Mental Status: He is alert.      Comments: Moving all 4 extremities.    Psychiatric:      Comments: Intoxicated male           DDX/DIAGNOSTIC RESULTS / EMERGENCY DEPARTMENT COURSE / MDM     Medical Decision

## 2024-04-29 NOTE — DISCHARGE INSTRUCTIONS
You are medically clear and stable to proceed to [facility]    Proceed to [facility] avoid alcohol and recreational drug use return if any symptoms persist or worsen

## 2024-04-29 NOTE — ED NOTES
Pt awake and answering questions appropriately.  Pt tolerating oral intake.   Pt also denying suicidal ideations and is currently requesting to go to [facility].

## 2024-04-30 LAB
EKG ATRIAL RATE: 107 BPM
EKG P AXIS: 70 DEGREES
EKG P-R INTERVAL: 124 MS
EKG Q-T INTERVAL: 348 MS
EKG QRS DURATION: 82 MS
EKG QTC CALCULATION (BAZETT): 464 MS
EKG R AXIS: 65 DEGREES
EKG T AXIS: 63 DEGREES
EKG VENTRICULAR RATE: 107 BPM

## 2024-04-30 PROCEDURE — 93010 ELECTROCARDIOGRAM REPORT: CPT | Performed by: INTERNAL MEDICINE

## 2024-06-10 ENCOUNTER — HOSPITAL ENCOUNTER (EMERGENCY)
Age: 34
Discharge: HOME | End: 2024-06-11
Attending: EMERGENCY MEDICINE
Payer: MEDICAID

## 2024-06-10 DIAGNOSIS — T50.904A OVERDOSE OF UNDETERMINED INTENT, INITIAL ENCOUNTER: Primary | ICD-10-CM

## 2024-06-10 LAB
ANION GAP SERPL CALCULATED.3IONS-SCNC: 8 MMOL/L (ref 9–16)
BASOPHILS # BLD: 0.04 K/UL (ref 0–0.2)
BASOPHILS NFR BLD: 1 % (ref 0–2)
BUN SERPL-MCNC: 13 MG/DL (ref 6–20)
CALCIUM SERPL-MCNC: 8.8 MG/DL (ref 8.6–10.4)
CHLORIDE SERPL-SCNC: 105 MMOL/L (ref 98–107)
CO2 SERPL-SCNC: 27 MMOL/L (ref 20–31)
CREAT SERPL-MCNC: 1.1 MG/DL (ref 0.7–1.2)
EOSINOPHIL # BLD: 0.08 K/UL (ref 0–0.44)
EOSINOPHILS RELATIVE PERCENT: 1 % (ref 1–4)
ERYTHROCYTE [DISTWIDTH] IN BLOOD BY AUTOMATED COUNT: 12.4 % (ref 11.8–14.4)
GFR, ESTIMATED: >90 ML/MIN/1.73M2
GLUCOSE SERPL-MCNC: 89 MG/DL (ref 74–99)
HCT VFR BLD AUTO: 42.3 % (ref 40.7–50.3)
HGB BLD-MCNC: 13.4 G/DL (ref 13–17)
IMM GRANULOCYTES # BLD AUTO: <0.03 K/UL (ref 0–0.3)
IMM GRANULOCYTES NFR BLD: 0 %
LYMPHOCYTES NFR BLD: 3.19 K/UL (ref 1.1–3.7)
LYMPHOCYTES RELATIVE PERCENT: 54 % (ref 24–43)
MCH RBC QN AUTO: 29.7 PG (ref 25.2–33.5)
MCHC RBC AUTO-ENTMCNC: 31.7 G/DL (ref 28.4–34.8)
MCV RBC AUTO: 93.8 FL (ref 82.6–102.9)
MONOCYTES NFR BLD: 0.52 K/UL (ref 0.1–1.2)
MONOCYTES NFR BLD: 9 % (ref 3–12)
NEUTROPHILS NFR BLD: 35 % (ref 36–65)
NEUTS SEG NFR BLD: 2.08 K/UL (ref 1.5–8.1)
NRBC BLD-RTO: 0 PER 100 WBC
PLATELET # BLD AUTO: 274 K/UL (ref 138–453)
PMV BLD AUTO: 9.5 FL (ref 8.1–13.5)
POTASSIUM SERPL-SCNC: 4.4 MMOL/L (ref 3.7–5.3)
RBC # BLD AUTO: 4.51 M/UL (ref 4.21–5.77)
SODIUM SERPL-SCNC: 140 MMOL/L (ref 136–145)
WBC OTHER # BLD: 5.9 K/UL (ref 3.5–11.3)

## 2024-06-10 PROCEDURE — 80048 BASIC METABOLIC PNL TOTAL CA: CPT

## 2024-06-10 PROCEDURE — 85025 COMPLETE CBC W/AUTO DIFF WBC: CPT

## 2024-06-10 PROCEDURE — G0480 DRUG TEST DEF 1-7 CLASSES: HCPCS

## 2024-06-10 PROCEDURE — 6360000002 HC RX W HCPCS: Performed by: STUDENT IN AN ORGANIZED HEALTH CARE EDUCATION/TRAINING PROGRAM

## 2024-06-10 PROCEDURE — 80143 DRUG ASSAY ACETAMINOPHEN: CPT

## 2024-06-10 PROCEDURE — 99284 EMERGENCY DEPT VISIT MOD MDM: CPT

## 2024-06-10 PROCEDURE — 93005 ELECTROCARDIOGRAM TRACING: CPT | Performed by: STUDENT IN AN ORGANIZED HEALTH CARE EDUCATION/TRAINING PROGRAM

## 2024-06-10 PROCEDURE — 80179 DRUG ASSAY SALICYLATE: CPT

## 2024-06-10 PROCEDURE — 96374 THER/PROPH/DIAG INJ IV PUSH: CPT

## 2024-06-10 RX ORDER — NALOXONE HYDROCHLORIDE 0.4 MG/ML
0.1 INJECTION, SOLUTION INTRAMUSCULAR; INTRAVENOUS; SUBCUTANEOUS ONCE
Status: COMPLETED | OUTPATIENT
Start: 2024-06-10 | End: 2024-06-10

## 2024-06-10 RX ADMIN — NALOXONE HYDROCHLORIDE 0.1 MG: 0.4 INJECTION, SOLUTION INTRAMUSCULAR; INTRAVENOUS; SUBCUTANEOUS at 17:20

## 2024-06-10 ASSESSMENT — PAIN - FUNCTIONAL ASSESSMENT: PAIN_FUNCTIONAL_ASSESSMENT: NONE - DENIES PAIN

## 2024-06-10 NOTE — ED PROVIDER NOTES
[facility]     Emergency Department     Faculty Attestation    I performed a history and physical examination of the patient and discussed management with the resident. I reviewed the resident’s note and agree with the documented findings including all diagnostic interpretations and plan of care. Any areas of disagreement are noted on the chart. I was personally present for the key portions of any procedures. I have documented in the chart those procedures where I was not present during the key portions. I have reviewed the emergency nurses triage note. I agree with the chief complaint, past medical history, past surgical history, allergies, medications, social and family history as documented unless otherwise noted below. Documentation of the HPI, Physical Exam and Medical Decision Making performed by dominick is based on my personal performance of the HPI, PE and MDM. For Physician Assistant/ Nurse Practitioner cases/documentation I have personally evaluated this patient and have completed at least one if not all key elements of the E/M (history, physical exam, and MDM). Additional findings are as noted.    Primary Care Physician: No primary care provider on file.    Note Started: 5:36 PM EDT     VITAL SIGNS:   temperature is 98.7 °F (37.1 °C). His blood pressure is 152/95 (abnormal) and his pulse is 80. His respiration is 11 and oxygen saturation is 97%.      Medical Decision Making  Initial call out for erratic behavior.  Reported use of recreational drugs.  Limited history from patient due to significant somnolence.  Patient is somnolent but arousable to voice however is intermittently having periods of apnea up to 7 seconds in duration with occasional dips in pulse ox to 88%.  Cardiac exam regular rate and rhythm no murmurs rubs gallops, pulmonary clear bilaterally abdomen soft nontender nondistended.  Skin warm and dry.  Pupils are equal but 1 mm  bilaterally.  Impression polysubstance use with predominantly opioid toxidrome present.  Low-dose Narcan and reassess.  Tox workup, observe for return to sobriety.    Amount and/or Complexity of Data Reviewed  External Data Reviewed: labs and notes.  Labs: ordered.  ECG/medicine tests: ordered.    Risk  Prescription drug management.  Diagnosis or treatment significantly limited by social determinants of health.        EKG Interpretation  EKG Interpretation    Interpreted by emergency department physician    Rhythm: sinus arrhythmia  Rate: normal  Axis: normal  Ectopy: none  Conduction: normal  ST Segments: normal  T Waves: normal  Q Waves: none    EKG  Impression: sinus arrhythmia    Jag Latif MD    Interpreted by me    CRITICAL CARE: There was a high probability of clinically significant/life threatening deterioration in this patient's condition which required my urgent intervention.  Total critical care time was 22 minutes.  This excludes any time for separately reportable procedures.     Jag Latif MD, FACEP, FAAEM  Attending Emergency Physician        Jag Latif MD  06/10/24 9449

## 2024-06-10 NOTE — ED NOTES
Pt presented to ED via EMS following bystander calling police due to pt jumping up and down. Pt states \"they didn't give me the right drugs\". Pt presents with pin point pupils. Pt falling asleep during triage. Pt denies pain.

## 2024-06-10 NOTE — ED PROVIDER NOTES
[facility] ED  Emergency Department Encounter  Emergency Medicine Resident     Pt Name:Bob Gastelum  MRN: [id number]  Birthdate 1990  Date of evaluation: 6/10/24  PCP:  No primary care provider on file.  Note Started: 5:07 PM EDT      CHIEF COMPLAINT       Chief Complaint   Patient presents with    Drug / Alcohol Assessment     States he did some drugs, erratic behavior.        HISTORY OF PRESENT ILLNESS  (Location/Symptom, Timing/Onset, Context/Setting, Quality, Duration, Modifying Factors, Severity.)      Bob Gastelum is a 33 y.o. male with PMH including polysubstance abuse, bipolar 1 disorder, HTN who presents emergency department via EMS for a suspected overdose.  Patient was found to be acting erratically and told EMS that he was doing \"some drugs.\"  On arrival here patient is unremarkable vitals asides from a blood pressure of 152/95.  He is protecting his airway well and satting 97% on room air.  Periodically his O2 sat would dip into the low 90s while sleeping.  He was given a very low-dose of Narcan 0.1 mg here in the ED with improvement of his O2 sats.  Patient is responding to verbal commands and able to tell me his name.  He denies chest pain, shortness of breath, abdominal pain, neck pain, headaches, changes in his vision.  On arrival his pupils are pinpoint and minimally reactive to light bilaterally.    PAST MEDICAL / SURGICAL / SOCIAL / FAMILY HISTORY      has a past medical history of Alcoholism (HCC), Anxiety, Bipolar 1 disorder (HCC), Cocaine abuse (HCC), Depression, Hepatitis C antibody test positive, Hypertension, Irregular heartbeat, Mild tetrahydrocannabinol (THC) abuse, and Suicidal ideation.     has a past surgical history that includes other surgical history (Right, 06/04/2017); Dialysis fistula creation (Right, 6/4/2017); EXPLORATION OF WOUND OF EXTREMITY (Right, 6/4/2017); and Femur Surgery (Left).      Social History     Socioeconomic History    Marital status:

## 2024-06-11 VITALS
HEART RATE: 52 BPM | RESPIRATION RATE: 10 BRPM | OXYGEN SATURATION: 93 % | TEMPERATURE: 98.7 F | SYSTOLIC BLOOD PRESSURE: 114 MMHG | DIASTOLIC BLOOD PRESSURE: 81 MMHG

## 2024-06-11 LAB
APAP SERPL-MCNC: <5 UG/ML (ref 10–30)
EKG ATRIAL RATE: 74 BPM
EKG P AXIS: 89 DEGREES
EKG P-R INTERVAL: 124 MS
EKG Q-T INTERVAL: 374 MS
EKG QRS DURATION: 76 MS
EKG QTC CALCULATION (BAZETT): 415 MS
EKG R AXIS: 78 DEGREES
EKG T AXIS: 61 DEGREES
EKG VENTRICULAR RATE: 74 BPM
ETHANOL PERCENT: <0.01 %
ETHANOLAMINE SERPL-MCNC: <10 MG/DL (ref 0–0.08)
SALICYLATES SERPL-MCNC: <0.5 MG/DL (ref 0–10)

## 2024-06-11 PROCEDURE — 2580000003 HC RX 258: Performed by: STUDENT IN AN ORGANIZED HEALTH CARE EDUCATION/TRAINING PROGRAM

## 2024-06-11 PROCEDURE — 6370000000 HC RX 637 (ALT 250 FOR IP): Performed by: EMERGENCY MEDICINE

## 2024-06-11 RX ORDER — NALOXONE HYDROCHLORIDE 4 MG/.1ML
1 SPRAY NASAL ONCE
Status: COMPLETED | OUTPATIENT
Start: 2024-06-11 | End: 2024-06-11

## 2024-06-11 RX ORDER — 0.9 % SODIUM CHLORIDE 0.9 %
1000 INTRAVENOUS SOLUTION INTRAVENOUS ONCE
Status: COMPLETED | OUTPATIENT
Start: 2024-06-11 | End: 2024-06-11

## 2024-06-11 RX ADMIN — SODIUM CHLORIDE 1000 ML: 9 INJECTION, SOLUTION INTRAVENOUS at 01:51

## 2024-06-11 RX ADMIN — SODIUM CHLORIDE 1000 ML: 9 INJECTION, SOLUTION INTRAVENOUS at 03:55

## 2024-06-11 NOTE — ED PROVIDER NOTES
[facility] ED  Emergency Department  Emergency Medicine Resident Turn-Over     Note Started: 3:34 AM EDT    Care of Bob Gastelum was assumed from Dr. Fuentes and is being seen for Drug / Alcohol Assessment (States he did some drugs, erratic behavior. )  .  The patient's initial evaluation and plan have been discussed with the prior provider who initially evaluated the patient.     EMERGENCY DEPARTMENT COURSE / MEDICAL DECISION MAKING:       MEDICATIONS GIVEN:  Orders Placed This Encounter   Medications    naloxone (NARCAN) injection 0.1 mg    sodium chloride 0.9 % bolus 1,000 mL    sodium chloride 0.9 % bolus 1,000 mL    naloxone opiate overdose kit 4mg       LABS / RADIOLOGY:     Labs Reviewed   CBC WITH AUTO DIFFERENTIAL - Abnormal; Notable for the following components:       Result Value    Neutrophils % 35 (*)     Lymphocytes % 54 (*)     All other components within normal limits   BASIC METABOLIC PANEL - Abnormal; Notable for the following components:    Anion Gap 8 (*)     All other components within normal limits   TOX SCR, BLD, ED - Abnormal; Notable for the following components:    Acetaminophen Level <5 (*)     All other components within normal limits   PREVIOUS SPECIMEN       No results found.    RECENT VITALS:     Temp: 98.7 °F (37.1 °C),  Pulse: 52, Respirations: 10, BP: 114/81, SpO2: 93 %    This patient is a 33 y.o. Male with overdose with decreased respiratory effort.  Was hemodynamically stable with intact airway.  Labs are stable.  Patient did receive a small amount of Narcan in the emergency department as well as 2 L of IV fluids.  Trial ambulation, however patient was still feeling slightly unsteady.  Additional IV fluids and reattempt ambulation.  Anticipate likely discharge with Narcan to cocaine.      ED Course as of 06/15/24 1951   Mon Fransisco 10, 2024   2217 BMP and CBC with differential unremarkable [GC]   Tue Jun 11, 2024   0106 ED tox unremarkable [GC]   0218 Nurse  staff attempted to ambulate patient after he ate a boxed lunch.  Unfortunately he was unstable on his feet and stated that he was dizzy [GC]   0352 Patient signed to co-resident.  Will continue to reassess/monitor [GC]   0524 Patient ambulating with a steady gait.  Will discharge the patient with Narcan to go kit. [BL]      ED Course User Index  [BL] Ifrah Woodruff DO  [GC] Camilo Fuentes DO       OUTSTANDING TASKS / RECOMMENDATIONS:    Addt'l liter IVF  Ambulate  Dispo     FINAL IMPRESSION:     1. Overdose of undetermined intent, initial encounter        DISPOSITION:         DISPOSITION:  [x]  Discharge   []  Transfer -    []  Admission -     []  Against Medical Advice   []  Eloped   FOLLOW-UP: [facility] AT [address] [address] [phone]         DISCHARGE MEDICATIONS: Discharge Medication List as of 6/11/2024  5:23 AM              Ifrah Woodruff DO  Emergency Medicine Resident  [facility]

## 2024-06-11 NOTE — DISCHARGE INSTRUCTIONS
You are seen in the ER today for your overdose.  We gave you a small amount of Narcan and watched you for an extended amount of time.  At this time, you are medically stable to be discharged.  We will discharge you with a Narcan to go kit.  Please use this as needed for overdose.  Please return to the ER for any new or worsening symptoms.  Otherwise, please follow-up your primary care provider to be reassessed in the next 3 to 5 days.    PLEASE RETURN TO THE EMERGENCY DEPARTMENT IMMEDIATELY if you develop any concerning symptoms such as: chest pain, shortness of breath, feeling like your heart is racing, high fever not relieved by acetaminophen (Tylenol) and/or ibuprofen (Motrin / Advil), chills, persistent nausea and/or vomiting, loss of consciousness, numbness, weakness or tingling in the arms or legs or change in color of the extremities, changes in mental status, persistent or severe headache, blurry vision, loss of bladder / bowel control, unable to follow up with your physician, or other any other care or concern.

## 2024-06-11 NOTE — ED NOTES
KISHAN met with pt and provided him with AOD resources. Pt reports overdose was accidental. Denies SI/HI. Pt states he wants to leave and go to his brother's house as he hasn't slept in a couple nights but would like some food first. SW will assist with transport when pt is discharged.

## 2024-06-11 NOTE — ED PROVIDER NOTES
Faculty Sign-Out Attestation  Handoff taken on the following patient from prior Attending Physician: Salomon  Note Started: 12:03 AM EDT    I was available and discussed any additional care issues that arose and coordinated the management plans with the resident(s) caring for the patient during my duty period. Any areas of disagreement with resident’s documentation of care or procedures are noted on the chart. I was personally present for the key portions of any/all procedures during my duty period. I have documented in the chart those procedures where I was not present during the key portions.    Manic, opiod,   0.1 mg narcan improved symptoms,   Await sober recheck & discharge    -Talkative, ambulatory, tolerating liquids, patient alert, gcs 15,    Benny Ramirez DO  Attending Physician       Benny Ramirez DO  06/11/24 0003       Benny Ramirez DO  06/11/24 0527

## 2024-07-11 ENCOUNTER — HOSPITAL ENCOUNTER (EMERGENCY)
Age: 34
Discharge: HOME | End: 2024-07-12
Attending: EMERGENCY MEDICINE
Payer: MEDICAID

## 2024-07-11 DIAGNOSIS — F19.920 DRUG INTOXICATION WITHOUT COMPLICATION (HCC): Primary | ICD-10-CM

## 2024-07-11 LAB
ANION GAP SERPL CALCULATED.3IONS-SCNC: 10 MMOL/L (ref 9–16)
APAP SERPL-MCNC: <5 UG/ML (ref 10–30)
BASOPHILS # BLD: 0.03 K/UL (ref 0–0.2)
BASOPHILS NFR BLD: 0 % (ref 0–2)
BUN BLD-MCNC: 11 MG/DL (ref 8–26)
BUN SERPL-MCNC: 12 MG/DL (ref 6–20)
CA-I BLD-SCNC: 1.32 MMOL/L (ref 1.15–1.33)
CALCIUM SERPL-MCNC: 8.7 MG/DL (ref 8.6–10.4)
CHLORIDE BLD-SCNC: 111 MMOL/L (ref 98–107)
CHLORIDE SERPL-SCNC: 109 MMOL/L (ref 98–107)
CO2 BLD CALC-SCNC: 32 MMOL/L (ref 22–30)
CO2 SERPL-SCNC: 25 MMOL/L (ref 20–31)
CREAT SERPL-MCNC: 1.2 MG/DL (ref 0.7–1.2)
EGFR, POC: >90 ML/MIN/1.73M2
EOSINOPHIL # BLD: 0.17 K/UL (ref 0–0.44)
EOSINOPHILS RELATIVE PERCENT: 2 % (ref 1–4)
ERYTHROCYTE [DISTWIDTH] IN BLOOD BY AUTOMATED COUNT: 12.5 % (ref 11.8–14.4)
ETHANOL PERCENT: <0.01 %
ETHANOLAMINE SERPL-MCNC: <10 MG/DL (ref 0–0.08)
GFR, ESTIMATED: 84 ML/MIN/1.73M2
GLUCOSE BLD-MCNC: 111 MG/DL (ref 74–100)
GLUCOSE SERPL-MCNC: 110 MG/DL (ref 74–99)
HCO3 VENOUS: 30.9 MMOL/L (ref 22–29)
HCT VFR BLD AUTO: 40.6 % (ref 40.7–50.3)
HCT VFR BLD AUTO: 41 % (ref 41–53)
HGB BLD-MCNC: 12.8 G/DL (ref 13–17)
IMM GRANULOCYTES # BLD AUTO: 0.03 K/UL (ref 0–0.3)
IMM GRANULOCYTES NFR BLD: 0 %
LYMPHOCYTES NFR BLD: 3.54 K/UL (ref 1.1–3.7)
LYMPHOCYTES RELATIVE PERCENT: 43 % (ref 24–43)
MCH RBC QN AUTO: 29.6 PG (ref 25.2–33.5)
MCHC RBC AUTO-ENTMCNC: 31.5 G/DL (ref 28.4–34.8)
MCV RBC AUTO: 94 FL (ref 82.6–102.9)
MONOCYTES NFR BLD: 0.52 K/UL (ref 0.1–1.2)
MONOCYTES NFR BLD: 6 % (ref 3–12)
NEUTROPHILS NFR BLD: 49 % (ref 36–65)
NEUTS SEG NFR BLD: 3.91 K/UL (ref 1.5–8.1)
NRBC BLD-RTO: 0 PER 100 WBC
O2 SAT, VEN: 76.7 % (ref 60–85)
PCO2 VENOUS: 69.8 MM HG (ref 41–51)
PH VENOUS: 7.25 (ref 7.32–7.43)
PLATELET # BLD AUTO: 298 K/UL (ref 138–453)
PMV BLD AUTO: 9.2 FL (ref 8.1–13.5)
PO2 VENOUS: 49.5 MM HG (ref 30–50)
POC ANION GAP: 4 MMOL/L (ref 7–16)
POC CREATININE: 1.1 MG/DL (ref 0.51–1.19)
POC HEMOGLOBIN (CALC): 13.8 G/DL (ref 13.5–17.5)
POC LACTIC ACID: 0.7 MMOL/L (ref 0.56–1.39)
POSITIVE BASE EXCESS, VEN: 1.7 MMOL/L (ref 0–3)
POTASSIUM BLD-SCNC: 3.7 MMOL/L (ref 3.5–4.5)
POTASSIUM SERPL-SCNC: 3.7 MMOL/L (ref 3.7–5.3)
RBC # BLD AUTO: 4.32 M/UL (ref 4.21–5.77)
SALICYLATES SERPL-MCNC: <0.5 MG/DL (ref 0–10)
SODIUM BLD-SCNC: 146 MMOL/L (ref 138–146)
SODIUM SERPL-SCNC: 144 MMOL/L (ref 136–145)
WBC OTHER # BLD: 8.2 K/UL (ref 3.5–11.3)

## 2024-07-11 PROCEDURE — 82330 ASSAY OF CALCIUM: CPT

## 2024-07-11 PROCEDURE — 85014 HEMATOCRIT: CPT

## 2024-07-11 PROCEDURE — 96361 HYDRATE IV INFUSION ADD-ON: CPT

## 2024-07-11 PROCEDURE — 80051 ELECTROLYTE PANEL: CPT

## 2024-07-11 PROCEDURE — 93005 ELECTROCARDIOGRAM TRACING: CPT

## 2024-07-11 PROCEDURE — 6360000002 HC RX W HCPCS

## 2024-07-11 PROCEDURE — 85025 COMPLETE CBC W/AUTO DIFF WBC: CPT

## 2024-07-11 PROCEDURE — 82803 BLOOD GASES ANY COMBINATION: CPT

## 2024-07-11 PROCEDURE — G0480 DRUG TEST DEF 1-7 CLASSES: HCPCS

## 2024-07-11 PROCEDURE — 80179 DRUG ASSAY SALICYLATE: CPT

## 2024-07-11 PROCEDURE — 80307 DRUG TEST PRSMV CHEM ANLYZR: CPT

## 2024-07-11 PROCEDURE — 80143 DRUG ASSAY ACETAMINOPHEN: CPT

## 2024-07-11 PROCEDURE — 96374 THER/PROPH/DIAG INJ IV PUSH: CPT

## 2024-07-11 PROCEDURE — 2580000003 HC RX 258

## 2024-07-11 PROCEDURE — 82947 ASSAY GLUCOSE BLOOD QUANT: CPT

## 2024-07-11 PROCEDURE — 84520 ASSAY OF UREA NITROGEN: CPT

## 2024-07-11 PROCEDURE — 82565 ASSAY OF CREATININE: CPT

## 2024-07-11 PROCEDURE — 99284 EMERGENCY DEPT VISIT MOD MDM: CPT

## 2024-07-11 PROCEDURE — 80048 BASIC METABOLIC PNL TOTAL CA: CPT

## 2024-07-11 PROCEDURE — 83605 ASSAY OF LACTIC ACID: CPT

## 2024-07-11 RX ORDER — LORAZEPAM 2 MG/ML
1 INJECTION INTRAMUSCULAR ONCE
Status: COMPLETED | OUTPATIENT
Start: 2024-07-11 | End: 2024-07-11

## 2024-07-11 RX ORDER — LORAZEPAM 2 MG/ML
0.5 INJECTION INTRAMUSCULAR ONCE
Status: COMPLETED | OUTPATIENT
Start: 2024-07-11 | End: 2024-07-11

## 2024-07-11 RX ORDER — 0.9 % SODIUM CHLORIDE 0.9 %
500 INTRAVENOUS SOLUTION INTRAVENOUS ONCE
Status: COMPLETED | OUTPATIENT
Start: 2024-07-11 | End: 2024-07-11

## 2024-07-11 RX ORDER — LORAZEPAM 2 MG/ML
0.5 INJECTION INTRAMUSCULAR ONCE
Status: DISCONTINUED | OUTPATIENT
Start: 2024-07-11 | End: 2024-07-11

## 2024-07-11 RX ADMIN — SODIUM CHLORIDE 500 ML: 0.9 INJECTION, SOLUTION INTRAVENOUS at 18:53

## 2024-07-11 RX ADMIN — Medication 1 MG: at 19:11

## 2024-07-11 RX ADMIN — Medication 0.5 MG: at 18:53

## 2024-07-11 NOTE — ED PROVIDER NOTES
[facility] ED  Emergency Department Encounter  Emergency Medicine Resident     Pt Name:Bob Gastelum  MRN: [id number]  Birthdate 1990  Date of evaluation: 7/11/24  PCP:  No primary care provider on file.  Note Started: 6:38 PM EDT      CHIEF COMPLAINT       Chief Complaint   Patient presents with    Altered Mental Status       HISTORY OF PRESENT ILLNESS  (Location/Symptom, Timing/Onset, Context/Setting, Quality, Duration, Modifying Factors, Severity.)      Bob Gastelum is a 33 y.o. male who presents by EMS as patient was found in a laundromat. Patient was not providing any history to EMS on their arrival.  They were concerned that patient may have been using drugs.  Patient only nodded yes to snorting cocaine.  He is not providing any other history.    PAST MEDICAL / SURGICAL / SOCIAL / FAMILY HISTORY      has a past medical history of Alcoholism (HCC), Anxiety, Bipolar 1 disorder (HCC), Cocaine abuse (HCC), Depression, Hepatitis C antibody test positive, Hypertension, Irregular heartbeat, Mild tetrahydrocannabinol (THC) abuse, and Suicidal ideation.     has a past surgical history that includes other surgical history (Right, 06/04/2017); Dialysis fistula creation (Right, 6/4/2017); EXPLORATION OF WOUND OF EXTREMITY (Right, 6/4/2017); and Femur Surgery (Left).    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  No rebound or guarding.  Since patient is not cooperating with providing a history, will get basic labs and EKG.  Will give IV fluids and Ativan for tachycardia as patient did nod yes to using cocaine.  Will have social work meet with patient once he is more cooperative with exam.  Dispo pending.    Amount and/or Complexity of Data Reviewed  Independent Historian: EMS  Labs: ordered. Decision-making details documented in ED Course.  ECG/medicine tests: ordered and independent interpretation performed. Decision-making details documented in ED Course.    Risk  Prescription drug management.    Critical Care  Total time providing critical care: 0 minutes        EKG    EKG shows sinus tachycardia with heart rate of 123 bpm    All EKG's are interpreted by the Emergency Department Physician who either signs or Co-signs this chart in the absence of a cardiologist.    EMERGENCY DEPARTMENT COURSE:  ED Course as of 07/12/24 0050   Thu Jul 11, 2024 1910 WBC: 8.2 [KR]   1910 Hemoglobin Quant(!): 12.8 [KR]   1954 Sodium: 144 [KR]   1954 Potassium: 3.7 [KR]   1954 Creatinine: 1.2 [KR]   2012 Patient's tachycardia improving with Ativan.  Patient was placed on 2 L nasal cannula, resting comfortably. [KR]   2144 Patient still scrunching his face and not answering questions appropriately. [KR]   2259 Patient is now much more awake and alert.  He is answering more questions.  However, he is still falling asleep mid conversation.  He did state that he does live with his brother who he thinks is at work right now but we could try and call him to see if he could have a ride.  Will continue to observe him as he is still falling asleep mid sentence. [KR]   2324 Spoke with patient's sister.  Mustapha his brother is at work right now and there is no family member that can come to get him as they do not have transportation. [KR]   Fri Jul 12, 2024   0025 Cocaine Metabolite, Urine(!): POSITIVE [KR]   0025 Cannabinoid Scrn,

## 2024-07-11 NOTE — ED NOTES
Pt presents to the ER via EMS in stretcher. Pt able to move to ER bed from stretcher on own power. EMS responded to pt not responding. Pt awake but not responding to questions. Pt altered. Pt on arrival admits to drug use. Pt will not say which drug he took but states he did drugs. Pt acting erratic on arrival, scratching all over. Pt able to respond in short sentences. Pt placed on bedside cardiac monitoring, EKG taken, line established, labs drawn. Call light within reach. Pt A&O x4.

## 2024-07-11 NOTE — ED PROVIDER NOTES
[facility] ED     Emergency Department     Faculty Attestation    I performed a history and physical examination of the patient and discussed management with the resident. I reviewed the resident’s note and agree with the documented findings and plan of care. Any areas of disagreement are noted on the chart. I was personally present for the key portions of any procedures. I have documented in the chart those procedures where I was not present during the key portions. I have reviewed the emergency nurses triage note. I agree with the chief complaint, past medical history, past surgical history, allergies, medications, social and family history as documented unless otherwise noted below. For Physician Assistant/ Nurse Practitioner cases/documentation I have personally evaluated this patient and have completed at least one if not all key elements of the E/M (history, physical exam, and MDM). Additional findings are as noted.    Note Started: 7:04 PM EDT    Patient arrives by mass provides independent history for drug intoxication.  Patient does admit to using cocaine possibly other substance as well.  Difficult to obtain history limiting history.  Patient has choreiform movements all extremities but does follow simple commands.  Protecting airway.  Will medicate labs monitor possible discharge    EKG interpretation: Sinus tachycardia 123 normal intervals normal axis no acute ST or T changes no acute findings except rate.    Critical Care     none    Gómez Hodges MD, FACEP, FAAEM  Attending Emergency  Physician           Gómez Hodges MD  07/11/24 1952

## 2024-07-12 VITALS
TEMPERATURE: 98.6 F | RESPIRATION RATE: 14 BRPM | OXYGEN SATURATION: 93 % | DIASTOLIC BLOOD PRESSURE: 97 MMHG | HEART RATE: 76 BPM | SYSTOLIC BLOOD PRESSURE: 154 MMHG

## 2024-07-12 LAB
AMPHET UR QL SCN: NEGATIVE
BARBITURATES UR QL SCN: NEGATIVE
BENZODIAZ UR QL: NEGATIVE
CANNABINOIDS UR QL SCN: POSITIVE
COCAINE UR QL SCN: POSITIVE
EKG ATRIAL RATE: 123 BPM
EKG P AXIS: 71 DEGREES
EKG P-R INTERVAL: 114 MS
EKG Q-T INTERVAL: 330 MS
EKG QRS DURATION: 72 MS
EKG QTC CALCULATION (BAZETT): 472 MS
EKG R AXIS: 30 DEGREES
EKG T AXIS: 46 DEGREES
EKG VENTRICULAR RATE: 123 BPM
FENTANYL UR QL: POSITIVE
METHADONE UR QL: NEGATIVE
OPIATES UR QL SCN: NEGATIVE
OXYCODONE UR QL SCN: NEGATIVE
PCP UR QL SCN: NEGATIVE
TEST INFORMATION: ABNORMAL

## 2024-07-12 NOTE — DISCHARGE INSTRUCTIONS
You were seen due to concerns for altered mental status.  We suspect this is likely due to drug use.  We encourage you to not use recreational drugs.  You need to follow-up outpatient with a primary care doctor soon as possible for reevaluation.  Return the emergency department for any new headaches, dizziness, loss of consciousness, chest pain or shortness of breath, fevers or chills, other new or concerning symptoms.

## 2024-07-12 NOTE — ED PROVIDER NOTES
[facility] ED  Emergency Department  Emergency Medicine Resident Turn-Over     Care of Bob Gastelum was assumed from Dr. Marina and is being seen for Altered Mental Status  .  The patient's initial evaluation and plan have been discussed with the prior provider who initially evaluated the patient.     EMERGENCY DEPARTMENT COURSE / MEDICAL DECISION MAKING:       MEDICATIONS GIVEN:  Orders Placed This Encounter   Medications    sodium chloride 0.9 % bolus 500 mL    LORazepam (ATIVAN) injection 0.5 mg    DISCONTD: LORazepam (ATIVAN) injection 0.5 mg    LORazepam (ATIVAN) injection 1 mg       LABS / RADIOLOGY:     Labs Reviewed   CBC WITH AUTO DIFFERENTIAL - Abnormal; Notable for the following components:       Result Value    Hemoglobin 12.8 (*)     Hematocrit 40.6 (*)     All other components within normal limits   BASIC METABOLIC PANEL - Abnormal; Notable for the following components:    Chloride 109 (*)     Glucose 110 (*)     All other components within normal limits   TOX SCR, BLD, ED - Abnormal; Notable for the following components:    Acetaminophen Level <5 (*)     All other components within normal limits   URINE DRUG SCREEN - Abnormal; Notable for the following components:    Cocaine Metabolite, Urine POSITIVE (*)     Cannabinoid Scrn, Ur POSITIVE (*)     Fentanyl, Ur POSITIVE (*)     All other components within normal limits   ELECTROLYTES PLUS - Abnormal; Notable for the following components:    POC Chloride 111 (*)     POC TCO2 32 (*)     POC Anion Gap 4 (*)     All other components within normal limits   VENOUS BLOOD GAS, POINT OF CARE - Abnormal; Notable for the following components:    pH, Yves 7.254 (*)     pCO2, Yves 69.8 (*)     HCO3, Venous 30.9 (*)     All other components within normal limits   POCT GLUCOSE - Abnormal; Notable for the following components:    POC Glucose 111 (*)     All other components within normal limits   HGB/HCT   CALCIUM, IONIC (POC)   CREATININE W/GFR

## 2024-07-12 NOTE — ED NOTES
Pt woke up, keeps removing his nasal cannula from his face, despite multiple attempts to redirect the patient and education to keep on. Dr. Alberts notified and aware.

## 2024-07-12 NOTE — ED NOTES
Pt cannot stay awake enough to eat boxed lunch provided. Per Dr. Marina, watch patient longer. Will continue to assess.

## 2024-07-12 NOTE — ED PROVIDER NOTES
[facility]   Emergency Department  Emergency Medicine Attending Sign-out   Note started: 8:27 AM EDT    Care of Bob Gastelum was assumed from previous attending Dr. Hodges at 11 PM and is being seen for Altered Mental Status  .  The patient's initial evaluation and plan have been discussed with the prior provider who initially evaluated the patient.     Attestation  I was available and discussed any additional care issues that arose and coordinated the management plans with the resident(s) caring for the patient during my duty period. Any areas of disagreement with resident's documentation of care or procedures are noted on the chart. I was personally present for the key portions of any/all procedures, during my duty period. I have documented in the chart those procedures where I was not present during the key portions.     BRIEF PATIENT SUMMARY/MDM COURSE PER INITIAL PROVIDER:   RECENT VITALS:     Temp: 98.6 °F (37 °C),  Pulse: 76, Respirations: 14, BP: (!) 154/97, SpO2: 93 %    This patient is a 33 y.o. Male with cocaine use, came in was having coliform like movements in all of his extremities, following commands.  Awaiting reevaluation once more clinically sober.    DIAGNOSTICS/MEDICATIONS:     MEDICATIONS GIVEN:  ED Medication Orders (From admission, onward)      Start Ordered     Status Ordering Provider    07/11/24 1915 07/11/24 1901  LORazepam (ATIVAN) injection 1 mg  ONCE         Last MAR action: Given - by JUAN A JO on 07/11/24 at 1911 CHEL BARBOSA    07/11/24 1845 07/11/24 1841  sodium chloride 0.9 % bolus 500 mL  ONCE         Last MAR action: Stopped - by JUAN A JO on 07/11/24 at 1953 CHEL BARBOSA    07/11/24 1845 07/11/24 1842  LORazepam (ATIVAN) injection 0.5 mg  ONCE         Last MAR action: Given - by JUAN A JO on 07/11/24 at 1853 CHEL BARBOSA            LABS    Labs Reviewed   CBC WITH AUTO DIFFERENTIAL - Abnormal; Notable for the following components:

## 2024-07-15 LAB
EKG ATRIAL RATE: 123 BPM
EKG P AXIS: 71 DEGREES
EKG P-R INTERVAL: 114 MS
EKG Q-T INTERVAL: 330 MS
EKG QRS DURATION: 72 MS
EKG QTC CALCULATION (BAZETT): 472 MS
EKG R AXIS: 30 DEGREES
EKG T AXIS: 46 DEGREES
EKG VENTRICULAR RATE: 123 BPM

## 2024-07-20 NOTE — PROGRESS NOTES
MSW met with the patient at the bedside. The patient's daughter Esha is also at the bedside.     Patient is alert and oriented with no communication barriers.     Patient reported having DME at home.     Patients PCP is correct on the face sheet. Patient choice pharmacy is bedside delivery.     Patient will need transportation home at discharge.     CM team will continue to follow.     [facility] Med [facility] ([place])  Initial Discharge Assessment       Primary Care Provider: Chun Zapata MD    Admission Diagnosis: Chest pain [R07.9]    Admission Date: 7/18/2024  Expected Discharge Date:     Transition of Care Barriers: (P) None    Payor: MEDICARE / Plan: MEDICARE PART A & B / Product Type: Government /     Extended Emergency Contact Information  Primary Emergency Contact: Esha Severino  Address: [address] [place] [address] [facility]  Home Phone: [phone]  Relation: Daughter    Discharge Plan A: (P) Home with family, Home Health  Discharge Plan B: (P) Rehab, Skilled Nursing Facility      [facility] [place] [address] [address]  Phone: [phone] Fax: [phone]    [facility]  [address]  [facility] [address]  Phone: [phone] Fax: [phone]    [facility] #[id number] [address] AT [facility]  [address] [place] [address]  Phone: [phone] Fax: [phone]    [facility]  [address] [place] [address] A  [facility] [address]  Phone: [phone] Fax: [phone]      Initial Assessment (most recent)       Adult Discharge Assessment - 07/20/24 0836          Discharge Assessment    Assessment Type Discharge Planning Assessment (P)      Confirmed/corrected address, phone number and insurance Yes (P)      Confirmed Demographics Correct on Facesheet (P)      Source of Information  Physical Therapy  Facility/Department: [facility]- [facility]  Physical Therapy Initial Assessment    Name: Taty Yeboah  : 1990  MRN: [id number]  Date of Service: 2023  Chief Complaint   Patient presents with    Suicidal    Drug Overdose         Discharge Recommendations:  Therapy recommended at discharge          Patient Diagnosis(es): The primary encounter diagnosis was Intentional opiate overdose, initial encounter [facility]). Diagnoses of Hypokalemia and TEJAS (acute kidney injury) ([facility] were also pertinent to this visit. Past Medical History:  has a past medical history of Alcoholism ([facility] Anxiety, Bipolar 1 disorder ([facility] Cocaine abuse ([facility] Depression, Hepatitis C antibody test positive, Hypertension, Irregular heartbeat, Mild tetrahydrocannabinol (THC) abuse, and Suicidal ideation. Past Surgical History:  has a past surgical history that includes other surgical history (Right, 2017); Dialysis fistula creation (Right, 2017); EXPLORATION OF WOUND OF EXTREMITY (Right, 2017); and Femur Surgery (Left). Assessment   Body Structures, Functions, Activity Limitations Requiring Skilled Therapeutic Intervention: Decreased functional mobility ; Decreased body mechanics; Decreased strength;Decreased balance  Assessment: pt  ambulated 300' in hallway with RW and CGA ~ pt demonstrated unsteadiness with gait ambulating to door and was given RW. Gait still unsteady with RW but not LOB noted. Increased knee flexion noted throughout gait and pt reported pain of BL at worst being 8/10 as well as demonstrated some weakness 4/5 BL quads. At this time skilled PT is needed to address these issues and promote return to baseline function.   Therapy Prognosis: Good  Decision Making: Low Complexity  Requires PT Follow-Up: Yes  Activity Tolerance  Activity Tolerance: Patient tolerated evaluation without incident     Plan   Physcial Therapy Plan  General Plan:  (5-6xweek)  Current Treatment Recommendations: Strengthening, Balance training, Functional mobility training, Gait training, Stair training, Neuromuscular re-education  Safety Devices  Type of Devices: Sitter present, Nurse notified, Gait belt, Left in bed, Call light within reach, Bed alarm in place (pt in bed on PT exit with needs in reach and RN aware)     Restrictions  Restrictions/Precautions  Restrictions/Precautions: Fall Risk, Up as Tolerated  Required Braces or Orthoses?: No     Subjective   General  Chart Reviewed: Yes  Patient assessed for rehabilitation services?: Yes  Family / Caregiver Present: No  Follows Commands: Within Functional Limits  General Comment  Comments: per RN pt okay for PT and pt agreeable  Subjective  Subjective: pt reports 8/10 pain in BL quad prior to PT session and 4/10 after PT session         Social/Functional History  Social/Functional History  Lives With: Family (brother and aunt)  Type of Home: Apartment  Home Layout: Able to Live on Main level with bedroom/bathroom, One level  Home Access: Stairs to enter with rails  Entrance Stairs - Number of Steps: 3  Entrance Stairs - Rails: Both  Bathroom Shower/Tub: Tub/Shower unit  Bathroom Toilet: Standard  Bathroom Equipment: Hand-held shower  ADL Assistance: Independent  Homemaking Assistance: Independent  Homemaking Responsibilities: Yes  Laundry Responsibility: Primary  Cleaning Responsibility: Primary  Shopping Responsibility: Primary  Ambulation Assistance: Independent  Transfer Assistance: Independent  Active : No  Patient's  Info: Friends/ family/ walks  Mode of Transportation: Walk, Family, Friends  Occupation: Self employed  Type of Occupation: making music, rapping  Leisure & Hobbies: football and basketball  Additional Comments: Reports having supportive family at discharge  [address]: Within Functional Limits  Hearing  Hearing: Within functional limits    Cognition   Orientation  Overall Orientation Status: Within Functional family;patient;health record (P)      People in Home child(georges), adult (P)      Do you expect to return to your current living situation? Yes (P)      Do you have help at home or someone to help you manage your care at home? Yes (P)      Who are your caregiver(s) and their phone number(s)? daughter (P)      Prior to hospitilization cognitive status: Alert/Oriented (P)      Current cognitive status: Alert/Oriented (P)      Walking or Climbing Stairs Difficulty yes (P)      Walking or Climbing Stairs ambulation difficulty, requires equipment (P)      Dressing/Bathing Difficulty yes (P)      Dressing/Bathing bathing difficulty, requires equipment (P)      Equipment Currently Used at Home bedside commode;wheelchair;walker, rolling;cane, straight (P)      Readmission within 30 days? Yes (P)      Patient currently being followed by outpatient case management? No (P)      Do you currently have service(s) that help you manage your care at home? Yes (P)      Name and Contact number of agency At Home home health (P)      Do you take prescription medications? Yes (P)      Do you have prescription coverage? Yes (P)      Do you have any problems affording any of your prescribed medications? No (P)      Is the patient taking medications as prescribed? yes (P)      How do you get to doctors appointments? car, drives self;family or friend will provide (P)      Are you on dialysis? No (P)      Do you take coumadin? No (P)      Discharge Plan A Home with family;Home Health (P)      Discharge Plan B Rehab;Skilled Nursing Facility (P)      DME Needed Upon Discharge  none (P)      Discharge Plan discussed with: Patient;Adult children (P)      Transition of Care Barriers None (P)         Physical Activity    On average, how many days per week do you engage in moderate to strenuous exercise (like a brisk walk)? 0 days (P)      On average, how many minutes do you engage in exercise at this level? 0 min (P)         Financial Resource Strain     How hard is it for you to pay for the very basics like food, housing, medical care, and heating? Not hard at all (P)         Housing Stability    In the last 12 months, was there a time when you were not able to pay the mortgage or rent on time? No (P)      At any time in the past 12 months, were you homeless or living in a shelter (including now)? No (P)         Transportation Needs    Has the lack of transportation kept you from medical appointments, meetings, work or from getting things needed for daily living? No (P)         Food Insecurity    Within the past 12 months, you worried that your food would run out before you got the money to buy more. Never true (P)      Within the past 12 months, the food you bought just didn't last and you didn't have money to get more. Never true (P)         Stress    Do you feel stress - tense, restless, nervous, or anxious, or unable to sleep at night because your mind is troubled all the time - these days? Not at all (P)         Social Isolation    How often do you feel lonely or isolated from those around you?  Never (P)         Alcohol Use    Q1: How often do you have a drink containing alcohol? Never (P)      Q2: How many drinks containing alcohol do you have on a typical day when you are drinking? Patient does not drink (P)      Q3: How often do you have six or more drinks on one occasion? Never (P)         Utilities    In the past 12 months has the electric, gas, oil, or water company threatened to shut off services in your home? No (P)         Health Literacy    How often do you need to have someone help you when you read instructions, pamphlets, or other written material from your doctor or pharmacy? Never (P)                                  Limits  Cognition  Overall Cognitive Status: WFL  Cognition Comment: pt slightly lethargic but seems aware of current medical condtions and mobilty status     Objective        Observation/Palpation  Posture: Fair  Gross Assessment  AROM: Generally decreased, functional  PROM: Within functional limits  Strength: Generally decreased, functional  Coordination: Generally decreased, functional  Tone: Normal  Sensation: Intact        Strength RLE  Strength RLE: WFL  Comment: knee ext 4/5  Strength LLE  Strength LLE: WFL  Comment: knee ext 4/5  Strength Other  Other: WNL outside of knee extension 4/5. decrease in knee ext strength could be d/t increase in pain           Bed mobility  Rolling to Left: Independent  Rolling to Right: Independent  Supine to Sit: Independent  Sit to Supine: Independent  Bed Mobility Comments: IND bed mobility  Transfers  Sit to Stand: Contact guard assistance  Stand to Sit: Contact guard assistance  Bed to Chair: Contact guard assistance  Comment: CGA for pt safety  Ambulation  Surface: Level tile  Device: Rolling Walker  Assistance: Contact guard assistance  Quality of Gait: fair  Gait Deviations: Slow Cesia;Decreased step length  Distance: 300'  Comments: pt ambulated 300' with RW. pt demonstrated decreased stability during ambulation. pt also demonstrated decreased cadance and step length.  pt reports that this is not their baseline     Balance  Posture: Fair  Sitting - Static: Good  Sitting - Dynamic: Good  Standing - Static: Fair;+  Standing - Dynamic: Fair  Comments: balance assessed without rolling walker pt given RW d/t decreased balance                  AM-PAC Score  AM-PAC Inpatient Mobility Raw Score : 20 (02/02/23 1540)  AM-PAC Inpatient T-Scale Score : 47.67 (02/02/23 1540)  Mobility Inpatient CMS 0-100% Score: 35.83 (02/02/23 1540)  Mobility Inpatient CMS G-Code Modifier : CJ (02/02/23 1540)     Goals  Short Term Goals  Time Frame for Short Term Goals: 14 visits  Short Term Goal 1: pt will demonstrate terminal knee extension during ambulation without AD and SBA to promote return to baseline function  Short Term Goal 2: pt will demonstrate dynamic standing balance to at least good to facilitate improved balance and return to baseline function  Short Term Goal 3: pt will demonstrate 5/5 MMT in BL LE to facilitate improved functional strength       Education  Patient Education  Education Given To: Patient  Education Provided: Role of Therapy;Plan of Care; Fall Prevention Strategies; Equipment  Education Provided Comments: pt educated on PT purpose and POC. pt also educated on RW.   Education Method: Demonstration;Verbal  Barriers to Learning: None  Education Outcome: Verbalized understanding;Demonstrated understanding      Therapy Time   Individual Concurrent Group Co-treatment   Time In 1400         Time Out 1423         Minutes 23         Timed Code Treatment Minutes: 8 Minutes       [place]

## 2024-11-19 ENCOUNTER — HOSPITAL ENCOUNTER (EMERGENCY)
Age: 34
Discharge: ANOTHER HOSPITAL | DRG: 751 | End: 2024-11-20
Attending: EMERGENCY MEDICINE
Payer: MEDICAID

## 2024-11-19 ENCOUNTER — APPOINTMENT (OUTPATIENT)
Dept: CT IMAGING | Age: 34
DRG: 751 | End: 2024-11-19
Payer: MEDICAID

## 2024-11-19 ENCOUNTER — APPOINTMENT (OUTPATIENT)
Dept: GENERAL RADIOLOGY | Age: 34
DRG: 751 | End: 2024-11-19
Payer: MEDICAID

## 2024-11-19 DIAGNOSIS — T40.1X2A INTENTIONAL HEROIN OVERDOSE, INITIAL ENCOUNTER (HCC): Primary | ICD-10-CM

## 2024-11-19 LAB
ALBUMIN SERPL-MCNC: 4.1 G/DL (ref 3.5–5.2)
ALBUMIN/GLOB SERPL: 1.5 {RATIO} (ref 1–2.5)
ALP SERPL-CCNC: 60 U/L (ref 40–129)
ALT SERPL-CCNC: 58 U/L (ref 10–50)
AMPHET UR QL SCN: NEGATIVE
ANION GAP SERPL CALCULATED.3IONS-SCNC: 10 MMOL/L (ref 9–16)
APAP SERPL-MCNC: <5 UG/ML (ref 10–30)
AST SERPL-CCNC: 44 U/L (ref 10–50)
BACTERIA URNS QL MICRO: ABNORMAL
BARBITURATES UR QL SCN: NEGATIVE
BASOPHILS # BLD: 0.04 K/UL (ref 0–0.2)
BASOPHILS NFR BLD: 1 % (ref 0–2)
BENZODIAZ UR QL: NEGATIVE
BILIRUB SERPL-MCNC: 0.2 MG/DL (ref 0–1.2)
BILIRUB UR QL STRIP: NEGATIVE
BUN SERPL-MCNC: 11 MG/DL (ref 6–20)
CALCIUM SERPL-MCNC: 9 MG/DL (ref 8.6–10.4)
CANNABINOIDS UR QL SCN: POSITIVE
CASTS #/AREA URNS LPF: ABNORMAL /LPF (ref 0–8)
CHLORIDE SERPL-SCNC: 106 MMOL/L (ref 98–107)
CLARITY UR: CLEAR
CO2 SERPL-SCNC: 22 MMOL/L (ref 20–31)
COCAINE UR QL SCN: POSITIVE
COLOR UR: YELLOW
CREAT SERPL-MCNC: 1 MG/DL (ref 0.7–1.2)
EOSINOPHIL # BLD: 0.05 K/UL (ref 0–0.44)
EOSINOPHILS RELATIVE PERCENT: 1 % (ref 1–4)
EPI CELLS #/AREA URNS HPF: ABNORMAL /HPF (ref 0–5)
ERYTHROCYTE [DISTWIDTH] IN BLOOD BY AUTOMATED COUNT: 12.5 % (ref 11.8–14.4)
ETHANOL PERCENT: <0.01 %
ETHANOLAMINE SERPL-MCNC: <10 MG/DL (ref 0–0.08)
FENTANYL UR QL: POSITIVE
GFR, ESTIMATED: >90 ML/MIN/1.73M2
GLUCOSE SERPL-MCNC: 145 MG/DL (ref 74–99)
GLUCOSE UR STRIP-MCNC: NEGATIVE MG/DL
HCT VFR BLD AUTO: 41.3 % (ref 40.7–50.3)
HGB BLD-MCNC: 13.1 G/DL (ref 13–17)
HGB UR QL STRIP.AUTO: NEGATIVE
IMM GRANULOCYTES # BLD AUTO: <0.03 K/UL (ref 0–0.3)
IMM GRANULOCYTES NFR BLD: 0 %
KETONES UR STRIP-MCNC: NEGATIVE MG/DL
LEUKOCYTE ESTERASE UR QL STRIP: NEGATIVE
LYMPHOCYTES NFR BLD: 3.47 K/UL (ref 1.1–3.7)
LYMPHOCYTES RELATIVE PERCENT: 53 % (ref 24–43)
MCH RBC QN AUTO: 30.1 PG (ref 25.2–33.5)
MCHC RBC AUTO-ENTMCNC: 31.7 G/DL (ref 28.4–34.8)
MCV RBC AUTO: 94.9 FL (ref 82.6–102.9)
METHADONE UR QL: NEGATIVE
MONOCYTES NFR BLD: 0.45 K/UL (ref 0.1–1.2)
MONOCYTES NFR BLD: 7 % (ref 3–12)
NEUTROPHILS NFR BLD: 38 % (ref 36–65)
NEUTS SEG NFR BLD: 2.51 K/UL (ref 1.5–8.1)
NITRITE UR QL STRIP: NEGATIVE
NRBC BLD-RTO: 0 PER 100 WBC
OPIATES UR QL SCN: NEGATIVE
OXYCODONE UR QL SCN: NEGATIVE
PCP UR QL SCN: NEGATIVE
PH UR STRIP: 6 [PH] (ref 5–8)
PLATELET # BLD AUTO: 320 K/UL (ref 138–453)
PMV BLD AUTO: 8.8 FL (ref 8.1–13.5)
POTASSIUM SERPL-SCNC: 3.4 MMOL/L (ref 3.7–5.3)
PROT SERPL-MCNC: 6.9 G/DL (ref 6.6–8.7)
PROT UR STRIP-MCNC: ABNORMAL MG/DL
RBC # BLD AUTO: 4.35 M/UL (ref 4.21–5.77)
RBC #/AREA URNS HPF: ABNORMAL /HPF (ref 0–4)
SALICYLATES SERPL-MCNC: <0.5 MG/DL (ref 0–10)
SODIUM SERPL-SCNC: 138 MMOL/L (ref 136–145)
SP GR UR STRIP: 1.02 (ref 1–1.03)
TEST INFORMATION: ABNORMAL
UROBILINOGEN UR STRIP-ACNC: NORMAL EU/DL (ref 0–1)
WBC #/AREA URNS HPF: ABNORMAL /HPF (ref 0–5)
WBC OTHER # BLD: 6.5 K/UL (ref 3.5–11.3)

## 2024-11-19 PROCEDURE — 72125 CT NECK SPINE W/O DYE: CPT

## 2024-11-19 PROCEDURE — 70450 CT HEAD/BRAIN W/O DYE: CPT

## 2024-11-19 PROCEDURE — 80053 COMPREHEN METABOLIC PANEL: CPT

## 2024-11-19 PROCEDURE — 93005 ELECTROCARDIOGRAM TRACING: CPT

## 2024-11-19 PROCEDURE — 80307 DRUG TEST PRSMV CHEM ANLYZR: CPT

## 2024-11-19 PROCEDURE — 80179 DRUG ASSAY SALICYLATE: CPT

## 2024-11-19 PROCEDURE — 81001 URINALYSIS AUTO W/SCOPE: CPT

## 2024-11-19 PROCEDURE — 71045 X-RAY EXAM CHEST 1 VIEW: CPT

## 2024-11-19 PROCEDURE — 85025 COMPLETE CBC W/AUTO DIFF WBC: CPT

## 2024-11-19 PROCEDURE — 80143 DRUG ASSAY ACETAMINOPHEN: CPT

## 2024-11-19 PROCEDURE — G0480 DRUG TEST DEF 1-7 CLASSES: HCPCS

## 2024-11-19 ASSESSMENT — ENCOUNTER SYMPTOMS
BACK PAIN: 0
ABDOMINAL PAIN: 0
SHORTNESS OF BREATH: 0

## 2024-11-19 NOTE — ED NOTES
The following labs were labeled with appropriate pt sticker and tubed to lab:     [x] Blue     [x] Lavender   [] on ice  [x] Green/yellow  [x] Green/black [] on ice  [] Delgado  [] on ice  [x] Yellow  [] Red  [] Pink  [] Type/ Screen  [] ABG  [] VBG    [] COVID-19 swab    [] Rapid  [] PCR  [] Flu swab  [] Peds Viral Panel     [] Urine Sample  [] Fecal Sample  [] Pelvic Cultures  [] Blood Cultures  [] X 2  [] STREP Cultures  [] Wound Cultures

## 2024-11-19 NOTE — ED PROVIDER NOTES
[facility]  FACULTY HANDOFF       Handoff taken on the following patient from prior Attending Physician:  Pt Name: Bob Gastelum  PCP:  No primary care provider on file.    Attestation  I was available and discussed any additional care issues that arose and coordinated the management plans with the resident(s) caring for the patient during my duty period. Any areas of disagreement with resident's documentation of care or procedures are noted on the chart. I was personally present for the key portions of any/all procedures during my duty period. I have documented in the chart those procedures where I was not present during the key portions.         CHIEF COMPLAINT       Chief Complaint   Patient presents with    Drug Overdose         CURRENT MEDICATIONS     Previous Medications  Previous Medications    ACETAMINOPHEN (TYLENOL) 500 MG TABLET    Take 1 tablet by mouth every 6 hours as needed for Pain    HYDROXYZINE HCL (ATARAX) 50 MG TABLET    Take 1 tablet by mouth 3 times daily as needed for Anxiety    IBUPROFEN (ADVIL;MOTRIN) 600 MG TABLET    Take 1 tablet by mouth every 6 hours as needed for Pain    MIRTAZAPINE (REMERON) 7.5 MG TABLET    Take 1 tablet by mouth nightly    TRAZODONE (DESYREL) 50 MG TABLET    Take 1 tablet by mouth nightly as needed for Sleep       Encounter Medications  No orders of the defined types were placed in this encounter.      ALLERGIES     is allergic to vicodin [hydrocodone-acetaminophen].      RECENT VITALS:   Temp: 98.2 °F (36.8 °C),  Pulse: 90, Respirations: 14, BP: (!) 152/97    RADIOLOGY:   XR CHEST PORTABLE    (Results Pending)   CT HEAD WO CONTRAST    (Results Pending)   CT CERVICAL SPINE WO CONTRAST    (Results Pending)       LABS:  Labs Reviewed   TOX SCR, BLD, ED   COMPREHENSIVE METABOLIC PANEL   CBC WITH AUTO DIFFERENTIAL   URINE DRUG SCREEN   URINALYSIS WITH MICROSCOPIC           PLAN/ TASKS OUTSTANDING     Awaiting [facility] labds and CT imaging of head and

## 2024-11-19 NOTE — ED NOTES
Patient arrives to the ER with an intentional overdose of heroin in an attempt to kill himself.  Patient was given Narcan at the scene.  Patient tells SW that he is \"tired of living\".  Patient denies HI or any legal issues.  Patient states he is actively using heroin, Fentanyl, and crack cocaine daily.  Patient reports a history of mental health treatment, but has not been compliant or taken his meds \"in a while\".   Patient's last admission to the [facility] was in February of 2023.  Patient willing to go to the [facility] and will sign a Voluntary.  Await medical clearance and lab results.  CYNTHIA Dumont

## 2024-11-19 NOTE — ED NOTES
Pt incontinent of stool, pt performed care independently with writer in room. Changed out into paper scrubs. Belongings collected, bagged, labeled, and handed off to [facility] [employer] for securing. Continuous sitter initiated.

## 2024-11-19 NOTE — ED PROVIDER NOTES
[facility]     Emergency Department     Faculty Note/ Attestation      Pt Name: Bob Gastelum                                       MRN: [id number]  Birthdate 1990  Date of evaluation: 11/19/2024    Patients PCP:    No primary care provider on file.    Note Started: 5:32 PM EST      Attestation  I performed a history and physical examination of the patient and discussed management with the resident. I reviewed the resident’s note and agree with the documented findings and plan of care. Any areas of disagreement are noted on the chart. I was personally present for the key portions of any procedures. I have documented in the chart those procedures where I was not present during the key portions. I have reviewed the emergency nurses triage note. I agree with the chief complaint, past medical history, past surgical history, allergies, medications, social and family history as documented unless otherwise noted below.    For Physician Assistant/ Nurse Practitioner cases/documentation I have personally evaluated this patient and have completed at least one if not all key elements of the E/M (history, physical exam, and MDM). Additional findings are as noted.      Initial Screens:             Vitals:  There were no vitals filed for this visit.    CHIEF COMPLAINT       Chief Complaint   Patient presents with    Drug Overdose             DIAGNOSTIC RESULTS             RADIOLOGY:   No orders to display         LABS:  Labs Reviewed - No data to display      EMERGENCY DEPARTMENT COURSE:     -------------------------   ,  ,  ,        Comments    34-year-old with a reported intentional drug overdose, was found with a needle in his arm, given multiple rounds of intranasal and IV Narcan, then got up and ran away from EMS, did fall and hit his head, lost control of his bladder.  He arrives awake, in a c-collar.    Will need CT image likely, [facility] labs, close monitoring, social work as well for psychiatric  admission    (Please note that portions of this note were completed with a voice recognition program.  Efforts were made to edit the dictations but occasionally words are mis-transcribed.)      Rajendra Vasquez MD,, MD  Attending Emergency Physician          Rajendra Vasquez MD  11/19/24 5106

## 2024-11-19 NOTE — ED NOTES
[] [facility]    [] [facility]    [x]  [facility]    SUICIDE RISK ASSESSMENT      Y  N     [x] [] In the past two weeks have you had thoughts of hurting yourself in any way?    [x] [] In the past two weeks have you had thoughts that you would be better off dead?   [x] [] Have you made a suicide attempt in the past two months?   [x] [] Do you have a plan for hurting yourself or suicide?   [] [x] Presence of hallucinations/voices related to hurting himself or herself or someone else.    SUICIDE/SECURITY WATCH PRECAUTION CHECKLIST     Orders    [x]  Suicide/Security Watch Precautions initiated as checked below:   11/19/24 5:57 PM EST 35/35    [x] Notified physician:  Rajendra Vasquez MD  11/19/24 5:57 PM EST    [x] Orders obtained as appropriate:     [x] 1:1 Observer     [] Psych Consult     [] Psych Consult    Name:  Date:  Time:    [x] 1:1 Observer, Notified by:  Laureen Toro RN    Contact Nurse Supervisor    [x] Remove all personal clothes from room and place in snap/paper gown/pants.  Slipper only    [x] Remove all personal belongings from room and secured away from patient.    Documentation    [x] Initiate Suicide/Security Watch Precaution Flow Sheet    [x] Initiate individualized Care Plan/Problem    [x] Document why precautions initiated on flow sheet (Initiate Nursing Care Plan/Problem)    [x] 1:1 Observer in place; instructions provided.  Suicide precautions require observer be within arms length.    [x] Nurse-Observer Communication Hand-off initiated by RN, reviewed with Observer.  Subsequently used as Hand Off between Observers.    [x] Initiate every 15 minute observations per observer as delegated by the RN.    [x] Initiate RN assessment and documentation    Environmental Scan  Search Criteria and Process: OPTIONAL, see Search Policy    [x] Reason for search: SI    [x] Nursing in presence of second person to search patient    [x] Patient notified of reason for body assessment

## 2024-11-20 ENCOUNTER — HOSPITAL ENCOUNTER (INPATIENT)
Age: 34
LOS: 6 days | Discharge: HOME | DRG: 751 | End: 2024-11-26
Attending: PSYCHIATRY & NEUROLOGY | Admitting: PSYCHIATRY & NEUROLOGY
Payer: MEDICAID

## 2024-11-20 VITALS
SYSTOLIC BLOOD PRESSURE: 127 MMHG | DIASTOLIC BLOOD PRESSURE: 82 MMHG | OXYGEN SATURATION: 97 % | RESPIRATION RATE: 17 BRPM | HEART RATE: 83 BPM | TEMPERATURE: 98.3 F
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
## 2024-11-20 PROBLEM — R45.851 DEPRESSION WITH SUICIDAL IDEATION: Status: RESOLVED | Noted: 2021-07-31 | Resolved: 2024-11-20

## 2024-11-20 PROBLEM — F32.A DEPRESSION WITH SUICIDAL IDEATION: Status: RESOLVED | Noted: 2021-07-31 | Resolved: 2024-11-20

## 2024-11-20 PROCEDURE — 99222 1ST HOSP IP/OBS MODERATE 55: CPT | Performed by: INTERNAL MEDICINE

## 2024-11-20 PROCEDURE — 6370000000 HC RX 637 (ALT 250 FOR IP)

## 2024-11-20 PROCEDURE — 6370000000 HC RX 637 (ALT 250 FOR IP): Performed by: PSYCHIATRY & NEUROLOGY

## 2024-11-20 PROCEDURE — 6370000000 HC RX 637 (ALT 250 FOR IP): Performed by: INTERNAL MEDICINE

## 2024-11-20 PROCEDURE — 1240000000 HC EMOTIONAL WELLNESS R&B

## 2024-11-20 PROCEDURE — 99222 1ST HOSP IP/OBS MODERATE 55: CPT | Performed by: PSYCHIATRY & NEUROLOGY

## 2024-11-20 PROCEDURE — APPSS60 APP SPLIT SHARED TIME 46-60 MINUTES

## 2024-11-20 RX ORDER — POLYETHYLENE GLYCOL 3350 17 G
2 POWDER IN PACKET (EA) ORAL
Status: DISCONTINUED | OUTPATIENT
Start: 2024-11-20 | End: 2024-11-26 | Stop reason: HOSPADM

## 2024-11-20 RX ORDER — CYCLOBENZAPRINE HCL 10 MG
10 TABLET ORAL 3 TIMES DAILY PRN
Status: DISCONTINUED | OUTPATIENT
Start: 2024-11-20 | End: 2024-11-26 | Stop reason: HOSPADM

## 2024-11-20 RX ORDER — TRAZODONE HYDROCHLORIDE 50 MG/1
50 TABLET, FILM COATED ORAL NIGHTLY PRN
Status: DISCONTINUED | OUTPATIENT
Start: 2024-11-20 | End: 2024-11-26 | Stop reason: HOSPADM

## 2024-11-20 RX ORDER — DIPHENHYDRAMINE HYDROCHLORIDE 50 MG/ML
50 INJECTION INTRAMUSCULAR; INTRAVENOUS EVERY 6 HOURS PRN
Status: DISCONTINUED | OUTPATIENT
Start: 2024-11-20 | End: 2024-11-26 | Stop reason: HOSPADM

## 2024-11-20 RX ORDER — OLANZAPINE 5 MG/1
5 TABLET ORAL NIGHTLY
Status: DISCONTINUED | OUTPATIENT
Start: 2024-11-20 | End: 2024-11-26 | Stop reason: HOSPADM

## 2024-11-20 RX ORDER — ONDANSETRON 4 MG/1
4 TABLET, ORALLY DISINTEGRATING ORAL EVERY 8 HOURS PRN
Status: DISCONTINUED | OUTPATIENT
Start: 2024-11-20 | End: 2024-11-26 | Stop reason: HOSPADM

## 2024-11-20 RX ORDER — HALOPERIDOL 5 MG/ML
5 INJECTION INTRAMUSCULAR EVERY 6 HOURS PRN
Status: DISCONTINUED | OUTPATIENT
Start: 2024-11-20 | End: 2024-11-26 | Stop reason: HOSPADM

## 2024-11-20 RX ORDER — ACETAMINOPHEN 325 MG/1
650 TABLET ORAL EVERY 4 HOURS PRN
Status: DISCONTINUED | OUTPATIENT
Start: 2024-11-20 | End: 2024-11-26 | Stop reason: HOSPADM

## 2024-11-20 RX ORDER — ESCITALOPRAM OXALATE 10 MG/1
10 TABLET ORAL DAILY
Status: ON HOLD | COMMUNITY
End: 2024-11-26 | Stop reason: HOSPADM

## 2024-11-20 RX ORDER — HYDROXYZINE HYDROCHLORIDE 50 MG/1
50 TABLET, FILM COATED ORAL 3 TIMES DAILY PRN
Status: DISCONTINUED | OUTPATIENT
Start: 2024-11-20 | End: 2024-11-26 | Stop reason: HOSPADM

## 2024-11-20 RX ORDER — LORAZEPAM 2 MG/ML
2 INJECTION INTRAMUSCULAR EVERY 6 HOURS PRN
Status: DISCONTINUED | OUTPATIENT
Start: 2024-11-20 | End: 2024-11-20

## 2024-11-20 RX ORDER — IBUPROFEN 400 MG/1
400 TABLET, FILM COATED ORAL EVERY 6 HOURS PRN
Status: DISCONTINUED | OUTPATIENT
Start: 2024-11-20 | End: 2024-11-26 | Stop reason: HOSPADM

## 2024-11-20 RX ORDER — POLYETHYLENE GLYCOL 3350 17 G/17G
17 POWDER, FOR SOLUTION ORAL DAILY PRN
Status: DISCONTINUED | OUTPATIENT
Start: 2024-11-20 | End: 2024-11-26 | Stop reason: HOSPADM

## 2024-11-20 RX ORDER — HALOPERIDOL 5 MG/1
5 TABLET ORAL EVERY 6 HOURS PRN
Status: DISCONTINUED | OUTPATIENT
Start: 2024-11-20 | End: 2024-11-26 | Stop reason: HOSPADM

## 2024-11-20 RX ORDER — DICYCLOMINE HYDROCHLORIDE 10 MG/1
20 CAPSULE ORAL 4 TIMES DAILY PRN
Status: DISCONTINUED | OUTPATIENT
Start: 2024-11-20 | End: 2024-11-26 | Stop reason: HOSPADM

## 2024-11-20 RX ORDER — AMLODIPINE BESYLATE 10 MG/1
10 TABLET ORAL DAILY
Status: ON HOLD | COMMUNITY
End: 2024-11-26 | Stop reason: HOSPADM

## 2024-11-20 RX ORDER — POTASSIUM CHLORIDE 1500 MG/1
40 TABLET, EXTENDED RELEASE ORAL ONCE
Status: COMPLETED | OUTPATIENT
Start: 2024-11-20 | End: 2024-11-20

## 2024-11-20 RX ORDER — CLONIDINE HYDROCHLORIDE 0.1 MG/1
0.1 TABLET ORAL 3 TIMES DAILY PRN
Status: DISCONTINUED | OUTPATIENT
Start: 2024-11-20 | End: 2024-11-26 | Stop reason: HOSPADM

## 2024-11-20 RX ORDER — LORAZEPAM 1 MG/1
2 TABLET ORAL EVERY 6 HOURS PRN
Status: DISCONTINUED | OUTPATIENT
Start: 2024-11-20 | End: 2024-11-20

## 2024-11-20 RX ORDER — IBUPROFEN 400 MG/1
400 TABLET, FILM COATED ORAL EVERY 6 HOURS PRN
Status: DISCONTINUED | OUTPATIENT
Start: 2024-11-20 | End: 2024-11-20

## 2024-11-20 RX ORDER — MAGNESIUM HYDROXIDE/ALUMINUM HYDROXICE/SIMETHICONE 120; 1200; 1200 MG/30ML; MG/30ML; MG/30ML
30 SUSPENSION ORAL EVERY 6 HOURS PRN
Status: DISCONTINUED | OUTPATIENT
Start: 2024-11-20 | End: 2024-11-26 | Stop reason: HOSPADM

## 2024-11-20 RX ADMIN — HYDROXYZINE HYDROCHLORIDE 50 MG: 50 TABLET, FILM COATED ORAL at 21:25

## 2024-11-20 RX ADMIN — IBUPROFEN 400 MG: 400 TABLET, FILM COATED ORAL at 04:19

## 2024-11-20 RX ADMIN — CLONIDINE HYDROCHLORIDE 0.1 MG: 0.1 TABLET ORAL at 21:25

## 2024-11-20 RX ADMIN — DICYCLOMINE HYDROCHLORIDE 20 MG: 10 CAPSULE ORAL at 21:24

## 2024-11-20 RX ADMIN — POTASSIUM CHLORIDE 40 MEQ: 1500 TABLET, EXTENDED RELEASE ORAL at 18:16

## 2024-11-20 RX ADMIN — TRAZODONE HYDROCHLORIDE 50 MG: 50 TABLET ORAL at 21:24

## 2024-11-20 RX ADMIN — OLANZAPINE 5 MG: 5 TABLET, FILM COATED ORAL at 21:24

## 2024-11-20 ASSESSMENT — PAIN DESCRIPTION - LOCATION
LOCATION: KNEE;NECK
LOCATION: ABDOMEN
LOCATION: NECK;KNEE

## 2024-11-20 ASSESSMENT — LIFESTYLE VARIABLES
HOW MANY STANDARD DRINKS CONTAINING ALCOHOL DO YOU HAVE ON A TYPICAL DAY: PATIENT UNABLE TO ANSWER
HOW OFTEN DO YOU HAVE A DRINK CONTAINING ALCOHOL: NEVER
HOW OFTEN DO YOU HAVE A DRINK CONTAINING ALCOHOL: PATIENT UNABLE TO ANSWER
HOW MANY STANDARD DRINKS CONTAINING ALCOHOL DO YOU HAVE ON A TYPICAL DAY: PATIENT UNABLE TO ANSWER
HOW MANY STANDARD DRINKS CONTAINING ALCOHOL DO YOU HAVE ON A TYPICAL DAY: PATIENT DOES NOT DRINK
HOW OFTEN DO YOU HAVE A DRINK CONTAINING ALCOHOL: NEVER
HOW MANY STANDARD DRINKS CONTAINING ALCOHOL DO YOU HAVE ON A TYPICAL DAY: PATIENT DOES NOT DRINK
HOW MANY STANDARD DRINKS CONTAINING ALCOHOL DO YOU HAVE ON A TYPICAL DAY: PATIENT DECLINED
HOW OFTEN DO YOU HAVE A DRINK CONTAINING ALCOHOL: PATIENT UNABLE TO ANSWER
HOW OFTEN DO YOU HAVE A DRINK CONTAINING ALCOHOL: PATIENT DECLINED

## 2024-11-20 ASSESSMENT — PAIN SCALES - GENERAL
PAINLEVEL_OUTOF10: 10
PAINLEVEL_OUTOF10: 8

## 2024-11-20 ASSESSMENT — PAIN DESCRIPTION - ORIENTATION
ORIENTATION: MID
ORIENTATION: POSTERIOR;RIGHT;LEFT

## 2024-11-20 ASSESSMENT — PATIENT HEALTH QUESTIONNAIRE - PHQ9
SUM OF ALL RESPONSES TO PHQ9 QUESTIONS 1 & 2: 2
1. LITTLE INTEREST OR PLEASURE IN DOING THINGS: SEVERAL DAYS
SUM OF ALL RESPONSES TO PHQ QUESTIONS 1-9: 2
SUM OF ALL RESPONSES TO PHQ QUESTIONS 1-9: 2
2. FEELING DOWN, DEPRESSED OR HOPELESS: SEVERAL DAYS
SUM OF ALL RESPONSES TO PHQ QUESTIONS 1-9: 2
SUM OF ALL RESPONSES TO PHQ QUESTIONS 1-9: 2

## 2024-11-20 ASSESSMENT — SLEEP AND FATIGUE QUESTIONNAIRES
DO YOU USE A SLEEP AID: NO
AVERAGE NUMBER OF SLEEP HOURS: 4
DO YOU HAVE DIFFICULTY SLEEPING: YES
SLEEP PATTERN: DIFFICULTY FALLING ASLEEP;DISTURBED/INTERRUPTED SLEEP

## 2024-11-20 ASSESSMENT — PAIN DESCRIPTION - DESCRIPTORS
DESCRIPTORS: ACHING

## 2024-11-20 NOTE — ED NOTES
Pt resting in bed with bed in lowest position, locked, and rails up x2. Respirations even and unlabored. Belongings continue to be locked away, out of patients reach. Constant observer maintained and at patient's bedside.

## 2024-11-20 NOTE — H&P
(HCC) 11/20/2024 Yes    Cocaine abuse (HCC) (Chronic) 11/20/2024 Yes    Opioid use disorder 11/20/2024 Yes       Plan:     Admitted to inpatient psych with depression suicidal ideation  Heroin overdose currently alert awake, given Narcan,  Polysubstance abuse positive for cannabis cocaine fentanyl watch for withdrawals  Hypokalemia replace potassium  Borderline hypertensive continue to monitor the log  Patient is symptomatic    DVT prophylaxis,  Pt mobile   Full code status       Consultations:   IP CONSULT TO INTERNAL MEDICINE      Lilly Fernandez MD  11/20/2024  5:26 PM    Copy sent to Dr. Reaves primary care provider on file.    Please note that this chart was generated using voice recognition Dragon dictation software.  Although every effort was made to ensure the accuracy of this automated transcription, some errors in transcription may have occurred.

## 2024-11-20 NOTE — H&P
Intact  Insight &Judgment: Poor         DSM-5 Diagnosis    Principal Problem: Major depressive disorder, recurrent, severe, without psychosis   Cocaine use disorder  Opioid use disorder    Psychosocial and Contextual factors:  Financial x  Occupational   Relationship   Legal   Living situation   Educational     Past Medical History:   Diagnosis Date    Alcoholism (HCC)     Anxiety     Bipolar 1 disorder (HCC)     Cocaine abuse (HCC)     Depression     Hepatitis C antibody test positive     Hypertension     Irregular heartbeat     Mild tetrahydrocannabinol (THC) abuse     Suicidal ideation         PATIENT HANDOFF:  Home medications reviewed.   Medication management per attending  Monitor need and frequency of PRN medications.    CONSULT:  [x] Yes [] No  Internal medicine for medical management/medical H&P      Risk Management: close watch per standard protocol      Psychotherapy: participation in milieu and group and individual sessions with Attending Physician,  and Physician Assistant/CNP      Estimated length of stay:  2-14 days      GENERAL PATIENT/FAMILY EDUCATION  Patient will understand basic signs and symptoms, patient will understand benefits/risks and potential side effects from proposed medications, and patient will understand their role in recovery.  Family is not active in patient's care.   Patient assets that may be helpful during treatment include: Intent to participate and engage in treatment, sufficient fund of knowledge and intellect to understand and utilize treatments.    OUTCOME QUESTIONNAIRE (OQ 30.2):  [x] Completed [] Patient too ill to participate    Goals:    1) Remission of suicidal ideations.  2) Stabilization of symptoms prior to discharge.  3) Establish efficacy and tolerability of medications.       Behavioral Services  Medicare Certification     Admission Day 1  I certify that this patient's inpatient psychiatric hospital admission is medically necessary for:    x (1)

## 2024-11-20 NOTE — GROUP NOTE
Group Therapy Note    Date: 11/20/2024    Group Start Time: 1100  Group End Time: 1145  Group Topic: Cognitive Skills    Marielos Lopez CTRS        Group Therapy Note    Attendees: 7/19     Topic: To increase socialization, practice problem solving and deductive reasoning r/t task.       Comments:   Patient did not participate in Cognitive Skills Group, at 11:00, despite staff encouragement   and explanation of benefits. Pt was seclusive to self et room.     Q15 minute safety checks maintained for patient safety and will continue to encourage   patient to attend unit programming.       Discipline Responsible: Psychoeducational Specialist   Signature: SHANNAN BILLY

## 2024-11-20 NOTE — ED NOTES
..Transfer Center Handoff for Behavioral Health Transfers      Patient's Current Location: [facility] ED     Chief Complaint   Patient presents with    Drug Overdose       Current or History of Violent Behavior: No    Currently in Restraints Now or During this Encounter: No  (Specify if Agitation or self harm is noted in ED?)  If yes, please describe behaviors requiring restraint:             Medical Clearance Documented and Verified in the Chart: Yes    Allergies   Allergen Reactions    Vicodin [Hydrocodone-Acetaminophen] Nausea And Vomiting        Can Patient Tolerate Lying Flat: Yes    Able to Perform ADLs:  Yes  (Specify if able to ambulate or uses any mobility devices such as cane or walker)  Activity:    Level of Assistance:    Assistive Device:    Miscellaneous Devices:      LABS    CBC:   Lab Results   Component Value Date/Time    WBC 6.5 11/19/2024 06:06 PM    RBC 4.35 11/19/2024 06:06 PM    HGB 13.1 11/19/2024 06:06 PM    HCT 41.3 11/19/2024 06:06 PM    MCV 94.9 11/19/2024 06:06 PM    MCH 30.1 11/19/2024 06:06 PM    MCHC 31.7 11/19/2024 06:06 PM    RDW 12.5 11/19/2024 06:06 PM     11/19/2024 06:06 PM    MPV 8.8 11/19/2024 06:06 PM     CMP:   Lab Results   Component Value Date/Time     11/19/2024 06:06 PM    K 3.4 11/19/2024 06:06 PM     11/19/2024 06:06 PM    CO2 22 11/19/2024 06:06 PM    BUN 11 11/19/2024 06:06 PM    CREATININE 1.0 11/19/2024 06:06 PM    GFRAA >60 08/09/2022 04:47 AM    LABGLOM >90 11/19/2024 06:06 PM    LABGLOM >90 04/28/2024 04:40 PM    GLUCOSE 145 11/19/2024 06:06 PM    CALCIUM 9.0 11/19/2024 06:06 PM    BILITOT 0.2 11/19/2024 06:06 PM    ALKPHOS 60 11/19/2024 06:06 PM    AST 44 11/19/2024 06:06 PM    ALT 58 11/19/2024 06:06 PM     Drug Panel:   Lab Results   Component Value Date/Time    OPIAU NEGATIVE 07/11/2024 11:26 PM     UA:  Lab Results   Component Value Date/Time    COLORU Dark Yellow 04/28/2022 06:04 AM    PROTEINU TRACE 04/28/2022 06:04 AM

## 2024-11-20 NOTE — ED PROVIDER NOTES
FACULTY SIGN-OUT  ADDENDUM       Patient: Bbo Gastelum   MRN: [id number]  PCP:  No primary care provider on file.  Note Started: 11/20/24, 2:19 AM EST  Attestation  I was available and discussed any additional care issues that arose and coordinated the management plans with the resident(s) caring for the patient during my duty period. Any areas of disagreement with resident's documentation of care or procedures are noted on the chart. I was personally present for the key portions of any/all procedures during my duty period. I have documented in the chart those procedures where I was not present during the key portions.   The patient's initial evaluation and plan have been discussed with the prior provider who initially evaluated the patient.      Pertinent Comments:  The patient is a 34 y.o. male taken in signout with intentional drug overdose on heroin with suicidal ideation.   Initially received intranasal and then IV Narcan with good effect.   Medically stabilized at this time including CT head negative as well as laboratories at baseline are negative.    Apparently accepted at the [facility]  We are awaiting transfer to [facility]    ED COURSE      The patient was given the following medications:  No orders of the defined types were placed in this encounter.      RECENT VITALS:   BP: 132/76  Pulse: 83  Respirations: 17  Temp:  (deferred for patient rest) SpO2: 96 %    (Please note that portions of this note were completed with a voice recognition program.  Efforts were made to edit the dictations but occasionally words are mis-transcribed.)    Jesus Beverly MD [facility]  Attending Emergency Medicine Physician       Jesus Beverly MD  11/20/24 0226

## 2024-11-20 NOTE — ED NOTES
[facility] here to take patient to [facility]. [facility] notified.

## 2024-11-20 NOTE — GROUP NOTE
Group Therapy Note    Date: 11/20/2024    Group Start Time: 1000  Group End Time: 1045  Group Topic: Psychoeducation    Jameson Rincon        Group Therapy Note    Attendees: 10/20     Patient was offered group therapy today but declined to participate despite encouragement from staff. 1:1 was offered.    Signature:  Jameson Leal

## 2024-11-20 NOTE — GROUP NOTE
Group Therapy Note    Date: 11/20/2024    Group Start Time: 1315  Group End Time: 1420  Group Topic: Cognitive Skills    Marielos Lopez CTRS        Group Therapy Note    Attendees: 4/17     Topic: To increase socialization, practice self expression, explore stress management r/t environment/  activities/boundaries/resources r/t the senses, using creative expression and discussion.       Comments:   Patient did not participate in Cognitive Skills Group, at 13:15, despite staff encouragement   and explanation of benefits. Pt was seclusive to self et room.     Q15 minute safety checks maintained for patient safety and will continue to encourage   patient to attend unit programming.       Discipline Responsible: Psychoeducational Specialist   Signature: SHANNAN BILLY
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
## 2024-11-20 NOTE — ED NOTES
Patient accepted to the [facility]. Patient signed himself into treatment. Clinician faxed paper work to [facility].

## 2024-11-20 NOTE — BH NOTE
Patient given tobacco quitline number 65589394361 at this time, refusing to call at this time, states \" I just dont want to quit now\"- patient given information as to the dangers of long term tobacco use. Continue to reinforce the importance of tobacco cessation.

## 2024-11-20 NOTE — BH NOTE
Best practice advisory for suicide precautions popped up for patient. Patient agrees to staying safe while in the hospital. On call provider notified and states that q15min safety rounding on patient is sufficient. Order for suicidal precautions discontinued.

## 2024-11-20 NOTE — GROUP NOTE
Group Therapy Note    Date: 11/20/2024    Group Start Time: 0900  Group End Time: 0915  Group Topic: Community Meeting    Ese Decker      Community Meeting Group Note        Date: 11/20/2024 Start Time: 9am  End Time: 0915      Number of Participants in Group & Unit Census:  7/20        Goal of Group: To discuss daily goal      Comments:     Patient did not participate in Community Meeting group, despite staff encouragement and explanation of benefits.  Patient remain seclusive to self.  Q15 minute safety checks maintained for patient safety and will continue to encourage patient to attend unit programming.          Signature:  Ese Sahu

## 2024-11-20 NOTE — CARE COORDINATION
BHI Biopsychosocial Assessment    Current Level of Psychosocial Functioning     Independent xxx  Dependent    Minimal Assist     Comments:    Psychosocial High Risk Factors (check all that apply)    Unable to obtain meds   Chronic illness/pain    Substance abuse xx  Lack of Family Support   Financial stress   Isolation   Inadequate Community Resources  Suicide attempt(s)  Not taking medications   Victim of crime   Developmental Delay  Unable to manage personal needs    Age 65 or older   Homeless  No transportation   Readmission within 30 days  Unemployment  Traumatic Event    Comments:   Psychiatric Advanced Directives: none reported     Family to Involve in Treatment: LENNIE; did not participate in assessment     Sexual Orientation:  n/a    Patient Strengths: chart reports he lives with his brother and is linked with [facility]     Patient Barriers: history of previous psychiatric hospitalizations       Opiate Education Provided:  chart reports use of fentanyl prior to admission       CMHC/mental health history:  history of link to [facility]     Plan of Care   medication management, group/individual therapies, family meetings, psycho -education, treatment team meetings to assist with stabilization    Initial Discharge Plan:  [facility]NIE; did not participate in assessment, AOD resource folder provided bedside       Clinical Summary:  Bob is a 34 year old single male who has been admitted to [facility] after being brought to hospital for medical care following suicide attempt by overdose. Bob has been isolative throughout the day, per nursing reporting symptoms of withdrawal. His chart reports he lives with his brother and is linked with [facility]. Writer to continue to attempt engagement and offered ongoing support and encouragement.

## 2024-11-21 LAB
EKG ATRIAL RATE: 94 BPM
EKG P AXIS: 100 DEGREES
EKG P-R INTERVAL: 136 MS
EKG Q-T INTERVAL: 364 MS
EKG QRS DURATION: 86 MS
EKG QTC CALCULATION (BAZETT): 455 MS
EKG R AXIS: 168 DEGREES
EKG T AXIS: 146 DEGREES
EKG VENTRICULAR RATE: 94 BPM

## 2024-11-21 PROCEDURE — APPSS30 APP SPLIT SHARED TIME 16-30 MINUTES

## 2024-11-21 PROCEDURE — 99232 SBSQ HOSP IP/OBS MODERATE 35: CPT | Performed by: PSYCHIATRY & NEUROLOGY

## 2024-11-21 PROCEDURE — 93010 ELECTROCARDIOGRAM REPORT: CPT | Performed by: INTERNAL MEDICINE

## 2024-11-21 PROCEDURE — 1240000000 HC EMOTIONAL WELLNESS R&B

## 2024-11-21 PROCEDURE — 6370000000 HC RX 637 (ALT 250 FOR IP): Performed by: PSYCHIATRY & NEUROLOGY

## 2024-11-21 RX ADMIN — OLANZAPINE 5 MG: 5 TABLET, FILM COATED ORAL at 20:55

## 2024-11-21 ASSESSMENT — LIFESTYLE VARIABLES
HOW MANY STANDARD DRINKS CONTAINING ALCOHOL DO YOU HAVE ON A TYPICAL DAY: PATIENT DECLINED
HOW OFTEN DO YOU HAVE A DRINK CONTAINING ALCOHOL: PATIENT DECLINED

## 2024-11-21 NOTE — GROUP NOTE
Group Therapy Note    Date: 11/21/2024    Group Start Time: 1000  Group End Time: 1030  Group Topic: Psychoeducation    Jameson Rincon        Group Therapy Note    Attendees: 5/18     Patient was offered group therapy today but declined to participate despite encouragement from staff. 1:1 was offered.    Signature:  Jameson Leal

## 2024-11-21 NOTE — GROUP NOTE
Group Therapy Note    Date: 11/21/2024    Group Start Time: 1100  Group End Time: 1150  Group Topic: Cognitive Skills    Marielos Lopez CTRS        Group Therapy Note    Attendees: 9/19     Topic: To increase socialization, practice problem solving and deductive reasoning r/t task.       Comments:   Patient did not participate in Cognitive Skills Group, at 11:00, despite staff encouragement   and explanation of benefits. Pt was seclusive to self et room.     Q15 minute safety checks maintained for patient safety and will continue to encourage   patient to attend unit programming.       Discipline Responsible: Psychoeducational Specialist   Signature: SHANNAN BILLY

## 2024-11-21 NOTE — GROUP NOTE
Group Therapy Note    Date: 11/21/2024    Group Start Time: 1435  Group End Time: 1550  Group Topic: Cognitive Skills    Marielos Lopez CTRS        Group Therapy Note    Attendees: 9/19     Topic: To increase socialization, practice self expression, explore feelings and positive   coping skills, resources, and barriers , using creative expression and discussion.         Comments:   Patient did not participate in Cognitive Skills Group, at 14:35, despite staff encouragement   and explanation of benefits. Pt was seclusive to self et room.     Q15 minute safety checks maintained for patient safety and will continue to encourage   patient to attend unit programming.       Discipline Responsible: Psychoeducational Specialist   Signature: SHANNAN BILLY

## 2024-11-21 NOTE — GROUP NOTE
Group Therapy Note    Date: 11/21/2024    Group Start Time: 0930  Group End Time: 0940  Group Topic: Community Meeting    Jacqueline Mason, RN        Group Therapy Note    Attendees: 6/18     Patient refused to attend group at this time.

## 2024-11-21 NOTE — BH NOTE
At this time writer offers again to assess vitals, ask assessment question and to perform physical assessment. Patient shakes his head no repeatedly and puts his head under blanket.

## 2024-11-21 NOTE — BH NOTE
At this time writer tries an second attempt at monitoring patients vitals. When writer approaches patients room , patient looks to be resting , writer ask permission to assess vitals and patient does not answer . Writer then gently taps patient to wake them , patient then looks at writer and places cover over his head. Writer provides education on benefits of monitoring vitals while withdrawing  and patient continues to not acknowledge writer.

## 2024-11-22 PROCEDURE — 6370000000 HC RX 637 (ALT 250 FOR IP): Performed by: PSYCHIATRY & NEUROLOGY

## 2024-11-22 PROCEDURE — APPSS30 APP SPLIT SHARED TIME 16-30 MINUTES: Performed by: NURSE PRACTITIONER

## 2024-11-22 PROCEDURE — 6370000000 HC RX 637 (ALT 250 FOR IP)

## 2024-11-22 PROCEDURE — 1240000000 HC EMOTIONAL WELLNESS R&B

## 2024-11-22 PROCEDURE — 99232 SBSQ HOSP IP/OBS MODERATE 35: CPT | Performed by: PSYCHIATRY & NEUROLOGY

## 2024-11-22 PROCEDURE — 6370000000 HC RX 637 (ALT 250 FOR IP): Performed by: INTERNAL MEDICINE

## 2024-11-22 RX ORDER — LISINOPRIL 5 MG/1
2.5 TABLET ORAL DAILY
Status: DISCONTINUED | OUTPATIENT
Start: 2024-11-22 | End: 2024-11-25

## 2024-11-22 RX ADMIN — CLONIDINE HYDROCHLORIDE 0.1 MG: 0.1 TABLET ORAL at 20:57

## 2024-11-22 RX ADMIN — TRAZODONE HYDROCHLORIDE 50 MG: 50 TABLET ORAL at 20:58

## 2024-11-22 RX ADMIN — LISINOPRIL 2.5 MG: 5 TABLET ORAL at 16:58

## 2024-11-22 RX ADMIN — ONDANSETRON 4 MG: 4 TABLET, ORALLY DISINTEGRATING ORAL at 12:00

## 2024-11-22 RX ADMIN — CLONIDINE HYDROCHLORIDE 0.1 MG: 0.1 TABLET ORAL at 12:08

## 2024-11-22 RX ADMIN — DICYCLOMINE HYDROCHLORIDE 20 MG: 10 CAPSULE ORAL at 20:57

## 2024-11-22 RX ADMIN — HYDROXYZINE HYDROCHLORIDE 50 MG: 50 TABLET, FILM COATED ORAL at 20:58

## 2024-11-22 RX ADMIN — OLANZAPINE 5 MG: 5 TABLET, FILM COATED ORAL at 20:57

## 2024-11-22 ASSESSMENT — PAIN DESCRIPTION - LOCATION
LOCATION: NECK
LOCATION: ABDOMEN

## 2024-11-22 ASSESSMENT — PAIN - FUNCTIONAL ASSESSMENT: PAIN_FUNCTIONAL_ASSESSMENT: ACTIVITIES ARE NOT PREVENTED

## 2024-11-22 ASSESSMENT — PAIN DESCRIPTION - PAIN TYPE: TYPE: ACUTE PAIN

## 2024-11-22 ASSESSMENT — PAIN DESCRIPTION - FREQUENCY: FREQUENCY: INTERMITTENT

## 2024-11-22 ASSESSMENT — PAIN SCALES - GENERAL
PAINLEVEL_OUTOF10: 4
PAINLEVEL_OUTOF10: 2
PAINLEVEL_OUTOF10: 9

## 2024-11-22 ASSESSMENT — PAIN DESCRIPTION - ONSET: ONSET: GRADUAL

## 2024-11-22 ASSESSMENT — PAIN DESCRIPTION - DESCRIPTORS
DESCRIPTORS: ACHING;DISCOMFORT
DESCRIPTORS: CRAMPING

## 2024-11-22 NOTE — GROUP NOTE
Group Therapy Note    Date: 11/21/2024    Group Start Time: 2010  Group End Time: 2040  Group Topic: Topic Group    Humble Cano RN        Group Therapy Note    Attendees: 10    patient refused to attend ABCs of Coping Skills  group at 8PM  after encouragement from staff.  1:1 talk time was offered but declined as alternative to group session            Signature:  Humble Cummings RN

## 2024-11-22 NOTE — GROUP NOTE
Group Therapy Note    Date: 11/22/2024    Group Start Time: 0900  Group End Time: 0930  Group Topic: Nursing    Emelyn Garcia LPN        Group Therapy Note    Attendees: 9/19       patient refused to attend goals group at 0900 after encouragement from staff.  1:1 talk time provided as alternative to group session       Signature:  Emelyn Faria LPN

## 2024-11-22 NOTE — GROUP NOTE
Group Therapy Note    Date: 11/22/2024    Group Start Time: 1000  Group End Time: 1040  Group Topic: Psychoeducation    Luciana Everett, DELTAW        Group Therapy Note    Attendees: 5/18       patient refused to attend psychoeducation group at 10a after encouragement from staff.  1:1 talk time provided as alternative to group session

## 2024-11-23 PROCEDURE — 6370000000 HC RX 637 (ALT 250 FOR IP): Performed by: PSYCHIATRY & NEUROLOGY

## 2024-11-23 PROCEDURE — APPSS30 APP SPLIT SHARED TIME 16-30 MINUTES: Performed by: NURSE PRACTITIONER

## 2024-11-23 PROCEDURE — 6370000000 HC RX 637 (ALT 250 FOR IP): Performed by: INTERNAL MEDICINE

## 2024-11-23 PROCEDURE — 1240000000 HC EMOTIONAL WELLNESS R&B

## 2024-11-23 PROCEDURE — 6370000000 HC RX 637 (ALT 250 FOR IP)

## 2024-11-23 PROCEDURE — 99232 SBSQ HOSP IP/OBS MODERATE 35: CPT | Performed by: PSYCHIATRY & NEUROLOGY

## 2024-11-23 RX ADMIN — TRAZODONE HYDROCHLORIDE 50 MG: 50 TABLET ORAL at 20:40

## 2024-11-23 RX ADMIN — OLANZAPINE 5 MG: 5 TABLET, FILM COATED ORAL at 20:40

## 2024-11-23 RX ADMIN — HYDROXYZINE HYDROCHLORIDE 50 MG: 50 TABLET, FILM COATED ORAL at 20:40

## 2024-11-23 RX ADMIN — DICYCLOMINE HYDROCHLORIDE 20 MG: 10 CAPSULE ORAL at 19:44

## 2024-11-23 RX ADMIN — ONDANSETRON 4 MG: 4 TABLET, ORALLY DISINTEGRATING ORAL at 19:44

## 2024-11-23 RX ADMIN — LISINOPRIL 2.5 MG: 5 TABLET ORAL at 12:02

## 2024-11-23 ASSESSMENT — PAIN SCALES - GENERAL: PAINLEVEL_OUTOF10: 7

## 2024-11-23 ASSESSMENT — PAIN DESCRIPTION - LOCATION: LOCATION: ABDOMEN

## 2024-11-23 ASSESSMENT — PAIN DESCRIPTION - DESCRIPTORS: DESCRIPTORS: CRAMPING

## 2024-11-23 NOTE — GROUP NOTE
Group Therapy Note    Date: 11/23/2024    Group Start Time: 1015  Group End Time: 1045  Group Topic: Psychoeducation    Jameson Rincon        Group Therapy Note    Attendees: 6/13     Patient was offered group therapy today but declined to participate despite encouragement from staff. 1:1 was offered.  Signature:  Jameson Leal

## 2024-11-24 ENCOUNTER — APPOINTMENT (OUTPATIENT)
Dept: GENERAL RADIOLOGY | Age: 34
DRG: 751 | End: 2024-11-24
Attending: PSYCHIATRY & NEUROLOGY
Payer: MEDICAID

## 2024-11-24 PROCEDURE — APPSS30 APP SPLIT SHARED TIME 16-30 MINUTES: Performed by: NURSE PRACTITIONER

## 2024-11-24 PROCEDURE — 1240000000 HC EMOTIONAL WELLNESS R&B

## 2024-11-24 PROCEDURE — 6370000000 HC RX 637 (ALT 250 FOR IP)

## 2024-11-24 PROCEDURE — 99232 SBSQ HOSP IP/OBS MODERATE 35: CPT | Performed by: PSYCHIATRY & NEUROLOGY

## 2024-11-24 PROCEDURE — 73130 X-RAY EXAM OF HAND: CPT

## 2024-11-24 PROCEDURE — 73110 X-RAY EXAM OF WRIST: CPT

## 2024-11-24 PROCEDURE — 6370000000 HC RX 637 (ALT 250 FOR IP): Performed by: PSYCHIATRY & NEUROLOGY

## 2024-11-24 RX ADMIN — OLANZAPINE 5 MG: 5 TABLET, FILM COATED ORAL at 20:47

## 2024-11-24 RX ADMIN — ACETAMINOPHEN 650 MG: 325 TABLET ORAL at 20:47

## 2024-11-24 RX ADMIN — TRAZODONE HYDROCHLORIDE 50 MG: 50 TABLET ORAL at 20:47

## 2024-11-24 ASSESSMENT — PAIN SCALES - GENERAL: PAINLEVEL_OUTOF10: 9

## 2024-11-24 ASSESSMENT — PAIN DESCRIPTION - ORIENTATION: ORIENTATION: RIGHT

## 2024-11-24 ASSESSMENT — PAIN DESCRIPTION - LOCATION: LOCATION: WRIST

## 2024-11-24 NOTE — GROUP NOTE
Group Therapy Note    Date: 11/24/2024    Group Start Time: 0935  Group End Time: 1005  Group Topic: Nursing    Ramy Dumas, RN        Group Therapy Note    Attendees: 5/10         Notes:  Patient declined to participate in group at this time.       Modes of Intervention: Education, Support, and Limit-setting      Discipline Responsible: Registered Nurse      Signature:  RAMY WHITE RN

## 2024-11-24 NOTE — GROUP NOTE
Group Therapy Note    Date: 11/24/2024    Group Start Time: 1730  Group End Time: 1750  Group Topic: Community Meeting    [facility] Ramy Reyes, RN        Group Therapy Note    Attendees: 13/13       Room checks completed. No contraband found      Signature:  RAMY WHITE RN

## 2024-11-24 NOTE — GROUP NOTE
Group Therapy Note    Date: 11/24/2024    Group Start Time: 1030  Group End Time: 1100  Group Topic: Psychoeducation    Jameson Rincon        Group Therapy Note    Attendees: 3/15     Patient was offered group therapy today but declined to participate despite encouragement from staff. 1:1 was offered.  Signature:  Jameson Leal

## 2024-11-24 NOTE — BH NOTE
The patient reports experiencing abdominal pain and stated that he had 1 episode of emesis. PRN Bentyl 20 mg and Zofran 4 mg administered as prescribed. Writer will assess the patient in an hour for efficacy of the medications. Writer also prepared the patient chicken broth to provide some relief and nourishment.

## 2024-11-25 PROCEDURE — 6370000000 HC RX 637 (ALT 250 FOR IP): Performed by: PSYCHIATRY & NEUROLOGY

## 2024-11-25 PROCEDURE — 6370000000 HC RX 637 (ALT 250 FOR IP): Performed by: INTERNAL MEDICINE

## 2024-11-25 PROCEDURE — 6370000000 HC RX 637 (ALT 250 FOR IP)

## 2024-11-25 PROCEDURE — 99232 SBSQ HOSP IP/OBS MODERATE 35: CPT | Performed by: PSYCHIATRY & NEUROLOGY

## 2024-11-25 PROCEDURE — 99232 SBSQ HOSP IP/OBS MODERATE 35: CPT | Performed by: INTERNAL MEDICINE

## 2024-11-25 PROCEDURE — 90833 PSYTX W PT W E/M 30 MIN: CPT | Performed by: PSYCHIATRY & NEUROLOGY

## 2024-11-25 PROCEDURE — APPSS30 APP SPLIT SHARED TIME 16-30 MINUTES: Performed by: NURSE PRACTITIONER

## 2024-11-25 PROCEDURE — 1240000000 HC EMOTIONAL WELLNESS R&B

## 2024-11-25 RX ORDER — LISINOPRIL 10 MG/1
10 TABLET ORAL DAILY
Status: DISCONTINUED | OUTPATIENT
Start: 2024-11-26 | End: 2024-11-26 | Stop reason: HOSPADM

## 2024-11-25 RX ORDER — FLUOXETINE 10 MG/1
10 CAPSULE ORAL DAILY
Status: DISCONTINUED | OUTPATIENT
Start: 2024-11-25 | End: 2024-11-26 | Stop reason: HOSPADM

## 2024-11-25 RX ADMIN — ACETAMINOPHEN 650 MG: 325 TABLET ORAL at 15:38

## 2024-11-25 RX ADMIN — FLUOXETINE HYDROCHLORIDE 10 MG: 10 CAPSULE ORAL at 10:55

## 2024-11-25 RX ADMIN — HYDROXYZINE HYDROCHLORIDE 50 MG: 50 TABLET, FILM COATED ORAL at 21:01

## 2024-11-25 RX ADMIN — OLANZAPINE 5 MG: 5 TABLET, FILM COATED ORAL at 21:05

## 2024-11-25 RX ADMIN — IBUPROFEN 400 MG: 400 TABLET, FILM COATED ORAL at 19:28

## 2024-11-25 RX ADMIN — LISINOPRIL 2.5 MG: 5 TABLET ORAL at 08:16

## 2024-11-25 RX ADMIN — ACETAMINOPHEN 650 MG: 325 TABLET ORAL at 21:02

## 2024-11-25 RX ADMIN — TRAZODONE HYDROCHLORIDE 50 MG: 50 TABLET ORAL at 21:02

## 2024-11-25 RX ADMIN — ACETAMINOPHEN 650 MG: 325 TABLET ORAL at 06:47

## 2024-11-25 RX ADMIN — ACETAMINOPHEN 650 MG: 325 TABLET ORAL at 10:46

## 2024-11-25 ASSESSMENT — PAIN DESCRIPTION - LOCATION
LOCATION: HAND
LOCATION: WRIST;HAND
LOCATION: HAND
LOCATION: HAND;WRIST
LOCATION: HAND
LOCATION: HAND;WRIST
LOCATION: HAND;WRIST

## 2024-11-25 ASSESSMENT — PAIN SCALES - GENERAL
PAINLEVEL_OUTOF10: 8
PAINLEVEL_OUTOF10: 4
PAINLEVEL_OUTOF10: 5
PAINLEVEL_OUTOF10: 9
PAINLEVEL_OUTOF10: 6
PAINLEVEL_OUTOF10: 9
PAINLEVEL_OUTOF10: 7
PAINLEVEL_OUTOF10: 8

## 2024-11-25 ASSESSMENT — PAIN - FUNCTIONAL ASSESSMENT
PAIN_FUNCTIONAL_ASSESSMENT: PREVENTS OR INTERFERES SOME ACTIVE ACTIVITIES AND ADLS
PAIN_FUNCTIONAL_ASSESSMENT: PREVENTS OR INTERFERES SOME ACTIVE ACTIVITIES AND ADLS

## 2024-11-25 ASSESSMENT — PAIN DESCRIPTION - DESCRIPTORS
DESCRIPTORS: ACHING;THROBBING
DESCRIPTORS: ACHING
DESCRIPTORS: ACHING;THROBBING
DESCRIPTORS: THROBBING;ACHING
DESCRIPTORS: ACHING

## 2024-11-25 ASSESSMENT — LIFESTYLE VARIABLES
HOW OFTEN DO YOU HAVE A DRINK CONTAINING ALCOHOL: PATIENT DECLINED
HOW MANY STANDARD DRINKS CONTAINING ALCOHOL DO YOU HAVE ON A TYPICAL DAY: PATIENT DECLINED

## 2024-11-25 ASSESSMENT — PAIN DESCRIPTION - ORIENTATION
ORIENTATION: RIGHT

## 2024-11-25 NOTE — GROUP NOTE
Group Therapy Note    Date: 11/25/2024    Group Start Time: 1100  Group End Time: 1155  Group Topic: Cognitive Skills    Marielos Lopez CTRS        Group Therapy Note    Attendees: 6/14     Topic: To increase socialization, practice decision making and communication skills.       Comments:   Patient did not participate in Cognitive Skills Group, at 11:00, despite staff encouragement   and explanation of benefits. Pt was seclusive to self et room.     Q15 minute safety checks maintained for patient safety and will continue to encourage   patient to attend unit programming.       Discipline Responsible: Psychoeducational Specialist   Signature: SHANNAN BILLY
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
## 2024-11-25 NOTE — GROUP NOTE
Group Therapy Note    Date: 11/25/2024    Group Start Time: 1000  Group End Time: 1040  Group Topic: Psychotherapy    [facility]    Marina Gonzalez MSW, CYNTHIA        Group Therapy Note    Attendees: 6/14     Patient refused to attend psychotherapy group at 10:00 am after encouragement from staff. 1:1 talk was offered as alternative to group; patient declined.      Discipline Responsible: /Counselor      Signature:  LUCIANA Gibson, CYNTHIA

## 2024-11-25 NOTE — BH NOTE
Pt states pain in hand/wrist is an 8/10.  Pt points to wrist and mid hand (back) for location of pain.  Minimal swelling noted, minimal warmth noted, no skin abrasion noted, ice pack at bedside, PRN medication for pain not due yet.

## 2024-11-25 NOTE — BH NOTE
Spoke with , he is aware of the consult, and will see the patient later today. He is aware, hand is not wrapped. Will continue to monitor and support.

## 2024-11-25 NOTE — GROUP NOTE
Group Therapy Note    Date: 11/25/2024    Group Start Time: 1440  Group End Time: 1520  Group Topic: Cognitive Skills    Marielos Lopez CTRS        Group Therapy Note    Attendees: 8/17     Topic: To increase socialization, practice decision making, crisis management skills, collaboration,   and communication skills.         Comments:   Patient did not participate in Cognitive Skills Group, at 14:40, despite staff encouragement   and explanation of benefits. Pt was seclusive to self et room.     Q15 minute safety checks maintained for patient safety and will continue to encourage   patient to attend unit programming.       Discipline Responsible: Psychoeducational Specialist   Signature: SHANNAN BILLY

## 2024-11-26 VITALS
SYSTOLIC BLOOD PRESSURE: 151 MMHG | RESPIRATION RATE: 18 BRPM | TEMPERATURE: 96.9 F | BODY MASS INDEX: 23.62 KG/M2 | DIASTOLIC BLOOD PRESSURE: 100 MMHG | OXYGEN SATURATION: 99 % | HEIGHT: 70 IN | WEIGHT: 165 LBS | HEART RATE: 73 BPM
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
## 2024-11-26 PROCEDURE — 6370000000 HC RX 637 (ALT 250 FOR IP): Performed by: INTERNAL MEDICINE

## 2024-11-26 PROCEDURE — 99222 1ST HOSP IP/OBS MODERATE 55: CPT | Performed by: ORTHOPAEDIC SURGERY

## 2024-11-26 PROCEDURE — 90656 IIV3 VACC NO PRSV 0.5 ML IM: CPT | Performed by: PSYCHIATRY & NEUROLOGY

## 2024-11-26 PROCEDURE — 6360000002 HC RX W HCPCS: Performed by: PSYCHIATRY & NEUROLOGY

## 2024-11-26 PROCEDURE — G0008 ADMIN INFLUENZA VIRUS VAC: HCPCS | Performed by: PSYCHIATRY & NEUROLOGY

## 2024-11-26 PROCEDURE — 6370000000 HC RX 637 (ALT 250 FOR IP): Performed by: PSYCHIATRY & NEUROLOGY

## 2024-11-26 PROCEDURE — 6370000000 HC RX 637 (ALT 250 FOR IP)

## 2024-11-26 PROCEDURE — 99239 HOSP IP/OBS DSCHRG MGMT >30: CPT | Performed by: PSYCHIATRY & NEUROLOGY

## 2024-11-26 RX ORDER — OLANZAPINE 5 MG/1
5 TABLET ORAL NIGHTLY
Qty: 30 TABLET | Refills: 0 | Status: SHIPPED | OUTPATIENT
Start: 2024-11-26

## 2024-11-26 RX ORDER — HYDROXYZINE HYDROCHLORIDE 50 MG/1
50 TABLET, FILM COATED ORAL 3 TIMES DAILY PRN
Qty: 30 TABLET | Refills: 0 | Status: SHIPPED | OUTPATIENT
Start: 2024-11-26 | End: 2024-12-06

## 2024-11-26 RX ORDER — TRAZODONE HYDROCHLORIDE 50 MG/1
50 TABLET, FILM COATED ORAL NIGHTLY PRN
Qty: 30 TABLET | Refills: 0 | Status: SHIPPED | OUTPATIENT
Start: 2024-11-26

## 2024-11-26 RX ORDER — FLUOXETINE 10 MG/1
10 CAPSULE ORAL DAILY
Qty: 30 CAPSULE | Refills: 0 | Status: SHIPPED | OUTPATIENT
Start: 2024-11-27

## 2024-11-26 RX ORDER — LISINOPRIL 10 MG/1
10 TABLET ORAL DAILY
Qty: 30 TABLET | Refills: 0 | Status: SHIPPED | OUTPATIENT
Start: 2024-11-27

## 2024-11-26 RX ADMIN — FLUOXETINE HYDROCHLORIDE 10 MG: 10 CAPSULE ORAL at 07:50

## 2024-11-26 RX ADMIN — IBUPROFEN 400 MG: 400 TABLET, FILM COATED ORAL at 00:47

## 2024-11-26 RX ADMIN — LISINOPRIL 10 MG: 10 TABLET ORAL at 07:50

## 2024-11-26 RX ADMIN — INFLUENZA A VIRUS A/VICTORIA/4897/2022 IVR-238 (H1N1) ANTIGEN (PROPIOLACTONE INACTIVATED), INFLUENZA A VIRUS A/THAILAND/8/2022 IVR-237 (H3N2) ANTIGEN (PROPIOLACTONE INACTIVATED), INFLUENZA B VIRUS B/AUSTRIA/1359417/2021 BVR-26 ANTIGEN (PROPIOLACTONE INACTIVATED) 0.5 ML: 15; 15; 15 INJECTION, SUSPENSION INTRAMUSCULAR at 13:02

## 2024-11-26 RX ADMIN — ACETAMINOPHEN 650 MG: 325 TABLET ORAL at 07:49

## 2024-11-26 ASSESSMENT — PAIN DESCRIPTION - ORIENTATION
ORIENTATION: RIGHT

## 2024-11-26 ASSESSMENT — PAIN SCALES - GENERAL
PAINLEVEL_OUTOF10: 3
PAINLEVEL_OUTOF10: 6
PAINLEVEL_OUTOF10: 9
PAINLEVEL_OUTOF10: 7

## 2024-11-26 ASSESSMENT — PAIN DESCRIPTION - DESCRIPTORS
DESCRIPTORS: ACHING;THROBBING
DESCRIPTORS: ACHING;THROBBING;POUNDING

## 2024-11-26 ASSESSMENT — PAIN DESCRIPTION - LOCATION
LOCATION: HAND

## 2024-11-26 ASSESSMENT — PAIN SCALES - WONG BAKER: WONGBAKER_NUMERICALRESPONSE: NO HURT

## 2024-11-26 NOTE — GROUP NOTE
Group Therapy Note    Date: 11/26/2024    Group Start Time: 1000  Group End Time: 1045  Group Topic: Psychoeducation    Jameson Rincon        Group Therapy Note    Attendees: 12/18       Patient was offered group therapy today but declined to participate despite encouragement from staff. 1:1 was offered.    Signature:  Jameson Leal

## 2024-11-26 NOTE — GROUP NOTE
Group Therapy Note    Date: 2024    Group Start Time: 1100  Group End Time: 1145  Group Topic: Cognitive Skills    [facility] Marielos Dumont CTRS        Group Therapy Note    Attendees:          Patient's Goal:  ***    Notes:  ***    Status After Intervention:  {Status After Intervention:865266654}    Participation Level: {Participation Level:659443135}    Participation Quality: {[facility] PARTICIPATION QUALITY:310330192}      Speech:  {ED  CD_SPEECH:74440}      Thought Process/Content: {Thought Process/Content:691457197}      Affective Functioning: {Affective Functionin}      Mood: {Mood:637806095}      Level of consciousness:  {Level of consciousness:814347653}      Response to Learning: {[facility] Responses to Learnin}      Endings: {[facility] Endings:85541}    Modes of Intervention: {[facility] Modes of Intervention:419347437}      Discipline Responsible: {[facility] Multidisciplinary:557636702}      Signature:  SHANNAN BILLY

## 2024-11-26 NOTE — TRANSITION OF CARE
tobacco cessation? Yes  Patient referred to the following for tobacco cessation with an appointment? Patient refused  Patient was offered medication to assist with tobacco cessation at discharge? Patient refused    Alcohol/Substance Abuse Discharge Plan:   Does the patient have a history of substance/alcohol abuse and requires a referral for treatment? No  Patient referred to the following for substance/alcohol abuse treatment with an appointment? No  Patient was offered medication to assist with substance/alcohol abuse cessation at discharge? No      Patient discharged to: Home; Transition record discussed with patient/caregiver and provided this record in hard copy or electronically

## 2024-11-26 NOTE — GROUP NOTE
Group Therapy Note    Date: 11/26/2024    Group Start Time: 0930  Group End Time: 0945  Group Topic: Community Meeting    Jacqueline Mason, RN        Group Therapy Note    Attendees: 8/17   Patient refused to attend group at this time.

## 2024-11-26 NOTE — DISCHARGE INSTRUCTIONS
Information:  Medications:   Medication summary provided   I understand that I should take only the medications on my list.     -why and when I need to take each medicine.     -which side effects to watch for.     -that I should carry my medication information at all times in case of     Emergency situations.    I will take all of my medicines to follow up appointments.     -check with my physician or pharmacist before taking any new    Medication, over the counter product or drink alcohol.    -Ask about food, drug or dietary supplement interactions.    -discard old lists and update records with medication providers.    Notify Physician:  Notify physician if you notice:   Always call 911 if you feel your life is in danger  In case of an emergency call 911 immediately!  If 911 is not available call your local emergency medical system for help    Behavioral Health Follow Up:  Original Referral Source: [place]  Discharge Diagnosis: Depression with suicidal ideation [F32.A, R45.851]  Recommendations for Level of Care:  Attend follow up appointments   Patient status at discharge:  alert and oriented x4, denies Suicidal/homicidal idealizations  My hospital  was:  [facility]  Aftercare plan faxed: Yes   -faxed by: Staff   -date: 11/26/2024   -time:  1200  Prescriptions: see AVS    Smoking: Quit Smoking.   Call the [employer]'s smoking quitline at 0-816-44I-QUIT  Know the signs of a heart attack   If you have any of the following symptoms call 911 immediately, do not wait more    Than five minutes.    1. Pressure, fullness and/ or squeezing in the center of the chest spreading to    The jaw, neck or shoulder.    2. Chest discomfort with light headedness, fainting, sweating, nausea or    Shortness of breath.   3. Upper abdominal pressure or discomfort.   4. Lower chest pain, back pain, unusual fatigue, shortness of breath, nausea   Or dizziness.     General Information:   Questions regarding your bill:

## 2024-11-26 NOTE — BH NOTE
[facility] [place]  Discharge Note    Pt discharged with followings belongings:   Dental Appliances: None  Vision - Corrective Lenses: None  Hearing Aid: None  Jewelry: None  Body Piercings Removed: N/A  Clothing: Socks, Shirt, Sweater, Pants, Hat, Footwear  Other Valuables: Other (Comment) (none)   Valuables sent home with patient or returned to patient. Patient educated on aftercare instructions: Yes  Information faxed to [facility] by RN  at 1:57 PM .Patient verbalize understanding of AVS:   Yes.    Status EXAM upon discharge:  Mental Status and Behavioral Exam  Normal: Yes  Level of Assistance: Independent/Self  Facial Expression: Flat  Affect: Blunt, Appropriate  Level of Consciousness: Alert  Frequency of Checks: 4 times per hour, close  Mood:Normal: No  Mood: Depressed  Motor Activity:Normal: No  Motor Activity: Decreased  Eye Contact: Good  Observed Behavior: Guarded, Friendly, Cooperative  Sexual Misconduct History: Current - no  Preception: [place] to person, [place] to situation, [place] to place, [place] to time  Attention:Normal: Yes  Attention: Distractible  Thought Processes: Unremarkable  Thought Content:Normal: No  Thought Content: Preoccupations  Depression Symptoms: Isolative  Anxiety Symptoms: No problems reported or observed.  [place] Symptoms: No problems reported or observed.  Hallucinations: None  Delusions: No  Memory:Normal: Yes  Memory: Poor recent  Insight and Judgment: No  Insight and Judgment: Poor judgment, Poor insight    Tobacco Screening:  Practical Counseling, on admission, rachna X, if applicable and completed (first 3 are required if patient doesn't refuse):            ( ) Recognizing danger situations (included triggers and roadblocks)                    ( ) Coping skills (new ways to manage stress,relaxation techniques, changing routine, distraction)                                                           ( ) Basic information about quitting (benefits of quitting, techniques in
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
## 2024-11-26 NOTE — BH NOTE
Orthopedic MD rounded on Mr. Gastelum and gave him a splint to keep on his pointer finger for 6 weeks.
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
## 2024-11-26 NOTE — BH NOTE
Patient given tobacco quitline number 35759087195 at this time, refusing to call at this time, states \" I just dont want to quit now\"- patient given information as to the dangers of long term tobacco use. Continue to reinforce the importance of tobacco cessation.

## 2024-11-26 NOTE — DISCHARGE INSTR - DIET

## 2024-11-26 NOTE — CARE COORDINATION
Name: Bob Gastelum    : 1990    Auth number: [id number]     Discharge Date: 24    Destination: home     Discharge Medications:      Medication List        START taking these medications      FLUoxetine 10 MG capsule  Commonly known as: PROZAC  Take 1 capsule by mouth daily  Start taking on: 2024  Notes to patient: Antidepressant- SSRI     lisinopril 10 MG tablet  Commonly known as: PRINIVIL;ZESTRIL  Take 1 tablet by mouth daily  Start taking on: 2024  Notes to patient: Antihypertensive- lower blood pressure     OLANZapine 5 MG tablet  Commonly known as: ZYPREXA  Take 1 tablet by mouth nightly  Notes to patient: antidepressant            CONTINUE taking these medications      hydrOXYzine HCl 50 MG tablet  Commonly known as: ATARAX  Take 1 tablet by mouth 3 times daily as needed for Anxiety  Notes to patient: antianxiety     traZODone 50 MG tablet  Commonly known as: DESYREL  Take 1 tablet by mouth nightly as needed for Sleep  Notes to patient: Sleep aide            STOP taking these medications      acetaminophen 500 MG tablet  Commonly known as: TYLENOL     amLODIPine 10 MG tablet  Commonly known as: NORVASC     ibuprofen 600 MG tablet  Commonly known as: ADVIL;MOTRIN     Lexapro 10 MG tablet  Generic drug: escitalopram               Where to Get Your Medications        These medications were sent to [id number] - [address] -  [phone] - F [phone]  [address] [place] [address]      Phone: [phone]   FLUoxetine 10 MG capsule  hydrOXYzine HCl 50 MG tablet  lisinopril 10 MG tablet  OLANZapine 5 MG tablet  traZODone 50 MG tablet     Pharmacy Instructions:    Take medications as prescribed, do not take with drugs or alcohol .            Follow Up Appointment: [facility]   [place] [address]  [phone]  Follow up on 2024  You have a mental health appointment at 2:00 pm.
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
## 2024-11-28 PROBLEM — M20.019 MALLET FINGER: Status: ACTIVE | Noted: 2024-11-28
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
## 2024-12-04 ENCOUNTER — TELEPHONE (OUTPATIENT)
Dept: ORTHOPEDIC SURGERY | Age: 34
End: 2024-12-04

## 2024-12-04 NOTE — TELEPHONE ENCOUNTER
Called patient to get him scheduled with Dr. Bentley for a f/u appointment  to a consult done in hospital. Left patient a message to please call us to make follow up appointmemt

## 2024-12-25 NOTE — ED PROVIDER NOTES
[facility] [address] [facility]     Emergency Department     Faculty Attestation        I performed a history and physical examination of the patient and discussed management with the resident. I reviewed the residents note and agree with the documented findings and plan of care. Any areas of disagreement are noted on the chart. I was personally present for the key portions of any procedures. I have documented in the chart those procedures where I was not present during the key portions. I have reviewed the emergency nurses triage note. I agree with the chief complaint, past medical history, past surgical history, allergies, medications, social and family history as documented unless otherwise noted below. For Physician Assistant/ Nurse Practitioner cases/documentation I have personally evaluated this patient and have completed at least one if not all key elements of the E/M (history, physical exam, and MDM). Additional findings are as noted. Vital Signs: BP: 131/85  Heart Rate: 73     Temp: 97.3 °F (36.3 °C) SpO2: 98 %  PCP:  GENERIC HIGH    Pertinent Comments:     Is a 69-year-old male with history of bipolar disease as well as depression and polysubstance abuse who is currently suicidal without a plan. Patient did use fentanyl and crack this morning already. Denies any chest pain or shortness of breath and no abdominal pain. No other complaints. Assessment/plan: Patient with suicidal ideation and history of bipolar and depression   Will obtain medical clearance laboratories and reevaluate after with psychiatric social work consult. Critical Care  None      (Please note that portions of this note were completed with a voice recognition program. Efforts were made to edit the dictations but occasionally words are mis-transcribed.  Whenever words are used in this note in any gender, they shall be construed as though they were used in the gender appropriate to the circumstances; and whenever words are used in this note in the singular or plural form, they shall be construed as though they were used in the form appropriate to the circumstances.)    Kaylyn Lavender, MD Andres Shone  Attending Emergency Medicine Physician           Esther Chen MD  12/03/22 2098 Yes

## 2025-01-27 ENCOUNTER — HOSPITAL ENCOUNTER (INPATIENT)
Age: 35
LOS: 9 days | Discharge: HOME | DRG: 750 | End: 2025-02-05
Attending: PSYCHIATRY & NEUROLOGY | Admitting: PSYCHIATRY & NEUROLOGY
Payer: MEDICAID

## 2025-01-27 ENCOUNTER — HOSPITAL ENCOUNTER (EMERGENCY)
Age: 35
Discharge: PSYCHIATRIC OR SUBSTANCE USE FACILITY | DRG: 750 | End: 2025-01-27
Attending: EMERGENCY MEDICINE
Payer: MEDICAID

## 2025-01-27 ENCOUNTER — APPOINTMENT (OUTPATIENT)
Dept: GENERAL RADIOLOGY | Age: 35
DRG: 750 | End: 2025-01-27
Payer: MEDICAID

## 2025-01-27 VITALS
SYSTOLIC BLOOD PRESSURE: 142 MMHG | RESPIRATION RATE: 16 BRPM | BODY MASS INDEX: 24.34 KG/M2 | OXYGEN SATURATION: 98 % | WEIGHT: 170 LBS | DIASTOLIC BLOOD PRESSURE: 77 MMHG | HEIGHT: 70 IN | HEART RATE: 77 BPM | TEMPERATURE: 98.4 F

## 2025-01-27 DIAGNOSIS — R45.851 SUICIDAL IDEATION: Primary | ICD-10-CM

## 2025-01-27 DIAGNOSIS — F19.10 SUBSTANCE ABUSE (HCC): ICD-10-CM

## 2025-01-27 PROBLEM — F32.A DEPRESSION WITH SUICIDAL IDEATION: Status: ACTIVE | Noted: 2025-01-27

## 2025-01-27 LAB
ALBUMIN SERPL-MCNC: 4.1 G/DL (ref 3.5–5.2)
ALBUMIN/GLOB SERPL: 1.4 {RATIO} (ref 1–2.5)
ALP SERPL-CCNC: 70 U/L (ref 40–129)
ALT SERPL-CCNC: 108 U/L (ref 10–50)
AMPHET UR QL SCN: NEGATIVE
ANION GAP SERPL CALCULATED.3IONS-SCNC: 10 MMOL/L (ref 9–16)
APAP SERPL-MCNC: <5 UG/ML (ref 10–30)
AST SERPL-CCNC: 68 U/L (ref 10–50)
BARBITURATES UR QL SCN: NEGATIVE
BASOPHILS # BLD: 0.03 K/UL (ref 0–0.2)
BASOPHILS NFR BLD: 1 % (ref 0–2)
BENZODIAZ UR QL: NEGATIVE
BILIRUB SERPL-MCNC: 0.2 MG/DL (ref 0–1.2)
BUN SERPL-MCNC: 17 MG/DL (ref 6–20)
CALCIUM SERPL-MCNC: 9.4 MG/DL (ref 8.6–10.4)
CANNABINOIDS UR QL SCN: POSITIVE
CHLORIDE SERPL-SCNC: 103 MMOL/L (ref 98–107)
CO2 SERPL-SCNC: 27 MMOL/L (ref 20–31)
COCAINE UR QL SCN: POSITIVE
CREAT SERPL-MCNC: 1 MG/DL (ref 0.7–1.2)
EOSINOPHIL # BLD: 0.12 K/UL (ref 0–0.44)
EOSINOPHILS RELATIVE PERCENT: 2 % (ref 1–4)
ERYTHROCYTE [DISTWIDTH] IN BLOOD BY AUTOMATED COUNT: 12.3 % (ref 11.8–14.4)
ETHANOL PERCENT: <0.01 %
ETHANOLAMINE SERPL-MCNC: <10 MG/DL (ref 0–0.08)
FENTANYL UR QL: POSITIVE
GFR, ESTIMATED: >90 ML/MIN/1.73M2
GLUCOSE SERPL-MCNC: 115 MG/DL (ref 74–99)
HCT VFR BLD AUTO: 40.6 % (ref 40.7–50.3)
HGB BLD-MCNC: 12.9 G/DL (ref 13–17)
IMM GRANULOCYTES # BLD AUTO: <0.03 K/UL (ref 0–0.3)
IMM GRANULOCYTES NFR BLD: 0 %
LYMPHOCYTES NFR BLD: 3.05 K/UL (ref 1.1–3.7)
LYMPHOCYTES RELATIVE PERCENT: 50 % (ref 24–43)
MCH RBC QN AUTO: 29.4 PG (ref 25.2–33.5)
MCHC RBC AUTO-ENTMCNC: 31.8 G/DL (ref 28.4–34.8)
MCV RBC AUTO: 92.5 FL (ref 82.6–102.9)
METHADONE UR QL: NEGATIVE
MONOCYTES NFR BLD: 0.51 K/UL (ref 0.1–1.2)
MONOCYTES NFR BLD: 8 % (ref 3–12)
NEUTROPHILS NFR BLD: 39 % (ref 36–65)
NEUTS SEG NFR BLD: 2.35 K/UL (ref 1.5–8.1)
NRBC BLD-RTO: 0 PER 100 WBC
OPIATES UR QL SCN: POSITIVE
OXYCODONE UR QL SCN: NEGATIVE
PCP UR QL SCN: NEGATIVE
PLATELET # BLD AUTO: 260 K/UL (ref 138–453)
PMV BLD AUTO: 8.5 FL (ref 8.1–13.5)
POTASSIUM SERPL-SCNC: 3.7 MMOL/L (ref 3.7–5.3)
PROT SERPL-MCNC: 7 G/DL (ref 6.6–8.7)
RBC # BLD AUTO: 4.39 M/UL (ref 4.21–5.77)
SALICYLATES SERPL-MCNC: <0.5 MG/DL (ref 0–10)
SODIUM SERPL-SCNC: 140 MMOL/L (ref 136–145)
TEST INFORMATION: ABNORMAL
WBC OTHER # BLD: 6.1 K/UL (ref 3.5–11.3)

## 2025-01-27 PROCEDURE — 80143 DRUG ASSAY ACETAMINOPHEN: CPT

## 2025-01-27 PROCEDURE — 80053 COMPREHEN METABOLIC PANEL: CPT

## 2025-01-27 PROCEDURE — G0480 DRUG TEST DEF 1-7 CLASSES: HCPCS

## 2025-01-27 PROCEDURE — 73130 X-RAY EXAM OF HAND: CPT

## 2025-01-27 PROCEDURE — 80307 DRUG TEST PRSMV CHEM ANLYZR: CPT

## 2025-01-27 PROCEDURE — 85025 COMPLETE CBC W/AUTO DIFF WBC: CPT

## 2025-01-27 PROCEDURE — 1240000000 HC EMOTIONAL WELLNESS R&B

## 2025-01-27 PROCEDURE — 80179 DRUG ASSAY SALICYLATE: CPT

## 2025-01-27 PROCEDURE — 99253 IP/OBS CNSLTJ NEW/EST LOW 45: CPT | Performed by: NURSE PRACTITIONER

## 2025-01-27 RX ORDER — DIPHENHYDRAMINE HYDROCHLORIDE 50 MG/ML
50 INJECTION INTRAMUSCULAR; INTRAVENOUS EVERY 6 HOURS PRN
Status: DISCONTINUED | OUTPATIENT
Start: 2025-01-27 | End: 2025-02-05 | Stop reason: HOSPADM

## 2025-01-27 RX ORDER — LORAZEPAM 1 MG/1
2 TABLET ORAL EVERY 6 HOURS PRN
Status: DISCONTINUED | OUTPATIENT
Start: 2025-01-27 | End: 2025-02-02

## 2025-01-27 RX ORDER — HALOPERIDOL 5 MG/ML
5 INJECTION INTRAMUSCULAR EVERY 6 HOURS PRN
Status: DISCONTINUED | OUTPATIENT
Start: 2025-01-27 | End: 2025-02-05 | Stop reason: HOSPADM

## 2025-01-27 RX ORDER — POLYETHYLENE GLYCOL 3350 17 G/17G
17 POWDER, FOR SOLUTION ORAL DAILY PRN
Status: DISCONTINUED | OUTPATIENT
Start: 2025-01-27 | End: 2025-02-05 | Stop reason: HOSPADM

## 2025-01-27 RX ORDER — HALOPERIDOL 5 MG/1
5 TABLET ORAL EVERY 6 HOURS PRN
Status: DISCONTINUED | OUTPATIENT
Start: 2025-01-27 | End: 2025-02-05 | Stop reason: HOSPADM

## 2025-01-27 RX ORDER — LORAZEPAM 2 MG/ML
2 INJECTION INTRAMUSCULAR EVERY 6 HOURS PRN
Status: DISCONTINUED | OUTPATIENT
Start: 2025-01-27 | End: 2025-02-02

## 2025-01-27 RX ORDER — MAGNESIUM HYDROXIDE/ALUMINUM HYDROXICE/SIMETHICONE 120; 1200; 1200 MG/30ML; MG/30ML; MG/30ML
30 SUSPENSION ORAL EVERY 6 HOURS PRN
Status: DISCONTINUED | OUTPATIENT
Start: 2025-01-27 | End: 2025-02-05 | Stop reason: HOSPADM

## 2025-01-27 RX ORDER — ACETAMINOPHEN 325 MG/1
650 TABLET ORAL EVERY 6 HOURS PRN
Status: DISCONTINUED | OUTPATIENT
Start: 2025-01-27 | End: 2025-02-05 | Stop reason: HOSPADM

## 2025-01-27 RX ORDER — POLYETHYLENE GLYCOL 3350 17 G
2 POWDER IN PACKET (EA) ORAL
Status: DISCONTINUED | OUTPATIENT
Start: 2025-01-27 | End: 2025-02-05 | Stop reason: HOSPADM

## 2025-01-27 RX ORDER — IBUPROFEN 400 MG/1
400 TABLET, FILM COATED ORAL EVERY 6 HOURS PRN
Status: DISCONTINUED | OUTPATIENT
Start: 2025-01-27 | End: 2025-02-05 | Stop reason: HOSPADM

## 2025-01-27 RX ORDER — HYDROXYZINE HYDROCHLORIDE 50 MG/1
50 TABLET, FILM COATED ORAL 3 TIMES DAILY PRN
Status: DISCONTINUED | OUTPATIENT
Start: 2025-01-27 | End: 2025-02-05 | Stop reason: HOSPADM

## 2025-01-27 RX ORDER — TRAZODONE HYDROCHLORIDE 50 MG/1
50 TABLET, FILM COATED ORAL NIGHTLY PRN
Status: DISCONTINUED | OUTPATIENT
Start: 2025-01-27 | End: 2025-01-31

## 2025-01-27 ASSESSMENT — ENCOUNTER SYMPTOMS
SHORTNESS OF BREATH: 0
COUGH: 0
NAUSEA: 0
ABDOMINAL PAIN: 0
VOMITING: 0

## 2025-01-27 ASSESSMENT — PAIN DESCRIPTION - ONSET: ONSET: ON-GOING

## 2025-01-27 ASSESSMENT — PAIN DESCRIPTION - ORIENTATION: ORIENTATION: RIGHT

## 2025-01-27 ASSESSMENT — PATIENT HEALTH QUESTIONNAIRE - PHQ9
2. FEELING DOWN, DEPRESSED OR HOPELESS: MORE THAN HALF THE DAYS
7. TROUBLE CONCENTRATING ON THINGS, SUCH AS READING THE NEWSPAPER OR WATCHING TELEVISION: SEVERAL DAYS
6. FEELING BAD ABOUT YOURSELF - OR THAT YOU ARE A FAILURE OR HAVE LET YOURSELF OR YOUR FAMILY DOWN: SEVERAL DAYS
4. FEELING TIRED OR HAVING LITTLE ENERGY: MORE THAN HALF THE DAYS
10. IF YOU CHECKED OFF ANY PROBLEMS, HOW DIFFICULT HAVE THESE PROBLEMS MADE IT FOR YOU TO DO YOUR WORK, TAKE CARE OF THINGS AT HOME, OR GET ALONG WITH OTHER PEOPLE: SOMEWHAT DIFFICULT
SUM OF ALL RESPONSES TO PHQ QUESTIONS 1-9: 13
SUM OF ALL RESPONSES TO PHQ QUESTIONS 1-9: 12
SUM OF ALL RESPONSES TO PHQ QUESTIONS 1-9: 13
1. LITTLE INTEREST OR PLEASURE IN DOING THINGS: NEARLY EVERY DAY
8. MOVING OR SPEAKING SO SLOWLY THAT OTHER PEOPLE COULD HAVE NOTICED. OR THE OPPOSITE, BEING SO FIGETY OR RESTLESS THAT YOU HAVE BEEN MOVING AROUND A LOT MORE THAN USUAL: SEVERAL DAYS
SUM OF ALL RESPONSES TO PHQ QUESTIONS 1-9: 13
SUM OF ALL RESPONSES TO PHQ9 QUESTIONS 1 & 2: 5
3. TROUBLE FALLING OR STAYING ASLEEP: MORE THAN HALF THE DAYS
9. THOUGHTS THAT YOU WOULD BE BETTER OFF DEAD, OR OF HURTING YOURSELF: SEVERAL DAYS
5. POOR APPETITE OR OVEREATING: NOT AT ALL

## 2025-01-27 ASSESSMENT — PAIN DESCRIPTION - LOCATION: LOCATION: HAND

## 2025-01-27 ASSESSMENT — LIFESTYLE VARIABLES
HOW OFTEN DO YOU HAVE A DRINK CONTAINING ALCOHOL: NEVER
HOW MANY STANDARD DRINKS CONTAINING ALCOHOL DO YOU HAVE ON A TYPICAL DAY: PATIENT DOES NOT DRINK
HOW MANY STANDARD DRINKS CONTAINING ALCOHOL DO YOU HAVE ON A TYPICAL DAY: PATIENT DOES NOT DRINK
HOW OFTEN DO YOU HAVE A DRINK CONTAINING ALCOHOL: NEVER

## 2025-01-27 ASSESSMENT — PAIN SCALES - GENERAL: PAINLEVEL_OUTOF10: 5

## 2025-01-27 ASSESSMENT — PAIN DESCRIPTION - FREQUENCY: FREQUENCY: CONTINUOUS

## 2025-01-27 ASSESSMENT — PAIN DESCRIPTION - PAIN TYPE: TYPE: CHRONIC PAIN

## 2025-01-27 ASSESSMENT — PAIN DESCRIPTION - DESCRIPTORS: DESCRIPTORS: ACHING

## 2025-01-27 ASSESSMENT — PAIN - FUNCTIONAL ASSESSMENT: PAIN_FUNCTIONAL_ASSESSMENT: PREVENTS OR INTERFERES WITH ALL ACTIVE AND SOME PASSIVE ACTIVITIES

## 2025-01-27 NOTE — ED NOTES
The patient is a 34 year old male that has a history of Depression. The patient came to the ED today due to suicidal thoughts/plans and ongoing depression. The patient states that he has been feeling suicidal for the past week. He states that there has been a lot of stressors in life especially relapsing on drugs. The patient states that he has a suicidal plan to over dose on fentanyl. The patient reports that he has access to fentanyl to overdose. The patient denied any homicidal thoughts or plans. Patient denied any ashish or psychosis. Patient is not responding to internal stimuli. The patient does present as hopeless and helpless. Patient tearful at times. The patient states that he used fentanyl and cocaine earlier today. Patient denied any recent alcohol use. Patient last know medications are Atarax and Trazodone. The patient is not compliant with his medications. Patient is calm, cooperative and pleasant. The patient states that he is not able to contract for safety.

## 2025-01-27 NOTE — ED PROVIDER NOTES
Faculty Sign-Out Attestation  Handoff taken on the following patient from prior Attending Physician:     Note Started: 3:28 PM EST    I was available and discussed any additional care issues that arose and coordinated the management plans with the resident(s) caring for the patient during my duty period. Any areas of disagreement with resident’s documentation of care or procedures are noted on the chart. I was personally present for the key portions of any/all procedures during my duty period. I have documented in the chart those procedures where I was not present during the key portions.    Awaiting placement at [facility], [facility] already signed.    Gómez Hodges MD  Attending Physician        Gómez Hodges MD  01/27/25 1528

## 2025-01-27 NOTE — ED PROVIDER NOTES
[facility] EMERGENCY DEPARTMENT  Emergency Department  Emergency Medicine Resident Sign-out     Care of Bob Gastelum was assumed from Dr. Marina and is being seen for Suicidal (Pt reports suicidal thoughts with plan to overdose on fentanyl. Pt denies hallucination, delusions or HI. Pt admits to smoking crack and snorting fentanyl in the past 24 hours. Last suicide attempt was in November by overdose. )  .  The patient's initial evaluation and plan have been discussed with the prior provider who initially evaluated the patient.     EMERGENCY DEPARTMENT COURSE / MEDICAL DECISION MAKING:       MEDICATIONS GIVEN:  No orders of the defined types were placed in this encounter.      LABS / RADIOLOGY:     Labs Reviewed   CBC WITH AUTO DIFFERENTIAL - Abnormal; Notable for the following components:       Result Value    Hemoglobin 12.9 (*)     Hematocrit 40.6 (*)     Lymphocytes % 50 (*)     All other components within normal limits   COMPREHENSIVE METABOLIC PANEL - Abnormal; Notable for the following components:    Glucose 115 (*)      (*)     AST 68 (*)     All other components within normal limits   URINE DRUG SCREEN - Abnormal; Notable for the following components:    Cocaine Metabolite, Urine POSITIVE (*)     Opiates, Urine POSITIVE (*)     Cannabinoid Scrn, Ur POSITIVE (*)     Fentanyl, Ur POSITIVE (*)     All other components within normal limits   TOX SCR, BLD, ED - Abnormal; Notable for the following components:    Acetaminophen Level <5 (*)     All other components within normal limits       XR HAND RIGHT (MIN 3 VIEWS)    Result Date: 1/27/2025  EXAMINATION: THREE XRAY VIEWS OF THE RIGHT HAND 1/27/2025 5:17 am COMPARISON: None. HISTORY: ORDERING SYSTEM PROVIDED HISTORY: Chronic deformity to dorsal right hand from prior injury, states appears larger and is tender, xray to compare to old films TECHNOLOGIST PROVIDED HISTORY: Chronic deformity to dorsal right hand from prior injury, states appears

## 2025-01-27 NOTE — ED NOTES
..[facility] Handoff for [facility] Transfers      Patient's Current Location: [facility] EMERGENCY DEPARTMENT     Chief Complaint   Patient presents with    Suicidal     Pt reports suicidal thoughts with plan to overdose on fentanyl. Pt denies hallucination, delusions or HI. Pt admits to smoking crack and snorting fentanyl in the past 24 hours. Last suicide attempt was in November by overdose.        Current or History of Violent Behavior: No    Currently in Restraints Now or During this Encounter: No  (Specify if Agitation or self harm is noted in ED?)  If yes, please describe behaviors requiring restraint:             Medical Clearance Documented and Verified in the Chart: Yes    Allergies   Allergen Reactions    Vicodin [Hydrocodone-Acetaminophen] Nausea And Vomiting        Can Patient Tolerate Lying Flat: Yes    Able to Perform ADLs:  Yes  (Specify if able to ambulate or uses any mobility devices such as cane or walker)  Activity:    Level of Assistance:    Assistive Device:    Miscellaneous Devices:      LABS    CBC:   Lab Results   Component Value Date/Time    WBC 6.1 01/27/2025 04:43 AM    RBC 4.39 01/27/2025 04:43 AM    HGB 12.9 01/27/2025 04:43 AM    HCT 40.6 01/27/2025 04:43 AM    MCV 92.5 01/27/2025 04:43 AM    MCH 29.4 01/27/2025 04:43 AM    MCHC 31.8 01/27/2025 04:43 AM    RDW 12.3 01/27/2025 04:43 AM     01/27/2025 04:43 AM    MPV 8.5 01/27/2025 04:43 AM     CMP:   Lab Results   Component Value Date/Time     01/27/2025 04:43 AM    K 3.7 01/27/2025 04:43 AM     01/27/2025 04:43 AM    CO2 27 01/27/2025 04:43 AM    BUN 17 01/27/2025 04:43 AM    CREATININE 1.0 01/27/2025 04:43 AM    GFRAA >60 08/09/2022 04:47 AM    LABGLOM >90 01/27/2025 04:43 AM    LABGLOM >90 04/28/2024 04:40 PM    GLUCOSE 115 01/27/2025 04:43 AM    CALCIUM 9.4 01/27/2025 04:43 AM    BILITOT 0.2 01/27/2025 04:43 AM    ALKPHOS 70 01/27/2025 04:43 AM    AST 68 01/27/2025 04:43 AM     01/27/2025

## 2025-01-27 NOTE — ED PROVIDER NOTES
[facility] EMERGENCY DEPARTMENT  Emergency Department  Emergency Medicine Resident Turn-Over     Note Started: 6:06 AM EST    Care of Bob Gastelum was assumed from Dr. Galvin and is being seen for Suicidal (Pt reports suicidal thoughts with plan to overdose on fentanyl. Pt denies hallucination, delusions or HI. Pt admits to smoking crack and snorting fentanyl in the past 24 hours. Last suicide attempt was in November by overdose. )  .  The patient's initial evaluation and plan have been discussed with the prior provider who initially evaluated the patient.     EMERGENCY DEPARTMENT COURSE / MEDICAL DECISION MAKING:       MEDICATIONS GIVEN:  No orders of the defined types were placed in this encounter.      LABS / RADIOLOGY:     Labs Reviewed   CBC WITH AUTO DIFFERENTIAL - Abnormal; Notable for the following components:       Result Value    Hemoglobin 12.9 (*)     Hematocrit 40.6 (*)     Lymphocytes % 50 (*)     All other components within normal limits   COMPREHENSIVE METABOLIC PANEL - Abnormal; Notable for the following components:    Glucose 115 (*)      (*)     AST 68 (*)     All other components within normal limits   URINE DRUG SCREEN - Abnormal; Notable for the following components:    Cocaine Metabolite, Urine POSITIVE (*)     Opiates, Urine POSITIVE (*)     Cannabinoid Scrn, Ur POSITIVE (*)     Fentanyl, Ur POSITIVE (*)     All other components within normal limits   TOX SCR, BLD, ED - Abnormal; Notable for the following components:    Acetaminophen Level <5 (*)     All other components within normal limits       XR HAND RIGHT (MIN 3 VIEWS)    Result Date: 1/27/2025  EXAMINATION: THREE XRAY VIEWS OF THE RIGHT HAND 1/27/2025 5:17 am COMPARISON: None. HISTORY: ORDERING SYSTEM PROVIDED HISTORY: Chronic deformity to dorsal right hand from prior injury, states appears larger and is tender, xray to compare to old films TECHNOLOGIST PROVIDED HISTORY: Chronic deformity to dorsal right hand from

## 2025-01-27 NOTE — ED PROVIDER NOTES
[facility]   Emergency Department  Faculty Attestation       I performed a history and physical examination of the patient and discussed management with the resident. I reviewed the resident’s note and agree with the documented findings including all diagnostic interpretations and plan of care. Any areas of disagreement are noted on the chart. I was personally present for the key portions of any procedures. I have documented in the chart those procedures where I was not present during the key portions. I have reviewed the emergency nurses triage note. I agree with the chief complaint, past medical history, past surgical history, allergies, medications, social and family history as documented unless otherwise noted below.  For Physician Assistant/ Nurse Practitioner cases/documentation I have personally evaluated this patient and have completed at least one if not all key elements of the E/M (history, physical exam, and MDM). Additional findings are as noted.    Patient Name: Bob Gastelum  MRN: [id number]  : 1990  Primary Care Physician: No primary care provider on file.    Date of evaluationa: 2025   Note Started: 4:27 AM EST    Pertinent Comments     Chief Complaint: No chief complaint on file.       Initial vitals: (If not listed, please see nursing documentation)  ED Triage Vitals [25 0426]   BP Systolic BP Percentile Diastolic BP Percentile Temp Temp src Pulse Resp SpO2   (!) 157/93 -- -- 97.9 °F (36.6 °C) -- 79 -- 100 %      Height Weight         -- --              HPI/PE/Impression:  This is a 34 y.o. male who presents to the Emergency Department suicidal ideation with plan to overdose.  Has done this before in attempt to kill himself where someone else then called 911.  Drug user of fentanyl and crack.  SI intermittent for 1 month.  Also complaining of some right hand pain for approximately a week.  States he has had prior surgery and repair on his right hand.  But has

## 2025-01-27 NOTE — ED PROVIDER NOTES
[facility] EMERGENCY DEPARTMENT  Emergency Department Encounter  Emergency Medicine Resident     Pt Name:Bob Gastelum  MRN: [id number]  Birthdate 1990  Date of evaluation: 1/27/25  PCP:  No primary care provider on file.  Note Started: 4:30 AM EST      CHIEF COMPLAINT       Chief Complaint   Patient presents with    Suicidal     Pt reports suicidal thoughts with plan to overdose on fentanyl. Pt denies hallucination, delusions or HI. Pt admits to smoking crack and snorting fentanyl in the past 24 hours. Last suicide attempt was in November by overdose.        HISTORY OF PRESENT ILLNESS  (Location/Symptom, Timing/Onset, Context/Setting, Quality, Duration, Modifying Factors, Severity.)      Bob Gastelum is a 34 y.o. male who presents with suicidal patient with plan to overdose on drugs.  Does have previous history of multiple overdose attempts in an attempt to take his own life.  States he has been feeling increasingly depressed over the last month with intermittent suicidal ideation.  Denies homicidal ideation or hallucinations.  Not currently on any medications, states he is supposed to be but does not have them as he has failed to follow-up outpatient.  States when it is time for follow-up he feels \"jittery and nervous\" and ends up not going.  Reports feeling depressed, anhedonia, poor sleep.  Smokes half pack per day cigarettes.  Denies alcohol use.  Reports crack cocaine and fentanyl use including snorting and sometimes IV use.  Last psychiatric hospitalization 11/2024 after intentional suicide attempt by fentanyl overdose.    Additionally reports chronic bony deformity to the dorsal surface of the right hand from a previous injury, states this is appearing larger to him in recent time and is somewhat tender.    PAST MEDICAL / SURGICAL / SOCIAL / FAMILY HISTORY      has a past medical history of Alcoholism (HCC), Anxiety, Bipolar 1 disorder (HCC), Cocaine abuse (HCC), Depression, Hepatitis C

## 2025-01-27 NOTE — BH NOTE
Patient arrived on unit transported by two staff [facility] via stretcher  with voluntary signed  in hand. Patient ambulated independently was assessed, vitals obtained, wanded, changed into appropriate clothing and orientated to unit.

## 2025-01-27 NOTE — BH NOTE
Patient given tobacco quitline number 43024959859 at this time, refusing to call at this time, states \" I just dont want to quit now\"- patient given information as to the dangers of long term tobacco use. Continue to reinforce the importance of tobacco cessation.

## 2025-01-27 NOTE — BH NOTE
[facility]  Admission Note     Admission Type:   Voluntary     Reason for admission:  Reason for Admission: The patient states that he has been feeling suicidal for the past week. He states that there has been a lot of stressors in life especially relapsing on drugs. The patient states that he has a suicidal plan to over dose on fentanyl. The patient reports that he has access to fentanyl to overdose.      Addictive Behavior:   Addictive Behavior  In the Past 3 Months, Have You Felt or Has Someone Told You That You Have a Problem With  : None    Medical Problems:   Past Medical History:   Diagnosis Date    Alcoholism ([facility])     Anxiety     Bipolar 1 disorder ([facility])     Cocaine abuse ([facility])     Depression     Hepatitis C antibody test positive     Hypertension     Irregular heartbeat     Mild tetrahydrocannabinol (THC) abuse     Suicidal ideation        Status EXAM:  Mental Status and Behavioral Exam  Normal: Yes  Level of Assistance: Independent/Self  Facial Expression: Avoids Gaze, Sad  Affect: Congruent  Level of Consciousness: Alert  Frequency of Checks: 4 times per hour, close  Mood:Normal: No  Mood: Depressed, Anxious, Sad, Helpless, Worthless, low self-esteem  Motor Activity:Normal: Yes  Eye Contact: Poor  Observed Behavior: Cooperative, Withdrawn  Sexual Misconduct History: Current - no  Preception: [place] to person, [place] to time, [place] to place, [place] to situation  Attention:Normal: Yes  Thought Processes: Unremarkable  Thought Content:Normal: No  Thought Content: Preoccupations, Poverty of content  Depression Symptoms: Change in energy level, Feelings of helplessness, Feelings of hopelessess, Isolative, Loss of interest, Sleep disturbance  Anxiety Symptoms: Generalized  Lizett Symptoms: No problems reported or observed.  Hallucinations: None  Delusions: No  Memory:Normal: Yes  Insight and Judgment: No  Insight and Judgment: Unmotivated, Poor insight, Poor judgment    Tobacco

## 2025-01-27 NOTE — CONSULTS
Unemployed, no income  EDUCATION: GED    Past Medical History:        Diagnosis Date    Alcoholism (HCC)     Anxiety     Bipolar 1 disorder (HCC)     Cocaine abuse (HCC)     Depression     Hepatitis C antibody test positive     Hypertension     Irregular heartbeat     Mild tetrahydrocannabinol (THC) abuse     Suicidal ideation        Past Surgical History:        Procedure Laterality Date    DIALYSIS FISTULA CREATION Right 2017    RIGHT WRIST WOUND EXPLORATION WITH ARTERIAL REPAIR ULNAR AND RADIAL ARTERIES RIGHT WRIST performed by Alex Walker MD at [facility] OR    EXPLORATION OF WOUND OF EXTREMITY Right 2017    WOUND EXPLORATION AND/OR REVISION performed by Alex Walker MD at [facility] OR    FEMUR SURGERY Left     OTHER SURGICAL HISTORY Right 2017    exp and repair of unlar and radial artery with exp and repair of 12 tendons and 2 nerves       Family Medical and Psychiatric History:   Denies        Problem Relation Age of Onset    Heart Disease Father 49    Asthma Brother     Hypertension Maternal Grandmother     Lung Cancer Maternal Grandmother         Kidney cancer, liver cancer    Stroke Maternal Grandmother     Heart Disease Mother 51         of presumed heart disease in her sleep       Medications Prior to Admission:   Medications Prior to Admission: FLUoxetine (PROZAC) 10 MG capsule, Take 1 capsule by mouth daily  lisinopril (PRINIVIL;ZESTRIL) 10 MG tablet, Take 1 tablet by mouth daily  OLANZapine (ZYPREXA) 5 MG tablet, Take 1 tablet by mouth nightly  traZODone (DESYREL) 50 MG tablet, Take 1 tablet by mouth nightly as needed for Sleep    Allergies:  Vicodin [hydrocodone-acetaminophen]    Lifetime Psychiatric Review of Systems        Depression: Endorses     Anxiety: Endorses     Panic Attacks: Denies     Lizett or Hypomania: Denies     Phobias: Denies     Obsessions and Compulsions: Denies     Body or Vocal Tics: Denies     Visual Hallucinations: Denies in the absence of substances     Auditory

## 2025-01-27 NOTE — ED NOTES
[] [facility]    [] [facility]    [x]  [facility]    SUICIDE RISK ASSESSMENT      Y  N     [x] [] In the past two weeks have you had thoughts of hurting yourself in any way?    [x] [] In the past two weeks have you had thoughts that you would be better off dead?   [x] [] Have you made a suicide attempt in the past two months?   [x] [] Do you have a plan for hurting yourself or suicide?   [] [x] Presence of hallucinations/voices related to hurting himself or herself or someone else.    SUICIDE/SECURITY WATCH PRECAUTION CHECKLIST     Orders    [x]  Suicide/Security Watch Precautions initiated as checked below:   1/27/25 4:41 AM EST 06/06    [x] Notified physician:  Jolie Hope MD  1/27/25 4:41 AM EST    [x] Orders obtained as appropriate:     [x] 1:1 Observer     [] Psych Consult     [] Psych Consult    Name:  Date:  Time:    [x] 1:1 Observer, Notified by:  Katy Fernández RN    Contact Nurse Supervisor    [x] Remove all personal clothes from room and place in snap/paper gown/pants.  Slipper only    [x] Remove all personal belongings from room and secured away from patient.    Documentation    [x] Initiate Suicide/Security Watch Precaution Flow Sheet    [x] Initiate individualized Care Plan/Problem    [x] Document why precautions initiated on flow sheet (Initiate Nursing Care Plan/Problem)    [x] 1:1 Observer in place; instructions provided.  Suicide precautions require observer be within arms length.    [x] Nurse-Observer Communication Hand-off initiated by RN, reviewed with Observer.  Subsequently used as Hand Off between Observers.    [x] Initiate every 15 minute observations per observer as delegated by the RN.    [x] Initiate RN assessment and documentation    Environmental Scan  Search Criteria and Process: OPTIONAL, see Search Policy    [x] Reason for search: SI    [x] Nursing in presence of second person to search patient    [x] Patient notified of reason for body assessment and  belongings search:     Persons present during search:   Results of search and disposition:       Searchers Name: Officer Catarina     These items or items similar should be removed from the room:   [] Chairs   [] Telephone   [] Trash cans and liners   [] Plastic utensils (order Patient Safety tray)   [] Empty or remove Sharps containers   [x] All personal clothing/belongings removed   [x] All unnecessary lead wires, electrical cords, draw cords, etc.   [] Flowers and plants   [] Double check for lighters, matches, razors, any glass items etc that can be used as weapons.    Person completing Checklist: Katy Fernández RN       GENERAL INFORMATION     Y  N     [x] [] Has the patient been informed that they are on a watch and what that means?   [x] [] Can the patient get out of Bed without nursing assistance?   [x] [] Can the patient use the restroom without nursing assistance?   [x] [] Can the patient walk the halls to \"stretch their legs?\"   [] [x] Does the patient have metal utensils?   [x] [] Have the patient's belongings been placed out of control of the patient?   [x] [] Have the patient and his/her belongings been checked for contraband?   [] [x] Is the patient under any visitor restrictions?   If Yes, explain:   [] [x] Is the patient under an alias?  Alias Name:   Authorized visitors (no more than two are to be on the list)   Name/Relationship:   Name/Relationship:    Name of Staff member that you  Received this information from?: Writer     General Description:    Bob Gastelum [birth year] male 34 y.o. Admission weight: 77.1 kg (170 lb) Height: 177.8 cm (5' 10\")  Race: []  [x] Black  []   []   []  [] Other  Facial Hair:  [x] Yes  [] No  If yes, please describe:  Identifying Marks (i.e. Visible tattoos, scars, etc...):     NURSING CARE PLAN    Nursing Diagnosis: Risk of Self Directed Harm  [] Actual  [x] Potential  Date Started: 1/27/25      Etiological Factors: (related to)  [x]

## 2025-01-27 NOTE — ED PROVIDER NOTES
[facility]  FACULTY HANDOFF     6:42 AM EST  Handoff taken on the following patient from prior Attending Physician:  Pt Name: Bob Gastelum  PCP:  No primary care provider on file.    Attestation  I was available and discussed any additional care issues that arose and coordinated the management plans with the resident(s) caring for the patient during my duty period. Any areas of disagreement with resident's documentation of care or procedures are noted on the chart. I was personally present for the key portions of any/all procedures during my duty period. I have documented in the chart those procedures where I was not present during the key portions.         CHIEF COMPLAINT       Chief Complaint   Patient presents with    Suicidal     Pt reports suicidal thoughts with plan to overdose on fentanyl. Pt denies hallucination, delusions or HI. Pt admits to smoking crack and snorting fentanyl in the past 24 hours. Last suicide attempt was in November by overdose.          CURRENT MEDICATIONS     Previous Medications  Previous Medications    FLUOXETINE (PROZAC) 10 MG CAPSULE    Take 1 capsule by mouth daily    LISINOPRIL (PRINIVIL;ZESTRIL) 10 MG TABLET    Take 1 tablet by mouth daily    OLANZAPINE (ZYPREXA) 5 MG TABLET    Take 1 tablet by mouth nightly    TRAZODONE (DESYREL) 50 MG TABLET    Take 1 tablet by mouth nightly as needed for Sleep       Encounter Medications  No orders of the defined types were placed in this encounter.      ALLERGIES     is allergic to vicodin [hydrocodone-acetaminophen].      RECENT VITALS:   Temp: 97.9 °F (36.6 °C),  Pulse: 79, Respirations: 16, BP: (!) 157/93    RADIOLOGY:   XR HAND RIGHT (MIN 3 VIEWS)   Final Result   No acute osseous or soft tissue abnormality.      Degenerative change most pronounced at the 5th carpometacarpal joint space.             LABS:  Labs Reviewed   CBC WITH AUTO DIFFERENTIAL - Abnormal; Notable for the following components:       Result

## 2025-01-28 PROCEDURE — 6370000000 HC RX 637 (ALT 250 FOR IP): Performed by: PSYCHIATRY & NEUROLOGY

## 2025-01-28 PROCEDURE — 1240000000 HC EMOTIONAL WELLNESS R&B

## 2025-01-28 PROCEDURE — 90792 PSYCH DIAG EVAL W/MED SRVCS: CPT | Performed by: PSYCHIATRY & NEUROLOGY

## 2025-01-28 PROCEDURE — 99222 1ST HOSP IP/OBS MODERATE 55: CPT | Performed by: INTERNAL MEDICINE

## 2025-01-28 PROCEDURE — APPSS60 APP SPLIT SHARED TIME 46-60 MINUTES

## 2025-01-28 RX ORDER — HYDROXYZINE HYDROCHLORIDE 50 MG/1
50 TABLET, FILM COATED ORAL 3 TIMES DAILY PRN
Status: ON HOLD | COMMUNITY
End: 2025-02-04

## 2025-01-28 RX ORDER — FLUOXETINE 10 MG/1
10 CAPSULE ORAL DAILY
Status: DISCONTINUED | OUTPATIENT
Start: 2025-01-28 | End: 2025-01-31

## 2025-01-28 RX ORDER — OLANZAPINE 5 MG/1
5 TABLET ORAL NIGHTLY
Status: DISCONTINUED | OUTPATIENT
Start: 2025-01-28 | End: 2025-02-01

## 2025-01-28 RX ORDER — LISINOPRIL 10 MG/1
10 TABLET ORAL DAILY
Status: DISCONTINUED | OUTPATIENT
Start: 2025-01-28 | End: 2025-01-31

## 2025-01-28 RX ADMIN — OLANZAPINE 5 MG: 5 TABLET, FILM COATED ORAL at 21:03

## 2025-01-28 RX ADMIN — HYDROXYZINE HYDROCHLORIDE 50 MG: 50 TABLET ORAL at 21:03

## 2025-01-28 RX ADMIN — TRAZODONE HYDROCHLORIDE 50 MG: 50 TABLET ORAL at 21:03

## 2025-01-28 NOTE — GROUP NOTE
Group Therapy Note    Date: 1/28/2025    Group Start Time: 1100  Group End Time: 1140  Group Topic: Cognitive Skills    Tasha Srivastava CTRS    Cognitive Skills Group Note        Date: January 28, 2025 Start Time: 11am  End Time:  1140 am       Number of Participants in Group & Unit Census:  11/23    Topic: cognitive skills     Goal of Group: pt will demonstrate improved coping skills and improved interpersonal relationships      Comments:     Patient did not participate in Cognitive Skills group, despite staff encouragement and explanation of benefits.  Patient remain seclusive to self.  Q15 minute safety checks maintained for patient safety and will continue to encourage patient to attend unit programming.            Signature:  SHANNAN HUTTON

## 2025-01-28 NOTE — CARE COORDINATION
Psychosocial Assessment    Current Level of Psychosocial Functioning     Independent X  Dependent    Minimal Assist     Comments:      Psychosocial High Risk Factors (check all that apply)    Unable to obtain meds   Chronic illness/pain    Substance abuse  X  Lack of Family Support   Financial stress  X  Isolation   Inadequate Community Resources  Suicide attempt(s) X  Not taking medications  X  Victim of crime   Developmental Delay  Unable to manage personal needs    Age 65 or older   Homeless       X  No transportation   Readmission within 30 days  Unemployment  Traumatic Event    Family/Supports identified: Patient reports his brother is supportive.      Patient Strengths: Has insurance    Patient Barriers: Substance use, Homeless, non compliance with medications, and appointments.    CM[facility]/MH history: Linked with [facility]    Plan of Care:  medication management, group/individual therapies, family meetings, psycho -education, treatment team meetings to assist with stabilization    Initial Discharge Plan:  AOD folder provided.    Clinical Summary:  Patient is a 34 year old male admitted to the [facility] for safety. Patient reports suicidal ideations, but feels safe on the unit. Patient denies homicidal ideations, and hallucinations. Patient reports cocaine, xanax, marijuana and fentanyl use. Patient denies any history of physical, emotional, verbal, and sexual abuse. Patient reports past legal issues. Patient expressed some interest in substance use inpatient treatment. Social work provided the patient with the resource folder.

## 2025-01-28 NOTE — BH NOTE
At this time writer attempts to assess patients vitals an second time and educate patient on the importance , benefits and uses of vitals being assessed especially with history of hypertension. Patient continues to refuse. Will continue to monitor.

## 2025-01-28 NOTE — GROUP NOTE
Group Therapy Note    Date: 1/28/2025    Group Start Time: 1015  Group End Time: 1045  Group Topic: Psychoeducation    Jameson Rincon        Group Therapy Note    Attendees: 7/22       Patient was offered group therapy today but declined to participate despite encouragement from staff. 1:1 was offered.  Signature:  Jameson Leal

## 2025-01-28 NOTE — H&P
Department of Psychiatry  Attending Physician Psychiatric Assessment     Reason for Admission to Psychiatric Unit:  Threat to self requiring 24 hour professional observation  A mental disorder causing major disability in social, interpersonal, occupational, and/or educational functioning that is leading to dangerous or life-threatening functioning, and that can only be addressed in an acute inpatient setting   A mental disorder that causes an inability to maintain adequate nurtrition or self-care, and family/community support cannot provide reliable, essential care, so that the patient cannont function at a less intensive level of care during evaluation and treatment   Failure of outpatient psychiatry treatment so that the beneficiary requires 24 hour professional observation and care  Concerns about patient's safety in the community  Past Mental Health Diagnosis: a history of  Major Depression, Prior suicide attempt, and Alcohol and/or Drug Use Disorder  Triggering event or precipitating factor: Housing instability, Financial instability, Grief r/t loss , Alcohol/Drug Relapse, Relationship Issues, and Psych Treatment Noncompliance  Length of time/duration of triggering event: Weeks  Legal Status: Voluntary    CHIEF COMPLAINT: Depression with suicidal ideation    History obtained from: Patient, electronic medical record          HISTORY OF PRESENT ILLNESS:    Bob Gastelum is a 34 y.o. male who has a past medical history of major depression, polysubstance abuse, suicide attempt, Hepatitis C, irregular heartbeat, hypertension. Patient presented to the ED endorsing ongoing depression and suicidal ideation with plan to overdose on drugs. Per ED documentation, The patient states that he has been feeling suicidal for the past week. He states that there has been a lot of stressors in life especially relapsing on drugs. The patient states that he has a suicidal plan to over dose on fentanyl. The patient reports that he 
carpometacarpal joint space.       Assessment :      Hospital Problems             Last Modified POA    * (Principal) Depression with suicidal ideation 1/27/2025 Yes       Plan:     34-year-old -American gentleman with history of street drug abuse history of hepatitis C admitted for mental health disorder complains of withdrawal from fentanyl denies any chest pain positive for nausea no vomiting no significant weight loss or weight gain denies any history of HIV tuberculous  History of hypertension start lisinopril 10 mg daily  DVT prophylaxis,  Pt mobile   Full code status       Consultations:   IP CONSULT TO INTERNAL MEDICINE      Alexander Perez MD  1/28/2025  10:22 AM    Copy sent to Dr. Reaves primary care provider on file.    Please note that this chart was generated using voice recognition Dragon dictation software.  Although every effort was made to ensure the accuracy of this automated transcription, some errors in transcription may have occurred.

## 2025-01-28 NOTE — GROUP NOTE
Group Therapy Note    Date: 1/28/2025    Group Start Time: 0900  Group End Time: 0915  Group Topic: Community Meeting    Ese Decker    Community Meeting Group Note        Date: 1/28/2025 Start Time: 9am  End Time: 0915      Number of Participants in Group & Unit Census:  7/22        Goal of Group: To discuss daily goals       Comments:     Patient did not participate in Community Meeting group, despite staff encouragement and explanation of benefits.  Patient remain seclusive to self.  Q15 minute safety checks maintained for patient safety and will continue to encourage patient to attend unit programming.          Signature:  Ese Sahu

## 2025-01-28 NOTE — BH NOTE
At this time patient refused scheduled Prozac after writer offered education on benefits and uses. Writer notes patient to be irritable stating \" no, I'm not worried about that where's dinner..\"

## 2025-01-29 LAB
CHOLEST SERPL-MCNC: 160 MG/DL (ref 0–199)
CHOLESTEROL/HDL RATIO: 2.8
EST. AVERAGE GLUCOSE BLD GHB EST-MCNC: 120 MG/DL
HBA1C MFR BLD: 5.8 % (ref 4–6)
HDLC SERPL-MCNC: 57 MG/DL
LDLC SERPL CALC-MCNC: 95 MG/DL (ref 0–100)
TRIGL SERPL-MCNC: 38 MG/DL (ref 0–149)

## 2025-01-29 PROCEDURE — 36415 COLL VENOUS BLD VENIPUNCTURE: CPT

## 2025-01-29 PROCEDURE — APPSS30 APP SPLIT SHARED TIME 16-30 MINUTES

## 2025-01-29 PROCEDURE — 6370000000 HC RX 637 (ALT 250 FOR IP): Performed by: PSYCHIATRY & NEUROLOGY

## 2025-01-29 PROCEDURE — 6370000000 HC RX 637 (ALT 250 FOR IP): Performed by: INTERNAL MEDICINE

## 2025-01-29 PROCEDURE — 83036 HEMOGLOBIN GLYCOSYLATED A1C: CPT

## 2025-01-29 PROCEDURE — 99232 SBSQ HOSP IP/OBS MODERATE 35: CPT | Performed by: INTERNAL MEDICINE

## 2025-01-29 PROCEDURE — 1240000000 HC EMOTIONAL WELLNESS R&B

## 2025-01-29 PROCEDURE — 80061 LIPID PANEL: CPT

## 2025-01-29 PROCEDURE — 99232 SBSQ HOSP IP/OBS MODERATE 35: CPT | Performed by: PSYCHIATRY & NEUROLOGY

## 2025-01-29 RX ADMIN — HYDROXYZINE HYDROCHLORIDE 50 MG: 50 TABLET ORAL at 22:20

## 2025-01-29 RX ADMIN — LISINOPRIL 10 MG: 10 TABLET ORAL at 08:10

## 2025-01-29 RX ADMIN — TRAZODONE HYDROCHLORIDE 50 MG: 50 TABLET ORAL at 22:20

## 2025-01-29 RX ADMIN — OLANZAPINE 5 MG: 5 TABLET, FILM COATED ORAL at 22:20

## 2025-01-29 RX ADMIN — FLUOXETINE HYDROCHLORIDE 10 MG: 10 CAPSULE ORAL at 08:10

## 2025-01-29 NOTE — BH NOTE
At this time patient refused to allow writer to reassess his vitals to see if blood pressure decreased after administering scheduled hypertension medication . Writer offered education on benefits and uses and patient continues to refuse and denies all objective symptoms of hypertension. Writer will continue to monitor and reassess.

## 2025-01-29 NOTE — GROUP NOTE
Group Therapy Note    Date: 1/29/2025    Group Start Time: 1100  Group End Time: 1140  Group Topic: Cognitive Skills    Tasha Srivastava CTRS    Cognitive Skills Group Note        Date: January 29, 2025 Start Time: 11am  End Time:  1140 am       Number of Participants in Group & Unit Census:  10/21    Topic: cognitive skills     Goal of Group: pt will demonstrate improved coping skills and improved interpersonal relationships      Comments:     Patient did not participate in Cognitive Skills group, despite staff encouragement and explanation of benefits.  Patient remain seclusive to self.  Q15 minute safety checks maintained for patient safety and will continue to encourage patient to attend unit programming.              Signature:  SHANNAN HUTTON

## 2025-01-29 NOTE — GROUP NOTE
Group Therapy Note    Date: 1/29/2025    Group Start Time: 1015  Group End Time: 1045  Group Topic: Psychoeducation    Jameson Rincon        Group Therapy Note    Attendees: 13/21       Patient was offered group therapy today but declined to participate despite encouragement from staff. 1:1 was offered.  Signature:  Jameson Leal

## 2025-01-29 NOTE — GROUP NOTE
Group Therapy Note    Date: 1/29/2025    Group Start Time: 1330  Group End Time: 1415  Group Topic: Cognitive Skills    Tasha Srivastava CTRS                                                                        Group Therapy Note    Date: 1/29/2025    Group Start Time: 1330  Group End Time: 1415  Group Topic: leisure awareness    Tasha Srivastava CTRS    Leisure Group Note        Date: January 29, 2025 Start Time: 130 pm  End Time:  215pm      Number of Participants in Group & Unit Census:  10/20    Topic: cognitive skills     Goal of Group: pt will demonstrate improved leisure awareness and improved interpersonal relationships      Comments:     Patient did not participate in leisure  group, despite staff encouragement and explanation of benefits.  Patient remain seclusive to self.  Q15 minute safety checks maintained for patient safety and will continue to encourage patient to attend unit programming.              Signature:  SHANNAN HUTTON

## 2025-01-29 NOTE — GROUP NOTE
Group Therapy Note    Date: 1/28/2025    Group Start Time: 2010  Group End Time: 2050  Group Topic: Wrap-Up    CZ Suzan Stevenson, RN      Group Therapy Note    Attendees: 10/20       Pt refused to attend Wrap-up/Goal Review group this evening after encouragement from staff.  1:1 talk time offered as alternative to group session, but pt declined.  Will continue to encourage patient to attend unit group programming.        Signature:  Suzan Rothman, RN

## 2025-01-30 PROCEDURE — 6370000000 HC RX 637 (ALT 250 FOR IP): Performed by: PSYCHIATRY & NEUROLOGY

## 2025-01-30 PROCEDURE — 6370000000 HC RX 637 (ALT 250 FOR IP): Performed by: INTERNAL MEDICINE

## 2025-01-30 PROCEDURE — 99232 SBSQ HOSP IP/OBS MODERATE 35: CPT | Performed by: PSYCHIATRY & NEUROLOGY

## 2025-01-30 PROCEDURE — 99232 SBSQ HOSP IP/OBS MODERATE 35: CPT | Performed by: INTERNAL MEDICINE

## 2025-01-30 PROCEDURE — APPSS30 APP SPLIT SHARED TIME 16-30 MINUTES

## 2025-01-30 PROCEDURE — 1240000000 HC EMOTIONAL WELLNESS R&B

## 2025-01-30 RX ORDER — DICYCLOMINE HYDROCHLORIDE 10 MG/1
20 CAPSULE ORAL 4 TIMES DAILY PRN
Status: DISCONTINUED | OUTPATIENT
Start: 2025-01-30 | End: 2025-02-04

## 2025-01-30 RX ORDER — CYCLOBENZAPRINE HCL 10 MG
10 TABLET ORAL 3 TIMES DAILY PRN
Status: DISCONTINUED | OUTPATIENT
Start: 2025-01-30 | End: 2025-02-04

## 2025-01-30 RX ORDER — CLONIDINE HYDROCHLORIDE 0.1 MG/1
0.1 TABLET ORAL EVERY 4 HOURS PRN
Status: DISCONTINUED | OUTPATIENT
Start: 2025-01-30 | End: 2025-02-04

## 2025-01-30 RX ORDER — ONDANSETRON 4 MG/1
4 TABLET, FILM COATED ORAL EVERY 8 HOURS PRN
Status: DISCONTINUED | OUTPATIENT
Start: 2025-01-30 | End: 2025-02-05 | Stop reason: HOSPADM

## 2025-01-30 RX ADMIN — LISINOPRIL 10 MG: 10 TABLET ORAL at 08:34

## 2025-01-30 RX ADMIN — HYDROXYZINE HYDROCHLORIDE 50 MG: 50 TABLET ORAL at 21:12

## 2025-01-30 RX ADMIN — FLUOXETINE HYDROCHLORIDE 10 MG: 10 CAPSULE ORAL at 08:34

## 2025-01-30 RX ADMIN — OLANZAPINE 5 MG: 5 TABLET, FILM COATED ORAL at 21:12

## 2025-01-30 RX ADMIN — TRAZODONE HYDROCHLORIDE 50 MG: 50 TABLET ORAL at 21:12

## 2025-01-30 ASSESSMENT — PAIN SCALES - GENERAL: PAINLEVEL_OUTOF10: 0

## 2025-01-30 NOTE — GROUP NOTE
Group Therapy Note    Date: 1/30/2025    Group Start Time: 1100  Group End Time: 1135  Group Topic: Cognitive Skills    Tasha Srivastava CTRS    Cognitive Skills Group Note        Date: January 30, 2024 Start Time: 11am  End Time:  1135am      Number of Participants in Group & Unit Census:  8/17    Topic: cognitive skills     Goal of Group: pt will demonstrate improved coping skill and improved interpersonal relationships       Comments:     Patient did not participate in Cognitive Skills group, despite staff encouragement and explanation of benefits.  Patient remain seclusive to self.  Q15 minute safety checks maintained for patient safety and will continue to encourage patient to attend unit programming.            Signature:  SHANNAN HUTTON

## 2025-01-30 NOTE — GROUP NOTE
Group Therapy Note    Date: 1/30/2025    Group Start Time: 1000  Group End Time: 1030  Group Topic: Psychoeducation    Jameson Rincon    Patient was offered group therapy today but declined to participate despite encouragement from staff. 1:1 was offered.       Signature:  Jameson Leal

## 2025-01-30 NOTE — GROUP NOTE
Group Therapy Note    Date: 1/30/2025    Group Start Time: 1430  Group End Time: 1510  Group Topic: Recreational    [facility] Tasha Dixon CTRS    Recreation Group Note        Date: January 30, 2025 Start Time: 2:30pm  End Time:  310pm      Number of Participants in Group & Unit Census:  8/15    Topic: recreation group     Goal of Group: pt will demonstrate improved coping skills and improved interpersonal relationships      Comments:     Patient did not participate in Recreation group, despite staff encouragement and explanation of benefits.  Patient remain seclusive to self.  Q15 minute safety checks maintained for patient safety and will continue to encourage patient to attend unit programming.              Signature:  SHANNAN HUTTON

## 2025-01-30 NOTE — GROUP NOTE
Group Therapy Note    Date: 1/30/2025    Group Start Time: 0905  Group End Time: 0947  Group Topic: Community Meeting    Katy Cosme LPN    Patient refused to attend community meeting due to resting in bed, refused 1;1 talk time      Signature:  Katy Welsh LPN

## 2025-01-31 PROCEDURE — 99232 SBSQ HOSP IP/OBS MODERATE 35: CPT | Performed by: INTERNAL MEDICINE

## 2025-01-31 PROCEDURE — 6370000000 HC RX 637 (ALT 250 FOR IP): Performed by: PSYCHIATRY & NEUROLOGY

## 2025-01-31 PROCEDURE — 1240000000 HC EMOTIONAL WELLNESS R&B

## 2025-01-31 PROCEDURE — 6370000000 HC RX 637 (ALT 250 FOR IP)

## 2025-01-31 PROCEDURE — 6370000000 HC RX 637 (ALT 250 FOR IP): Performed by: INTERNAL MEDICINE

## 2025-01-31 RX ORDER — TRAZODONE HYDROCHLORIDE 100 MG/1
100 TABLET ORAL NIGHTLY PRN
Status: DISCONTINUED | OUTPATIENT
Start: 2025-01-31 | End: 2025-02-05 | Stop reason: HOSPADM

## 2025-01-31 RX ORDER — LISINOPRIL 20 MG/1
40 TABLET ORAL DAILY
Status: DISCONTINUED | OUTPATIENT
Start: 2025-02-01 | End: 2025-02-05 | Stop reason: HOSPADM

## 2025-01-31 RX ADMIN — FLUOXETINE HYDROCHLORIDE 10 MG: 10 CAPSULE ORAL at 08:29

## 2025-01-31 RX ADMIN — TRAZODONE HYDROCHLORIDE 100 MG: 100 TABLET ORAL at 20:33

## 2025-01-31 RX ADMIN — LISINOPRIL 10 MG: 10 TABLET ORAL at 08:29

## 2025-01-31 RX ADMIN — ACETAMINOPHEN 650 MG: 325 TABLET ORAL at 20:33

## 2025-01-31 RX ADMIN — HYDROXYZINE HYDROCHLORIDE 50 MG: 50 TABLET ORAL at 08:28

## 2025-01-31 RX ADMIN — HYDROXYZINE HYDROCHLORIDE 50 MG: 50 TABLET ORAL at 20:33

## 2025-01-31 RX ADMIN — OLANZAPINE 5 MG: 5 TABLET, FILM COATED ORAL at 20:33

## 2025-01-31 ASSESSMENT — PAIN SCALES - GENERAL: PAINLEVEL_OUTOF10: 8

## 2025-01-31 NOTE — BH NOTE
At this time writer notified Internal Medicine while rounding on unit of patients elevated blood pressure and possible adjustment to scheduled blood pressure medication.

## 2025-01-31 NOTE — GROUP NOTE
Group Therapy Note    Date: 2025    Group Start Time: 1003  Group End Time: 1004  Group Topic: Cognitive Skills    [facility] Arthur Martinez LPN        Group Therapy Note    Attendees:   [id number]       Patient's Goal:  ***    Notes:  ***    Status After Intervention:  {Status After Intervention:896110768}    Participation Level: {Participation Level:909434748}    Participation Quality: {[facility] PARTICIPATION QUALITY:850396494}      Speech:  {[facility] CD_SPEECH:21728}      Thought Process/Content: {Thought Process/Content:813349852}      Affective Functioning: {Affective Functionin}      Mood: {Mood:001680596}      Level of consciousness:  {Level of consciousness:520085149}      Response to Learning: {[facility] Responses to Learnin}      Endings: {[facility] Endings:29974}    Modes of Intervention: {[facility] Modes of Intervention:717875140}      Discipline Responsible: {[facility] Multidisciplinary:062237278}      Signature:  ARTHUR MCDANIEL LPN

## 2025-01-31 NOTE — GROUP NOTE
Group Therapy Note    Date: 1/31/2025    Group Start Time: 1100  Group End Time: 1140  Group Topic: Cognitive Skills    Tasha Srivastava CTRS    Cognitive Skills Group Note        Date: January 31, 2025 Start Time: 11am  End Time:  1140 am       Number of Participants in Group & Unit Census:  9/20    Topic: cognitive skills     Goal of Group: pt will demonstrate improved coping skills and improved interpersonal relationships      Comments:     Patient did not participate in Cognitive Skills group, despite staff encouragement and explanation of benefits.  Patient remain seclusive to self.  Q15 minute safety checks maintained for patient safety and will continue to encourage patient to attend unit programming.              Signature:  SHANNAN HUTTON

## 2025-01-31 NOTE — GROUP NOTE
Group Therapy Note    Date: 1/30/2025    Group Start Time: 2000  Group End Time: 2020  Group Topic: Focus Group    Arthur Naidu LPN        Group Therapy Note    Attendees: 8/22    Discussed achievements, or points of pride we achieved today.  Brainstormed about ways in which we can help others, and how it helps ourselves.         Participation Level: Active Listener and Interactive    Participation Quality: Appropriate        Signature:  ARTHUR MCDANIEL LPN

## 2025-01-31 NOTE — GROUP NOTE
Group Therapy Note    Date: 1/31/2025    Group Start Time: 1330  Group End Time: 1415  Group Topic: Recreational    [facility] Tasha Dixon CTRS    Recreation Group Note        Date: January 31, 2025 Start Time: 1:30pm  End Time:  215pm      Number of Participants in Group & Unit Census:  5/16    Topic: recreation group    Goal of Group:pt will demonstrate improved coping skills and improved leisure awareness       Comments:     Patient did not participate in Recreation group, despite staff encouragement and explanation of benefits.  Patient remain seclusive to self.  Q15 minute safety checks maintained for patient safety and will continue to encourage patient to attend unit programming.              Signature:  SHANNAN HUTTON

## 2025-02-01 PROBLEM — F33.3 SEVERE RECURRENT MAJOR DEPRESSION WITH PSYCHOTIC FEATURES (HCC): Status: ACTIVE | Noted: 2025-02-01

## 2025-02-01 PROCEDURE — 6370000000 HC RX 637 (ALT 250 FOR IP): Performed by: PSYCHIATRY & NEUROLOGY

## 2025-02-01 PROCEDURE — 6370000000 HC RX 637 (ALT 250 FOR IP)

## 2025-02-01 PROCEDURE — APPSS30 APP SPLIT SHARED TIME 16-30 MINUTES

## 2025-02-01 PROCEDURE — 1240000000 HC EMOTIONAL WELLNESS R&B

## 2025-02-01 PROCEDURE — 90833 PSYTX W PT W E/M 30 MIN: CPT | Performed by: PSYCHIATRY & NEUROLOGY

## 2025-02-01 PROCEDURE — 99232 SBSQ HOSP IP/OBS MODERATE 35: CPT | Performed by: INTERNAL MEDICINE

## 2025-02-01 PROCEDURE — 99232 SBSQ HOSP IP/OBS MODERATE 35: CPT | Performed by: PSYCHIATRY & NEUROLOGY

## 2025-02-01 PROCEDURE — 6370000000 HC RX 637 (ALT 250 FOR IP): Performed by: INTERNAL MEDICINE

## 2025-02-01 RX ORDER — OLANZAPINE 7.5 MG/1
7.5 TABLET, FILM COATED ORAL NIGHTLY
Status: DISCONTINUED | OUTPATIENT
Start: 2025-02-01 | End: 2025-02-02

## 2025-02-01 RX ADMIN — FLUOXETINE HYDROCHLORIDE 20 MG: 20 CAPSULE ORAL at 12:18

## 2025-02-01 RX ADMIN — HYDROXYZINE HYDROCHLORIDE 50 MG: 50 TABLET ORAL at 20:40

## 2025-02-01 RX ADMIN — TRAZODONE HYDROCHLORIDE 100 MG: 100 TABLET ORAL at 20:40

## 2025-02-01 RX ADMIN — OLANZAPINE 7.5 MG: 7.5 TABLET, FILM COATED ORAL at 20:40

## 2025-02-01 RX ADMIN — LISINOPRIL 40 MG: 20 TABLET ORAL at 12:18

## 2025-02-01 RX ADMIN — IBUPROFEN 400 MG: 400 TABLET, FILM COATED ORAL at 01:23

## 2025-02-01 ASSESSMENT — PAIN SCALES - GENERAL
PAINLEVEL_OUTOF10: 0
PAINLEVEL_OUTOF10: 8

## 2025-02-01 NOTE — GROUP NOTE
Group Therapy Note    Date: 2/1/2025    Group Start Time: 0915  Group End Time: 0930  Group Topic: Daily Inventory Group    [facility]    Julisa Watson LPN        Group Therapy Note    Attendees: 8/17     Patient did not participate in goals group, despite staff encouragement and explanation of benefits.  Patient remain seclusive to self.  Q15 minute safety checks maintained for patient safety and will continue to encourage patient to attend unit programming.      Discipline Responsible: Licensed Practical Nurse      Signature:  Julisa Watson LPN

## 2025-02-01 NOTE — GROUP NOTE
Group Therapy Note    Date: 1/31/2025    Group Start Time: 2000  Group End Time: 2020  Group Topic: Wrap-Up    [facility]    Maxi Martinez LPN        Group Therapy Note    Attendees: 9/17       Patient's Goal:  \"To socialize more and come out of my room more\"    Notes:  Patient participated appropriately    Status After Intervention:  Improved    Participation Level: Active Listener and Interactive    Participation Quality: Appropriate, Attentive, and Sharing      Speech:  normal      Thought Process/Content: Logical      Affective Functioning: Congruent      Mood:  calm      Level of consciousness:  Alert and Oriented x4      Response to Learning: Able to verbalize current knowledge/experience, Able to verbalize/acknowledge new learning, and Able to retain information      Endings: None Reported    Modes of Intervention: Education, Support, and Socialization      Discipline Responsible: Licensed Practical Nurse      Signature:  Juan German LPN     eMERGENCY dEPARTMENT eNCOUnter      CHIEF COMPLAINT    Chief Complaint   Patient presents with   • Flank Pain       HPI    Fela Roth is a 22 year old female who presents for evaluation of right flank/right lower back pain, worse with movements since a week ago or so, denies any specific injury, denies any radiation of this pain, and no weakness or numbness or tingling of lower extremities, patient has no bowel or bladder incontinence.  Patient denies fever or chills and denies any urinary symptoms.  Patient has history of kidney stones when she was pregnant about year ago or so, and she passed the stone on her own approximately one week before delivery which was normal and she has a healthy 1 and half-year-old child. She noted minimal blood in urine which cleared up, no other complaints.  She has minimal pain at present time.  She went to walk-in clinic and she was sent here for further evaluation    ALLERGIES    ALLERGIES:  No Known Allergies    CURRENT MEDICATIONS    No current facility-administered medications for this encounter.      Current Outpatient Prescriptions   Medication Sig Dispense Refill   • citalopram (CELEXA) 10 MG tablet Take 1 tablet by mouth daily. 30 tablet 11   • norethindrone-ethinyl estradiol and ferrous fumarate (LOESTRIN 24 FE) 1-20 MG-MCG(24) tablet Take 1 tablet by mouth daily. 84 tablet 4   • norethindrone (ORTHO MICRONOR) 0.35 MG tablet Take 1 tablet by mouth daily. 84 tablet 4   • Pediatric Multivit-Minerals-C (FLINTSTONES COMPLETE PO) Take by mouth daily.         PAST MEDICAL HISTORY    Past Medical History:   Diagnosis Date   • Anemia     history.  denies currently   • Anxiety    • Anxiety disorder    • Dyspareunia    • Pelvic pain        SURGICAL HISTORY    History reviewed. No pertinent surgical history.    SOCIAL HISTORY    Social History     Social History   • Marital status:      Spouse name: N/A   • Number of children: N/A   • Years of education: N/A     Social  History Main Topics   • Smoking status: Never Smoker   • Smokeless tobacco: Never Used   • Alcohol use No   • Drug use: No   • Sexual activity: Yes     Partners: Male     Birth control/ protection: None     Other Topics Concern   • None     Social History Narrative   • None       FAMILY HISTORY    Family History   Problem Relation Age of Onset   • Heart disease Mother    • Heart disease Maternal Grandfather    • Diabetes Maternal Grandfather    • Hypertension Maternal Grandfather    • Diabetes Maternal Aunt    • Hypertension Maternal Aunt    • Diabetes Maternal Uncle    • Hypertension Maternal Uncle    • Hypertension Maternal Grandmother    • Stroke Maternal Grandmother        REVIEW OF SYSTEMS    Constitutional:  Denies fever or chills.   Eyes:  Denies change in visual acuity.   HENT:  Denies nasal congestion or sore throat.   Respiratory:  Denies cough or shortness of breath.   Cardiovascular:  Denies chest pain or edema.   GI:  See history of present illness  :  See history of present illness  Musculoskeletal:  Denies back pain or joint pain.   Integument:  Denies rash.   Neurologic:  Denies headache, focal weakness or sensory changes.   Psychiatric:  Denies depression or anxiety.     PHYSICAL EXAM    ED Triage Vitals [08/10/18 0845]   /71   Pulse 88   Resp 14   Temp 98.7 °F (37.1 °C)   SpO2 98 %       Constitutional:  Well developed, no acute distress,   Eyes:  PERRL, EOMI,   Head: Atraumatic, normocephalic  ENT:  oropharynx moist,  Neck:  normal range of motion,   Respiratory:  No respiratory distress, normal breath sounds, no rales, no wheezing.no accessory muscle use   Cardiovascular:  Normal rate, normal rhythm, no murmurs,no lower extremity edema.    GI:  Soft, nondistended, normal bowel sounds, non-tender, no mass, no rebound, no guarding.   :  No costovertebral angle tenderness.   Musculoskeletal:  Neck: normal ROM   Back:  No midline tenderness, right paralumbar muscular spasm and tenderness  reproducing the pain   Extremities:  No tenderness or deformities in extremities x 4  Integument:  Well hydrated, no rash.   Neurologic:  Alert & oriented x 3;  Strength 5/5 in extremities x 4.   Psychiatric:  Speech and behavior appropriate.       RADIOLOGY    XR Abdomen AP Kub   Final Result   IMPRESSION:    Normal KUB.           Images personally reviewed and interpreted by me, no obvious calcifications no obstruction no perforation, awaiting radiology report    LABS    Results for orders placed or performed during the hospital encounter of 08/10/18   CBC & Auto Differential   Result Value    WBC 5.0    RBC 4.73    HGB 14.1    HCT 42.4    MCV 89.6    MCH 29.8    MCHC 33.3    RDW-CV 12.5        DIFF TYPE AUTOMATED DIFFERENTIAL    Neutrophil 48    LYMPH 44    MONO 6    EOSIN 2    BASO 0    Absolute Neutrophil 2.4    Absolute Lymph 2.2    Absolute Mono 0.3    Absolute Eos 0.1    Absolute Baso 0.0   Comprehensive Metabolic Panel   Result Value    Sodium 143    Potassium 3.8    Chloride 106    Carbon Dioxide 29    Anion Gap 12    Glucose 100 (H)    BUN 13    Creatinine 0.71    GFR Estimate,  >90     Comment: eGFR results = or >90 mL/min/1.73m2 = Normal kidney function.    GFR Estimate, Non  >90     Comment: eGFR results = or >90 mL/min/1.73m2 = Normal kidney function.    BUN/Creatinine Ratio 18    CALCIUM 8.8    TOTAL BILIRUBIN 0.2    AST/SGOT 13    ALT/SGPT 19    ALK PHOSPHATASE 49    TOTAL PROTEIN 7.7    Albumin 4.1    GLOBULIN 3.6    A/G Ratio, Serum 1.1   Urinalysis & Reflex Micro with Culture if Indicated   Result Value    COLOR YELLOW    APPEARANCE CLEAR    GLUCOSE(URINE) NEGATIVE    BILIRUBIN NEGATIVE    KETONES NEGATIVE    SPECIFIC GRAVITY 1.018    BLOOD NEGATIVE    pH 7.0    PROTEIN(URINE) NEGATIVE    UROBILINOGEN 0.2    NITRITE NEGATIVE    LEUKOCYTE ESTERASE NEGATIVE    SPECIMEN TYPE URINE, CLEAN CATCH/MIDSTREAM       MEDS:  No current facility-administered medications  for this encounter.      Current Outpatient Prescriptions   Medication Sig   • citalopram (CELEXA) 10 MG tablet Take 1 tablet by mouth daily.   • norethindrone-ethinyl estradiol and ferrous fumarate (LOESTRIN 24 FE) 1-20 MG-MCG(24) tablet Take 1 tablet by mouth daily.   • norethindrone (ORTHO MICRONOR) 0.35 MG tablet Take 1 tablet by mouth daily.   • Pediatric Multivit-Minerals-C (FLINTSTONES COMPLETE PO) Take by mouth daily.       ED COURSE & MEDICAL DECISION MAKING      I have reviewed the patient's past medical/surgical/social and family history.     Patient declined pain medications, her physical exam is consistent with muscular pain in the right paralumbar region, she was advised to take ibuprofen up to 800 mg 3 times a day for pain, I will prescribe baclofen, labs reviewed and normal, urine test negative for infection and negative for hematuria, KUB normal.    Assessment and plan and differential diagnosis: Right flank/right lower back pain-muscular, reproducible, no hematuria no obvious stones on KUB, no need for any CT at this point, needs close follow-up or return to ED if worsening pain    Discharge Medication List as of 8/10/2018 10:37 AM      START taking these medications    Details   baclofen (LIORESAL) 10 MG tablet Take 0.5 tablets by mouth 3 times daily.Eprescribe, Disp-10 tablet, R-0             FINAL IMPRESSION      Right lower back pain       Blue Pratt MD  08/10/18 9789

## 2025-02-01 NOTE — GROUP NOTE
Group Therapy Note    Date: 2/1/2025    Group Start Time: 1015  Group End Time: 1100  Group Topic: Cognitive Skills    Tasha Srivastava CTRS    Cognitive Skills Group Note        Date: February 1, 2025 Start Time:  1015 am    End Time:  1100 am      Number of Participants in Group & Unit Census:  9/16    Topic: cognitive skills     Goal of Group: pt will demonstrate improved coping skills and improved interpersonal relationships      Comments:     Patient did not participate in Cognitive Skills group, despite staff encouragement and explanation of benefits.  Patient remain seclusive to self.  Q15 minute safety checks maintained for patient safety and will continue to encourage patient to attend unit programming.              Signature:  SHANNAN HUTTON

## 2025-02-02 PROCEDURE — 99232 SBSQ HOSP IP/OBS MODERATE 35: CPT | Performed by: INTERNAL MEDICINE

## 2025-02-02 PROCEDURE — 6370000000 HC RX 637 (ALT 250 FOR IP): Performed by: INTERNAL MEDICINE

## 2025-02-02 PROCEDURE — 6370000000 HC RX 637 (ALT 250 FOR IP)

## 2025-02-02 PROCEDURE — 1240000000 HC EMOTIONAL WELLNESS R&B

## 2025-02-02 PROCEDURE — 99232 SBSQ HOSP IP/OBS MODERATE 35: CPT | Performed by: PSYCHIATRY & NEUROLOGY

## 2025-02-02 PROCEDURE — 6370000000 HC RX 637 (ALT 250 FOR IP): Performed by: PSYCHIATRY & NEUROLOGY

## 2025-02-02 RX ORDER — OLANZAPINE 10 MG/1
10 TABLET ORAL NIGHTLY
Status: DISCONTINUED | OUTPATIENT
Start: 2025-02-02 | End: 2025-02-05 | Stop reason: HOSPADM

## 2025-02-02 RX ADMIN — HYDROXYZINE HYDROCHLORIDE 50 MG: 50 TABLET ORAL at 20:44

## 2025-02-02 RX ADMIN — CYCLOBENZAPRINE 10 MG: 10 TABLET, FILM COATED ORAL at 23:13

## 2025-02-02 RX ADMIN — ACETAMINOPHEN 650 MG: 325 TABLET ORAL at 23:13

## 2025-02-02 RX ADMIN — ONDANSETRON HYDROCHLORIDE 4 MG: 4 TABLET, FILM COATED ORAL at 23:13

## 2025-02-02 RX ADMIN — TRAZODONE HYDROCHLORIDE 100 MG: 100 TABLET ORAL at 20:45

## 2025-02-02 RX ADMIN — OLANZAPINE 10 MG: 10 TABLET, FILM COATED ORAL at 20:45

## 2025-02-02 RX ADMIN — FLUOXETINE HYDROCHLORIDE 20 MG: 20 CAPSULE ORAL at 07:56

## 2025-02-02 RX ADMIN — LISINOPRIL 40 MG: 20 TABLET ORAL at 07:57

## 2025-02-02 ASSESSMENT — PAIN SCALES - GENERAL
PAINLEVEL_OUTOF10: 8
PAINLEVEL_OUTOF10: 0

## 2025-02-02 ASSESSMENT — PAIN - FUNCTIONAL ASSESSMENT: PAIN_FUNCTIONAL_ASSESSMENT: ACTIVITIES ARE NOT PREVENTED

## 2025-02-02 ASSESSMENT — PAIN DESCRIPTION - DESCRIPTORS: DESCRIPTORS: SHARP

## 2025-02-02 ASSESSMENT — PAIN DESCRIPTION - LOCATION: LOCATION: ABDOMEN

## 2025-02-02 NOTE — BH NOTE
Sunday Rm Safety checks:      Pt participates in Rm checks. Pt Rm free of contraband and food.

## 2025-02-02 NOTE — GROUP NOTE
Group Therapy Note    Date: 2/1/2025    Group Start Time: 2000  Group End Time: 2030  Group Topic: Wrap-Up    [facility]    Thierno Bradford, RN        Group Therapy Note    Attendees: 11       Patient's Goal:  To improve interpersonal interaction through socialization.    Notes:  Patient participated appropriately.     Status After Intervention:  Improved    Participation Level: Active Listener and Interactive    Participation Quality: Appropriate and Attentive      Speech:  normal      Thought Process/Content: Linear      Affective Functioning: Congruent      Mood: calm      Level of consciousness:  Alert      Response to Learning: Able to verbalize current knowledge/experience, Able to verbalize/acknowledge new learning, and Progressing to goal      Endings: None Reported    Modes of Intervention: Education, Support, and Socialization      Discipline Responsible: Registered Nurse      Signature:  Thierno Bradford RN

## 2025-02-02 NOTE — GROUP NOTE
Group Therapy Note    Date: 2/2/2025    Group Start Time: 1330  Group End Time: 1425  Group Topic: Relaxation    [facility] Tasha Dixon CTRS    Relaxation Group Note        Date: February 2, 2025 Start Time: 1:30pm  End Time:  225 pm      Number of Participants in Group & Unit Census:  10/19    Topic: relaxation group     Goal of Group: pt will identify benefits of using art for coping and relaxation       Comments:     Patient did not participate in Relaxation group, despite staff encouragement and explanation of benefits.  Patient remain seclusive to self.  Q15 minute safety checks maintained for patient safety and will continue to encourage patient to attend unit programming.              Signature:  SHANNAN HUTTON

## 2025-02-02 NOTE — GROUP NOTE
Group Therapy Note    Date: 2025    Group Start Time:   Group End Time:   Group Topic: Wrap-Up    [facility] Thierno Del Toro, RN        Group Therapy Note    Attendees: 11         Patient's Goal:  ***    Notes:  ***    Status After Intervention:  {Status After Intervention:094281552}    Participation Level: {Participation Level:464375966}    Participation Quality: {[facility] PARTICIPATION QUALITY:190665713}      Speech:  {[facility] CD_SPEECH:21623}      Thought Process/Content: {Thought Process/Content:182038223}      Affective Functioning: {Affective Functionin}      Mood: {Mood:085310458}      Level of consciousness:  {Level of consciousness:173568374}      Response to Learning: {[facility] Responses to Learnin}      Endings: {[facility] Endings:96792}    Modes of Intervention: {[facility] Modes of Intervention:609098034}      Discipline Responsible: {[facility] Multidisciplinary:184395741}      Signature:  Thierno Bradford RN

## 2025-02-03 PROCEDURE — 90833 PSYTX W PT W E/M 30 MIN: CPT | Performed by: PSYCHIATRY & NEUROLOGY

## 2025-02-03 PROCEDURE — 6370000000 HC RX 637 (ALT 250 FOR IP): Performed by: PSYCHIATRY & NEUROLOGY

## 2025-02-03 PROCEDURE — 1240000000 HC EMOTIONAL WELLNESS R&B

## 2025-02-03 PROCEDURE — 99232 SBSQ HOSP IP/OBS MODERATE 35: CPT | Performed by: PSYCHIATRY & NEUROLOGY

## 2025-02-03 PROCEDURE — 6370000000 HC RX 637 (ALT 250 FOR IP)

## 2025-02-03 PROCEDURE — 99232 SBSQ HOSP IP/OBS MODERATE 35: CPT | Performed by: INTERNAL MEDICINE

## 2025-02-03 PROCEDURE — 6370000000 HC RX 637 (ALT 250 FOR IP): Performed by: INTERNAL MEDICINE

## 2025-02-03 RX ORDER — AMLODIPINE BESYLATE 5 MG/1
5 TABLET ORAL DAILY
Status: DISCONTINUED | OUTPATIENT
Start: 2025-02-03 | End: 2025-02-04

## 2025-02-03 RX ADMIN — IBUPROFEN 400 MG: 400 TABLET, FILM COATED ORAL at 08:18

## 2025-02-03 RX ADMIN — AMLODIPINE BESYLATE 5 MG: 5 TABLET ORAL at 17:11

## 2025-02-03 RX ADMIN — OLANZAPINE 10 MG: 10 TABLET, FILM COATED ORAL at 20:54

## 2025-02-03 RX ADMIN — HYDROXYZINE HYDROCHLORIDE 50 MG: 50 TABLET ORAL at 20:54

## 2025-02-03 RX ADMIN — TRAZODONE HYDROCHLORIDE 100 MG: 100 TABLET ORAL at 20:54

## 2025-02-03 RX ADMIN — LISINOPRIL 40 MG: 20 TABLET ORAL at 08:18

## 2025-02-03 RX ADMIN — FLUOXETINE HYDROCHLORIDE 30 MG: 10 CAPSULE ORAL at 08:18

## 2025-02-03 RX ADMIN — HYDROXYZINE HYDROCHLORIDE 50 MG: 50 TABLET ORAL at 08:18

## 2025-02-03 ASSESSMENT — PAIN SCALES - GENERAL: PAINLEVEL_OUTOF10: 5

## 2025-02-03 NOTE — GROUP NOTE
Group Therapy Note    Date: 2/3/2025    Group Start Time: 1000  Group End Time: 1030  Group Topic: Psychoeducation    Jameson Rincon  Patient was offered group therapy today but declined to participate despite encouragement from staff. 1:1 was offered.      Group Therapy Note    Attendees: 8/18

## 2025-02-03 NOTE — GROUP NOTE
Group Therapy Note    Date: 2025    Group Start Time:   Group End Time:   Group Topic: Discharge Planning    [facility] Thierno Del Toro RN        Group Therapy Note    Attendees: 10/15         Patient's Goal:  ***    Notes:  ***    Status After Intervention:  {Status After Intervention:996419040}    Participation Level: {Participation Level:267769182}    Participation Quality: {[facility] PARTICIPATION QUALITY:973124309}      Speech:  {[facility] CD_SPEECH:56467}      Thought Process/Content: {Thought Process/Content:785596612}      Affective Functioning: {Affective Functionin}      Mood: {Mood:658826302}      Level of consciousness:  {Level of consciousness:496167338}      Response to Learning: {[facility] Responses to Learnin}      Endings: {[facility] Endings:90903}    Modes of Intervention: {[facility] Modes of Intervention:416156509}      Discipline Responsible: {[facility] Multidisciplinary:306463709}      Signature:  Thierno Bradford RN

## 2025-02-03 NOTE — GROUP NOTE
Group Therapy Note    Date: 2/3/2025    Group Start Time: 1330  Group End Time: 1400  Group Topic: Nursing    [facility]        Group Therapy Note    Attendees: 7/19       Patient was offered group therapy today but declined to participate despite encouragement from staff. 1:1 was offered.           Signature:  Emelyn Faria LPN

## 2025-02-03 NOTE — GROUP NOTE
Group Therapy Note    Date: 2/3/2025    Group Start Time: 1430  Group End Time: 1525  Group Topic: Cognitive Skills    Marielos Lopez CTRS        Group Therapy Note    Attendees: 8/19     Topic: To increase socialization, practice self expression, explore positive environments, activities,  soothing things, boundaries related to stress management using the senses, through creative expression   and discussion.        Comments:   Patient did not participate in Cognitive Skills Group, at 1430, despite staff encouragement.   Pt was resting in room and seclusive to self when invited to group.      Q15 minute safety checks maintained for patient safety and will continue to encourage   patient to attend unit programming.       Discipline Responsible: Psychoeducational Specialist   Signature: HSANNAN BILLY

## 2025-02-03 NOTE — GROUP NOTE
Group Therapy Note    Date: 2/3/2025    Group Start Time: 0900  Group End Time: 1002  Group Topic: Community Meeting    Zandra Candelaria LPN        Group Therapy Note    Attendees: 7/18    patient refused to attend Goals group at 0900 after encouragement from staff.  1:1 talk time provided as alternative to group session      Signature:  Zandra Topete LPN

## 2025-02-03 NOTE — GROUP NOTE
Thierno Bradford RN        Group Therapy Note    Attendees: 10/15       Patient's Goal:  To familiarize patient with the discharge process     Notes:  Patient participated appropriately     Status After Intervention:  Improved    Participation Level: Active Listener and Interactive    Participation Quality: Appropriate      Speech:  normal      Thought Process/Content: Logical      Affective Functioning: Congruent      Mood: calm      Level of consciousness:  Alert      Response to Learning: Able to verbalize current knowledge/experience and Able to verbalize/acknowledge new learning      Endings: None Reported    Modes of Intervention: Education and Support      Discipline Responsible: Registered Nurse      Signature:  Thierno Bradford RN

## 2025-02-04 PROCEDURE — 6370000000 HC RX 637 (ALT 250 FOR IP)

## 2025-02-04 PROCEDURE — 6370000000 HC RX 637 (ALT 250 FOR IP): Performed by: PSYCHIATRY & NEUROLOGY

## 2025-02-04 PROCEDURE — 6370000000 HC RX 637 (ALT 250 FOR IP): Performed by: INTERNAL MEDICINE

## 2025-02-04 PROCEDURE — 99232 SBSQ HOSP IP/OBS MODERATE 35: CPT | Performed by: PSYCHIATRY & NEUROLOGY

## 2025-02-04 PROCEDURE — 1240000000 HC EMOTIONAL WELLNESS R&B

## 2025-02-04 PROCEDURE — 99232 SBSQ HOSP IP/OBS MODERATE 35: CPT | Performed by: INTERNAL MEDICINE

## 2025-02-04 PROCEDURE — 90833 PSYTX W PT W E/M 30 MIN: CPT | Performed by: PSYCHIATRY & NEUROLOGY

## 2025-02-04 RX ORDER — AMLODIPINE BESYLATE 10 MG/1
10 TABLET ORAL DAILY
Status: DISCONTINUED | OUTPATIENT
Start: 2025-02-05 | End: 2025-02-05 | Stop reason: HOSPADM

## 2025-02-04 RX ORDER — AMLODIPINE BESYLATE 10 MG/1
10 TABLET ORAL DAILY
Qty: 30 TABLET | Refills: 3 | Status: SHIPPED | OUTPATIENT
Start: 2025-02-05

## 2025-02-04 RX ORDER — LISINOPRIL 40 MG/1
40 TABLET ORAL DAILY
Qty: 30 TABLET | Refills: 0 | Status: SHIPPED | OUTPATIENT
Start: 2025-02-05

## 2025-02-04 RX ORDER — HYDROXYZINE HYDROCHLORIDE 50 MG/1
50 TABLET, FILM COATED ORAL 3 TIMES DAILY PRN
Qty: 30 TABLET | Refills: 0 | Status: SHIPPED | OUTPATIENT
Start: 2025-02-04

## 2025-02-04 RX ORDER — OLANZAPINE 10 MG/1
10 TABLET ORAL NIGHTLY
Qty: 30 TABLET | Refills: 0 | Status: SHIPPED | OUTPATIENT
Start: 2025-02-04

## 2025-02-04 RX ORDER — FLUOXETINE 10 MG/1
30 CAPSULE ORAL DAILY
Qty: 90 CAPSULE | Refills: 0 | Status: SHIPPED | OUTPATIENT
Start: 2025-02-05

## 2025-02-04 RX ORDER — TRAZODONE HYDROCHLORIDE 100 MG/1
100 TABLET ORAL NIGHTLY PRN
Qty: 30 TABLET | Refills: 0 | Status: SHIPPED | OUTPATIENT
Start: 2025-02-04

## 2025-02-04 RX ADMIN — ACETAMINOPHEN 650 MG: 325 TABLET ORAL at 01:06

## 2025-02-04 RX ADMIN — FLUOXETINE HYDROCHLORIDE 30 MG: 10 CAPSULE ORAL at 08:19

## 2025-02-04 RX ADMIN — IBUPROFEN 400 MG: 400 TABLET, FILM COATED ORAL at 08:19

## 2025-02-04 RX ADMIN — AMLODIPINE BESYLATE 5 MG: 5 TABLET ORAL at 08:19

## 2025-02-04 RX ADMIN — CYCLOBENZAPRINE 10 MG: 10 TABLET, FILM COATED ORAL at 01:08

## 2025-02-04 RX ADMIN — LISINOPRIL 40 MG: 20 TABLET ORAL at 08:19

## 2025-02-04 RX ADMIN — DICYCLOMINE HYDROCHLORIDE 20 MG: 10 CAPSULE ORAL at 01:08

## 2025-02-04 RX ADMIN — HYDROXYZINE HYDROCHLORIDE 50 MG: 50 TABLET ORAL at 21:07

## 2025-02-04 RX ADMIN — HYDROXYZINE HYDROCHLORIDE 50 MG: 50 TABLET ORAL at 08:18

## 2025-02-04 RX ADMIN — TRAZODONE HYDROCHLORIDE 100 MG: 100 TABLET ORAL at 21:07

## 2025-02-04 RX ADMIN — OLANZAPINE 10 MG: 10 TABLET, FILM COATED ORAL at 21:07

## 2025-02-04 ASSESSMENT — PAIN DESCRIPTION - LOCATION: LOCATION: ABDOMEN

## 2025-02-04 ASSESSMENT — PAIN SCALES - GENERAL
PAINLEVEL_OUTOF10: 6
PAINLEVEL_OUTOF10: 8
PAINLEVEL_OUTOF10: 8

## 2025-02-04 ASSESSMENT — PAIN SCALES - WONG BAKER: WONGBAKER_NUMERICALRESPONSE: HURTS A LITTLE BIT

## 2025-02-04 NOTE — BH NOTE
[facility]  Weekly Interdisciplinary Treatment Plan Note     Review Date & Time: 02/04/2025 12:45pm    Admission Type:   Admission Type: Voluntary    Reason for admission:  Reason for Admission: The patient states that he has been feeling suicidal for the past week. He states that there has been a lot of stressors in life especially relapsing on drugs. The patient states that he has a suicidal plan to over dose on fentanyl. The patient reports that he has access to fentanyl to overdose.    Estimated Length of Stay :  5-7 days  Estimated Discharge Date Update: to be determined by physician    PATIENT STRENGTHS:  Patient Strengths:   Patient Strengths and Limitations:Limitations: Difficulty problem solving/relies on others to help solve problems, Inappropriate/potentially harmful leisure interests, Apathetic / unmotivated  Addictive Behavior:Addictive Behavior  In the Past 3 Months, Have You Felt or Has Someone Told You That You Have a Problem With  : None  Medical Problems:   Past Medical History:   Diagnosis Date    Alcoholism (HCC)     Anxiety     Bipolar 1 disorder (HCC)     Cocaine abuse (HCC)     Depression     Hepatitis C antibody test positive     Hypertension     Irregular heartbeat     Mild tetrahydrocannabinol (THC) abuse     Suicidal ideation        Risk:  Fall Risk   Joao Scale Joao Scale Score: 22  BVC      Change in scores no. Changes to plan of Care no    Status EXAM:   Mental Status and Behavioral Exam  Normal: No  Level of Assistance: Independent/Self  Facial Expression: Brightened  Affect: Appropriate  Level of Consciousness: Alert  Frequency of Checks: 4 times per hour, close  Mood:Normal: No  Mood: Depressed, Anxious  Motor Activity:Normal: No  Motor Activity: Decreased  Eye Contact: Good  Observed Behavior: Preoccupied, Cooperative, Friendly  Sexual Misconduct History: Current - no  Preception: [place] to person, [place] to time, [place] to situation, [place]na to

## 2025-02-04 NOTE — GROUP NOTE
Group Therapy Note    Date: 2/4/2025    Group Start Time: 1000  Group End Time: 1055  Group Topic: Psychoeducation    [facility] Everett, DELTAW        Group Therapy Note    Attendees: 8/22       patient refused to attend psychoeducation group at 10a after encouragement from staff.  1:1 talk time provided as alternative to group session

## 2025-02-04 NOTE — CARE COORDINATION
Writer spoke with Bob regarding discharge plan. He states he wishes to complete walk-in intake assessment tomorrow morning at [facility] with goal of admitting to recovery treatment.

## 2025-02-04 NOTE — GROUP NOTE
Group Therapy Note    Date: 2/4/2025    Group Start Time: 0900  Group End Time: 0915  Group Topic: Community Meeting    Ese Decker      Community Meeting Group Note        Date: 2/4/2025 Start Time: 9am  End Time: 0915      Number of Participants in Group & Unit Census:  9/22    Topic: To discuss and share daily goal        Comments:     Patient did not participate in Community Meeting group, despite staff encouragement and explanation of benefits.  Patient remain seclusive to self.  Q15 minute safety checks maintained for patient safety and will continue to encourage patient to attend unit programming.            Signature:  Ese Sahu

## 2025-02-04 NOTE — GROUP NOTE
Group Therapy Note    Date: 2/4/2025    Group Start Time: 1100  Group End Time: 1145  Group Topic: Cognitive Skills    Marielos Lopez CTRS        Group Therapy Note    Attendees: 10/22     Topic: To increase socialization, practice decision making and communication skills.           Comments:   Patient did not participate in Cognitive Skills Group, at 1100, despite staff encouragement.   Pt was resting in room and seclusive to self when invited to group.      Q15 minute safety checks maintained for patient safety and will continue to encourage   patient to attend unit programming.       Discipline Responsible: Psychoeducational Specialist   Signature: SHANNAN BILLY

## 2025-02-05 VITALS
HEIGHT: 70 IN | OXYGEN SATURATION: 97 % | TEMPERATURE: 97.9 F | RESPIRATION RATE: 16 BRPM | BODY MASS INDEX: 24.34 KG/M2 | HEART RATE: 99 BPM | SYSTOLIC BLOOD PRESSURE: 147 MMHG | DIASTOLIC BLOOD PRESSURE: 99 MMHG | WEIGHT: 170 LBS

## 2025-02-05 PROCEDURE — 99239 HOSP IP/OBS DSCHRG MGMT >30: CPT | Performed by: PSYCHIATRY & NEUROLOGY

## 2025-02-05 PROCEDURE — 6370000000 HC RX 637 (ALT 250 FOR IP): Performed by: PSYCHIATRY & NEUROLOGY

## 2025-02-05 PROCEDURE — 6370000000 HC RX 637 (ALT 250 FOR IP): Performed by: INTERNAL MEDICINE

## 2025-02-05 PROCEDURE — 99232 SBSQ HOSP IP/OBS MODERATE 35: CPT | Performed by: INTERNAL MEDICINE

## 2025-02-05 RX ADMIN — AMLODIPINE BESYLATE 10 MG: 10 TABLET ORAL at 08:15

## 2025-02-05 RX ADMIN — LISINOPRIL 40 MG: 20 TABLET ORAL at 08:15

## 2025-02-05 RX ADMIN — HYDROXYZINE HYDROCHLORIDE 50 MG: 50 TABLET ORAL at 08:16

## 2025-02-05 RX ADMIN — FLUOXETINE HYDROCHLORIDE 30 MG: 10 CAPSULE ORAL at 08:15

## 2025-02-05 NOTE — GROUP NOTE
Group Therapy Note    Date: 2/5/2025    Group Start Time: 1120  Group End Time: 1200  Group Topic: Cognitive Skills    Marielos Lopez CTRS        Group Therapy Note    Attendees: 5/14     Topic: To increase socialization, practice decision making skills and communication .           Comments:   Patient did not participate in Cognitive Skills Group, at 1120, despite staff encouragement.   Pt was resting in room and seclusive to self when invited to group.      Q15 minute safety checks maintained for patient safety and will continue to encourage   patient to attend unit programming.       Discipline Responsible: Psychoeducational Specialist   Signature: SHANNAN BILLY

## 2025-02-05 NOTE — GROUP NOTE
Group Therapy Note    Date: 2/5/2025    Group Start Time: 0900  Group End Time: 1005  Group Topic: Group Documentation    Gisselle Gunn, RN        Group Therapy Note    Attendees: 4/14    Community Meeting Group Note        Date: February 5, 2025 Start Time: 9am  End Time:  10:05 am      Number of Participants in Group & Unit Census:  4/14    Topic: Goals group    Goal of Group: To establish attainable daily goal(s)      Comments:     Patient did not participate in Community Meeting group, despite staff encouragement and explanation of benefits.  Patient remain seclusive to self.  Q15 minute safety checks maintained for patient safety and will continue to encourage patient to attend unit programming.

## 2025-02-05 NOTE — DISCHARGE INSTRUCTIONS
Information:  Medications:   Medication summary provided   I understand that I should take only the medications on my list.     -why and when I need to take each medicine.     -which side effects to watch for.     -that I should carry my medication information at all times in case of     Emergency situations.    I will take all of my medicines to follow up appointments.     -check with my physician or pharmacist before taking any new    Medication, over the counter product or drink alcohol.    -Ask about food, drug or dietary supplement interactions.    -discard old lists and update records with medication providers.    Notify Physician:  Notify physician if you notice:   Always call 911 if you feel your life is in danger  In case of an emergency call 911 immediately!  If 911 is not available call your local emergency medical system for help    Behavioral Health Follow Up:  Original Referral Source:[place]  Discharge Diagnosis: Depression with suicidal ideation [F32.A, R45.851]  Recommendations for Level of Care: continue meds as prescribed, follow up at [facility]  Patient status at discharge: denies thoughts of harm to self or others  [facility] hospital  was: [facility]  Aftercare plan faxed: [facility]   -faxed by: nursing staff   -date: 2/5/25   -time: 1200  Prescriptions: filled and sent home with patient per [facility]    Smoking: Quit Smoking.   Call the [employer]'s smoking quitline at 8-625-94D-QUIT  Know the signs of a heart attack   If you have any of the following symptoms call 911 immediately, do not wait more    Than five minutes.    1. Pressure, fullness and/ or squeezing in the center of the chest spreading to    The jaw, neck or shoulder.    2. Chest discomfort with light headedness, fainting, sweating, nausea or    Shortness of breath.   3. Upper abdominal pressure or discomfort.   4. Lower chest pain, back pain, unusual fatigue, shortness of breath, nausea   Or dizziness.     General

## 2025-02-05 NOTE — GROUP NOTE
Group Therapy Note    Date: 2/5/2025    Group Start Time: 1010  Group End Time: 1110  Group Topic: Psychoeducation    Luciana Everett, DELTAW        Group Therapy Note    Attendees: 4/14       patient refused to attend psychoeducation group at 1010a after encouragement from staff.  1:1 talk time provided as alternative to group session

## 2025-02-05 NOTE — BH NOTE
[facility]  Discharge Note    Pt discharged with followings belongings:   Dental Appliances: None  Vision - Corrective Lenses: None  Hearing Aid: None  Jewelry: None  Body Piercings Removed: N/A  Clothing: Footwear, Jacket/Coat, Pants, Shirt, Socks  Other Valuables: Drug Paraphenalia, Lighter/Matches   Valuables returned to patient. Patient educated on aftercare instructions: YES, Medication education and follow up.   Information faxed to [facility] by nursing staff  at 1:24 PM .Patient verbalize understanding of AVS:  yes, Patient took cab to [facility] for intake assessment.    Status EXAM upon discharge:  Mental Status and Behavioral Exam  Normal: No  Level of Assistance: Independent/Self  Facial Expression: Brightened  Affect: Appropriate  Level of Consciousness: Alert  Frequency of Checks: 4 times per hour, close  Mood:Normal: No  Mood: Depressed, Anxious  Motor Activity:Normal: Yes  Motor Activity: Decreased  Eye Contact: Good  Observed Behavior: Cooperative, Friendly, Preoccupied  Sexual Misconduct History: Current - no  Preception: [place] to person, [place] to time, [place] to place, [place] to situation  Attention:Normal: No  Attention: Distractible  Thought Processes: Circumstantial  Thought Content:Normal: No  Thought Content: Preoccupations  Depression Symptoms: Loss of interest, Isolative  Anxiety Symptoms: Generalized  [place] Symptoms: No problems reported or observed.  Hallucinations: None  Delusions: No  Memory:Normal: No  Memory: Poor recent  Insight and Judgment: No  Insight and Judgment: Poor judgment, Poor insight, Unmotivated    Tobacco Screening:  Practical Counseling, on admission, rachna X, if applicable and completed (first 3 are required if patient doesn't refuse):            ( ) Recognizing danger situations (included triggers and roadblocks)                    ( ) Coping skills (new ways to manage stress,relaxation techniques, changing routine, distraction)

## 2025-02-05 NOTE — TRANSITION OF CARE
Behavioral Health Transition Record    Patient Name: Bob Gastelum  YOB: 1990   Medical Record Number: [id number]  Date of Admission: 1/27/2025  5:11 PM   Date of Discharge: 2/5/25    Attending Provider: Amauri Rowland MD   Discharging Provider: Amauri Rowland MD  To contact this individual call [phone] and ask the  to page.  If unavailable, ask to be transferred to Behavioral Health Provider on call.  A Behavioral Health Provider will be available on call 24/7 and during holidays.    Primary Care Provider: No primary care provider on file.    Allergies   Allergen Reactions    Vicodin [Hydrocodone-Acetaminophen] Nausea And Vomiting       Reason for Admission: Reason for Admission to Psychiatric Unit:  Threat to self requiring 24 hour professional observation  A mental disorder causing major disability in social, interpersonal, occupational, and/or educational functioning that is leading to dangerous or life-threatening functioning, and that can only be addressed in an acute inpatient setting   A mental disorder that causes an inability to maintain adequate nurtrition or self-care, and family/community support cannot provide reliable, essential care, so that the patient cannont function at a less intensive level of care during evaluation and treatment   Failure of outpatient psychiatry treatment so that the beneficiary requires 24 hour professional observation and care  Concerns about patient's safety in the community  Past Mental Health Diagnosis: a history of  Major Depression, Prior suicide attempt, and Alcohol and/or Drug Use Disorder  Triggering event or precipitating factor: Housing instability, Financial instability, Grief r/t loss , Alcohol/Drug Relapse, Relationship Issues, and Psych Treatment Noncompliance  Length of time/duration of triggering event: Weeks  Legal Status: Voluntary       Admission Diagnosis: Depression with suicidal ideation [F32.A, R45.851]    * No surgery

## 2025-02-05 NOTE — BH NOTE
Patient given tobacco quitline number 29906204279 at this time, refusing to call at this time, states \" I just dont want to quit now\"- patient given information as to the dangers of long term tobacco use. Continue to reinforce the importance of tobacco cessation.
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
## 2025-02-05 NOTE — PROGRESS NOTES
Behavioral Services                                              Medicare Re-Certification    I certify that the inpatient psychiatric hospital services furnished since the previous certification/re-certification were, and continue to be, medically necessary for;    [x] (1) Treatment which could reasonably be expected to improve the patient's condition,    [x] (2) Or for diagnostic study.    Estimated length of stay/service 2 to 3 days    Plan for post-hospital care home with outpatient community mental health follow-up    This patient continues to need, on a daily basis, active treatment furnished directly by or requiring the supervision of inpatient psychiatric personnel.    Electronically signed by DONNA PECK MD on 2/3/2025 at 4:47 PM   
      Behavioral Services  Medicare Certification Upon Admission    I certify that this patient's inpatient psychiatric hospital admission is medically necessary for:    [x] (1) Treatment which could reasonably be expected to improve this patient's condition,       [x] (2) Or for diagnostic study;     AND     [x](2) The inpatient psychiatric services are provided while the individual is under the care of a physician and are included in the individualized plan of care.    Estimated length of stay/service 4 to 7 days    Plan for post-hospital care home with outpatient community mental health follow-up    Electronically signed by DONNA PECK MD on 1/28/2025 at 1:02 PM      
    [facility] Internal Medicine  Alexander Perez MD; Judson Burnette MD, Jian Brown MD, Lilly Fernandez MD, Latanya Wells MD; Maday Degroot MD    [facility] Internal Medicine   IN-PATIENT SERVICE   [facility]     HISTORY AND PHYSICAL EXAMINATION            Date:   2/5/2025  Patient name:  Bob Gastelum  Date of admission:  1/27/2025  5:11 PM  MRN:   [id number]  Account:  [id number]  YOB: 1990  PCP:    No primary care provider on file.  Room:   [address] [place] [address]  Code Status:    Full Code      Chief Complaint:   Fentanyl withdrawal  HTN  History Obtained From:     Patient/EMR/bedside RN     History of Present Illness:     34-year-old -American gentleman with history of street drug abuse history of hepatitis C admitted for mental health disorder complains of withdrawal from fentanyl denies any chest pain positive for nausea no vomiting no significant weight loss or weight gain denies any history of HIV tuberculous  HTN  Onset more than 2 years ago  carmen mild to mod  Controlled with current po meds  Not associated with headaches or blurry vision  No chest pain    Past Medical History:     Past Medical History:   Diagnosis Date    Alcoholism (HCC)     Anxiety     Bipolar 1 disorder (HCC)     Cocaine abuse (HCC)     Depression     Hepatitis C antibody test positive     Hypertension     Irregular heartbeat     Mild tetrahydrocannabinol (THC) abuse     Suicidal ideation         Past Surgical History:     Past Surgical History:   Procedure Laterality Date    DIALYSIS FISTULA CREATION Right 6/4/2017    RIGHT WRIST WOUND EXPLORATION WITH ARTERIAL REPAIR ULNAR AND RADIAL ARTERIES RIGHT WRIST performed by Alex Walker MD at [facility] OR    EXPLORATION OF WOUND OF EXTREMITY Right 6/4/2017    WOUND EXPLORATION AND/OR REVISION performed by Alex Walker MD at [facility] OR    FEMUR SURGERY Left     OTHER SURGICAL HISTORY Right 06/04/2017    exp and repair of 
    [facility] Internal Medicine  Alexander Perez MD; Judson Burnette MD, Jian Brown MD, Lilly Fernandez MD, Latanya Wells MD; Maday Degroot MD    [facility] Internal Medicine   IN-PATIENT SERVICE   [facility]     HISTORY AND PHYSICAL EXAMINATION            Date:   1/30/2025  Patient name:  Bob Gastelum  Date of admission:  1/27/2025  5:11 PM  MRN:   [id number]  Account:  [id number]  YOB: 1990  PCP:    No primary care provider on file.  Room:   [address] [place] [address]  Code Status:    Full Code      Chief Complaint:   Fentanyl withdrawal  HTN  History Obtained From:     Patient/EMR/bedside RN     History of Present Illness:     34-year-old -American gentleman with history of street drug abuse history of hepatitis C admitted for mental health disorder complains of withdrawal from fentanyl denies any chest pain positive for nausea no vomiting no significant weight loss or weight gain denies any history of HIV tuberculous  HTN  Onset more than 2 years ago  carmen mild to mod  Controlled with current po meds  Not associated with headaches or blurry vision  No chest pain    Past Medical History:     Past Medical History:   Diagnosis Date    Alcoholism (HCC)     Anxiety     Bipolar 1 disorder (HCC)     Cocaine abuse (HCC)     Depression     Hepatitis C antibody test positive     Hypertension     Irregular heartbeat     Mild tetrahydrocannabinol (THC) abuse     Suicidal ideation         Past Surgical History:     Past Surgical History:   Procedure Laterality Date    DIALYSIS FISTULA CREATION Right 6/4/2017    RIGHT WRIST WOUND EXPLORATION WITH ARTERIAL REPAIR ULNAR AND RADIAL ARTERIES RIGHT WRIST performed by Alex Walker MD at [facility] OR    EXPLORATION OF WOUND OF EXTREMITY Right 6/4/2017    WOUND EXPLORATION AND/OR REVISION performed by Alex Walker MD at [facility] OR    FEMUR SURGERY Left     OTHER SURGICAL HISTORY Right 06/04/2017    exp and repair of 
    [facility] Internal Medicine  Alexander Perez MD; Judson Burnette MD, Jian Brown MD, Lilly Fernandez MD, Latanya Wells MD; Maday Degroot MD    [facility] Internal Medicine   IN-PATIENT SERVICE   [facility]     HISTORY AND PHYSICAL EXAMINATION            Date:   1/29/2025  Patient name:  Bob Gastelum  Date of admission:  1/27/2025  5:11 PM  MRN:   [id number]  Account:  [id number]  YOB: 1990  PCP:    No primary care provider on file.  Room:   [address] [place] [address]  Code Status:    Full Code      Chief Complaint:   Fentanyl withdrawal  HTN  History Obtained From:     Patient/EMR/bedside RN     History of Present Illness:     34-year-old -American gentleman with history of street drug abuse history of hepatitis C admitted for mental health disorder complains of withdrawal from fentanyl denies any chest pain positive for nausea no vomiting no significant weight loss or weight gain denies any history of HIV tuberculous  HTN  Onset more than 2 years ago  carmen mild to mod  Controlled with current po meds  Not associated with headaches or blurry vision  No chest pain    Past Medical History:     Past Medical History:   Diagnosis Date    Alcoholism (HCC)     Anxiety     Bipolar 1 disorder (HCC)     Cocaine abuse (HCC)     Depression     Hepatitis C antibody test positive     Hypertension     Irregular heartbeat     Mild tetrahydrocannabinol (THC) abuse     Suicidal ideation         Past Surgical History:     Past Surgical History:   Procedure Laterality Date    DIALYSIS FISTULA CREATION Right 6/4/2017    RIGHT WRIST WOUND EXPLORATION WITH ARTERIAL REPAIR ULNAR AND RADIAL ARTERIES RIGHT WRIST performed by Alex Walker MD at [facility] OR    EXPLORATION OF WOUND OF EXTREMITY Right 6/4/2017    WOUND EXPLORATION AND/OR REVISION performed by Alex Walker MD at [facility] OR    FEMUR SURGERY Left     OTHER SURGICAL HISTORY Right 06/04/2017    exp and repair of 
    [facility] Internal Medicine  Alexander Perez MD; Judson Burnette MD, Jian Brown MD, Lilly Fernandez MD, Latanya Wells MD; Maday Degroot MD    [facility] Internal Medicine   IN-PATIENT SERVICE   [facility]     HISTORY AND PHYSICAL EXAMINATION            Date:   2/3/2025  Patient name:  Bob Gastelum  Date of admission:  1/27/2025  5:11 PM  MRN:   [id number]  Account:  [id number]  YOB: 1990  PCP:    No primary care provider on file.  Room:   [address] [place] [address]  Code Status:    Full Code      Chief Complaint:   Fentanyl withdrawal  HTN  History Obtained From:     Patient/EMR/bedside RN     History of Present Illness:     34-year-old -American gentleman with history of street drug abuse history of hepatitis C admitted for mental health disorder complains of withdrawal from fentanyl denies any chest pain positive for nausea no vomiting no significant weight loss or weight gain denies any history of HIV tuberculous  HTN  Onset more than 2 years ago  carmen mild to mod  Controlled with current po meds  Not associated with headaches or blurry vision  No chest pain    Past Medical History:     Past Medical History:   Diagnosis Date    Alcoholism (HCC)     Anxiety     Bipolar 1 disorder (HCC)     Cocaine abuse (HCC)     Depression     Hepatitis C antibody test positive     Hypertension     Irregular heartbeat     Mild tetrahydrocannabinol (THC) abuse     Suicidal ideation         Past Surgical History:     Past Surgical History:   Procedure Laterality Date    DIALYSIS FISTULA CREATION Right 6/4/2017    RIGHT WRIST WOUND EXPLORATION WITH ARTERIAL REPAIR ULNAR AND RADIAL ARTERIES RIGHT WRIST performed by Alex Walker MD at [facility] OR    EXPLORATION OF WOUND OF EXTREMITY Right 6/4/2017    WOUND EXPLORATION AND/OR REVISION performed by Alex Walker MD at [facility] OR    FEMUR SURGERY Left     OTHER SURGICAL HISTORY Right 06/04/2017    exp and repair of 
    [facility] Internal Medicine  Alexander Perez MD; Judson Burnette MD, Jian Brown MD, Lilly Fernandez MD, Latanya Wells MD; Maday Degroot MD    [facility] Internal Medicine   IN-PATIENT SERVICE   [facility]     HISTORY AND PHYSICAL EXAMINATION            Date:   1/31/2025  Patient name:  Bob Gastelum  Date of admission:  1/27/2025  5:11 PM  MRN:   [id number]  Account:  [id number]  YOB: 1990  PCP:    No primary care provider on file.  Room:   [address] [place] [address]  Code Status:    Full Code      Chief Complaint:   Fentanyl withdrawal  HTN  History Obtained From:     Patient/EMR/bedside RN     History of Present Illness:     34-year-old -American gentleman with history of street drug abuse history of hepatitis C admitted for mental health disorder complains of withdrawal from fentanyl denies any chest pain positive for nausea no vomiting no significant weight loss or weight gain denies any history of HIV tuberculous  HTN  Onset more than 2 years ago  carmen mild to mod  Controlled with current po meds  Not associated with headaches or blurry vision  No chest pain    Past Medical History:     Past Medical History:   Diagnosis Date    Alcoholism (HCC)     Anxiety     Bipolar 1 disorder (HCC)     Cocaine abuse (HCC)     Depression     Hepatitis C antibody test positive     Hypertension     Irregular heartbeat     Mild tetrahydrocannabinol (THC) abuse     Suicidal ideation         Past Surgical History:     Past Surgical History:   Procedure Laterality Date    DIALYSIS FISTULA CREATION Right 6/4/2017    RIGHT WRIST WOUND EXPLORATION WITH ARTERIAL REPAIR ULNAR AND RADIAL ARTERIES RIGHT WRIST performed by Alex Walker MD at [facility] OR    EXPLORATION OF WOUND OF EXTREMITY Right 6/4/2017    WOUND EXPLORATION AND/OR REVISION performed by Alex Walker MD at [facility] OR    FEMUR SURGERY Left     OTHER SURGICAL HISTORY Right 06/04/2017    exp and repair of 
    [facility] Internal Medicine  Alexander Perez MD; Judson Burnette MD, Jian Brown MD, Lilly Fernandez MD, Latanya Wells MD; Maday Degroot MD    [facility] Internal Medicine   IN-PATIENT SERVICE   [facility]     HISTORY AND PHYSICAL EXAMINATION            Date:   2/2/2025  Patient name:  Bob Gastelum  Date of admission:  1/27/2025  5:11 PM  MRN:   [id number]  Account:  [id number]  YOB: 1990  PCP:    No primary care provider on file.  Room:   [address] [place] [address]  Code Status:    Full Code      Chief Complaint:   Fentanyl withdrawal  HTN  History Obtained From:     Patient/EMR/bedside RN     History of Present Illness:     34-year-old -American gentleman with history of street drug abuse history of hepatitis C admitted for mental health disorder complains of withdrawal from fentanyl denies any chest pain positive for nausea no vomiting no significant weight loss or weight gain denies any history of HIV tuberculous  HTN  Onset more than 2 years ago  carmen mild to mod  Controlled with current po meds  Not associated with headaches or blurry vision  No chest pain    Past Medical History:     Past Medical History:   Diagnosis Date    Alcoholism (HCC)     Anxiety     Bipolar 1 disorder (HCC)     Cocaine abuse (HCC)     Depression     Hepatitis C antibody test positive     Hypertension     Irregular heartbeat     Mild tetrahydrocannabinol (THC) abuse     Suicidal ideation         Past Surgical History:     Past Surgical History:   Procedure Laterality Date    DIALYSIS FISTULA CREATION Right 6/4/2017    RIGHT WRIST WOUND EXPLORATION WITH ARTERIAL REPAIR ULNAR AND RADIAL ARTERIES RIGHT WRIST performed by Alex Walker MD at [facility] OR    EXPLORATION OF WOUND OF EXTREMITY Right 6/4/2017    WOUND EXPLORATION AND/OR REVISION performed by Alex Walker MD at [facility] OR    FEMUR SURGERY Left     OTHER SURGICAL HISTORY Right 06/04/2017    exp and repair of 
  Daily Progress Note  1/29/2025    Patient Name: Bob Gastelum    CHIEF COMPLAINT: Major depressive disorder severe, recurrent without psychotic features         SUBJECTIVE:    Patient is seen today for a follow up assessment.  He is found lying in bed awake. He is approachable, agrees to conduct interview in private room with door open. Affect and speech are blunted. He places blanket over head but willing to engage in discussion. He describes current mood as \"bad\".  He endorses depression and anxiety rating intensity of both 9 out of 10 (with 10 as most severe). He endorses suicidal ideation.  Denies homicidal ideation.  Denies hallucinations or paranoia.  He is compliant with taking Prozac 10 mg and olanzapine 5 mg.  He has been without behavioral disturbances or use of emergency medications at the unit.  He is isolative to his room and out for meals only.  He has been refusing measurement of vital signs.  Vitals obtained this morning , blood pressure 141/86.  He is noted to refuse lisinopril 10 mg yesterday, accepted dose this morning.  He remains at risk of harm to self and unable to contract for safety in the community.  He requires ongoing inpatient hospitalization for safety and stabilization.    Appetite:  [x] Adequate/Unchanged  [] Increased  [] Decreased      Sleep:       [] Adequate/Unchanged  [x] Fair  [] Poor      Group Attendance on Unit:   [] Yes   [] Selectively    [x] No    Compliant with scheduled medications: [x] Yes  [] No    Received emergency medications in past 24 hrs: [] Yes   [x] No    Medication Side Effects: Denies         Mental Status Exam  Level of consciousness: Alert and awake   Appearance: Appropriate attire for setting, resting in bed, with poor grooming and hygiene   Behavior/Motor: Approachable, guarded, engages with interviewer, no psychomotor abnormalities   Attitude toward examiner: Cooperative, attentive, no eye contact  Speech: Low in volume, monotone   Mood: \"Bad\"  Affect: 
  Daily Progress Note  1/30/2025    Patient Name: Bob Gastelum    CHIEF COMPLAINT:  Major depressive disorder severe, recurrent without psychotic features           SUBJECTIVE:    Patient is seen today for a follow up assessment.  He is found lying in bed awake.  Agrees to conduct interview in private room with door open.  Maintains blanket over her head.  He does not speak spontaneously and responses are blunted. He endorses depression and anxiety. Reports low motivation and energy. Denies improvement of suicidal ideation.  Denies homicidal ideation.  Denies hallucinations or paranoia.  He denies disturbance of sleep or appetite.  He continues isolative to his room.  Out for meals only.  He does not engage in milieu activities or attend groups.  He is compliant with taking Prozac 10 mg and olanzapine 5 mg.  No adverse effects reported.  He has been without behavioral disturbances or use of emergency medications on the unit.  Continues to utilize hydroxyzine and trazodone as needed.  Remains at risk of harm to self and unable to contract for safety in the community.  He requires ongoing inpatient psychiatric hospitalization for safety and stabilization.    Appetite:  [x] Adequate/Unchanged  [] Increased  [] Decreased      Sleep:       [x] Adequate/Unchanged  [] Fair  [] Poor      Group Attendance on Unit:   [] Yes   [] Selectively    [x] No    Compliant with scheduled medications: [x] Yes  [] No    Received emergency medications in past 24 hrs: [] Yes   [x] No    Medication Side Effects: Denies         Mental Status Exam  Level of consciousness: Alert and awake   Appearance: Appropriate attire for setting, resting in bed, difficult to assess grooming and appearance as patient maintains cover over her body and head.  Behavior/Motor: Approachable, guarded but engages with interviewer, no psychomotor abnormalities   Attitude toward examiner: Cooperative, attentive, no eye contact  Speech: Blunted, low volume  Mood: 
  Daily Progress Note  1/31/2025    Patient Name: Bob Gastelum    CHIEF COMPLAINT:  Depression with suicidal ideation          SUBJECTIVE:      Patient is seen today for a follow up assessment. Vitals and labs reviewed and patient's blood pressure appears high.  Internal medicine has been notified. Lisinopril has been ordered.  Bob is compliant with scheduled medications. He remains behaviorally in control and has not required emergency medications in the past 24 hours.  Bob is complaining of withdrawal symptoms however has not utilized comfort medications.  He was reminded he has these on board.  He reports continued significant depression and anxiety.  He reports his sleep is poor.  We discussed increasing his Trazodone.  He reports his appetite is normal.  Bob continues to acknowledge constant suicidal thoughts.  He cannot contract for safety outside of the hospital at this time. He denies homicidal ideations. He denies auditory and visual hallucinations. He denies paranoia.  He denies any medication side effects at this time. After discussing risks, benefits and alternatives mutually agreed to further titrate Prozac.    Appetite:  [x] Normal/Adequate/Unchanged  [] Increased  [] Decreased      Sleep:       [] Normal/Adequate/Unchanged  [] Fair  [x] Poor      Group Attendance on Unit:   [] Yes  [] Selectively    [x] No    Medication Side Effects:  Patient denies any medication side effects at the time of assessment.         Mental Status Exam  Level of consciousness: Alert and awake.   Appearance: Appropriate attire for setting, resting in bed, with poor grooming and hygiene.   Behavior/Motor: Approachable, calm  Attitude toward examiner: Cooperative, attentive, good eye contact.  Speech: Normal rate, normal volume, normal tone.  Mood:  Patient reports \"shitty\".   Affect: Depressed  Thought processes: Linear and coherent.   Thought content: Denies homicidal ideation.  Suicidal Ideation: Active suicidal 
CLINICAL PHARMACY NOTE: MEDS TO BEDS    Total # of Prescriptions Filled: 6   The following medications were delivered to the patient:  Amlodipine Besylate 10MG Tablets   Fluoxetine HCL 10MG Capsules   Hydroxyzine HCL 50MG Tablets   Lisinopril 40MG Tablets   Olanzapine 10MG Tablets   Trazodone HCL 100MG Tablets     Additional Documentation:  Picked up at the pharmacy by Imelda at 10:24AM 2/5/25  
Daily Progress Note  Amauri Rowland MD  2/2/2025  CHIEF COMPLAINT: Depression with suicidal ideation    Reviewed patient's current plan of care and vital signs with nursing staff.  Sleep:  several hours last night  Attending groups: No:     SUBJECTIVE:    Patient remains more honest about his symptoms.  Still expressing auditory hallucinations which are distressing at times and suicidal ideation.  Continues to report today lack of any opiate withdrawal symptoms.  After discussion of risk benefits and alternatives he is agreeable to further titrate Prozac and olanzapine..  Clearly cannot contract for safety in the community at present time.  Reports depressed mood, broken sleep, poor appetite, low motivation and low energy along with his command auditory hallucinations to harm himself    Mental Status Exam  Level of consciousness:  Within normal limits  Appearance: Hospital attire, lying in bed, poor hygiene   behavior/Motor: Psychomotor retardation  Attitude toward examiner: Withdrawn, indifferent  Speech: Little spontaneity of speech, well articulated  Mood: Depressed  Affect: Congruent  Thought processes: Linear to brief close ended questions thought content:  denies homicidal ideation  Suicidal Ideation: Endorses suicidal ideation  Delusions:  no evidence of delusions  Perceptual Disturbance: Endorses auditory hallucination  Cognition:  Oriented to self, location, time, and situation  Memory: age appropriate  Insight & Judgement: Limited   medication side effects:  denies       Data   height is 1.778 m (5' 10\") and weight is 77.1 kg (170 lb). His temporal temperature is 97.6 °F (36.4 °C). His blood pressure is 124/95 (abnormal) and his pulse is 85. His respiration is 14 and oxygen saturation is 99%.   Labs:   Admission on 01/27/2025   Component Date Value Ref Range Status    Hemoglobin A1C 01/29/2025 5.8  4.0 - 6.0 % Final    Estimated Avg Glucose 01/29/2025 120  mg/dL Final    Comment: The ADA and AACC recommend 
Daily Progress Note  Amauri Rowland MD  2/3/2025  CHIEF COMPLAINT: Depression with suicidal ideation    Reviewed patient's current plan of care and vital signs with nursing staff.  Sleep:  several hours last night  Attending groups: No:     SUBJECTIVE:    Overall patient reports significant improvement from yesterday.  Today he reports is the first day that he has not had distressing auditory hallucinations or intense suicidal ideation.  Tolerating medication adjustments well.  Feels like today is a day and indicates he is moving in the right direction.  Reports better appetite and better sleep.  Reports better energy though he still lying in bed.  His affect is substantially improved from yesterday.    Mental Status Exam  Level of consciousness:  Within normal limits  Appearance: Hospital attire, seated in chair, with good grooming and hygiene   Behavior/Motor: No abnormalities noted  Attitude toward examiner:  Cooperative, attentive, good eye contact  Speech:  spontaneous, normal rate, normal volume and well articulated  Mood: \"Better\"  Affect: Congruent  Thought processes:  linear, goal directed and coherent  Thought content:  denies homicidal ideation  Suicidal Ideation: Reports improvement in suicidal ideation  Delusions:  no evidence of delusions  Perceptual Disturbance:  denies any perceptual disturbance  Cognition:  Oriented to self, location, time, and situation  Memory: age appropriate  Insight & Judgement: improving  Medication side effects:  denies       Data   height is 1.778 m (5' 10\") and weight is 77.1 kg (170 lb). His temporal temperature is 98 °F (36.7 °C). His blood pressure is 143/77 (abnormal) and his pulse is 85. His respiration is 14 and oxygen saturation is 99%.   Labs:   Admission on 01/27/2025   Component Date Value Ref Range Status    Hemoglobin A1C 01/29/2025 5.8  4.0 - 6.0 % Final    Estimated Avg Glucose 01/29/2025 120  mg/dL Final    Comment: The ADA and AACC recommend providing the 
Daily Progress Note  Amauri Rowland MD  2/4/2025  CHIEF COMPLAINT: Depression with suicidal ideation    Reviewed patient's current plan of care and vital signs with nursing staff.  Sleep:  several hours last night  Attending groups: No:     SUBJECTIVE:    Overall patient continues to report significant improvement from admission.  Continues to be free of any suicidal ideation intent or plan as well as any auditory hallucinations.  Tolerating medication adjustments well.  Feels like today is a day and indicates he is moving in the right direction.  Reports better appetite and better sleep.  Reports better energy though he still lying in bed.  His affect is  appears to be at or near baseline, substantially brighter today.    Mental Status Exam  Level of consciousness:  Within normal limits  Appearance: Hospital attire, seated in chair, with good grooming and hygiene   Behavior/Motor: No abnormalities noted  Attitude toward examiner:  Cooperative, attentive, good eye contact  Speech:  spontaneous, normal rate, normal volume and well articulated  Mood: \"Better\"  Affect: Congruent  Thought processes:  linear, goal directed and coherent  Thought content:  denies homicidal ideation  Suicidal Ideation: Reports improvement in suicidal ideation  Delusions:  no evidence of delusions  Perceptual Disturbance:  denies any perceptual disturbance  Cognition:  Oriented to self, location, time, and situation  Memory: age appropriate  Insight & Judgement: improving  Medication side effects:  denies       Data   height is 1.778 m (5' 10\") and weight is 77.1 kg (170 lb). His temporal temperature is 97.1 °F (36.2 °C). His blood pressure is 139/89 and his pulse is 90. His respiration is 14 and oxygen saturation is 100%.   Labs:   Admission on 01/27/2025   Component Date Value Ref Range Status    Hemoglobin A1C 01/29/2025 5.8  4.0 - 6.0 % Final    Estimated Avg Glucose 01/29/2025 120  mg/dL Final    Comment: The ADA and AACC recommend 
Pharmacy Medication History Note      List of current medications patient is taking is complete.    Source of information: Epic (discharge report 11/26/24), [facility] ([phone]), [facility] ([phone]), dispense report, [facility]AR[facility]    Changes made to medication list:  Medications removed (include reason, ex. therapy complete or physician discontinued, noncompliance):  None     Medications flagged for provider review:  None     Medications added/doses adjusted:  Hydroxyzine 50 mg TID PRN added    Other notes (ex. Recent course of antibiotics, Coumadin dosing):  OARRS negative  Current list reflects discharge list from [facility] on 11/26/24; patient [facility] scripts at [facility], but never went to [facility] for follow up      Current Home Medication List at Time of Admission:  Prior to Admission medications    Medication Sig Start Date End Date Taking? Authorizing Provider   hydrOXYzine HCl (ATARAX) 50 MG tablet Take 1 tablet by mouth 3 times daily as needed for Anxiety   Yes Provider, MD Richard   FLUoxetine (PROZAC) 10 MG capsule Take 1 capsule by mouth daily 11/27/24   Amauri Rowland MD   lisinopril (PRINIVIL;ZESTRIL) 10 MG tablet Take 1 tablet by mouth daily 11/27/24   Amauri Rowland MD   OLANZapine (ZYPREXA) 5 MG tablet Take 1 tablet by mouth nightly 11/26/24   Amauri Rowland MD   traZODone (DESYREL) 50 MG tablet Take 1 tablet by mouth nightly as needed for Sleep 11/26/24   Amauri Rowland MD         Please let me know if you have any questions about this encounter. Thank you!    Electronically signed by Jeane Garcia RPH on 1/28/2025 at 10:11 AM     
RT ASSESSMENT TREATMENT GOALS    [x]Pt Goal:  Pt will identify 1-2 positive coping skills by time of discharge.    []Pt Goal:  Pt will identify 1-2 positive aspects of self by time of discharge.    []Pt Goal:  Pt will remain on task/topic for 15-30 minutes during group by time of discharge.    [x]Pt Goal:  Pt will identify 1-2 aspects of relapse prevention plan by time of discharge.    []Pt Goal:  Pt will join in conversation with peers 1-2 times per group by time of discharge.    []Pt Goal:  Pt will identify 1-2 new leisure interests by time of discharge.    []Pt Goal:  Pt will not voice any delusional content by time of discharge.   
Cooperative, seems inattentive, fair eye contact.  Speech: Normal rate, low volume,monotone.  Mood:  Patient reports \"not good\".   Affect: Depressed  Thought processes: Linear and coherent.   Thought content: Denies homicidal ideation.  Suicidal Ideation: Fleeting suicidal ideations  Delusions: No evidence of delusions. Denies paranoia.  Perceptual Disturbance: Patient does not appear to be responding to internal stimuli. Denies auditory hallucinations. Denies visual hallucinations.   Cognition: Oriented to self, location, time, and situation.  Memory: Intact.  Insight & Judgement: Poor.     Data   height is 1.778 m (5' 10\") and weight is 77.1 kg (170 lb). His temporal temperature is 98.1 °F (36.7 °C). His blood pressure is 148/95 (abnormal) and his pulse is 71. His respiration is 14 and oxygen saturation is 99%.   Labs:   Admission on 01/27/2025   Component Date Value Ref Range Status    Hemoglobin A1C 01/29/2025 5.8  4.0 - 6.0 % Final    Estimated Avg Glucose 01/29/2025 120  mg/dL Final    Comment: The ADA and AACC recommend providing the estimated average glucose result to permit better   patient understanding of their HBA1c result.      Cholesterol, Total 01/29/2025 160  0 - 199 mg/dL Final    Comment:    Cholesterol Guidelines:      <200  Desirable   200-240  Borderline      >240  Undesirable         HDL 01/29/2025 57  >40 mg/dL Final    Comment:    HDL Guidelines:    <40     Undesirable   40-59    Borderline    >59     Desirable         LDL Cholesterol 01/29/2025 95  0 - 100 mg/dL Final    Comment:    LDL Guidelines:     <100    Desirable   100-129   Near to/above Desirable   130-159   Borderline      >159   Undesirable     Direct (measured) LDL and calculated LDL are not interchangeable tests.      Chol/HDL Ratio 01/29/2025 2.8   Final    Triglycerides 01/29/2025 38  0 - 149 mg/dL Final    Comment:    Triglyceride Guidelines:     <150   Desirable   150-199  Borderline   200-499  High     >499   Very high   
unlar and radial artery with exp and repair of 12 tendons and 2 nerves        Medications Prior to Admission:     Prior to Admission medications    Medication Sig Start Date End Date Taking? Authorizing Provider   hydrOXYzine HCl (ATARAX) 50 MG tablet Take 1 tablet by mouth 3 times daily as needed for Anxiety   Yes Provider, MD Richard   FLUoxetine (PROZAC) 10 MG capsule Take 1 capsule by mouth daily 24   Amauri Rowland MD   lisinopril (PRINIVIL;ZESTRIL) 10 MG tablet Take 1 tablet by mouth daily 24   Amauri Rowland MD   OLANZapine (ZYPREXA) 5 MG tablet Take 1 tablet by mouth nightly 24   Amauri Rowland MD   traZODone (DESYREL) 50 MG tablet Take 1 tablet by mouth nightly as needed for Sleep 24   Amauri Rowland MD        Allergies:     Vicodin [hydrocodone-acetaminophen]    Social History:     Tobacco:    reports that he has been smoking cigarettes. He has a 7 pack-year smoking history. He has never used smokeless tobacco.  Alcohol:      reports that he does not currently use alcohol.  Drug Use:  reports current drug use. Drugs: Marijuana (Weed) and Cocaine.    Family History:     Family History   Problem Relation Age of Onset    Heart Disease Father 49    Asthma Brother     Hypertension Maternal Grandmother     Lung Cancer Maternal Grandmother         Kidney cancer, liver cancer    Stroke Maternal Grandmother     Heart Disease Mother 51         of presumed heart disease in her sleep       Review of Systems:     Positive and Negative as described in HPI.    CONSTITUTIONAL:  negative for fevers, chills, sweats, fatigue, weight loss  HEENT:  negative for vision, hearing changes, runny nose, throat pain  RESPIRATORY:  negative for shortness of breath, cough, congestion, wheezing.  CARDIOVASCULAR:  negative for chest pain, palpitations.  GASTROINTESTINAL:  negative for nausea, vomiting, diarrhea, constipation, change in bowel habits, abdominal pain   GENITOURINARY:  negative for 
carpometacarpal joint space.       Assessment :      Hospital Problems             Last Modified POA    * (Principal) Severe episode of recurrent major depressive disorder, without psychotic features (HCC) 1/28/2025 Yes    Depression with suicidal ideation 1/28/2025 Yes       Plan:     34-year-old -American gentleman with history of street drug abuse history of hepatitis C admitted for mental health disorder complains of withdrawal from fentanyl denies any chest pain positive for nausea no vomiting no significant weight loss or weight gain denies any history of HIV tuberculous  History of hypertension start lisinopril 10 mg daily  DVT prophylaxis,  Pt mobile   Full code status   Prediabetes A1c 5.8 ,better diet advised    Uncontrolled htn  Increase lisinopril to  40 mg daily      Consultations:   IP CONSULT TO INTERNAL MEDICINE      Alexander Perez MD  2/1/2025  2:19 PM    Copy sent to Dr. Reaves primary care provider on file.    Please note that this chart was generated using voice recognition Dragon dictation software.  Although every effort was made to ensure the accuracy of this automated transcription, some errors in transcription may have occurred.

## 2025-02-05 NOTE — CARE COORDINATION
Name: Bob Gastelum    : 1990    Auth number: [id number]     Discharge Date: 2025    Destination: [facility]    *If you have any specific discharge questions, please contact the assigned /discharge planner: Luciana ([phone]) or Marina ([phone])      Discharge Medications:      Medication List        START taking these medications      amLODIPine 10 MG tablet  Commonly known as: NORVASC  Take 1 tablet by mouth daily  Notes to patient: For the heart-b/p            CHANGE how you take these medications      FLUoxetine 10 MG capsule  Commonly known as: PROZAC  Take 3 capsules by mouth daily  What changed: how much to take  Notes to patient: Used to treat depression     lisinopril 40 MG tablet  Commonly known as: PRINIVIL;ZESTRIL  Take 1 tablet by mouth daily  What changed:   medication strength  how much to take  Notes to patient: For the heart, b/p     OLANZapine 10 MG tablet  Commonly known as: ZYPREXA  Take 1 tablet by mouth nightly  What changed:   medication strength  how much to take  Notes to patient: Mood/clears thoughts     traZODone 100 MG tablet  Commonly known as: DESYREL  Take 1 tablet by mouth nightly as needed for Sleep  What changed:   medication strength  how much to take  Notes to patient: For sleep            CONTINUE taking these medications      hydrOXYzine HCl 50 MG tablet  Commonly known as: ATARAX  Take 1 tablet by mouth 3 times daily as needed for Anxiety  Notes to patient: For anxiety               Where to Get Your Medications        These medications were sent to [facility] [address] [place] [address] -  [phone] -  [phone]  [address] [place] [address]      Phone: [phone]   amLODIPine 10 MG tablet  FLUoxetine 10 MG capsule  hydrOXYzine HCl 50 MG tablet  lisinopril 40 MG tablet  OLANZapine 10 MG tablet  traZODone 100 MG tablet         Follow Up Appointment: [facility]   Nicole De Luna

## 2025-02-05 NOTE — PLAN OF CARE
Problem: Depression  Goal: Will be euthymic at discharge  Description: INTERVENTIONS:  1. Administer medication as ordered  2. Provide emotional support via 1:1 interaction with staff  3. Encourage involvement in milieu/groups/activities  4. Monitor for social isolation  2/1/2025 1103 by Delfina Garnett, RN  Outcome: Progressing   1:1 with pt x ten minutes.  Pt encouraged to attend unit programming and interact with peers and staff.  Pt also encouraged to tend to hygiene and ADLs.  Pt encouraged to discuss feelings with staff and feedback and reassurance provided.    Pt denies thoughts of self harm and is agreeable to seeking out should thoughts of self harm arise.  Safe environment maintained.  Q15 minute checks for safety cont per unit policy.  Will cont to monitor for safety and provides support and reassurance as needed.    Pt refuses talk time and all groups. Only out of room for meals. Aloof of peers. Not invested in treatment.   
  Problem: Depression  Goal: Will be euthymic at discharge  Description: INTERVENTIONS:  1. Administer medication as ordered  2. Provide emotional support via 1:1 interaction with staff  3. Encourage involvement in milieu/groups/activities  4. Monitor for social isolation  Outcome: Progressing   1:1 with pt x ten minutes.  Pt encouraged to attend unit programming and interact with peers and staff.  Pt also encouraged to tend to hygiene and ADLs.  Pt encouraged to discuss feelings with staff and feedback and reassurance provided.    Pt denies thoughts of self harm and is agreeable to seeking out should thoughts of self harm arise.  Safe environment maintained.  Q15 minute checks for safety cont per unit policy.  Will cont to monitor for safety and provides support and reassurance as needed.    Pt is controlled et cooperative. Aloof of peers. Refused groups. Out on unit for meals.   
  Problem: Pain  Goal: Verbalizes/displays adequate comfort level or baseline comfort level  2/1/2025 2059 by Camilo Porter LPN  Note: Patient denies pain this shift. Patient does have as needed medications if he does have pain and patient aware. Will continue to monitor.     Problem: Self Harm/Suicidality  Goal: Will have no self-injury during hospital stay  Description: INTERVENTIONS:  1.  Ensure constant observer at bedside with Q15M safety checks  2.  Maintain a safe environment  3.  Secure patient belongings  4.  Ensure family/visitors adhere to safety recommendations  5.  Ensure safety tray has been added to patient's diet order  6.  Every shift and PRN: Re-assess suicidal risk via Frequent Screener    2/1/2025 2059 by Camilo Porter LPN  Outcome: Progressing  Note: Patient denies self harm this shift. Every 15 minute checks continue per policy. Will continue to monitor. Did encourage patient if he does start to feel like hurting self to notify staff.     
  Problem: Self Harm/Suicidality  Goal: Will have no self-injury during hospital stay  Description: INTERVENTIONS:  1.  Ensure constant observer at bedside with Q15M safety checks  2.  Maintain a safe environment  3.  Secure patient belongings  4.  Ensure family/visitors adhere to safety recommendations  5.  Ensure safety tray has been added to patient's diet order  6.  Every shift and PRN: Re-assess suicidal risk via Frequent Screener    1/28/2025 1157 by Zandra Topete LPN  Outcome: Progressing  1/28/2025 1110 by Saranya Anaya RN  Outcome: Progressing  1/27/2025 2224 by Ophelia Carey RN  Outcome: Progressing  Note: Pt denies suicidal thoughts, no self-injury present, will continue to reassess for suicidal ideations throughout shift, q 15 min safety checks maintained.     Problem: Depression  Goal: Will be euthymic at discharge  Description: INTERVENTIONS:  1. Administer medication as ordered  2. Provide emotional support via 1:1 interaction with staff  3. Encourage involvement in milieu/groups/activities  4. Monitor for social isolation  1/28/2025 1157 by Zandra Topete LPN  Outcome: Progressing  1/28/2025 1110 by Saranya Anaya RN  Outcome: Progressing     Problem: Drug Abuse/Detox  Goal: Will have no detox symptoms and will verbalize plan for changing drug-related behavior  Description: INTERVENTIONS:  1. Administer medication as ordered  2. Monitor physical status  3. Provide emotional support with 1:1 interaction with staff  4. Encourage  recovery focused treatment   1/28/2025 1157 by Zandra Topeet LPN  Outcome: Progressing  1/28/2025 1110 by Saranya Anaya RN  Outcome: Progressing     Problem: Pain  Goal: Verbalizes/displays adequate comfort level or baseline comfort level  1/28/2025 1157 by Zandra Topete LPN  Outcome: Progressing  1/28/2025 1110 by Saranya Anaya RN  Outcome: Progressing  1/27/2025 2224 by Ophelia Carey RN  Outcome: Progressing  Note: Pt denies pain at this time 
  Problem: Self Harm/Suicidality  Goal: Will have no self-injury during hospital stay  Description: INTERVENTIONS:  1.  Ensure constant observer at bedside with Q15M safety checks  2.  Maintain a safe environment  3.  Secure patient belongings  4.  Ensure family/visitors adhere to safety recommendations  5.  Ensure safety tray has been added to patient's diet order  6.  Every shift and PRN: Re-assess suicidal risk via Frequent Screener    1/28/2025 2040 by Tima Dunne LPN  Outcome: Progressing  Flowsheets (Taken 1/28/2025 2025)  Will have no self-injury during hospital stay:   Maintain a safe environment   Secure patient belongings   Ensure family/visitors adhere to safety recommendations   Every shift and PRN: Re-assess suicidal risk via Frequent Screener       Problem: Depression  Goal: Will be euthymic at discharge  Description: INTERVENTIONS:  1. Administer medication as ordered  2. Provide emotional support via 1:1 interaction with staff  3. Encourage involvement in milieu/groups/activities  4. Monitor for social isolation  1/28/2025 2040 by Tima Dunne LPN  Outcome: Progressing        Patient expressed thoughts of suicide and depression . When asked patient if he wanted to discuss reasons why he feels depressed and suicidal, he said no. Patient did come out of his room to takes a shower. Patient education provided. Patient agreed to seek staff at anytime he/she felt like any urges to harm self would arise. Safety checks maintained every 15 minutes.  
  Problem: Self Harm/Suicidality  Goal: Will have no self-injury during hospital stay  Description: INTERVENTIONS:  1.  Ensure constant observer at bedside with Q15M safety checks  2.  Maintain a safe environment  3.  Secure patient belongings  4.  Ensure family/visitors adhere to safety recommendations  5.  Ensure safety tray has been added to patient's diet order  6.  Every shift and PRN: Re-assess suicidal risk via Frequent Screener    1/29/2025 2153 by Raghu Garcia, RN  Outcome: Progressing  Patient has made no attempt to harm self at this time.      Problem: Depression  Goal: Will be euthymic at discharge  Description: INTERVENTIONS:  1. Administer medication as ordered  2. Provide emotional support via 1:1 interaction with staff  3. Encourage involvement in milieu/groups/activities  4. Monitor for social isolation  1/29/2025 2153 by Raghu Garcia, RN  Outcome: Progressing  Patient is isolative to his room. He refused vitals assessment this shift. He reports suicidal ideation all the time but feels safe while here. Patient denies homicidal ideation and hallucinations at this time. He is helpless and hopeless. Safety plan reviewed with patient, agrees to approach staff when feeling upset.  15 minute and random checks maintained for safety.  No violent or escalating behaviors noted during this shift. Patient is currently calm, controlled and medication-compliant.        Problem: Drug Abuse/Detox  Goal: Will have no detox symptoms and will verbalize plan for changing drug-related behavior  Description: INTERVENTIONS:  1. Administer medication as ordered  2. Monitor physical status  3. Provide emotional support with 1:1 interaction with staff  4. Encourage  recovery focused treatment   1/29/2025 2153 by Raghu Garcia, RN  Outcome: Progressing  Patient denies withdrawal symptoms at this time.       Problem: Pain  Goal: Verbalizes/displays adequate comfort level or baseline comfort level  1/29/2025 2153 by Radha 
  Problem: Self Harm/Suicidality  Goal: Will have no self-injury during hospital stay  Description: INTERVENTIONS:  1.  Ensure constant observer at bedside with Q15M safety checks  2.  Maintain a safe environment  3.  Secure patient belongings  4.  Ensure family/visitors adhere to safety recommendations  5.  Ensure safety tray has been added to patient's diet order  6.  Every shift and PRN: Re-assess suicidal risk via Frequent Screener    1/31/2025 1039 by Zandra Topete LPN  Outcome: Progressing  1/31/2025 0006 by Raghu Garcia RN  Outcome: Progressing     Problem: Depression  Goal: Will be euthymic at discharge  Description: INTERVENTIONS:  1. Administer medication as ordered  2. Provide emotional support via 1:1 interaction with staff  3. Encourage involvement in milieu/groups/activities  4. Monitor for social isolation  1/31/2025 1039 by Zandra Topete LPN  Outcome: Progressing  1/31/2025 0006 by Raghu Garcia RN  Outcome: Progressing     Problem: Drug Abuse/Detox  Goal: Will have no detox symptoms and will verbalize plan for changing drug-related behavior  Description: INTERVENTIONS:  1. Administer medication as ordered  2. Monitor physical status  3. Provide emotional support with 1:1 interaction with staff  4. Encourage  recovery focused treatment   1/31/2025 1039 by Zandra Topete LPN  Outcome: Progressing  1/31/2025 0006 by Raghu Garcia RN  Outcome: Progressing     Problem: Pain  Goal: Verbalizes/displays adequate comfort level or baseline comfort level  1/31/2025 1039 by Zandra Topete LPN  Outcome: Progressing  1/31/2025 0006 by Raghu Garcia RN  Outcome: Progressing     Patient reports anxiety, depression and suicidal ideation but contracts for safety while on the unit. Patient is encouraged to notify staff if thoughts intense or mood changes occur throughout the shift . Patient also reports improvement in mood since admission. Patient denies pain and seems to be in no distress . Patient denies all drug 
  Problem: Self Harm/Suicidality  Goal: Will have no self-injury during hospital stay  Description: INTERVENTIONS:  1.  Ensure constant observer at bedside with Q15M safety checks  2.  Maintain a safe environment  3.  Secure patient belongings  4.  Ensure family/visitors adhere to safety recommendations  5.  Ensure safety tray has been added to patient's diet order  6.  Every shift and PRN: Re-assess suicidal risk via Frequent Screener    1/31/2025 2215 by Maxi Martinez LPN  Outcome: Progressing  Note: Patient reports having thoughts to harm self, does not have a plan on how to do so. Patient encouraged to seek out to staff if thoughts worsen. Safety checks continued Q15 minutes.     Problem: Depression  Goal: Will be euthymic at discharge  Description: INTERVENTIONS:  1. Administer medication as ordered  2. Provide emotional support via 1:1 interaction with staff  3. Encourage involvement in milieu/groups/activities  4. Monitor for social isolation  1/31/2025 2215 by Maxi Martinez LPN  Outcome: Progressing  Note: Patient reports having symptoms of depression an 8 on a scale of 1-10. Patient did attend group, remains isolative to room out for needs only. Patient encouraged to seek out to staff if symptoms worsen. Safety checks continued Q15 minutes.     Problem: Pain  Goal: Verbalizes/displays adequate comfort level or baseline comfort level  1/31/2025 2215 by Maxi Martinez LPN  Outcome: Progressing     
  Problem: Self Harm/Suicidality  Goal: Will have no self-injury during hospital stay  Description: INTERVENTIONS:  1.  Ensure constant observer at bedside with Q15M safety checks  2.  Maintain a safe environment  3.  Secure patient belongings  4.  Ensure family/visitors adhere to safety recommendations  5.  Ensure safety tray has been added to patient's diet order  6.  Every shift and PRN: Re-assess suicidal risk via Frequent Screener    2/2/2025 2223 by Raghu Garcia, RN  Outcome: Progressing  Patient has made no attempt to harm self at this time.      Problem: Depression  Goal: Will be euthymic at discharge  Description: INTERVENTIONS:  1. Administer medication as ordered  2. Provide emotional support via 1:1 interaction with staff  3. Encourage involvement in milieu/groups/activities  4. Monitor for social isolation  2/2/2025 2223 by Raghu Garcia, RN  Outcome: Progressing  Patient reports minor anxiety. He feels ready for discharge. He spends most of shift in day room but is aloof of peers. Safety plan reviewed with patient, agrees to approach staff when feeling upset.  15 minute and random checks maintained for safety.  No violent or escalating behaviors noted during this shift. Patient is currently calm, controlled and medication-compliant. Patient denies suicidal ideation, homicidal ideation and hallucinations at this time.       Problem: Drug Abuse/Detox  Goal: Will have no detox symptoms and will verbalize plan for changing drug-related behavior  Description: INTERVENTIONS:  1. Administer medication as ordered  2. Monitor physical status  3. Provide emotional support with 1:1 interaction with staff  4. Encourage  recovery focused treatment   2/2/2025 2223 by Raghu Garcia, RN  Outcome: Progressing  Patient denies withdrawal symptoms at this time.      Problem: Pain  Goal: Verbalizes/displays adequate comfort level or baseline comfort level  2/2/2025 2223 by Raghu Garcia, RN  Outcome: Progressing  Patient 
  Problem: Self Harm/Suicidality  Goal: Will have no self-injury during hospital stay  Description: INTERVENTIONS:  1.  Ensure constant observer at bedside with Q15M safety checks  2.  Maintain a safe environment  3.  Secure patient belongings  4.  Ensure family/visitors adhere to safety recommendations  5.  Ensure safety tray has been added to patient's diet order  6.  Every shift and PRN: Re-assess suicidal risk via Frequent Screener    2/3/2025 2359 by Maria E Mitchell LPN  Outcome: Progressing  Note: Patient is free of self harm at this time.  Patient agrees to seek out staff if thoughts to harm self arise.  Staff will provide support and reassurance as needed.  Safety checks maintained every 15 minutes.      Problem: Depression  Goal: Will be euthymic at discharge  Description: INTERVENTIONS:  1. Administer medication as ordered  2. Provide emotional support via 1:1 interaction with staff  3. Encourage involvement in milieu/groups/activities  4. Monitor for social isolation  2/3/2025 2359 by Maria E Mitchell LPN  Outcome: Progressing  Note: Patient is out in dayroom, aloof of peers this shift. Patient denies suicidal/homicidal ideations and hallucinations this shift. Patient is cooperative and pleasant with assessments and medications this shift. Patient educated and agrees to come to staff if needed this shift. Patient monitored every 15 minutes with environmental safety checks.      Problem: Drug Abuse/Detox  Goal: Will have no detox symptoms and will verbalize plan for changing drug-related behavior  Description: INTERVENTIONS:  1. Administer medication as ordered  2. Monitor physical status  3. Provide emotional support with 1:1 interaction with staff  4. Encourage  recovery focused treatment   2/3/2025 2359 by Maria E Mitchell LPN  Outcome: Progressing  Note: Patient denies withdrawal symptoms at this time. Patient educated and agrees to come to staff if needed this shift. Patient monitored every 15 minutes with 
  Problem: Self Harm/Suicidality  Goal: Will have no self-injury during hospital stay  Description: INTERVENTIONS:  1.  Ensure constant observer at bedside with Q15M safety checks  2.  Maintain a safe environment  3.  Secure patient belongings  4.  Ensure family/visitors adhere to safety recommendations  5.  Ensure safety tray has been added to patient's diet order  6.  Every shift and PRN: Re-assess suicidal risk via Frequent Screener    2/4/2025 2128 by Mi Huerta LPN  Outcome: Progressing     Problem: Depression  Goal: Will be euthymic at discharge  Description: INTERVENTIONS:  1. Administer medication as ordered  2. Provide emotional support via 1:1 interaction with staff  3. Encourage involvement in milieu/groups/activities  4. Monitor for social isolation  2/4/2025 2128 by Mi Huerta LPN  Outcome: Progressing    Patient denies suicidal ideations at this time. Patient reports feeling \"better\" and is looking forward to discharge. Patient is medication compliant and behavior is controlled. Patient safety checks are maintained every 15 minutes,   
  Problem: Self Harm/Suicidality  Goal: Will have no self-injury during hospital stay  Description: INTERVENTIONS:  1.  Ensure constant observer at bedside with Q15M safety checks  2.  Maintain a safe environment  3.  Secure patient belongings  4.  Ensure family/visitors adhere to safety recommendations  5.  Ensure safety tray has been added to patient's diet order  6.  Every shift and PRN: Re-assess suicidal risk via Frequent Screener    Outcome: Progressing     Problem: Depression  Goal: Will be euthymic at discharge  Description: INTERVENTIONS:  1. Administer medication as ordered  2. Provide emotional support via 1:1 interaction with staff  3. Encourage involvement in milieu/groups/activities  4. Monitor for social isolation  Outcome: Progressing     Problem: Drug Abuse/Detox  Goal: Will have no detox symptoms and will verbalize plan for changing drug-related behavior  Description: INTERVENTIONS:  1. Administer medication as ordered  2. Monitor physical status  3. Provide emotional support with 1:1 interaction with staff  4. Encourage  recovery focused treatment   Outcome: Progressing     Problem: Pain  Goal: Verbalizes/displays adequate comfort level or baseline comfort level  Outcome: Progressing     Patient reports anxiety, depression and suicidal ideation.but contracts for safety while on unit. Patient is encouraged to notify staff if thoughts become intense or mood changes occur throughout shift. Patient is blunt, thought blocked, not interactive, withdrawn, isolative to room out for needs only, medication compliant and behavior controlled. Patient denies pain and all other drug detox symptoms and is encouraged to notify staff if any changes may occur and is in need of as needed medication. Patient is noted to be sleeping most of shift and having increased appetite. 15 minute rounding continues throughout shift for patient safety while on unit.   
  Problem: Self Harm/Suicidality  Goal: Will have no self-injury during hospital stay  Description: INTERVENTIONS:  1.  Ensure constant observer at bedside with Q15M safety checks  2.  Maintain a safe environment  3.  Secure patient belongings  4.  Ensure family/visitors adhere to safety recommendations  5.  Ensure safety tray has been added to patient's diet order  6.  Every shift and PRN: Re-assess suicidal risk via Frequent Screener    Outcome: Progressing     Problem: Depression  Goal: Will be euthymic at discharge  Description: INTERVENTIONS:  1. Administer medication as ordered  2. Provide emotional support via 1:1 interaction with staff  3. Encourage involvement in milieu/groups/activities  4. Monitor for social isolation  Outcome: Progressing     Problem: Drug Abuse/Detox  Goal: Will have no detox symptoms and will verbalize plan for changing drug-related behavior  Description: INTERVENTIONS:  1. Administer medication as ordered  2. Monitor physical status  3. Provide emotional support with 1:1 interaction with staff  4. Encourage  recovery focused treatment   Outcome: Progressing     Problem: Pain  Goal: Verbalizes/displays adequate comfort level or baseline comfort level  Outcome: Progressing     Patient reports depression and anxiety but denies suicidal ideation. Patient is encouraged to notify staff if thoughts or mood changes occur throughout shift. Patient denies all drug detox symptoms and is encouraged to notify staff if any changes occur. Patient reports headache as need medication given and patient educated to notify writer if any changes in pain scale occur. Patient has blunt affect, pleasant, cooperative, isolative to room, sleeps most of shift, aloof, disheveled , not interactive, medication compliant and behavior controled.  Patient reports adequate amount of sleep and normal appetite. 15 minute rounds continue throughout shift for patient safety while on unit.   
  Problem: Self Harm/Suicidality  Goal: Will have no self-injury during hospital stay  Description: INTERVENTIONS:  1.  Ensure constant observer at bedside with Q15M safety checks  2.  Maintain a safe environment  3.  Secure patient belongings  4.  Ensure family/visitors adhere to safety recommendations  5.  Ensure safety tray has been added to patient's diet order  6.  Every shift and PRN: Re-assess suicidal risk via Frequent Screener    Outcome: Progressing  Note: Pt denies suicidal thoughts, no self-injury present, will continue to reassess for suicidal ideations throughout shift, q 15 min safety checks maintained.     Problem: Pain  Goal: Verbalizes/displays adequate comfort level or baseline comfort level  Outcome: Progressing  Note: Pt denies pain at this time 0/10, will continue to reevaluate for presence of pain throughout shift, q 15 min safety checks maintained.     
  Problem: Self Harm/Suicidality  Goal: Will have no self-injury during hospital stay  Description: INTERVENTIONS:  1.  Ensure constant observer at bedside with Q15M safety checks  2.  Maintain a safe environment  3.  Secure patient belongings  4.  Ensure family/visitors adhere to safety recommendations  5.  Ensure safety tray has been added to patient's diet order  6.  Every shift and PRN: Re-assess suicidal risk via Frequent Screener    Outcome: Progressing  Patient has made no attempt to harm self at this time.      Problem: Depression  Goal: Will be euthymic at discharge  Description: INTERVENTIONS:  1. Administer medication as ordered  2. Provide emotional support via 1:1 interaction with staff  3. Encourage involvement in milieu/groups/activities  4. Monitor for social isolation  Outcome: Progressing  Patient remains isolative to his room. He is cooperative and reports his mood is better. He is depressed, anxious and helpless. He is positive for suicidal ideation but feels safe while here. Safety plan reviewed with patient, agrees to approach staff when feeling upset.  15 minute and random checks maintained for safety.  No violent or escalating behaviors noted during this shift. Patient is currently calm, controlled and medication-compliant.      Problem: Drug Abuse/Detox  Goal: Will have no detox symptoms and will verbalize plan for changing drug-related behavior  Description: INTERVENTIONS:  1. Administer medication as ordered  2. Monitor physical status  3. Provide emotional support with 1:1 interaction with staff  4. Encourage  recovery focused treatment   Outcome: Progressing  Patient denies withdrawal symptoms at this time.       Problem: Pain  Goal: Verbalizes/displays adequate comfort level or baseline comfort level  Outcome: Progressing  Patient denies pain at this time.       
[facility]  Day 3 Interdisciplinary Treatment Plan NOTE    Review Date & Time: 1/30/2025 1245    Admission Type:   Admission Type: Voluntary    Reason for admission:  Reason for Admission: The patient states that he has been feeling suicidal for the past week. He states that there has been a lot of stressors in life especially relapsing on drugs. The patient states that he has a suicidal plan to over dose on fentanyl. The patient reports that he has access to fentanyl to overdose.  Estimated Length of Stay:  5-7 days  Estimated Discharge Date Update:   to be determined by physician    PATIENT STRENGTHS:  Patient Strengths:   Patient Strengths and Limitations:Limitations: Difficulty problem solving/relies on others to help solve problems, Inappropriate/potentially harmful leisure interests, Apathetic / unmotivated  Addictive Behavior:Addictive Behavior  In the Past 3 Months, Have You Felt or Has Someone Told You That You Have a Problem With  : None  Medical Problems:  Past Medical History:   Diagnosis Date    Alcoholism (HCC)     Anxiety     Bipolar 1 disorder (HCC)     Cocaine abuse (HCC)     Depression     Hepatitis C antibody test positive     Hypertension     Irregular heartbeat     Mild tetrahydrocannabinol (THC) abuse     Suicidal ideation        Risk:  Fall Risk   Joao Scale Joao Scale Score: 22  BVC    Change in scores:  No Changes to plan of Care:  No    Status EXAM:   Mental Status and Behavioral Exam  Normal: No  Level of Assistance: Independent/Self  Facial Expression: Avoids Gaze, Flat  Affect: Blunt  Level of Consciousness: Alert  Frequency of Checks: 4 times per hour, close  Mood:Normal: No  Mood: Depressed, Anxious, Helpless  Motor Activity:Normal: No  Motor Activity: Decreased  Eye Contact: Fair  Observed Behavior: Guarded, Cooperative, Preoccupied  Sexual Misconduct History: Current - no  Preception: [place] to person, [place] to time, [place] to place, [place] to 
[facility]  Initial Interdisciplinary Treatment Plan NO      Original treatment plan Date & Time: 1/28/2025  11:10 am    Admission Type:  Admission Type: Voluntary    Reason for admission:   Reason for Admission: The patient states that he has been feeling suicidal for the past week. He states that there has been a lot of stressors in life especially relapsing on drugs. The patient states that he has a suicidal plan to over dose on fentanyl. The patient reports that he has access to fentanyl to overdose.    Estimated Length of Stay:  5-7days  Estimated Discharge Date: to be determined by physician    PATIENT STRENGTHS:  Patient Strengths:   Patient Strengths and Limitations:Limitations: Difficulty problem solving/relies on others to help solve problems, Inappropriate/potentially harmful leisure interests, Apathetic / unmotivated  Addictive Behavior: Addictive Behavior  In the Past 3 Months, Have You Felt or Has Someone Told You That You Have a Problem With  : None  Medical Problems:  Past Medical History:   Diagnosis Date    Alcoholism (HCC)     Anxiety     Bipolar 1 disorder (HCC)     Cocaine abuse (HCC)     Depression     Hepatitis C antibody test positive     Hypertension     Irregular heartbeat     Mild tetrahydrocannabinol (THC) abuse     Suicidal ideation      Status EXAM:Mental Status and Behavioral Exam  Normal: No  Level of Assistance: Independent/Self  Facial Expression: Avoids Gaze, Sad, Flat  Affect: Appropriate  Level of Consciousness: Alert  Frequency of Checks: 4 times per hour, close  Mood:Normal: No  Mood: Helpless, Sad, Despair, Worthless, low self-esteem, Depressed  Motor Activity:Normal: Yes  Eye Contact: Fair  Observed Behavior: Cooperative, Withdrawn, Preoccupied  Sexual Misconduct History: Current - no  Preception: [place] to person, [place] to time, [place] to place, [place] to situation  Attention:Normal: Yes  Thought Processes: Unremarkable  Thought Content:Normal: No  Thought 
Progressing  Note: Patient denies withdrawal symptoms at this time. Patient educated and agrees to come to staff if needed this shift. Patient monitored every 15 minutes with environmental safety checks.      Problem: Pain  Goal: Verbalizes/displays adequate comfort level or baseline comfort level  2/4/2025 0902 by Zandra Topete LPN  Outcome: Progressing  2/3/2025 2359 by Maria E Mitchell LPN  Outcome: Progressing    Patient reports depression and anxiety but says it has improved since admission . Patient does deny suicidal ideation and is encouraged to notify staff if thoughts or mood changes occur throughout shift. Patient denies pain and any drug detox symptoms but is educated to inform staff if any changes occur at any time .  Patient reports normal appetite and adequate amount of sleep . Patient is brightened, isolative to room out for needs, cooperative , pleasant, medication compliant and behavior controled. 15 minute rounding continues while on unit for patient safety.      
staff  1/29/2025 2153 by Raghu Garcia, RN  Outcome: Progressing     Problem: Pain  Goal: Verbalizes/displays adequate comfort level or baseline comfort level  1/30/2025 0959 by Abi Jang, RN  Outcome: Progressing  Note: Patient denies pain at time of assessment. Patient is educated on 0-10 pain scale and PRN pain medications.   1/29/2025 2153 by Raghu Garcia, RN  Outcome: Progressing

## 2025-03-07 ENCOUNTER — HOSPITAL ENCOUNTER (EMERGENCY)
Age: 35
Discharge: HOME | End: 2025-03-07
Attending: EMERGENCY MEDICINE
Payer: MEDICAID

## 2025-03-07 ENCOUNTER — APPOINTMENT (OUTPATIENT)
Dept: GENERAL RADIOLOGY | Age: 35
End: 2025-03-07
Payer: MEDICAID

## 2025-03-07 VITALS
RESPIRATION RATE: 12 BRPM | DIASTOLIC BLOOD PRESSURE: 85 MMHG | SYSTOLIC BLOOD PRESSURE: 131 MMHG | WEIGHT: 200 LBS | BODY MASS INDEX: 28.63 KG/M2 | HEIGHT: 70 IN | TEMPERATURE: 98.2 F | HEART RATE: 69 BPM | OXYGEN SATURATION: 94 %

## 2025-03-07 DIAGNOSIS — R07.9 CHEST PAIN, UNSPECIFIED TYPE: Primary | ICD-10-CM

## 2025-03-07 LAB
ANION GAP SERPL CALCULATED.3IONS-SCNC: 10 MMOL/L (ref 9–16)
BUN SERPL-MCNC: 10 MG/DL (ref 6–20)
CALCIUM SERPL-MCNC: 9.3 MG/DL (ref 8.6–10.4)
CHLORIDE SERPL-SCNC: 103 MMOL/L (ref 98–107)
CO2 SERPL-SCNC: 25 MMOL/L (ref 20–31)
CREAT SERPL-MCNC: 0.9 MG/DL (ref 0.7–1.2)
EKG ATRIAL RATE: 70 BPM
EKG P AXIS: 45 DEGREES
EKG P-R INTERVAL: 138 MS
EKG Q-T INTERVAL: 402 MS
EKG QRS DURATION: 78 MS
EKG QTC CALCULATION (BAZETT): 434 MS
EKG R AXIS: 34 DEGREES
EKG T AXIS: 22 DEGREES
EKG VENTRICULAR RATE: 70 BPM
GFR, ESTIMATED: >90 ML/MIN/1.73M2
GLUCOSE SERPL-MCNC: 118 MG/DL (ref 74–99)
POTASSIUM SERPL-SCNC: 4 MMOL/L (ref 3.7–5.3)
SODIUM SERPL-SCNC: 137 MMOL/L (ref 136–145)
TROPONIN I SERPL HS-MCNC: <6 NG/L (ref 0–22)

## 2025-03-07 PROCEDURE — 6360000002 HC RX W HCPCS: Performed by: EMERGENCY MEDICINE

## 2025-03-07 PROCEDURE — 84484 ASSAY OF TROPONIN QUANT: CPT

## 2025-03-07 PROCEDURE — 93005 ELECTROCARDIOGRAM TRACING: CPT | Performed by: EMERGENCY MEDICINE

## 2025-03-07 PROCEDURE — 80048 BASIC METABOLIC PNL TOTAL CA: CPT

## 2025-03-07 PROCEDURE — 96375 TX/PRO/DX INJ NEW DRUG ADDON: CPT

## 2025-03-07 PROCEDURE — 96374 THER/PROPH/DIAG INJ IV PUSH: CPT

## 2025-03-07 PROCEDURE — 99285 EMERGENCY DEPT VISIT HI MDM: CPT

## 2025-03-07 PROCEDURE — 71046 X-RAY EXAM CHEST 2 VIEWS: CPT

## 2025-03-07 RX ORDER — IBUPROFEN 600 MG/1
600 TABLET, FILM COATED ORAL EVERY 6 HOURS PRN
Qty: 120 TABLET | Refills: 0 | Status: SHIPPED | OUTPATIENT
Start: 2025-03-07

## 2025-03-07 RX ORDER — CYCLOBENZAPRINE HCL 10 MG
10 TABLET ORAL NIGHTLY PRN
Qty: 10 TABLET | Refills: 0 | Status: SHIPPED | OUTPATIENT
Start: 2025-03-07 | End: 2025-03-17

## 2025-03-07 RX ORDER — ORPHENADRINE CITRATE 30 MG/ML
60 INJECTION INTRAMUSCULAR; INTRAVENOUS ONCE
Status: COMPLETED | OUTPATIENT
Start: 2025-03-07 | End: 2025-03-07

## 2025-03-07 RX ORDER — KETOROLAC TROMETHAMINE 30 MG/ML
30 INJECTION, SOLUTION INTRAMUSCULAR; INTRAVENOUS ONCE
Status: COMPLETED | OUTPATIENT
Start: 2025-03-07 | End: 2025-03-07

## 2025-03-07 RX ADMIN — KETOROLAC TROMETHAMINE 30 MG: 30 INJECTION, SOLUTION INTRAMUSCULAR at 13:08

## 2025-03-07 RX ADMIN — ORPHENADRINE CITRATE 60 MG: 60 INJECTION INTRAMUSCULAR; INTRAVENOUS at 13:08

## 2025-03-07 ASSESSMENT — PAIN SCALES - GENERAL: PAINLEVEL_OUTOF10: 6
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
## 2025-04-07 NOTE — ED PROVIDER NOTES
[facility]     Emergency Department     Faculty Attestation    I performed a history and physical examination of the patient and discussed management with the resident. I have reviewed and agree with the residents findings including all diagnostic interpretations, and treatment plans as written. Any areas of disagreement are noted on the chart. I was personally present for the key portions of any procedures. I have documented in the chart those procedures where I was not present during the key portions. I have reviewed the emergency nurses triage note. I agree with the chief complaint, past medical history, past surgical history, allergies, medications, social and family history as documented unless otherwise noted below. Documentation of the HPI, Physical Exam and Medical Decision Making performed by scribozzy is based on my personal performance of the HPI, PE and MDM. For Physician Assistant/ Nurse Practitioner cases/documentation I have personally evaluated this patient and have completed at least one if not all key elements of the E/M (history, physical exam, and MDM). Additional findings are as noted. 26 yo M c/o suicidal or homicidal ideation, pt denies plan, no fever, no vomit, no injury, pt denies cp,   Pe vss gcs 15, steady even gait, no cervical tenderness, crepitus or deformity, chest symmetric, no pronator drift,     [facility] to [facility]    EKG Interpretation    Interpreted by me      CRITICAL CARE: There was a high probability of clinically significant/life threatening deterioration in this patient's condition which required my urgent intervention. Total critical care time was 5 minutes. This excludes any time for separately reportable procedures.        Ranjan Dominguez, DO  08/09/22 Carlos Enrique , DO  08/09/22 9113 Return for Next scheduled follow up.

## 2025-04-25 ENCOUNTER — APPOINTMENT (OUTPATIENT)
Dept: GENERAL RADIOLOGY | Age: 35
End: 2025-04-25
Payer: MEDICAID

## 2025-04-25 ENCOUNTER — HOSPITAL ENCOUNTER (EMERGENCY)
Age: 35
Discharge: HOME | End: 2025-04-25
Attending: EMERGENCY MEDICINE
Payer: MEDICAID

## 2025-04-25 VITALS
RESPIRATION RATE: 16 BRPM | HEIGHT: 70 IN | DIASTOLIC BLOOD PRESSURE: 90 MMHG | OXYGEN SATURATION: 100 % | SYSTOLIC BLOOD PRESSURE: 125 MMHG | HEART RATE: 94 BPM | BODY MASS INDEX: 28.63 KG/M2 | TEMPERATURE: 98.6 F | WEIGHT: 200 LBS

## 2025-04-25 DIAGNOSIS — S62.320A CLOSED DISPLACED FRACTURE OF SHAFT OF SECOND METACARPAL BONE OF RIGHT HAND, INITIAL ENCOUNTER: Primary | ICD-10-CM

## 2025-04-25 PROCEDURE — 99283 EMERGENCY DEPT VISIT LOW MDM: CPT | Performed by: EMERGENCY MEDICINE

## 2025-04-25 PROCEDURE — 73130 X-RAY EXAM OF HAND: CPT

## 2025-04-25 PROCEDURE — 6370000000 HC RX 637 (ALT 250 FOR IP)

## 2025-04-25 PROCEDURE — 73110 X-RAY EXAM OF WRIST: CPT

## 2025-04-25 RX ORDER — IBUPROFEN 800 MG/1
800 TABLET, FILM COATED ORAL ONCE
Status: COMPLETED | OUTPATIENT
Start: 2025-04-25 | End: 2025-04-25

## 2025-04-25 RX ORDER — ACETAMINOPHEN 500 MG
1000 TABLET ORAL ONCE
Status: COMPLETED | OUTPATIENT
Start: 2025-04-25 | End: 2025-04-25

## 2025-04-25 RX ADMIN — IBUPROFEN 800 MG: 800 TABLET, FILM COATED ORAL at 01:32

## 2025-04-25 RX ADMIN — ACETAMINOPHEN 1000 MG: 500 TABLET ORAL at 01:32

## 2025-04-25 ASSESSMENT — PAIN DESCRIPTION - ORIENTATION: ORIENTATION: RIGHT

## 2025-04-25 ASSESSMENT — PAIN SCALES - GENERAL: PAINLEVEL_OUTOF10: 10

## 2025-04-25 ASSESSMENT — PAIN DESCRIPTION - LOCATION: LOCATION: HAND

## 2025-04-25 ASSESSMENT — PAIN - FUNCTIONAL ASSESSMENT
PAIN_FUNCTIONAL_ASSESSMENT: 0-10
PAIN_FUNCTIONAL_ASSESSMENT: NONE - DENIES PAIN
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
## 2025-04-25 NOTE — DISCHARGE INSTRUCTIONS
You were seen and evaluated at [facility] emergency department for right hand injury.  You have a fracture of the second metacarpal bone of the right hand.  A splint was placed.  You will need to follow-up with plastics/hand surgery office.  Attached is contact information become established.  Do not get the splinting wet.  Please return to the emergency department if this does get wet.  Please watch out for signs of numbness, tingling, color changes, intractable pain, all would be reasons to come back to the emergency department.    Again it is extremely important that you follow-up with primary care provider as well as plastic/hand surgeon office.    Please return to the ED with any new or worsening symptoms or concerns.

## 2025-07-08 ENCOUNTER — APPOINTMENT (OUTPATIENT)
Dept: GENERAL RADIOLOGY | Age: 35
End: 2025-07-08
Payer: COMMERCIAL

## 2025-07-08 ENCOUNTER — HOSPITAL ENCOUNTER (EMERGENCY)
Age: 35
Discharge: HOME | End: 2025-07-08
Attending: EMERGENCY MEDICINE
Payer: COMMERCIAL

## 2025-07-08 VITALS
TEMPERATURE: 97.9 F | HEIGHT: 70 IN | RESPIRATION RATE: 20 BRPM | SYSTOLIC BLOOD PRESSURE: 122 MMHG | HEART RATE: 68 BPM | WEIGHT: 170 LBS | OXYGEN SATURATION: 100 % | DIASTOLIC BLOOD PRESSURE: 66 MMHG | BODY MASS INDEX: 24.34 KG/M2

## 2025-07-08 DIAGNOSIS — F14.90 COCAINE USE: ICD-10-CM

## 2025-07-08 DIAGNOSIS — R06.02 SHORTNESS OF BREATH: Primary | ICD-10-CM

## 2025-07-08 LAB
ALBUMIN SERPL-MCNC: 4 G/DL (ref 3.5–5.2)
ALBUMIN/GLOB SERPL: 1.4 {RATIO} (ref 1–2.5)
ALP SERPL-CCNC: 64 U/L (ref 40–129)
ALT SERPL-CCNC: 55 U/L (ref 10–50)
ANION GAP SERPL CALCULATED.3IONS-SCNC: 10 MMOL/L (ref 9–16)
AST SERPL-CCNC: 46 U/L (ref 10–50)
BASOPHILS # BLD: 0.03 K/UL (ref 0–0.2)
BASOPHILS NFR BLD: 1 % (ref 0–2)
BILIRUB SERPL-MCNC: 0.3 MG/DL (ref 0–1.2)
BUN SERPL-MCNC: 15 MG/DL (ref 6–20)
CALCIUM SERPL-MCNC: 8.9 MG/DL (ref 8.6–10.4)
CHLORIDE SERPL-SCNC: 105 MMOL/L (ref 98–107)
CO2 SERPL-SCNC: 25 MMOL/L (ref 20–31)
CREAT SERPL-MCNC: 1 MG/DL (ref 0.7–1.2)
EOSINOPHIL # BLD: 0.13 K/UL (ref 0–0.44)
EOSINOPHILS RELATIVE PERCENT: 3 % (ref 1–4)
ERYTHROCYTE [DISTWIDTH] IN BLOOD BY AUTOMATED COUNT: 12 % (ref 11.8–14.4)
GFR, ESTIMATED: >90 ML/MIN/1.73M2
GLUCOSE BLD-MCNC: 86 MG/DL (ref 75–110)
GLUCOSE SERPL-MCNC: 87 MG/DL (ref 74–99)
HCT VFR BLD AUTO: 37 % (ref 40.7–50.3)
HGB BLD-MCNC: 12 G/DL (ref 13–17)
IMM GRANULOCYTES # BLD AUTO: <0.03 K/UL (ref 0–0.3)
IMM GRANULOCYTES NFR BLD: 0 %
LIPASE SERPL-CCNC: 21 U/L (ref 13–60)
LYMPHOCYTES NFR BLD: 2.7 K/UL (ref 1.1–3.7)
LYMPHOCYTES RELATIVE PERCENT: 52 % (ref 24–43)
MCH RBC QN AUTO: 28.9 PG (ref 25.2–33.5)
MCHC RBC AUTO-ENTMCNC: 32.4 G/DL (ref 28.4–34.8)
MCV RBC AUTO: 89.2 FL (ref 82.6–102.9)
MONOCYTES NFR BLD: 0.53 K/UL (ref 0.1–1.2)
MONOCYTES NFR BLD: 10 % (ref 3–12)
NEUTROPHILS NFR BLD: 34 % (ref 36–65)
NEUTS SEG NFR BLD: 1.75 K/UL (ref 1.5–8.1)
NRBC BLD-RTO: 0 PER 100 WBC
PLATELET # BLD AUTO: 308 K/UL (ref 138–453)
PMV BLD AUTO: 9 FL (ref 8.1–13.5)
POTASSIUM SERPL-SCNC: 4 MMOL/L (ref 3.7–5.3)
PROT SERPL-MCNC: 6.8 G/DL (ref 6.6–8.7)
RBC # BLD AUTO: 4.15 M/UL (ref 4.21–5.77)
SODIUM SERPL-SCNC: 140 MMOL/L (ref 136–145)
TROPONIN I SERPL HS-MCNC: <6 NG/L (ref 0–22)
TROPONIN I SERPL HS-MCNC: <6 NG/L (ref 0–22)
WBC OTHER # BLD: 5.2 K/UL (ref 3.5–11.3)

## 2025-07-08 PROCEDURE — 80053 COMPREHEN METABOLIC PANEL: CPT

## 2025-07-08 PROCEDURE — 99285 EMERGENCY DEPT VISIT HI MDM: CPT | Performed by: EMERGENCY MEDICINE

## 2025-07-08 PROCEDURE — 84484 ASSAY OF TROPONIN QUANT: CPT

## 2025-07-08 PROCEDURE — 82947 ASSAY GLUCOSE BLOOD QUANT: CPT

## 2025-07-08 PROCEDURE — 71045 X-RAY EXAM CHEST 1 VIEW: CPT

## 2025-07-08 PROCEDURE — 93005 ELECTROCARDIOGRAM TRACING: CPT

## 2025-07-08 PROCEDURE — 83690 ASSAY OF LIPASE: CPT

## 2025-07-08 PROCEDURE — 85025 COMPLETE CBC W/AUTO DIFF WBC: CPT

## 2025-07-08 ASSESSMENT — ENCOUNTER SYMPTOMS
COUGH: 0
ABDOMINAL PAIN: 0

## 2025-07-08 NOTE — ED TRIAGE NOTES
Pt presents to ED via [facility] for SOB that occurred while he was using crack cocaine. Pt states he became SOB while smoking crack and marijuana. Pt denies n/v/d, LOC, CP, fever, chills, injury/trauma, change in bowel/bladder function, loss of appetite, pain, or any other sx.    Pt is A&OX4, placed on full monitor, EKG done, IV established, changed into gown and given warm blankets. Labs drawn, labeled, and sent to lab. Pt vitals show HTN. Pt is squirming around on stretcher, grinding teeth, appears to be under the influence of crack cocaine. Pt smells strongly of marijuana, states they let him keep \"his weed\" and that it is in his pocket. Blood glucose checked and is 86. White board updated, and patient is updated on plan of care.

## 2025-07-08 NOTE — ED NOTES
Pt dropped his water all over the bed. Pt's linen changed and pt is changed into a new gown.  Pt is given new warm blankets. Denies further needs at this time.

## 2025-07-08 NOTE — ED NOTES
Writer met with patient regarding concerns for substance use.  Patient indicates that he used crack cocaine and also should be following up regarding his mental health needs.  He denies feeling suicidal/homicidal.  Resources provided, patient was ambivalent regarding follow up for substances but was in agreement to take the resources. He denied additional resources.

## 2025-07-08 NOTE — ED PROVIDER NOTES
[facility]     Emergency Department     Faculty Note/ Attestation      12:47 PM EDT  Pt Name: Bob Gastelum                                       MRN: [id number]  Birthdate 1990  Date of evaluation: 7/8/2025  Patients PCP:    No primary care provider on file.    Attestation  I performed a history and physical examination of the patient/ or directly observed  and discussed management with the resident. I reviewed the resident’s note and agree with the documented findings and plan of care. Any areas of disagreement are noted on the chart. I was personally present for the key portions of any procedures. I have documented in the chart those procedures where I was not present during the key portions. I have reviewed the emergency nurses triage note. I agree with the chief complaint, past medical history, past surgical history, allergies, medications, social and family history as documented unless otherwise noted below.    For Physician Assistant/ Nurse Practitioner cases/documentation I have personally evaluated this patient and have completed at least one if not all key elements of the E/M (history, physical exam, and MDM). Additional findings are as noted.       Initial Screens:     -------------------------------------     ----------------------------------------  Tasha Coma Scale  Eye Opening: Spontaneous  Best Verbal Response: Oriented  Best Motor Response: Obeys commands  Tasha Coma Scale Score: 15  ------------------------------------------------------------------------------------------------------------  Vitals:    Vitals:    07/08/25 1156 07/08/25 1200 07/08/25 1206 07/08/25 1219   BP: (!) 149/90 122/66     Pulse: 80 84 84 85   Resp: 16 18 22 14   Temp:       TempSrc:       SpO2: 97% 97% 99% 97%   Weight:       Height:           Chief Complaint      Chief Complaint   Patient presents with    Addiction Problem    Shortness of Breath          height is 1.778 m (5' 10\") and weight

## 2025-07-08 NOTE — DISCHARGE INSTRUCTIONS
Call today or tomorrow to follow up with your PCP in 2 days. If you do not have a PCP Blood number for our [place] clinic, please call establish care with a provider.  You are advised to stop using drugs including cocaine.    Please take all medications as prescribed.  You can also use ibuprofen or Tylenol (unless prescribed medications that have Tylenol in it) for pain.  You can take over the counter Ibuprofen (advil) tablets (4 every 8 hours or 3 every 6 hours or 2 every 4 hours)      Please return to the Emergency Department if you develop any symptoms that concern you, including the following: increasing pain, shortness of breath, swelling to your feet, unable to lay flat, vomiting, fevers, numbness, weakness, loss of bladder/bowel control, loss of consciousness or any other concerns.

## 2025-07-09 NOTE — ED PROVIDER NOTES
[facility] EMERGENCY DEPARTMENT  Emergency Department Encounter  Emergency Medicine Resident     Pt Name:Bob Gastelum  MRN: [id number]  Birthdate 1990  Date of evaluation: 7/8/25  PCP:  No primary care provider on file.  Note Started: 11:03 PM EDT      CHIEF COMPLAINT       Chief Complaint   Patient presents with    Addiction Problem    Shortness of Breath       HISTORY OF PRESENT ILLNESS  (Location/Symptom, Timing/Onset, Context/Setting, Quality, Duration, Modifying Factors, Severity.)      Bob Gastelum is a 34 y.o. male who presents with shortness of breath.  Patient reports that he was smoking crack cocaine when he started to feel short of breath.  Patient is a daily cocaine user.  He also was smoking a blunt when he started feeling short of breath.  He is complaining of generalized chest pain 2.  He denies any concomitant drug use or alcohol use.  Denies any fever, chills, sweats, cough, abdominal pain, nausea, vomiting, changes in appetite.  Denies suicidal or homicidal ideation    PAST MEDICAL / SURGICAL / SOCIAL / FAMILY HISTORY      has a past medical history of Alcoholism (HCC), Anxiety, Bipolar 1 disorder (HCC), Cocaine abuse (HCC), Depression, Hepatitis C antibody test positive, Hypertension, Irregular heartbeat, Mild tetrahydrocannabinol (THC) abuse, and Suicidal ideation.     has a past surgical history that includes other surgical history (Right, 06/04/2017); Dialysis fistula creation (Right, 6/4/2017); EXPLORATION OF WOUND OF EXTREMITY (Right, 6/4/2017); and Femur Surgery (Left).    Social History     Socioeconomic History    Marital status: Single     Spouse name: magalys    Number of children: 1    Years of education: Not on file    Highest education level: Not on file   Occupational History    Occupation: unemployed     Comment: used to work at [employer] 6 months ago   Tobacco Use    Smoking status: Every Day     Current packs/day: 0.50     Average packs/day: 0.5 packs/day for

## 2025-07-10 LAB
EKG ATRIAL RATE: 73 BPM
EKG P AXIS: 83 DEGREES
EKG P-R INTERVAL: 132 MS
EKG Q-T INTERVAL: 412 MS
EKG QRS DURATION: 86 MS
EKG QTC CALCULATION (BAZETT): 453 MS
EKG R AXIS: 70 DEGREES
EKG T AXIS: 46 DEGREES
EKG VENTRICULAR RATE: 73 BPM

## 2025-08-24 ENCOUNTER — HOSPITAL ENCOUNTER (INPATIENT)
Age: 35
LOS: 4 days | Discharge: HOME | End: 2025-08-28
Attending: PSYCHIATRY & NEUROLOGY | Admitting: PSYCHIATRY & NEUROLOGY
Payer: COMMERCIAL

## 2025-08-24 ENCOUNTER — HOSPITAL ENCOUNTER (EMERGENCY)
Age: 35
Discharge: PSYCHIATRIC OR SUBSTANCE USE FACILITY | End: 2025-08-24
Attending: EMERGENCY MEDICINE
Payer: COMMERCIAL

## 2025-08-24 VITALS
OXYGEN SATURATION: 100 % | HEART RATE: 57 BPM | RESPIRATION RATE: 16 BRPM | DIASTOLIC BLOOD PRESSURE: 91 MMHG | BODY MASS INDEX: 24.34 KG/M2 | WEIGHT: 170 LBS | HEIGHT: 70 IN | SYSTOLIC BLOOD PRESSURE: 140 MMHG | TEMPERATURE: 98.6 F

## 2025-08-24 DIAGNOSIS — R45.851 SUICIDAL IDEATION: Primary | ICD-10-CM

## 2025-08-24 LAB
ALBUMIN SERPL-MCNC: 3.8 G/DL (ref 3.5–5.2)
ALBUMIN/GLOB SERPL: 1.4 {RATIO} (ref 1–2.5)
ALP SERPL-CCNC: 56 U/L (ref 40–129)
ALT SERPL-CCNC: 39 U/L (ref 10–50)
AMPHET UR QL SCN: NEGATIVE
ANION GAP SERPL CALCULATED.3IONS-SCNC: 9 MMOL/L (ref 9–16)
AST SERPL-CCNC: 40 U/L (ref 10–50)
BARBITURATES UR QL SCN: NEGATIVE
BASOPHILS # BLD: 0.04 K/UL (ref 0–0.2)
BASOPHILS NFR BLD: 1 % (ref 0–2)
BENZODIAZ UR QL: NEGATIVE
BILIRUB SERPL-MCNC: 0.2 MG/DL (ref 0–1.2)
BUN SERPL-MCNC: 6 MG/DL (ref 6–20)
CALCIUM SERPL-MCNC: 9.2 MG/DL (ref 8.6–10.4)
CANNABINOIDS UR QL SCN: POSITIVE
CHLORIDE SERPL-SCNC: 100 MMOL/L (ref 98–107)
CO2 SERPL-SCNC: 28 MMOL/L (ref 20–31)
COCAINE UR QL SCN: POSITIVE
CREAT SERPL-MCNC: 0.7 MG/DL (ref 0.7–1.2)
EKG ATRIAL RATE: 63 BPM
EKG P AXIS: 67 DEGREES
EKG P-R INTERVAL: 136 MS
EKG Q-T INTERVAL: 448 MS
EKG QRS DURATION: 86 MS
EKG QTC CALCULATION (BAZETT): 458 MS
EKG R AXIS: 49 DEGREES
EKG T AXIS: 17 DEGREES
EKG VENTRICULAR RATE: 63 BPM
EOSINOPHIL # BLD: 0.17 K/UL (ref 0–0.44)
EOSINOPHILS RELATIVE PERCENT: 3 % (ref 1–4)
ERYTHROCYTE [DISTWIDTH] IN BLOOD BY AUTOMATED COUNT: 12.7 % (ref 11.8–14.4)
ETHANOL PERCENT: <0.01 %
ETHANOLAMINE SERPL-MCNC: <10 MG/DL (ref 0–0.08)
FENTANYL UR QL: POSITIVE
GFR, ESTIMATED: >90 ML/MIN/1.73M2
GLUCOSE SERPL-MCNC: 90 MG/DL (ref 74–99)
HCT VFR BLD AUTO: 35.7 % (ref 40.7–50.3)
HGB BLD-MCNC: 11.4 G/DL (ref 13–17)
IMM GRANULOCYTES # BLD AUTO: <0.03 K/UL (ref 0–0.3)
IMM GRANULOCYTES NFR BLD: 0 %
LYMPHOCYTES NFR BLD: 2.32 K/UL (ref 1.1–3.7)
LYMPHOCYTES RELATIVE PERCENT: 40 % (ref 24–43)
MCH RBC QN AUTO: 28.9 PG (ref 25.2–33.5)
MCHC RBC AUTO-ENTMCNC: 31.9 G/DL (ref 28.4–34.8)
MCV RBC AUTO: 90.4 FL (ref 82.6–102.9)
METHADONE UR QL: NEGATIVE
MONOCYTES NFR BLD: 0.52 K/UL (ref 0.1–1.2)
MONOCYTES NFR BLD: 9 % (ref 3–12)
NEUTROPHILS NFR BLD: 47 % (ref 36–65)
NEUTS SEG NFR BLD: 2.79 K/UL (ref 1.5–8.1)
NRBC BLD-RTO: 0 PER 100 WBC
OPIATES UR QL SCN: NEGATIVE
OXYCODONE UR QL SCN: NEGATIVE
PCP UR QL SCN: NEGATIVE
PLATELET # BLD AUTO: 311 K/UL (ref 138–453)
PMV BLD AUTO: 9.4 FL (ref 8.1–13.5)
POTASSIUM SERPL-SCNC: 3.9 MMOL/L (ref 3.7–5.3)
PROT SERPL-MCNC: 6.6 G/DL (ref 6.6–8.7)
RBC # BLD AUTO: 3.95 M/UL (ref 4.21–5.77)
SODIUM SERPL-SCNC: 137 MMOL/L (ref 136–145)
TEST INFORMATION: ABNORMAL
WBC OTHER # BLD: 5.9 K/UL (ref 3.5–11.3)

## 2025-08-24 PROCEDURE — 1240000000 HC EMOTIONAL WELLNESS R&B

## 2025-08-24 PROCEDURE — 99285 EMERGENCY DEPT VISIT HI MDM: CPT

## 2025-08-24 PROCEDURE — 80053 COMPREHEN METABOLIC PANEL: CPT

## 2025-08-24 PROCEDURE — 80307 DRUG TEST PRSMV CHEM ANLYZR: CPT

## 2025-08-24 PROCEDURE — G0480 DRUG TEST DEF 1-7 CLASSES: HCPCS

## 2025-08-24 PROCEDURE — 85025 COMPLETE CBC W/AUTO DIFF WBC: CPT

## 2025-08-24 PROCEDURE — 93005 ELECTROCARDIOGRAM TRACING: CPT

## 2025-08-24 PROCEDURE — 93010 ELECTROCARDIOGRAM REPORT: CPT | Performed by: INTERNAL MEDICINE

## 2025-08-24 RX ORDER — IBUPROFEN 400 MG/1
400 TABLET, FILM COATED ORAL EVERY 6 HOURS PRN
Status: DISCONTINUED | OUTPATIENT
Start: 2025-08-24 | End: 2025-08-28 | Stop reason: HOSPADM

## 2025-08-24 RX ORDER — POLYETHYLENE GLYCOL 3350 17 G/17G
17 POWDER, FOR SOLUTION ORAL DAILY PRN
Status: DISCONTINUED | OUTPATIENT
Start: 2025-08-24 | End: 2025-08-28 | Stop reason: HOSPADM

## 2025-08-24 RX ORDER — DIPHENHYDRAMINE HYDROCHLORIDE 50 MG/ML
50 INJECTION, SOLUTION INTRAMUSCULAR; INTRAVENOUS EVERY 6 HOURS PRN
Status: DISCONTINUED | OUTPATIENT
Start: 2025-08-24 | End: 2025-08-28 | Stop reason: HOSPADM

## 2025-08-24 RX ORDER — MAGNESIUM HYDROXIDE/ALUMINUM HYDROXICE/SIMETHICONE 120; 1200; 1200 MG/30ML; MG/30ML; MG/30ML
30 SUSPENSION ORAL EVERY 6 HOURS PRN
Status: DISCONTINUED | OUTPATIENT
Start: 2025-08-24 | End: 2025-08-28 | Stop reason: HOSPADM

## 2025-08-24 RX ORDER — HALOPERIDOL 5 MG/1
5 TABLET ORAL EVERY 6 HOURS PRN
Status: DISCONTINUED | OUTPATIENT
Start: 2025-08-24 | End: 2025-08-28 | Stop reason: HOSPADM

## 2025-08-24 RX ORDER — HALOPERIDOL 5 MG/ML
5 INJECTION INTRAMUSCULAR EVERY 6 HOURS PRN
Status: DISCONTINUED | OUTPATIENT
Start: 2025-08-24 | End: 2025-08-28 | Stop reason: HOSPADM

## 2025-08-24 RX ORDER — TRAZODONE HYDROCHLORIDE 50 MG/1
50 TABLET ORAL NIGHTLY PRN
Status: DISCONTINUED | OUTPATIENT
Start: 2025-08-24 | End: 2025-08-28 | Stop reason: HOSPADM

## 2025-08-24 RX ORDER — CYCLOBENZAPRINE HCL 10 MG
10 TABLET ORAL 3 TIMES DAILY PRN
Status: DISCONTINUED | OUTPATIENT
Start: 2025-08-24 | End: 2025-08-28 | Stop reason: HOSPADM

## 2025-08-24 RX ORDER — POLYETHYLENE GLYCOL 3350 17 G
2 POWDER IN PACKET (EA) ORAL
Status: DISCONTINUED | OUTPATIENT
Start: 2025-08-24 | End: 2025-08-28 | Stop reason: HOSPADM

## 2025-08-24 RX ORDER — CLONIDINE HYDROCHLORIDE 0.1 MG/1
0.1 TABLET ORAL EVERY 4 HOURS PRN
Status: DISCONTINUED | OUTPATIENT
Start: 2025-08-24 | End: 2025-08-28 | Stop reason: HOSPADM

## 2025-08-24 RX ORDER — HYDROXYZINE HYDROCHLORIDE 50 MG/1
50 TABLET, FILM COATED ORAL 3 TIMES DAILY PRN
Status: DISCONTINUED | OUTPATIENT
Start: 2025-08-24 | End: 2025-08-28 | Stop reason: HOSPADM

## 2025-08-24 RX ORDER — DICYCLOMINE HYDROCHLORIDE 10 MG/1
20 CAPSULE ORAL 4 TIMES DAILY PRN
Status: DISCONTINUED | OUTPATIENT
Start: 2025-08-24 | End: 2025-08-28 | Stop reason: HOSPADM

## 2025-08-24 RX ORDER — ACETAMINOPHEN 325 MG/1
650 TABLET ORAL EVERY 6 HOURS PRN
Status: DISCONTINUED | OUTPATIENT
Start: 2025-08-24 | End: 2025-08-28 | Stop reason: HOSPADM

## 2025-08-24 RX ORDER — ONDANSETRON 4 MG/1
4 TABLET, FILM COATED ORAL EVERY 8 HOURS PRN
Status: DISCONTINUED | OUTPATIENT
Start: 2025-08-24 | End: 2025-08-28 | Stop reason: HOSPADM

## 2025-08-24 ASSESSMENT — PATIENT HEALTH QUESTIONNAIRE - PHQ9
SUM OF ALL RESPONSES TO PHQ QUESTIONS 1-9: 2
2. FEELING DOWN, DEPRESSED OR HOPELESS: SEVERAL DAYS
SUM OF ALL RESPONSES TO PHQ QUESTIONS 1-9: 2
1. LITTLE INTEREST OR PLEASURE IN DOING THINGS: SEVERAL DAYS

## 2025-08-24 ASSESSMENT — PAIN DESCRIPTION - DESCRIPTORS
DESCRIPTORS: DISCOMFORT
DESCRIPTORS: SORE

## 2025-08-24 ASSESSMENT — PAIN DESCRIPTION - LOCATION: LOCATION: ABDOMEN

## 2025-08-24 ASSESSMENT — SLEEP AND FATIGUE QUESTIONNAIRES
DO YOU HAVE DIFFICULTY SLEEPING: NO
DO YOU USE A SLEEP AID: NO
AVERAGE NUMBER OF SLEEP HOURS: 6

## 2025-08-24 ASSESSMENT — PAIN SCALES - GENERAL
PAINLEVEL_OUTOF10: 8
PAINLEVEL_OUTOF10: 8
PAINLEVEL_OUTOF10: 0
PAINLEVEL_OUTOF10: 0

## 2025-08-24 ASSESSMENT — PAIN DESCRIPTION - PAIN TYPE
TYPE: ACUTE PAIN
TYPE: ACUTE PAIN

## 2025-08-24 ASSESSMENT — PAIN DESCRIPTION - ORIENTATION: ORIENTATION: MID;LOWER

## 2025-08-24 ASSESSMENT — PAIN DESCRIPTION - FREQUENCY
FREQUENCY: INTERMITTENT
FREQUENCY: INTERMITTENT

## 2025-08-25 PROBLEM — F19.90 POLYSUBSTANCE USE DISORDER: Status: ACTIVE | Noted: 2022-03-18

## 2025-08-25 PROCEDURE — 6370000000 HC RX 637 (ALT 250 FOR IP): Performed by: PSYCHIATRY & NEUROLOGY

## 2025-08-25 PROCEDURE — APPSS60 APP SPLIT SHARED TIME 46-60 MINUTES

## 2025-08-25 PROCEDURE — 1240000000 HC EMOTIONAL WELLNESS R&B

## 2025-08-25 PROCEDURE — 99222 1ST HOSP IP/OBS MODERATE 55: CPT | Performed by: PSYCHIATRY & NEUROLOGY

## 2025-08-25 RX ORDER — OLANZAPINE 5 MG/1
5 TABLET, FILM COATED ORAL NIGHTLY
Status: DISCONTINUED | OUTPATIENT
Start: 2025-08-25 | End: 2025-08-28 | Stop reason: HOSPADM

## 2025-08-25 RX ADMIN — OLANZAPINE 5 MG: 5 TABLET, FILM COATED ORAL at 20:20

## 2025-08-25 RX ADMIN — IBUPROFEN 400 MG: 400 TABLET, FILM COATED ORAL at 17:32

## 2025-08-25 RX ADMIN — ONDANSETRON HYDROCHLORIDE 4 MG: 4 TABLET, FILM COATED ORAL at 17:32

## 2025-08-25 RX ADMIN — CYCLOBENZAPRINE 10 MG: 10 TABLET, FILM COATED ORAL at 17:32

## 2025-08-25 RX ADMIN — HYDROXYZINE HYDROCHLORIDE 50 MG: 50 TABLET ORAL at 17:32

## 2025-08-25 RX ADMIN — DICYCLOMINE HYDROCHLORIDE 20 MG: 10 CAPSULE ORAL at 17:32

## 2025-08-25 RX ADMIN — TRAZODONE HYDROCHLORIDE 50 MG: 50 TABLET ORAL at 20:19

## 2025-08-25 RX ADMIN — CLONIDINE HYDROCHLORIDE 0.1 MG: 0.1 TABLET ORAL at 20:19

## 2025-08-25 RX ADMIN — ACETAMINOPHEN 650 MG: 325 TABLET ORAL at 20:19

## 2025-08-25 ASSESSMENT — PAIN - FUNCTIONAL ASSESSMENT
PAIN_FUNCTIONAL_ASSESSMENT: 0-10
PAIN_FUNCTIONAL_ASSESSMENT: 0-10

## 2025-08-25 ASSESSMENT — PAIN DESCRIPTION - LOCATION: LOCATION: GENERALIZED

## 2025-08-25 ASSESSMENT — PAIN SCALES - GENERAL
PAINLEVEL_OUTOF10: 7
PAINLEVEL_OUTOF10: 10

## 2025-08-26 PROCEDURE — 6370000000 HC RX 637 (ALT 250 FOR IP): Performed by: INTERNAL MEDICINE

## 2025-08-26 PROCEDURE — 1240000000 HC EMOTIONAL WELLNESS R&B

## 2025-08-26 PROCEDURE — 6370000000 HC RX 637 (ALT 250 FOR IP): Performed by: PSYCHIATRY & NEUROLOGY

## 2025-08-26 PROCEDURE — APPSS30 APP SPLIT SHARED TIME 16-30 MINUTES: Performed by: NURSE PRACTITIONER

## 2025-08-26 PROCEDURE — 99232 SBSQ HOSP IP/OBS MODERATE 35: CPT | Performed by: PSYCHIATRY & NEUROLOGY

## 2025-08-26 RX ORDER — AMLODIPINE BESYLATE 10 MG/1
10 TABLET ORAL DAILY
Status: DISCONTINUED | OUTPATIENT
Start: 2025-08-26 | End: 2025-08-28 | Stop reason: HOSPADM

## 2025-08-26 RX ADMIN — HYDROXYZINE HYDROCHLORIDE 50 MG: 50 TABLET ORAL at 09:11

## 2025-08-26 RX ADMIN — OLANZAPINE 5 MG: 5 TABLET, FILM COATED ORAL at 21:39

## 2025-08-26 RX ADMIN — HYDROXYZINE HYDROCHLORIDE 50 MG: 50 TABLET ORAL at 21:39

## 2025-08-26 RX ADMIN — TRAZODONE HYDROCHLORIDE 50 MG: 50 TABLET ORAL at 21:39

## 2025-08-26 RX ADMIN — AMLODIPINE BESYLATE 10 MG: 10 TABLET ORAL at 14:00

## 2025-08-26 RX ADMIN — FLUOXETINE HYDROCHLORIDE 20 MG: 20 CAPSULE ORAL at 09:11

## 2025-08-26 RX ADMIN — IBUPROFEN 400 MG: 400 TABLET, FILM COATED ORAL at 09:11

## 2025-08-26 ASSESSMENT — PAIN SCALES - GENERAL
PAINLEVEL_OUTOF10: 8
PAINLEVEL_OUTOF10: 8

## 2025-08-26 ASSESSMENT — PAIN - FUNCTIONAL ASSESSMENT: PAIN_FUNCTIONAL_ASSESSMENT: 0-10

## 2025-08-26 ASSESSMENT — PAIN DESCRIPTION - LOCATION
LOCATION: ABDOMEN
LOCATION: ABDOMEN

## 2025-08-27 PROCEDURE — 6370000000 HC RX 637 (ALT 250 FOR IP): Performed by: INTERNAL MEDICINE

## 2025-08-27 PROCEDURE — 6370000000 HC RX 637 (ALT 250 FOR IP): Performed by: PSYCHIATRY & NEUROLOGY

## 2025-08-27 PROCEDURE — 99232 SBSQ HOSP IP/OBS MODERATE 35: CPT | Performed by: PSYCHIATRY & NEUROLOGY

## 2025-08-27 PROCEDURE — 1240000000 HC EMOTIONAL WELLNESS R&B

## 2025-08-27 PROCEDURE — 99222 1ST HOSP IP/OBS MODERATE 55: CPT | Performed by: INTERNAL MEDICINE

## 2025-08-27 PROCEDURE — APPSS30 APP SPLIT SHARED TIME 16-30 MINUTES: Performed by: NURSE PRACTITIONER

## 2025-08-27 RX ORDER — LISINOPRIL 20 MG/1
40 TABLET ORAL DAILY
Status: DISCONTINUED | OUTPATIENT
Start: 2025-08-27 | End: 2025-08-28 | Stop reason: HOSPADM

## 2025-08-27 RX ADMIN — CLONIDINE HYDROCHLORIDE 0.1 MG: 0.1 TABLET ORAL at 01:39

## 2025-08-27 RX ADMIN — DICYCLOMINE HYDROCHLORIDE 20 MG: 10 CAPSULE ORAL at 08:49

## 2025-08-27 RX ADMIN — DICYCLOMINE HYDROCHLORIDE 20 MG: 10 CAPSULE ORAL at 01:39

## 2025-08-27 RX ADMIN — HYDROXYZINE HYDROCHLORIDE 50 MG: 50 TABLET ORAL at 21:32

## 2025-08-27 RX ADMIN — LISINOPRIL 40 MG: 20 TABLET ORAL at 12:02

## 2025-08-27 RX ADMIN — CLONIDINE HYDROCHLORIDE 0.1 MG: 0.1 TABLET ORAL at 08:49

## 2025-08-27 RX ADMIN — CYCLOBENZAPRINE 10 MG: 10 TABLET, FILM COATED ORAL at 01:39

## 2025-08-27 RX ADMIN — OLANZAPINE 5 MG: 5 TABLET, FILM COATED ORAL at 21:32

## 2025-08-27 RX ADMIN — FLUOXETINE HYDROCHLORIDE 20 MG: 20 CAPSULE ORAL at 08:49

## 2025-08-27 RX ADMIN — HYDROXYZINE HYDROCHLORIDE 50 MG: 50 TABLET ORAL at 08:49

## 2025-08-27 RX ADMIN — AMLODIPINE BESYLATE 10 MG: 10 TABLET ORAL at 08:49

## 2025-08-27 RX ADMIN — ONDANSETRON HYDROCHLORIDE 4 MG: 4 TABLET, FILM COATED ORAL at 21:32

## 2025-08-27 RX ADMIN — CYCLOBENZAPRINE 10 MG: 10 TABLET, FILM COATED ORAL at 08:49

## 2025-08-27 RX ADMIN — DICYCLOMINE HYDROCHLORIDE 20 MG: 10 CAPSULE ORAL at 21:32

## 2025-08-27 ASSESSMENT — PAIN - FUNCTIONAL ASSESSMENT
PAIN_FUNCTIONAL_ASSESSMENT: ACTIVITIES ARE NOT PREVENTED
PAIN_FUNCTIONAL_ASSESSMENT: 0-10

## 2025-08-27 ASSESSMENT — PAIN SCALES - GENERAL
PAINLEVEL_OUTOF10: 6
PAINLEVEL_OUTOF10: 0
PAINLEVEL_OUTOF10: 0

## 2025-08-27 ASSESSMENT — PAIN DESCRIPTION - DESCRIPTORS: DESCRIPTORS: DISCOMFORT;CRAMPING

## 2025-08-27 ASSESSMENT — PAIN DESCRIPTION - ORIENTATION: ORIENTATION: MID

## 2025-08-27 ASSESSMENT — PAIN DESCRIPTION - LOCATION: LOCATION: ABDOMEN

## 2025-08-28 VITALS
SYSTOLIC BLOOD PRESSURE: 125 MMHG | HEART RATE: 79 BPM | OXYGEN SATURATION: 99 % | TEMPERATURE: 97.8 F | RESPIRATION RATE: 14 BRPM | WEIGHT: 170 LBS | BODY MASS INDEX: 24.34 KG/M2 | HEIGHT: 70 IN | DIASTOLIC BLOOD PRESSURE: 100 MMHG

## 2025-08-28 PROCEDURE — 6370000000 HC RX 637 (ALT 250 FOR IP): Performed by: PSYCHIATRY & NEUROLOGY

## 2025-08-28 PROCEDURE — 6370000000 HC RX 637 (ALT 250 FOR IP): Performed by: INTERNAL MEDICINE

## 2025-08-28 PROCEDURE — 99239 HOSP IP/OBS DSCHRG MGMT >30: CPT | Performed by: PSYCHIATRY & NEUROLOGY

## 2025-08-28 PROCEDURE — 99232 SBSQ HOSP IP/OBS MODERATE 35: CPT | Performed by: INTERNAL MEDICINE

## 2025-08-28 PROCEDURE — APPSS30 APP SPLIT SHARED TIME 16-30 MINUTES

## 2025-08-28 RX ORDER — TRAZODONE HYDROCHLORIDE 50 MG/1
50 TABLET ORAL NIGHTLY PRN
Qty: 30 TABLET | Refills: 0 | Status: SHIPPED | OUTPATIENT
Start: 2025-08-28

## 2025-08-28 RX ORDER — AMLODIPINE BESYLATE 10 MG/1
10 TABLET ORAL DAILY
Qty: 30 TABLET | Refills: 3 | Status: SHIPPED | OUTPATIENT
Start: 2025-08-28

## 2025-08-28 RX ORDER — OLANZAPINE 5 MG/1
5 TABLET, FILM COATED ORAL NIGHTLY
Qty: 30 TABLET | Refills: 3 | Status: SHIPPED | OUTPATIENT
Start: 2025-08-28

## 2025-08-28 RX ORDER — HYDROXYZINE HYDROCHLORIDE 50 MG/1
50 TABLET, FILM COATED ORAL 3 TIMES DAILY PRN
Qty: 30 TABLET | Refills: 0 | Status: SHIPPED | OUTPATIENT
Start: 2025-08-28

## 2025-08-28 RX ORDER — LISINOPRIL 40 MG/1
40 TABLET ORAL DAILY
Qty: 30 TABLET | Refills: 0 | Status: SHIPPED | OUTPATIENT
Start: 2025-08-28

## 2025-08-28 RX ADMIN — DICYCLOMINE HYDROCHLORIDE 20 MG: 10 CAPSULE ORAL at 15:04

## 2025-08-28 RX ADMIN — CLONIDINE HYDROCHLORIDE 0.1 MG: 0.1 TABLET ORAL at 11:03

## 2025-08-28 RX ADMIN — LISINOPRIL 40 MG: 20 TABLET ORAL at 11:03

## 2025-08-28 RX ADMIN — DICYCLOMINE HYDROCHLORIDE 20 MG: 10 CAPSULE ORAL at 11:03

## 2025-08-28 RX ADMIN — ONDANSETRON HYDROCHLORIDE 4 MG: 4 TABLET, FILM COATED ORAL at 11:04

## 2025-08-28 RX ADMIN — CYCLOBENZAPRINE 10 MG: 10 TABLET, FILM COATED ORAL at 15:04

## 2025-08-28 RX ADMIN — AMLODIPINE BESYLATE 10 MG: 10 TABLET ORAL at 11:03

## 2025-08-28 RX ADMIN — FLUOXETINE HYDROCHLORIDE 20 MG: 20 CAPSULE ORAL at 11:03

## 2025-08-28 RX ADMIN — HYDROXYZINE HYDROCHLORIDE 50 MG: 50 TABLET ORAL at 15:04

## 2025-08-28 ASSESSMENT — PAIN DESCRIPTION - LOCATION
LOCATION: ABDOMEN
LOCATION: OTHER (COMMENT)

## 2025-08-28 ASSESSMENT — PAIN - FUNCTIONAL ASSESSMENT
PAIN_FUNCTIONAL_ASSESSMENT: 0-10
PAIN_FUNCTIONAL_ASSESSMENT: 0-10
PAIN_FUNCTIONAL_ASSESSMENT: PREVENTS OR INTERFERES SOME ACTIVE ACTIVITIES AND ADLS

## 2025-08-28 ASSESSMENT — PAIN SCALES - GENERAL
PAINLEVEL_OUTOF10: 7
PAINLEVEL_OUTOF10: 0
PAINLEVEL_OUTOF10: 0

## 2025-08-28 ASSESSMENT — PAIN DESCRIPTION - DESCRIPTORS: DESCRIPTORS: CRAMPING;DISCOMFORT

## (undated) DEVICE — DRESSING PETRO W3XL8IN OIL EMUL N ADH GZ KNIT IMPREG CELOS

## (undated) DEVICE — COVER LT HNDL BLU PLAS

## (undated) DEVICE — GLOVE ORANGE PI 8   MSG9080

## (undated) DEVICE — PADDING CAST 4 YDX3 IN COTTON NS WBRL

## (undated) DEVICE — GARMENT COMPR STD FOR 17IN CALF UNIF THER FLOTRN

## (undated) DEVICE — DRAPE,U/ SHT,SPLIT,PLAS,STERIL: Brand: MEDLINE

## (undated) DEVICE — BANDAGE COMPR W4XL108IN WHT LAYERED NO CLSR SYN RUB ESMARCH

## (undated) DEVICE — PENCIL ES L3M BTTN SWCH HOLSTER W/ BLDE ELECTRD EDGE

## (undated) DEVICE — CORD,CAUTERY,BIPOLAR,STERILE: Brand: MEDLINE

## (undated) DEVICE — SUTURE NONABSORBABLE MONOFILAMENT 7-0 BV-1 1X24 IN PROLENE 8702H

## (undated) DEVICE — GLOVE ORANGE PI 7   MSG9070

## (undated) DEVICE — GAUZE,SPONGE,4"X4",16PLY,XRAY,STRL,LF: Brand: MEDLINE

## (undated) DEVICE — GAUZE,SPONGE,FLUFF,6"X6.75",STRL,5/TRAY: Brand: MEDLINE

## (undated) DEVICE — SUTURE VCRL SZ 3-0 L27IN ABSRB UD L26MM SH 1/2 CIR J416H

## (undated) DEVICE — GLOVE SURG SZ 6 THK91MIL LTX FREE SYN POLYISOPRENE ANTI

## (undated) DEVICE — TRAY CATHETER 16FR F INCLUDE BARDX IC COMPLT CARE DRNGE BG

## (undated) DEVICE — Device

## (undated) DEVICE — MEDI-VAC SUCTION HANDLE REGULAR CAPACITY: Brand: CARDINAL HEALTH

## (undated) DEVICE — CHLORAPREP 26ML ORANGE

## (undated) DEVICE — ZIMMER® STERILE DISPOSABLE TOURNIQUET CUFF WITH PROTECTIVE SLEEVE AND PLC, DUAL PORT, SINGLE BLADDER, 18 IN. (46 CM)

## (undated) DEVICE — SUTURE VCRL SZ 3-0 L54IN ABSRB UD LIGAPAK REEL POLYGLACTIN J285G

## (undated) DEVICE — 2F 40 CM LEMAITRE EMBOLECTOMY CATHETER: Brand: LEMAITRE EMBOLECTOMY CATHETER

## (undated) DEVICE — GLOVE SURG SZ 65 THK91MIL LTX FREE SYN POLYISOPRENE

## (undated) DEVICE — ZIMMER® A.T.S. CYCLINDRICAL TOURNIQUET CUFF, DUAL PORT, DUAL BLADDER 18 IN. (46 CM)

## (undated) DEVICE — PAD,NON-ADHERENT,3X8,STERILE,LF,1/PK: Brand: MEDLINE

## (undated) DEVICE — INTENDED FOR TISSUE SEPARATION, AND OTHER PROCEDURES THAT REQUIRE A SHARP SURGICAL BLADE TO PUNCTURE OR CUT.: Brand: BARD-PARKER ® CARBON RIB-BACK BLADES

## (undated) DEVICE — CONVERTED USE 338908 SPONGES LAP 18X18 ST

## (undated) DEVICE — TOWEL SURG W16XL26IN WHT NONFENESTRATED ST 4 PER PK

## (undated) DEVICE — BOOT SUT STD RED IN BLU SMOOTH RADPQ

## (undated) DEVICE — PADDING CAST W2INXL4YD ST COT COHESIVE HND TEARABLE SPEC

## (undated) DEVICE — ZIMMER® STERILE DISPOSABLE TOURNIQUET CUFF WITH PROTECTIVE SLEEVE AND PLC, DUAL PORT, SINGLE BLADDER, 24 IN. (61 CM)

## (undated) DEVICE — CONVERTED USE 323606 PAD CAST COTN ST 3INX4YD

## (undated) DEVICE — SUTURE NONABSORBABLE MONOFILAMENT 6-0 C-1 1X30 IN PROLENE 8706H

## (undated) DEVICE — SUTURE PROL SZ 5-0 L36IN NONABSORBABLE BLU L13MM C-1 3/8 8720H

## (undated) DEVICE — SUTURE VCRL SZ 2-0 L54IN ABSRB UD LIGAPAK REEL NDL J286G

## (undated) DEVICE — PREP SOL PVP IODINE 4%  4 OZ/BTL

## (undated) DEVICE — TAPE CAST W3INXL4YD WHT FBRGLS POLYUR RESIN LO TACK RIG

## (undated) DEVICE — GOWN,AURORA,NONRNF,XL,30/CS: Brand: MEDLINE

## (undated) DEVICE — DECANTER FLD 9IN ST BG FOR ASEP TRNSF OF FLD

## (undated) DEVICE — PADDING 4YDX2IN COTTON NONSTER WEBRIL

## (undated) DEVICE — GLOVE ORANGE PI 7 1/2   MSG9075

## (undated) DEVICE — GOWN,AURORA,NONREINFORCED,LARGE: Brand: MEDLINE